# Patient Record
Sex: FEMALE | Race: WHITE | Employment: UNEMPLOYED | URBAN - METROPOLITAN AREA
[De-identification: names, ages, dates, MRNs, and addresses within clinical notes are randomized per-mention and may not be internally consistent; named-entity substitution may affect disease eponyms.]

---

## 2017-01-01 ENCOUNTER — APPOINTMENT (OUTPATIENT)
Dept: GENERAL RADIOLOGY | Age: 47
DRG: 053 | End: 2017-01-01
Payer: MEDICAID

## 2017-01-01 ENCOUNTER — HOSPITAL ENCOUNTER (EMERGENCY)
Age: 47
Discharge: HOME | DRG: 053 | End: 2017-12-28
Attending: EMERGENCY MEDICINE
Payer: MEDICAID

## 2017-01-01 ENCOUNTER — APPOINTMENT (OUTPATIENT)
Dept: CT IMAGING | Age: 47
DRG: 053 | End: 2017-01-01
Payer: MEDICAID

## 2017-01-01 ENCOUNTER — HOSPITAL ENCOUNTER (INPATIENT)
Age: 47
LOS: 5 days | Discharge: NURSING, REHAB OR LONG-TERM CARE | DRG: 053 | End: 2018-01-05
Attending: EMERGENCY MEDICINE | Admitting: FAMILY MEDICINE
Payer: MEDICAID

## 2017-01-01 VITALS
SYSTOLIC BLOOD PRESSURE: 126 MMHG | DIASTOLIC BLOOD PRESSURE: 79 MMHG | RESPIRATION RATE: 14 BRPM | TEMPERATURE: 97.2 F | HEART RATE: 107 BPM | OXYGEN SATURATION: 100 %

## 2017-01-01 DIAGNOSIS — D50.0 IRON DEFICIENCY ANEMIA DUE TO CHRONIC BLOOD LOSS: ICD-10-CM

## 2017-01-01 DIAGNOSIS — R91.8 MASS OF LOWER LOBE OF LEFT LUNG: Primary | Chronic | ICD-10-CM

## 2017-01-01 DIAGNOSIS — R56.9 SEIZURE (HCC): Primary | ICD-10-CM

## 2017-01-01 DIAGNOSIS — E87.6 HYPOKALEMIA: ICD-10-CM

## 2017-01-01 DIAGNOSIS — J18.9 PNEUMONIA OF RIGHT MIDDLE LOBE DUE TO INFECTIOUS ORGANISM: ICD-10-CM

## 2017-01-01 DIAGNOSIS — D68.2: Chronic | ICD-10-CM

## 2017-01-01 DIAGNOSIS — S03.00XA DISLOCATION OF TEMPOROMANDIBULAR JOINT, INITIAL ENCOUNTER: Primary | ICD-10-CM

## 2017-01-01 DIAGNOSIS — D64.9 NORMOCYTIC ANEMIA: Chronic | ICD-10-CM

## 2017-01-01 DIAGNOSIS — D63.8 ANEMIA, CHRONIC DISEASE: ICD-10-CM

## 2017-01-01 DIAGNOSIS — E87.1 HYPONATREMIA: ICD-10-CM

## 2017-01-01 DIAGNOSIS — R53.83 FATIGUE, UNSPECIFIED TYPE: ICD-10-CM

## 2017-01-01 DIAGNOSIS — D64.9 ANEMIA, UNSPECIFIED TYPE: ICD-10-CM

## 2017-01-01 DIAGNOSIS — R91.8 MASS OF LOWER LOBE OF LEFT LUNG: Chronic | ICD-10-CM

## 2017-01-01 LAB
-: NORMAL
ABSOLUTE EOS #: 0.12 K/UL (ref 0–0.44)
ABSOLUTE IMMATURE GRANULOCYTE: 0.15 K/UL (ref 0–0.3)
ABSOLUTE LYMPH #: 1.1 K/UL (ref 1.1–3.7)
ABSOLUTE MONO #: 1.36 K/UL (ref 0.1–1.2)
ALBUMIN SERPL-MCNC: 2.4 G/DL (ref 3.5–5.2)
ALBUMIN/GLOBULIN RATIO: 0.4 (ref 1–2.5)
ALP BLD-CCNC: 567 U/L (ref 35–104)
ALT SERPL-CCNC: 12 U/L (ref 5–33)
AMMONIA: 20 UMOL/L (ref 11–51)
AMORPHOUS: NORMAL
ANION GAP SERPL CALCULATED.3IONS-SCNC: 16 MMOL/L (ref 9–17)
AST SERPL-CCNC: 32 U/L
BACTERIA: NORMAL
BASOPHILS # BLD: 0 % (ref 0–2)
BASOPHILS ABSOLUTE: 0.06 K/UL (ref 0–0.2)
BILIRUB SERPL-MCNC: 1.16 MG/DL (ref 0.3–1.2)
BILIRUBIN URINE: NEGATIVE
BUN BLDV-MCNC: 12 MG/DL (ref 6–20)
BUN/CREAT BLD: ABNORMAL (ref 9–20)
CALCIUM SERPL-MCNC: 8.5 MG/DL (ref 8.6–10.4)
CASTS UA: NORMAL /LPF (ref 0–8)
CHLORIDE BLD-SCNC: 89 MMOL/L (ref 98–107)
CO2: 23 MMOL/L (ref 20–31)
COLOR: YELLOW
CREAT SERPL-MCNC: 0.45 MG/DL (ref 0.5–0.9)
CRYSTALS, UA: NORMAL /HPF
DIFFERENTIAL TYPE: ABNORMAL
EOSINOPHILS RELATIVE PERCENT: 1 % (ref 1–4)
EPITHELIAL CELLS UA: NORMAL /HPF (ref 0–5)
FOLATE: >20 NG/ML
GFR AFRICAN AMERICAN: >60 ML/MIN
GFR NON-AFRICAN AMERICAN: >60 ML/MIN
GFR SERPL CREATININE-BSD FRML MDRD: ABNORMAL ML/MIN/{1.73_M2}
GFR SERPL CREATININE-BSD FRML MDRD: ABNORMAL ML/MIN/{1.73_M2}
GLUCOSE BLD-MCNC: 124 MG/DL (ref 70–99)
GLUCOSE URINE: NEGATIVE
HCT VFR BLD CALC: 22.4 % (ref 36.3–47.1)
HCT VFR BLD CALC: 24.7 % (ref 36.3–47.1)
HEMOGLOBIN: 6.9 G/DL (ref 11.9–15.1)
HEMOGLOBIN: 7.8 G/DL (ref 11.9–15.1)
IMMATURE GRANULOCYTES: 1 %
INR BLD: 1.3
KETONES, URINE: NEGATIVE
LACTIC ACID, WHOLE BLOOD: 0.8 MMOL/L (ref 0.7–2.1)
LEUKOCYTE ESTERASE, URINE: NEGATIVE
LYMPHOCYTES # BLD: 6 % (ref 24–43)
MCH RBC QN AUTO: 26.2 PG (ref 25.2–33.5)
MCHC RBC AUTO-ENTMCNC: 30.8 G/DL (ref 28.4–34.8)
MCV RBC AUTO: 85.2 FL (ref 82.6–102.9)
MONOCYTES # BLD: 8 % (ref 3–12)
MUCUS: NORMAL
NITRITE, URINE: NEGATIVE
OTHER OBSERVATIONS UA: NORMAL
PARTIAL THROMBOPLASTIN TIME: 19.9 SEC (ref 21.3–31.3)
PDW BLD-RTO: 16.4 % (ref 11.8–14.4)
PH UA: 6 (ref 5–8)
PLATELET # BLD: 571 K/UL (ref 138–453)
PLATELET ESTIMATE: ABNORMAL
PMV BLD AUTO: 8.8 FL (ref 8.1–13.5)
POC TROPONIN I: 0.01 NG/ML (ref 0–0.1)
POC TROPONIN INTERP: NORMAL
POTASSIUM SERPL-SCNC: 3.1 MMOL/L (ref 3.7–5.3)
POTASSIUM SERPL-SCNC: 3.7 MMOL/L (ref 3.7–5.3)
PROLACTIN: 21.83 UG/L (ref 4.79–23.3)
PROTEIN UA: NEGATIVE
PROTHROMBIN TIME: 13.6 SEC (ref 9.4–12.6)
RBC # BLD: 2.63 M/UL (ref 3.95–5.11)
RBC # BLD: ABNORMAL 10*6/UL
RBC UA: NORMAL /HPF (ref 0–4)
RENAL EPITHELIAL, UA: NORMAL /HPF
SEG NEUTROPHILS: 84 % (ref 36–65)
SEGMENTED NEUTROPHILS ABSOLUTE COUNT: 14.43 K/UL (ref 1.5–8.1)
SODIUM BLD-SCNC: 128 MMOL/L (ref 135–144)
SODIUM BLD-SCNC: 135 MMOL/L (ref 135–144)
SODIUM BLD-SCNC: 137 MMOL/L (ref 135–144)
SODIUM BLD-SCNC: 137 MMOL/L (ref 135–144)
SPECIFIC GRAVITY UA: 1.01 (ref 1–1.03)
TOTAL PROTEIN: 7.8 G/DL (ref 6.4–8.3)
TRICHOMONAS: NORMAL
TURBIDITY: CLEAR
URINE HGB: NEGATIVE
UROBILINOGEN, URINE: NORMAL
VITAMIN B-12: 473 PG/ML (ref 232–1245)
WBC # BLD: 17.2 K/UL (ref 3.5–11.3)
WBC # BLD: ABNORMAL 10*3/UL
WBC UA: NORMAL /HPF (ref 0–5)
YEAST: NORMAL

## 2017-01-01 PROCEDURE — 96372 THER/PROPH/DIAG INJ SC/IM: CPT

## 2017-01-01 PROCEDURE — 2580000003 HC RX 258: Performed by: NURSE PRACTITIONER

## 2017-01-01 PROCEDURE — 36415 COLL VENOUS BLD VENIPUNCTURE: CPT

## 2017-01-01 PROCEDURE — 84295 ASSAY OF SERUM SODIUM: CPT

## 2017-01-01 PROCEDURE — 36430 TRANSFUSION BLD/BLD COMPNT: CPT

## 2017-01-01 PROCEDURE — 82140 ASSAY OF AMMONIA: CPT

## 2017-01-01 PROCEDURE — 81001 URINALYSIS AUTO W/SCOPE: CPT

## 2017-01-01 PROCEDURE — 99223 1ST HOSP IP/OBS HIGH 75: CPT | Performed by: FAMILY MEDICINE

## 2017-01-01 PROCEDURE — 85025 COMPLETE CBC W/AUTO DIFF WBC: CPT

## 2017-01-01 PROCEDURE — 6360000002 HC RX W HCPCS: Performed by: NURSE PRACTITIONER

## 2017-01-01 PROCEDURE — 87040 BLOOD CULTURE FOR BACTERIA: CPT

## 2017-01-01 PROCEDURE — 86901 BLOOD TYPING SEROLOGIC RH(D): CPT

## 2017-01-01 PROCEDURE — 80307 DRUG TEST PRSMV CHEM ANLYZR: CPT

## 2017-01-01 PROCEDURE — 98925 OSTEOPATH MANJ 1-2 REGIONS: CPT

## 2017-01-01 PROCEDURE — 6360000002 HC RX W HCPCS: Performed by: FAMILY MEDICINE

## 2017-01-01 PROCEDURE — 6360000002 HC RX W HCPCS: Performed by: EMERGENCY MEDICINE

## 2017-01-01 PROCEDURE — 70110 X-RAY EXAM OF JAW 4/> VIEWS: CPT

## 2017-01-01 PROCEDURE — 87086 URINE CULTURE/COLONY COUNT: CPT

## 2017-01-01 PROCEDURE — G0382 LEV 3 HOSP TYPE B ED VISIT: HCPCS

## 2017-01-01 PROCEDURE — 99285 EMERGENCY DEPT VISIT HI MDM: CPT

## 2017-01-01 PROCEDURE — 84484 ASSAY OF TROPONIN QUANT: CPT

## 2017-01-01 PROCEDURE — 70450 CT HEAD/BRAIN W/O DYE: CPT

## 2017-01-01 PROCEDURE — 94640 AIRWAY INHALATION TREATMENT: CPT

## 2017-01-01 PROCEDURE — 86900 BLOOD TYPING SEROLOGIC ABO: CPT

## 2017-01-01 PROCEDURE — 2580000003 HC RX 258: Performed by: EMERGENCY MEDICINE

## 2017-01-01 PROCEDURE — 86850 RBC ANTIBODY SCREEN: CPT

## 2017-01-01 PROCEDURE — 70100 X-RAY EXAM OF JAW <4VIEWS: CPT

## 2017-01-01 PROCEDURE — 6370000000 HC RX 637 (ALT 250 FOR IP): Performed by: FAMILY MEDICINE

## 2017-01-01 PROCEDURE — 85730 THROMBOPLASTIN TIME PARTIAL: CPT

## 2017-01-01 PROCEDURE — 84132 ASSAY OF SERUM POTASSIUM: CPT

## 2017-01-01 PROCEDURE — 85014 HEMATOCRIT: CPT

## 2017-01-01 PROCEDURE — 6370000000 HC RX 637 (ALT 250 FOR IP): Performed by: NURSE PRACTITIONER

## 2017-01-01 PROCEDURE — 80053 COMPREHEN METABOLIC PANEL: CPT

## 2017-01-01 PROCEDURE — 94762 N-INVAS EAR/PLS OXIMTRY CONT: CPT

## 2017-01-01 PROCEDURE — 85610 PROTHROMBIN TIME: CPT

## 2017-01-01 PROCEDURE — 86920 COMPATIBILITY TEST SPIN: CPT

## 2017-01-01 PROCEDURE — 2580000003 HC RX 258: Performed by: FAMILY MEDICINE

## 2017-01-01 PROCEDURE — 2060000000 HC ICU INTERMEDIATE R&B

## 2017-01-01 PROCEDURE — 2500000003 HC RX 250 WO HCPCS: Performed by: FAMILY MEDICINE

## 2017-01-01 PROCEDURE — 83605 ASSAY OF LACTIC ACID: CPT

## 2017-01-01 PROCEDURE — 82746 ASSAY OF FOLIC ACID SERUM: CPT

## 2017-01-01 PROCEDURE — 84146 ASSAY OF PROLACTIN: CPT

## 2017-01-01 PROCEDURE — 82607 VITAMIN B-12: CPT

## 2017-01-01 PROCEDURE — 71010 XR CHEST PORTABLE: CPT

## 2017-01-01 PROCEDURE — 85018 HEMOGLOBIN: CPT

## 2017-01-01 PROCEDURE — 99255 IP/OBS CONSLTJ NEW/EST HI 80: CPT | Performed by: PSYCHIATRY & NEUROLOGY

## 2017-01-01 PROCEDURE — P9040 RBC LEUKOREDUCED IRRADIATED: HCPCS

## 2017-01-01 RX ORDER — PROMETHAZINE HYDROCHLORIDE 25 MG/1
12.5 TABLET ORAL EVERY 6 HOURS PRN
Status: DISCONTINUED | OUTPATIENT
Start: 2017-01-01 | End: 2018-01-01 | Stop reason: HOSPADM

## 2017-01-01 RX ORDER — LORAZEPAM 2 MG/ML
2 INJECTION INTRAMUSCULAR EVERY 4 HOURS PRN
Status: DISCONTINUED | OUTPATIENT
Start: 2017-01-01 | End: 2018-01-01

## 2017-01-01 RX ORDER — MORPHINE SULFATE 10 MG/ML
2 INJECTION, SOLUTION INTRAMUSCULAR; INTRAVENOUS ONCE
Status: COMPLETED | OUTPATIENT
Start: 2017-01-01 | End: 2017-01-01

## 2017-01-01 RX ORDER — OXYCODONE HYDROCHLORIDE AND ACETAMINOPHEN 5; 325 MG/1; MG/1
1 TABLET ORAL EVERY 6 HOURS PRN
Qty: 40 TABLET | Refills: 0 | Status: ON HOLD | OUTPATIENT
Start: 2017-01-01 | End: 2018-01-01

## 2017-01-01 RX ORDER — POTASSIUM CHLORIDE 7.45 MG/ML
10 INJECTION INTRAVENOUS PRN
Status: DISCONTINUED | OUTPATIENT
Start: 2017-01-01 | End: 2018-01-01 | Stop reason: HOSPADM

## 2017-01-01 RX ORDER — ASPIRIN 81 MG/1
81 TABLET, CHEWABLE ORAL DAILY
Status: DISCONTINUED | OUTPATIENT
Start: 2017-01-01 | End: 2018-01-01 | Stop reason: HOSPADM

## 2017-01-01 RX ORDER — CETIRIZINE HYDROCHLORIDE 10 MG/1
10 TABLET ORAL DAILY
Status: DISCONTINUED | OUTPATIENT
Start: 2017-01-01 | End: 2018-01-01 | Stop reason: HOSPADM

## 2017-01-01 RX ORDER — SODIUM CHLORIDE 0.9 % (FLUSH) 0.9 %
10 SYRINGE (ML) INJECTION PRN
Status: DISCONTINUED | OUTPATIENT
Start: 2017-01-01 | End: 2018-01-01 | Stop reason: HOSPADM

## 2017-01-01 RX ORDER — IPRATROPIUM BROMIDE AND ALBUTEROL SULFATE 2.5; .5 MG/3ML; MG/3ML
1 SOLUTION RESPIRATORY (INHALATION)
Status: DISCONTINUED | OUTPATIENT
Start: 2017-01-01 | End: 2017-01-01

## 2017-01-01 RX ORDER — 0.9 % SODIUM CHLORIDE 0.9 %
250 INTRAVENOUS SOLUTION INTRAVENOUS ONCE
Status: COMPLETED | OUTPATIENT
Start: 2017-01-01 | End: 2018-01-01

## 2017-01-01 RX ORDER — POTASSIUM CHLORIDE 20 MEQ/1
40 TABLET, EXTENDED RELEASE ORAL PRN
Status: DISCONTINUED | OUTPATIENT
Start: 2017-01-01 | End: 2018-01-01 | Stop reason: HOSPADM

## 2017-01-01 RX ORDER — ALBUTEROL SULFATE 2.5 MG/3ML
2.5 SOLUTION RESPIRATORY (INHALATION) EVERY 4 HOURS PRN
Status: DISCONTINUED | OUTPATIENT
Start: 2017-01-01 | End: 2018-01-01 | Stop reason: HOSPADM

## 2017-01-01 RX ORDER — LEVOFLOXACIN 5 MG/ML
750 INJECTION, SOLUTION INTRAVENOUS EVERY 24 HOURS
Status: DISCONTINUED | OUTPATIENT
Start: 2017-01-01 | End: 2017-01-01

## 2017-01-01 RX ORDER — POTASSIUM CHLORIDE 20 MEQ/1
40 TABLET, EXTENDED RELEASE ORAL ONCE
Status: DISCONTINUED | OUTPATIENT
Start: 2017-01-01 | End: 2018-01-01 | Stop reason: HOSPADM

## 2017-01-01 RX ORDER — ONDANSETRON 2 MG/ML
4 INJECTION INTRAMUSCULAR; INTRAVENOUS EVERY 6 HOURS PRN
Status: DISCONTINUED | OUTPATIENT
Start: 2017-01-01 | End: 2018-01-01 | Stop reason: HOSPADM

## 2017-01-01 RX ORDER — SODIUM CHLORIDE 9 MG/ML
INJECTION, SOLUTION INTRAVENOUS CONTINUOUS
Status: DISCONTINUED | OUTPATIENT
Start: 2017-01-01 | End: 2017-01-01

## 2017-01-01 RX ORDER — MORPHINE SULFATE 10 MG/ML
2 INJECTION, SOLUTION INTRAMUSCULAR; INTRAVENOUS ONCE
Status: DISCONTINUED | OUTPATIENT
Start: 2017-01-01 | End: 2017-01-01

## 2017-01-01 RX ORDER — ECHINACEA PURPUREA EXTRACT 125 MG
1 TABLET ORAL PRN
Status: DISCONTINUED | OUTPATIENT
Start: 2017-01-01 | End: 2018-01-01 | Stop reason: HOSPADM

## 2017-01-01 RX ORDER — GABAPENTIN 300 MG/1
300 CAPSULE ORAL NIGHTLY
Status: DISCONTINUED | OUTPATIENT
Start: 2017-01-01 | End: 2018-01-01 | Stop reason: HOSPADM

## 2017-01-01 RX ORDER — ALBUTEROL SULFATE 2.5 MG/3ML
2.5 SOLUTION RESPIRATORY (INHALATION)
Status: DISCONTINUED | OUTPATIENT
Start: 2017-01-01 | End: 2018-01-01

## 2017-01-01 RX ORDER — NICOTINE 21 MG/24HR
1 PATCH, TRANSDERMAL 24 HOURS TRANSDERMAL DAILY PRN
Status: DISCONTINUED | OUTPATIENT
Start: 2017-01-01 | End: 2018-01-01 | Stop reason: HOSPADM

## 2017-01-01 RX ORDER — CITALOPRAM 20 MG/1
40 TABLET ORAL DAILY
Status: DISCONTINUED | OUTPATIENT
Start: 2017-01-01 | End: 2018-01-01 | Stop reason: HOSPADM

## 2017-01-01 RX ORDER — LORAZEPAM 2 MG/ML
1 INJECTION INTRAMUSCULAR EVERY 6 HOURS PRN
Status: DISCONTINUED | OUTPATIENT
Start: 2017-01-01 | End: 2018-01-01

## 2017-01-01 RX ORDER — PANTOPRAZOLE SODIUM 40 MG/1
40 TABLET, DELAYED RELEASE ORAL DAILY
Status: DISCONTINUED | OUTPATIENT
Start: 2017-01-01 | End: 2018-01-01 | Stop reason: HOSPADM

## 2017-01-01 RX ORDER — POTASSIUM CHLORIDE 20MEQ/15ML
40 LIQUID (ML) ORAL PRN
Status: DISCONTINUED | OUTPATIENT
Start: 2017-01-01 | End: 2018-01-01 | Stop reason: HOSPADM

## 2017-01-01 RX ORDER — VANCOMYCIN HYDROCHLORIDE 1 G/200ML
1000 INJECTION, SOLUTION INTRAVENOUS EVERY 12 HOURS
Status: DISCONTINUED | OUTPATIENT
Start: 2017-01-01 | End: 2017-01-01

## 2017-01-01 RX ORDER — SODIUM CHLORIDE 9 MG/ML
INJECTION, SOLUTION INTRAVENOUS CONTINUOUS
Status: DISCONTINUED | OUTPATIENT
Start: 2017-01-01 | End: 2018-01-01 | Stop reason: HOSPADM

## 2017-01-01 RX ORDER — DICYCLOMINE HYDROCHLORIDE 10 MG/1
10 CAPSULE ORAL
Status: DISCONTINUED | OUTPATIENT
Start: 2017-01-01 | End: 2018-01-01 | Stop reason: HOSPADM

## 2017-01-01 RX ORDER — FLUTICASONE PROPIONATE 50 MCG
1 SPRAY, SUSPENSION (ML) NASAL DAILY
Status: DISCONTINUED | OUTPATIENT
Start: 2017-01-01 | End: 2018-01-01 | Stop reason: HOSPADM

## 2017-01-01 RX ORDER — BISACODYL 10 MG
10 SUPPOSITORY, RECTAL RECTAL DAILY PRN
Status: DISCONTINUED | OUTPATIENT
Start: 2017-01-01 | End: 2018-01-01 | Stop reason: HOSPADM

## 2017-01-01 RX ORDER — SODIUM CHLORIDE, SODIUM LACTATE, POTASSIUM CHLORIDE, AND CALCIUM CHLORIDE .6; .31; .03; .02 G/100ML; G/100ML; G/100ML; G/100ML
250 INJECTION, SOLUTION INTRAVENOUS ONCE
Status: COMPLETED | OUTPATIENT
Start: 2017-01-01 | End: 2017-01-01

## 2017-01-01 RX ORDER — SODIUM CHLORIDE 0.9 % (FLUSH) 0.9 %
10 SYRINGE (ML) INJECTION EVERY 12 HOURS SCHEDULED
Status: DISCONTINUED | OUTPATIENT
Start: 2017-01-01 | End: 2018-01-01 | Stop reason: HOSPADM

## 2017-01-01 RX ORDER — FOLIC ACID 1 MG/1
1 TABLET ORAL DAILY
Status: DISCONTINUED | OUTPATIENT
Start: 2017-01-01 | End: 2018-01-01 | Stop reason: HOSPADM

## 2017-01-01 RX ORDER — DOCUSATE SODIUM 100 MG/1
100 CAPSULE, LIQUID FILLED ORAL 2 TIMES DAILY
Status: DISCONTINUED | OUTPATIENT
Start: 2017-01-01 | End: 2018-01-01 | Stop reason: HOSPADM

## 2017-01-01 RX ADMIN — DICYCLOMINE HYDROCHLORIDE 10 MG: 10 CAPSULE ORAL at 21:25

## 2017-01-01 RX ADMIN — Medication 1 TABLET: at 09:09

## 2017-01-01 RX ADMIN — SODIUM CHLORIDE 250 ML: 9 INJECTION, SOLUTION INTRAVENOUS at 10:47

## 2017-01-01 RX ADMIN — PROMETHAZINE HYDROCHLORIDE 12.5 MG: 25 TABLET ORAL at 09:09

## 2017-01-01 RX ADMIN — OLODATEROL RESPIMAT INHALATION SPRAY 2 PUFF: 2.5 SPRAY, METERED RESPIRATORY (INHALATION) at 11:52

## 2017-01-01 RX ADMIN — SODIUM CHLORIDE, POTASSIUM CHLORIDE, SODIUM LACTATE AND CALCIUM CHLORIDE 250 ML: 600; 310; 30; 20 INJECTION, SOLUTION INTRAVENOUS at 02:30

## 2017-01-01 RX ADMIN — SODIUM CHLORIDE: 9 INJECTION, SOLUTION INTRAVENOUS at 09:10

## 2017-01-01 RX ADMIN — DOCUSATE SODIUM 100 MG: 100 CAPSULE ORAL at 09:08

## 2017-01-01 RX ADMIN — ENOXAPARIN SODIUM 40 MG: 40 INJECTION SUBCUTANEOUS at 09:10

## 2017-01-01 RX ADMIN — MORPHINE SULFATE 2 MG: 10 INJECTION, SOLUTION INTRAMUSCULAR; INTRAVENOUS at 19:36

## 2017-01-01 RX ADMIN — POTASSIUM CHLORIDE 10 MEQ: 7.46 INJECTION, SOLUTION INTRAVENOUS at 14:44

## 2017-01-01 RX ADMIN — SODIUM CHLORIDE: 9 INJECTION, SOLUTION INTRAVENOUS at 14:44

## 2017-01-01 RX ADMIN — GABAPENTIN 300 MG: 300 CAPSULE ORAL at 21:25

## 2017-01-01 RX ADMIN — DOCUSATE SODIUM 100 MG: 100 CAPSULE ORAL at 21:25

## 2017-01-01 RX ADMIN — FLUTICASONE PROPIONATE 1 SPRAY: 50 SPRAY, METERED NASAL at 11:50

## 2017-01-01 RX ADMIN — FOLIC ACID 1 MG: 1 TABLET ORAL at 09:09

## 2017-01-01 RX ADMIN — POTASSIUM CHLORIDE 10 MEQ: 7.46 INJECTION, SOLUTION INTRAVENOUS at 17:06

## 2017-01-01 RX ADMIN — CITALOPRAM 40 MG: 20 TABLET, FILM COATED ORAL at 09:08

## 2017-01-01 RX ADMIN — POTASSIUM CHLORIDE 10 MEQ: 7.46 INJECTION, SOLUTION INTRAVENOUS at 11:51

## 2017-01-01 RX ADMIN — ASPIRIN 81 MG: 81 TABLET, CHEWABLE ORAL at 09:08

## 2017-01-01 RX ADMIN — TAZOBACTAM SODIUM AND PIPERACILLIN SODIUM 3.38 G: 375; 3 INJECTION, SOLUTION INTRAVENOUS at 21:23

## 2017-01-01 RX ADMIN — TAZOBACTAM SODIUM AND PIPERACILLIN SODIUM 3.38 G: 375; 3 INJECTION, SOLUTION INTRAVENOUS at 11:51

## 2017-01-01 RX ADMIN — TIOTROPIUM BROMIDE 18 MCG: 18 CAPSULE ORAL; RESPIRATORY (INHALATION) at 11:52

## 2017-01-01 RX ADMIN — PANTOPRAZOLE SODIUM 40 MG: 40 TABLET, DELAYED RELEASE ORAL at 09:08

## 2017-01-01 RX ADMIN — POTASSIUM CHLORIDE 10 MEQ: 7.46 INJECTION, SOLUTION INTRAVENOUS at 13:29

## 2017-01-01 RX ADMIN — VANCOMYCIN HYDROCHLORIDE 1000 MG: 1 INJECTION, SOLUTION INTRAVENOUS at 05:41

## 2017-01-01 RX ADMIN — WATER 2 G: 1 INJECTION INTRAMUSCULAR; INTRAVENOUS; SUBCUTANEOUS at 05:40

## 2017-01-01 RX ADMIN — CETIRIZINE HYDROCHLORIDE 10 MG: 10 TABLET ORAL at 09:09

## 2017-01-01 RX ADMIN — DICYCLOMINE HYDROCHLORIDE 10 MG: 10 CAPSULE ORAL at 09:09

## 2017-01-01 ASSESSMENT — PAIN DESCRIPTION - DESCRIPTORS: DESCRIPTORS: ACHING

## 2017-01-01 ASSESSMENT — ENCOUNTER SYMPTOMS
DIARRHEA: 0
ABDOMINAL PAIN: 0
NAUSEA: 0
CONSTIPATION: 0
VOMITING: 0
SHORTNESS OF BREATH: 0
SORE THROAT: 0
CHEST TIGHTNESS: 0

## 2017-01-01 ASSESSMENT — PAIN DESCRIPTION - PAIN TYPE: TYPE: ACUTE PAIN

## 2017-01-01 ASSESSMENT — PAIN DESCRIPTION - ORIENTATION: ORIENTATION: RIGHT;LEFT

## 2017-01-01 ASSESSMENT — PAIN SCALES - GENERAL
PAINLEVEL_OUTOF10: 0
PAINLEVEL_OUTOF10: 10
PAINLEVEL_OUTOF10: 0
PAINLEVEL_OUTOF10: 10
PAINLEVEL_OUTOF10: 0

## 2017-01-01 ASSESSMENT — PAIN DESCRIPTION - LOCATION: LOCATION: JAW

## 2017-01-01 ASSESSMENT — PAIN DESCRIPTION - FREQUENCY: FREQUENCY: CONTINUOUS

## 2017-01-01 ASSESSMENT — PAIN DESCRIPTION - ONSET: ONSET: SUDDEN

## 2017-01-13 ENCOUNTER — OFFICE VISIT (OUTPATIENT)
Dept: PULMONOLOGY | Facility: CLINIC | Age: 47
End: 2017-01-13

## 2017-01-13 VITALS
SYSTOLIC BLOOD PRESSURE: 133 MMHG | RESPIRATION RATE: 16 BRPM | WEIGHT: 177 LBS | HEART RATE: 115 BPM | TEMPERATURE: 99.5 F | OXYGEN SATURATION: 94 % | DIASTOLIC BLOOD PRESSURE: 80 MMHG | HEIGHT: 60 IN | BODY MASS INDEX: 34.75 KG/M2

## 2017-01-13 DIAGNOSIS — J84.116 CRYPTOGENIC ORGANIZING PNEUMONIA (HCC): Primary | ICD-10-CM

## 2017-01-13 DIAGNOSIS — J06.9 VIRAL UPPER RESPIRATORY TRACT INFECTION: ICD-10-CM

## 2017-01-13 DIAGNOSIS — R91.8 MULTIPLE LUNG NODULES ON CT: ICD-10-CM

## 2017-01-13 PROCEDURE — 99214 OFFICE O/P EST MOD 30 MIN: CPT | Performed by: INTERNAL MEDICINE

## 2017-01-13 RX ORDER — AZITHROMYCIN 250 MG/1
TABLET, FILM COATED ORAL
Qty: 6 TABLET | Refills: 0 | Status: SHIPPED | OUTPATIENT
Start: 2017-01-13 | End: 2017-01-18 | Stop reason: ALTCHOICE

## 2017-01-13 RX ORDER — ALBUTEROL SULFATE 90 UG/1
2 AEROSOL, METERED RESPIRATORY (INHALATION) EVERY 6 HOURS PRN
Qty: 1 INHALER | Refills: 3 | Status: SHIPPED | OUTPATIENT
Start: 2017-01-13 | End: 2017-05-17 | Stop reason: SDUPTHER

## 2017-01-18 ENCOUNTER — OFFICE VISIT (OUTPATIENT)
Dept: INTERNAL MEDICINE | Facility: CLINIC | Age: 47
End: 2017-01-18

## 2017-01-18 VITALS
DIASTOLIC BLOOD PRESSURE: 84 MMHG | HEIGHT: 60 IN | WEIGHT: 176 LBS | BODY MASS INDEX: 34.55 KG/M2 | HEART RATE: 114 BPM | SYSTOLIC BLOOD PRESSURE: 144 MMHG

## 2017-01-18 DIAGNOSIS — J84.116 CRYPTOGENIC ORGANIZING PNEUMONIA (HCC): Primary | Chronic | ICD-10-CM

## 2017-01-18 DIAGNOSIS — M25.551 CHRONIC RIGHT HIP PAIN: ICD-10-CM

## 2017-01-18 DIAGNOSIS — I10 ESSENTIAL HYPERTENSION: ICD-10-CM

## 2017-01-18 DIAGNOSIS — M81.0 OSTEOPOROSIS: ICD-10-CM

## 2017-01-18 DIAGNOSIS — G89.29 CHRONIC RIGHT HIP PAIN: ICD-10-CM

## 2017-01-18 DIAGNOSIS — F32.A DEPRESSION, UNSPECIFIED DEPRESSION TYPE: Chronic | ICD-10-CM

## 2017-01-18 PROCEDURE — 99213 OFFICE O/P EST LOW 20 MIN: CPT | Performed by: INTERNAL MEDICINE

## 2017-01-18 RX ORDER — ONDANSETRON 4 MG/1
4 TABLET, FILM COATED ORAL EVERY 8 HOURS PRN
Qty: 30 TABLET | Refills: 0 | Status: SHIPPED | OUTPATIENT
Start: 2017-01-18 | End: 2017-05-09

## 2017-01-18 RX ORDER — GUAIFENESIN/DEXTROMETHORPHAN 100-10MG/5
5 SYRUP ORAL 3 TIMES DAILY PRN
Qty: 120 ML | Refills: 0 | Status: SHIPPED | OUTPATIENT
Start: 2017-01-18 | End: 2017-01-28

## 2017-01-19 ENCOUNTER — TELEPHONE (OUTPATIENT)
Dept: INTERNAL MEDICINE | Facility: CLINIC | Age: 47
End: 2017-01-19

## 2017-01-19 PROBLEM — M81.0 OSTEOPOROSIS: Status: ACTIVE | Noted: 2017-01-19

## 2017-01-19 RX ORDER — PANTOPRAZOLE SODIUM 40 MG/1
TABLET, DELAYED RELEASE ORAL
Qty: 30 TABLET | Refills: 5 | Status: ON HOLD | OUTPATIENT
Start: 2017-01-19 | End: 2017-06-13 | Stop reason: HOSPADM

## 2017-01-19 RX ORDER — LORATADINE 10 MG/1
TABLET ORAL
Qty: 30 TABLET | Refills: 5 | Status: SHIPPED | OUTPATIENT
Start: 2017-01-19 | End: 2017-07-11 | Stop reason: SDUPTHER

## 2017-01-19 RX ORDER — HYDROCHLOROTHIAZIDE 25 MG/1
TABLET ORAL
Qty: 30 TABLET | Refills: 0 | OUTPATIENT
Start: 2017-01-19

## 2017-01-19 RX ORDER — DOCUSATE SODIUM 100 MG/1
CAPSULE ORAL
Qty: 60 CAPSULE | Refills: 5 | Status: ON HOLD | OUTPATIENT
Start: 2017-01-19 | End: 2017-05-24 | Stop reason: HOSPADM

## 2017-01-19 RX ORDER — SPIRONOLACTONE 25 MG/1
TABLET ORAL
Qty: 30 TABLET | Refills: 5 | Status: ON HOLD | OUTPATIENT
Start: 2017-01-19 | End: 2017-05-24 | Stop reason: HOSPADM

## 2017-01-19 RX ORDER — FERROUS SULFATE 325(65) MG
TABLET ORAL
Qty: 60 TABLET | Refills: 5 | Status: ON HOLD | OUTPATIENT
Start: 2017-01-19 | End: 2017-06-13

## 2017-01-19 RX ORDER — SENNA-LAX 8.6 MG/1
TABLET ORAL
Qty: 60 TABLET | Refills: 5 | Status: SHIPPED | OUTPATIENT
Start: 2017-01-19 | End: 2017-07-11 | Stop reason: SDUPTHER

## 2017-01-19 RX ORDER — CITALOPRAM 40 MG/1
TABLET ORAL
Qty: 30 TABLET | Refills: 5 | Status: SHIPPED | OUTPATIENT
Start: 2017-01-19 | End: 2017-07-11 | Stop reason: SDUPTHER

## 2017-01-19 RX ORDER — FOLIC ACID 1 MG/1
TABLET ORAL
Qty: 30 TABLET | Refills: 5 | Status: SHIPPED | OUTPATIENT
Start: 2017-01-19 | End: 2017-07-11 | Stop reason: SDUPTHER

## 2017-01-26 ENCOUNTER — TELEPHONE (OUTPATIENT)
Dept: PULMONOLOGY | Facility: CLINIC | Age: 47
End: 2017-01-26

## 2017-02-02 ENCOUNTER — HOSPITAL ENCOUNTER (OUTPATIENT)
Dept: ONCOLOGY | Age: 47
Setting detail: OBSERVATION
LOS: 1 days | Discharge: HOME | DRG: 249 | End: 2017-02-04
Attending: EMERGENCY MEDICINE | Admitting: INTERNAL MEDICINE
Payer: MEDICAID

## 2017-02-02 DIAGNOSIS — R79.89 ELEVATED SERUM CREATININE: ICD-10-CM

## 2017-02-02 DIAGNOSIS — K56.7 ILEUS (HCC): Primary | ICD-10-CM

## 2017-02-02 DIAGNOSIS — R11.2 NAUSEA AND VOMITING, INTRACTABILITY OF VOMITING NOT SPECIFIED, UNSPECIFIED VOMITING TYPE: ICD-10-CM

## 2017-02-02 DIAGNOSIS — E86.0 DEHYDRATION: ICD-10-CM

## 2017-02-02 PROCEDURE — 9990 CHARGE CONVERSION

## 2017-02-02 PROCEDURE — 99282 EMERGENCY DEPT VISIT SF MDM: CPT

## 2017-02-02 PROCEDURE — 96374 THER/PROPH/DIAG INJ IV PUSH: CPT

## 2017-02-02 PROCEDURE — 99285 EMERGENCY DEPT VISIT HI MDM: CPT

## 2017-02-02 RX ORDER — ONDANSETRON 2 MG/ML
4 INJECTION INTRAMUSCULAR; INTRAVENOUS ONCE
Status: COMPLETED | OUTPATIENT
Start: 2017-02-02 | End: 2017-02-03

## 2017-02-02 ASSESSMENT — PAIN DESCRIPTION - LOCATION: LOCATION: ABDOMEN

## 2017-02-02 ASSESSMENT — PAIN SCALES - GENERAL: PAINLEVEL_OUTOF10: 3

## 2017-02-02 ASSESSMENT — PAIN DESCRIPTION - ORIENTATION: ORIENTATION: LEFT;MID

## 2017-02-03 VITALS
HEIGHT: 60 IN | DIASTOLIC BLOOD PRESSURE: 65 MMHG | OXYGEN SATURATION: 94 % | HEART RATE: 91 BPM | TEMPERATURE: 98.3 F | WEIGHT: 174.16 LBS | RESPIRATION RATE: 15 BRPM | BODY MASS INDEX: 34.19 KG/M2 | SYSTOLIC BLOOD PRESSURE: 112 MMHG

## 2017-02-03 PROBLEM — D72.829 LEUCOCYTOSIS: Status: ACTIVE | Noted: 2017-02-03

## 2017-02-03 PROBLEM — M81.0 OSTEOPOROSIS: Status: RESOLVED | Noted: 2017-01-19 | Resolved: 2017-02-03

## 2017-02-03 PROBLEM — R11.2 NAUSEA & VOMITING: Status: ACTIVE | Noted: 2017-02-03

## 2017-02-03 LAB
-: ABNORMAL
ABSOLUTE EOS #: 0 K/UL (ref 0–0.4)
ABSOLUTE EOS #: 0.47 K/UL (ref 0–0.4)
ABSOLUTE LYMPH #: 2.56 K/UL (ref 1–4.8)
ABSOLUTE LYMPH #: 5.65 K/UL (ref 1–4.8)
ABSOLUTE MONO #: 1.61 K/UL (ref 0.1–0.8)
ABSOLUTE MONO #: 2.1 K/UL (ref 0.1–1.2)
ABSOLUTE RETIC #: NORMAL M/UL (ref 0.02–0.1)
ALBUMIN SERPL-MCNC: 3.1 G/DL (ref 3.5–5.2)
ALBUMIN SERPL-MCNC: 3.6 G/DL (ref 3.5–5.2)
ALBUMIN/GLOBULIN RATIO: 0.8 (ref 1–2.5)
ALBUMIN/GLOBULIN RATIO: 0.8 (ref 1–2.5)
ALP BLD-CCNC: 231 U/L (ref 35–104)
ALP BLD-CCNC: 277 U/L (ref 35–104)
ALT SERPL-CCNC: 26 U/L (ref 5–33)
ALT SERPL-CCNC: 30 U/L (ref 5–33)
AMORPHOUS: ABNORMAL
ANION GAP SERPL CALCULATED.3IONS-SCNC: 16 MMOL/L (ref 9–17)
ANION GAP SERPL CALCULATED.3IONS-SCNC: 16 MMOL/L (ref 9–17)
AST SERPL-CCNC: 21 U/L
AST SERPL-CCNC: 27 U/L
BACTERIA: ABNORMAL
BASOPHILS # BLD: 0 % (ref 0–2)
BASOPHILS # BLD: 0 % (ref 0–2)
BASOPHILS ABSOLUTE: 0 K/UL (ref 0–0.2)
BASOPHILS ABSOLUTE: 0 K/UL (ref 0–0.2)
BILIRUB SERPL-MCNC: 0.17 MG/DL (ref 0.3–1.2)
BILIRUB SERPL-MCNC: 0.19 MG/DL (ref 0.3–1.2)
BILIRUBIN DIRECT: <0.08 MG/DL
BILIRUBIN URINE: NEGATIVE
BILIRUBIN URINE: NEGATIVE
BILIRUBIN, INDIRECT: ABNORMAL MG/DL (ref 0–1)
BUN BLDV-MCNC: 19 MG/DL (ref 6–20)
BUN BLDV-MCNC: 22 MG/DL (ref 6–20)
BUN/CREAT BLD: ABNORMAL (ref 9–20)
BUN/CREAT BLD: ABNORMAL (ref 9–20)
CALCIUM SERPL-MCNC: 10.3 MG/DL (ref 8.6–10.4)
CALCIUM SERPL-MCNC: 9.3 MG/DL (ref 8.6–10.4)
CASTS UA: ABNORMAL /LPF (ref 0–2)
CHLORIDE BLD-SCNC: 101 MMOL/L (ref 98–107)
CHLORIDE BLD-SCNC: 96 MMOL/L (ref 98–107)
CO2: 19 MMOL/L (ref 20–31)
CO2: 24 MMOL/L (ref 20–31)
COLOR: YELLOW
COLOR: YELLOW
COMMENT UA: ABNORMAL
COMMENT UA: ABNORMAL
CREAT SERPL-MCNC: 1 MG/DL (ref 0.5–0.9)
CREAT SERPL-MCNC: 1.18 MG/DL (ref 0.5–0.9)
CRYSTALS, UA: ABNORMAL /HPF
DIFFERENTIAL TYPE: ABNORMAL
DIFFERENTIAL TYPE: ABNORMAL
DIRECT EXAM: NORMAL
EOSINOPHILS RELATIVE PERCENT: 0 % (ref 1–4)
EOSINOPHILS RELATIVE PERCENT: 2 % (ref 1–4)
EPITHELIAL CELLS UA: ABNORMAL /HPF (ref 0–5)
GFR AFRICAN AMERICAN: 59 ML/MIN
GFR AFRICAN AMERICAN: >60 ML/MIN
GFR NON-AFRICAN AMERICAN: 49 ML/MIN
GFR NON-AFRICAN AMERICAN: 59 ML/MIN
GFR SERPL CREATININE-BSD FRML MDRD: ABNORMAL ML/MIN/{1.73_M2}
GLOBULIN: ABNORMAL G/DL (ref 1.5–3.8)
GLUCOSE BLD-MCNC: 121 MG/DL (ref 70–99)
GLUCOSE BLD-MCNC: 139 MG/DL (ref 70–99)
GLUCOSE URINE: NEGATIVE
GLUCOSE URINE: NEGATIVE
HCG(URINE) PREGNANCY TEST: NEGATIVE
HCT VFR BLD CALC: 39.2 % (ref 36–46)
HCT VFR BLD CALC: 45.6 % (ref 36–46)
HEMOGLOBIN: 12.7 G/DL (ref 12–16)
HEMOGLOBIN: 14.6 G/DL (ref 12–16)
KETONES, URINE: NEGATIVE
KETONES, URINE: NEGATIVE
LACTATE DEHYDROGENASE: 165 U/L (ref 135–214)
LACTIC ACID, WHOLE BLOOD: 1.3 MMOL/L (ref 0.7–2.1)
LEUKOCYTE ESTERASE, URINE: ABNORMAL
LEUKOCYTE ESTERASE, URINE: NEGATIVE
LIPASE: 47 U/L (ref 13–60)
LYMPHOCYTES # BLD: 11 % (ref 24–44)
LYMPHOCYTES # BLD: 21 % (ref 24–44)
Lab: NORMAL
Lab: NORMAL
MCH RBC QN AUTO: 29.6 PG (ref 26–34)
MCH RBC QN AUTO: 29.7 PG (ref 26–34)
MCHC RBC AUTO-ENTMCNC: 32.1 G/DL (ref 31–37)
MCHC RBC AUTO-ENTMCNC: 32.4 G/DL (ref 31–37)
MCV RBC AUTO: 91.8 FL (ref 80–100)
MCV RBC AUTO: 92.2 FL (ref 80–100)
MONOCYTES # BLD: 6 % (ref 1–7)
MONOCYTES # BLD: 9 % (ref 2–11)
MORPHOLOGY: ABNORMAL
MORPHOLOGY: ABNORMAL
MUCUS: ABNORMAL
NITRITE, URINE: NEGATIVE
NITRITE, URINE: NEGATIVE
OTHER OBSERVATIONS UA: ABNORMAL
PDW BLD-RTO: 18 % (ref 12.5–15.4)
PDW BLD-RTO: 18.4 % (ref 12.5–15.4)
PH UA: 5.5 (ref 5–8)
PH UA: >9 (ref 5–8)
PLATELET # BLD: 529 K/UL (ref 140–450)
PLATELET # BLD: 546 K/UL (ref 140–450)
PLATELET ESTIMATE: ABNORMAL
PLATELET ESTIMATE: ABNORMAL
PMV BLD AUTO: 7 FL (ref 6–12)
PMV BLD AUTO: 7.1 FL (ref 6–12)
POTASSIUM SERPL-SCNC: 4.2 MMOL/L (ref 3.7–5.3)
POTASSIUM SERPL-SCNC: 4.4 MMOL/L (ref 3.7–5.3)
PROTEIN UA: ABNORMAL
PROTEIN UA: NEGATIVE
RBC # BLD: 4.27 M/UL (ref 4–5.2)
RBC # BLD: 4.95 M/UL (ref 4–5.2)
RBC # BLD: ABNORMAL 10*6/UL
RBC # BLD: ABNORMAL 10*6/UL
RBC UA: ABNORMAL /HPF (ref 0–2)
RENAL EPITHELIAL, UA: ABNORMAL /HPF
RETIC %: 2 % (ref 0.5–2)
SEG NEUTROPHILS: 73 % (ref 36–66)
SEG NEUTROPHILS: 78 % (ref 36–66)
SEGMENTED NEUTROPHILS ABSOLUTE COUNT: 18.17 K/UL (ref 1.8–7.7)
SEGMENTED NEUTROPHILS ABSOLUTE COUNT: 19.64 K/UL (ref 1.8–7.7)
SODIUM BLD-SCNC: 136 MMOL/L (ref 135–144)
SODIUM BLD-SCNC: 136 MMOL/L (ref 135–144)
SPECIFIC GRAVITY UA: 1.02 (ref 1–1.03)
SPECIFIC GRAVITY UA: 1.05 (ref 1–1.03)
SPECIMEN DESCRIPTION: NORMAL
SPECIMEN DESCRIPTION: NORMAL
STATUS: NORMAL
STATUS: NORMAL
TOTAL PROTEIN: 7.1 G/DL (ref 6.4–8.3)
TOTAL PROTEIN: 8.4 G/DL (ref 6.4–8.3)
TRICHOMONAS: ABNORMAL
TURBIDITY: ABNORMAL
TURBIDITY: CLEAR
URINE HGB: NEGATIVE
URINE HGB: NEGATIVE
UROBILINOGEN, URINE: NORMAL
UROBILINOGEN, URINE: NORMAL
WBC # BLD: 23.3 K/UL (ref 3.5–11)
WBC # BLD: 26.9 K/UL (ref 3.5–11)
WBC # BLD: ABNORMAL 10*3/UL
WBC # BLD: ABNORMAL 10*3/UL
WBC UA: ABNORMAL /HPF (ref 0–5)
YEAST: ABNORMAL

## 2017-02-03 PROCEDURE — G0378 HOSPITAL OBSERVATION PER HR: HCPCS | Performed by: INTERNAL MEDICINE

## 2017-02-03 PROCEDURE — 80048 BASIC METABOLIC PNL TOTAL CA: CPT

## 2017-02-03 PROCEDURE — 97530 THERAPEUTIC ACTIVITIES: CPT

## 2017-02-03 PROCEDURE — G8978 MOBILITY CURRENT STATUS: HCPCS

## 2017-02-03 PROCEDURE — 97535 SELF CARE MNGMENT TRAINING: CPT

## 2017-02-03 PROCEDURE — 96376 TX/PRO/DX INJ SAME DRUG ADON: CPT | Performed by: INTERNAL MEDICINE

## 2017-02-03 PROCEDURE — 83690 ASSAY OF LIPASE: CPT

## 2017-02-03 PROCEDURE — 96366 THER/PROPH/DIAG IV INF ADDON: CPT | Performed by: INTERNAL MEDICINE

## 2017-02-03 PROCEDURE — 97161 PT EVAL LOW COMPLEX 20 MIN: CPT

## 2017-02-03 PROCEDURE — 87086 URINE CULTURE/COLONY COUNT: CPT

## 2017-02-03 PROCEDURE — 87103 BLOOD FUNGUS CULTURE: CPT

## 2017-02-03 PROCEDURE — 81001 URINALYSIS AUTO W/SCOPE: CPT

## 2017-02-03 PROCEDURE — 9990 CHARGE CONVERSION

## 2017-02-03 PROCEDURE — 86698 HISTOPLASMA ANTIBODY: CPT

## 2017-02-03 PROCEDURE — 85025 COMPLETE CBC W/AUTO DIFF WBC: CPT

## 2017-02-03 PROCEDURE — 87040 BLOOD CULTURE FOR BACTERIA: CPT

## 2017-02-03 PROCEDURE — 85045 AUTOMATED RETICULOCYTE COUNT: CPT

## 2017-02-03 PROCEDURE — 83516 IMMUNOASSAY NONANTIBODY: CPT

## 2017-02-03 PROCEDURE — 74177 CT ABD & PELVIS W/CONTRAST: CPT

## 2017-02-03 PROCEDURE — 71260 CT THORAX DX C+: CPT

## 2017-02-03 PROCEDURE — 96367 TX/PROPH/DG ADDL SEQ IV INF: CPT | Performed by: INTERNAL MEDICINE

## 2017-02-03 PROCEDURE — 36415 COLL VENOUS BLD VENIPUNCTURE: CPT

## 2017-02-03 PROCEDURE — 84703 CHORIONIC GONADOTROPIN ASSAY: CPT

## 2017-02-03 PROCEDURE — 96372 THER/PROPH/DIAG INJ SC/IM: CPT | Performed by: INTERNAL MEDICINE

## 2017-02-03 PROCEDURE — G8979 MOBILITY GOAL STATUS: HCPCS

## 2017-02-03 PROCEDURE — 96361 HYDRATE IV INFUSION ADD-ON: CPT | Performed by: INTERNAL MEDICINE

## 2017-02-03 PROCEDURE — 99222 1ST HOSP IP/OBS MODERATE 55: CPT | Performed by: INTERNAL MEDICINE

## 2017-02-03 PROCEDURE — 74022 RADEX COMPL AQT ABD SERIES: CPT

## 2017-02-03 PROCEDURE — 87804 INFLUENZA ASSAY W/OPTIC: CPT

## 2017-02-03 PROCEDURE — 96375 TX/PRO/DX INJ NEW DRUG ADDON: CPT | Performed by: INTERNAL MEDICINE

## 2017-02-03 PROCEDURE — G8989 SELF CARE D/C STATUS: HCPCS

## 2017-02-03 PROCEDURE — 81003 URINALYSIS AUTO W/O SCOPE: CPT

## 2017-02-03 PROCEDURE — 80053 COMPREHEN METABOLIC PANEL: CPT

## 2017-02-03 PROCEDURE — 80076 HEPATIC FUNCTION PANEL: CPT

## 2017-02-03 PROCEDURE — 83615 LACTATE (LD) (LDH) ENZYME: CPT

## 2017-02-03 PROCEDURE — 99223 1ST HOSP IP/OBS HIGH 75: CPT | Performed by: INTERNAL MEDICINE

## 2017-02-03 PROCEDURE — G8988 SELF CARE GOAL STATUS: HCPCS

## 2017-02-03 PROCEDURE — G8987 SELF CARE CURRENT STATUS: HCPCS

## 2017-02-03 PROCEDURE — 83605 ASSAY OF LACTIC ACID: CPT

## 2017-02-03 PROCEDURE — 96365 THER/PROPH/DIAG IV INF INIT: CPT | Performed by: INTERNAL MEDICINE

## 2017-02-03 PROCEDURE — 87385 HISTOPLASMA CAPSUL AG IA: CPT

## 2017-02-03 PROCEDURE — 97165 OT EVAL LOW COMPLEX 30 MIN: CPT

## 2017-02-03 RX ORDER — 0.9 % SODIUM CHLORIDE 0.9 %
500 INTRAVENOUS SOLUTION INTRAVENOUS ONCE
Status: COMPLETED | OUTPATIENT
Start: 2017-02-03 | End: 2017-02-03

## 2017-02-03 RX ORDER — ASPIRIN 81 MG/1
81 TABLET, CHEWABLE ORAL DAILY
Status: DISCONTINUED | OUTPATIENT
Start: 2017-02-03 | End: 2017-02-04 | Stop reason: HOSPADM

## 2017-02-03 RX ORDER — SODIUM CHLORIDE 0.9 % (FLUSH) 0.9 %
10 SYRINGE (ML) INJECTION EVERY 12 HOURS SCHEDULED
Status: DISCONTINUED | OUTPATIENT
Start: 2017-02-03 | End: 2017-02-04 | Stop reason: HOSPADM

## 2017-02-03 RX ORDER — ONDANSETRON 2 MG/ML
4 INJECTION INTRAMUSCULAR; INTRAVENOUS EVERY 6 HOURS PRN
Status: DISCONTINUED | OUTPATIENT
Start: 2017-02-03 | End: 2017-02-04 | Stop reason: HOSPADM

## 2017-02-03 RX ORDER — FOLIC ACID 1 MG/1
1 TABLET ORAL DAILY
Status: DISCONTINUED | OUTPATIENT
Start: 2017-02-03 | End: 2017-02-04 | Stop reason: HOSPADM

## 2017-02-03 RX ORDER — PANTOPRAZOLE SODIUM 40 MG/1
40 TABLET, DELAYED RELEASE ORAL
Status: DISCONTINUED | OUTPATIENT
Start: 2017-02-03 | End: 2017-02-04 | Stop reason: HOSPADM

## 2017-02-03 RX ORDER — SODIUM CHLORIDE 9 MG/ML
INJECTION, SOLUTION INTRAVENOUS CONTINUOUS
Status: DISCONTINUED | OUTPATIENT
Start: 2017-02-03 | End: 2017-02-04

## 2017-02-03 RX ORDER — IBUPROFEN 800 MG/1
800 TABLET ORAL ONCE
Status: COMPLETED | OUTPATIENT
Start: 2017-02-03 | End: 2017-02-03

## 2017-02-03 RX ORDER — MORPHINE SULFATE 4 MG/ML
4 INJECTION, SOLUTION INTRAMUSCULAR; INTRAVENOUS EVERY 4 HOURS PRN
Status: DISCONTINUED | OUTPATIENT
Start: 2017-02-03 | End: 2017-02-04 | Stop reason: HOSPADM

## 2017-02-03 RX ORDER — SODIUM CHLORIDE 0.9 % (FLUSH) 0.9 %
10 SYRINGE (ML) INJECTION PRN
Status: DISCONTINUED | OUTPATIENT
Start: 2017-02-03 | End: 2017-02-04 | Stop reason: HOSPADM

## 2017-02-03 RX ORDER — SODIUM CHLORIDE 9 MG/ML
INJECTION, SOLUTION INTRAVENOUS CONTINUOUS
Status: DISCONTINUED | OUTPATIENT
Start: 2017-02-03 | End: 2017-02-03

## 2017-02-03 RX ORDER — CITALOPRAM 20 MG/1
40 TABLET ORAL DAILY
Status: DISCONTINUED | OUTPATIENT
Start: 2017-02-03 | End: 2017-02-04 | Stop reason: HOSPADM

## 2017-02-03 RX ORDER — ACETAMINOPHEN 325 MG/1
650 TABLET ORAL EVERY 4 HOURS PRN
Status: DISCONTINUED | OUTPATIENT
Start: 2017-02-03 | End: 2017-02-04 | Stop reason: HOSPADM

## 2017-02-03 RX ORDER — CIPROFLOXACIN 2 MG/ML
400 INJECTION, SOLUTION INTRAVENOUS EVERY 12 HOURS
Status: DISCONTINUED | OUTPATIENT
Start: 2017-02-03 | End: 2017-02-04 | Stop reason: HOSPADM

## 2017-02-03 RX ORDER — PANTOPRAZOLE SODIUM 40 MG/1
40 TABLET, DELAYED RELEASE ORAL
Status: DISCONTINUED | OUTPATIENT
Start: 2017-02-04 | End: 2017-02-03

## 2017-02-03 RX ADMIN — METRONIDAZOLE 500 MG: 500 INJECTION, SOLUTION INTRAVENOUS at 21:59

## 2017-02-03 RX ADMIN — METRONIDAZOLE 500 MG: 500 INJECTION, SOLUTION INTRAVENOUS at 13:59

## 2017-02-03 RX ADMIN — IBUPROFEN 800 MG: 800 TABLET ORAL at 01:29

## 2017-02-03 RX ADMIN — FOLIC ACID 1 MG: 1 TABLET ORAL at 08:58

## 2017-02-03 RX ADMIN — Medication 500 ML: at 00:02

## 2017-02-03 RX ADMIN — SODIUM CHLORIDE: 9 INJECTION, SOLUTION INTRAVENOUS at 12:08

## 2017-02-03 RX ADMIN — ENOXAPARIN SODIUM 40 MG: 40 INJECTION SUBCUTANEOUS at 08:58

## 2017-02-03 RX ADMIN — CIPROFLOXACIN 400 MG: 2 INJECTION, SOLUTION INTRAVENOUS at 12:11

## 2017-02-03 RX ADMIN — MORPHINE SULFATE 4 MG: 4 INJECTION, SOLUTION INTRAMUSCULAR; INTRAVENOUS at 20:41

## 2017-02-03 RX ADMIN — MORPHINE SULFATE 4 MG: 4 INJECTION, SOLUTION INTRAMUSCULAR; INTRAVENOUS at 08:58

## 2017-02-03 RX ADMIN — SODIUM CHLORIDE: 9 INJECTION, SOLUTION INTRAVENOUS at 04:40

## 2017-02-03 RX ADMIN — CITALOPRAM 40 MG: 20 TABLET, FILM COATED ORAL at 08:58

## 2017-02-03 RX ADMIN — ONDANSETRON 4 MG: 2 INJECTION INTRAMUSCULAR; INTRAVENOUS at 00:02

## 2017-02-03 RX ADMIN — PANTOPRAZOLE SODIUM 40 MG: 40 TABLET, DELAYED RELEASE ORAL at 12:19

## 2017-02-03 RX ADMIN — ASPIRIN 81 MG: 81 TABLET, CHEWABLE ORAL at 08:58

## 2017-02-03 ASSESSMENT — ENCOUNTER SYMPTOMS
CONSTIPATION: 0
COUGH: 0
SORE THROAT: 0
VOMITING: 1
ABDOMINAL PAIN: 0
EYE DISCHARGE: 0
NAUSEA: 1
DIARRHEA: 0
BLOOD IN STOOL: 0
EYE PAIN: 0
SHORTNESS OF BREATH: 0

## 2017-02-03 ASSESSMENT — PAIN DESCRIPTION - DESCRIPTORS
DESCRIPTORS: CONSTANT;ACHING
DESCRIPTORS: PRESSURE;DISCOMFORT

## 2017-02-03 ASSESSMENT — PAIN DESCRIPTION - LOCATION
LOCATION: ABDOMEN

## 2017-02-03 ASSESSMENT — PAIN SCALES - GENERAL
PAINLEVEL_OUTOF10: 2
PAINLEVEL_OUTOF10: 3
PAINLEVEL_OUTOF10: 5
PAINLEVEL_OUTOF10: 0
PAINLEVEL_OUTOF10: 5

## 2017-02-03 ASSESSMENT — PAIN DESCRIPTION - PAIN TYPE
TYPE: ACUTE PAIN
TYPE: ACUTE PAIN

## 2017-02-03 ASSESSMENT — PAIN DESCRIPTION - ORIENTATION: ORIENTATION: LEFT;UPPER;OUTER

## 2017-02-03 ASSESSMENT — PAIN DESCRIPTION - FREQUENCY: FREQUENCY: INTERMITTENT

## 2017-02-04 LAB
CULTURE: NORMAL
HCT VFR BLD CALC: 36.9 % (ref 36–46)
HCT VFR BLD CALC: 36.9 % (ref 36–46)
HEMOGLOBIN: 11.9 G/DL (ref 12–16)
HEMOGLOBIN: 11.9 G/DL (ref 12–16)
HISTOPLASMA AG, S: < 2 U/ML
HISTOPLASMA ANTIGEN, SERUM: NEGATIVE
Lab: NORMAL
Lab: NORMAL
MCH RBC QN AUTO: 29.8 PG (ref 26–34)
MCH RBC QN AUTO: 29.8 PG (ref 26–34)
MCHC RBC AUTO-ENTMCNC: 32.3 G/DL (ref 31–37)
MCHC RBC AUTO-ENTMCNC: 32.3 G/DL (ref 31–37)
MCV RBC AUTO: 92.3 FL (ref 80–100)
MCV RBC AUTO: 92.3 FL (ref 80–100)
PDW BLD-RTO: 17.5 % (ref 12.5–15.4)
PDW BLD-RTO: 17.5 % (ref 12.5–15.4)
PLATELET # BLD: 423 K/UL (ref 140–450)
PLATELET # BLD: 423 K/UL (ref 140–450)
PMV BLD AUTO: 7.6 FL (ref 6–12)
PMV BLD AUTO: 7.6 FL (ref 6–12)
RBC # BLD: 4 M/UL (ref 4–5.2)
RBC # BLD: 4 M/UL (ref 4–5.2)
SPECIMEN DESCRIPTION: NORMAL
SPECIMEN DESCRIPTION: NORMAL
STATUS: NORMAL
STATUS: NORMAL
WBC # BLD: 18.1 K/UL (ref 3.5–11)
WBC # BLD: 18.1 K/UL (ref 3.5–11)

## 2017-02-04 PROCEDURE — 2580000003 HC RX 258: Performed by: INTERNAL MEDICINE

## 2017-02-04 PROCEDURE — 99239 HOSP IP/OBS DSCHRG MGMT >30: CPT | Performed by: INTERNAL MEDICINE

## 2017-02-04 PROCEDURE — G0378 HOSPITAL OBSERVATION PER HR: HCPCS

## 2017-02-04 PROCEDURE — 96376 TX/PRO/DX INJ SAME DRUG ADON: CPT | Performed by: INTERNAL MEDICINE

## 2017-02-04 PROCEDURE — 96366 THER/PROPH/DIAG IV INF ADDON: CPT | Performed by: INTERNAL MEDICINE

## 2017-02-04 PROCEDURE — 6360000002 HC RX W HCPCS: Performed by: INTERNAL MEDICINE

## 2017-02-04 PROCEDURE — 80048 BASIC METABOLIC PNL TOTAL CA: CPT

## 2017-02-04 PROCEDURE — 96372 THER/PROPH/DIAG INJ SC/IM: CPT | Performed by: INTERNAL MEDICINE

## 2017-02-04 PROCEDURE — 2500000003 HC RX 250 WO HCPCS: Performed by: INTERNAL MEDICINE

## 2017-02-04 PROCEDURE — 85027 COMPLETE CBC AUTOMATED: CPT

## 2017-02-04 PROCEDURE — 36415 COLL VENOUS BLD VENIPUNCTURE: CPT

## 2017-02-04 PROCEDURE — 6370000000 HC RX 637 (ALT 250 FOR IP): Performed by: INTERNAL MEDICINE

## 2017-02-04 RX ORDER — CIPROFLOXACIN 500 MG/1
500 TABLET, FILM COATED ORAL 2 TIMES DAILY
Qty: 10 TABLET | Refills: 0 | Status: SHIPPED | OUTPATIENT
Start: 2017-02-04 | End: 2017-02-09

## 2017-02-04 RX ORDER — METRONIDAZOLE 250 MG/1
500 TABLET ORAL 3 TIMES DAILY
Qty: 15 TABLET | Refills: 0 | Status: SHIPPED | OUTPATIENT
Start: 2017-02-04 | End: 2017-02-14

## 2017-02-04 RX ORDER — PREDNISONE 20 MG/1
70 TABLET ORAL DAILY
Qty: 49 TABLET | Refills: 0 | Status: SHIPPED | OUTPATIENT
Start: 2017-02-04 | End: 2017-02-18

## 2017-02-04 RX ORDER — OXYCODONE HYDROCHLORIDE 5 MG/1
5 TABLET ORAL EVERY 6 HOURS PRN
Qty: 30 TABLET | Refills: 0 | Status: SHIPPED | OUTPATIENT
Start: 2017-02-04 | End: 2017-02-11

## 2017-02-04 RX ADMIN — MORPHINE SULFATE 4 MG: 4 INJECTION, SOLUTION INTRAMUSCULAR; INTRAVENOUS at 03:20

## 2017-02-04 RX ADMIN — FOLIC ACID 1 MG: 1 TABLET ORAL at 08:20

## 2017-02-04 RX ADMIN — CITALOPRAM 40 MG: 20 TABLET, FILM COATED ORAL at 08:20

## 2017-02-04 RX ADMIN — ASPIRIN 81 MG: 81 TABLET, CHEWABLE ORAL at 08:20

## 2017-02-04 RX ADMIN — PANTOPRAZOLE SODIUM 40 MG: 40 TABLET, DELAYED RELEASE ORAL at 05:39

## 2017-02-04 RX ADMIN — MORPHINE SULFATE 4 MG: 4 INJECTION, SOLUTION INTRAMUSCULAR; INTRAVENOUS at 08:18

## 2017-02-04 RX ADMIN — SODIUM CHLORIDE: 9 INJECTION, SOLUTION INTRAVENOUS at 08:18

## 2017-02-04 RX ADMIN — ENOXAPARIN SODIUM 40 MG: 40 INJECTION SUBCUTANEOUS at 08:20

## 2017-02-04 RX ADMIN — METRONIDAZOLE 500 MG: 500 INJECTION, SOLUTION INTRAVENOUS at 05:39

## 2017-02-04 ASSESSMENT — ENCOUNTER SYMPTOMS
GASTROINTESTINAL NEGATIVE: 1
WHEEZING: 0
SPUTUM PRODUCTION: 1
ORTHOPNEA: 0
COUGH: 1
SHORTNESS OF BREATH: 1
EYES NEGATIVE: 1

## 2017-02-04 ASSESSMENT — PAIN DESCRIPTION - FREQUENCY: FREQUENCY: CONTINUOUS

## 2017-02-04 ASSESSMENT — PAIN DESCRIPTION - LOCATION
LOCATION: ABDOMEN
LOCATION: CHEST

## 2017-02-04 ASSESSMENT — PAIN DESCRIPTION - PROGRESSION: CLINICAL_PROGRESSION: GRADUALLY IMPROVING

## 2017-02-04 ASSESSMENT — PAIN SCALES - GENERAL
PAINLEVEL_OUTOF10: 7
PAINLEVEL_OUTOF10: 7
PAINLEVEL_OUTOF10: 5
PAINLEVEL_OUTOF10: 7

## 2017-02-04 ASSESSMENT — PAIN DESCRIPTION - ORIENTATION: ORIENTATION: LEFT

## 2017-02-04 ASSESSMENT — PAIN DESCRIPTION - PAIN TYPE
TYPE: ACUTE PAIN
TYPE: ACUTE PAIN

## 2017-02-04 ASSESSMENT — PAIN DESCRIPTION - ONSET: ONSET: ON-GOING

## 2017-02-04 ASSESSMENT — PAIN DESCRIPTION - DESCRIPTORS
DESCRIPTORS: ACHING;DULL
DESCRIPTORS: CONSTANT;ACHING

## 2017-02-06 ENCOUNTER — CARE COORDINATION (OUTPATIENT)
Dept: CARE COORDINATION | Facility: CLINIC | Age: 47
End: 2017-02-06

## 2017-02-06 ENCOUNTER — TELEPHONE (OUTPATIENT)
Dept: PULMONOLOGY | Facility: CLINIC | Age: 47
End: 2017-02-06

## 2017-02-06 DIAGNOSIS — R91.8 LUNG MASS: Primary | ICD-10-CM

## 2017-02-07 ENCOUNTER — TELEPHONE (OUTPATIENT)
Dept: PULMONOLOGY | Facility: CLINIC | Age: 47
End: 2017-02-07

## 2017-02-07 DIAGNOSIS — R91.8 LUNG MASS: Primary | ICD-10-CM

## 2017-02-07 LAB
ANCA MYELOPEROXIDASE: 5 AU/ML
ANCA PROTEINASE 3: 13 AU/ML
ANCA REFERENCE RANGE:: NORMAL
HISTOPLASMA ABS, ID: NORMAL
HISTOPLASMA ANTIBODY MYCELIAL CF: NORMAL
HISTOPLASMA ANTIBODY YEAST CF: NORMAL

## 2017-02-08 LAB — PATHOLOGIST REVIEW: NORMAL

## 2017-02-09 ENCOUNTER — OFFICE VISIT (OUTPATIENT)
Dept: INTERNAL MEDICINE | Facility: CLINIC | Age: 47
End: 2017-02-09

## 2017-02-09 VITALS
HEART RATE: 102 BPM | HEIGHT: 60 IN | SYSTOLIC BLOOD PRESSURE: 131 MMHG | DIASTOLIC BLOOD PRESSURE: 87 MMHG | WEIGHT: 175.4 LBS | BODY MASS INDEX: 34.44 KG/M2

## 2017-02-09 DIAGNOSIS — R11.2 NON-INTRACTABLE VOMITING WITH NAUSEA, UNSPECIFIED VOMITING TYPE: Primary | ICD-10-CM

## 2017-02-09 DIAGNOSIS — R91.8 LUNG MASS: ICD-10-CM

## 2017-02-09 DIAGNOSIS — M81.0 OSTEOPOROSIS: ICD-10-CM

## 2017-02-09 LAB
CULTURE: NORMAL
Lab: NORMAL
Lab: NORMAL
SPECIMEN DESCRIPTION: NORMAL
SPECIMEN DESCRIPTION: NORMAL
STATUS: NORMAL
STATUS: NORMAL

## 2017-02-09 PROCEDURE — 99213 OFFICE O/P EST LOW 20 MIN: CPT | Performed by: INTERNAL MEDICINE

## 2017-02-09 RX ORDER — TRIAMCINOLONE ACETONIDE 0.25 MG/G
CREAM TOPICAL
Qty: 1 TUBE | Refills: 2 | Status: SHIPPED | OUTPATIENT
Start: 2017-02-09 | End: 2017-08-15 | Stop reason: ALTCHOICE

## 2017-02-14 ENCOUNTER — HOSPITAL ENCOUNTER (OUTPATIENT)
Dept: GENERAL RADIOLOGY | Age: 47
Discharge: HOME | End: 2017-02-14
Payer: MEDICAID

## 2017-02-14 ENCOUNTER — HOSPITAL ENCOUNTER (OUTPATIENT)
Dept: CT IMAGING | Age: 47
Discharge: HOME | End: 2017-02-14
Payer: MEDICAID

## 2017-02-14 VITALS
HEIGHT: 60 IN | BODY MASS INDEX: 34.55 KG/M2 | HEART RATE: 88 BPM | RESPIRATION RATE: 18 BRPM | DIASTOLIC BLOOD PRESSURE: 76 MMHG | WEIGHT: 176 LBS | TEMPERATURE: 97.6 F | OXYGEN SATURATION: 95 % | SYSTOLIC BLOOD PRESSURE: 124 MMHG

## 2017-02-14 DIAGNOSIS — R91.8 LUNG MASS: ICD-10-CM

## 2017-02-14 LAB
INR BLD: 1
PARTIAL THROMBOPLASTIN TIME: 19.2 SEC (ref 21.3–31.3)
PLATELET # BLD: 493 K/UL (ref 140–450)
POTASSIUM, WHOLE BLOOD: 4.4 MMOL/L (ref 3.6–5)
PROTHROMBIN TIME: 11.3 SEC (ref 9.4–12.6)
SURGICAL PATHOLOGY REPORT: NORMAL

## 2017-02-14 PROCEDURE — 88342 IMHCHEM/IMCYTCHM 1ST ANTB: CPT

## 2017-02-14 PROCEDURE — 88305 TISSUE EXAM BY PATHOLOGIST: CPT

## 2017-02-14 PROCEDURE — 85610 PROTHROMBIN TIME: CPT

## 2017-02-14 PROCEDURE — 7100000011 HC PHASE II RECOVERY - ADDTL 15 MIN

## 2017-02-14 PROCEDURE — 71010 XR CHEST LIMITED: CPT | Performed by: RADIOLOGY

## 2017-02-14 PROCEDURE — 32405 CT NEEDLE BIOPSY LUNG PERCUTANEOUS: CPT | Performed by: RADIOLOGY

## 2017-02-14 PROCEDURE — 7100000010 HC PHASE II RECOVERY - FIRST 15 MIN

## 2017-02-14 PROCEDURE — 84132 ASSAY OF SERUM POTASSIUM: CPT

## 2017-02-14 PROCEDURE — 71010 XR CHEST LIMITED: CPT

## 2017-02-14 PROCEDURE — 32405 CT NEEDLE BIOPSY LUNG PERCUTANEOUS: CPT

## 2017-02-14 PROCEDURE — 2580000003 HC RX 258: Performed by: RADIOLOGY

## 2017-02-14 PROCEDURE — 85730 THROMBOPLASTIN TIME PARTIAL: CPT

## 2017-02-14 PROCEDURE — 88312 SPECIAL STAINS GROUP 1: CPT

## 2017-02-14 PROCEDURE — 85049 AUTOMATED PLATELET COUNT: CPT

## 2017-02-14 PROCEDURE — 88333 PATH CONSLTJ SURG CYTO XM 1: CPT

## 2017-02-14 PROCEDURE — 6360000002 HC RX W HCPCS: Performed by: RADIOLOGY

## 2017-02-14 PROCEDURE — 77012 CT SCAN FOR NEEDLE BIOPSY: CPT | Performed by: RADIOLOGY

## 2017-02-14 RX ORDER — FENTANYL CITRATE 50 UG/ML
INJECTION, SOLUTION INTRAMUSCULAR; INTRAVENOUS
Status: COMPLETED | OUTPATIENT
Start: 2017-02-14 | End: 2017-02-14

## 2017-02-14 RX ORDER — SODIUM CHLORIDE 9 MG/ML
INJECTION, SOLUTION INTRAVENOUS CONTINUOUS
Status: DISCONTINUED | OUTPATIENT
Start: 2017-02-14 | End: 2017-02-17 | Stop reason: HOSPADM

## 2017-02-14 RX ORDER — MIDAZOLAM HYDROCHLORIDE 1 MG/ML
INJECTION INTRAMUSCULAR; INTRAVENOUS
Status: COMPLETED | OUTPATIENT
Start: 2017-02-14 | End: 2017-02-14

## 2017-02-14 RX ADMIN — MIDAZOLAM HYDROCHLORIDE 0.5 MG: 1 INJECTION, SOLUTION INTRAMUSCULAR; INTRAVENOUS at 10:11

## 2017-02-14 RX ADMIN — FENTANYL CITRATE 50 MCG: 50 INJECTION INTRAMUSCULAR; INTRAVENOUS at 10:05

## 2017-02-14 RX ADMIN — MIDAZOLAM HYDROCHLORIDE 0.5 MG: 1 INJECTION, SOLUTION INTRAMUSCULAR; INTRAVENOUS at 10:05

## 2017-02-14 RX ADMIN — SODIUM CHLORIDE: 9 INJECTION, SOLUTION INTRAVENOUS at 08:33

## 2017-02-14 RX ADMIN — FENTANYL CITRATE 50 MCG: 50 INJECTION INTRAMUSCULAR; INTRAVENOUS at 10:11

## 2017-02-14 ASSESSMENT — PAIN SCALES - GENERAL
PAINLEVEL_OUTOF10: 0

## 2017-02-14 ASSESSMENT — PAIN - FUNCTIONAL ASSESSMENT: PAIN_FUNCTIONAL_ASSESSMENT: 0-10

## 2017-02-16 LAB — SURGICAL PATHOLOGY REPORT: NORMAL

## 2017-02-17 ENCOUNTER — OFFICE VISIT (OUTPATIENT)
Dept: PULMONOLOGY | Facility: CLINIC | Age: 47
End: 2017-02-17

## 2017-02-17 ENCOUNTER — HOSPITAL ENCOUNTER (OUTPATIENT)
Dept: CT IMAGING | Age: 47
Discharge: HOME | End: 2017-02-17
Payer: MEDICAID

## 2017-02-17 VITALS
OXYGEN SATURATION: 96 % | DIASTOLIC BLOOD PRESSURE: 91 MMHG | RESPIRATION RATE: 16 BRPM | HEART RATE: 99 BPM | WEIGHT: 183 LBS | SYSTOLIC BLOOD PRESSURE: 135 MMHG | HEIGHT: 60 IN | BODY MASS INDEX: 35.93 KG/M2

## 2017-02-17 DIAGNOSIS — R91.8 LUNG MASS: Primary | ICD-10-CM

## 2017-02-17 DIAGNOSIS — J84.116 CRYPTOGENIC ORGANIZING PNEUMONIA (HCC): ICD-10-CM

## 2017-02-17 DIAGNOSIS — R91.8 LUNG MASS: ICD-10-CM

## 2017-02-17 LAB
CULTURE: NORMAL
CULTURE: NORMAL
Lab: NORMAL
SPECIMEN DESCRIPTION: NORMAL
STATUS: NORMAL

## 2017-02-17 PROCEDURE — 77012 CT SCAN FOR NEEDLE BIOPSY: CPT

## 2017-02-17 PROCEDURE — 99213 OFFICE O/P EST LOW 20 MIN: CPT | Performed by: INTERNAL MEDICINE

## 2017-02-17 RX ORDER — ASPIRIN 81 MG
TABLET,CHEWABLE ORAL
Qty: 30 TABLET | Refills: 0 | Status: ON HOLD | OUTPATIENT
Start: 2017-02-17 | End: 2017-02-20 | Stop reason: HOSPADM

## 2017-02-17 RX ORDER — CYCLOBENZAPRINE HCL 5 MG
TABLET ORAL
Qty: 60 TABLET | Refills: 0 | Status: SHIPPED | OUTPATIENT
Start: 2017-02-17 | End: 2017-03-14 | Stop reason: SDUPTHER

## 2017-02-18 ENCOUNTER — APPOINTMENT (OUTPATIENT)
Dept: CT IMAGING | Age: 47
DRG: 247 | End: 2017-02-18
Payer: MEDICAID

## 2017-02-18 ENCOUNTER — APPOINTMENT (OUTPATIENT)
Dept: GENERAL RADIOLOGY | Age: 47
DRG: 247 | End: 2017-02-18
Payer: MEDICAID

## 2017-02-18 ENCOUNTER — HOSPITAL ENCOUNTER (INPATIENT)
Age: 47
LOS: 2 days | Discharge: HOME | DRG: 247 | End: 2017-02-20
Attending: EMERGENCY MEDICINE | Admitting: INTERNAL MEDICINE
Payer: MEDICAID

## 2017-02-18 DIAGNOSIS — R10.30 LOWER ABDOMINAL PAIN: ICD-10-CM

## 2017-02-18 DIAGNOSIS — K56.609 SMALL BOWEL OBSTRUCTION (HCC): Primary | ICD-10-CM

## 2017-02-18 DIAGNOSIS — D72.829 LEUKOCYTOSIS, UNSPECIFIED TYPE: ICD-10-CM

## 2017-02-18 DIAGNOSIS — R79.89 ELEVATED LACTIC ACID LEVEL: ICD-10-CM

## 2017-02-18 LAB
ABSOLUTE EOS #: 0 K/UL (ref 0–0.4)
ABSOLUTE LYMPH #: 5.02 K/UL (ref 1–4.8)
ABSOLUTE MONO #: 1.48 K/UL (ref 0.1–0.8)
ALBUMIN SERPL-MCNC: 3.1 G/DL (ref 3.5–5.2)
ALBUMIN/GLOBULIN RATIO: 1.1 (ref 1–2.5)
ALP BLD-CCNC: 92 U/L (ref 35–104)
ALT SERPL-CCNC: 12 U/L (ref 5–33)
ANION GAP SERPL CALCULATED.3IONS-SCNC: 10 MMOL/L (ref 9–17)
AST SERPL-CCNC: 15 U/L
BASOPHILS # BLD: 0 % (ref 0–2)
BASOPHILS ABSOLUTE: 0 K/UL (ref 0–0.2)
BILIRUB SERPL-MCNC: <0.1 MG/DL (ref 0.3–1.2)
BUN BLDV-MCNC: 31 MG/DL (ref 6–20)
BUN/CREAT BLD: ABNORMAL (ref 9–20)
CALCIUM SERPL-MCNC: 8.1 MG/DL (ref 8.6–10.4)
CHLORIDE BLD-SCNC: 100 MMOL/L (ref 98–107)
CO2: 25 MMOL/L (ref 20–31)
CREAT SERPL-MCNC: 0.67 MG/DL (ref 0.5–0.9)
CULTURE: NORMAL
DIFFERENTIAL TYPE: ABNORMAL
EOSINOPHILS RELATIVE PERCENT: 0 % (ref 1–4)
GFR AFRICAN AMERICAN: >60 ML/MIN
GFR NON-AFRICAN AMERICAN: >60 ML/MIN
GFR SERPL CREATININE-BSD FRML MDRD: ABNORMAL ML/MIN/{1.73_M2}
GFR SERPL CREATININE-BSD FRML MDRD: ABNORMAL ML/MIN/{1.73_M2}
GLUCOSE BLD-MCNC: 86 MG/DL (ref 70–99)
HCT VFR BLD CALC: 44.7 % (ref 36–46)
HEMOGLOBIN: 14.6 G/DL (ref 12–16)
LACTIC ACID, WHOLE BLOOD: 1.4 MMOL/L (ref 0.7–2.1)
LACTIC ACID, WHOLE BLOOD: 2.5 MMOL/L (ref 0.7–2.1)
LACTIC ACID: ABNORMAL MMOL/L
LACTIC ACID: NORMAL MMOL/L
LIPASE: 48 U/L (ref 13–60)
LYMPHOCYTES # BLD: 17 % (ref 24–44)
Lab: NORMAL
Lab: NORMAL
MCH RBC QN AUTO: 29.3 PG (ref 26–34)
MCHC RBC AUTO-ENTMCNC: 32.7 G/DL (ref 31–37)
MCV RBC AUTO: 89.5 FL (ref 80–100)
MONOCYTES # BLD: 5 % (ref 1–7)
MORPHOLOGY: ABNORMAL
PDW BLD-RTO: 19.1 % (ref 12.5–15.4)
PLATELET # BLD: 470 K/UL (ref 140–450)
PLATELET ESTIMATE: ABNORMAL
PMV BLD AUTO: 7.7 FL (ref 6–12)
POTASSIUM SERPL-SCNC: 4.1 MMOL/L (ref 3.7–5.3)
RBC # BLD: 4.99 M/UL (ref 4–5.2)
RBC # BLD: ABNORMAL 10*6/UL
SEG NEUTROPHILS: 78 % (ref 36–66)
SEGMENTED NEUTROPHILS ABSOLUTE COUNT: 23 K/UL (ref 1.8–7.7)
SODIUM BLD-SCNC: 135 MMOL/L (ref 135–144)
SPECIMEN DESCRIPTION: NORMAL
SPECIMEN DESCRIPTION: NORMAL
STATUS: NORMAL
STATUS: NORMAL
TOTAL PROTEIN: 6 G/DL (ref 6.4–8.3)
WBC # BLD: 29.5 K/UL (ref 3.5–11)
WBC # BLD: ABNORMAL 10*3/UL

## 2017-02-18 PROCEDURE — 83690 ASSAY OF LIPASE: CPT

## 2017-02-18 PROCEDURE — 71020 XR CHEST STANDARD TWO VW: CPT

## 2017-02-18 PROCEDURE — 2580000003 HC RX 258: Performed by: EMERGENCY MEDICINE

## 2017-02-18 PROCEDURE — 74177 CT ABD & PELVIS W/CONTRAST: CPT | Performed by: RADIOLOGY

## 2017-02-18 PROCEDURE — 96375 TX/PRO/DX INJ NEW DRUG ADDON: CPT

## 2017-02-18 PROCEDURE — 96374 THER/PROPH/DIAG INJ IV PUSH: CPT

## 2017-02-18 PROCEDURE — 87040 BLOOD CULTURE FOR BACTERIA: CPT

## 2017-02-18 PROCEDURE — 6360000002 HC RX W HCPCS: Performed by: EMERGENCY MEDICINE

## 2017-02-18 PROCEDURE — 87086 URINE CULTURE/COLONY COUNT: CPT

## 2017-02-18 PROCEDURE — 6360000002 HC RX W HCPCS: Performed by: INTERNAL MEDICINE

## 2017-02-18 PROCEDURE — 80053 COMPREHEN METABOLIC PANEL: CPT

## 2017-02-18 PROCEDURE — 81003 URINALYSIS AUTO W/O SCOPE: CPT

## 2017-02-18 PROCEDURE — 85025 COMPLETE CBC W/AUTO DIFF WBC: CPT

## 2017-02-18 PROCEDURE — 6370000000 HC RX 637 (ALT 250 FOR IP): Performed by: INTERNAL MEDICINE

## 2017-02-18 PROCEDURE — 81015 MICROSCOPIC EXAM OF URINE: CPT

## 2017-02-18 PROCEDURE — 1200000000 HC SEMI PRIVATE

## 2017-02-18 PROCEDURE — S0028 INJECTION, FAMOTIDINE, 20 MG: HCPCS | Performed by: INTERNAL MEDICINE

## 2017-02-18 PROCEDURE — 2580000003 HC RX 258: Performed by: INTERNAL MEDICINE

## 2017-02-18 PROCEDURE — 71020 XR CHEST STANDARD TWO VW: CPT | Performed by: RADIOLOGY

## 2017-02-18 PROCEDURE — 99285 EMERGENCY DEPT VISIT HI MDM: CPT

## 2017-02-18 PROCEDURE — 83605 ASSAY OF LACTIC ACID: CPT

## 2017-02-18 PROCEDURE — 74177 CT ABD & PELVIS W/CONTRAST: CPT

## 2017-02-18 PROCEDURE — 2500000003 HC RX 250 WO HCPCS: Performed by: INTERNAL MEDICINE

## 2017-02-18 PROCEDURE — 6360000004 HC RX CONTRAST MEDICATION: Performed by: EMERGENCY MEDICINE

## 2017-02-18 RX ORDER — MORPHINE SULFATE 4 MG/ML
4 INJECTION, SOLUTION INTRAMUSCULAR; INTRAVENOUS
Status: DISCONTINUED | OUTPATIENT
Start: 2017-02-18 | End: 2017-02-20

## 2017-02-18 RX ORDER — SODIUM CHLORIDE 0.9 % (FLUSH) 0.9 %
10 SYRINGE (ML) INJECTION EVERY 12 HOURS SCHEDULED
Status: DISCONTINUED | OUTPATIENT
Start: 2017-02-18 | End: 2017-02-20 | Stop reason: HOSPADM

## 2017-02-18 RX ORDER — POTASSIUM CHLORIDE 7.45 MG/ML
10 INJECTION INTRAVENOUS PRN
Status: DISCONTINUED | OUTPATIENT
Start: 2017-02-18 | End: 2017-02-20 | Stop reason: HOSPADM

## 2017-02-18 RX ORDER — POTASSIUM CHLORIDE 20MEQ/15ML
40 LIQUID (ML) ORAL PRN
Status: DISCONTINUED | OUTPATIENT
Start: 2017-02-18 | End: 2017-02-20 | Stop reason: HOSPADM

## 2017-02-18 RX ORDER — POTASSIUM CHLORIDE 20 MEQ/1
40 TABLET, EXTENDED RELEASE ORAL PRN
Status: DISCONTINUED | OUTPATIENT
Start: 2017-02-18 | End: 2017-02-20 | Stop reason: HOSPADM

## 2017-02-18 RX ORDER — SODIUM CHLORIDE 0.9 % (FLUSH) 0.9 %
10 SYRINGE (ML) INJECTION EVERY 12 HOURS SCHEDULED
Status: DISCONTINUED | OUTPATIENT
Start: 2017-02-18 | End: 2017-02-20 | Stop reason: SDUPTHER

## 2017-02-18 RX ORDER — MORPHINE SULFATE 2 MG/ML
2 INJECTION, SOLUTION INTRAMUSCULAR; INTRAVENOUS
Status: DISCONTINUED | OUTPATIENT
Start: 2017-02-18 | End: 2017-02-20

## 2017-02-18 RX ORDER — ASPIRIN 81 MG/1
81 TABLET, CHEWABLE ORAL DAILY
Status: DISCONTINUED | OUTPATIENT
Start: 2017-02-18 | End: 2017-02-20 | Stop reason: HOSPADM

## 2017-02-18 RX ORDER — MORPHINE SULFATE 4 MG/ML
4 INJECTION, SOLUTION INTRAMUSCULAR; INTRAVENOUS ONCE
Status: COMPLETED | OUTPATIENT
Start: 2017-02-18 | End: 2017-02-18

## 2017-02-18 RX ORDER — SODIUM CHLORIDE 0.9 % (FLUSH) 0.9 %
10 SYRINGE (ML) INJECTION PRN
Status: DISCONTINUED | OUTPATIENT
Start: 2017-02-18 | End: 2017-02-20 | Stop reason: SDUPTHER

## 2017-02-18 RX ORDER — ONDANSETRON 2 MG/ML
4 INJECTION INTRAMUSCULAR; INTRAVENOUS ONCE
Status: COMPLETED | OUTPATIENT
Start: 2017-02-18 | End: 2017-02-18

## 2017-02-18 RX ORDER — ONDANSETRON 2 MG/ML
4 INJECTION INTRAMUSCULAR; INTRAVENOUS EVERY 6 HOURS PRN
Status: DISCONTINUED | OUTPATIENT
Start: 2017-02-18 | End: 2017-02-20 | Stop reason: HOSPADM

## 2017-02-18 RX ORDER — CITALOPRAM 20 MG/1
40 TABLET ORAL DAILY
Status: DISCONTINUED | OUTPATIENT
Start: 2017-02-18 | End: 2017-02-20 | Stop reason: HOSPADM

## 2017-02-18 RX ORDER — SODIUM CHLORIDE 0.9 % (FLUSH) 0.9 %
10 SYRINGE (ML) INJECTION PRN
Status: DISCONTINUED | OUTPATIENT
Start: 2017-02-18 | End: 2017-02-20 | Stop reason: HOSPADM

## 2017-02-18 RX ORDER — 0.9 % SODIUM CHLORIDE 0.9 %
1000 INTRAVENOUS SOLUTION INTRAVENOUS ONCE
Status: COMPLETED | OUTPATIENT
Start: 2017-02-18 | End: 2017-02-18

## 2017-02-18 RX ORDER — 0.9 % SODIUM CHLORIDE 0.9 %
30 INTRAVENOUS SOLUTION INTRAVENOUS ONCE
Status: COMPLETED | OUTPATIENT
Start: 2017-02-18 | End: 2017-02-18

## 2017-02-18 RX ORDER — SODIUM CHLORIDE 9 MG/ML
INJECTION, SOLUTION INTRAVENOUS CONTINUOUS
Status: DISCONTINUED | OUTPATIENT
Start: 2017-02-18 | End: 2017-02-20 | Stop reason: HOSPADM

## 2017-02-18 RX ADMIN — SODIUM CHLORIDE 2490 ML: 9 INJECTION, SOLUTION INTRAVENOUS at 19:29

## 2017-02-18 RX ADMIN — MORPHINE SULFATE 4 MG: 4 INJECTION, SOLUTION INTRAMUSCULAR; INTRAVENOUS at 17:09

## 2017-02-18 RX ADMIN — SODIUM CHLORIDE 1000 ML: 9 INJECTION, SOLUTION INTRAVENOUS at 17:09

## 2017-02-18 RX ADMIN — MORPHINE SULFATE 2 MG: 2 INJECTION, SOLUTION INTRAMUSCULAR; INTRAVENOUS at 21:09

## 2017-02-18 RX ADMIN — SODIUM CHLORIDE 1000 ML: 9 INJECTION, SOLUTION INTRAVENOUS at 21:20

## 2017-02-18 RX ADMIN — ASPIRIN 81 MG: 81 TABLET, CHEWABLE ORAL at 21:58

## 2017-02-18 RX ADMIN — CITALOPRAM 40 MG: 20 TABLET, FILM COATED ORAL at 21:58

## 2017-02-18 RX ADMIN — HYDROMORPHONE HYDROCHLORIDE 1 MG: 1 INJECTION, SOLUTION INTRAMUSCULAR; INTRAVENOUS; SUBCUTANEOUS at 18:55

## 2017-02-18 RX ADMIN — IOHEXOL 130 ML: 350 INJECTION, SOLUTION INTRAVENOUS at 18:31

## 2017-02-18 RX ADMIN — FAMOTIDINE 20 MG: 10 INJECTION, SOLUTION INTRAVENOUS at 21:57

## 2017-02-18 RX ADMIN — SODIUM CHLORIDE: 9 INJECTION, SOLUTION INTRAVENOUS at 23:28

## 2017-02-18 RX ADMIN — ONDANSETRON 4 MG: 2 INJECTION, SOLUTION INTRAMUSCULAR; INTRAVENOUS at 17:09

## 2017-02-18 RX ADMIN — ONDANSETRON 4 MG: 2 INJECTION, SOLUTION INTRAMUSCULAR; INTRAVENOUS at 21:09

## 2017-02-18 ASSESSMENT — PAIN DESCRIPTION - ORIENTATION
ORIENTATION: MID;LOWER
ORIENTATION: LEFT;LOWER

## 2017-02-18 ASSESSMENT — PAIN DESCRIPTION - FREQUENCY
FREQUENCY: CONTINUOUS
FREQUENCY: CONTINUOUS

## 2017-02-18 ASSESSMENT — ENCOUNTER SYMPTOMS
BLOOD IN STOOL: 0
RHINORRHEA: 0
TROUBLE SWALLOWING: 0
COUGH: 0
HEMOPTYSIS: 0
SHORTNESS OF BREATH: 0
BLURRED VISION: 0
ABDOMINAL PAIN: 1
DIARRHEA: 0
VOMITING: 1
CONSTIPATION: 0
NAUSEA: 1
BACK PAIN: 0

## 2017-02-18 ASSESSMENT — PAIN SCALES - GENERAL
PAINLEVEL_OUTOF10: 10
PAINLEVEL_OUTOF10: 6
PAINLEVEL_OUTOF10: 10
PAINLEVEL_OUTOF10: 7
PAINLEVEL_OUTOF10: 3

## 2017-02-18 ASSESSMENT — PAIN DESCRIPTION - ONSET: ONSET: ON-GOING

## 2017-02-18 ASSESSMENT — PAIN DESCRIPTION - DESCRIPTORS
DESCRIPTORS: ACHING;DISCOMFORT
DESCRIPTORS: SHARP;CRAMPING

## 2017-02-18 ASSESSMENT — PAIN DESCRIPTION - PAIN TYPE
TYPE: ACUTE PAIN
TYPE: ACUTE PAIN

## 2017-02-18 ASSESSMENT — PAIN DESCRIPTION - LOCATION
LOCATION: ABDOMEN
LOCATION: ABDOMEN

## 2017-02-18 ASSESSMENT — PAIN DESCRIPTION - PROGRESSION: CLINICAL_PROGRESSION: NOT CHANGED

## 2017-02-19 ENCOUNTER — APPOINTMENT (OUTPATIENT)
Dept: GENERAL RADIOLOGY | Age: 47
DRG: 247 | End: 2017-02-19
Payer: MEDICAID

## 2017-02-19 LAB
-: ABNORMAL
ALBUMIN SERPL-MCNC: 3 G/DL (ref 3.5–5.2)
ALBUMIN/GLOBULIN RATIO: 1 (ref 1–2.5)
ALP BLD-CCNC: 103 U/L (ref 35–104)
ALT SERPL-CCNC: 15 U/L (ref 5–33)
AMORPHOUS: ABNORMAL
ANION GAP SERPL CALCULATED.3IONS-SCNC: 12 MMOL/L (ref 9–17)
AST SERPL-CCNC: 14 U/L
BACTERIA: ABNORMAL
BILIRUB SERPL-MCNC: <0.1 MG/DL (ref 0.3–1.2)
BILIRUBIN URINE: NEGATIVE
BUN BLDV-MCNC: 26 MG/DL (ref 6–20)
BUN/CREAT BLD: ABNORMAL (ref 9–20)
CALCIUM SERPL-MCNC: 7.9 MG/DL (ref 8.6–10.4)
CASTS UA: ABNORMAL /LPF (ref 0–2)
CHLORIDE BLD-SCNC: 108 MMOL/L (ref 98–107)
CO2: 22 MMOL/L (ref 20–31)
COLOR: YELLOW
CREAT SERPL-MCNC: 0.69 MG/DL (ref 0.5–0.9)
CRYSTALS, UA: ABNORMAL /HPF
CULTURE: NORMAL
EPITHELIAL CELLS UA: ABNORMAL /HPF (ref 0–5)
GFR AFRICAN AMERICAN: >60 ML/MIN
GFR NON-AFRICAN AMERICAN: >60 ML/MIN
GFR SERPL CREATININE-BSD FRML MDRD: ABNORMAL ML/MIN/{1.73_M2}
GFR SERPL CREATININE-BSD FRML MDRD: ABNORMAL ML/MIN/{1.73_M2}
GLUCOSE BLD-MCNC: 84 MG/DL (ref 70–99)
GLUCOSE URINE: NEGATIVE
HCT VFR BLD CALC: 40.4 % (ref 36–46)
HEMOGLOBIN: 13.2 G/DL (ref 12–16)
KETONES, URINE: NEGATIVE
LACTIC ACID, WHOLE BLOOD: 0.6 MMOL/L (ref 0.7–2.1)
LEUKOCYTE ESTERASE, URINE: NEGATIVE
Lab: NORMAL
Lab: NORMAL
MCH RBC QN AUTO: 29.6 PG (ref 26–34)
MCHC RBC AUTO-ENTMCNC: 32.7 G/DL (ref 31–37)
MCV RBC AUTO: 90.6 FL (ref 80–100)
MUCUS: ABNORMAL
NITRITE, URINE: NEGATIVE
OTHER OBSERVATIONS UA: ABNORMAL
PDW BLD-RTO: 18.6 % (ref 12.5–15.4)
PH UA: 5.5 (ref 5–8)
PLATELET # BLD: 408 K/UL (ref 140–450)
PMV BLD AUTO: 7.4 FL (ref 6–12)
POTASSIUM SERPL-SCNC: 3.6 MMOL/L (ref 3.7–5.3)
PROTEIN UA: NEGATIVE
RBC # BLD: 4.46 M/UL (ref 4–5.2)
RBC UA: ABNORMAL /HPF (ref 0–2)
RENAL EPITHELIAL, UA: ABNORMAL /HPF
SODIUM BLD-SCNC: 142 MMOL/L (ref 135–144)
SPECIFIC GRAVITY UA: 1.09 (ref 1–1.03)
SPECIMEN DESCRIPTION: NORMAL
SPECIMEN DESCRIPTION: NORMAL
STATUS: NORMAL
STATUS: NORMAL
TOTAL PROTEIN: 6 G/DL (ref 6.4–8.3)
TRICHOMONAS: ABNORMAL
TURBIDITY: CLEAR
URINE HGB: NEGATIVE
UROBILINOGEN, URINE: NORMAL
WBC # BLD: 22.3 K/UL (ref 3.5–11)
WBC UA: ABNORMAL /HPF (ref 0–5)
YEAST: ABNORMAL

## 2017-02-19 PROCEDURE — 85027 COMPLETE CBC AUTOMATED: CPT

## 2017-02-19 PROCEDURE — G8978 MOBILITY CURRENT STATUS: HCPCS

## 2017-02-19 PROCEDURE — S0028 INJECTION, FAMOTIDINE, 20 MG: HCPCS | Performed by: INTERNAL MEDICINE

## 2017-02-19 PROCEDURE — 80053 COMPREHEN METABOLIC PANEL: CPT

## 2017-02-19 PROCEDURE — 97161 PT EVAL LOW COMPLEX 20 MIN: CPT

## 2017-02-19 PROCEDURE — 97116 GAIT TRAINING THERAPY: CPT

## 2017-02-19 PROCEDURE — 36415 COLL VENOUS BLD VENIPUNCTURE: CPT

## 2017-02-19 PROCEDURE — 99222 1ST HOSP IP/OBS MODERATE 55: CPT | Performed by: INTERNAL MEDICINE

## 2017-02-19 PROCEDURE — 83605 ASSAY OF LACTIC ACID: CPT

## 2017-02-19 PROCEDURE — 74022 RADEX COMPL AQT ABD SERIES: CPT

## 2017-02-19 PROCEDURE — 2500000003 HC RX 250 WO HCPCS: Performed by: INTERNAL MEDICINE

## 2017-02-19 PROCEDURE — G8980 MOBILITY D/C STATUS: HCPCS

## 2017-02-19 PROCEDURE — G8979 MOBILITY GOAL STATUS: HCPCS

## 2017-02-19 PROCEDURE — 2580000003 HC RX 258: Performed by: INTERNAL MEDICINE

## 2017-02-19 PROCEDURE — 74022 RADEX COMPL AQT ABD SERIES: CPT | Performed by: RADIOLOGY

## 2017-02-19 PROCEDURE — 6360000002 HC RX W HCPCS: Performed by: INTERNAL MEDICINE

## 2017-02-19 PROCEDURE — 1200000000 HC SEMI PRIVATE

## 2017-02-19 PROCEDURE — 6370000000 HC RX 637 (ALT 250 FOR IP): Performed by: INTERNAL MEDICINE

## 2017-02-19 RX ORDER — ASPIRIN 81 MG/1
81 TABLET, CHEWABLE ORAL DAILY
Status: DISCONTINUED | OUTPATIENT
Start: 2017-02-19 | End: 2017-02-20 | Stop reason: SDUPTHER

## 2017-02-19 RX ADMIN — ASPIRIN 81 MG: 81 TABLET, CHEWABLE ORAL at 09:06

## 2017-02-19 RX ADMIN — MORPHINE SULFATE 2 MG: 2 INJECTION, SOLUTION INTRAMUSCULAR; INTRAVENOUS at 05:09

## 2017-02-19 RX ADMIN — FAMOTIDINE 20 MG: 10 INJECTION, SOLUTION INTRAVENOUS at 21:04

## 2017-02-19 RX ADMIN — MORPHINE SULFATE 2 MG: 2 INJECTION, SOLUTION INTRAMUSCULAR; INTRAVENOUS at 01:31

## 2017-02-19 RX ADMIN — SODIUM CHLORIDE: 9 INJECTION, SOLUTION INTRAVENOUS at 15:56

## 2017-02-19 RX ADMIN — MORPHINE SULFATE 2 MG: 2 INJECTION, SOLUTION INTRAMUSCULAR; INTRAVENOUS at 19:22

## 2017-02-19 RX ADMIN — ENOXAPARIN SODIUM 40 MG: 40 INJECTION SUBCUTANEOUS at 09:06

## 2017-02-19 RX ADMIN — CITALOPRAM 40 MG: 20 TABLET, FILM COATED ORAL at 09:06

## 2017-02-19 RX ADMIN — MORPHINE SULFATE 4 MG: 4 INJECTION, SOLUTION INTRAMUSCULAR; INTRAVENOUS at 11:26

## 2017-02-19 RX ADMIN — Medication 10 ML: at 09:07

## 2017-02-19 RX ADMIN — FAMOTIDINE 20 MG: 10 INJECTION, SOLUTION INTRAVENOUS at 09:06

## 2017-02-19 RX ADMIN — MORPHINE SULFATE 4 MG: 4 INJECTION, SOLUTION INTRAMUSCULAR; INTRAVENOUS at 15:55

## 2017-02-19 RX ADMIN — MORPHINE SULFATE 4 MG: 4 INJECTION, SOLUTION INTRAMUSCULAR; INTRAVENOUS at 07:43

## 2017-02-19 RX ADMIN — SODIUM CHLORIDE: 9 INJECTION, SOLUTION INTRAVENOUS at 03:40

## 2017-02-19 ASSESSMENT — PAIN SCALES - GENERAL
PAINLEVEL_OUTOF10: 6
PAINLEVEL_OUTOF10: 6
PAINLEVEL_OUTOF10: 4
PAINLEVEL_OUTOF10: 3
PAINLEVEL_OUTOF10: 9
PAINLEVEL_OUTOF10: 6
PAINLEVEL_OUTOF10: 8
PAINLEVEL_OUTOF10: 8
PAINLEVEL_OUTOF10: 2

## 2017-02-19 ASSESSMENT — PAIN DESCRIPTION - LOCATION: LOCATION: ABDOMEN

## 2017-02-20 VITALS
HEIGHT: 60 IN | HEART RATE: 85 BPM | DIASTOLIC BLOOD PRESSURE: 91 MMHG | OXYGEN SATURATION: 93 % | WEIGHT: 188.49 LBS | BODY MASS INDEX: 37.01 KG/M2 | SYSTOLIC BLOOD PRESSURE: 151 MMHG | TEMPERATURE: 98.5 F | RESPIRATION RATE: 16 BRPM

## 2017-02-20 PROCEDURE — 6360000002 HC RX W HCPCS: Performed by: INTERNAL MEDICINE

## 2017-02-20 PROCEDURE — 99239 HOSP IP/OBS DSCHRG MGMT >30: CPT | Performed by: INTERNAL MEDICINE

## 2017-02-20 PROCEDURE — S0028 INJECTION, FAMOTIDINE, 20 MG: HCPCS | Performed by: INTERNAL MEDICINE

## 2017-02-20 PROCEDURE — 2500000003 HC RX 250 WO HCPCS: Performed by: INTERNAL MEDICINE

## 2017-02-20 PROCEDURE — 6370000000 HC RX 637 (ALT 250 FOR IP): Performed by: INTERNAL MEDICINE

## 2017-02-20 PROCEDURE — 2580000003 HC RX 258: Performed by: INTERNAL MEDICINE

## 2017-02-20 RX ORDER — AMLODIPINE BESYLATE 2.5 MG/1
2.5 TABLET ORAL DAILY
Status: CANCELLED | OUTPATIENT
Start: 2017-02-21

## 2017-02-20 RX ORDER — OXYCODONE HYDROCHLORIDE 5 MG/1
5 TABLET ORAL EVERY 6 HOURS PRN
Status: CANCELLED | OUTPATIENT
Start: 2017-02-20

## 2017-02-20 RX ORDER — OXYCODONE HYDROCHLORIDE 5 MG/1
5 TABLET ORAL EVERY 6 HOURS PRN
Status: DISCONTINUED | OUTPATIENT
Start: 2017-02-20 | End: 2017-02-20 | Stop reason: HOSPADM

## 2017-02-20 RX ORDER — CITALOPRAM 20 MG/1
40 TABLET ORAL DAILY
Status: CANCELLED | OUTPATIENT
Start: 2017-02-21

## 2017-02-20 RX ORDER — ASPIRIN 81 MG/1
81 TABLET, CHEWABLE ORAL DAILY
Status: CANCELLED | OUTPATIENT
Start: 2017-02-21

## 2017-02-20 RX ORDER — OXYCODONE HYDROCHLORIDE 5 MG/1
5 TABLET ORAL EVERY 6 HOURS PRN
Qty: 10 TABLET | Refills: 0 | Status: SHIPPED | OUTPATIENT
Start: 2017-02-20 | End: 2017-04-03 | Stop reason: ALTCHOICE

## 2017-02-20 RX ORDER — AMLODIPINE BESYLATE 2.5 MG/1
2.5 TABLET ORAL DAILY
Qty: 30 TABLET | Refills: 3 | Status: SHIPPED | OUTPATIENT
Start: 2017-02-20 | End: 2017-04-19 | Stop reason: SDUPTHER

## 2017-02-20 RX ORDER — AMLODIPINE BESYLATE 2.5 MG/1
2.5 TABLET ORAL DAILY
Status: DISCONTINUED | OUTPATIENT
Start: 2017-02-20 | End: 2017-02-20 | Stop reason: HOSPADM

## 2017-02-20 RX ADMIN — ASPIRIN 81 MG: 81 TABLET, CHEWABLE ORAL at 08:33

## 2017-02-20 RX ADMIN — AMLODIPINE BESYLATE 2.5 MG: 2.5 TABLET ORAL at 09:30

## 2017-02-20 RX ADMIN — FAMOTIDINE 20 MG: 10 INJECTION, SOLUTION INTRAVENOUS at 08:33

## 2017-02-20 RX ADMIN — CITALOPRAM 40 MG: 20 TABLET, FILM COATED ORAL at 08:33

## 2017-02-20 RX ADMIN — MORPHINE SULFATE 4 MG: 4 INJECTION, SOLUTION INTRAMUSCULAR; INTRAVENOUS at 05:01

## 2017-02-20 RX ADMIN — MORPHINE SULFATE 2 MG: 2 INJECTION, SOLUTION INTRAMUSCULAR; INTRAVENOUS at 08:29

## 2017-02-20 RX ADMIN — ENOXAPARIN SODIUM 40 MG: 40 INJECTION SUBCUTANEOUS at 08:34

## 2017-02-20 RX ADMIN — OXYCODONE HYDROCHLORIDE 5 MG: 5 TABLET ORAL at 10:59

## 2017-02-20 RX ADMIN — MORPHINE SULFATE 2 MG: 2 INJECTION, SOLUTION INTRAMUSCULAR; INTRAVENOUS at 02:20

## 2017-02-20 RX ADMIN — MORPHINE SULFATE 2 MG: 2 INJECTION, SOLUTION INTRAMUSCULAR; INTRAVENOUS at 00:15

## 2017-02-20 RX ADMIN — OXYCODONE HYDROCHLORIDE 5 MG: 5 TABLET ORAL at 16:50

## 2017-02-20 RX ADMIN — SODIUM CHLORIDE: 9 INJECTION, SOLUTION INTRAVENOUS at 00:19

## 2017-02-20 ASSESSMENT — PAIN SCALES - GENERAL
PAINLEVEL_OUTOF10: 3
PAINLEVEL_OUTOF10: 8
PAINLEVEL_OUTOF10: 10
PAINLEVEL_OUTOF10: 3
PAINLEVEL_OUTOF10: 7
PAINLEVEL_OUTOF10: 6
PAINLEVEL_OUTOF10: 6
PAINLEVEL_OUTOF10: 7
PAINLEVEL_OUTOF10: 3

## 2017-02-20 ASSESSMENT — PAIN DESCRIPTION - PROGRESSION: CLINICAL_PROGRESSION: GRADUALLY IMPROVING

## 2017-02-20 ASSESSMENT — PAIN DESCRIPTION - DESCRIPTORS: DESCRIPTORS: ACHING;DISCOMFORT

## 2017-02-20 ASSESSMENT — PAIN DESCRIPTION - ONSET: ONSET: ON-GOING

## 2017-02-20 ASSESSMENT — PAIN DESCRIPTION - LOCATION: LOCATION: ABDOMEN

## 2017-02-20 ASSESSMENT — PAIN DESCRIPTION - ORIENTATION: ORIENTATION: LEFT;LOWER

## 2017-02-20 ASSESSMENT — PAIN DESCRIPTION - PAIN TYPE: TYPE: ACUTE PAIN

## 2017-02-20 ASSESSMENT — PAIN DESCRIPTION - FREQUENCY: FREQUENCY: CONTINUOUS

## 2017-02-21 ENCOUNTER — CARE COORDINATION (OUTPATIENT)
Dept: INTERNAL MEDICINE | Facility: CLINIC | Age: 47
End: 2017-02-21

## 2017-03-07 ENCOUNTER — OFFICE VISIT (OUTPATIENT)
Dept: INTERNAL MEDICINE | Facility: CLINIC | Age: 47
End: 2017-03-07

## 2017-03-07 ENCOUNTER — HOSPITAL ENCOUNTER (OUTPATIENT)
Age: 47
Setting detail: SPECIMEN
Discharge: HOME | End: 2017-03-07
Payer: MEDICAID

## 2017-03-07 VITALS
DIASTOLIC BLOOD PRESSURE: 89 MMHG | HEIGHT: 60 IN | WEIGHT: 177 LBS | HEART RATE: 113 BPM | SYSTOLIC BLOOD PRESSURE: 131 MMHG | BODY MASS INDEX: 34.75 KG/M2

## 2017-03-07 DIAGNOSIS — Z12.4 SCREENING FOR CERVICAL CANCER: Primary | ICD-10-CM

## 2017-03-07 PROCEDURE — 99396 PREV VISIT EST AGE 40-64: CPT | Performed by: INTERNAL MEDICINE

## 2017-03-10 ENCOUNTER — TELEPHONE (OUTPATIENT)
Dept: INTERNAL MEDICINE | Facility: CLINIC | Age: 47
End: 2017-03-10

## 2017-03-10 LAB — CYTOLOGY REPORT: NORMAL

## 2017-03-15 RX ORDER — ASPIRIN 81 MG
TABLET,CHEWABLE ORAL
Qty: 30 TABLET | Refills: 0 | Status: SHIPPED | OUTPATIENT
Start: 2017-03-15 | End: 2017-04-20 | Stop reason: SDUPTHER

## 2017-03-15 RX ORDER — CYCLOBENZAPRINE HCL 5 MG
TABLET ORAL
Qty: 60 TABLET | Refills: 0 | Status: SHIPPED | OUTPATIENT
Start: 2017-03-15 | End: 2017-04-13 | Stop reason: SDUPTHER

## 2017-03-25 ENCOUNTER — HOSPITAL ENCOUNTER (EMERGENCY)
Age: 47
Discharge: HOME | End: 2017-03-25
Attending: EMERGENCY MEDICINE
Payer: MEDICAID

## 2017-03-25 ENCOUNTER — APPOINTMENT (OUTPATIENT)
Dept: GENERAL RADIOLOGY | Age: 47
End: 2017-03-25
Payer: MEDICAID

## 2017-03-25 VITALS
DIASTOLIC BLOOD PRESSURE: 93 MMHG | TEMPERATURE: 99 F | OXYGEN SATURATION: 97 % | SYSTOLIC BLOOD PRESSURE: 135 MMHG | HEIGHT: 60 IN | WEIGHT: 172 LBS | BODY MASS INDEX: 33.77 KG/M2 | RESPIRATION RATE: 15 BRPM | HEART RATE: 106 BPM

## 2017-03-25 DIAGNOSIS — S43.422A SPRAIN OF LEFT ROTATOR CUFF CAPSULE, INITIAL ENCOUNTER: Primary | ICD-10-CM

## 2017-03-25 PROCEDURE — G0382 LEV 3 HOSP TYPE B ED VISIT: HCPCS

## 2017-03-25 PROCEDURE — 6370000000 HC RX 637 (ALT 250 FOR IP): Performed by: EMERGENCY MEDICINE

## 2017-03-25 PROCEDURE — 73030 X-RAY EXAM OF SHOULDER: CPT

## 2017-03-25 RX ORDER — HYDROCODONE BITARTRATE AND ACETAMINOPHEN 5; 325 MG/1; MG/1
2 TABLET ORAL ONCE
Status: COMPLETED | OUTPATIENT
Start: 2017-03-25 | End: 2017-03-25

## 2017-03-25 RX ORDER — HYDROCODONE BITARTRATE AND ACETAMINOPHEN 5; 325 MG/1; MG/1
1 TABLET ORAL EVERY 6 HOURS PRN
Qty: 10 TABLET | Refills: 0 | Status: SHIPPED | OUTPATIENT
Start: 2017-03-25 | End: 2017-04-01

## 2017-03-25 RX ADMIN — HYDROCODONE BITARTRATE AND ACETAMINOPHEN 2 TABLET: 5; 325 TABLET ORAL at 20:26

## 2017-03-25 ASSESSMENT — ENCOUNTER SYMPTOMS
NAUSEA: 0
WHEEZING: 0
SHORTNESS OF BREATH: 0
VOMITING: 0

## 2017-03-25 ASSESSMENT — PAIN SCALES - GENERAL
PAINLEVEL_OUTOF10: 10
PAINLEVEL_OUTOF10: 10

## 2017-03-25 ASSESSMENT — PAIN DESCRIPTION - ORIENTATION: ORIENTATION: LEFT

## 2017-03-25 ASSESSMENT — PAIN DESCRIPTION - LOCATION: LOCATION: SHOULDER

## 2017-03-25 ASSESSMENT — PAIN DESCRIPTION - DESCRIPTORS: DESCRIPTORS: SHARP

## 2017-04-03 ENCOUNTER — OFFICE VISIT (OUTPATIENT)
Dept: ORTHOPEDIC SURGERY | Age: 47
End: 2017-04-03
Payer: MEDICAID

## 2017-04-03 VITALS — HEIGHT: 60 IN | BODY MASS INDEX: 33.98 KG/M2 | WEIGHT: 173.06 LBS

## 2017-04-03 DIAGNOSIS — M75.42 IMPINGEMENT SYNDROME, SHOULDER, LEFT: Primary | ICD-10-CM

## 2017-04-03 DIAGNOSIS — M75.82 ROTATOR CUFF TENDINITIS, LEFT: ICD-10-CM

## 2017-04-03 PROBLEM — M75.40 IMPINGEMENT SYNDROME, SHOULDER: Status: ACTIVE | Noted: 2017-04-03

## 2017-04-03 PROBLEM — M75.80 ROTATOR CUFF TENDINITIS: Status: ACTIVE | Noted: 2017-04-03

## 2017-04-03 PROCEDURE — 99213 OFFICE O/P EST LOW 20 MIN: CPT | Performed by: ORTHOPAEDIC SURGERY

## 2017-04-03 ASSESSMENT — ENCOUNTER SYMPTOMS
ABDOMINAL PAIN: 0
COLOR CHANGE: 0
VOMITING: 0
COUGH: 0
WHEEZING: 0
SHORTNESS OF BREATH: 0
NAUSEA: 0
ABDOMINAL DISTENTION: 0
EYE PAIN: 0

## 2017-04-04 RX ORDER — MELOXICAM 15 MG/1
15 TABLET ORAL DAILY
Qty: 30 TABLET | Refills: 2 | Status: CANCELLED | OUTPATIENT
Start: 2017-04-04

## 2017-04-12 ENCOUNTER — HOSPITAL ENCOUNTER (OUTPATIENT)
Dept: PHYSICAL THERAPY | Age: 47
Setting detail: THERAPIES SERIES
Discharge: HOME | End: 2017-04-12
Payer: MEDICAID

## 2017-04-12 PROCEDURE — 97161 PT EVAL LOW COMPLEX 20 MIN: CPT

## 2017-04-12 PROCEDURE — 97110 THERAPEUTIC EXERCISES: CPT

## 2017-04-13 ENCOUNTER — ANESTHESIA EVENT (OUTPATIENT)
Dept: OPERATING ROOM | Age: 47
End: 2017-04-13
Payer: MEDICAID

## 2017-04-13 RX ORDER — CYCLOBENZAPRINE HCL 5 MG
TABLET ORAL
Qty: 60 TABLET | Refills: 0 | Status: ON HOLD | OUTPATIENT
Start: 2017-04-13 | End: 2017-05-24 | Stop reason: HOSPADM

## 2017-04-13 RX ORDER — IBUPROFEN 400 MG/1
TABLET ORAL
Qty: 60 TABLET | Refills: 0 | Status: SHIPPED | OUTPATIENT
Start: 2017-04-13 | End: 2017-05-09

## 2017-04-14 ENCOUNTER — ANESTHESIA (OUTPATIENT)
Dept: OPERATING ROOM | Age: 47
End: 2017-04-14
Payer: MEDICAID

## 2017-04-14 ENCOUNTER — HOSPITAL ENCOUNTER (OUTPATIENT)
Age: 47
Setting detail: OUTPATIENT SURGERY
Discharge: HOME | End: 2017-04-14
Attending: OTOLARYNGOLOGY | Admitting: OTOLARYNGOLOGY
Payer: MEDICAID

## 2017-04-14 VITALS — DIASTOLIC BLOOD PRESSURE: 83 MMHG | SYSTOLIC BLOOD PRESSURE: 122 MMHG | OXYGEN SATURATION: 98 %

## 2017-04-14 VITALS
BODY MASS INDEX: 35.17 KG/M2 | WEIGHT: 163 LBS | RESPIRATION RATE: 16 BRPM | OXYGEN SATURATION: 95 % | TEMPERATURE: 97.5 F | HEART RATE: 108 BPM | SYSTOLIC BLOOD PRESSURE: 111 MMHG | HEIGHT: 57 IN | DIASTOLIC BLOOD PRESSURE: 69 MMHG

## 2017-04-14 LAB
GLUCOSE BLD-MCNC: 107 MG/DL (ref 74–106)
POC POTASSIUM: 5.3 MMOL/L (ref 3.5–5.1)

## 2017-04-14 PROCEDURE — 6360000002 HC RX W HCPCS: Performed by: NURSE ANESTHETIST, CERTIFIED REGISTERED

## 2017-04-14 PROCEDURE — 3600000004 HC SURGERY LEVEL 4 BASE: Performed by: OTOLARYNGOLOGY

## 2017-04-14 PROCEDURE — 2780000010 HC IMPLANT OTHER: Performed by: OTOLARYNGOLOGY

## 2017-04-14 PROCEDURE — 84132 ASSAY OF SERUM POTASSIUM: CPT

## 2017-04-14 PROCEDURE — 3600000014 HC SURGERY LEVEL 4 ADDTL 15MIN: Performed by: OTOLARYNGOLOGY

## 2017-04-14 PROCEDURE — 2580000003 HC RX 258: Performed by: NURSE ANESTHETIST, CERTIFIED REGISTERED

## 2017-04-14 PROCEDURE — 3700000000 HC ANESTHESIA ATTENDED CARE: Performed by: OTOLARYNGOLOGY

## 2017-04-14 PROCEDURE — 2580000003 HC RX 258: Performed by: OTOLARYNGOLOGY

## 2017-04-14 PROCEDURE — 2500000003 HC RX 250 WO HCPCS: Performed by: OTOLARYNGOLOGY

## 2017-04-14 PROCEDURE — 6370000000 HC RX 637 (ALT 250 FOR IP): Performed by: OTOLARYNGOLOGY

## 2017-04-14 PROCEDURE — 2500000003 HC RX 250 WO HCPCS: Performed by: NURSE ANESTHETIST, CERTIFIED REGISTERED

## 2017-04-14 PROCEDURE — 82947 ASSAY GLUCOSE BLOOD QUANT: CPT

## 2017-04-14 PROCEDURE — 7100000010 HC PHASE II RECOVERY - FIRST 15 MIN: Performed by: OTOLARYNGOLOGY

## 2017-04-14 PROCEDURE — 2580000003 HC RX 258: Performed by: ANESTHESIOLOGY

## 2017-04-14 PROCEDURE — 7100000001 HC PACU RECOVERY - ADDTL 15 MIN: Performed by: OTOLARYNGOLOGY

## 2017-04-14 PROCEDURE — 3700000001 HC ADD 15 MINUTES (ANESTHESIA): Performed by: OTOLARYNGOLOGY

## 2017-04-14 PROCEDURE — 7100000000 HC PACU RECOVERY - FIRST 15 MIN: Performed by: OTOLARYNGOLOGY

## 2017-04-14 DEVICE — VENT TUBE 1016040 5PK MOD GOODE T SIL
Type: IMPLANTABLE DEVICE | Site: NOSE | Status: FUNCTIONAL
Brand: GOODE T-TUBE®

## 2017-04-14 RX ORDER — OXYMETAZOLINE HYDROCHLORIDE 0.05 G/100ML
2 SPRAY NASAL EVERY 10 MIN PRN
Status: COMPLETED | OUTPATIENT
Start: 2017-04-14 | End: 2017-04-14

## 2017-04-14 RX ORDER — LIDOCAINE HYDROCHLORIDE 10 MG/ML
1 INJECTION, SOLUTION EPIDURAL; INFILTRATION; INTRACAUDAL; PERINEURAL
Status: DISCONTINUED | OUTPATIENT
Start: 2017-04-14 | End: 2017-04-14 | Stop reason: HOSPADM

## 2017-04-14 RX ORDER — LIDOCAINE HYDROCHLORIDE AND EPINEPHRINE 10; 10 MG/ML; UG/ML
INJECTION, SOLUTION INFILTRATION; PERINEURAL PRN
Status: DISCONTINUED | OUTPATIENT
Start: 2017-04-14 | End: 2017-04-14 | Stop reason: HOSPADM

## 2017-04-14 RX ORDER — SODIUM CHLORIDE 0.9 % (FLUSH) 0.9 %
10 SYRINGE (ML) INJECTION EVERY 12 HOURS SCHEDULED
Status: DISCONTINUED | OUTPATIENT
Start: 2017-04-14 | End: 2017-04-14 | Stop reason: HOSPADM

## 2017-04-14 RX ORDER — SODIUM CHLORIDE, SODIUM LACTATE, POTASSIUM CHLORIDE, CALCIUM CHLORIDE 600; 310; 30; 20 MG/100ML; MG/100ML; MG/100ML; MG/100ML
INJECTION, SOLUTION INTRAVENOUS CONTINUOUS
Status: DISCONTINUED | OUTPATIENT
Start: 2017-04-14 | End: 2017-04-14 | Stop reason: SDUPTHER

## 2017-04-14 RX ORDER — FENTANYL CITRATE 50 UG/ML
INJECTION, SOLUTION INTRAMUSCULAR; INTRAVENOUS PRN
Status: DISCONTINUED | OUTPATIENT
Start: 2017-04-14 | End: 2017-04-14 | Stop reason: SDUPTHER

## 2017-04-14 RX ORDER — MAGNESIUM HYDROXIDE 1200 MG/15ML
LIQUID ORAL CONTINUOUS PRN
Status: DISCONTINUED | OUTPATIENT
Start: 2017-04-14 | End: 2017-04-14 | Stop reason: HOSPADM

## 2017-04-14 RX ORDER — MIDAZOLAM HYDROCHLORIDE 1 MG/ML
INJECTION INTRAMUSCULAR; INTRAVENOUS PRN
Status: DISCONTINUED | OUTPATIENT
Start: 2017-04-14 | End: 2017-04-14 | Stop reason: SDUPTHER

## 2017-04-14 RX ORDER — SODIUM CHLORIDE 0.9 % (FLUSH) 0.9 %
10 SYRINGE (ML) INJECTION PRN
Status: DISCONTINUED | OUTPATIENT
Start: 2017-04-14 | End: 2017-04-14 | Stop reason: HOSPADM

## 2017-04-14 RX ORDER — OXYMETAZOLINE HYDROCHLORIDE 0.05 G/100ML
SPRAY NASAL PRN
Status: DISCONTINUED | OUTPATIENT
Start: 2017-04-14 | End: 2017-04-14 | Stop reason: HOSPADM

## 2017-04-14 RX ORDER — ONDANSETRON 2 MG/ML
4 INJECTION INTRAMUSCULAR; INTRAVENOUS
Status: DISCONTINUED | OUTPATIENT
Start: 2017-04-14 | End: 2017-04-14 | Stop reason: HOSPADM

## 2017-04-14 RX ORDER — SODIUM CHLORIDE, SODIUM LACTATE, POTASSIUM CHLORIDE, CALCIUM CHLORIDE 600; 310; 30; 20 MG/100ML; MG/100ML; MG/100ML; MG/100ML
INJECTION, SOLUTION INTRAVENOUS CONTINUOUS PRN
Status: DISCONTINUED | OUTPATIENT
Start: 2017-04-14 | End: 2017-04-14 | Stop reason: SDUPTHER

## 2017-04-14 RX ORDER — DEXAMETHASONE SODIUM PHOSPHATE 10 MG/ML
INJECTION INTRAMUSCULAR; INTRAVENOUS PRN
Status: DISCONTINUED | OUTPATIENT
Start: 2017-04-14 | End: 2017-04-14 | Stop reason: SDUPTHER

## 2017-04-14 RX ORDER — SODIUM CHLORIDE, SODIUM LACTATE, POTASSIUM CHLORIDE, CALCIUM CHLORIDE 600; 310; 30; 20 MG/100ML; MG/100ML; MG/100ML; MG/100ML
INJECTION, SOLUTION INTRAVENOUS CONTINUOUS
Status: DISCONTINUED | OUTPATIENT
Start: 2017-04-14 | End: 2017-04-14 | Stop reason: HOSPADM

## 2017-04-14 RX ORDER — PROPOFOL 10 MG/ML
INJECTION, EMULSION INTRAVENOUS PRN
Status: DISCONTINUED | OUTPATIENT
Start: 2017-04-14 | End: 2017-04-14 | Stop reason: SDUPTHER

## 2017-04-14 RX ORDER — LIDOCAINE HYDROCHLORIDE 10 MG/ML
INJECTION, SOLUTION INFILTRATION; PERINEURAL PRN
Status: DISCONTINUED | OUTPATIENT
Start: 2017-04-14 | End: 2017-04-14 | Stop reason: SDUPTHER

## 2017-04-14 RX ADMIN — SODIUM CHLORIDE, POTASSIUM CHLORIDE, SODIUM LACTATE AND CALCIUM CHLORIDE: 600; 310; 30; 20 INJECTION, SOLUTION INTRAVENOUS at 09:04

## 2017-04-14 RX ADMIN — OXYMETAZOLINE HYDROCHLORIDE 2 SPRAY: 5 SPRAY NASAL at 09:11

## 2017-04-14 RX ADMIN — OXYMETAZOLINE HYDROCHLORIDE 2 SPRAY: 5 SPRAY NASAL at 09:01

## 2017-04-14 RX ADMIN — FENTANYL CITRATE 50 MCG: 50 INJECTION INTRAMUSCULAR; INTRAVENOUS at 10:12

## 2017-04-14 RX ADMIN — MIDAZOLAM HYDROCHLORIDE 2 MG: 1 INJECTION, SOLUTION INTRAMUSCULAR; INTRAVENOUS at 10:12

## 2017-04-14 RX ADMIN — PROPOFOL 150 MG: 10 INJECTION, EMULSION INTRAVENOUS at 10:12

## 2017-04-14 RX ADMIN — DEXAMETHASONE SODIUM PHOSPHATE 10 MG: 10 INJECTION INTRAMUSCULAR; INTRAVENOUS at 10:23

## 2017-04-14 RX ADMIN — SODIUM CHLORIDE, POTASSIUM CHLORIDE, SODIUM LACTATE AND CALCIUM CHLORIDE: 600; 310; 30; 20 INJECTION, SOLUTION INTRAVENOUS at 10:12

## 2017-04-14 RX ADMIN — LIDOCAINE HYDROCHLORIDE 50 MG: 10 INJECTION, SOLUTION INFILTRATION; PERINEURAL at 10:12

## 2017-04-14 ASSESSMENT — PAIN SCALES - GENERAL
PAINLEVEL_OUTOF10: 0

## 2017-04-14 ASSESSMENT — ENCOUNTER SYMPTOMS
SHORTNESS OF BREATH: 0
STRIDOR: 0

## 2017-04-14 ASSESSMENT — PAIN - FUNCTIONAL ASSESSMENT: PAIN_FUNCTIONAL_ASSESSMENT: 0-10

## 2017-04-18 ENCOUNTER — HOSPITAL ENCOUNTER (OUTPATIENT)
Dept: PHYSICAL THERAPY | Age: 47
Setting detail: THERAPIES SERIES
Discharge: HOME | End: 2017-04-18
Payer: MEDICAID

## 2017-04-18 PROCEDURE — 97110 THERAPEUTIC EXERCISES: CPT

## 2017-04-19 RX ORDER — AMLODIPINE BESYLATE 2.5 MG/1
2.5 TABLET ORAL DAILY
Qty: 30 TABLET | Refills: 3 | Status: ON HOLD | OUTPATIENT
Start: 2017-04-19 | End: 2017-05-24 | Stop reason: HOSPADM

## 2017-04-20 ENCOUNTER — HOSPITAL ENCOUNTER (OUTPATIENT)
Dept: PHYSICAL THERAPY | Age: 47
Setting detail: THERAPIES SERIES
Discharge: HOME | End: 2017-04-20
Payer: MEDICAID

## 2017-04-23 ENCOUNTER — HOSPITAL ENCOUNTER (EMERGENCY)
Age: 47
Discharge: HOME | End: 2017-04-23
Attending: EMERGENCY MEDICINE
Payer: MEDICAID

## 2017-04-23 ENCOUNTER — APPOINTMENT (OUTPATIENT)
Dept: GENERAL RADIOLOGY | Age: 47
End: 2017-04-23
Payer: MEDICAID

## 2017-04-23 VITALS
SYSTOLIC BLOOD PRESSURE: 129 MMHG | OXYGEN SATURATION: 94 % | WEIGHT: 160 LBS | TEMPERATURE: 99.1 F | RESPIRATION RATE: 18 BRPM | BODY MASS INDEX: 34.62 KG/M2 | HEART RATE: 125 BPM | DIASTOLIC BLOOD PRESSURE: 82 MMHG

## 2017-04-23 DIAGNOSIS — N30.00 ACUTE CYSTITIS WITHOUT HEMATURIA: Primary | ICD-10-CM

## 2017-04-23 DIAGNOSIS — R11.0 NAUSEA: ICD-10-CM

## 2017-04-23 DIAGNOSIS — R11.11 NON-INTRACTABLE VOMITING WITHOUT NAUSEA, UNSPECIFIED VOMITING TYPE: ICD-10-CM

## 2017-04-23 LAB
-: ABNORMAL
ABSOLUTE EOS #: 0.8 K/UL (ref 0–0.4)
ABSOLUTE LYMPH #: 2.1 K/UL (ref 1–4.8)
ABSOLUTE MONO #: 1 K/UL (ref 0.1–1.2)
ALBUMIN SERPL-MCNC: 3.8 G/DL (ref 3.5–5.2)
ALBUMIN/GLOBULIN RATIO: 0.8 (ref 1–2.5)
ALP BLD-CCNC: 225 U/L (ref 35–104)
ALT SERPL-CCNC: 18 U/L (ref 5–33)
AMORPHOUS: ABNORMAL
ANION GAP SERPL CALCULATED.3IONS-SCNC: 17 MMOL/L (ref 9–17)
AST SERPL-CCNC: 21 U/L
BACTERIA: ABNORMAL
BASOPHILS # BLD: 0 % (ref 0–2)
BASOPHILS ABSOLUTE: 0 K/UL (ref 0–0.2)
BILIRUB SERPL-MCNC: 0.27 MG/DL (ref 0.3–1.2)
BILIRUBIN URINE: NEGATIVE
BUN BLDV-MCNC: 21 MG/DL (ref 6–20)
BUN/CREAT BLD: ABNORMAL (ref 9–20)
CALCIUM SERPL-MCNC: 10.2 MG/DL (ref 8.6–10.4)
CASTS UA: ABNORMAL /LPF (ref 0–2)
CHLORIDE BLD-SCNC: 95 MMOL/L (ref 98–107)
CO2: 24 MMOL/L (ref 20–31)
COLOR: ABNORMAL
COMMENT UA: ABNORMAL
CREAT SERPL-MCNC: 0.93 MG/DL (ref 0.5–0.9)
CRYSTALS, UA: ABNORMAL /HPF
DIFFERENTIAL TYPE: ABNORMAL
EOSINOPHILS RELATIVE PERCENT: 4 % (ref 1–4)
EPITHELIAL CELLS UA: ABNORMAL /HPF (ref 0–5)
GFR AFRICAN AMERICAN: >60 ML/MIN
GFR NON-AFRICAN AMERICAN: >60 ML/MIN
GFR SERPL CREATININE-BSD FRML MDRD: ABNORMAL ML/MIN/{1.73_M2}
GFR SERPL CREATININE-BSD FRML MDRD: ABNORMAL ML/MIN/{1.73_M2}
GLUCOSE BLD-MCNC: 122 MG/DL (ref 70–99)
GLUCOSE URINE: NEGATIVE
HCG QUALITATIVE: NEGATIVE
HCT VFR BLD CALC: 38.5 % (ref 36–46)
HEMOGLOBIN: 12.3 G/DL (ref 12–16)
KETONES, URINE: NEGATIVE
LEUKOCYTE ESTERASE, URINE: ABNORMAL
LIPASE: 46 U/L (ref 13–60)
LYMPHOCYTES # BLD: 11 % (ref 24–44)
MCH RBC QN AUTO: 27.2 PG (ref 26–34)
MCHC RBC AUTO-ENTMCNC: 31.9 G/DL (ref 31–37)
MCV RBC AUTO: 85.3 FL (ref 80–100)
MONOCYTES # BLD: 5 % (ref 2–11)
MUCUS: ABNORMAL
NITRITE, URINE: NEGATIVE
OTHER OBSERVATIONS UA: ABNORMAL
PDW BLD-RTO: 17.8 % (ref 12.5–15.4)
PH UA: 8 (ref 5–8)
PLATELET # BLD: 565 K/UL (ref 140–450)
PLATELET ESTIMATE: ABNORMAL
PMV BLD AUTO: 7.4 FL (ref 6–12)
POTASSIUM SERPL-SCNC: 3.6 MMOL/L (ref 3.7–5.3)
PROTEIN UA: NEGATIVE
RBC # BLD: 4.51 M/UL (ref 4–5.2)
RBC # BLD: ABNORMAL 10*6/UL
RBC UA: ABNORMAL /HPF (ref 0–2)
RENAL EPITHELIAL, UA: ABNORMAL /HPF
SEG NEUTROPHILS: 80 % (ref 36–66)
SEGMENTED NEUTROPHILS ABSOLUTE COUNT: 15.2 K/UL (ref 1.8–7.7)
SODIUM BLD-SCNC: 136 MMOL/L (ref 135–144)
SPECIFIC GRAVITY UA: 1.02 (ref 1–1.03)
TOTAL PROTEIN: 8.4 G/DL (ref 6.4–8.3)
TRICHOMONAS: ABNORMAL
TURBIDITY: ABNORMAL
URINE HGB: NEGATIVE
UROBILINOGEN, URINE: NORMAL
WBC # BLD: 19.1 K/UL (ref 3.5–11)
WBC # BLD: ABNORMAL 10*3/UL
WBC UA: ABNORMAL /HPF (ref 0–5)
YEAST: ABNORMAL

## 2017-04-23 PROCEDURE — 74022 RADEX COMPL AQT ABD SERIES: CPT

## 2017-04-23 PROCEDURE — 81001 URINALYSIS AUTO W/SCOPE: CPT

## 2017-04-23 PROCEDURE — 80053 COMPREHEN METABOLIC PANEL: CPT

## 2017-04-23 PROCEDURE — 6360000002 HC RX W HCPCS: Performed by: EMERGENCY MEDICINE

## 2017-04-23 PROCEDURE — 84703 CHORIONIC GONADOTROPIN ASSAY: CPT

## 2017-04-23 PROCEDURE — 99284 EMERGENCY DEPT VISIT MOD MDM: CPT

## 2017-04-23 PROCEDURE — 6360000002 HC RX W HCPCS

## 2017-04-23 PROCEDURE — 2580000003 HC RX 258: Performed by: EMERGENCY MEDICINE

## 2017-04-23 PROCEDURE — 96374 THER/PROPH/DIAG INJ IV PUSH: CPT

## 2017-04-23 PROCEDURE — 96376 TX/PRO/DX INJ SAME DRUG ADON: CPT

## 2017-04-23 PROCEDURE — 85025 COMPLETE CBC W/AUTO DIFF WBC: CPT

## 2017-04-23 PROCEDURE — 83690 ASSAY OF LIPASE: CPT

## 2017-04-23 RX ORDER — PROMETHAZINE HYDROCHLORIDE 25 MG/1
12.5 TABLET ORAL EVERY 8 HOURS PRN
Qty: 10 TABLET | Refills: 0 | Status: SHIPPED | OUTPATIENT
Start: 2017-04-23 | End: 2017-04-30

## 2017-04-23 RX ORDER — ONDANSETRON 4 MG/1
4 TABLET, FILM COATED ORAL EVERY 8 HOURS PRN
Qty: 20 TABLET | Refills: 0 | Status: SHIPPED | OUTPATIENT
Start: 2017-04-23 | End: 2017-05-04 | Stop reason: SDUPTHER

## 2017-04-23 RX ORDER — ONDANSETRON 2 MG/ML
4 INJECTION INTRAMUSCULAR; INTRAVENOUS ONCE
Status: COMPLETED | OUTPATIENT
Start: 2017-04-23 | End: 2017-04-23

## 2017-04-23 RX ORDER — 0.9 % SODIUM CHLORIDE 0.9 %
1000 INTRAVENOUS SOLUTION INTRAVENOUS ONCE
Status: COMPLETED | OUTPATIENT
Start: 2017-04-23 | End: 2017-04-23

## 2017-04-23 RX ORDER — ONDANSETRON 2 MG/ML
INJECTION INTRAMUSCULAR; INTRAVENOUS
Status: COMPLETED
Start: 2017-04-23 | End: 2017-04-23

## 2017-04-23 RX ORDER — CEPHALEXIN 500 MG/1
500 CAPSULE ORAL 4 TIMES DAILY
Qty: 28 CAPSULE | Refills: 0 | Status: SHIPPED | OUTPATIENT
Start: 2017-04-23 | End: 2017-04-30

## 2017-04-23 RX ADMIN — SODIUM CHLORIDE 1000 ML: 9 INJECTION, SOLUTION INTRAVENOUS at 16:58

## 2017-04-23 RX ADMIN — ONDANSETRON 4 MG: 2 INJECTION INTRAMUSCULAR; INTRAVENOUS at 18:12

## 2017-04-23 RX ADMIN — ONDANSETRON 4 MG: 2 INJECTION, SOLUTION INTRAMUSCULAR; INTRAVENOUS at 18:12

## 2017-04-23 RX ADMIN — ONDANSETRON 4 MG: 2 INJECTION, SOLUTION INTRAMUSCULAR; INTRAVENOUS at 16:58

## 2017-04-23 RX ADMIN — ONDANSETRON 4 MG: 2 INJECTION, SOLUTION INTRAMUSCULAR; INTRAVENOUS at 20:01

## 2017-04-23 ASSESSMENT — ENCOUNTER SYMPTOMS
COLOR CHANGE: 0
WHEEZING: 0
CONSTIPATION: 0
NAUSEA: 1
ABDOMINAL PAIN: 0
VOMITING: 1
BLOOD IN STOOL: 0
DIARRHEA: 0
SHORTNESS OF BREATH: 0

## 2017-04-23 ASSESSMENT — PAIN DESCRIPTION - PAIN TYPE: TYPE: ACUTE PAIN

## 2017-04-23 ASSESSMENT — PAIN SCALES - GENERAL: PAINLEVEL_OUTOF10: 7

## 2017-04-23 ASSESSMENT — PAIN DESCRIPTION - LOCATION: LOCATION: ABDOMEN

## 2017-04-25 ENCOUNTER — HOSPITAL ENCOUNTER (OUTPATIENT)
Dept: PHYSICAL THERAPY | Age: 47
Setting detail: THERAPIES SERIES
Discharge: HOME | End: 2017-04-25
Payer: MEDICAID

## 2017-04-25 PROCEDURE — 97110 THERAPEUTIC EXERCISES: CPT

## 2017-04-28 ENCOUNTER — HOSPITAL ENCOUNTER (OUTPATIENT)
Dept: PHYSICAL THERAPY | Age: 47
Setting detail: THERAPIES SERIES
Discharge: HOME | End: 2017-04-28
Payer: MEDICAID

## 2017-04-28 PROCEDURE — 97110 THERAPEUTIC EXERCISES: CPT

## 2017-05-01 ENCOUNTER — HOSPITAL ENCOUNTER (OUTPATIENT)
Age: 47
Setting detail: OBSERVATION
Discharge: HOME | End: 2017-05-02
Attending: EMERGENCY MEDICINE | Admitting: EMERGENCY MEDICINE
Payer: MEDICAID

## 2017-05-01 ENCOUNTER — APPOINTMENT (OUTPATIENT)
Dept: GENERAL RADIOLOGY | Age: 47
End: 2017-05-01
Payer: MEDICAID

## 2017-05-01 ENCOUNTER — APPOINTMENT (OUTPATIENT)
Dept: CT IMAGING | Age: 47
End: 2017-05-01
Payer: MEDICAID

## 2017-05-01 DIAGNOSIS — D72.829 LEUKOCYTOSIS, UNSPECIFIED TYPE: Primary | ICD-10-CM

## 2017-05-01 DIAGNOSIS — R91.1 LESION OF LUNG: ICD-10-CM

## 2017-05-01 LAB
-: ABNORMAL
ABSOLUTE EOS #: 0.6 K/UL (ref 0–0.4)
ABSOLUTE LYMPH #: 2.3 K/UL (ref 1–4.8)
ABSOLUTE MONO #: 0.9 K/UL (ref 0.1–1.2)
ALBUMIN SERPL-MCNC: 3.7 G/DL (ref 3.5–5.2)
ALBUMIN/GLOBULIN RATIO: 0.7 (ref 1–2.5)
ALP BLD-CCNC: 209 U/L (ref 35–104)
ALT SERPL-CCNC: 13 U/L (ref 5–33)
AMORPHOUS: ABNORMAL
ANION GAP SERPL CALCULATED.3IONS-SCNC: 20 MMOL/L (ref 9–17)
AST SERPL-CCNC: 14 U/L
BACTERIA: ABNORMAL
BASOPHILS # BLD: 0 %
BASOPHILS ABSOLUTE: 0 K/UL (ref 0–0.2)
BILIRUB SERPL-MCNC: 0.2 MG/DL (ref 0.3–1.2)
BILIRUBIN URINE: NEGATIVE
BUN BLDV-MCNC: 30 MG/DL (ref 6–20)
BUN/CREAT BLD: ABNORMAL (ref 9–20)
CALCIUM SERPL-MCNC: 10.3 MG/DL (ref 8.6–10.4)
CASTS UA: ABNORMAL /LPF (ref 0–2)
CHLORIDE BLD-SCNC: 99 MMOL/L (ref 98–107)
CO2: 20 MMOL/L (ref 20–31)
COLOR: YELLOW
COMMENT UA: ABNORMAL
CREAT SERPL-MCNC: 1.14 MG/DL (ref 0.5–0.9)
CRYSTALS, UA: ABNORMAL /HPF
DIFFERENTIAL TYPE: ABNORMAL
EOSINOPHILS RELATIVE PERCENT: 3 %
EPITHELIAL CELLS UA: ABNORMAL /HPF (ref 0–5)
GFR AFRICAN AMERICAN: >60 ML/MIN
GFR NON-AFRICAN AMERICAN: 51 ML/MIN
GFR SERPL CREATININE-BSD FRML MDRD: ABNORMAL ML/MIN/{1.73_M2}
GFR SERPL CREATININE-BSD FRML MDRD: ABNORMAL ML/MIN/{1.73_M2}
GLUCOSE BLD-MCNC: 75 MG/DL (ref 70–99)
GLUCOSE URINE: NEGATIVE
HCG QUALITATIVE: NEGATIVE
HCT VFR BLD CALC: 35.7 % (ref 36–46)
HEMOGLOBIN: 11.6 G/DL (ref 12–16)
KETONES, URINE: NEGATIVE
LACTIC ACID, WHOLE BLOOD: 1.9 MMOL/L (ref 0.7–2.1)
LEUKOCYTE ESTERASE, URINE: ABNORMAL
LIPASE: 93 U/L (ref 13–60)
LYMPHOCYTES # BLD: 12 %
MCH RBC QN AUTO: 27.6 PG (ref 26–34)
MCHC RBC AUTO-ENTMCNC: 32.5 G/DL (ref 31–37)
MCV RBC AUTO: 84.8 FL (ref 80–100)
MONOCYTES # BLD: 5 %
MUCUS: ABNORMAL
NITRITE, URINE: NEGATIVE
OTHER OBSERVATIONS UA: ABNORMAL
PDW BLD-RTO: 17.4 % (ref 12.5–15.4)
PH UA: 6 (ref 5–8)
PLATELET # BLD: 587 K/UL (ref 140–450)
PLATELET ESTIMATE: ABNORMAL
PMV BLD AUTO: 7.9 FL (ref 6–12)
POTASSIUM SERPL-SCNC: 4.3 MMOL/L (ref 3.7–5.3)
PROTEIN UA: NEGATIVE
RBC # BLD: 4.21 M/UL (ref 4–5.2)
RBC # BLD: ABNORMAL 10*6/UL
RBC UA: ABNORMAL /HPF (ref 0–2)
RENAL EPITHELIAL, UA: ABNORMAL /HPF
SEG NEUTROPHILS: 80 %
SEGMENTED NEUTROPHILS ABSOLUTE COUNT: 15.9 K/UL (ref 1.8–7.7)
SODIUM BLD-SCNC: 139 MMOL/L (ref 135–144)
SPECIFIC GRAVITY UA: 1.02 (ref 1–1.03)
TOTAL PROTEIN: 8.8 G/DL (ref 6.4–8.3)
TRICHOMONAS: ABNORMAL
TURBIDITY: ABNORMAL
URINE HGB: NEGATIVE
UROBILINOGEN, URINE: NORMAL
WBC # BLD: 19.7 K/UL (ref 3.5–11)
WBC # BLD: ABNORMAL 10*3/UL
WBC UA: ABNORMAL /HPF (ref 0–5)
YEAST: ABNORMAL

## 2017-05-01 PROCEDURE — 93005 ELECTROCARDIOGRAM TRACING: CPT

## 2017-05-01 PROCEDURE — 6360000004 HC RX CONTRAST MEDICATION: Performed by: EMERGENCY MEDICINE

## 2017-05-01 PROCEDURE — 87086 URINE CULTURE/COLONY COUNT: CPT

## 2017-05-01 PROCEDURE — G0384 LEV 5 HOSP TYPE B ED VISIT: HCPCS

## 2017-05-01 PROCEDURE — 6360000002 HC RX W HCPCS: Performed by: EMERGENCY MEDICINE

## 2017-05-01 PROCEDURE — 84703 CHORIONIC GONADOTROPIN ASSAY: CPT

## 2017-05-01 PROCEDURE — 96374 THER/PROPH/DIAG INJ IV PUSH: CPT

## 2017-05-01 PROCEDURE — 96375 TX/PRO/DX INJ NEW DRUG ADDON: CPT

## 2017-05-01 PROCEDURE — 85025 COMPLETE CBC W/AUTO DIFF WBC: CPT

## 2017-05-01 PROCEDURE — 83605 ASSAY OF LACTIC ACID: CPT

## 2017-05-01 PROCEDURE — 83690 ASSAY OF LIPASE: CPT

## 2017-05-01 PROCEDURE — 80053 COMPREHEN METABOLIC PANEL: CPT

## 2017-05-01 PROCEDURE — 2580000003 HC RX 258: Performed by: EMERGENCY MEDICINE

## 2017-05-01 PROCEDURE — 74177 CT ABD & PELVIS W/CONTRAST: CPT

## 2017-05-01 PROCEDURE — 74022 RADEX COMPL AQT ABD SERIES: CPT

## 2017-05-01 PROCEDURE — 81001 URINALYSIS AUTO W/SCOPE: CPT

## 2017-05-01 PROCEDURE — 71260 CT THORAX DX C+: CPT

## 2017-05-01 PROCEDURE — 87040 BLOOD CULTURE FOR BACTERIA: CPT

## 2017-05-01 RX ORDER — ONDANSETRON 2 MG/ML
4 INJECTION INTRAMUSCULAR; INTRAVENOUS ONCE
Status: COMPLETED | OUTPATIENT
Start: 2017-05-01 | End: 2017-05-01

## 2017-05-01 RX ORDER — 0.9 % SODIUM CHLORIDE 0.9 %
1000 INTRAVENOUS SOLUTION INTRAVENOUS ONCE
Status: COMPLETED | OUTPATIENT
Start: 2017-05-01 | End: 2017-05-01

## 2017-05-01 RX ADMIN — SODIUM CHLORIDE 1000 ML: 9 INJECTION, SOLUTION INTRAVENOUS at 21:02

## 2017-05-01 RX ADMIN — IOVERSOL 130 ML: 741 INJECTION INTRA-ARTERIAL; INTRAVENOUS at 23:46

## 2017-05-01 RX ADMIN — ONDANSETRON 4 MG: 2 INJECTION, SOLUTION INTRAMUSCULAR; INTRAVENOUS at 21:02

## 2017-05-01 ASSESSMENT — PAIN DESCRIPTION - ORIENTATION: ORIENTATION: LEFT;LOWER

## 2017-05-01 ASSESSMENT — PAIN DESCRIPTION - PAIN TYPE: TYPE: ACUTE PAIN

## 2017-05-01 ASSESSMENT — PAIN DESCRIPTION - DESCRIPTORS: DESCRIPTORS: SHARP;SHOOTING

## 2017-05-01 ASSESSMENT — PAIN DESCRIPTION - LOCATION: LOCATION: ABDOMEN

## 2017-05-01 ASSESSMENT — PAIN SCALES - GENERAL: PAINLEVEL_OUTOF10: 10

## 2017-05-01 ASSESSMENT — PAIN DESCRIPTION - FREQUENCY: FREQUENCY: CONTINUOUS

## 2017-05-02 ENCOUNTER — APPOINTMENT (OUTPATIENT)
Dept: NUCLEAR MEDICINE | Age: 47
End: 2017-05-02
Payer: MEDICAID

## 2017-05-02 VITALS
SYSTOLIC BLOOD PRESSURE: 126 MMHG | TEMPERATURE: 98.5 F | BODY MASS INDEX: 34.95 KG/M2 | HEART RATE: 102 BPM | OXYGEN SATURATION: 93 % | DIASTOLIC BLOOD PRESSURE: 85 MMHG | HEIGHT: 57 IN | WEIGHT: 162 LBS | RESPIRATION RATE: 16 BRPM

## 2017-05-02 LAB
CULTURE: NORMAL
CULTURE: NORMAL
Lab: NORMAL
SPECIMEN DESCRIPTION: NORMAL
STATUS: NORMAL

## 2017-05-02 PROCEDURE — 3430000000 HC RX DIAGNOSTIC RADIOPHARMACEUTICAL: Performed by: UROLOGY

## 2017-05-02 PROCEDURE — G0378 HOSPITAL OBSERVATION PER HR: HCPCS

## 2017-05-02 PROCEDURE — 96365 THER/PROPH/DIAG IV INF INIT: CPT

## 2017-05-02 PROCEDURE — A9562 TC99M MERTIATIDE: HCPCS | Performed by: UROLOGY

## 2017-05-02 PROCEDURE — 6360000002 HC RX W HCPCS: Performed by: UROLOGY

## 2017-05-02 PROCEDURE — 99245 OFF/OP CONSLTJ NEW/EST HI 55: CPT | Performed by: INTERNAL MEDICINE

## 2017-05-02 PROCEDURE — 2580000003 HC RX 258: Performed by: EMERGENCY MEDICINE

## 2017-05-02 PROCEDURE — 6360000002 HC RX W HCPCS: Performed by: EMERGENCY MEDICINE

## 2017-05-02 PROCEDURE — 6370000000 HC RX 637 (ALT 250 FOR IP): Performed by: EMERGENCY MEDICINE

## 2017-05-02 PROCEDURE — 96372 THER/PROPH/DIAG INJ SC/IM: CPT

## 2017-05-02 PROCEDURE — 78708 K FLOW/FUNCT IMAGE W/DRUG: CPT

## 2017-05-02 RX ORDER — AMLODIPINE BESYLATE 2.5 MG/1
2.5 TABLET ORAL DAILY
Status: DISCONTINUED | OUTPATIENT
Start: 2017-05-02 | End: 2017-05-03 | Stop reason: HOSPADM

## 2017-05-02 RX ORDER — SODIUM CHLORIDE 0.9 % (FLUSH) 0.9 %
10 SYRINGE (ML) INJECTION PRN
Status: DISCONTINUED | OUTPATIENT
Start: 2017-05-02 | End: 2017-05-03 | Stop reason: HOSPADM

## 2017-05-02 RX ORDER — ALBUTEROL SULFATE 90 UG/1
2 AEROSOL, METERED RESPIRATORY (INHALATION) EVERY 6 HOURS PRN
Status: DISCONTINUED | OUTPATIENT
Start: 2017-05-02 | End: 2017-05-03 | Stop reason: HOSPADM

## 2017-05-02 RX ORDER — FOLIC ACID 1 MG/1
1 TABLET ORAL DAILY
Status: DISCONTINUED | OUTPATIENT
Start: 2017-05-02 | End: 2017-05-03 | Stop reason: HOSPADM

## 2017-05-02 RX ORDER — ONDANSETRON 4 MG/1
4 TABLET, FILM COATED ORAL EVERY 8 HOURS PRN
Status: DISCONTINUED | OUTPATIENT
Start: 2017-05-02 | End: 2017-05-03 | Stop reason: HOSPADM

## 2017-05-02 RX ORDER — ASPIRIN 81 MG
TABLET,CHEWABLE ORAL
Qty: 30 TABLET | Refills: 0 | Status: SHIPPED | OUTPATIENT
Start: 2017-05-02 | End: 2017-05-04 | Stop reason: SDUPTHER

## 2017-05-02 RX ORDER — SPIRONOLACTONE 25 MG/1
25 TABLET ORAL DAILY
Status: DISCONTINUED | OUTPATIENT
Start: 2017-05-02 | End: 2017-05-03 | Stop reason: HOSPADM

## 2017-05-02 RX ORDER — ASPIRIN 81 MG/1
81 TABLET, CHEWABLE ORAL DAILY
Status: DISCONTINUED | OUTPATIENT
Start: 2017-05-02 | End: 2017-05-03 | Stop reason: HOSPADM

## 2017-05-02 RX ORDER — FUROSEMIDE 10 MG/ML
40 INJECTION INTRAMUSCULAR; INTRAVENOUS ONCE
Status: COMPLETED | OUTPATIENT
Start: 2017-05-02 | End: 2017-05-02

## 2017-05-02 RX ORDER — CEPHALEXIN 500 MG/1
500 CAPSULE ORAL 4 TIMES DAILY
Qty: 28 CAPSULE | Refills: 0 | Status: SHIPPED | OUTPATIENT
Start: 2017-05-02 | End: 2017-05-02

## 2017-05-02 RX ORDER — SODIUM CHLORIDE 0.9 % (FLUSH) 0.9 %
10 SYRINGE (ML) INJECTION EVERY 12 HOURS SCHEDULED
Status: DISCONTINUED | OUTPATIENT
Start: 2017-05-02 | End: 2017-05-03 | Stop reason: HOSPADM

## 2017-05-02 RX ORDER — CEPHALEXIN 500 MG/1
500 CAPSULE ORAL 4 TIMES DAILY
Qty: 28 CAPSULE | Refills: 0 | Status: SHIPPED | OUTPATIENT
Start: 2017-05-02 | End: 2017-05-09

## 2017-05-02 RX ORDER — LANOLIN ALCOHOL/MO/W.PET/CERES
325 CREAM (GRAM) TOPICAL 2 TIMES DAILY WITH MEALS
Status: DISCONTINUED | OUTPATIENT
Start: 2017-05-02 | End: 2017-05-03 | Stop reason: HOSPADM

## 2017-05-02 RX ORDER — CITALOPRAM 20 MG/1
40 TABLET ORAL DAILY
Status: DISCONTINUED | OUTPATIENT
Start: 2017-05-02 | End: 2017-05-03 | Stop reason: HOSPADM

## 2017-05-02 RX ADMIN — Medication 1 TABLET: at 09:12

## 2017-05-02 RX ADMIN — CITALOPRAM 40 MG: 20 TABLET, FILM COATED ORAL at 09:12

## 2017-05-02 RX ADMIN — Medication 3 MILLICURIE: at 15:00

## 2017-05-02 RX ADMIN — FOLIC ACID 1 MG: 1 TABLET ORAL at 09:12

## 2017-05-02 RX ADMIN — AMLODIPINE BESYLATE 2.5 MG: 2.5 TABLET ORAL at 09:12

## 2017-05-02 RX ADMIN — ASPIRIN 81 MG: 81 TABLET, CHEWABLE ORAL at 09:12

## 2017-05-02 RX ADMIN — FERROUS SULFATE TAB EC 325 MG (65 MG FE EQUIVALENT) 325 MG: 325 (65 FE) TABLET DELAYED RESPONSE at 09:12

## 2017-05-02 RX ADMIN — FUROSEMIDE 40 MG: 10 INJECTION, SOLUTION INTRAMUSCULAR; INTRAVENOUS at 15:29

## 2017-05-02 RX ADMIN — SPIRONOLACTONE 25 MG: 25 TABLET ORAL at 09:12

## 2017-05-02 RX ADMIN — CEFTRIAXONE SODIUM 1 G: 1 INJECTION, POWDER, FOR SOLUTION INTRAMUSCULAR; INTRAVENOUS at 04:19

## 2017-05-02 RX ADMIN — ENOXAPARIN SODIUM 40 MG: 40 INJECTION SUBCUTANEOUS at 09:12

## 2017-05-02 RX ADMIN — Medication 10 ML: at 09:21

## 2017-05-02 ASSESSMENT — PAIN DESCRIPTION - PAIN TYPE: TYPE: ACUTE PAIN

## 2017-05-02 ASSESSMENT — PAIN SCALES - GENERAL
PAINLEVEL_OUTOF10: 7
PAINLEVEL_OUTOF10: 0

## 2017-05-02 ASSESSMENT — PAIN DESCRIPTION - FREQUENCY: FREQUENCY: CONTINUOUS

## 2017-05-02 ASSESSMENT — PAIN DESCRIPTION - ONSET: ONSET: ON-GOING

## 2017-05-02 ASSESSMENT — PAIN DESCRIPTION - PROGRESSION: CLINICAL_PROGRESSION: GRADUALLY IMPROVING

## 2017-05-02 ASSESSMENT — PAIN DESCRIPTION - DESCRIPTORS: DESCRIPTORS: STABBING

## 2017-05-02 ASSESSMENT — PAIN DESCRIPTION - ORIENTATION: ORIENTATION: LEFT;LOWER

## 2017-05-02 ASSESSMENT — PAIN DESCRIPTION - LOCATION: LOCATION: ABDOMEN

## 2017-05-04 ENCOUNTER — HOSPITAL ENCOUNTER (OUTPATIENT)
Dept: MAMMOGRAPHY | Age: 47
Discharge: HOME | End: 2017-05-04
Payer: MEDICAID

## 2017-05-04 ENCOUNTER — OFFICE VISIT (OUTPATIENT)
Dept: UROLOGY | Age: 47
End: 2017-05-04
Payer: MEDICAID

## 2017-05-04 VITALS
WEIGHT: 160 LBS | SYSTOLIC BLOOD PRESSURE: 117 MMHG | BODY MASS INDEX: 34.52 KG/M2 | DIASTOLIC BLOOD PRESSURE: 85 MMHG | TEMPERATURE: 98.5 F | HEIGHT: 57 IN | HEART RATE: 115 BPM

## 2017-05-04 DIAGNOSIS — N13.30 HYDRONEPHROSIS, RIGHT: Primary | ICD-10-CM

## 2017-05-04 DIAGNOSIS — Z12.31 VISIT FOR SCREENING MAMMOGRAM: ICD-10-CM

## 2017-05-04 LAB
BILIRUBIN, POC: ABNORMAL
BLOOD URINE, POC: ABNORMAL
CLARITY, POC: ABNORMAL
COLOR, POC: YELLOW
EKG ATRIAL RATE: 103 BPM
EKG P AXIS: 42 DEGREES
EKG P-R INTERVAL: 122 MS
EKG Q-T INTERVAL: 388 MS
EKG QRS DURATION: 78 MS
EKG QTC CALCULATION (BAZETT): 508 MS
EKG R AXIS: 44 DEGREES
EKG T AXIS: 65 DEGREES
EKG VENTRICULAR RATE: 103 BPM
GLUCOSE URINE, POC: NEGATIVE
KETONES, POC: ABNORMAL
LEUKOCYTE EST, POC: ABNORMAL
NITRITE, POC: NEGATIVE
PH, POC: 5
PROTEIN, POC: ABNORMAL
SPECIFIC GRAVITY, POC: 1.02
UROBILINOGEN, POC: 0.2

## 2017-05-04 PROCEDURE — 77063 BREAST TOMOSYNTHESIS BI: CPT

## 2017-05-04 PROCEDURE — 99213 OFFICE O/P EST LOW 20 MIN: CPT | Performed by: SPECIALIST

## 2017-05-04 PROCEDURE — 81002 URINALYSIS NONAUTO W/O SCOPE: CPT | Performed by: SPECIALIST

## 2017-05-05 ENCOUNTER — APPOINTMENT (OUTPATIENT)
Dept: PHYSICAL THERAPY | Age: 47
End: 2017-05-05
Payer: MEDICAID

## 2017-05-08 ENCOUNTER — HOSPITAL ENCOUNTER (OUTPATIENT)
Dept: PHYSICAL THERAPY | Age: 47
Setting detail: THERAPIES SERIES
Discharge: HOME | End: 2017-05-08
Payer: MEDICAID

## 2017-05-08 PROCEDURE — 97110 THERAPEUTIC EXERCISES: CPT

## 2017-05-09 ENCOUNTER — HOSPITAL ENCOUNTER (EMERGENCY)
Age: 47
Discharge: HOME | End: 2017-05-09
Attending: EMERGENCY MEDICINE
Payer: MEDICAID

## 2017-05-09 VITALS
RESPIRATION RATE: 16 BRPM | DIASTOLIC BLOOD PRESSURE: 81 MMHG | BODY MASS INDEX: 34.52 KG/M2 | WEIGHT: 160 LBS | HEIGHT: 57 IN | OXYGEN SATURATION: 95 % | SYSTOLIC BLOOD PRESSURE: 115 MMHG | TEMPERATURE: 99.7 F | HEART RATE: 97 BPM

## 2017-05-09 DIAGNOSIS — R10.9 FLANK PAIN: Primary | ICD-10-CM

## 2017-05-09 LAB
-: ABNORMAL
ABSOLUTE EOS #: 0.3 K/UL (ref 0–0.4)
ABSOLUTE LYMPH #: 1.5 K/UL (ref 1–4.8)
ABSOLUTE MONO #: 1.3 K/UL (ref 0.1–1.2)
AMORPHOUS: ABNORMAL
ANION GAP SERPL CALCULATED.3IONS-SCNC: 19 MMOL/L (ref 9–17)
BACTERIA: ABNORMAL
BASOPHILS # BLD: 0 %
BASOPHILS ABSOLUTE: 0 K/UL (ref 0–0.2)
BILIRUBIN URINE: ABNORMAL
BUN BLDV-MCNC: 23 MG/DL (ref 6–20)
BUN/CREAT BLD: ABNORMAL (ref 9–20)
CALCIUM SERPL-MCNC: 10.5 MG/DL (ref 8.6–10.4)
CASTS UA: ABNORMAL /LPF (ref 0–2)
CHLORIDE BLD-SCNC: 97 MMOL/L (ref 98–107)
CO2: 19 MMOL/L (ref 20–31)
COLOR: YELLOW
COMMENT UA: ABNORMAL
CREAT SERPL-MCNC: 0.93 MG/DL (ref 0.5–0.9)
CRYSTALS, UA: ABNORMAL /HPF
DIFFERENTIAL TYPE: ABNORMAL
EOSINOPHILS RELATIVE PERCENT: 2 %
EPITHELIAL CELLS UA: ABNORMAL /HPF (ref 0–5)
GFR AFRICAN AMERICAN: >60 ML/MIN
GFR NON-AFRICAN AMERICAN: >60 ML/MIN
GFR SERPL CREATININE-BSD FRML MDRD: ABNORMAL ML/MIN/{1.73_M2}
GFR SERPL CREATININE-BSD FRML MDRD: ABNORMAL ML/MIN/{1.73_M2}
GLUCOSE BLD-MCNC: 127 MG/DL (ref 70–99)
GLUCOSE URINE: NEGATIVE
HCT VFR BLD CALC: 34.2 % (ref 36–46)
HEMOGLOBIN: 10.9 G/DL (ref 12–16)
KETONES, URINE: NEGATIVE
LACTIC ACID, WHOLE BLOOD: 1.1 MMOL/L (ref 0.7–2.1)
LEUKOCYTE ESTERASE, URINE: NEGATIVE
LYMPHOCYTES # BLD: 8 %
MCH RBC QN AUTO: 27.3 PG (ref 26–34)
MCHC RBC AUTO-ENTMCNC: 31.9 G/DL (ref 31–37)
MCV RBC AUTO: 85.6 FL (ref 80–100)
MONOCYTES # BLD: 7 %
MUCUS: ABNORMAL
NITRITE, URINE: NEGATIVE
OTHER OBSERVATIONS UA: ABNORMAL
PDW BLD-RTO: 17 % (ref 12.5–15.4)
PH UA: 5.5 (ref 5–8)
PLATELET # BLD: 579 K/UL (ref 140–450)
PLATELET ESTIMATE: ABNORMAL
PMV BLD AUTO: 7.2 FL (ref 6–12)
POTASSIUM SERPL-SCNC: 4.1 MMOL/L (ref 3.7–5.3)
PROTEIN UA: ABNORMAL
RBC # BLD: 3.99 M/UL (ref 4–5.2)
RBC # BLD: ABNORMAL 10*6/UL
RBC UA: ABNORMAL /HPF (ref 0–2)
RENAL EPITHELIAL, UA: ABNORMAL /HPF
SEG NEUTROPHILS: 83 %
SEGMENTED NEUTROPHILS ABSOLUTE COUNT: 16 K/UL (ref 1.8–7.7)
SODIUM BLD-SCNC: 135 MMOL/L (ref 135–144)
SPECIFIC GRAVITY UA: 1.02 (ref 1–1.03)
TRICHOMONAS: ABNORMAL
TURBIDITY: ABNORMAL
URINE HGB: ABNORMAL
UROBILINOGEN, URINE: NORMAL
WBC # BLD: 19.1 K/UL (ref 3.5–11)
WBC # BLD: ABNORMAL 10*3/UL
WBC UA: ABNORMAL /HPF (ref 0–5)
YEAST: ABNORMAL

## 2017-05-09 PROCEDURE — 96374 THER/PROPH/DIAG INJ IV PUSH: CPT

## 2017-05-09 PROCEDURE — 2580000003 HC RX 258: Performed by: EMERGENCY MEDICINE

## 2017-05-09 PROCEDURE — 85025 COMPLETE CBC W/AUTO DIFF WBC: CPT

## 2017-05-09 PROCEDURE — G0382 LEV 3 HOSP TYPE B ED VISIT: HCPCS

## 2017-05-09 PROCEDURE — 96375 TX/PRO/DX INJ NEW DRUG ADDON: CPT

## 2017-05-09 PROCEDURE — 87086 URINE CULTURE/COLONY COUNT: CPT

## 2017-05-09 PROCEDURE — 81001 URINALYSIS AUTO W/SCOPE: CPT

## 2017-05-09 PROCEDURE — 6360000002 HC RX W HCPCS: Performed by: EMERGENCY MEDICINE

## 2017-05-09 PROCEDURE — 80048 BASIC METABOLIC PNL TOTAL CA: CPT

## 2017-05-09 PROCEDURE — 83605 ASSAY OF LACTIC ACID: CPT

## 2017-05-09 RX ORDER — IBUPROFEN 600 MG/1
600 TABLET ORAL EVERY 6 HOURS PRN
Qty: 20 TABLET | Refills: 0 | Status: SHIPPED | OUTPATIENT
Start: 2017-05-09 | End: 2017-05-15

## 2017-05-09 RX ORDER — 0.9 % SODIUM CHLORIDE 0.9 %
1000 INTRAVENOUS SOLUTION INTRAVENOUS ONCE
Status: COMPLETED | OUTPATIENT
Start: 2017-05-09 | End: 2017-05-09

## 2017-05-09 RX ORDER — KETOROLAC TROMETHAMINE 30 MG/ML
30 INJECTION, SOLUTION INTRAMUSCULAR; INTRAVENOUS ONCE
Status: COMPLETED | OUTPATIENT
Start: 2017-05-09 | End: 2017-05-09

## 2017-05-09 RX ORDER — ONDANSETRON 4 MG/1
4 TABLET, FILM COATED ORAL EVERY 8 HOURS PRN
Qty: 8 TABLET | Refills: 0 | Status: SHIPPED | OUTPATIENT
Start: 2017-05-09

## 2017-05-09 RX ORDER — ONDANSETRON 2 MG/ML
4 INJECTION INTRAMUSCULAR; INTRAVENOUS ONCE
Status: COMPLETED | OUTPATIENT
Start: 2017-05-09 | End: 2017-05-09

## 2017-05-09 RX ADMIN — SODIUM CHLORIDE 1000 ML: 9 INJECTION, SOLUTION INTRAVENOUS at 12:37

## 2017-05-09 RX ADMIN — ONDANSETRON 4 MG: 2 INJECTION, SOLUTION INTRAMUSCULAR; INTRAVENOUS at 12:36

## 2017-05-09 RX ADMIN — KETOROLAC TROMETHAMINE 30 MG: 30 INJECTION, SOLUTION INTRAMUSCULAR at 12:36

## 2017-05-09 ASSESSMENT — ENCOUNTER SYMPTOMS
NAUSEA: 1
ABDOMINAL PAIN: 1
VOMITING: 1

## 2017-05-09 ASSESSMENT — PAIN DESCRIPTION - LOCATION
LOCATION: FLANK
LOCATION: FLANK

## 2017-05-09 ASSESSMENT — PAIN SCALES - GENERAL
PAINLEVEL_OUTOF10: 8
PAINLEVEL_OUTOF10: 4
PAINLEVEL_OUTOF10: 8

## 2017-05-09 ASSESSMENT — PAIN DESCRIPTION - PAIN TYPE
TYPE: ACUTE PAIN
TYPE: ACUTE PAIN

## 2017-05-09 ASSESSMENT — PAIN DESCRIPTION - ORIENTATION
ORIENTATION: RIGHT
ORIENTATION: RIGHT

## 2017-05-09 ASSESSMENT — PAIN DESCRIPTION - FREQUENCY
FREQUENCY: CONTINUOUS
FREQUENCY: CONTINUOUS

## 2017-05-09 ASSESSMENT — PAIN DESCRIPTION - DESCRIPTORS
DESCRIPTORS: STABBING
DESCRIPTORS: STABBING

## 2017-05-10 ENCOUNTER — ANESTHESIA EVENT (OUTPATIENT)
Dept: OPERATING ROOM | Age: 47
End: 2017-05-10
Payer: MEDICAID

## 2017-05-10 LAB
CULTURE: NORMAL
CULTURE: NORMAL
Lab: NORMAL
SPECIMEN DESCRIPTION: NORMAL
STATUS: NORMAL

## 2017-05-11 ENCOUNTER — HOSPITAL ENCOUNTER (OUTPATIENT)
Age: 47
Setting detail: OUTPATIENT SURGERY
Discharge: HOME | End: 2017-05-11
Attending: SPECIALIST | Admitting: SPECIALIST
Payer: MEDICAID

## 2017-05-11 ENCOUNTER — APPOINTMENT (OUTPATIENT)
Dept: GENERAL RADIOLOGY | Age: 47
End: 2017-05-11
Attending: SPECIALIST
Payer: MEDICAID

## 2017-05-11 ENCOUNTER — ANESTHESIA (OUTPATIENT)
Dept: OPERATING ROOM | Age: 47
End: 2017-05-11
Payer: MEDICAID

## 2017-05-11 VITALS — DIASTOLIC BLOOD PRESSURE: 76 MMHG | TEMPERATURE: 98.2 F | SYSTOLIC BLOOD PRESSURE: 118 MMHG | OXYGEN SATURATION: 98 %

## 2017-05-11 VITALS
DIASTOLIC BLOOD PRESSURE: 68 MMHG | SYSTOLIC BLOOD PRESSURE: 114 MMHG | BODY MASS INDEX: 33.87 KG/M2 | HEIGHT: 57 IN | RESPIRATION RATE: 14 BRPM | OXYGEN SATURATION: 97 % | TEMPERATURE: 97.5 F | HEART RATE: 97 BPM | WEIGHT: 157 LBS

## 2017-05-11 PROCEDURE — 3700000000 HC ANESTHESIA ATTENDED CARE: Performed by: SPECIALIST

## 2017-05-11 PROCEDURE — 3600000014 HC SURGERY LEVEL 4 ADDTL 15MIN: Performed by: SPECIALIST

## 2017-05-11 PROCEDURE — 7100000000 HC PACU RECOVERY - FIRST 15 MIN: Performed by: SPECIALIST

## 2017-05-11 PROCEDURE — C1769 GUIDE WIRE: HCPCS | Performed by: SPECIALIST

## 2017-05-11 PROCEDURE — 6360000002 HC RX W HCPCS: Performed by: SPECIALIST

## 2017-05-11 PROCEDURE — 7100000001 HC PACU RECOVERY - ADDTL 15 MIN: Performed by: SPECIALIST

## 2017-05-11 PROCEDURE — C2617 STENT, NON-COR, TEM W/O DEL: HCPCS | Performed by: SPECIALIST

## 2017-05-11 PROCEDURE — 3700000001 HC ADD 15 MINUTES (ANESTHESIA): Performed by: SPECIALIST

## 2017-05-11 PROCEDURE — 74000 XR ABDOMEN LIMITED (KUB): CPT

## 2017-05-11 PROCEDURE — 2500000003 HC RX 250 WO HCPCS: Performed by: ANESTHESIOLOGY

## 2017-05-11 PROCEDURE — C1894 INTRO/SHEATH, NON-LASER: HCPCS | Performed by: SPECIALIST

## 2017-05-11 PROCEDURE — 7100000010 HC PHASE II RECOVERY - FIRST 15 MIN: Performed by: SPECIALIST

## 2017-05-11 PROCEDURE — 3600000004 HC SURGERY LEVEL 4 BASE: Performed by: SPECIALIST

## 2017-05-11 PROCEDURE — 6360000004 HC RX CONTRAST MEDICATION: Performed by: SPECIALIST

## 2017-05-11 PROCEDURE — 2580000003 HC RX 258: Performed by: ANESTHESIOLOGY

## 2017-05-11 DEVICE — URETERAL STENT
Type: IMPLANTABLE DEVICE | Site: URETER | Status: FUNCTIONAL
Brand: CONTOUR™

## 2017-05-11 RX ORDER — TRAMADOL HYDROCHLORIDE 50 MG/1
50 TABLET ORAL EVERY 6 HOURS PRN
Qty: 30 TABLET | Refills: 0 | Status: ON HOLD | OUTPATIENT
Start: 2017-05-11 | End: 2017-05-24

## 2017-05-11 RX ORDER — SODIUM CHLORIDE 0.9 % (FLUSH) 0.9 %
10 SYRINGE (ML) INJECTION EVERY 12 HOURS SCHEDULED
Status: DISCONTINUED | OUTPATIENT
Start: 2017-05-11 | End: 2017-05-11 | Stop reason: HOSPADM

## 2017-05-11 RX ORDER — LIDOCAINE HYDROCHLORIDE 10 MG/ML
1 INJECTION, SOLUTION EPIDURAL; INFILTRATION; INTRACAUDAL; PERINEURAL
Status: COMPLETED | OUTPATIENT
Start: 2017-05-11 | End: 2017-05-11

## 2017-05-11 RX ORDER — FENTANYL CITRATE 50 UG/ML
INJECTION, SOLUTION INTRAMUSCULAR; INTRAVENOUS PRN
Status: DISCONTINUED | OUTPATIENT
Start: 2017-05-11 | End: 2017-05-11 | Stop reason: SDUPTHER

## 2017-05-11 RX ORDER — FENTANYL CITRATE 50 UG/ML
25 INJECTION, SOLUTION INTRAMUSCULAR; INTRAVENOUS EVERY 5 MIN PRN
Status: DISCONTINUED | OUTPATIENT
Start: 2017-05-11 | End: 2017-05-11 | Stop reason: HOSPADM

## 2017-05-11 RX ORDER — TAMSULOSIN HYDROCHLORIDE 0.4 MG/1
0.4 CAPSULE ORAL DAILY
Qty: 30 CAPSULE | Refills: 1 | Status: ON HOLD | OUTPATIENT
Start: 2017-05-11 | End: 2017-05-24 | Stop reason: HOSPADM

## 2017-05-11 RX ORDER — SODIUM CHLORIDE, SODIUM LACTATE, POTASSIUM CHLORIDE, CALCIUM CHLORIDE 600; 310; 30; 20 MG/100ML; MG/100ML; MG/100ML; MG/100ML
INJECTION, SOLUTION INTRAVENOUS CONTINUOUS
Status: DISCONTINUED | OUTPATIENT
Start: 2017-05-11 | End: 2017-05-11 | Stop reason: HOSPADM

## 2017-05-11 RX ORDER — MIDAZOLAM HYDROCHLORIDE 1 MG/ML
1 INJECTION INTRAMUSCULAR; INTRAVENOUS EVERY 10 MIN PRN
Status: DISCONTINUED | OUTPATIENT
Start: 2017-05-11 | End: 2017-05-11 | Stop reason: HOSPADM

## 2017-05-11 RX ORDER — CIPROFLOXACIN 2 MG/ML
400 INJECTION, SOLUTION INTRAVENOUS ONCE
Status: COMPLETED | OUTPATIENT
Start: 2017-05-11 | End: 2017-05-11

## 2017-05-11 RX ORDER — SODIUM CHLORIDE 0.9 % (FLUSH) 0.9 %
10 SYRINGE (ML) INJECTION PRN
Status: DISCONTINUED | OUTPATIENT
Start: 2017-05-11 | End: 2017-05-11 | Stop reason: HOSPADM

## 2017-05-11 RX ORDER — PROPOFOL 10 MG/ML
INJECTION, EMULSION INTRAVENOUS PRN
Status: DISCONTINUED | OUTPATIENT
Start: 2017-05-11 | End: 2017-05-11 | Stop reason: SDUPTHER

## 2017-05-11 RX ORDER — OXYBUTYNIN CHLORIDE 10 MG/1
10 TABLET, EXTENDED RELEASE ORAL DAILY PRN
Qty: 30 TABLET | Refills: 0 | Status: ON HOLD | OUTPATIENT
Start: 2017-05-11 | End: 2017-06-12 | Stop reason: HOSPADM

## 2017-05-11 RX ORDER — ONDANSETRON 2 MG/ML
INJECTION INTRAMUSCULAR; INTRAVENOUS PRN
Status: DISCONTINUED | OUTPATIENT
Start: 2017-05-11 | End: 2017-05-11 | Stop reason: SDUPTHER

## 2017-05-11 RX ORDER — DOCUSATE SODIUM 100 MG/1
100 CAPSULE, LIQUID FILLED ORAL 2 TIMES DAILY PRN
Qty: 30 CAPSULE | Refills: 1 | Status: SHIPPED | OUTPATIENT
Start: 2017-05-11 | End: 2017-06-19 | Stop reason: SDUPTHER

## 2017-05-11 RX ADMIN — SODIUM CHLORIDE, POTASSIUM CHLORIDE, SODIUM LACTATE AND CALCIUM CHLORIDE: 600; 310; 30; 20 INJECTION, SOLUTION INTRAVENOUS at 13:51

## 2017-05-11 RX ADMIN — SODIUM CHLORIDE, SODIUM LACTATE, POTASSIUM CHLORIDE, CALCIUM CHLORIDE: 600; 310; 30; 20 INJECTION, SOLUTION INTRAVENOUS at 14:01

## 2017-05-11 RX ADMIN — FENTANYL CITRATE 50 MCG: 50 INJECTION, SOLUTION INTRAMUSCULAR; INTRAVENOUS at 14:06

## 2017-05-11 RX ADMIN — CIPROFLOXACIN 400 MG: 2 INJECTION, SOLUTION INTRAVENOUS at 14:10

## 2017-05-11 RX ADMIN — LIDOCAINE HYDROCHLORIDE 50 MG: 10 INJECTION, SOLUTION EPIDURAL; INFILTRATION; INTRACAUDAL; PERINEURAL at 14:06

## 2017-05-11 RX ADMIN — FENTANYL CITRATE 25 MCG: 50 INJECTION, SOLUTION INTRAMUSCULAR; INTRAVENOUS at 14:35

## 2017-05-11 RX ADMIN — FENTANYL CITRATE 25 MCG: 50 INJECTION, SOLUTION INTRAMUSCULAR; INTRAVENOUS at 14:20

## 2017-05-11 RX ADMIN — ONDANSETRON 4 MG: 2 INJECTION, SOLUTION INTRAMUSCULAR; INTRAVENOUS at 14:26

## 2017-05-11 RX ADMIN — PROPOFOL 150 MG: 10 INJECTION, EMULSION INTRAVENOUS at 14:06

## 2017-05-11 ASSESSMENT — ENCOUNTER SYMPTOMS: SHORTNESS OF BREATH: 1

## 2017-05-11 ASSESSMENT — PAIN SCALES - GENERAL
PAINLEVEL_OUTOF10: 0
PAINLEVEL_OUTOF10: 0

## 2017-05-11 ASSESSMENT — COPD QUESTIONNAIRES: CAT_SEVERITY: SEVERE

## 2017-05-12 ENCOUNTER — HOSPITAL ENCOUNTER (OUTPATIENT)
Dept: PHYSICAL THERAPY | Age: 47
Setting detail: THERAPIES SERIES
Discharge: HOME | End: 2017-05-12
Payer: MEDICAID

## 2017-05-12 PROCEDURE — 97110 THERAPEUTIC EXERCISES: CPT

## 2017-05-15 RX ORDER — ASPIRIN 81 MG/1
81 TABLET, CHEWABLE ORAL DAILY
Qty: 30 TABLET | Refills: 11 | Status: SHIPPED | OUTPATIENT
Start: 2017-05-15 | End: 2017-06-19 | Stop reason: SDUPTHER

## 2017-05-16 ENCOUNTER — HOSPITAL ENCOUNTER (OUTPATIENT)
Dept: PHYSICAL THERAPY | Age: 47
Setting detail: THERAPIES SERIES
Discharge: HOME | End: 2017-05-16
Payer: MEDICAID

## 2017-05-16 PROCEDURE — 97110 THERAPEUTIC EXERCISES: CPT

## 2017-05-17 DIAGNOSIS — J06.9 VIRAL UPPER RESPIRATORY TRACT INFECTION: ICD-10-CM

## 2017-05-19 ENCOUNTER — APPOINTMENT (OUTPATIENT)
Dept: CT IMAGING | Age: 47
DRG: 249 | End: 2017-05-19
Payer: MEDICAID

## 2017-05-19 ENCOUNTER — HOSPITAL ENCOUNTER (INPATIENT)
Age: 47
LOS: 5 days | Discharge: HOME | DRG: 249 | End: 2017-05-24
Attending: EMERGENCY MEDICINE | Admitting: INTERNAL MEDICINE
Payer: MEDICAID

## 2017-05-19 DIAGNOSIS — R19.7 DIARRHEA, UNSPECIFIED TYPE: ICD-10-CM

## 2017-05-19 DIAGNOSIS — R11.2 NON-INTRACTABLE VOMITING WITH NAUSEA, UNSPECIFIED VOMITING TYPE: ICD-10-CM

## 2017-05-19 DIAGNOSIS — K52.9 COLITIS: Primary | ICD-10-CM

## 2017-05-19 LAB
-: ABNORMAL
ABSOLUTE EOS #: 0 K/UL (ref 0–0.4)
ABSOLUTE LYMPH #: 1.74 K/UL (ref 1–4.8)
ABSOLUTE MONO #: 1.09 K/UL (ref 0.1–0.8)
ALBUMIN SERPL-MCNC: 3.5 G/DL (ref 3.5–5.2)
ALBUMIN/GLOBULIN RATIO: 0.7 (ref 1–2.5)
ALP BLD-CCNC: 286 U/L (ref 35–104)
ALT SERPL-CCNC: 12 U/L (ref 5–33)
AMORPHOUS: ABNORMAL
ANION GAP SERPL CALCULATED.3IONS-SCNC: 21 MMOL/L (ref 9–17)
AST SERPL-CCNC: 15 U/L
BACTERIA: ABNORMAL
BASOPHILS # BLD: 0 %
BASOPHILS ABSOLUTE: 0 K/UL (ref 0–0.2)
BILIRUB SERPL-MCNC: 0.26 MG/DL (ref 0.3–1.2)
BILIRUBIN URINE: NEGATIVE
BUN BLDV-MCNC: 16 MG/DL (ref 6–20)
BUN/CREAT BLD: ABNORMAL (ref 9–20)
CALCIUM SERPL-MCNC: 10.6 MG/DL (ref 8.6–10.4)
CASTS UA: ABNORMAL /LPF (ref 0–2)
CHLORIDE BLD-SCNC: 96 MMOL/L (ref 98–107)
CO2: 19 MMOL/L (ref 20–31)
COLOR: ABNORMAL
COMMENT UA: ABNORMAL
CREAT SERPL-MCNC: 0.86 MG/DL (ref 0.5–0.9)
CRYSTALS, UA: ABNORMAL /HPF
DIFFERENTIAL TYPE: ABNORMAL
EOSINOPHILS RELATIVE PERCENT: 0 %
EPITHELIAL CELLS UA: ABNORMAL /HPF (ref 0–5)
GFR AFRICAN AMERICAN: >60 ML/MIN
GFR NON-AFRICAN AMERICAN: >60 ML/MIN
GFR SERPL CREATININE-BSD FRML MDRD: ABNORMAL ML/MIN/{1.73_M2}
GFR SERPL CREATININE-BSD FRML MDRD: ABNORMAL ML/MIN/{1.73_M2}
GLUCOSE BLD-MCNC: 125 MG/DL (ref 70–99)
GLUCOSE URINE: NEGATIVE
HCG QUALITATIVE: NEGATIVE
HCT VFR BLD CALC: 32.1 % (ref 36–46)
HEMOGLOBIN: 10.4 G/DL (ref 12–16)
KETONES, URINE: ABNORMAL
LACTIC ACID, WHOLE BLOOD: 1.8 MMOL/L (ref 0.7–2.1)
LEUKOCYTE ESTERASE, URINE: ABNORMAL
LIPASE: 16 U/L (ref 13–60)
LYMPHOCYTES # BLD: 8 %
MCH RBC QN AUTO: 27.1 PG (ref 26–34)
MCHC RBC AUTO-ENTMCNC: 32.2 G/DL (ref 31–37)
MCV RBC AUTO: 84 FL (ref 80–100)
MONOCYTES # BLD: 5 %
MORPHOLOGY: ABNORMAL
MUCUS: ABNORMAL
NITRITE, URINE: POSITIVE
OTHER OBSERVATIONS UA: ABNORMAL
PDW BLD-RTO: 17.1 % (ref 12.5–15.4)
PH UA: 5.5 (ref 5–8)
PLATELET # BLD: 713 K/UL (ref 140–450)
PLATELET ESTIMATE: ABNORMAL
PMV BLD AUTO: 7.9 FL (ref 6–12)
POTASSIUM SERPL-SCNC: 4 MMOL/L (ref 3.7–5.3)
PROTEIN UA: ABNORMAL
RBC # BLD: 3.83 M/UL (ref 4–5.2)
RBC # BLD: ABNORMAL 10*6/UL
RBC UA: ABNORMAL /HPF (ref 0–2)
RENAL EPITHELIAL, UA: ABNORMAL /HPF
SEG NEUTROPHILS: 87 %
SEGMENTED NEUTROPHILS ABSOLUTE COUNT: 18.97 K/UL (ref 1.8–7.7)
SODIUM BLD-SCNC: 136 MMOL/L (ref 135–144)
SPECIFIC GRAVITY UA: 1.02 (ref 1–1.03)
TOTAL PROTEIN: 8.5 G/DL (ref 6.4–8.3)
TRICHOMONAS: ABNORMAL
TURBIDITY: ABNORMAL
URINE HGB: ABNORMAL
UROBILINOGEN, URINE: NORMAL
WBC # BLD: 21.8 K/UL (ref 3.5–11)
WBC # BLD: ABNORMAL 10*3/UL
WBC UA: ABNORMAL /HPF (ref 0–5)
YEAST: ABNORMAL

## 2017-05-19 PROCEDURE — 83690 ASSAY OF LIPASE: CPT

## 2017-05-19 PROCEDURE — 1200000000 HC SEMI PRIVATE

## 2017-05-19 PROCEDURE — 85025 COMPLETE CBC W/AUTO DIFF WBC: CPT

## 2017-05-19 PROCEDURE — 74177 CT ABD & PELVIS W/CONTRAST: CPT

## 2017-05-19 PROCEDURE — 2580000003 HC RX 258: Performed by: EMERGENCY MEDICINE

## 2017-05-19 PROCEDURE — 87040 BLOOD CULTURE FOR BACTERIA: CPT

## 2017-05-19 PROCEDURE — 93005 ELECTROCARDIOGRAM TRACING: CPT

## 2017-05-19 PROCEDURE — 2580000003 HC RX 258: Performed by: INTERNAL MEDICINE

## 2017-05-19 PROCEDURE — 96374 THER/PROPH/DIAG INJ IV PUSH: CPT

## 2017-05-19 PROCEDURE — 87086 URINE CULTURE/COLONY COUNT: CPT

## 2017-05-19 PROCEDURE — 6370000000 HC RX 637 (ALT 250 FOR IP): Performed by: INTERNAL MEDICINE

## 2017-05-19 PROCEDURE — 83605 ASSAY OF LACTIC ACID: CPT

## 2017-05-19 PROCEDURE — 96372 THER/PROPH/DIAG INJ SC/IM: CPT

## 2017-05-19 PROCEDURE — 83993 ASSAY FOR CALPROTECTIN FECAL: CPT

## 2017-05-19 PROCEDURE — 6360000002 HC RX W HCPCS: Performed by: INTERNAL MEDICINE

## 2017-05-19 PROCEDURE — 81001 URINALYSIS AUTO W/SCOPE: CPT

## 2017-05-19 PROCEDURE — 6360000004 HC RX CONTRAST MEDICATION: Performed by: EMERGENCY MEDICINE

## 2017-05-19 PROCEDURE — 99285 EMERGENCY DEPT VISIT HI MDM: CPT

## 2017-05-19 PROCEDURE — 2500000003 HC RX 250 WO HCPCS: Performed by: INTERNAL MEDICINE

## 2017-05-19 PROCEDURE — 96375 TX/PRO/DX INJ NEW DRUG ADDON: CPT

## 2017-05-19 PROCEDURE — 6360000002 HC RX W HCPCS: Performed by: EMERGENCY MEDICINE

## 2017-05-19 PROCEDURE — 80053 COMPREHEN METABOLIC PANEL: CPT

## 2017-05-19 PROCEDURE — S0028 INJECTION, FAMOTIDINE, 20 MG: HCPCS | Performed by: INTERNAL MEDICINE

## 2017-05-19 PROCEDURE — 84703 CHORIONIC GONADOTROPIN ASSAY: CPT

## 2017-05-19 RX ORDER — FOLIC ACID 1 MG/1
1 TABLET ORAL DAILY
Status: DISCONTINUED | OUTPATIENT
Start: 2017-05-19 | End: 2017-05-24 | Stop reason: HOSPADM

## 2017-05-19 RX ORDER — OXYBUTYNIN CHLORIDE 10 MG/1
10 TABLET, EXTENDED RELEASE ORAL DAILY PRN
Status: DISCONTINUED | OUTPATIENT
Start: 2017-05-19 | End: 2017-05-24 | Stop reason: HOSPADM

## 2017-05-19 RX ORDER — DICYCLOMINE HYDROCHLORIDE 10 MG/ML
20 INJECTION INTRAMUSCULAR ONCE
Status: COMPLETED | OUTPATIENT
Start: 2017-05-19 | End: 2017-05-19

## 2017-05-19 RX ORDER — SODIUM CHLORIDE 0.9 % (FLUSH) 0.9 %
10 SYRINGE (ML) INJECTION EVERY 12 HOURS SCHEDULED
Status: DISCONTINUED | OUTPATIENT
Start: 2017-05-19 | End: 2017-05-24 | Stop reason: HOSPADM

## 2017-05-19 RX ORDER — ONDANSETRON 2 MG/ML
4 INJECTION INTRAMUSCULAR; INTRAVENOUS ONCE
Status: COMPLETED | OUTPATIENT
Start: 2017-05-19 | End: 2017-05-19

## 2017-05-19 RX ORDER — CIPROFLOXACIN 2 MG/ML
400 INJECTION, SOLUTION INTRAVENOUS EVERY 12 HOURS
Status: DISCONTINUED | OUTPATIENT
Start: 2017-05-20 | End: 2017-05-24 | Stop reason: HOSPADM

## 2017-05-19 RX ORDER — 0.9 % SODIUM CHLORIDE 0.9 %
1000 INTRAVENOUS SOLUTION INTRAVENOUS ONCE
Status: COMPLETED | OUTPATIENT
Start: 2017-05-19 | End: 2017-05-19

## 2017-05-19 RX ORDER — MORPHINE SULFATE 2 MG/ML
2 INJECTION, SOLUTION INTRAMUSCULAR; INTRAVENOUS
Status: DISCONTINUED | OUTPATIENT
Start: 2017-05-19 | End: 2017-05-24 | Stop reason: HOSPADM

## 2017-05-19 RX ORDER — SODIUM CHLORIDE 9 MG/ML
INJECTION, SOLUTION INTRAVENOUS CONTINUOUS
Status: DISCONTINUED | OUTPATIENT
Start: 2017-05-19 | End: 2017-05-21

## 2017-05-19 RX ORDER — ONDANSETRON 2 MG/ML
4 INJECTION INTRAMUSCULAR; INTRAVENOUS EVERY 6 HOURS PRN
Status: DISCONTINUED | OUTPATIENT
Start: 2017-05-19 | End: 2017-05-24 | Stop reason: HOSPADM

## 2017-05-19 RX ORDER — CETIRIZINE HYDROCHLORIDE 10 MG/1
10 TABLET ORAL DAILY
Status: DISCONTINUED | OUTPATIENT
Start: 2017-05-19 | End: 2017-05-24 | Stop reason: HOSPADM

## 2017-05-19 RX ORDER — SODIUM CHLORIDE 0.9 % (FLUSH) 0.9 %
10 SYRINGE (ML) INJECTION PRN
Status: DISCONTINUED | OUTPATIENT
Start: 2017-05-19 | End: 2017-05-24 | Stop reason: HOSPADM

## 2017-05-19 RX ORDER — MORPHINE SULFATE 4 MG/ML
4 INJECTION, SOLUTION INTRAMUSCULAR; INTRAVENOUS
Status: DISCONTINUED | OUTPATIENT
Start: 2017-05-19 | End: 2017-05-24 | Stop reason: HOSPADM

## 2017-05-19 RX ORDER — ACETAMINOPHEN 325 MG/1
650 TABLET ORAL EVERY 4 HOURS PRN
Status: DISCONTINUED | OUTPATIENT
Start: 2017-05-19 | End: 2017-05-24 | Stop reason: HOSPADM

## 2017-05-19 RX ORDER — MORPHINE SULFATE 4 MG/ML
4 INJECTION, SOLUTION INTRAMUSCULAR; INTRAVENOUS ONCE
Status: COMPLETED | OUTPATIENT
Start: 2017-05-19 | End: 2017-05-19

## 2017-05-19 RX ORDER — TRAMADOL HYDROCHLORIDE 50 MG/1
50 TABLET ORAL EVERY 6 HOURS PRN
Status: DISCONTINUED | OUTPATIENT
Start: 2017-05-19 | End: 2017-05-24 | Stop reason: HOSPADM

## 2017-05-19 RX ORDER — CIPROFLOXACIN 2 MG/ML
400 INJECTION, SOLUTION INTRAVENOUS ONCE
Status: COMPLETED | OUTPATIENT
Start: 2017-05-19 | End: 2017-05-19

## 2017-05-19 RX ORDER — ALBUTEROL SULFATE 90 UG/1
2 AEROSOL, METERED RESPIRATORY (INHALATION) EVERY 4 HOURS PRN
Status: DISCONTINUED | OUTPATIENT
Start: 2017-05-19 | End: 2017-05-24 | Stop reason: HOSPADM

## 2017-05-19 RX ORDER — CITALOPRAM 10 MG/1
10 TABLET ORAL DAILY
Status: DISCONTINUED | OUTPATIENT
Start: 2017-05-19 | End: 2017-05-24 | Stop reason: HOSPADM

## 2017-05-19 RX ADMIN — SODIUM CHLORIDE: 9 INJECTION, SOLUTION INTRAVENOUS at 18:15

## 2017-05-19 RX ADMIN — ENOXAPARIN SODIUM 40 MG: 40 INJECTION SUBCUTANEOUS at 16:32

## 2017-05-19 RX ADMIN — MORPHINE SULFATE 4 MG: 4 INJECTION, SOLUTION INTRAMUSCULAR; INTRAVENOUS at 11:19

## 2017-05-19 RX ADMIN — FOLIC ACID 1 MG: 1 TABLET ORAL at 16:32

## 2017-05-19 RX ADMIN — FAMOTIDINE 20 MG: 10 INJECTION, SOLUTION INTRAVENOUS at 21:24

## 2017-05-19 RX ADMIN — DICYCLOMINE HYDROCHLORIDE 20 MG: 20 INJECTION, SOLUTION INTRAMUSCULAR at 11:22

## 2017-05-19 RX ADMIN — IOVERSOL 130 ML: 741 INJECTION INTRA-ARTERIAL; INTRAVENOUS at 12:56

## 2017-05-19 RX ADMIN — METRONIDAZOLE 500 MG: 500 INJECTION, SOLUTION INTRAVENOUS at 16:39

## 2017-05-19 RX ADMIN — SODIUM CHLORIDE 1000 ML: 9 INJECTION, SOLUTION INTRAVENOUS at 11:19

## 2017-05-19 RX ADMIN — MORPHINE SULFATE 4 MG: 4 INJECTION, SOLUTION INTRAMUSCULAR; INTRAVENOUS at 14:37

## 2017-05-19 RX ADMIN — SODIUM CHLORIDE: 9 INJECTION, SOLUTION INTRAVENOUS at 16:34

## 2017-05-19 RX ADMIN — CIPROFLOXACIN 400 MG: 2 INJECTION, SOLUTION INTRAVENOUS at 13:51

## 2017-05-19 RX ADMIN — CITALOPRAM 10 MG: 10 TABLET, FILM COATED ORAL at 16:32

## 2017-05-19 RX ADMIN — MORPHINE SULFATE 2 MG: 2 INJECTION, SOLUTION INTRAMUSCULAR; INTRAVENOUS at 18:11

## 2017-05-19 RX ADMIN — ONDANSETRON 4 MG: 2 INJECTION, SOLUTION INTRAMUSCULAR; INTRAVENOUS at 11:19

## 2017-05-19 RX ADMIN — CETIRIZINE HYDROCHLORIDE 10 MG: 10 TABLET ORAL at 16:32

## 2017-05-19 RX ADMIN — OXYBUTYNIN CHLORIDE 10 MG: 10 TABLET, EXTENDED RELEASE ORAL at 16:32

## 2017-05-19 RX ADMIN — SODIUM CHLORIDE 1000 ML: 9 INJECTION, SOLUTION INTRAVENOUS at 13:05

## 2017-05-19 ASSESSMENT — ENCOUNTER SYMPTOMS
NAUSEA: 1
SHORTNESS OF BREATH: 0
ABDOMINAL DISTENTION: 0
BLOOD IN STOOL: 0
VOMITING: 1
COLOR CHANGE: 0
WHEEZING: 0
DIARRHEA: 1
ABDOMINAL PAIN: 1

## 2017-05-19 ASSESSMENT — PAIN SCALES - GENERAL
PAINLEVEL_OUTOF10: 2
PAINLEVEL_OUTOF10: 4
PAINLEVEL_OUTOF10: 7
PAINLEVEL_OUTOF10: 6
PAINLEVEL_OUTOF10: 10
PAINLEVEL_OUTOF10: 10

## 2017-05-19 ASSESSMENT — PAIN DESCRIPTION - LOCATION: LOCATION: ABDOMEN

## 2017-05-19 ASSESSMENT — PAIN DESCRIPTION - PAIN TYPE: TYPE: ACUTE PAIN

## 2017-05-19 ASSESSMENT — PAIN DESCRIPTION - DESCRIPTORS: DESCRIPTORS: SQUEEZING

## 2017-05-19 ASSESSMENT — PAIN DESCRIPTION - ORIENTATION: ORIENTATION: RIGHT;LEFT

## 2017-05-19 ASSESSMENT — PAIN DESCRIPTION - FREQUENCY: FREQUENCY: CONTINUOUS

## 2017-05-20 PROBLEM — K52.9 COLITIS: Status: ACTIVE | Noted: 2017-05-20

## 2017-05-20 PROBLEM — I95.9 HYPOTENSION ARTERIAL: Status: ACTIVE | Noted: 2017-05-20

## 2017-05-20 PROBLEM — D72.825 BANDEMIA: Status: ACTIVE | Noted: 2017-05-20

## 2017-05-20 LAB
ALBUMIN SERPL-MCNC: 2.4 G/DL (ref 3.5–5.2)
ALBUMIN/GLOBULIN RATIO: 0.6 (ref 1–2.5)
ALP BLD-CCNC: 227 U/L (ref 35–104)
ALT SERPL-CCNC: 10 U/L (ref 5–33)
ANION GAP SERPL CALCULATED.3IONS-SCNC: 15 MMOL/L (ref 9–17)
AST SERPL-CCNC: 11 U/L
BILIRUB SERPL-MCNC: <0.1 MG/DL (ref 0.3–1.2)
BILIRUBIN DIRECT: <0.08 MG/DL
BILIRUBIN, INDIRECT: ABNORMAL MG/DL (ref 0–1)
BUN BLDV-MCNC: 10 MG/DL (ref 6–20)
BUN/CREAT BLD: ABNORMAL (ref 9–20)
CALCIUM SERPL-MCNC: 8.4 MG/DL (ref 8.6–10.4)
CHLORIDE BLD-SCNC: 107 MMOL/L (ref 98–107)
CO2: 19 MMOL/L (ref 20–31)
CREAT SERPL-MCNC: 0.65 MG/DL (ref 0.5–0.9)
CULTURE: NORMAL
CULTURE: NORMAL
EKG ATRIAL RATE: 103 BPM
EKG P AXIS: 31 DEGREES
EKG P-R INTERVAL: 114 MS
EKG Q-T INTERVAL: 338 MS
EKG QRS DURATION: 78 MS
EKG QTC CALCULATION (BAZETT): 442 MS
EKG R AXIS: 17 DEGREES
EKG T AXIS: 60 DEGREES
EKG VENTRICULAR RATE: 103 BPM
GFR AFRICAN AMERICAN: >60 ML/MIN
GFR NON-AFRICAN AMERICAN: >60 ML/MIN
GFR SERPL CREATININE-BSD FRML MDRD: ABNORMAL ML/MIN/{1.73_M2}
GFR SERPL CREATININE-BSD FRML MDRD: ABNORMAL ML/MIN/{1.73_M2}
GLOBULIN: ABNORMAL G/DL (ref 1.5–3.8)
GLUCOSE BLD-MCNC: 87 MG/DL (ref 70–99)
HCT VFR BLD CALC: 29.4 % (ref 36–46)
HEMOGLOBIN: 9.3 G/DL (ref 12–16)
LIPASE: 15 U/L (ref 13–60)
Lab: NORMAL
MCH RBC QN AUTO: 27.4 PG (ref 26–34)
MCHC RBC AUTO-ENTMCNC: 31.5 G/DL (ref 31–37)
MCV RBC AUTO: 86.9 FL (ref 80–100)
PDW BLD-RTO: 17.3 % (ref 12.5–15.4)
PLATELET # BLD: 597 K/UL (ref 140–450)
PMV BLD AUTO: 7.8 FL (ref 6–12)
POTASSIUM SERPL-SCNC: 3.4 MMOL/L (ref 3.7–5.3)
RBC # BLD: 3.38 M/UL (ref 4–5.2)
SODIUM BLD-SCNC: 141 MMOL/L (ref 135–144)
SPECIMEN DESCRIPTION: NORMAL
STATUS: NORMAL
TOTAL PROTEIN: 6.7 G/DL (ref 6.4–8.3)
WBC # BLD: 10.1 K/UL (ref 3.5–11)

## 2017-05-20 PROCEDURE — S0028 INJECTION, FAMOTIDINE, 20 MG: HCPCS | Performed by: INTERNAL MEDICINE

## 2017-05-20 PROCEDURE — 85027 COMPLETE CBC AUTOMATED: CPT

## 2017-05-20 PROCEDURE — 6360000002 HC RX W HCPCS: Performed by: INTERNAL MEDICINE

## 2017-05-20 PROCEDURE — 99223 1ST HOSP IP/OBS HIGH 75: CPT | Performed by: INTERNAL MEDICINE

## 2017-05-20 PROCEDURE — 93005 ELECTROCARDIOGRAM TRACING: CPT

## 2017-05-20 PROCEDURE — 2500000003 HC RX 250 WO HCPCS: Performed by: INTERNAL MEDICINE

## 2017-05-20 PROCEDURE — 80048 BASIC METABOLIC PNL TOTAL CA: CPT

## 2017-05-20 PROCEDURE — 1200000000 HC SEMI PRIVATE

## 2017-05-20 PROCEDURE — 2580000003 HC RX 258: Performed by: INTERNAL MEDICINE

## 2017-05-20 PROCEDURE — 80076 HEPATIC FUNCTION PANEL: CPT

## 2017-05-20 PROCEDURE — 83690 ASSAY OF LIPASE: CPT

## 2017-05-20 PROCEDURE — 6370000000 HC RX 637 (ALT 250 FOR IP): Performed by: INTERNAL MEDICINE

## 2017-05-20 PROCEDURE — 2580000003 HC RX 258: Performed by: NURSE PRACTITIONER

## 2017-05-20 PROCEDURE — 36415 COLL VENOUS BLD VENIPUNCTURE: CPT

## 2017-05-20 RX ORDER — 0.9 % SODIUM CHLORIDE 0.9 %
1000 INTRAVENOUS SOLUTION INTRAVENOUS ONCE
Status: DISCONTINUED | OUTPATIENT
Start: 2017-05-20 | End: 2017-05-20

## 2017-05-20 RX ORDER — 0.9 % SODIUM CHLORIDE 0.9 %
250 INTRAVENOUS SOLUTION INTRAVENOUS ONCE
Status: COMPLETED | OUTPATIENT
Start: 2017-05-20 | End: 2017-05-20

## 2017-05-20 RX ORDER — 0.9 % SODIUM CHLORIDE 0.9 %
1000 INTRAVENOUS SOLUTION INTRAVENOUS ONCE
Status: COMPLETED | OUTPATIENT
Start: 2017-05-20 | End: 2017-05-20

## 2017-05-20 RX ADMIN — Medication 10 ML: at 08:18

## 2017-05-20 RX ADMIN — SODIUM CHLORIDE: 9 INJECTION, SOLUTION INTRAVENOUS at 04:07

## 2017-05-20 RX ADMIN — SODIUM CHLORIDE: 9 INJECTION, SOLUTION INTRAVENOUS at 09:57

## 2017-05-20 RX ADMIN — METRONIDAZOLE 500 MG: 500 INJECTION, SOLUTION INTRAVENOUS at 08:10

## 2017-05-20 RX ADMIN — Medication 10 ML: at 20:58

## 2017-05-20 RX ADMIN — TRAMADOL HYDROCHLORIDE 50 MG: 50 TABLET, FILM COATED ORAL at 13:12

## 2017-05-20 RX ADMIN — CITALOPRAM 10 MG: 10 TABLET, FILM COATED ORAL at 08:10

## 2017-05-20 RX ADMIN — CETIRIZINE HYDROCHLORIDE 10 MG: 10 TABLET ORAL at 08:10

## 2017-05-20 RX ADMIN — SODIUM CHLORIDE: 9 INJECTION, SOLUTION INTRAVENOUS at 13:12

## 2017-05-20 RX ADMIN — METRONIDAZOLE 500 MG: 500 INJECTION, SOLUTION INTRAVENOUS at 00:27

## 2017-05-20 RX ADMIN — TRAMADOL HYDROCHLORIDE 50 MG: 50 TABLET, FILM COATED ORAL at 19:26

## 2017-05-20 RX ADMIN — FAMOTIDINE 20 MG: 10 INJECTION, SOLUTION INTRAVENOUS at 08:10

## 2017-05-20 RX ADMIN — METRONIDAZOLE 500 MG: 500 INJECTION, SOLUTION INTRAVENOUS at 15:47

## 2017-05-20 RX ADMIN — FAMOTIDINE 20 MG: 10 INJECTION, SOLUTION INTRAVENOUS at 20:57

## 2017-05-20 RX ADMIN — FOLIC ACID 1 MG: 1 TABLET ORAL at 08:11

## 2017-05-20 RX ADMIN — CIPROFLOXACIN 400 MG: 2 INJECTION, SOLUTION INTRAVENOUS at 02:17

## 2017-05-20 RX ADMIN — SODIUM CHLORIDE: 9 INJECTION, SOLUTION INTRAVENOUS at 22:17

## 2017-05-20 RX ADMIN — SODIUM CHLORIDE 1000 ML: 9 INJECTION, SOLUTION INTRAVENOUS at 09:00

## 2017-05-20 RX ADMIN — SODIUM CHLORIDE 250 ML: 9 INJECTION, SOLUTION INTRAVENOUS at 15:47

## 2017-05-20 RX ADMIN — OXYBUTYNIN CHLORIDE 10 MG: 10 TABLET, EXTENDED RELEASE ORAL at 08:10

## 2017-05-20 RX ADMIN — TRAMADOL HYDROCHLORIDE 50 MG: 50 TABLET, FILM COATED ORAL at 07:02

## 2017-05-20 RX ADMIN — CIPROFLOXACIN 400 MG: 2 INJECTION, SOLUTION INTRAVENOUS at 13:54

## 2017-05-20 RX ADMIN — SODIUM CHLORIDE 1000 ML: 9 INJECTION, SOLUTION INTRAVENOUS at 01:11

## 2017-05-20 RX ADMIN — ENOXAPARIN SODIUM 40 MG: 40 INJECTION SUBCUTANEOUS at 08:10

## 2017-05-20 ASSESSMENT — PAIN SCALES - GENERAL
PAINLEVEL_OUTOF10: 6
PAINLEVEL_OUTOF10: 6
PAINLEVEL_OUTOF10: 0
PAINLEVEL_OUTOF10: 4
PAINLEVEL_OUTOF10: 4
PAINLEVEL_OUTOF10: 8
PAINLEVEL_OUTOF10: 3

## 2017-05-20 ASSESSMENT — PAIN DESCRIPTION - PAIN TYPE: TYPE: ACUTE PAIN

## 2017-05-20 ASSESSMENT — PAIN DESCRIPTION - ORIENTATION: ORIENTATION: LEFT

## 2017-05-20 ASSESSMENT — PAIN DESCRIPTION - FREQUENCY: FREQUENCY: CONTINUOUS

## 2017-05-20 ASSESSMENT — PAIN DESCRIPTION - LOCATION: LOCATION: ABDOMEN

## 2017-05-20 ASSESSMENT — PAIN DESCRIPTION - DESCRIPTORS: DESCRIPTORS: ACHING

## 2017-05-21 LAB
ABSOLUTE EOS #: 0.3 K/UL (ref 0–0.4)
ABSOLUTE LYMPH #: 1.3 K/UL (ref 1–4.8)
ABSOLUTE MONO #: 0.8 K/UL (ref 0.1–1.2)
ANION GAP SERPL CALCULATED.3IONS-SCNC: 14 MMOL/L (ref 9–17)
BASOPHILS # BLD: 1 %
BASOPHILS ABSOLUTE: 0.1 K/UL (ref 0–0.2)
BUN BLDV-MCNC: 4 MG/DL (ref 6–20)
BUN/CREAT BLD: ABNORMAL (ref 9–20)
CALCIUM SERPL-MCNC: 8.6 MG/DL (ref 8.6–10.4)
CHLORIDE BLD-SCNC: 105 MMOL/L (ref 98–107)
CO2: 20 MMOL/L (ref 20–31)
CREAT SERPL-MCNC: 0.73 MG/DL (ref 0.5–0.9)
DIFFERENTIAL TYPE: ABNORMAL
EOSINOPHILS RELATIVE PERCENT: 3 %
GFR AFRICAN AMERICAN: >60 ML/MIN
GFR NON-AFRICAN AMERICAN: >60 ML/MIN
GFR SERPL CREATININE-BSD FRML MDRD: ABNORMAL ML/MIN/{1.73_M2}
GFR SERPL CREATININE-BSD FRML MDRD: ABNORMAL ML/MIN/{1.73_M2}
GLUCOSE BLD-MCNC: 83 MG/DL (ref 70–99)
HCT VFR BLD CALC: 27.8 % (ref 36–46)
HEMOGLOBIN: 8.9 G/DL (ref 12–16)
LYMPHOCYTES # BLD: 14 %
MCH RBC QN AUTO: 27.4 PG (ref 26–34)
MCHC RBC AUTO-ENTMCNC: 32 G/DL (ref 31–37)
MCV RBC AUTO: 85.6 FL (ref 80–100)
MONOCYTES # BLD: 8 %
PDW BLD-RTO: 16.7 % (ref 12.5–15.4)
PLATELET # BLD: 634 K/UL (ref 140–450)
PLATELET ESTIMATE: ABNORMAL
PMV BLD AUTO: 7.3 FL (ref 6–12)
POTASSIUM SERPL-SCNC: 3.1 MMOL/L (ref 3.7–5.3)
RBC # BLD: 3.25 M/UL (ref 4–5.2)
RBC # BLD: ABNORMAL 10*6/UL
SEG NEUTROPHILS: 74 %
SEGMENTED NEUTROPHILS ABSOLUTE COUNT: 7.4 K/UL (ref 1.8–7.7)
SODIUM BLD-SCNC: 139 MMOL/L (ref 135–144)
WBC # BLD: 10 K/UL (ref 3.5–11)
WBC # BLD: ABNORMAL 10*3/UL

## 2017-05-21 PROCEDURE — 99232 SBSQ HOSP IP/OBS MODERATE 35: CPT | Performed by: INTERNAL MEDICINE

## 2017-05-21 PROCEDURE — 2500000003 HC RX 250 WO HCPCS: Performed by: INTERNAL MEDICINE

## 2017-05-21 PROCEDURE — 2580000003 HC RX 258: Performed by: INTERNAL MEDICINE

## 2017-05-21 PROCEDURE — 6360000002 HC RX W HCPCS: Performed by: INTERNAL MEDICINE

## 2017-05-21 PROCEDURE — S0028 INJECTION, FAMOTIDINE, 20 MG: HCPCS | Performed by: INTERNAL MEDICINE

## 2017-05-21 PROCEDURE — 6370000000 HC RX 637 (ALT 250 FOR IP): Performed by: NURSE PRACTITIONER

## 2017-05-21 PROCEDURE — 6370000000 HC RX 637 (ALT 250 FOR IP): Performed by: INTERNAL MEDICINE

## 2017-05-21 PROCEDURE — 85025 COMPLETE CBC W/AUTO DIFF WBC: CPT

## 2017-05-21 PROCEDURE — 80048 BASIC METABOLIC PNL TOTAL CA: CPT

## 2017-05-21 PROCEDURE — 36415 COLL VENOUS BLD VENIPUNCTURE: CPT

## 2017-05-21 PROCEDURE — 1200000000 HC SEMI PRIVATE

## 2017-05-21 RX ORDER — POTASSIUM CHLORIDE 20 MEQ/1
40 TABLET, EXTENDED RELEASE ORAL PRN
Status: DISCONTINUED | OUTPATIENT
Start: 2017-05-21 | End: 2017-05-24 | Stop reason: HOSPADM

## 2017-05-21 RX ORDER — DEXTROSE AND SODIUM CHLORIDE 5; .9 G/100ML; G/100ML
INJECTION, SOLUTION INTRAVENOUS CONTINUOUS
Status: DISCONTINUED | OUTPATIENT
Start: 2017-05-21 | End: 2017-05-24 | Stop reason: HOSPADM

## 2017-05-21 RX ORDER — POTASSIUM CHLORIDE 20MEQ/15ML
40 LIQUID (ML) ORAL PRN
Status: DISCONTINUED | OUTPATIENT
Start: 2017-05-21 | End: 2017-05-24 | Stop reason: HOSPADM

## 2017-05-21 RX ORDER — POTASSIUM CHLORIDE 7.45 MG/ML
10 INJECTION INTRAVENOUS PRN
Status: DISCONTINUED | OUTPATIENT
Start: 2017-05-21 | End: 2017-05-24 | Stop reason: HOSPADM

## 2017-05-21 RX ADMIN — CIPROFLOXACIN 400 MG: 2 INJECTION, SOLUTION INTRAVENOUS at 02:10

## 2017-05-21 RX ADMIN — ENOXAPARIN SODIUM 40 MG: 40 INJECTION SUBCUTANEOUS at 09:02

## 2017-05-21 RX ADMIN — POLYETHYLENE GLYCOL 3350, SODIUM SULFATE ANHYDROUS, SODIUM BICARBONATE, SODIUM CHLORIDE, POTASSIUM CHLORIDE 2000 ML: 236; 22.74; 6.74; 5.86; 2.97 POWDER, FOR SOLUTION ORAL at 17:06

## 2017-05-21 RX ADMIN — FAMOTIDINE 20 MG: 10 INJECTION, SOLUTION INTRAVENOUS at 20:08

## 2017-05-21 RX ADMIN — METRONIDAZOLE 500 MG: 500 INJECTION, SOLUTION INTRAVENOUS at 08:59

## 2017-05-21 RX ADMIN — CIPROFLOXACIN 400 MG: 2 INJECTION, SOLUTION INTRAVENOUS at 14:30

## 2017-05-21 RX ADMIN — CETIRIZINE HYDROCHLORIDE 10 MG: 10 TABLET ORAL at 09:02

## 2017-05-21 RX ADMIN — DEXTROSE AND SODIUM CHLORIDE: 5; 900 INJECTION, SOLUTION INTRAVENOUS at 17:08

## 2017-05-21 RX ADMIN — POTASSIUM CHLORIDE 40 MEQ: 20 TABLET, EXTENDED RELEASE ORAL at 19:25

## 2017-05-21 RX ADMIN — TRAMADOL HYDROCHLORIDE 50 MG: 50 TABLET, FILM COATED ORAL at 11:00

## 2017-05-21 RX ADMIN — METRONIDAZOLE 500 MG: 500 INJECTION, SOLUTION INTRAVENOUS at 00:23

## 2017-05-21 RX ADMIN — CITALOPRAM 10 MG: 10 TABLET, FILM COATED ORAL at 09:02

## 2017-05-21 RX ADMIN — FAMOTIDINE 20 MG: 10 INJECTION, SOLUTION INTRAVENOUS at 09:02

## 2017-05-21 RX ADMIN — METRONIDAZOLE 500 MG: 500 INJECTION, SOLUTION INTRAVENOUS at 17:06

## 2017-05-21 RX ADMIN — FOLIC ACID 1 MG: 1 TABLET ORAL at 09:02

## 2017-05-21 RX ADMIN — MORPHINE SULFATE 2 MG: 2 INJECTION, SOLUTION INTRAMUSCULAR; INTRAVENOUS at 23:00

## 2017-05-21 RX ADMIN — POLYETHYLENE GLYCOL 3350, SODIUM SULFATE ANHYDROUS, SODIUM BICARBONATE, SODIUM CHLORIDE, POTASSIUM CHLORIDE 2000 ML: 236; 22.74; 6.74; 5.86; 2.97 POWDER, FOR SOLUTION ORAL at 23:02

## 2017-05-21 ASSESSMENT — PAIN SCALES - GENERAL
PAINLEVEL_OUTOF10: 8
PAINLEVEL_OUTOF10: 9

## 2017-05-22 LAB
ABSOLUTE EOS #: 0.3 K/UL (ref 0–0.4)
ABSOLUTE LYMPH #: 1.3 K/UL (ref 1–4.8)
ABSOLUTE MONO #: 1 K/UL (ref 0.1–1.2)
ANION GAP SERPL CALCULATED.3IONS-SCNC: 16 MMOL/L (ref 9–17)
BASOPHILS # BLD: 0 %
BASOPHILS ABSOLUTE: 0 K/UL (ref 0–0.2)
BUN BLDV-MCNC: 2 MG/DL (ref 6–20)
BUN/CREAT BLD: ABNORMAL (ref 9–20)
CALCIUM SERPL-MCNC: 8.9 MG/DL (ref 8.6–10.4)
CHLORIDE BLD-SCNC: 104 MMOL/L (ref 98–107)
CO2: 21 MMOL/L (ref 20–31)
CREAT SERPL-MCNC: 0.69 MG/DL (ref 0.5–0.9)
DIFFERENTIAL TYPE: ABNORMAL
EKG ATRIAL RATE: 83 BPM
EKG P AXIS: 24 DEGREES
EKG P-R INTERVAL: 132 MS
EKG Q-T INTERVAL: 396 MS
EKG QRS DURATION: 80 MS
EKG QTC CALCULATION (BAZETT): 465 MS
EKG R AXIS: 63 DEGREES
EKG T AXIS: 40 DEGREES
EKG VENTRICULAR RATE: 83 BPM
EOSINOPHILS RELATIVE PERCENT: 3 %
GFR AFRICAN AMERICAN: >60 ML/MIN
GFR NON-AFRICAN AMERICAN: >60 ML/MIN
GFR SERPL CREATININE-BSD FRML MDRD: ABNORMAL ML/MIN/{1.73_M2}
GFR SERPL CREATININE-BSD FRML MDRD: ABNORMAL ML/MIN/{1.73_M2}
GLUCOSE BLD-MCNC: 95 MG/DL (ref 70–99)
HCT VFR BLD CALC: 29.8 % (ref 36–46)
HEMOGLOBIN: 9.5 G/DL (ref 12–16)
LYMPHOCYTES # BLD: 12 %
MCH RBC QN AUTO: 27.1 PG (ref 26–34)
MCHC RBC AUTO-ENTMCNC: 31.8 G/DL (ref 31–37)
MCV RBC AUTO: 85.3 FL (ref 80–100)
MONOCYTES # BLD: 9 %
PDW BLD-RTO: 16.7 % (ref 12.5–15.4)
PLATELET # BLD: 667 K/UL (ref 140–450)
PLATELET ESTIMATE: ABNORMAL
PMV BLD AUTO: 7.1 FL (ref 6–12)
POTASSIUM SERPL-SCNC: 3.4 MMOL/L (ref 3.7–5.3)
RBC # BLD: 3.49 M/UL (ref 4–5.2)
RBC # BLD: ABNORMAL 10*6/UL
SEG NEUTROPHILS: 76 %
SEGMENTED NEUTROPHILS ABSOLUTE COUNT: 8.6 K/UL (ref 1.8–7.7)
SODIUM BLD-SCNC: 141 MMOL/L (ref 135–144)
WBC # BLD: 11.3 K/UL (ref 3.5–11)
WBC # BLD: ABNORMAL 10*3/UL

## 2017-05-22 PROCEDURE — 6370000000 HC RX 637 (ALT 250 FOR IP): Performed by: INTERNAL MEDICINE

## 2017-05-22 PROCEDURE — 83993 ASSAY FOR CALPROTECTIN FECAL: CPT

## 2017-05-22 PROCEDURE — 2580000003 HC RX 258: Performed by: INTERNAL MEDICINE

## 2017-05-22 PROCEDURE — 80048 BASIC METABOLIC PNL TOTAL CA: CPT

## 2017-05-22 PROCEDURE — 36415 COLL VENOUS BLD VENIPUNCTURE: CPT

## 2017-05-22 PROCEDURE — 2500000003 HC RX 250 WO HCPCS: Performed by: INTERNAL MEDICINE

## 2017-05-22 PROCEDURE — 99232 SBSQ HOSP IP/OBS MODERATE 35: CPT | Performed by: INTERNAL MEDICINE

## 2017-05-22 PROCEDURE — 6360000002 HC RX W HCPCS: Performed by: INTERNAL MEDICINE

## 2017-05-22 PROCEDURE — 1200000000 HC SEMI PRIVATE

## 2017-05-22 PROCEDURE — S0028 INJECTION, FAMOTIDINE, 20 MG: HCPCS | Performed by: INTERNAL MEDICINE

## 2017-05-22 PROCEDURE — 85025 COMPLETE CBC W/AUTO DIFF WBC: CPT

## 2017-05-22 RX ADMIN — FAMOTIDINE 20 MG: 10 INJECTION, SOLUTION INTRAVENOUS at 08:52

## 2017-05-22 RX ADMIN — MORPHINE SULFATE 4 MG: 4 INJECTION, SOLUTION INTRAMUSCULAR; INTRAVENOUS at 23:22

## 2017-05-22 RX ADMIN — FOLIC ACID 1 MG: 1 TABLET ORAL at 08:52

## 2017-05-22 RX ADMIN — POLYETHYLENE GLYCOL 3350, SODIUM SULFATE ANHYDROUS, SODIUM BICARBONATE, SODIUM CHLORIDE, POTASSIUM CHLORIDE 2000 ML: 236; 22.74; 6.74; 5.86; 2.97 POWDER, FOR SOLUTION ORAL at 14:01

## 2017-05-22 RX ADMIN — METRONIDAZOLE 500 MG: 500 INJECTION, SOLUTION INTRAVENOUS at 23:58

## 2017-05-22 RX ADMIN — DEXTROSE AND SODIUM CHLORIDE: 5; 900 INJECTION, SOLUTION INTRAVENOUS at 14:26

## 2017-05-22 RX ADMIN — CETIRIZINE HYDROCHLORIDE 10 MG: 10 TABLET ORAL at 08:52

## 2017-05-22 RX ADMIN — Medication 10 ML: at 08:55

## 2017-05-22 RX ADMIN — METRONIDAZOLE 500 MG: 500 INJECTION, SOLUTION INTRAVENOUS at 00:38

## 2017-05-22 RX ADMIN — FAMOTIDINE 20 MG: 10 INJECTION, SOLUTION INTRAVENOUS at 20:00

## 2017-05-22 RX ADMIN — METRONIDAZOLE 500 MG: 500 INJECTION, SOLUTION INTRAVENOUS at 16:18

## 2017-05-22 RX ADMIN — ENOXAPARIN SODIUM 40 MG: 40 INJECTION SUBCUTANEOUS at 08:55

## 2017-05-22 RX ADMIN — CITALOPRAM 10 MG: 10 TABLET, FILM COATED ORAL at 08:52

## 2017-05-22 RX ADMIN — POTASSIUM CHLORIDE 40 MEQ: 20 TABLET, EXTENDED RELEASE ORAL at 14:44

## 2017-05-22 RX ADMIN — CIPROFLOXACIN 400 MG: 2 INJECTION, SOLUTION INTRAVENOUS at 03:26

## 2017-05-22 RX ADMIN — METRONIDAZOLE 500 MG: 500 INJECTION, SOLUTION INTRAVENOUS at 08:49

## 2017-05-22 RX ADMIN — CIPROFLOXACIN 400 MG: 2 INJECTION, SOLUTION INTRAVENOUS at 14:25

## 2017-05-22 ASSESSMENT — PAIN SCALES - GENERAL
PAINLEVEL_OUTOF10: 2
PAINLEVEL_OUTOF10: 8

## 2017-05-23 LAB
ABSOLUTE EOS #: 0.3 K/UL (ref 0–0.4)
ABSOLUTE LYMPH #: 1.6 K/UL (ref 1–4.8)
ABSOLUTE MONO #: 1.2 K/UL (ref 0.1–1.2)
ANION GAP SERPL CALCULATED.3IONS-SCNC: 15 MMOL/L (ref 9–17)
BASOPHILS # BLD: 0 %
BASOPHILS ABSOLUTE: 0 K/UL (ref 0–0.2)
BUN BLDV-MCNC: 2 MG/DL (ref 6–20)
BUN/CREAT BLD: ABNORMAL (ref 9–20)
CALCIUM SERPL-MCNC: 8.9 MG/DL (ref 8.6–10.4)
CHLORIDE BLD-SCNC: 99 MMOL/L (ref 98–107)
CO2: 21 MMOL/L (ref 20–31)
CREAT SERPL-MCNC: 0.73 MG/DL (ref 0.5–0.9)
DIFFERENTIAL TYPE: ABNORMAL
EOSINOPHILS RELATIVE PERCENT: 2 %
GFR AFRICAN AMERICAN: >60 ML/MIN
GFR NON-AFRICAN AMERICAN: >60 ML/MIN
GFR SERPL CREATININE-BSD FRML MDRD: ABNORMAL ML/MIN/{1.73_M2}
GFR SERPL CREATININE-BSD FRML MDRD: ABNORMAL ML/MIN/{1.73_M2}
GLUCOSE BLD-MCNC: 127 MG/DL (ref 70–99)
HCT VFR BLD CALC: 29.8 % (ref 36–46)
HEMOGLOBIN: 9.4 G/DL (ref 12–16)
LYMPHOCYTES # BLD: 13 %
MCH RBC QN AUTO: 27 PG (ref 26–34)
MCHC RBC AUTO-ENTMCNC: 31.7 G/DL (ref 31–37)
MCV RBC AUTO: 85 FL (ref 80–100)
MONOCYTES # BLD: 10 %
PDW BLD-RTO: 16.8 % (ref 12.5–15.4)
PLATELET # BLD: 602 K/UL (ref 140–450)
PLATELET ESTIMATE: ABNORMAL
PMV BLD AUTO: 6.9 FL (ref 6–12)
POTASSIUM SERPL-SCNC: 3.4 MMOL/L (ref 3.7–5.3)
RBC # BLD: 3.5 M/UL (ref 4–5.2)
RBC # BLD: ABNORMAL 10*6/UL
SEG NEUTROPHILS: 75 %
SEGMENTED NEUTROPHILS ABSOLUTE COUNT: 9.5 K/UL (ref 1.8–7.7)
SODIUM BLD-SCNC: 135 MMOL/L (ref 135–144)
WBC # BLD: 12.6 K/UL (ref 3.5–11)
WBC # BLD: ABNORMAL 10*3/UL

## 2017-05-23 PROCEDURE — 85025 COMPLETE CBC W/AUTO DIFF WBC: CPT

## 2017-05-23 PROCEDURE — 36415 COLL VENOUS BLD VENIPUNCTURE: CPT

## 2017-05-23 PROCEDURE — S0028 INJECTION, FAMOTIDINE, 20 MG: HCPCS | Performed by: INTERNAL MEDICINE

## 2017-05-23 PROCEDURE — 6370000000 HC RX 637 (ALT 250 FOR IP): Performed by: INTERNAL MEDICINE

## 2017-05-23 PROCEDURE — 99232 SBSQ HOSP IP/OBS MODERATE 35: CPT | Performed by: INTERNAL MEDICINE

## 2017-05-23 PROCEDURE — 1200000000 HC SEMI PRIVATE

## 2017-05-23 PROCEDURE — 76937 US GUIDE VASCULAR ACCESS: CPT

## 2017-05-23 PROCEDURE — 2580000003 HC RX 258: Performed by: INTERNAL MEDICINE

## 2017-05-23 PROCEDURE — 2500000003 HC RX 250 WO HCPCS: Performed by: INTERNAL MEDICINE

## 2017-05-23 PROCEDURE — 80048 BASIC METABOLIC PNL TOTAL CA: CPT

## 2017-05-23 PROCEDURE — 6360000002 HC RX W HCPCS: Performed by: INTERNAL MEDICINE

## 2017-05-23 RX ADMIN — POLYETHYLENE GLYCOL 3350, SODIUM SULFATE ANHYDROUS, SODIUM BICARBONATE, SODIUM CHLORIDE, POTASSIUM CHLORIDE 2000 ML: 236; 22.74; 6.74; 5.86; 2.97 POWDER, FOR SOLUTION ORAL at 14:00

## 2017-05-23 RX ADMIN — CIPROFLOXACIN 400 MG: 2 INJECTION, SOLUTION INTRAVENOUS at 01:38

## 2017-05-23 RX ADMIN — FOLIC ACID 1 MG: 1 TABLET ORAL at 08:35

## 2017-05-23 RX ADMIN — POTASSIUM CHLORIDE 40 MEQ: 20 TABLET, EXTENDED RELEASE ORAL at 08:39

## 2017-05-23 RX ADMIN — OXYBUTYNIN CHLORIDE 10 MG: 10 TABLET, EXTENDED RELEASE ORAL at 08:35

## 2017-05-23 RX ADMIN — METRONIDAZOLE 500 MG: 500 INJECTION, SOLUTION INTRAVENOUS at 16:19

## 2017-05-23 RX ADMIN — CIPROFLOXACIN 400 MG: 2 INJECTION, SOLUTION INTRAVENOUS at 13:30

## 2017-05-23 RX ADMIN — CETIRIZINE HYDROCHLORIDE 10 MG: 10 TABLET ORAL at 08:35

## 2017-05-23 RX ADMIN — MORPHINE SULFATE 4 MG: 4 INJECTION, SOLUTION INTRAMUSCULAR; INTRAVENOUS at 23:20

## 2017-05-23 RX ADMIN — CITALOPRAM 10 MG: 10 TABLET, FILM COATED ORAL at 08:35

## 2017-05-23 RX ADMIN — DEXTROSE AND SODIUM CHLORIDE: 5; 900 INJECTION, SOLUTION INTRAVENOUS at 06:18

## 2017-05-23 RX ADMIN — POLYETHYLENE GLYCOL 3350, SODIUM SULFATE ANHYDROUS, SODIUM BICARBONATE, SODIUM CHLORIDE, POTASSIUM CHLORIDE 2000 ML: 236; 22.74; 6.74; 5.86; 2.97 POWDER, FOR SOLUTION ORAL at 15:22

## 2017-05-23 RX ADMIN — METRONIDAZOLE 500 MG: 500 INJECTION, SOLUTION INTRAVENOUS at 08:00

## 2017-05-23 RX ADMIN — FAMOTIDINE 20 MG: 10 INJECTION, SOLUTION INTRAVENOUS at 08:35

## 2017-05-23 RX ADMIN — DEXTROSE AND SODIUM CHLORIDE: 5; 900 INJECTION, SOLUTION INTRAVENOUS at 15:23

## 2017-05-23 RX ADMIN — FAMOTIDINE 20 MG: 10 INJECTION, SOLUTION INTRAVENOUS at 19:58

## 2017-05-23 ASSESSMENT — PAIN SCALES - GENERAL
PAINLEVEL_OUTOF10: 0
PAINLEVEL_OUTOF10: 9
PAINLEVEL_OUTOF10: 4

## 2017-05-24 ENCOUNTER — TELEPHONE (OUTPATIENT)
Dept: UROLOGY | Age: 47
End: 2017-05-24

## 2017-05-24 VITALS
HEIGHT: 57 IN | DIASTOLIC BLOOD PRESSURE: 66 MMHG | OXYGEN SATURATION: 95 % | RESPIRATION RATE: 16 BRPM | TEMPERATURE: 98.5 F | BODY MASS INDEX: 34.09 KG/M2 | SYSTOLIC BLOOD PRESSURE: 104 MMHG | WEIGHT: 158 LBS | HEART RATE: 84 BPM

## 2017-05-24 LAB
ABSOLUTE EOS #: 0.3 K/UL (ref 0–0.4)
ABSOLUTE LYMPH #: 1.6 K/UL (ref 1–4.8)
ABSOLUTE MONO #: 1.4 K/UL (ref 0.1–1.2)
ANION GAP SERPL CALCULATED.3IONS-SCNC: 15 MMOL/L (ref 9–17)
BASOPHILS # BLD: 0 %
BASOPHILS ABSOLUTE: 0 K/UL (ref 0–0.2)
BUN BLDV-MCNC: 3 MG/DL (ref 6–20)
BUN/CREAT BLD: ABNORMAL (ref 9–20)
CALCIUM SERPL-MCNC: 8.7 MG/DL (ref 8.6–10.4)
CHLORIDE BLD-SCNC: 104 MMOL/L (ref 98–107)
CO2: 20 MMOL/L (ref 20–31)
CREAT SERPL-MCNC: 0.77 MG/DL (ref 0.5–0.9)
DIFFERENTIAL TYPE: ABNORMAL
EOSINOPHILS RELATIVE PERCENT: 2 %
GFR AFRICAN AMERICAN: >60 ML/MIN
GFR NON-AFRICAN AMERICAN: >60 ML/MIN
GFR SERPL CREATININE-BSD FRML MDRD: ABNORMAL ML/MIN/{1.73_M2}
GFR SERPL CREATININE-BSD FRML MDRD: ABNORMAL ML/MIN/{1.73_M2}
GLUCOSE BLD-MCNC: 111 MG/DL (ref 70–99)
HCT VFR BLD CALC: 29.8 % (ref 36–46)
HEMOGLOBIN: 9.4 G/DL (ref 12–16)
LYMPHOCYTES # BLD: 12 %
MAGNESIUM: 1.8 MG/DL (ref 1.6–2.6)
MCH RBC QN AUTO: 27.1 PG (ref 26–34)
MCHC RBC AUTO-ENTMCNC: 31.4 G/DL (ref 31–37)
MCV RBC AUTO: 86.2 FL (ref 80–100)
MONOCYTES # BLD: 11 %
PDW BLD-RTO: 17.1 % (ref 12.5–15.4)
PLATELET # BLD: 564 K/UL (ref 140–450)
PLATELET ESTIMATE: ABNORMAL
PMV BLD AUTO: 7.1 FL (ref 6–12)
POTASSIUM SERPL-SCNC: 3.6 MMOL/L (ref 3.7–5.3)
RBC # BLD: 3.46 M/UL (ref 4–5.2)
RBC # BLD: ABNORMAL 10*6/UL
SEG NEUTROPHILS: 75 %
SEGMENTED NEUTROPHILS ABSOLUTE COUNT: 10.3 K/UL (ref 1.8–7.7)
SODIUM BLD-SCNC: 139 MMOL/L (ref 135–144)
WBC # BLD: 13.6 K/UL (ref 3.5–11)
WBC # BLD: ABNORMAL 10*3/UL

## 2017-05-24 PROCEDURE — 6370000000 HC RX 637 (ALT 250 FOR IP): Performed by: INTERNAL MEDICINE

## 2017-05-24 PROCEDURE — 80048 BASIC METABOLIC PNL TOTAL CA: CPT

## 2017-05-24 PROCEDURE — 36415 COLL VENOUS BLD VENIPUNCTURE: CPT

## 2017-05-24 PROCEDURE — 2580000003 HC RX 258: Performed by: INTERNAL MEDICINE

## 2017-05-24 PROCEDURE — 83735 ASSAY OF MAGNESIUM: CPT

## 2017-05-24 PROCEDURE — 85025 COMPLETE CBC W/AUTO DIFF WBC: CPT

## 2017-05-24 PROCEDURE — S0028 INJECTION, FAMOTIDINE, 20 MG: HCPCS | Performed by: INTERNAL MEDICINE

## 2017-05-24 PROCEDURE — 99232 SBSQ HOSP IP/OBS MODERATE 35: CPT | Performed by: INTERNAL MEDICINE

## 2017-05-24 PROCEDURE — 6360000002 HC RX W HCPCS: Performed by: INTERNAL MEDICINE

## 2017-05-24 PROCEDURE — 2500000003 HC RX 250 WO HCPCS: Performed by: INTERNAL MEDICINE

## 2017-05-24 RX ORDER — CIPROFLOXACIN 500 MG/1
500 TABLET, FILM COATED ORAL 2 TIMES DAILY
Qty: 8 TABLET | Refills: 0 | Status: SHIPPED | OUTPATIENT
Start: 2017-05-24 | End: 2017-05-28

## 2017-05-24 RX ORDER — TRAMADOL HYDROCHLORIDE 50 MG/1
50 TABLET ORAL EVERY 12 HOURS PRN
Qty: 6 TABLET | Refills: 0 | Status: SHIPPED | OUTPATIENT
Start: 2017-05-24 | End: 2017-05-27

## 2017-05-24 RX ORDER — MAGNESIUM SULFATE 1 G/100ML
1 INJECTION INTRAVENOUS PRN
Status: DISCONTINUED | OUTPATIENT
Start: 2017-05-24 | End: 2017-05-24 | Stop reason: HOSPADM

## 2017-05-24 RX ORDER — METRONIDAZOLE 500 MG/1
500 TABLET ORAL 3 TIMES DAILY
Qty: 12 TABLET | Refills: 0 | Status: SHIPPED | OUTPATIENT
Start: 2017-05-24 | End: 2017-05-28

## 2017-05-24 RX ADMIN — FAMOTIDINE 20 MG: 10 INJECTION, SOLUTION INTRAVENOUS at 09:05

## 2017-05-24 RX ADMIN — CITALOPRAM 10 MG: 10 TABLET, FILM COATED ORAL at 09:05

## 2017-05-24 RX ADMIN — Medication 10 ML: at 09:06

## 2017-05-24 RX ADMIN — CETIRIZINE HYDROCHLORIDE 10 MG: 10 TABLET ORAL at 09:05

## 2017-05-24 RX ADMIN — METRONIDAZOLE 500 MG: 500 INJECTION, SOLUTION INTRAVENOUS at 00:25

## 2017-05-24 RX ADMIN — METRONIDAZOLE 500 MG: 500 INJECTION, SOLUTION INTRAVENOUS at 08:04

## 2017-05-24 RX ADMIN — CIPROFLOXACIN 400 MG: 2 INJECTION, SOLUTION INTRAVENOUS at 02:19

## 2017-05-24 RX ADMIN — DEXTROSE AND SODIUM CHLORIDE: 5; 900 INJECTION, SOLUTION INTRAVENOUS at 00:25

## 2017-05-24 RX ADMIN — FOLIC ACID 1 MG: 1 TABLET ORAL at 09:05

## 2017-05-25 ENCOUNTER — TELEPHONE (OUTPATIENT)
Dept: INTERNAL MEDICINE | Age: 47
End: 2017-05-25

## 2017-05-25 LAB
CALPROTECTIN, FECAL: >1250 UG/G
CULTURE: NORMAL
Lab: NORMAL
Lab: NORMAL
SPECIMEN DESCRIPTION: NORMAL
SPECIMEN DESCRIPTION: NORMAL
STATUS: NORMAL
STATUS: NORMAL

## 2017-05-25 RX ORDER — IBUPROFEN 400 MG/1
TABLET ORAL
Qty: 60 TABLET | Refills: 0 | Status: ON HOLD | OUTPATIENT
Start: 2017-05-25 | End: 2017-06-06 | Stop reason: HOSPADM

## 2017-05-25 RX ORDER — CYCLOBENZAPRINE HCL 5 MG
TABLET ORAL
Qty: 60 TABLET | Refills: 0 | Status: ON HOLD | OUTPATIENT
Start: 2017-05-25 | End: 2017-06-13 | Stop reason: HOSPADM

## 2017-06-02 ENCOUNTER — HOSPITAL ENCOUNTER (OUTPATIENT)
Dept: PHYSICAL THERAPY | Age: 47
Setting detail: THERAPIES SERIES
Discharge: HOME | End: 2017-06-02
Payer: MEDICAID

## 2017-06-03 ENCOUNTER — APPOINTMENT (OUTPATIENT)
Dept: CT IMAGING | Age: 47
DRG: 254 | End: 2017-06-03
Payer: MEDICAID

## 2017-06-03 ENCOUNTER — HOSPITAL ENCOUNTER (INPATIENT)
Age: 47
LOS: 3 days | Discharge: HOME | DRG: 254 | End: 2017-06-06
Attending: EMERGENCY MEDICINE | Admitting: INTERNAL MEDICINE
Payer: MEDICAID

## 2017-06-03 DIAGNOSIS — R10.32 ABDOMINAL PAIN, LEFT LOWER QUADRANT: Primary | ICD-10-CM

## 2017-06-03 LAB
ABSOLUTE EOS #: 0.4 K/UL (ref 0–0.4)
ABSOLUTE LYMPH #: 2.38 K/UL (ref 1–4.8)
ABSOLUTE MONO #: 1.39 K/UL (ref 0.1–0.8)
ANION GAP SERPL CALCULATED.3IONS-SCNC: 17 MMOL/L (ref 9–17)
BASOPHILS # BLD: 0 %
BASOPHILS ABSOLUTE: 0 K/UL (ref 0–0.2)
BUN BLDV-MCNC: 20 MG/DL (ref 6–20)
BUN/CREAT BLD: ABNORMAL (ref 9–20)
CALCIUM SERPL-MCNC: 9.9 MG/DL (ref 8.6–10.4)
CHLORIDE BLD-SCNC: 99 MMOL/L (ref 98–107)
CO2: 19 MMOL/L (ref 20–31)
CREAT SERPL-MCNC: 0.62 MG/DL (ref 0.5–0.9)
DIFFERENTIAL TYPE: ABNORMAL
EOSINOPHILS RELATIVE PERCENT: 2 %
GFR AFRICAN AMERICAN: >60 ML/MIN
GFR NON-AFRICAN AMERICAN: >60 ML/MIN
GFR SERPL CREATININE-BSD FRML MDRD: ABNORMAL ML/MIN/{1.73_M2}
GFR SERPL CREATININE-BSD FRML MDRD: ABNORMAL ML/MIN/{1.73_M2}
GLUCOSE BLD-MCNC: 95 MG/DL (ref 70–99)
HCG QUALITATIVE: NEGATIVE
HCT VFR BLD CALC: 33 % (ref 36–46)
HEMOGLOBIN: 10.5 G/DL (ref 12–16)
LACTIC ACID, WHOLE BLOOD: 1 MMOL/L (ref 0.7–2.1)
LACTIC ACID: NORMAL MMOL/L
LIPASE: 56 U/L (ref 13–60)
LYMPHOCYTES # BLD: 12 %
MCH RBC QN AUTO: 26.6 PG (ref 26–34)
MCHC RBC AUTO-ENTMCNC: 31.9 G/DL (ref 31–37)
MCV RBC AUTO: 83.4 FL (ref 80–100)
MONOCYTES # BLD: 7 %
MORPHOLOGY: ABNORMAL
PDW BLD-RTO: 16.3 % (ref 12.5–15.4)
PLATELET # BLD: 645 K/UL (ref 140–450)
PLATELET ESTIMATE: ABNORMAL
PMV BLD AUTO: 7.3 FL (ref 6–12)
POTASSIUM SERPL-SCNC: 4 MMOL/L (ref 3.7–5.3)
RBC # BLD: 3.96 M/UL (ref 4–5.2)
RBC # BLD: ABNORMAL 10*6/UL
SEG NEUTROPHILS: 79 %
SEGMENTED NEUTROPHILS ABSOLUTE COUNT: 15.63 K/UL (ref 1.8–7.7)
SODIUM BLD-SCNC: 135 MMOL/L (ref 135–144)
WBC # BLD: 19.8 K/UL (ref 3.5–11)
WBC # BLD: ABNORMAL 10*3/UL

## 2017-06-03 PROCEDURE — 83605 ASSAY OF LACTIC ACID: CPT

## 2017-06-03 PROCEDURE — 74176 CT ABD & PELVIS W/O CONTRAST: CPT

## 2017-06-03 PROCEDURE — 2580000003 HC RX 258: Performed by: PHYSICIAN ASSISTANT

## 2017-06-03 PROCEDURE — 6360000002 HC RX W HCPCS: Performed by: PHYSICIAN ASSISTANT

## 2017-06-03 PROCEDURE — 96374 THER/PROPH/DIAG INJ IV PUSH: CPT

## 2017-06-03 PROCEDURE — 83690 ASSAY OF LIPASE: CPT

## 2017-06-03 PROCEDURE — 96376 TX/PRO/DX INJ SAME DRUG ADON: CPT

## 2017-06-03 PROCEDURE — 85025 COMPLETE CBC W/AUTO DIFF WBC: CPT

## 2017-06-03 PROCEDURE — 6360000002 HC RX W HCPCS

## 2017-06-03 PROCEDURE — 99285 EMERGENCY DEPT VISIT HI MDM: CPT

## 2017-06-03 PROCEDURE — 84703 CHORIONIC GONADOTROPIN ASSAY: CPT

## 2017-06-03 PROCEDURE — 1200000000 HC SEMI PRIVATE

## 2017-06-03 PROCEDURE — 80048 BASIC METABOLIC PNL TOTAL CA: CPT

## 2017-06-03 PROCEDURE — 96375 TX/PRO/DX INJ NEW DRUG ADDON: CPT

## 2017-06-03 RX ORDER — NICOTINE 21 MG/24HR
1 PATCH, TRANSDERMAL 24 HOURS TRANSDERMAL DAILY PRN
Status: DISCONTINUED | OUTPATIENT
Start: 2017-06-03 | End: 2017-06-06 | Stop reason: HOSPADM

## 2017-06-03 RX ORDER — FENTANYL CITRATE 50 UG/ML
25 INJECTION, SOLUTION INTRAMUSCULAR; INTRAVENOUS ONCE
Status: COMPLETED | OUTPATIENT
Start: 2017-06-03 | End: 2017-06-03

## 2017-06-03 RX ORDER — MORPHINE SULFATE 4 MG/ML
4 INJECTION, SOLUTION INTRAMUSCULAR; INTRAVENOUS
Status: DISCONTINUED | OUTPATIENT
Start: 2017-06-03 | End: 2017-06-06

## 2017-06-03 RX ORDER — POTASSIUM CHLORIDE 20 MEQ/1
40 TABLET, EXTENDED RELEASE ORAL PRN
Status: DISCONTINUED | OUTPATIENT
Start: 2017-06-03 | End: 2017-06-06 | Stop reason: HOSPADM

## 2017-06-03 RX ORDER — PROMETHAZINE HYDROCHLORIDE 25 MG/ML
6.25 INJECTION, SOLUTION INTRAMUSCULAR; INTRAVENOUS ONCE
Status: COMPLETED | OUTPATIENT
Start: 2017-06-03 | End: 2017-06-03

## 2017-06-03 RX ORDER — 0.9 % SODIUM CHLORIDE 0.9 %
1000 INTRAVENOUS SOLUTION INTRAVENOUS ONCE
Status: COMPLETED | OUTPATIENT
Start: 2017-06-03 | End: 2017-06-03

## 2017-06-03 RX ORDER — ONDANSETRON 2 MG/ML
4 INJECTION INTRAMUSCULAR; INTRAVENOUS ONCE
Status: COMPLETED | OUTPATIENT
Start: 2017-06-03 | End: 2017-06-03

## 2017-06-03 RX ORDER — FENTANYL CITRATE 50 UG/ML
INJECTION, SOLUTION INTRAMUSCULAR; INTRAVENOUS
Status: COMPLETED
Start: 2017-06-03 | End: 2017-06-03

## 2017-06-03 RX ORDER — POTASSIUM CHLORIDE 20MEQ/15ML
40 LIQUID (ML) ORAL PRN
Status: DISCONTINUED | OUTPATIENT
Start: 2017-06-03 | End: 2017-06-06 | Stop reason: HOSPADM

## 2017-06-03 RX ORDER — MAGNESIUM SULFATE 1 G/100ML
1 INJECTION INTRAVENOUS PRN
Status: DISCONTINUED | OUTPATIENT
Start: 2017-06-03 | End: 2017-06-06 | Stop reason: HOSPADM

## 2017-06-03 RX ORDER — BISACODYL 10 MG
10 SUPPOSITORY, RECTAL RECTAL DAILY PRN
Status: DISCONTINUED | OUTPATIENT
Start: 2017-06-03 | End: 2017-06-06 | Stop reason: HOSPADM

## 2017-06-03 RX ORDER — POTASSIUM CHLORIDE 7.45 MG/ML
10 INJECTION INTRAVENOUS PRN
Status: DISCONTINUED | OUTPATIENT
Start: 2017-06-03 | End: 2017-06-06 | Stop reason: HOSPADM

## 2017-06-03 RX ORDER — ONDANSETRON 2 MG/ML
4 INJECTION INTRAMUSCULAR; INTRAVENOUS EVERY 6 HOURS PRN
Status: DISCONTINUED | OUTPATIENT
Start: 2017-06-03 | End: 2017-06-06 | Stop reason: HOSPADM

## 2017-06-03 RX ORDER — MORPHINE SULFATE 2 MG/ML
2 INJECTION, SOLUTION INTRAMUSCULAR; INTRAVENOUS
Status: DISCONTINUED | OUTPATIENT
Start: 2017-06-03 | End: 2017-06-06

## 2017-06-03 RX ORDER — SODIUM CHLORIDE 9 MG/ML
INJECTION, SOLUTION INTRAVENOUS CONTINUOUS
Status: DISCONTINUED | OUTPATIENT
Start: 2017-06-03 | End: 2017-06-06 | Stop reason: HOSPADM

## 2017-06-03 RX ORDER — SODIUM CHLORIDE 0.9 % (FLUSH) 0.9 %
10 SYRINGE (ML) INJECTION PRN
Status: DISCONTINUED | OUTPATIENT
Start: 2017-06-03 | End: 2017-06-06 | Stop reason: HOSPADM

## 2017-06-03 RX ORDER — DIPHENHYDRAMINE HYDROCHLORIDE 50 MG/ML
12.5 INJECTION INTRAMUSCULAR; INTRAVENOUS EVERY 6 HOURS PRN
Status: DISCONTINUED | OUTPATIENT
Start: 2017-06-03 | End: 2017-06-03

## 2017-06-03 RX ORDER — SODIUM CHLORIDE 0.9 % (FLUSH) 0.9 %
10 SYRINGE (ML) INJECTION EVERY 12 HOURS SCHEDULED
Status: DISCONTINUED | OUTPATIENT
Start: 2017-06-03 | End: 2017-06-06 | Stop reason: HOSPADM

## 2017-06-03 RX ADMIN — DIPHENHYDRAMINE HYDROCHLORIDE 12.5 MG: 50 INJECTION INTRAMUSCULAR; INTRAVENOUS at 21:44

## 2017-06-03 RX ADMIN — PROMETHAZINE HYDROCHLORIDE 6.25 MG: 25 INJECTION INTRAMUSCULAR; INTRAVENOUS at 20:49

## 2017-06-03 RX ADMIN — FENTANYL CITRATE 25 MCG: 50 INJECTION, SOLUTION INTRAMUSCULAR; INTRAVENOUS at 16:03

## 2017-06-03 RX ADMIN — FENTANYL CITRATE 25 MCG: 50 INJECTION, SOLUTION INTRAMUSCULAR; INTRAVENOUS at 20:49

## 2017-06-03 RX ADMIN — ONDANSETRON 4 MG: 2 INJECTION, SOLUTION INTRAMUSCULAR; INTRAVENOUS at 16:03

## 2017-06-03 RX ADMIN — FENTANYL CITRATE 25 MCG: 50 INJECTION, SOLUTION INTRAMUSCULAR; INTRAVENOUS at 19:13

## 2017-06-03 RX ADMIN — SODIUM CHLORIDE 1000 ML: 9 INJECTION, SOLUTION INTRAVENOUS at 16:03

## 2017-06-03 RX ADMIN — FENTANYL CITRATE: 50 INJECTION INTRAMUSCULAR; INTRAVENOUS at 22:01

## 2017-06-03 RX ADMIN — ONDANSETRON 4 MG: 2 INJECTION INTRAMUSCULAR; INTRAVENOUS at 19:13

## 2017-06-03 ASSESSMENT — PAIN DESCRIPTION - FREQUENCY
FREQUENCY: CONTINUOUS
FREQUENCY: CONTINUOUS

## 2017-06-03 ASSESSMENT — PAIN SCALES - GENERAL
PAINLEVEL_OUTOF10: 10
PAINLEVEL_OUTOF10: 7
PAINLEVEL_OUTOF10: 7
PAINLEVEL_OUTOF10: 9
PAINLEVEL_OUTOF10: 9

## 2017-06-03 ASSESSMENT — PAIN DESCRIPTION - LOCATION
LOCATION: ABDOMEN
LOCATION: ABDOMEN

## 2017-06-03 ASSESSMENT — ENCOUNTER SYMPTOMS
VOMITING: 0
ABDOMINAL DISTENTION: 0
BACK PAIN: 0
COUGH: 0
NAUSEA: 1
DIARRHEA: 0
COLOR CHANGE: 0
SHORTNESS OF BREATH: 0
BLOOD IN STOOL: 0
ABDOMINAL PAIN: 1

## 2017-06-03 ASSESSMENT — PAIN DESCRIPTION - DESCRIPTORS
DESCRIPTORS: ACHING;CONSTANT;DISCOMFORT
DESCRIPTORS: OTHER (COMMENT)

## 2017-06-03 ASSESSMENT — PAIN DESCRIPTION - ORIENTATION: ORIENTATION: LEFT;LOWER

## 2017-06-03 ASSESSMENT — PAIN DESCRIPTION - PAIN TYPE
TYPE: ACUTE PAIN
TYPE: ACUTE PAIN

## 2017-06-04 PROBLEM — N13.5 URETEROPELVIC JUNCTION (UPJ) OBSTRUCTION, RIGHT: Status: ACTIVE | Noted: 2017-06-04

## 2017-06-04 LAB
-: ABNORMAL
ABSOLUTE EOS #: 0 K/UL (ref 0–0.4)
ABSOLUTE LYMPH #: 0.63 K/UL (ref 1–4.8)
ABSOLUTE MONO #: 1.05 K/UL (ref 0.1–0.8)
ALBUMIN SERPL-MCNC: 3.4 G/DL (ref 3.5–5.2)
ALBUMIN/GLOBULIN RATIO: 0.7 (ref 1–2.5)
ALP BLD-CCNC: 331 U/L (ref 35–104)
ALT SERPL-CCNC: 23 U/L (ref 5–33)
AMORPHOUS: ABNORMAL
ANION GAP SERPL CALCULATED.3IONS-SCNC: 16 MMOL/L (ref 9–17)
AST SERPL-CCNC: 44 U/L
BACTERIA: ABNORMAL
BASOPHILS # BLD: 1 %
BASOPHILS ABSOLUTE: 0.21 K/UL (ref 0–0.2)
BILIRUB SERPL-MCNC: 0.26 MG/DL (ref 0.3–1.2)
BILIRUBIN URINE: NEGATIVE
BUN BLDV-MCNC: 18 MG/DL (ref 6–20)
BUN/CREAT BLD: ABNORMAL (ref 9–20)
CALCIUM SERPL-MCNC: 9.3 MG/DL (ref 8.6–10.4)
CASTS UA: ABNORMAL /LPF (ref 0–8)
CHLORIDE BLD-SCNC: 100 MMOL/L (ref 98–107)
CO2: 19 MMOL/L (ref 20–31)
COLOR: YELLOW
COMMENT UA: ABNORMAL
CREAT SERPL-MCNC: 0.61 MG/DL (ref 0.5–0.9)
CRYSTALS, UA: ABNORMAL /HPF
DIFFERENTIAL TYPE: ABNORMAL
EOSINOPHILS RELATIVE PERCENT: 0 %
EPITHELIAL CELLS UA: ABNORMAL /HPF (ref 0–5)
GFR AFRICAN AMERICAN: >60 ML/MIN
GFR NON-AFRICAN AMERICAN: >60 ML/MIN
GFR SERPL CREATININE-BSD FRML MDRD: ABNORMAL ML/MIN/{1.73_M2}
GFR SERPL CREATININE-BSD FRML MDRD: ABNORMAL ML/MIN/{1.73_M2}
GLUCOSE BLD-MCNC: 142 MG/DL (ref 70–99)
GLUCOSE URINE: NEGATIVE
HCT VFR BLD CALC: 31.5 % (ref 36–46)
HEMOGLOBIN: 9.9 G/DL (ref 12–16)
KETONES, URINE: NEGATIVE
LACTIC ACID, WHOLE BLOOD: 0.8 MMOL/L (ref 0.7–2.1)
LEUKOCYTE ESTERASE, URINE: ABNORMAL
LYMPHOCYTES # BLD: 3 %
MAGNESIUM: 2.1 MG/DL (ref 1.6–2.6)
MCH RBC QN AUTO: 26.7 PG (ref 26–34)
MCHC RBC AUTO-ENTMCNC: 31.5 G/DL (ref 31–37)
MCV RBC AUTO: 84.6 FL (ref 80–100)
MONOCYTES # BLD: 5 %
MORPHOLOGY: ABNORMAL
MUCUS: ABNORMAL
NITRITE, URINE: NEGATIVE
OTHER OBSERVATIONS UA: ABNORMAL
PDW BLD-RTO: 16.2 % (ref 12.5–15.4)
PH UA: 5.5 (ref 5–8)
PLATELET # BLD: 699 K/UL (ref 140–450)
PLATELET ESTIMATE: ABNORMAL
PMV BLD AUTO: 7.5 FL (ref 6–12)
POTASSIUM SERPL-SCNC: 4.2 MMOL/L (ref 3.7–5.3)
PROTEIN UA: ABNORMAL
RBC # BLD: 3.72 M/UL (ref 4–5.2)
RBC # BLD: ABNORMAL 10*6/UL
RBC UA: ABNORMAL /HPF (ref 0–4)
RENAL EPITHELIAL, UA: ABNORMAL /HPF
SEG NEUTROPHILS: 91 %
SEGMENTED NEUTROPHILS ABSOLUTE COUNT: 19.01 K/UL (ref 1.8–7.7)
SODIUM BLD-SCNC: 135 MMOL/L (ref 135–144)
SPECIFIC GRAVITY UA: 1.02 (ref 1–1.03)
TOTAL PROTEIN: 8 G/DL (ref 6.4–8.3)
TRICHOMONAS: ABNORMAL
TURBIDITY: ABNORMAL
URINE HGB: ABNORMAL
UROBILINOGEN, URINE: NORMAL
WBC # BLD: 20.9 K/UL (ref 3.5–11)
WBC # BLD: ABNORMAL 10*3/UL
WBC UA: ABNORMAL /HPF (ref 0–5)
YEAST: ABNORMAL

## 2017-06-04 PROCEDURE — 80053 COMPREHEN METABOLIC PANEL: CPT

## 2017-06-04 PROCEDURE — 85025 COMPLETE CBC W/AUTO DIFF WBC: CPT

## 2017-06-04 PROCEDURE — 81001 URINALYSIS AUTO W/SCOPE: CPT

## 2017-06-04 PROCEDURE — 6360000002 HC RX W HCPCS: Performed by: NURSE PRACTITIONER

## 2017-06-04 PROCEDURE — 1200000000 HC SEMI PRIVATE

## 2017-06-04 PROCEDURE — 36415 COLL VENOUS BLD VENIPUNCTURE: CPT

## 2017-06-04 PROCEDURE — 99222 1ST HOSP IP/OBS MODERATE 55: CPT | Performed by: NURSE PRACTITIONER

## 2017-06-04 PROCEDURE — 2580000003 HC RX 258: Performed by: NURSE PRACTITIONER

## 2017-06-04 PROCEDURE — 83605 ASSAY OF LACTIC ACID: CPT

## 2017-06-04 PROCEDURE — 83735 ASSAY OF MAGNESIUM: CPT

## 2017-06-04 RX ADMIN — ENOXAPARIN SODIUM 40 MG: 40 INJECTION SUBCUTANEOUS at 09:05

## 2017-06-04 RX ADMIN — SODIUM CHLORIDE: 9 INJECTION, SOLUTION INTRAVENOUS at 00:03

## 2017-06-04 RX ADMIN — MORPHINE SULFATE 4 MG: 4 INJECTION, SOLUTION INTRAMUSCULAR; INTRAVENOUS at 06:02

## 2017-06-04 RX ADMIN — MORPHINE SULFATE 4 MG: 4 INJECTION, SOLUTION INTRAMUSCULAR; INTRAVENOUS at 21:23

## 2017-06-04 RX ADMIN — Medication 10 ML: at 00:03

## 2017-06-04 RX ADMIN — ONDANSETRON 4 MG: 2 INJECTION, SOLUTION INTRAMUSCULAR; INTRAVENOUS at 00:03

## 2017-06-04 RX ADMIN — ONDANSETRON 4 MG: 2 INJECTION, SOLUTION INTRAMUSCULAR; INTRAVENOUS at 06:02

## 2017-06-04 RX ADMIN — MORPHINE SULFATE 4 MG: 4 INJECTION, SOLUTION INTRAMUSCULAR; INTRAVENOUS at 03:30

## 2017-06-04 RX ADMIN — MORPHINE SULFATE 4 MG: 4 INJECTION, SOLUTION INTRAMUSCULAR; INTRAVENOUS at 00:03

## 2017-06-04 ASSESSMENT — PAIN SCALES - GENERAL
PAINLEVEL_OUTOF10: 8
PAINLEVEL_OUTOF10: 10
PAINLEVEL_OUTOF10: 5
PAINLEVEL_OUTOF10: 10
PAINLEVEL_OUTOF10: 10

## 2017-06-04 ASSESSMENT — PAIN DESCRIPTION - ORIENTATION: ORIENTATION: LEFT;LOWER

## 2017-06-04 ASSESSMENT — PAIN DESCRIPTION - ONSET: ONSET: ON-GOING

## 2017-06-04 ASSESSMENT — PAIN DESCRIPTION - PROGRESSION: CLINICAL_PROGRESSION: NOT CHANGED

## 2017-06-04 ASSESSMENT — PAIN DESCRIPTION - PAIN TYPE: TYPE: ACUTE PAIN

## 2017-06-04 ASSESSMENT — PAIN DESCRIPTION - DESCRIPTORS: DESCRIPTORS: ACHING;CONSTANT;DISCOMFORT

## 2017-06-04 ASSESSMENT — PAIN DESCRIPTION - FREQUENCY: FREQUENCY: CONTINUOUS

## 2017-06-04 ASSESSMENT — PAIN DESCRIPTION - LOCATION: LOCATION: ABDOMEN

## 2017-06-05 ENCOUNTER — APPOINTMENT (OUTPATIENT)
Dept: GENERAL RADIOLOGY | Age: 47
DRG: 254 | End: 2017-06-05
Payer: MEDICAID

## 2017-06-05 ENCOUNTER — HOSPITAL ENCOUNTER (OUTPATIENT)
Dept: PHYSICAL THERAPY | Age: 47
Setting detail: THERAPIES SERIES
Discharge: HOME | End: 2017-06-05
Payer: MEDICAID

## 2017-06-05 LAB
ANION GAP SERPL CALCULATED.3IONS-SCNC: 17 MMOL/L (ref 9–17)
BUN BLDV-MCNC: 14 MG/DL (ref 6–20)
BUN/CREAT BLD: ABNORMAL (ref 9–20)
CALCIUM SERPL-MCNC: 8.9 MG/DL (ref 8.6–10.4)
CHLORIDE BLD-SCNC: 108 MMOL/L (ref 98–107)
CO2: 16 MMOL/L (ref 20–31)
CREAT SERPL-MCNC: <0.2 MG/DL (ref 0.5–0.9)
GFR AFRICAN AMERICAN: ABNORMAL ML/MIN
GFR NON-AFRICAN AMERICAN: ABNORMAL ML/MIN
GFR SERPL CREATININE-BSD FRML MDRD: ABNORMAL ML/MIN/{1.73_M2}
GFR SERPL CREATININE-BSD FRML MDRD: ABNORMAL ML/MIN/{1.73_M2}
GLUCOSE BLD-MCNC: 61 MG/DL (ref 70–99)
HCT VFR BLD CALC: 27.9 % (ref 36–46)
HEMOGLOBIN: 9.1 G/DL (ref 12–16)
LACTIC ACID, WHOLE BLOOD: 0.8 MMOL/L (ref 0.7–2.1)
MCH RBC QN AUTO: 26.8 PG (ref 26–34)
MCHC RBC AUTO-ENTMCNC: 32.5 G/DL (ref 31–37)
MCV RBC AUTO: 82.5 FL (ref 80–100)
PDW BLD-RTO: 16.5 % (ref 12.5–15.4)
PLATELET # BLD: 612 K/UL (ref 140–450)
PMV BLD AUTO: 7.6 FL (ref 6–12)
POTASSIUM SERPL-SCNC: 3.6 MMOL/L (ref 3.7–5.3)
RBC # BLD: 3.39 M/UL (ref 4–5.2)
SODIUM BLD-SCNC: 141 MMOL/L (ref 135–144)
WBC # BLD: 11.6 K/UL (ref 3.5–11)

## 2017-06-05 PROCEDURE — 2580000003 HC RX 258: Performed by: NURSE PRACTITIONER

## 2017-06-05 PROCEDURE — 74000 XR ABDOMEN LIMITED (KUB): CPT

## 2017-06-05 PROCEDURE — 85027 COMPLETE CBC AUTOMATED: CPT

## 2017-06-05 PROCEDURE — 80048 BASIC METABOLIC PNL TOTAL CA: CPT

## 2017-06-05 PROCEDURE — 36415 COLL VENOUS BLD VENIPUNCTURE: CPT

## 2017-06-05 PROCEDURE — 6360000002 HC RX W HCPCS: Performed by: NURSE PRACTITIONER

## 2017-06-05 PROCEDURE — 1200000000 HC SEMI PRIVATE

## 2017-06-05 PROCEDURE — 83605 ASSAY OF LACTIC ACID: CPT

## 2017-06-05 PROCEDURE — 76937 US GUIDE VASCULAR ACCESS: CPT

## 2017-06-05 PROCEDURE — 99232 SBSQ HOSP IP/OBS MODERATE 35: CPT | Performed by: FAMILY MEDICINE

## 2017-06-05 RX ADMIN — MORPHINE SULFATE 2 MG: 2 INJECTION, SOLUTION INTRAMUSCULAR; INTRAVENOUS at 11:21

## 2017-06-05 RX ADMIN — SODIUM CHLORIDE: 9 INJECTION, SOLUTION INTRAVENOUS at 01:35

## 2017-06-05 RX ADMIN — MORPHINE SULFATE 2 MG: 2 INJECTION, SOLUTION INTRAMUSCULAR; INTRAVENOUS at 21:04

## 2017-06-05 RX ADMIN — ENOXAPARIN SODIUM 40 MG: 40 INJECTION SUBCUTANEOUS at 09:30

## 2017-06-05 RX ADMIN — Medication 10 ML: at 21:08

## 2017-06-05 RX ADMIN — MORPHINE SULFATE 2 MG: 2 INJECTION, SOLUTION INTRAMUSCULAR; INTRAVENOUS at 18:07

## 2017-06-05 RX ADMIN — MORPHINE SULFATE 2 MG: 2 INJECTION, SOLUTION INTRAMUSCULAR; INTRAVENOUS at 14:20

## 2017-06-05 RX ADMIN — MORPHINE SULFATE 2 MG: 2 INJECTION, SOLUTION INTRAMUSCULAR; INTRAVENOUS at 23:36

## 2017-06-05 RX ADMIN — SODIUM CHLORIDE: 9 INJECTION, SOLUTION INTRAVENOUS at 12:14

## 2017-06-05 ASSESSMENT — ENCOUNTER SYMPTOMS
COUGH: 0
DIARRHEA: 0
SINUS PRESSURE: 0
WHEEZING: 0
SHORTNESS OF BREATH: 0
BLOOD IN STOOL: 0
ABDOMINAL PAIN: 0
VOMITING: 0
CONSTIPATION: 0
NAUSEA: 0
SORE THROAT: 0
VOICE CHANGE: 0

## 2017-06-05 ASSESSMENT — PAIN SCALES - GENERAL
PAINLEVEL_OUTOF10: 8
PAINLEVEL_OUTOF10: 9
PAINLEVEL_OUTOF10: 0
PAINLEVEL_OUTOF10: 8
PAINLEVEL_OUTOF10: 8
PAINLEVEL_OUTOF10: 7
PAINLEVEL_OUTOF10: 5

## 2017-06-06 VITALS
WEIGHT: 151.8 LBS | TEMPERATURE: 98.8 F | RESPIRATION RATE: 16 BRPM | HEIGHT: 57 IN | SYSTOLIC BLOOD PRESSURE: 124 MMHG | OXYGEN SATURATION: 94 % | HEART RATE: 70 BPM | DIASTOLIC BLOOD PRESSURE: 78 MMHG | BODY MASS INDEX: 32.75 KG/M2

## 2017-06-06 LAB
ANION GAP SERPL CALCULATED.3IONS-SCNC: 14 MMOL/L (ref 9–17)
BUN BLDV-MCNC: 7 MG/DL (ref 6–20)
BUN/CREAT BLD: ABNORMAL (ref 9–20)
CALCIUM SERPL-MCNC: 8.8 MG/DL (ref 8.6–10.4)
CHLORIDE BLD-SCNC: 106 MMOL/L (ref 98–107)
CO2: 20 MMOL/L (ref 20–31)
CREAT SERPL-MCNC: 0.6 MG/DL (ref 0.5–0.9)
GFR AFRICAN AMERICAN: >60 ML/MIN
GFR NON-AFRICAN AMERICAN: >60 ML/MIN
GFR SERPL CREATININE-BSD FRML MDRD: ABNORMAL ML/MIN/{1.73_M2}
GFR SERPL CREATININE-BSD FRML MDRD: ABNORMAL ML/MIN/{1.73_M2}
GLUCOSE BLD-MCNC: 86 MG/DL (ref 70–99)
HCT VFR BLD CALC: 26.5 % (ref 36–46)
HEMOGLOBIN: 8.6 G/DL (ref 12–16)
MCH RBC QN AUTO: 26.7 PG (ref 26–34)
MCHC RBC AUTO-ENTMCNC: 32.5 G/DL (ref 31–37)
MCV RBC AUTO: 82.1 FL (ref 80–100)
PDW BLD-RTO: 16.9 % (ref 12.5–15.4)
PLATELET # BLD: ABNORMAL K/UL (ref 140–450)
PMV BLD AUTO: ABNORMAL FL (ref 6–12)
POTASSIUM SERPL-SCNC: 3.6 MMOL/L (ref 3.7–5.3)
RBC # BLD: 3.23 M/UL (ref 4–5.2)
SODIUM BLD-SCNC: 140 MMOL/L (ref 135–144)
WBC # BLD: 14.6 K/UL (ref 3.5–11)

## 2017-06-06 PROCEDURE — 6360000002 HC RX W HCPCS: Performed by: NURSE PRACTITIONER

## 2017-06-06 PROCEDURE — 36415 COLL VENOUS BLD VENIPUNCTURE: CPT

## 2017-06-06 PROCEDURE — 85027 COMPLETE CBC AUTOMATED: CPT

## 2017-06-06 PROCEDURE — 80048 BASIC METABOLIC PNL TOTAL CA: CPT

## 2017-06-06 PROCEDURE — 99232 SBSQ HOSP IP/OBS MODERATE 35: CPT | Performed by: FAMILY MEDICINE

## 2017-06-06 RX ORDER — TRAMADOL HYDROCHLORIDE 50 MG/1
50 TABLET ORAL EVERY 6 HOURS PRN
Status: DISCONTINUED | OUTPATIENT
Start: 2017-06-06 | End: 2017-06-06 | Stop reason: HOSPADM

## 2017-06-06 RX ADMIN — MORPHINE SULFATE 4 MG: 4 INJECTION, SOLUTION INTRAMUSCULAR; INTRAVENOUS at 04:18

## 2017-06-06 RX ADMIN — ENOXAPARIN SODIUM 40 MG: 40 INJECTION SUBCUTANEOUS at 09:05

## 2017-06-06 RX ADMIN — MORPHINE SULFATE 2 MG: 2 INJECTION, SOLUTION INTRAMUSCULAR; INTRAVENOUS at 10:08

## 2017-06-06 RX ADMIN — MORPHINE SULFATE 2 MG: 2 INJECTION, SOLUTION INTRAMUSCULAR; INTRAVENOUS at 01:55

## 2017-06-06 RX ADMIN — MORPHINE SULFATE 2 MG: 2 INJECTION, SOLUTION INTRAMUSCULAR; INTRAVENOUS at 07:01

## 2017-06-06 ASSESSMENT — PAIN SCALES - GENERAL
PAINLEVEL_OUTOF10: 6
PAINLEVEL_OUTOF10: 8
PAINLEVEL_OUTOF10: 8
PAINLEVEL_OUTOF10: 0
PAINLEVEL_OUTOF10: 8

## 2017-06-06 ASSESSMENT — ENCOUNTER SYMPTOMS
SORE THROAT: 0
SHORTNESS OF BREATH: 0
ABDOMINAL PAIN: 0
SINUS PRESSURE: 0
COUGH: 0
DIARRHEA: 0
CONSTIPATION: 0
BLOOD IN STOOL: 0
WHEEZING: 0
VOICE CHANGE: 0
VOMITING: 0
NAUSEA: 0

## 2017-06-07 ENCOUNTER — TELEPHONE (OUTPATIENT)
Dept: INTERNAL MEDICINE | Age: 47
End: 2017-06-07

## 2017-06-07 ENCOUNTER — HOSPITAL ENCOUNTER (OUTPATIENT)
Dept: PHYSICAL THERAPY | Age: 47
Setting detail: THERAPIES SERIES
Discharge: HOME | End: 2017-06-07
Payer: MEDICAID

## 2017-06-07 PROCEDURE — 97110 THERAPEUTIC EXERCISES: CPT

## 2017-06-11 ENCOUNTER — HOSPITAL ENCOUNTER (INPATIENT)
Age: 47
LOS: 2 days | Discharge: HOME | DRG: 241 | End: 2017-06-13
Attending: EMERGENCY MEDICINE | Admitting: INTERNAL MEDICINE
Payer: MEDICAID

## 2017-06-11 ENCOUNTER — APPOINTMENT (OUTPATIENT)
Dept: GENERAL RADIOLOGY | Age: 47
DRG: 241 | End: 2017-06-11
Payer: MEDICAID

## 2017-06-11 DIAGNOSIS — N39.0 URINARY TRACT INFECTION WITH HEMATURIA, SITE UNSPECIFIED: Primary | ICD-10-CM

## 2017-06-11 DIAGNOSIS — K56.600 PARTIAL SMALL BOWEL OBSTRUCTION (HCC): ICD-10-CM

## 2017-06-11 DIAGNOSIS — D72.829 LEUKOCYTOSIS, UNSPECIFIED TYPE: ICD-10-CM

## 2017-06-11 DIAGNOSIS — R10.84 GENERALIZED ABDOMINAL PAIN: ICD-10-CM

## 2017-06-11 DIAGNOSIS — R11.2 NON-INTRACTABLE VOMITING WITH NAUSEA, UNSPECIFIED VOMITING TYPE: ICD-10-CM

## 2017-06-11 DIAGNOSIS — R31.9 URINARY TRACT INFECTION WITH HEMATURIA, SITE UNSPECIFIED: Primary | ICD-10-CM

## 2017-06-11 PROBLEM — J45.30 MILD PERSISTENT ASTHMA WITHOUT COMPLICATION: Status: ACTIVE | Noted: 2017-06-11

## 2017-06-11 PROBLEM — N30.00 ACUTE CYSTITIS WITHOUT HEMATURIA: Status: ACTIVE | Noted: 2017-06-11

## 2017-06-11 LAB
-: NORMAL
ABSOLUTE EOS #: 0.1 K/UL (ref 0–0.4)
ABSOLUTE LYMPH #: 1.7 K/UL (ref 1–4.8)
ABSOLUTE MONO #: 0.9 K/UL (ref 0.1–1.2)
ALBUMIN SERPL-MCNC: 3.5 G/DL (ref 3.5–5.2)
ALBUMIN/GLOBULIN RATIO: 0.8 (ref 1–2.5)
ALP BLD-CCNC: 230 U/L (ref 35–104)
ALT SERPL-CCNC: 10 U/L (ref 5–33)
AMORPHOUS: NORMAL
ANION GAP SERPL CALCULATED.3IONS-SCNC: 16 MMOL/L (ref 9–17)
AST SERPL-CCNC: 15 U/L
BACTERIA: NORMAL
BASOPHILS # BLD: 0 %
BASOPHILS ABSOLUTE: 0 K/UL (ref 0–0.2)
BILIRUB SERPL-MCNC: 0.21 MG/DL (ref 0.3–1.2)
BILIRUBIN URINE: NEGATIVE
BUN BLDV-MCNC: 15 MG/DL (ref 6–20)
BUN/CREAT BLD: ABNORMAL (ref 9–20)
CALCIUM SERPL-MCNC: 9.8 MG/DL (ref 8.6–10.4)
CASTS UA: NORMAL /LPF (ref 0–8)
CHLORIDE BLD-SCNC: 100 MMOL/L (ref 98–107)
CO2: 23 MMOL/L (ref 20–31)
COLOR: YELLOW
COMMENT UA: ABNORMAL
CREAT SERPL-MCNC: 0.75 MG/DL (ref 0.5–0.9)
CRYSTALS, UA: NORMAL /HPF
DIFFERENTIAL TYPE: ABNORMAL
EOSINOPHILS RELATIVE PERCENT: 0 %
EPITHELIAL CELLS UA: NORMAL /HPF (ref 0–5)
GFR AFRICAN AMERICAN: >60 ML/MIN
GFR NON-AFRICAN AMERICAN: >60 ML/MIN
GFR SERPL CREATININE-BSD FRML MDRD: ABNORMAL ML/MIN/{1.73_M2}
GFR SERPL CREATININE-BSD FRML MDRD: ABNORMAL ML/MIN/{1.73_M2}
GLUCOSE BLD-MCNC: 107 MG/DL (ref 70–99)
GLUCOSE URINE: NEGATIVE
HCG QUALITATIVE: NEGATIVE
HCT VFR BLD CALC: 31.9 % (ref 36–46)
HEMOGLOBIN: 10.3 G/DL (ref 12–16)
INR BLD: 1.2
KETONES, URINE: NEGATIVE
LACTIC ACID, WHOLE BLOOD: 1.1 MMOL/L (ref 0.7–2.1)
LEUKOCYTE ESTERASE, URINE: ABNORMAL
LIPASE: 63 U/L (ref 13–60)
LYMPHOCYTES # BLD: 10 %
MCH RBC QN AUTO: 26.1 PG (ref 26–34)
MCHC RBC AUTO-ENTMCNC: 32.2 G/DL (ref 31–37)
MCV RBC AUTO: 81.1 FL (ref 80–100)
MONOCYTES # BLD: 5 %
MUCUS: NORMAL
NITRITE, URINE: NEGATIVE
OTHER OBSERVATIONS UA: NORMAL
PDW BLD-RTO: 16.7 % (ref 12.5–15.4)
PH UA: 7 (ref 5–8)
PLATELET # BLD: 638 K/UL (ref 140–450)
PLATELET ESTIMATE: ABNORMAL
PMV BLD AUTO: 7.5 FL (ref 6–12)
POTASSIUM SERPL-SCNC: 4.1 MMOL/L (ref 3.7–5.3)
PROTEIN UA: ABNORMAL
PROTHROMBIN TIME: 12.9 SEC (ref 9.4–12.6)
RBC # BLD: 3.93 M/UL (ref 4–5.2)
RBC # BLD: ABNORMAL 10*6/UL
RBC UA: NORMAL /HPF (ref 0–4)
RENAL EPITHELIAL, UA: NORMAL /HPF
SEG NEUTROPHILS: 85 %
SEGMENTED NEUTROPHILS ABSOLUTE COUNT: 15 K/UL (ref 1.8–7.7)
SODIUM BLD-SCNC: 139 MMOL/L (ref 135–144)
SPECIFIC GRAVITY UA: 1.02 (ref 1–1.03)
TOTAL PROTEIN: 7.8 G/DL (ref 6.4–8.3)
TRICHOMONAS: NORMAL
TURBIDITY: CLEAR
URINE HGB: ABNORMAL
UROBILINOGEN, URINE: NORMAL
WBC # BLD: 17.7 K/UL (ref 3.5–11)
WBC # BLD: ABNORMAL 10*3/UL
WBC UA: NORMAL /HPF (ref 0–5)
YEAST: NORMAL

## 2017-06-11 PROCEDURE — 74022 RADEX COMPL AQT ABD SERIES: CPT

## 2017-06-11 PROCEDURE — 6360000002 HC RX W HCPCS: Performed by: EMERGENCY MEDICINE

## 2017-06-11 PROCEDURE — 81001 URINALYSIS AUTO W/SCOPE: CPT

## 2017-06-11 PROCEDURE — 36415 COLL VENOUS BLD VENIPUNCTURE: CPT

## 2017-06-11 PROCEDURE — 2580000003 HC RX 258: Performed by: INTERNAL MEDICINE

## 2017-06-11 PROCEDURE — 85025 COMPLETE CBC W/AUTO DIFF WBC: CPT

## 2017-06-11 PROCEDURE — C9113 INJ PANTOPRAZOLE SODIUM, VIA: HCPCS | Performed by: HOSPITALIST

## 2017-06-11 PROCEDURE — 1200000000 HC SEMI PRIVATE

## 2017-06-11 PROCEDURE — 83605 ASSAY OF LACTIC ACID: CPT

## 2017-06-11 PROCEDURE — 99223 1ST HOSP IP/OBS HIGH 75: CPT | Performed by: INTERNAL MEDICINE

## 2017-06-11 PROCEDURE — 6360000002 HC RX W HCPCS: Performed by: INTERNAL MEDICINE

## 2017-06-11 PROCEDURE — 85610 PROTHROMBIN TIME: CPT

## 2017-06-11 PROCEDURE — 6360000002 HC RX W HCPCS: Performed by: HOSPITALIST

## 2017-06-11 PROCEDURE — 87040 BLOOD CULTURE FOR BACTERIA: CPT

## 2017-06-11 PROCEDURE — 87086 URINE CULTURE/COLONY COUNT: CPT

## 2017-06-11 PROCEDURE — 80053 COMPREHEN METABOLIC PANEL: CPT

## 2017-06-11 PROCEDURE — 83690 ASSAY OF LIPASE: CPT

## 2017-06-11 PROCEDURE — 84703 CHORIONIC GONADOTROPIN ASSAY: CPT

## 2017-06-11 PROCEDURE — 2580000003 HC RX 258: Performed by: EMERGENCY MEDICINE

## 2017-06-11 PROCEDURE — 99285 EMERGENCY DEPT VISIT HI MDM: CPT

## 2017-06-11 RX ORDER — SODIUM CHLORIDE 0.9 % (FLUSH) 0.9 %
10 SYRINGE (ML) INJECTION EVERY 12 HOURS SCHEDULED
Status: DISCONTINUED | OUTPATIENT
Start: 2017-06-11 | End: 2017-06-13 | Stop reason: HOSPADM

## 2017-06-11 RX ORDER — SODIUM CHLORIDE 0.9 % (FLUSH) 0.9 %
10 SYRINGE (ML) INJECTION PRN
Status: DISCONTINUED | OUTPATIENT
Start: 2017-06-11 | End: 2017-06-11

## 2017-06-11 RX ORDER — DOCUSATE SODIUM 100 MG/1
100 CAPSULE, LIQUID FILLED ORAL 2 TIMES DAILY PRN
Status: DISCONTINUED | OUTPATIENT
Start: 2017-06-11 | End: 2017-06-13 | Stop reason: HOSPADM

## 2017-06-11 RX ORDER — MORPHINE SULFATE 2 MG/ML
2 INJECTION, SOLUTION INTRAMUSCULAR; INTRAVENOUS EVERY 6 HOURS PRN
Status: DISCONTINUED | OUTPATIENT
Start: 2017-06-11 | End: 2017-06-13

## 2017-06-11 RX ORDER — SODIUM CHLORIDE 9 MG/ML
INJECTION, SOLUTION INTRAVENOUS CONTINUOUS
Status: DISCONTINUED | OUTPATIENT
Start: 2017-06-11 | End: 2017-06-12

## 2017-06-11 RX ORDER — BETAMETHASONE DIPROPIONATE 0.5 MG/G
CREAM TOPICAL DAILY
Status: DISCONTINUED | OUTPATIENT
Start: 2017-06-12 | End: 2017-06-13 | Stop reason: HOSPADM

## 2017-06-11 RX ORDER — PANTOPRAZOLE SODIUM 40 MG/10ML
40 INJECTION, POWDER, LYOPHILIZED, FOR SOLUTION INTRAVENOUS DAILY
Status: DISCONTINUED | OUTPATIENT
Start: 2017-06-11 | End: 2017-06-12

## 2017-06-11 RX ORDER — ONDANSETRON 2 MG/ML
4 INJECTION INTRAMUSCULAR; INTRAVENOUS ONCE
Status: COMPLETED | OUTPATIENT
Start: 2017-06-11 | End: 2017-06-11

## 2017-06-11 RX ORDER — SODIUM CHLORIDE 0.9 % (FLUSH) 0.9 %
10 SYRINGE (ML) INJECTION PRN
Status: DISCONTINUED | OUTPATIENT
Start: 2017-06-11 | End: 2017-06-13 | Stop reason: HOSPADM

## 2017-06-11 RX ORDER — HYDROXYZINE HYDROCHLORIDE 50 MG/ML
25 INJECTION, SOLUTION INTRAMUSCULAR EVERY 6 HOURS PRN
Status: DISCONTINUED | OUTPATIENT
Start: 2017-06-11 | End: 2017-06-13 | Stop reason: HOSPADM

## 2017-06-11 RX ORDER — 0.9 % SODIUM CHLORIDE 0.9 %
1000 INTRAVENOUS SOLUTION INTRAVENOUS ONCE
Status: COMPLETED | OUTPATIENT
Start: 2017-06-11 | End: 2017-06-11

## 2017-06-11 RX ORDER — DIPHENHYDRAMINE HYDROCHLORIDE 50 MG/ML
INJECTION INTRAMUSCULAR; INTRAVENOUS
Status: DISCONTINUED
Start: 2017-06-11 | End: 2017-06-11

## 2017-06-11 RX ORDER — CIPROFLOXACIN 2 MG/ML
400 INJECTION, SOLUTION INTRAVENOUS EVERY 12 HOURS
Status: DISCONTINUED | OUTPATIENT
Start: 2017-06-11 | End: 2017-06-13

## 2017-06-11 RX ORDER — SODIUM CHLORIDE 9 MG/ML
INJECTION, SOLUTION INTRAVENOUS CONTINUOUS
Status: DISCONTINUED | OUTPATIENT
Start: 2017-06-11 | End: 2017-06-11 | Stop reason: SDUPTHER

## 2017-06-11 RX ORDER — 0.9 % SODIUM CHLORIDE 0.9 %
10 VIAL (ML) INJECTION DAILY
Status: DISCONTINUED | OUTPATIENT
Start: 2017-06-12 | End: 2017-06-12

## 2017-06-11 RX ORDER — ONDANSETRON 2 MG/ML
4 INJECTION INTRAMUSCULAR; INTRAVENOUS EVERY 6 HOURS PRN
Status: DISCONTINUED | OUTPATIENT
Start: 2017-06-11 | End: 2017-06-13

## 2017-06-11 RX ORDER — CITALOPRAM 20 MG/1
40 TABLET ORAL DAILY
Status: DISCONTINUED | OUTPATIENT
Start: 2017-06-12 | End: 2017-06-13 | Stop reason: HOSPADM

## 2017-06-11 RX ORDER — SODIUM CHLORIDE 0.9 % (FLUSH) 0.9 %
10 SYRINGE (ML) INJECTION EVERY 12 HOURS SCHEDULED
Status: DISCONTINUED | OUTPATIENT
Start: 2017-06-11 | End: 2017-06-11

## 2017-06-11 RX ADMIN — CEFTRIAXONE SODIUM 1 G: 1 INJECTION, POWDER, FOR SOLUTION INTRAMUSCULAR; INTRAVENOUS at 20:27

## 2017-06-11 RX ADMIN — ONDANSETRON 4 MG: 2 INJECTION INTRAMUSCULAR; INTRAVENOUS at 14:34

## 2017-06-11 RX ADMIN — SODIUM CHLORIDE 1000 ML: 9 INJECTION, SOLUTION INTRAVENOUS at 14:48

## 2017-06-11 RX ADMIN — MORPHINE SULFATE 2 MG: 2 INJECTION, SOLUTION INTRAMUSCULAR; INTRAVENOUS at 22:24

## 2017-06-11 RX ADMIN — PANTOPRAZOLE SODIUM 40 MG: 40 INJECTION, POWDER, FOR SOLUTION INTRAVENOUS at 23:01

## 2017-06-11 RX ADMIN — SODIUM CHLORIDE: 900 INJECTION INTRAVENOUS at 16:48

## 2017-06-11 RX ADMIN — HYDROMORPHONE HYDROCHLORIDE 1 MG: 1 INJECTION, SOLUTION INTRAMUSCULAR; INTRAVENOUS; SUBCUTANEOUS at 18:01

## 2017-06-11 RX ADMIN — SODIUM CHLORIDE: 9 INJECTION, SOLUTION INTRAVENOUS at 21:16

## 2017-06-11 RX ADMIN — CIPROFLOXACIN 400 MG: 2 INJECTION, SOLUTION INTRAVENOUS at 23:00

## 2017-06-11 RX ADMIN — HYDROMORPHONE HYDROCHLORIDE 1 MG: 1 INJECTION, SOLUTION INTRAMUSCULAR; INTRAVENOUS; SUBCUTANEOUS at 16:35

## 2017-06-11 RX ADMIN — HYDROMORPHONE HYDROCHLORIDE 1 MG: 1 INJECTION, SOLUTION INTRAMUSCULAR; INTRAVENOUS; SUBCUTANEOUS at 14:35

## 2017-06-11 RX ADMIN — HYDROMORPHONE HYDROCHLORIDE 1 MG: 1 INJECTION, SOLUTION INTRAMUSCULAR; INTRAVENOUS; SUBCUTANEOUS at 19:20

## 2017-06-11 ASSESSMENT — ENCOUNTER SYMPTOMS
ANAL BLEEDING: 0
SHORTNESS OF BREATH: 0
DIARRHEA: 1
ABDOMINAL PAIN: 1
VOMITING: 1
BLOOD IN STOOL: 0
NAUSEA: 1
TROUBLE SWALLOWING: 0
CONSTIPATION: 0
RHINORRHEA: 0
BACK PAIN: 0

## 2017-06-11 ASSESSMENT — PAIN SCALES - GENERAL
PAINLEVEL_OUTOF10: 7
PAINLEVEL_OUTOF10: 8
PAINLEVEL_OUTOF10: 4
PAINLEVEL_OUTOF10: 10
PAINLEVEL_OUTOF10: 8
PAINLEVEL_OUTOF10: 7
PAINLEVEL_OUTOF10: 10
PAINLEVEL_OUTOF10: 9
PAINLEVEL_OUTOF10: 10

## 2017-06-11 ASSESSMENT — PAIN DESCRIPTION - PAIN TYPE: TYPE: ACUTE PAIN

## 2017-06-11 ASSESSMENT — PAIN DESCRIPTION - DESCRIPTORS: DESCRIPTORS: STABBING

## 2017-06-11 ASSESSMENT — PAIN DESCRIPTION - LOCATION: LOCATION: ABDOMEN

## 2017-06-11 ASSESSMENT — PAIN DESCRIPTION - FREQUENCY: FREQUENCY: CONTINUOUS

## 2017-06-12 LAB
ABSOLUTE EOS #: 0.4 K/UL (ref 0–0.4)
ABSOLUTE LYMPH #: 1.7 K/UL (ref 1–4.8)
ABSOLUTE MONO #: 1.3 K/UL (ref 0.1–1.2)
ANION GAP SERPL CALCULATED.3IONS-SCNC: 14 MMOL/L (ref 9–17)
BASOPHILS # BLD: 2 %
BASOPHILS ABSOLUTE: 0.3 K/UL (ref 0–0.2)
BUN BLDV-MCNC: 13 MG/DL (ref 6–20)
BUN/CREAT BLD: NORMAL (ref 9–20)
CALCIUM SERPL-MCNC: 8.9 MG/DL (ref 8.6–10.4)
CHLORIDE BLD-SCNC: 104 MMOL/L (ref 98–107)
CO2: 23 MMOL/L (ref 20–31)
CREAT SERPL-MCNC: 0.83 MG/DL (ref 0.5–0.9)
CULTURE: NORMAL
CULTURE: NORMAL
DIFFERENTIAL TYPE: ABNORMAL
EOSINOPHILS RELATIVE PERCENT: 3 %
GFR AFRICAN AMERICAN: >60 ML/MIN
GFR NON-AFRICAN AMERICAN: >60 ML/MIN
GFR SERPL CREATININE-BSD FRML MDRD: NORMAL ML/MIN/{1.73_M2}
GFR SERPL CREATININE-BSD FRML MDRD: NORMAL ML/MIN/{1.73_M2}
GLUCOSE BLD-MCNC: 86 MG/DL (ref 70–99)
HCT VFR BLD CALC: 30.6 % (ref 36–46)
HEMOGLOBIN: 9.6 G/DL (ref 12–16)
LYMPHOCYTES # BLD: 12 %
Lab: NORMAL
MCH RBC QN AUTO: 26.5 PG (ref 26–34)
MCHC RBC AUTO-ENTMCNC: 31.5 G/DL (ref 31–37)
MCV RBC AUTO: 84.2 FL (ref 80–100)
MONOCYTES # BLD: 9 %
PDW BLD-RTO: 16.5 % (ref 12.5–15.4)
PLATELET # BLD: 596 K/UL (ref 140–450)
PLATELET ESTIMATE: ABNORMAL
PMV BLD AUTO: 7.8 FL (ref 6–12)
POTASSIUM SERPL-SCNC: 3.8 MMOL/L (ref 3.7–5.3)
RBC # BLD: 3.64 M/UL (ref 4–5.2)
RBC # BLD: ABNORMAL 10*6/UL
SEG NEUTROPHILS: 74 %
SEGMENTED NEUTROPHILS ABSOLUTE COUNT: 10.8 K/UL (ref 1.8–7.7)
SODIUM BLD-SCNC: 141 MMOL/L (ref 135–144)
SPECIMEN DESCRIPTION: NORMAL
STATUS: NORMAL
WBC # BLD: 14.4 K/UL (ref 3.5–11)
WBC # BLD: ABNORMAL 10*3/UL

## 2017-06-12 PROCEDURE — 6360000002 HC RX W HCPCS: Performed by: HOSPITALIST

## 2017-06-12 PROCEDURE — 36415 COLL VENOUS BLD VENIPUNCTURE: CPT

## 2017-06-12 PROCEDURE — 97530 THERAPEUTIC ACTIVITIES: CPT

## 2017-06-12 PROCEDURE — G8987 SELF CARE CURRENT STATUS: HCPCS

## 2017-06-12 PROCEDURE — G8979 MOBILITY GOAL STATUS: HCPCS

## 2017-06-12 PROCEDURE — 6360000002 HC RX W HCPCS: Performed by: INTERNAL MEDICINE

## 2017-06-12 PROCEDURE — C9113 INJ PANTOPRAZOLE SODIUM, VIA: HCPCS | Performed by: HOSPITALIST

## 2017-06-12 PROCEDURE — 99233 SBSQ HOSP IP/OBS HIGH 50: CPT | Performed by: INTERNAL MEDICINE

## 2017-06-12 PROCEDURE — 85025 COMPLETE CBC W/AUTO DIFF WBC: CPT

## 2017-06-12 PROCEDURE — 2500000003 HC RX 250 WO HCPCS: Performed by: INTERNAL MEDICINE

## 2017-06-12 PROCEDURE — 97535 SELF CARE MNGMENT TRAINING: CPT

## 2017-06-12 PROCEDURE — 2580000003 HC RX 258: Performed by: HOSPITALIST

## 2017-06-12 PROCEDURE — 6370000000 HC RX 637 (ALT 250 FOR IP): Performed by: HOSPITALIST

## 2017-06-12 PROCEDURE — G8989 SELF CARE D/C STATUS: HCPCS

## 2017-06-12 PROCEDURE — 97165 OT EVAL LOW COMPLEX 30 MIN: CPT

## 2017-06-12 PROCEDURE — G8980 MOBILITY D/C STATUS: HCPCS

## 2017-06-12 PROCEDURE — 94762 N-INVAS EAR/PLS OXIMTRY CONT: CPT

## 2017-06-12 PROCEDURE — G8988 SELF CARE GOAL STATUS: HCPCS

## 2017-06-12 PROCEDURE — G8978 MOBILITY CURRENT STATUS: HCPCS

## 2017-06-12 PROCEDURE — 80048 BASIC METABOLIC PNL TOTAL CA: CPT

## 2017-06-12 PROCEDURE — 97161 PT EVAL LOW COMPLEX 20 MIN: CPT

## 2017-06-12 PROCEDURE — 1200000000 HC SEMI PRIVATE

## 2017-06-12 RX ORDER — CIPROFLOXACIN 500 MG/1
500 TABLET, FILM COATED ORAL 2 TIMES DAILY
Qty: 14 TABLET | Refills: 0 | Status: SHIPPED | OUTPATIENT
Start: 2017-06-12 | End: 2017-06-13 | Stop reason: HOSPADM

## 2017-06-12 RX ORDER — SODIUM CHLORIDE, SODIUM LACTATE, POTASSIUM CHLORIDE, CALCIUM CHLORIDE 600; 310; 30; 20 MG/100ML; MG/100ML; MG/100ML; MG/100ML
INJECTION, SOLUTION INTRAVENOUS CONTINUOUS
Status: DISCONTINUED | OUTPATIENT
Start: 2017-06-12 | End: 2017-06-13 | Stop reason: HOSPADM

## 2017-06-12 RX ORDER — 0.9 % SODIUM CHLORIDE 0.9 %
VIAL (ML) INJECTION
Status: DISPENSED
Start: 2017-06-12 | End: 2017-06-12

## 2017-06-12 RX ADMIN — SODIUM CHLORIDE 10 ML: 9 INJECTION, SOLUTION INTRAMUSCULAR; INTRAVENOUS; SUBCUTANEOUS at 09:01

## 2017-06-12 RX ADMIN — PANTOPRAZOLE SODIUM 40 MG: 40 INJECTION, POWDER, FOR SOLUTION INTRAVENOUS at 08:59

## 2017-06-12 RX ADMIN — CIPROFLOXACIN 400 MG: 2 INJECTION, SOLUTION INTRAVENOUS at 10:15

## 2017-06-12 RX ADMIN — MORPHINE SULFATE 2 MG: 2 INJECTION, SOLUTION INTRAMUSCULAR; INTRAVENOUS at 05:12

## 2017-06-12 RX ADMIN — CITALOPRAM 40 MG: 20 TABLET, FILM COATED ORAL at 08:59

## 2017-06-12 RX ADMIN — ENOXAPARIN SODIUM 40 MG: 40 INJECTION SUBCUTANEOUS at 09:00

## 2017-06-12 RX ADMIN — ONDANSETRON 4 MG: 2 INJECTION, SOLUTION INTRAMUSCULAR; INTRAVENOUS at 15:13

## 2017-06-12 RX ADMIN — MORPHINE SULFATE 2 MG: 2 INJECTION, SOLUTION INTRAMUSCULAR; INTRAVENOUS at 11:52

## 2017-06-12 RX ADMIN — SODIUM CHLORIDE, POTASSIUM CHLORIDE, SODIUM LACTATE AND CALCIUM CHLORIDE: 600; 310; 30; 20 INJECTION, SOLUTION INTRAVENOUS at 08:59

## 2017-06-12 RX ADMIN — METRONIDAZOLE 500 MG: 500 INJECTION, SOLUTION INTRAVENOUS at 07:56

## 2017-06-12 RX ADMIN — MORPHINE SULFATE 2 MG: 2 INJECTION, SOLUTION INTRAMUSCULAR; INTRAVENOUS at 17:36

## 2017-06-12 RX ADMIN — METRONIDAZOLE 500 MG: 500 INJECTION, SOLUTION INTRAVENOUS at 00:11

## 2017-06-12 RX ADMIN — BETAMETHASONE DIPROPIONATE: 0.5 CREAM TOPICAL at 08:59

## 2017-06-12 ASSESSMENT — PAIN DESCRIPTION - LOCATION
LOCATION: ABDOMEN
LOCATION: ABDOMEN

## 2017-06-12 ASSESSMENT — PAIN SCALES - GENERAL
PAINLEVEL_OUTOF10: 9
PAINLEVEL_OUTOF10: 4
PAINLEVEL_OUTOF10: 4
PAINLEVEL_OUTOF10: 7
PAINLEVEL_OUTOF10: 8

## 2017-06-13 VITALS
HEART RATE: 84 BPM | RESPIRATION RATE: 16 BRPM | WEIGHT: 154 LBS | BODY MASS INDEX: 33.22 KG/M2 | SYSTOLIC BLOOD PRESSURE: 116 MMHG | DIASTOLIC BLOOD PRESSURE: 75 MMHG | TEMPERATURE: 98.5 F | HEIGHT: 57 IN | OXYGEN SATURATION: 84 %

## 2017-06-13 LAB
ABSOLUTE EOS #: 0.4 K/UL (ref 0–0.4)
ABSOLUTE LYMPH #: 2 K/UL (ref 1–4.8)
ABSOLUTE MONO #: 1.5 K/UL (ref 0.1–1.2)
BASOPHILS # BLD: 0 %
BASOPHILS ABSOLUTE: 0 K/UL (ref 0–0.2)
DIFFERENTIAL TYPE: ABNORMAL
EKG ATRIAL RATE: 78 BPM
EKG P-R INTERVAL: 120 MS
EKG Q-T INTERVAL: 404 MS
EKG QRS DURATION: 74 MS
EKG QTC CALCULATION (BAZETT): 460 MS
EKG R AXIS: 180 DEGREES
EKG T AXIS: 156 DEGREES
EKG VENTRICULAR RATE: 78 BPM
EOSINOPHILS RELATIVE PERCENT: 2 %
FERRITIN: 1628 UG/L (ref 13–150)
HCT VFR BLD CALC: 30.7 % (ref 36–46)
HEMOGLOBIN: 9.6 G/DL (ref 12–16)
IRON SATURATION: 17 % (ref 20–55)
IRON: 21 UG/DL (ref 37–145)
LYMPHOCYTES # BLD: 12 %
MCH RBC QN AUTO: 26.2 PG (ref 26–34)
MCHC RBC AUTO-ENTMCNC: 31.3 G/DL (ref 31–37)
MCV RBC AUTO: 83.5 FL (ref 80–100)
MONOCYTES # BLD: 9 %
PDW BLD-RTO: 17.3 % (ref 12.5–15.4)
PLATELET # BLD: 609 K/UL (ref 140–450)
PLATELET ESTIMATE: ABNORMAL
PMV BLD AUTO: 7.9 FL (ref 6–12)
RBC # BLD: 3.68 M/UL (ref 4–5.2)
RBC # BLD: ABNORMAL 10*6/UL
SEG NEUTROPHILS: 77 %
SEGMENTED NEUTROPHILS ABSOLUTE COUNT: 12.9 K/UL (ref 1.8–7.7)
TOTAL IRON BINDING CAPACITY: 124 UG/DL (ref 250–450)
UNSATURATED IRON BINDING CAPACITY: 103 UG/DL (ref 112–347)
WBC # BLD: 16.9 K/UL (ref 3.5–11)
WBC # BLD: ABNORMAL 10*3/UL

## 2017-06-13 PROCEDURE — 93005 ELECTROCARDIOGRAM TRACING: CPT

## 2017-06-13 PROCEDURE — 82728 ASSAY OF FERRITIN: CPT

## 2017-06-13 PROCEDURE — 94762 N-INVAS EAR/PLS OXIMTRY CONT: CPT

## 2017-06-13 PROCEDURE — 6360000002 HC RX W HCPCS: Performed by: INTERNAL MEDICINE

## 2017-06-13 PROCEDURE — 6370000000 HC RX 637 (ALT 250 FOR IP): Performed by: HOSPITALIST

## 2017-06-13 PROCEDURE — 6360000002 HC RX W HCPCS: Performed by: HOSPITALIST

## 2017-06-13 PROCEDURE — 85025 COMPLETE CBC W/AUTO DIFF WBC: CPT

## 2017-06-13 PROCEDURE — 83540 ASSAY OF IRON: CPT

## 2017-06-13 PROCEDURE — 99233 SBSQ HOSP IP/OBS HIGH 50: CPT | Performed by: INTERNAL MEDICINE

## 2017-06-13 PROCEDURE — 36415 COLL VENOUS BLD VENIPUNCTURE: CPT

## 2017-06-13 PROCEDURE — 83550 IRON BINDING TEST: CPT

## 2017-06-13 PROCEDURE — 2580000003 HC RX 258: Performed by: HOSPITALIST

## 2017-06-13 RX ORDER — METOCLOPRAMIDE 10 MG/1
10 TABLET ORAL
Status: DISCONTINUED | OUTPATIENT
Start: 2017-06-13 | End: 2017-06-13 | Stop reason: HOSPADM

## 2017-06-13 RX ORDER — PANTOPRAZOLE SODIUM 40 MG/1
40 TABLET, DELAYED RELEASE ORAL DAILY
Qty: 90 TABLET | Refills: 3 | Status: SHIPPED | OUTPATIENT
Start: 2017-06-13 | End: 2017-09-21 | Stop reason: SDUPTHER

## 2017-06-13 RX ORDER — CIPROFLOXACIN 500 MG/1
500 TABLET, FILM COATED ORAL EVERY 12 HOURS SCHEDULED
Status: DISCONTINUED | OUTPATIENT
Start: 2017-06-14 | End: 2017-06-13 | Stop reason: HOSPADM

## 2017-06-13 RX ORDER — ONDANSETRON 2 MG/ML
4 INJECTION INTRAMUSCULAR; INTRAVENOUS EVERY 6 HOURS PRN
Status: DISCONTINUED | OUTPATIENT
Start: 2017-06-13 | End: 2017-06-13

## 2017-06-13 RX ORDER — MORPHINE SULFATE 2 MG/ML
2 INJECTION, SOLUTION INTRAMUSCULAR; INTRAVENOUS EVERY 6 HOURS PRN
Status: DISCONTINUED | OUTPATIENT
Start: 2017-06-13 | End: 2017-06-13 | Stop reason: HOSPADM

## 2017-06-13 RX ORDER — METOCLOPRAMIDE 5 MG/1
5 TABLET ORAL
Qty: 60 TABLET | Refills: 1 | Status: ON HOLD | OUTPATIENT
Start: 2017-06-13 | End: 2017-09-02 | Stop reason: HOSPADM

## 2017-06-13 RX ORDER — ONDANSETRON 4 MG/1
2 TABLET, FILM COATED ORAL
Status: DISCONTINUED | OUTPATIENT
Start: 2017-06-13 | End: 2017-06-13 | Stop reason: HOSPADM

## 2017-06-13 RX ORDER — METOCLOPRAMIDE 5 MG/1
5 TABLET ORAL
Status: DISCONTINUED | OUTPATIENT
Start: 2017-06-13 | End: 2017-06-13

## 2017-06-13 RX ORDER — MORPHINE SULFATE 2 MG/ML
2 INJECTION, SOLUTION INTRAMUSCULAR; INTRAVENOUS EVERY 6 HOURS PRN
Status: DISCONTINUED | OUTPATIENT
Start: 2017-06-13 | End: 2017-06-13

## 2017-06-13 RX ORDER — ONDANSETRON 4 MG/1
4 TABLET, FILM COATED ORAL EVERY 8 HOURS PRN
Status: DISCONTINUED | OUTPATIENT
Start: 2017-06-13 | End: 2017-06-13

## 2017-06-13 RX ORDER — CIPROFLOXACIN 500 MG/1
500 TABLET, FILM COATED ORAL EVERY 12 HOURS SCHEDULED
Qty: 10 TABLET | Refills: 0 | Status: SHIPPED | OUTPATIENT
Start: 2017-06-14 | End: 2017-06-19

## 2017-06-13 RX ORDER — ONDANSETRON 4 MG/1
2 TABLET, FILM COATED ORAL
Qty: 60 TABLET | Refills: 1 | Status: SHIPPED | OUTPATIENT
Start: 2017-06-13 | End: 2017-06-13 | Stop reason: HOSPADM

## 2017-06-13 RX ORDER — FERROUS SULFATE 325(65) MG
325 TABLET ORAL
Qty: 60 TABLET | Refills: 5 | Status: SHIPPED | OUTPATIENT
Start: 2017-06-13 | End: 2017-07-14 | Stop reason: SDUPTHER

## 2017-06-13 RX ADMIN — MORPHINE SULFATE 2 MG: 2 INJECTION, SOLUTION INTRAMUSCULAR; INTRAVENOUS at 08:51

## 2017-06-13 RX ADMIN — ONDANSETRON 4 MG: 2 INJECTION INTRAMUSCULAR; INTRAVENOUS at 00:47

## 2017-06-13 RX ADMIN — CITALOPRAM 40 MG: 20 TABLET, FILM COATED ORAL at 08:51

## 2017-06-13 RX ADMIN — CIPROFLOXACIN 400 MG: 2 INJECTION, SOLUTION INTRAVENOUS at 02:08

## 2017-06-13 RX ADMIN — METOCLOPRAMIDE 5 MG: 5 TABLET ORAL at 11:21

## 2017-06-13 RX ADMIN — ONDANSETRON HYDROCHLORIDE 2 MG: 4 TABLET, FILM COATED ORAL at 16:51

## 2017-06-13 RX ADMIN — SODIUM CHLORIDE, POTASSIUM CHLORIDE, SODIUM LACTATE AND CALCIUM CHLORIDE: 600; 310; 30; 20 INJECTION, SOLUTION INTRAVENOUS at 08:52

## 2017-06-13 RX ADMIN — METOCLOPRAMIDE 10 MG: 10 TABLET ORAL at 16:51

## 2017-06-13 RX ADMIN — MORPHINE SULFATE 2 MG: 2 INJECTION, SOLUTION INTRAMUSCULAR; INTRAVENOUS at 14:41

## 2017-06-13 RX ADMIN — BETAMETHASONE DIPROPIONATE: 0.5 CREAM TOPICAL at 08:52

## 2017-06-13 RX ADMIN — ENOXAPARIN SODIUM 40 MG: 40 INJECTION SUBCUTANEOUS at 08:51

## 2017-06-13 RX ADMIN — MORPHINE SULFATE 2 MG: 2 INJECTION, SOLUTION INTRAMUSCULAR; INTRAVENOUS at 00:46

## 2017-06-13 ASSESSMENT — PAIN SCALES - GENERAL
PAINLEVEL_OUTOF10: 4
PAINLEVEL_OUTOF10: 4
PAINLEVEL_OUTOF10: 6
PAINLEVEL_OUTOF10: 6
PAINLEVEL_OUTOF10: 10
PAINLEVEL_OUTOF10: 7

## 2017-06-14 ENCOUNTER — HOSPITAL ENCOUNTER (OUTPATIENT)
Age: 47
Setting detail: OBSERVATION
Discharge: HOME HEALTH | End: 2017-06-15
Attending: EMERGENCY MEDICINE | Admitting: EMERGENCY MEDICINE
Payer: MEDICAID

## 2017-06-14 ENCOUNTER — APPOINTMENT (OUTPATIENT)
Dept: GENERAL RADIOLOGY | Age: 47
End: 2017-06-14
Payer: MEDICAID

## 2017-06-14 DIAGNOSIS — R11.2 NON-INTRACTABLE VOMITING WITH NAUSEA, UNSPECIFIED VOMITING TYPE: ICD-10-CM

## 2017-06-14 DIAGNOSIS — E87.8 HYPOCHLOREMIA: ICD-10-CM

## 2017-06-14 DIAGNOSIS — E87.1 HYPONATREMIA: ICD-10-CM

## 2017-06-14 DIAGNOSIS — D72.828 OTHER ELEVATED WHITE BLOOD CELL (WBC) COUNT: ICD-10-CM

## 2017-06-14 DIAGNOSIS — D75.839 THROMBOCYTOSIS: ICD-10-CM

## 2017-06-14 DIAGNOSIS — R10.13 EPIGASTRIC PAIN: Primary | ICD-10-CM

## 2017-06-14 LAB
-: ABNORMAL
ABSOLUTE EOS #: 0 K/UL (ref 0–0.4)
ABSOLUTE LYMPH #: 2.7 K/UL (ref 1–4.8)
ABSOLUTE MONO #: 1.89 K/UL (ref 0.1–0.8)
ALBUMIN SERPL-MCNC: 3.3 G/DL (ref 3.5–5.2)
ALBUMIN/GLOBULIN RATIO: 0.6 (ref 1–2.5)
ALP BLD-CCNC: 220 U/L (ref 35–104)
ALT SERPL-CCNC: 8 U/L (ref 5–33)
AMORPHOUS: ABNORMAL
ANION GAP SERPL CALCULATED.3IONS-SCNC: 21 MMOL/L (ref 9–17)
AST SERPL-CCNC: 14 U/L
BACTERIA: ABNORMAL
BASOPHILS # BLD: 0 %
BASOPHILS ABSOLUTE: 0 K/UL (ref 0–0.2)
BILIRUB SERPL-MCNC: 0.4 MG/DL (ref 0.3–1.2)
BILIRUBIN DIRECT: <0.08 MG/DL
BILIRUBIN URINE: NEGATIVE
BILIRUBIN, INDIRECT: ABNORMAL MG/DL (ref 0–1)
BUN BLDV-MCNC: 9 MG/DL (ref 6–20)
BUN/CREAT BLD: ABNORMAL (ref 9–20)
CALCIUM SERPL-MCNC: 10.2 MG/DL (ref 8.6–10.4)
CASTS UA: ABNORMAL /LPF (ref 0–2)
CHLORIDE BLD-SCNC: 91 MMOL/L (ref 98–107)
CO2: 20 MMOL/L (ref 20–31)
COLOR: ABNORMAL
COMMENT UA: ABNORMAL
CREAT SERPL-MCNC: 0.85 MG/DL (ref 0.5–0.9)
CRYSTALS, UA: ABNORMAL /HPF
DIFFERENTIAL TYPE: ABNORMAL
EOSINOPHILS RELATIVE PERCENT: 0 %
EPITHELIAL CELLS UA: ABNORMAL /HPF (ref 0–5)
GFR AFRICAN AMERICAN: >60 ML/MIN
GFR NON-AFRICAN AMERICAN: >60 ML/MIN
GFR SERPL CREATININE-BSD FRML MDRD: ABNORMAL ML/MIN/{1.73_M2}
GFR SERPL CREATININE-BSD FRML MDRD: ABNORMAL ML/MIN/{1.73_M2}
GLOBULIN: ABNORMAL G/DL (ref 1.5–3.8)
GLUCOSE BLD-MCNC: 144 MG/DL (ref 70–99)
GLUCOSE URINE: NEGATIVE
HCT VFR BLD CALC: 30.4 % (ref 36–46)
HCT VFR BLD CALC: 35 % (ref 36–46)
HEMOGLOBIN: 11 G/DL (ref 12–16)
HEMOGLOBIN: 9.4 G/DL (ref 12–16)
KETONES, URINE: ABNORMAL
LACTIC ACID, WHOLE BLOOD: 2 MMOL/L (ref 0.7–2.1)
LEUKOCYTE ESTERASE, URINE: ABNORMAL
LIPASE: 29 U/L (ref 13–60)
LYMPHOCYTES # BLD: 10 %
MCH RBC QN AUTO: 25.9 PG (ref 26–34)
MCH RBC QN AUTO: 25.9 PG (ref 26–34)
MCHC RBC AUTO-ENTMCNC: 31 G/DL (ref 31–37)
MCHC RBC AUTO-ENTMCNC: 31.5 G/DL (ref 31–37)
MCV RBC AUTO: 82.3 FL (ref 80–100)
MCV RBC AUTO: 83.7 FL (ref 80–100)
MONOCYTES # BLD: 7 %
MORPHOLOGY: ABNORMAL
MUCUS: ABNORMAL
NITRITE, URINE: NEGATIVE
OTHER OBSERVATIONS UA: ABNORMAL
PDW BLD-RTO: 17.1 % (ref 12.5–15.4)
PDW BLD-RTO: 17.3 % (ref 12.5–15.4)
PH UA: 7.5 (ref 5–8)
PLATELET # BLD: 554 K/UL (ref 140–450)
PLATELET # BLD: 621 K/UL (ref 140–450)
PLATELET ESTIMATE: ABNORMAL
PMV BLD AUTO: 7.3 FL (ref 6–12)
PMV BLD AUTO: 7.9 FL (ref 6–12)
POTASSIUM SERPL-SCNC: 4.7 MMOL/L (ref 3.7–5.3)
PROTEIN UA: ABNORMAL
RBC # BLD: 3.63 M/UL (ref 4–5.2)
RBC # BLD: 4.25 M/UL (ref 4–5.2)
RBC # BLD: ABNORMAL 10*6/UL
RBC UA: ABNORMAL /HPF (ref 0–2)
RENAL EPITHELIAL, UA: ABNORMAL /HPF
SEG NEUTROPHILS: 83 %
SEGMENTED NEUTROPHILS ABSOLUTE COUNT: 22.41 K/UL (ref 1.8–7.7)
SODIUM BLD-SCNC: 132 MMOL/L (ref 135–144)
SPECIFIC GRAVITY UA: 1.02 (ref 1–1.03)
TOTAL PROTEIN: 8.8 G/DL (ref 6.4–8.3)
TRICHOMONAS: ABNORMAL
TURBIDITY: ABNORMAL
URINE HGB: NEGATIVE
UROBILINOGEN, URINE: NORMAL
WBC # BLD: 18.7 K/UL (ref 3.5–11)
WBC # BLD: 27 K/UL (ref 3.5–11)
WBC # BLD: ABNORMAL 10*3/UL
WBC UA: ABNORMAL /HPF (ref 0–5)
YEAST: ABNORMAL

## 2017-06-14 PROCEDURE — 96375 TX/PRO/DX INJ NEW DRUG ADDON: CPT

## 2017-06-14 PROCEDURE — 85025 COMPLETE CBC W/AUTO DIFF WBC: CPT

## 2017-06-14 PROCEDURE — 83690 ASSAY OF LIPASE: CPT

## 2017-06-14 PROCEDURE — 96376 TX/PRO/DX INJ SAME DRUG ADON: CPT

## 2017-06-14 PROCEDURE — 81001 URINALYSIS AUTO W/SCOPE: CPT

## 2017-06-14 PROCEDURE — 80048 BASIC METABOLIC PNL TOTAL CA: CPT

## 2017-06-14 PROCEDURE — 2580000003 HC RX 258: Performed by: EMERGENCY MEDICINE

## 2017-06-14 PROCEDURE — G0378 HOSPITAL OBSERVATION PER HR: HCPCS

## 2017-06-14 PROCEDURE — 6360000002 HC RX W HCPCS: Performed by: EMERGENCY MEDICINE

## 2017-06-14 PROCEDURE — 74022 RADEX COMPL AQT ABD SERIES: CPT

## 2017-06-14 PROCEDURE — 6370000000 HC RX 637 (ALT 250 FOR IP): Performed by: EMERGENCY MEDICINE

## 2017-06-14 PROCEDURE — 85027 COMPLETE CBC AUTOMATED: CPT

## 2017-06-14 PROCEDURE — 80076 HEPATIC FUNCTION PANEL: CPT

## 2017-06-14 PROCEDURE — 96374 THER/PROPH/DIAG INJ IV PUSH: CPT

## 2017-06-14 PROCEDURE — 36415 COLL VENOUS BLD VENIPUNCTURE: CPT

## 2017-06-14 PROCEDURE — 83605 ASSAY OF LACTIC ACID: CPT

## 2017-06-14 PROCEDURE — 99285 EMERGENCY DEPT VISIT HI MDM: CPT

## 2017-06-14 RX ORDER — LANOLIN ALCOHOL/MO/W.PET/CERES
325 CREAM (GRAM) TOPICAL
Status: DISCONTINUED | OUTPATIENT
Start: 2017-06-14 | End: 2017-06-15 | Stop reason: HOSPADM

## 2017-06-14 RX ORDER — PANTOPRAZOLE SODIUM 40 MG/1
40 TABLET, DELAYED RELEASE ORAL DAILY
Status: DISCONTINUED | OUTPATIENT
Start: 2017-06-14 | End: 2017-06-15 | Stop reason: HOSPADM

## 2017-06-14 RX ORDER — SODIUM CHLORIDE 9 MG/ML
INJECTION, SOLUTION INTRAVENOUS CONTINUOUS
Status: DISCONTINUED | OUTPATIENT
Start: 2017-06-14 | End: 2017-06-15 | Stop reason: HOSPADM

## 2017-06-14 RX ORDER — SODIUM CHLORIDE 0.9 % (FLUSH) 0.9 %
10 SYRINGE (ML) INJECTION EVERY 12 HOURS SCHEDULED
Status: DISCONTINUED | OUTPATIENT
Start: 2017-06-14 | End: 2017-06-15 | Stop reason: HOSPADM

## 2017-06-14 RX ORDER — 0.9 % SODIUM CHLORIDE 0.9 %
1000 INTRAVENOUS SOLUTION INTRAVENOUS ONCE
Status: COMPLETED | OUTPATIENT
Start: 2017-06-14 | End: 2017-06-14

## 2017-06-14 RX ORDER — ONDANSETRON 2 MG/ML
4 INJECTION INTRAMUSCULAR; INTRAVENOUS ONCE
Status: COMPLETED | OUTPATIENT
Start: 2017-06-14 | End: 2017-06-14

## 2017-06-14 RX ORDER — METOCLOPRAMIDE 5 MG/1
5 TABLET ORAL
Status: DISCONTINUED | OUTPATIENT
Start: 2017-06-14 | End: 2017-06-15 | Stop reason: HOSPADM

## 2017-06-14 RX ORDER — DOCUSATE SODIUM 100 MG/1
100 CAPSULE, LIQUID FILLED ORAL 2 TIMES DAILY PRN
Status: DISCONTINUED | OUTPATIENT
Start: 2017-06-14 | End: 2017-06-15 | Stop reason: HOSPADM

## 2017-06-14 RX ORDER — CETIRIZINE HYDROCHLORIDE 10 MG/1
10 TABLET ORAL DAILY
Status: DISCONTINUED | OUTPATIENT
Start: 2017-06-14 | End: 2017-06-15 | Stop reason: HOSPADM

## 2017-06-14 RX ORDER — FENTANYL CITRATE 50 UG/ML
25 INJECTION, SOLUTION INTRAMUSCULAR; INTRAVENOUS
Status: DISCONTINUED | OUTPATIENT
Start: 2017-06-14 | End: 2017-06-14

## 2017-06-14 RX ORDER — ONDANSETRON 2 MG/ML
4 INJECTION INTRAMUSCULAR; INTRAVENOUS EVERY 6 HOURS PRN
Status: DISCONTINUED | OUTPATIENT
Start: 2017-06-14 | End: 2017-06-15 | Stop reason: HOSPADM

## 2017-06-14 RX ORDER — SODIUM CHLORIDE 0.9 % (FLUSH) 0.9 %
10 SYRINGE (ML) INJECTION PRN
Status: DISCONTINUED | OUTPATIENT
Start: 2017-06-14 | End: 2017-06-15 | Stop reason: HOSPADM

## 2017-06-14 RX ORDER — ASPIRIN 81 MG/1
81 TABLET, CHEWABLE ORAL DAILY
Status: DISCONTINUED | OUTPATIENT
Start: 2017-06-14 | End: 2017-06-15 | Stop reason: HOSPADM

## 2017-06-14 RX ORDER — POLYETHYLENE GLYCOL 3350 17 G/17G
17 POWDER, FOR SOLUTION ORAL DAILY
Status: DISCONTINUED | OUTPATIENT
Start: 2017-06-14 | End: 2017-06-15 | Stop reason: HOSPADM

## 2017-06-14 RX ORDER — FENTANYL CITRATE 50 UG/ML
50 INJECTION, SOLUTION INTRAMUSCULAR; INTRAVENOUS
Status: DISCONTINUED | OUTPATIENT
Start: 2017-06-14 | End: 2017-06-14

## 2017-06-14 RX ORDER — FOLIC ACID 1 MG/1
1 TABLET ORAL DAILY
Status: DISCONTINUED | OUTPATIENT
Start: 2017-06-14 | End: 2017-06-15 | Stop reason: HOSPADM

## 2017-06-14 RX ORDER — ALBUTEROL SULFATE 90 UG/1
2 AEROSOL, METERED RESPIRATORY (INHALATION) EVERY 4 HOURS PRN
Status: DISCONTINUED | OUTPATIENT
Start: 2017-06-14 | End: 2017-06-15 | Stop reason: HOSPADM

## 2017-06-14 RX ORDER — CITALOPRAM 20 MG/1
40 TABLET ORAL DAILY
Status: DISCONTINUED | OUTPATIENT
Start: 2017-06-14 | End: 2017-06-15 | Stop reason: HOSPADM

## 2017-06-14 RX ORDER — KETOROLAC TROMETHAMINE 15 MG/ML
15 INJECTION, SOLUTION INTRAMUSCULAR; INTRAVENOUS EVERY 6 HOURS PRN
Status: DISCONTINUED | OUTPATIENT
Start: 2017-06-14 | End: 2017-06-15 | Stop reason: HOSPADM

## 2017-06-14 RX ORDER — SENNA PLUS 8.6 MG/1
1 TABLET ORAL 2 TIMES DAILY
Status: DISCONTINUED | OUTPATIENT
Start: 2017-06-14 | End: 2017-06-15 | Stop reason: HOSPADM

## 2017-06-14 RX ORDER — FENTANYL CITRATE 50 UG/ML
50 INJECTION, SOLUTION INTRAMUSCULAR; INTRAVENOUS ONCE
Status: COMPLETED | OUTPATIENT
Start: 2017-06-14 | End: 2017-06-14

## 2017-06-14 RX ORDER — ACETAMINOPHEN 325 MG/1
650 TABLET ORAL EVERY 4 HOURS PRN
Status: DISCONTINUED | OUTPATIENT
Start: 2017-06-14 | End: 2017-06-14

## 2017-06-14 RX ADMIN — ONDANSETRON 4 MG: 2 INJECTION INTRAMUSCULAR; INTRAVENOUS at 04:13

## 2017-06-14 RX ADMIN — CETIRIZINE HYDROCHLORIDE 10 MG: 10 TABLET ORAL at 08:10

## 2017-06-14 RX ADMIN — FENTANYL CITRATE 50 MCG: 50 INJECTION, SOLUTION INTRAMUSCULAR; INTRAVENOUS at 10:00

## 2017-06-14 RX ADMIN — ONDANSETRON 4 MG: 2 INJECTION INTRAMUSCULAR; INTRAVENOUS at 09:59

## 2017-06-14 RX ADMIN — SODIUM CHLORIDE 1000 ML: 9 INJECTION, SOLUTION INTRAVENOUS at 04:13

## 2017-06-14 RX ADMIN — SODIUM CHLORIDE: 9 INJECTION, SOLUTION INTRAVENOUS at 08:16

## 2017-06-14 RX ADMIN — FENTANYL CITRATE 50 MCG: 50 INJECTION, SOLUTION INTRAMUSCULAR; INTRAVENOUS at 04:13

## 2017-06-14 RX ADMIN — Medication 1 TABLET: at 08:10

## 2017-06-14 RX ADMIN — ONDANSETRON 4 MG: 2 INJECTION INTRAMUSCULAR; INTRAVENOUS at 14:15

## 2017-06-14 RX ADMIN — ONDANSETRON 4 MG: 2 INJECTION INTRAMUSCULAR; INTRAVENOUS at 21:07

## 2017-06-14 RX ADMIN — CITALOPRAM 40 MG: 20 TABLET, FILM COATED ORAL at 08:10

## 2017-06-14 RX ADMIN — METOCLOPRAMIDE 5 MG: 5 TABLET ORAL at 14:15

## 2017-06-14 RX ADMIN — KETOROLAC TROMETHAMINE 15 MG: 15 INJECTION, SOLUTION INTRAMUSCULAR; INTRAVENOUS at 22:51

## 2017-06-14 RX ADMIN — SODIUM CHLORIDE: 9 INJECTION, SOLUTION INTRAVENOUS at 20:12

## 2017-06-14 RX ADMIN — SENNA 8.6 MG: 8.6 TABLET, COATED ORAL at 08:09

## 2017-06-14 RX ADMIN — ASPIRIN 81 MG: 81 TABLET, CHEWABLE ORAL at 08:10

## 2017-06-14 RX ADMIN — FOLIC ACID 1 MG: 1 TABLET ORAL at 08:09

## 2017-06-14 RX ADMIN — PANTOPRAZOLE SODIUM 40 MG: 40 TABLET, DELAYED RELEASE ORAL at 08:09

## 2017-06-14 RX ADMIN — METOCLOPRAMIDE 5 MG: 5 TABLET ORAL at 08:09

## 2017-06-14 RX ADMIN — SENNA 8.6 MG: 8.6 TABLET, COATED ORAL at 20:10

## 2017-06-14 RX ADMIN — POLYETHYLENE GLYCOL 3350 17 G: 17 POWDER, FOR SOLUTION ORAL at 15:34

## 2017-06-14 RX ADMIN — FENTANYL CITRATE 50 MCG: 50 INJECTION, SOLUTION INTRAMUSCULAR; INTRAVENOUS at 08:08

## 2017-06-14 RX ADMIN — FERROUS SULFATE TAB EC 325 MG (65 MG FE EQUIVALENT) 325 MG: 325 (65 FE) TABLET DELAYED RESPONSE at 08:10

## 2017-06-14 ASSESSMENT — PAIN SCALES - GENERAL
PAINLEVEL_OUTOF10: 9
PAINLEVEL_OUTOF10: 10
PAINLEVEL_OUTOF10: 10
PAINLEVEL_OUTOF10: 6
PAINLEVEL_OUTOF10: 9
PAINLEVEL_OUTOF10: 9

## 2017-06-14 ASSESSMENT — PAIN DESCRIPTION - FREQUENCY: FREQUENCY: CONTINUOUS

## 2017-06-14 ASSESSMENT — ENCOUNTER SYMPTOMS
VOMITING: 1
ABDOMINAL PAIN: 1
NAUSEA: 1
DIARRHEA: 0
CONSTIPATION: 0
SHORTNESS OF BREATH: 0

## 2017-06-14 ASSESSMENT — PAIN DESCRIPTION - PAIN TYPE
TYPE: ACUTE PAIN
TYPE: ACUTE PAIN

## 2017-06-14 ASSESSMENT — PAIN DESCRIPTION - LOCATION
LOCATION: ABDOMEN
LOCATION: ABDOMEN

## 2017-06-14 ASSESSMENT — PAIN DESCRIPTION - DESCRIPTORS: DESCRIPTORS: CRAMPING

## 2017-06-15 VITALS
RESPIRATION RATE: 16 BRPM | SYSTOLIC BLOOD PRESSURE: 116 MMHG | DIASTOLIC BLOOD PRESSURE: 70 MMHG | TEMPERATURE: 98.2 F | HEIGHT: 57 IN | OXYGEN SATURATION: 95 % | WEIGHT: 154.76 LBS | BODY MASS INDEX: 33.39 KG/M2 | HEART RATE: 84 BPM

## 2017-06-15 PROCEDURE — G0378 HOSPITAL OBSERVATION PER HR: HCPCS

## 2017-06-15 PROCEDURE — 6370000000 HC RX 637 (ALT 250 FOR IP): Performed by: EMERGENCY MEDICINE

## 2017-06-15 PROCEDURE — 96376 TX/PRO/DX INJ SAME DRUG ADON: CPT

## 2017-06-15 PROCEDURE — 6360000002 HC RX W HCPCS: Performed by: EMERGENCY MEDICINE

## 2017-06-15 PROCEDURE — 2580000003 HC RX 258: Performed by: EMERGENCY MEDICINE

## 2017-06-15 RX ORDER — NITROFURANTOIN 25; 75 MG/1; MG/1
100 CAPSULE ORAL EVERY 12 HOURS SCHEDULED
Status: DISCONTINUED | OUTPATIENT
Start: 2017-06-15 | End: 2017-06-15 | Stop reason: HOSPADM

## 2017-06-15 RX ADMIN — ONDANSETRON 4 MG: 2 INJECTION INTRAMUSCULAR; INTRAVENOUS at 12:16

## 2017-06-15 RX ADMIN — FOLIC ACID 1 MG: 1 TABLET ORAL at 12:14

## 2017-06-15 RX ADMIN — METOCLOPRAMIDE 5 MG: 5 TABLET ORAL at 12:14

## 2017-06-15 RX ADMIN — CETIRIZINE HYDROCHLORIDE 10 MG: 10 TABLET ORAL at 12:15

## 2017-06-15 RX ADMIN — KETOROLAC TROMETHAMINE 15 MG: 15 INJECTION, SOLUTION INTRAMUSCULAR; INTRAVENOUS at 04:49

## 2017-06-15 RX ADMIN — Medication 1 TABLET: at 12:15

## 2017-06-15 RX ADMIN — Medication 10 ML: at 12:16

## 2017-06-15 RX ADMIN — CITALOPRAM 40 MG: 20 TABLET, FILM COATED ORAL at 12:14

## 2017-06-15 RX ADMIN — POLYETHYLENE GLYCOL 3350 17 G: 17 POWDER, FOR SOLUTION ORAL at 12:16

## 2017-06-15 RX ADMIN — SODIUM CHLORIDE: 9 INJECTION, SOLUTION INTRAVENOUS at 09:11

## 2017-06-15 RX ADMIN — DOCUSATE SODIUM 100 MG: 100 CAPSULE, LIQUID FILLED ORAL at 04:49

## 2017-06-15 RX ADMIN — KETOROLAC TROMETHAMINE 15 MG: 15 INJECTION, SOLUTION INTRAMUSCULAR; INTRAVENOUS at 12:16

## 2017-06-15 RX ADMIN — ASPIRIN 81 MG: 81 TABLET, CHEWABLE ORAL at 12:14

## 2017-06-15 RX ADMIN — SENNA 8.6 MG: 8.6 TABLET, COATED ORAL at 12:14

## 2017-06-15 RX ADMIN — FERROUS SULFATE TAB EC 325 MG (65 MG FE EQUIVALENT) 325 MG: 325 (65 FE) TABLET DELAYED RESPONSE at 12:15

## 2017-06-15 RX ADMIN — PANTOPRAZOLE SODIUM 40 MG: 40 TABLET, DELAYED RELEASE ORAL at 12:14

## 2017-06-15 ASSESSMENT — PAIN SCALES - GENERAL
PAINLEVEL_OUTOF10: 6
PAINLEVEL_OUTOF10: 9

## 2017-06-19 ENCOUNTER — OFFICE VISIT (OUTPATIENT)
Dept: INTERNAL MEDICINE | Age: 47
End: 2017-06-19
Payer: MEDICAID

## 2017-06-19 VITALS
WEIGHT: 147 LBS | DIASTOLIC BLOOD PRESSURE: 73 MMHG | BODY MASS INDEX: 31.71 KG/M2 | SYSTOLIC BLOOD PRESSURE: 100 MMHG | HEIGHT: 57 IN | HEART RATE: 123 BPM

## 2017-06-19 DIAGNOSIS — M81.0 OSTEOPOROSIS: ICD-10-CM

## 2017-06-19 DIAGNOSIS — F32.A DEPRESSION, UNSPECIFIED DEPRESSION TYPE: Chronic | ICD-10-CM

## 2017-06-19 DIAGNOSIS — K56.609 SMALL BOWEL OBSTRUCTION (HCC): Primary | ICD-10-CM

## 2017-06-19 DIAGNOSIS — D72.829 LEUKOCYTOSIS, UNSPECIFIED TYPE: ICD-10-CM

## 2017-06-19 PROCEDURE — 99214 OFFICE O/P EST MOD 30 MIN: CPT | Performed by: INTERNAL MEDICINE

## 2017-06-19 RX ORDER — DOCUSATE SODIUM 100 MG/1
100 CAPSULE, LIQUID FILLED ORAL 2 TIMES DAILY PRN
Qty: 30 CAPSULE | Refills: 1 | Status: SHIPPED | OUTPATIENT
Start: 2017-06-19 | End: 2017-08-02 | Stop reason: SDUPTHER

## 2017-06-19 RX ORDER — B-COMPLEX WITH VITAMIN C
1 TABLET ORAL DAILY
Qty: 30 TABLET | Refills: 0 | Status: SHIPPED | OUTPATIENT
Start: 2017-06-19 | End: 2017-10-19 | Stop reason: SDUPTHER

## 2017-06-19 RX ORDER — ASPIRIN 81 MG/1
81 TABLET, CHEWABLE ORAL DAILY
Qty: 30 TABLET | Refills: 11 | Status: SHIPPED | OUTPATIENT
Start: 2017-06-19 | End: 2017-09-21 | Stop reason: SDUPTHER

## 2017-06-19 ASSESSMENT — PATIENT HEALTH QUESTIONNAIRE - PHQ9
5. POOR APPETITE OR OVEREATING: 0
3. TROUBLE FALLING OR STAYING ASLEEP: 0
10. IF YOU CHECKED OFF ANY PROBLEMS, HOW DIFFICULT HAVE THESE PROBLEMS MADE IT FOR YOU TO DO YOUR WORK, TAKE CARE OF THINGS AT HOME, OR GET ALONG WITH OTHER PEOPLE: 1
SUM OF ALL RESPONSES TO PHQ9 QUESTIONS 1 & 2: 1
2. FEELING DOWN, DEPRESSED OR HOPELESS: 1
4. FEELING TIRED OR HAVING LITTLE ENERGY: 1
9. THOUGHTS THAT YOU WOULD BE BETTER OFF DEAD, OR OF HURTING YOURSELF: 0
1. LITTLE INTEREST OR PLEASURE IN DOING THINGS: 0
8. MOVING OR SPEAKING SO SLOWLY THAT OTHER PEOPLE COULD HAVE NOTICED. OR THE OPPOSITE, BEING SO FIGETY OR RESTLESS THAT YOU HAVE BEEN MOVING AROUND A LOT MORE THAN USUAL: 1
SUM OF ALL RESPONSES TO PHQ QUESTIONS 1-9: 4
6. FEELING BAD ABOUT YOURSELF - OR THAT YOU ARE A FAILURE OR HAVE LET YOURSELF OR YOUR FAMILY DOWN: 0
7. TROUBLE CONCENTRATING ON THINGS, SUCH AS READING THE NEWSPAPER OR WATCHING TELEVISION: 1

## 2017-06-21 ENCOUNTER — TELEPHONE (OUTPATIENT)
Dept: PULMONOLOGY | Age: 47
End: 2017-06-21

## 2017-06-21 ENCOUNTER — TELEPHONE (OUTPATIENT)
Dept: INTERNAL MEDICINE | Age: 47
End: 2017-06-21

## 2017-06-21 DIAGNOSIS — J06.9 VIRAL UPPER RESPIRATORY TRACT INFECTION: Primary | ICD-10-CM

## 2017-06-21 DIAGNOSIS — J84.116 CRYPTOGENIC ORGANIZING PNEUMONIA (HCC): ICD-10-CM

## 2017-06-21 DIAGNOSIS — J45.30 MILD PERSISTENT ASTHMA WITHOUT COMPLICATION: ICD-10-CM

## 2017-06-23 ENCOUNTER — TELEPHONE (OUTPATIENT)
Dept: INTERNAL MEDICINE | Age: 47
End: 2017-06-23

## 2017-06-25 ENCOUNTER — HOSPITAL ENCOUNTER (EMERGENCY)
Age: 47
Discharge: HOME | DRG: 139 | End: 2017-06-25
Attending: EMERGENCY MEDICINE
Payer: MEDICAID

## 2017-06-25 ENCOUNTER — APPOINTMENT (OUTPATIENT)
Dept: CT IMAGING | Age: 47
DRG: 139 | End: 2017-06-25
Payer: MEDICAID

## 2017-06-25 ENCOUNTER — APPOINTMENT (OUTPATIENT)
Dept: GENERAL RADIOLOGY | Age: 47
DRG: 139 | End: 2017-06-25
Payer: MEDICAID

## 2017-06-25 VITALS
BODY MASS INDEX: 31.71 KG/M2 | WEIGHT: 147 LBS | RESPIRATION RATE: 18 BRPM | SYSTOLIC BLOOD PRESSURE: 109 MMHG | HEART RATE: 99 BPM | OXYGEN SATURATION: 95 % | TEMPERATURE: 99.2 F | HEIGHT: 57 IN | DIASTOLIC BLOOD PRESSURE: 71 MMHG

## 2017-06-25 DIAGNOSIS — R10.84 GENERALIZED ABDOMINAL PAIN: Primary | ICD-10-CM

## 2017-06-25 LAB
ABSOLUTE EOS #: 0.18 K/UL (ref 0–0.4)
ABSOLUTE LYMPH #: 1.95 K/UL (ref 1–4.8)
ABSOLUTE MONO #: 1.42 K/UL (ref 0.1–0.8)
ALBUMIN SERPL-MCNC: 3.2 G/DL (ref 3.5–5.2)
ALBUMIN/GLOBULIN RATIO: 0.6 (ref 1–2.5)
ALP BLD-CCNC: 431 U/L (ref 35–104)
ALT SERPL-CCNC: 44 U/L (ref 5–33)
ANION GAP SERPL CALCULATED.3IONS-SCNC: 15 MMOL/L (ref 9–17)
AST SERPL-CCNC: 60 U/L
BASOPHILS # BLD: 0 %
BASOPHILS ABSOLUTE: 0 K/UL (ref 0–0.2)
BILIRUB SERPL-MCNC: 0.41 MG/DL (ref 0.3–1.2)
BUN BLDV-MCNC: 21 MG/DL (ref 6–20)
BUN/CREAT BLD: ABNORMAL (ref 9–20)
CALCIUM SERPL-MCNC: 9.9 MG/DL (ref 8.6–10.4)
CHLORIDE BLD-SCNC: 94 MMOL/L (ref 98–107)
CO2: 22 MMOL/L (ref 20–31)
CREAT SERPL-MCNC: 0.86 MG/DL (ref 0.5–0.9)
DIFFERENTIAL TYPE: ABNORMAL
EOSINOPHILS RELATIVE PERCENT: 1 %
GFR AFRICAN AMERICAN: >60 ML/MIN
GFR NON-AFRICAN AMERICAN: >60 ML/MIN
GFR SERPL CREATININE-BSD FRML MDRD: ABNORMAL ML/MIN/{1.73_M2}
GFR SERPL CREATININE-BSD FRML MDRD: ABNORMAL ML/MIN/{1.73_M2}
GLUCOSE BLD-MCNC: 99 MG/DL (ref 70–99)
HCG QUALITATIVE: NEGATIVE
HCT VFR BLD CALC: 31 % (ref 36–46)
HEMOGLOBIN: 10 G/DL (ref 12–16)
LACTIC ACID, WHOLE BLOOD: 1.1 MMOL/L (ref 0.7–2.1)
LIPASE: 66 U/L (ref 13–60)
LYMPHOCYTES # BLD: 11 %
MCH RBC QN AUTO: 26.2 PG (ref 26–34)
MCHC RBC AUTO-ENTMCNC: 32.3 G/DL (ref 31–37)
MCV RBC AUTO: 81.2 FL (ref 80–100)
MONOCYTES # BLD: 8 %
MORPHOLOGY: ABNORMAL
PDW BLD-RTO: 16.9 % (ref 12.5–15.4)
PLATELET # BLD: 604 K/UL (ref 140–450)
PLATELET ESTIMATE: ABNORMAL
PMV BLD AUTO: 7.6 FL (ref 6–12)
POTASSIUM SERPL-SCNC: 4.2 MMOL/L (ref 3.7–5.3)
RBC # BLD: 3.82 M/UL (ref 4–5.2)
RBC # BLD: ABNORMAL 10*6/UL
SEG NEUTROPHILS: 80 %
SEGMENTED NEUTROPHILS ABSOLUTE COUNT: 14.15 K/UL (ref 1.8–7.7)
SODIUM BLD-SCNC: 131 MMOL/L (ref 135–144)
TOTAL PROTEIN: 8.8 G/DL (ref 6.4–8.3)
WBC # BLD: 17.7 K/UL (ref 3.5–11)
WBC # BLD: ABNORMAL 10*3/UL

## 2017-06-25 PROCEDURE — 74177 CT ABD & PELVIS W/CONTRAST: CPT

## 2017-06-25 PROCEDURE — 74022 RADEX COMPL AQT ABD SERIES: CPT

## 2017-06-25 PROCEDURE — 6370000000 HC RX 637 (ALT 250 FOR IP): Performed by: EMERGENCY MEDICINE

## 2017-06-25 PROCEDURE — 84703 CHORIONIC GONADOTROPIN ASSAY: CPT

## 2017-06-25 PROCEDURE — 6360000002 HC RX W HCPCS: Performed by: EMERGENCY MEDICINE

## 2017-06-25 PROCEDURE — 80053 COMPREHEN METABOLIC PANEL: CPT

## 2017-06-25 PROCEDURE — 85025 COMPLETE CBC W/AUTO DIFF WBC: CPT

## 2017-06-25 PROCEDURE — 2580000003 HC RX 258: Performed by: EMERGENCY MEDICINE

## 2017-06-25 PROCEDURE — 83690 ASSAY OF LIPASE: CPT

## 2017-06-25 PROCEDURE — 96375 TX/PRO/DX INJ NEW DRUG ADDON: CPT

## 2017-06-25 PROCEDURE — 99285 EMERGENCY DEPT VISIT HI MDM: CPT

## 2017-06-25 PROCEDURE — 96374 THER/PROPH/DIAG INJ IV PUSH: CPT

## 2017-06-25 PROCEDURE — 83605 ASSAY OF LACTIC ACID: CPT

## 2017-06-25 PROCEDURE — 6360000004 HC RX CONTRAST MEDICATION: Performed by: EMERGENCY MEDICINE

## 2017-06-25 RX ORDER — MORPHINE SULFATE 4 MG/ML
4 INJECTION, SOLUTION INTRAMUSCULAR; INTRAVENOUS ONCE
Status: COMPLETED | OUTPATIENT
Start: 2017-06-25 | End: 2017-06-25

## 2017-06-25 RX ORDER — SODIUM PHOSPHATE, DIBASIC AND SODIUM PHOSPHATE, MONOBASIC 7; 19 G/133ML; G/133ML
1 ENEMA RECTAL
Qty: 2 BOTTLE | Refills: 0 | Status: SHIPPED | OUTPATIENT
Start: 2017-06-25 | End: 2017-06-25

## 2017-06-25 RX ORDER — ONDANSETRON 2 MG/ML
4 INJECTION INTRAMUSCULAR; INTRAVENOUS ONCE
Status: COMPLETED | OUTPATIENT
Start: 2017-06-25 | End: 2017-06-25

## 2017-06-25 RX ORDER — FENTANYL CITRATE 50 UG/ML
50 INJECTION, SOLUTION INTRAMUSCULAR; INTRAVENOUS ONCE
Status: DISCONTINUED | OUTPATIENT
Start: 2017-06-25 | End: 2017-06-25

## 2017-06-25 RX ORDER — 0.9 % SODIUM CHLORIDE 0.9 %
1000 INTRAVENOUS SOLUTION INTRAVENOUS ONCE
Status: COMPLETED | OUTPATIENT
Start: 2017-06-25 | End: 2017-06-25

## 2017-06-25 RX ORDER — SODIUM PHOSPHATE, DIBASIC AND SODIUM PHOSPHATE, MONOBASIC 7; 19 G/133ML; G/133ML
118 ENEMA RECTAL ONCE
Status: COMPLETED | OUTPATIENT
Start: 2017-06-25 | End: 2017-06-25

## 2017-06-25 RX ORDER — SODIUM CHLORIDE 0.9 % (FLUSH) 0.9 %
10 SYRINGE (ML) INJECTION 2 TIMES DAILY
Status: DISCONTINUED | OUTPATIENT
Start: 2017-06-25 | End: 2017-06-25 | Stop reason: HOSPADM

## 2017-06-25 RX ADMIN — ONDANSETRON 4 MG: 2 INJECTION INTRAMUSCULAR; INTRAVENOUS at 18:03

## 2017-06-25 RX ADMIN — MORPHINE SULFATE 4 MG: 4 INJECTION, SOLUTION INTRAMUSCULAR; INTRAVENOUS at 18:03

## 2017-06-25 RX ADMIN — Medication 10 ML: at 19:16

## 2017-06-25 RX ADMIN — IOVERSOL 130 ML: 741 INJECTION INTRA-ARTERIAL; INTRAVENOUS at 19:15

## 2017-06-25 RX ADMIN — SODIUM PHOSPHATE, DIBASIC AND SODIUM PHOSPHATE, MONOBASIC 1 ENEMA: 7; 19 ENEMA RECTAL at 20:26

## 2017-06-25 RX ADMIN — SODIUM CHLORIDE 1000 ML: 9 INJECTION, SOLUTION INTRAVENOUS at 18:14

## 2017-06-25 ASSESSMENT — PAIN DESCRIPTION - ORIENTATION: ORIENTATION: LOWER

## 2017-06-25 ASSESSMENT — ENCOUNTER SYMPTOMS
NAUSEA: 1
BLOOD IN STOOL: 0
WHEEZING: 0
VOMITING: 1
COLOR CHANGE: 0
CONSTIPATION: 1
SHORTNESS OF BREATH: 0
ABDOMINAL PAIN: 1

## 2017-06-25 ASSESSMENT — PAIN SCALES - GENERAL
PAINLEVEL_OUTOF10: 9
PAINLEVEL_OUTOF10: 9

## 2017-06-25 ASSESSMENT — PAIN DESCRIPTION - DESCRIPTORS: DESCRIPTORS: STABBING

## 2017-06-25 ASSESSMENT — PAIN DESCRIPTION - LOCATION: LOCATION: ABDOMEN

## 2017-06-27 RX ORDER — PERMETHRIN 50 MG/G
CREAM TOPICAL
Qty: 1 TUBE | Refills: 1 | Status: SHIPPED | OUTPATIENT
Start: 2017-06-27 | End: 2017-07-14

## 2017-06-28 ENCOUNTER — APPOINTMENT (OUTPATIENT)
Dept: GENERAL RADIOLOGY | Age: 47
DRG: 139 | End: 2017-06-28
Payer: MEDICAID

## 2017-06-28 ENCOUNTER — HOSPITAL ENCOUNTER (OUTPATIENT)
Age: 47
Setting detail: OBSERVATION
Discharge: HOME HEALTH | DRG: 139 | End: 2017-07-01
Attending: EMERGENCY MEDICINE | Admitting: INTERNAL MEDICINE
Payer: MEDICAID

## 2017-06-28 DIAGNOSIS — N39.0 URINARY TRACT INFECTION, SITE UNSPECIFIED: ICD-10-CM

## 2017-06-28 DIAGNOSIS — A41.9 SEPSIS, DUE TO UNSPECIFIED ORGANISM: Primary | ICD-10-CM

## 2017-06-28 LAB
-: ABNORMAL
ABSOLUTE EOS #: 0.2 K/UL (ref 0–0.4)
ABSOLUTE LYMPH #: 2 K/UL (ref 1–4.8)
ABSOLUTE MONO #: 1.3 K/UL (ref 0.1–1.2)
ALBUMIN SERPL-MCNC: 2.8 G/DL (ref 3.5–5.2)
ALBUMIN/GLOBULIN RATIO: 0.5 (ref 1–2.5)
ALP BLD-CCNC: 404 U/L (ref 35–104)
ALT SERPL-CCNC: 19 U/L (ref 5–33)
AMORPHOUS: ABNORMAL
ANION GAP SERPL CALCULATED.3IONS-SCNC: 18 MMOL/L (ref 9–17)
AST SERPL-CCNC: 21 U/L
BACTERIA: ABNORMAL
BASOPHILS # BLD: 0 %
BASOPHILS ABSOLUTE: 0.1 K/UL (ref 0–0.2)
BILIRUB SERPL-MCNC: 0.39 MG/DL (ref 0.3–1.2)
BILIRUBIN URINE: NEGATIVE
BUN BLDV-MCNC: 15 MG/DL (ref 6–20)
BUN/CREAT BLD: ABNORMAL (ref 9–20)
CALCIUM SERPL-MCNC: 9.3 MG/DL (ref 8.6–10.4)
CASTS UA: ABNORMAL /LPF (ref 0–8)
CHLORIDE BLD-SCNC: 96 MMOL/L (ref 98–107)
CO2: 19 MMOL/L (ref 20–31)
COLOR: YELLOW
CREAT SERPL-MCNC: 0.81 MG/DL (ref 0.5–0.9)
CRYSTALS, UA: ABNORMAL /HPF
DIFFERENTIAL TYPE: ABNORMAL
EOSINOPHILS RELATIVE PERCENT: 1 %
EPITHELIAL CELLS UA: ABNORMAL /HPF (ref 0–5)
GFR AFRICAN AMERICAN: >60 ML/MIN
GFR NON-AFRICAN AMERICAN: >60 ML/MIN
GFR SERPL CREATININE-BSD FRML MDRD: ABNORMAL ML/MIN/{1.73_M2}
GFR SERPL CREATININE-BSD FRML MDRD: ABNORMAL ML/MIN/{1.73_M2}
GLUCOSE BLD-MCNC: 102 MG/DL (ref 70–99)
GLUCOSE URINE: NEGATIVE
HCT VFR BLD CALC: 28.8 % (ref 36–46)
HEMOGLOBIN: 9 G/DL (ref 12–16)
INR BLD: 1.2
KETONES, URINE: NEGATIVE
LACTIC ACID, WHOLE BLOOD: 1.4 MMOL/L (ref 0.7–2.1)
LEUKOCYTE ESTERASE, URINE: ABNORMAL
LYMPHOCYTES # BLD: 11 %
MCH RBC QN AUTO: 25.4 PG (ref 26–34)
MCHC RBC AUTO-ENTMCNC: 31.2 G/DL (ref 31–37)
MCV RBC AUTO: 81.4 FL (ref 80–100)
MONOCYTES # BLD: 7 %
MUCUS: ABNORMAL
NITRITE, URINE: NEGATIVE
OTHER OBSERVATIONS UA: ABNORMAL
PARTIAL THROMBOPLASTIN TIME: 28.6 SEC (ref 21.3–31.3)
PDW BLD-RTO: 17.6 % (ref 12.5–15.4)
PH UA: 6.5 (ref 5–8)
PLATELET # BLD: 655 K/UL (ref 140–450)
PLATELET ESTIMATE: ABNORMAL
PMV BLD AUTO: 7.4 FL (ref 6–12)
POC TROPONIN I: 0 NG/ML (ref 0–0.1)
POC TROPONIN I: 0 NG/ML (ref 0–0.1)
POC TROPONIN INTERP: NORMAL
POC TROPONIN INTERP: NORMAL
POTASSIUM SERPL-SCNC: 3.6 MMOL/L (ref 3.7–5.3)
PROTEIN UA: NEGATIVE
PROTHROMBIN TIME: 13.3 SEC (ref 9.4–12.6)
RBC # BLD: 3.54 M/UL (ref 4–5.2)
RBC # BLD: ABNORMAL 10*6/UL
RBC UA: ABNORMAL /HPF (ref 0–4)
RENAL EPITHELIAL, UA: ABNORMAL /HPF
SEG NEUTROPHILS: 81 %
SEGMENTED NEUTROPHILS ABSOLUTE COUNT: 15 K/UL (ref 1.8–7.7)
SODIUM BLD-SCNC: 133 MMOL/L (ref 135–144)
SPECIFIC GRAVITY UA: 1.02 (ref 1–1.03)
TOTAL PROTEIN: 8.3 G/DL (ref 6.4–8.3)
TRICHOMONAS: ABNORMAL
TURBIDITY: CLEAR
URINE HGB: NEGATIVE
UROBILINOGEN, URINE: NORMAL
WBC # BLD: 18.5 K/UL (ref 3.5–11)
WBC # BLD: ABNORMAL 10*3/UL
WBC UA: ABNORMAL /HPF (ref 0–5)
YEAST: ABNORMAL

## 2017-06-28 PROCEDURE — 2580000003 HC RX 258: Performed by: INTERNAL MEDICINE

## 2017-06-28 PROCEDURE — 81001 URINALYSIS AUTO W/SCOPE: CPT

## 2017-06-28 PROCEDURE — 86632 CHLAMYDIA IGM ANTIBODY: CPT

## 2017-06-28 PROCEDURE — 2580000003 HC RX 258: Performed by: EMERGENCY MEDICINE

## 2017-06-28 PROCEDURE — 87804 INFLUENZA ASSAY W/OPTIC: CPT

## 2017-06-28 PROCEDURE — 85025 COMPLETE CBC W/AUTO DIFF WBC: CPT

## 2017-06-28 PROCEDURE — 99223 1ST HOSP IP/OBS HIGH 75: CPT | Performed by: INTERNAL MEDICINE

## 2017-06-28 PROCEDURE — 1200000000 HC SEMI PRIVATE

## 2017-06-28 PROCEDURE — 80053 COMPREHEN METABOLIC PANEL: CPT

## 2017-06-28 PROCEDURE — 87449 NOS EACH ORGANISM AG IA: CPT

## 2017-06-28 PROCEDURE — 86738 MYCOPLASMA ANTIBODY: CPT

## 2017-06-28 PROCEDURE — G0378 HOSPITAL OBSERVATION PER HR: HCPCS

## 2017-06-28 PROCEDURE — 87086 URINE CULTURE/COLONY COUNT: CPT

## 2017-06-28 PROCEDURE — 85730 THROMBOPLASTIN TIME PARTIAL: CPT

## 2017-06-28 PROCEDURE — 6360000002 HC RX W HCPCS: Performed by: EMERGENCY MEDICINE

## 2017-06-28 PROCEDURE — 93005 ELECTROCARDIOGRAM TRACING: CPT

## 2017-06-28 PROCEDURE — 96374 THER/PROPH/DIAG INJ IV PUSH: CPT

## 2017-06-28 PROCEDURE — 85610 PROTHROMBIN TIME: CPT

## 2017-06-28 PROCEDURE — 71020 XR CHEST STANDARD TWO VW: CPT

## 2017-06-28 PROCEDURE — 87040 BLOOD CULTURE FOR BACTERIA: CPT

## 2017-06-28 PROCEDURE — 87899 AGENT NOS ASSAY W/OPTIC: CPT

## 2017-06-28 PROCEDURE — 84484 ASSAY OF TROPONIN QUANT: CPT

## 2017-06-28 PROCEDURE — 83605 ASSAY OF LACTIC ACID: CPT

## 2017-06-28 PROCEDURE — 36415 COLL VENOUS BLD VENIPUNCTURE: CPT

## 2017-06-28 PROCEDURE — 99285 EMERGENCY DEPT VISIT HI MDM: CPT

## 2017-06-28 RX ORDER — ONDANSETRON 2 MG/ML
4 INJECTION INTRAMUSCULAR; INTRAVENOUS ONCE
Status: COMPLETED | OUTPATIENT
Start: 2017-06-28 | End: 2017-06-28

## 2017-06-28 RX ORDER — SODIUM CHLORIDE 9 MG/ML
INJECTION, SOLUTION INTRAVENOUS CONTINUOUS
Status: DISCONTINUED | OUTPATIENT
Start: 2017-06-28 | End: 2017-06-30

## 2017-06-28 RX ORDER — SODIUM CHLORIDE 0.9 % (FLUSH) 0.9 %
10 SYRINGE (ML) INJECTION PRN
Status: DISCONTINUED | OUTPATIENT
Start: 2017-06-28 | End: 2017-07-01 | Stop reason: HOSPADM

## 2017-06-28 RX ORDER — ONDANSETRON 2 MG/ML
4 INJECTION INTRAMUSCULAR; INTRAVENOUS EVERY 6 HOURS PRN
Status: DISCONTINUED | OUTPATIENT
Start: 2017-06-28 | End: 2017-07-01 | Stop reason: HOSPADM

## 2017-06-28 RX ORDER — 0.9 % SODIUM CHLORIDE 0.9 %
1000 INTRAVENOUS SOLUTION INTRAVENOUS ONCE
Status: COMPLETED | OUTPATIENT
Start: 2017-06-28 | End: 2017-06-28

## 2017-06-28 RX ORDER — IBUPROFEN 800 MG/1
800 TABLET ORAL ONCE
Status: DISCONTINUED | OUTPATIENT
Start: 2017-06-28 | End: 2017-06-28

## 2017-06-28 RX ORDER — SODIUM CHLORIDE 0.9 % (FLUSH) 0.9 %
10 SYRINGE (ML) INJECTION EVERY 12 HOURS SCHEDULED
Status: DISCONTINUED | OUTPATIENT
Start: 2017-06-28 | End: 2017-07-01 | Stop reason: HOSPADM

## 2017-06-28 RX ORDER — SODIUM CHLORIDE 0.9 % (FLUSH) 0.9 %
10 SYRINGE (ML) INJECTION EVERY 12 HOURS SCHEDULED
Status: DISCONTINUED | OUTPATIENT
Start: 2017-06-29 | End: 2017-07-01 | Stop reason: HOSPADM

## 2017-06-28 RX ADMIN — SODIUM CHLORIDE 1000 ML: 9 INJECTION, SOLUTION INTRAVENOUS at 21:05

## 2017-06-28 RX ADMIN — ONDANSETRON 4 MG: 2 INJECTION, SOLUTION INTRAMUSCULAR; INTRAVENOUS at 21:05

## 2017-06-28 RX ADMIN — SODIUM CHLORIDE: 9 INJECTION, SOLUTION INTRAVENOUS at 23:26

## 2017-06-28 RX ADMIN — CEFTRIAXONE SODIUM 1 G: 1 INJECTION, POWDER, FOR SOLUTION INTRAMUSCULAR; INTRAVENOUS at 22:25

## 2017-06-29 ENCOUNTER — APPOINTMENT (OUTPATIENT)
Dept: CT IMAGING | Age: 47
DRG: 139 | End: 2017-06-29
Payer: MEDICAID

## 2017-06-29 PROBLEM — R50.9 FEVER: Status: ACTIVE | Noted: 2017-06-29

## 2017-06-29 LAB
ABSOLUTE EOS #: 0.3 K/UL (ref 0–0.4)
ABSOLUTE LYMPH #: 2.2 K/UL (ref 1–4.8)
ABSOLUTE MONO #: 1.2 K/UL (ref 0.1–1.2)
ANION GAP SERPL CALCULATED.3IONS-SCNC: 13 MMOL/L (ref 9–17)
BASOPHILS # BLD: 0 %
BASOPHILS ABSOLUTE: 0 K/UL (ref 0–0.2)
BUN BLDV-MCNC: 11 MG/DL (ref 6–20)
BUN/CREAT BLD: NORMAL (ref 9–20)
CALCIUM SERPL-MCNC: 8.7 MG/DL (ref 8.6–10.4)
CHLORIDE BLD-SCNC: 102 MMOL/L (ref 98–107)
CO2: 22 MMOL/L (ref 20–31)
CREAT SERPL-MCNC: 0.75 MG/DL (ref 0.5–0.9)
CULTURE: NORMAL
CULTURE: NORMAL
DIFFERENTIAL TYPE: ABNORMAL
DIRECT EXAM: NORMAL
EOSINOPHILS RELATIVE PERCENT: 3 %
GFR AFRICAN AMERICAN: >60 ML/MIN
GFR NON-AFRICAN AMERICAN: >60 ML/MIN
GFR SERPL CREATININE-BSD FRML MDRD: NORMAL ML/MIN/{1.73_M2}
GFR SERPL CREATININE-BSD FRML MDRD: NORMAL ML/MIN/{1.73_M2}
GLUCOSE BLD-MCNC: 88 MG/DL (ref 70–99)
HCT VFR BLD CALC: 25.6 % (ref 36–46)
HEMOGLOBIN: 8.4 G/DL (ref 12–16)
HIV AG/AB: NONREACTIVE
LYMPHOCYTES # BLD: 16 %
Lab: NORMAL
MCH RBC QN AUTO: 26.6 PG (ref 26–34)
MCHC RBC AUTO-ENTMCNC: 32.9 G/DL (ref 31–37)
MCV RBC AUTO: 80.9 FL (ref 80–100)
MONOCYTES # BLD: 9 %
PDW BLD-RTO: 16.8 % (ref 12.5–15.4)
PLATELET # BLD: 574 K/UL (ref 140–450)
PLATELET ESTIMATE: ABNORMAL
PMV BLD AUTO: 7.5 FL (ref 6–12)
POTASSIUM SERPL-SCNC: 3.7 MMOL/L (ref 3.7–5.3)
RBC # BLD: 3.17 M/UL (ref 4–5.2)
RBC # BLD: ABNORMAL 10*6/UL
RHEUMATOID FACTOR: 12.8 IU/ML
SEG NEUTROPHILS: 72 %
SEGMENTED NEUTROPHILS ABSOLUTE COUNT: 9.8 K/UL (ref 1.8–7.7)
SODIUM BLD-SCNC: 137 MMOL/L (ref 135–144)
SPECIMEN DESCRIPTION: NORMAL
STATUS: NORMAL
TOTAL CK: 12 U/L (ref 26–192)
WBC # BLD: 13.5 K/UL (ref 3.5–11)
WBC # BLD: ABNORMAL 10*3/UL

## 2017-06-29 PROCEDURE — G8980 MOBILITY D/C STATUS: HCPCS

## 2017-06-29 PROCEDURE — 2580000003 HC RX 258: Performed by: INTERNAL MEDICINE

## 2017-06-29 PROCEDURE — 6360000004 HC RX CONTRAST MEDICATION: Performed by: HOSPITALIST

## 2017-06-29 PROCEDURE — 96365 THER/PROPH/DIAG IV INF INIT: CPT

## 2017-06-29 PROCEDURE — 99223 1ST HOSP IP/OBS HIGH 75: CPT | Performed by: INTERNAL MEDICINE

## 2017-06-29 PROCEDURE — 87389 HIV-1 AG W/HIV-1&-2 AB AG IA: CPT

## 2017-06-29 PROCEDURE — 97161 PT EVAL LOW COMPLEX 20 MIN: CPT

## 2017-06-29 PROCEDURE — 6370000000 HC RX 637 (ALT 250 FOR IP): Performed by: HOSPITALIST

## 2017-06-29 PROCEDURE — 96372 THER/PROPH/DIAG INJ SC/IM: CPT

## 2017-06-29 PROCEDURE — 99233 SBSQ HOSP IP/OBS HIGH 50: CPT | Performed by: INTERNAL MEDICINE

## 2017-06-29 PROCEDURE — 71260 CT THORAX DX C+: CPT

## 2017-06-29 PROCEDURE — G8978 MOBILITY CURRENT STATUS: HCPCS

## 2017-06-29 PROCEDURE — 6360000002 HC RX W HCPCS: Performed by: INTERNAL MEDICINE

## 2017-06-29 PROCEDURE — 36415 COLL VENOUS BLD VENIPUNCTURE: CPT

## 2017-06-29 PROCEDURE — 86235 NUCLEAR ANTIGEN ANTIBODY: CPT

## 2017-06-29 PROCEDURE — 86431 RHEUMATOID FACTOR QUANT: CPT

## 2017-06-29 PROCEDURE — 86225 DNA ANTIBODY NATIVE: CPT

## 2017-06-29 PROCEDURE — 82550 ASSAY OF CK (CPK): CPT

## 2017-06-29 PROCEDURE — 80048 BASIC METABOLIC PNL TOTAL CA: CPT

## 2017-06-29 PROCEDURE — 94762 N-INVAS EAR/PLS OXIMTRY CONT: CPT

## 2017-06-29 PROCEDURE — 1200000000 HC SEMI PRIVATE

## 2017-06-29 PROCEDURE — 86038 ANTINUCLEAR ANTIBODIES: CPT

## 2017-06-29 PROCEDURE — 85025 COMPLETE CBC W/AUTO DIFF WBC: CPT

## 2017-06-29 PROCEDURE — 97530 THERAPEUTIC ACTIVITIES: CPT

## 2017-06-29 PROCEDURE — G8979 MOBILITY GOAL STATUS: HCPCS

## 2017-06-29 PROCEDURE — G0378 HOSPITAL OBSERVATION PER HR: HCPCS

## 2017-06-29 RX ORDER — SENNA PLUS 8.6 MG/1
1 TABLET ORAL DAILY
Status: DISCONTINUED | OUTPATIENT
Start: 2017-06-29 | End: 2017-07-01 | Stop reason: HOSPADM

## 2017-06-29 RX ORDER — TRAMADOL HYDROCHLORIDE 50 MG/1
25 TABLET ORAL EVERY 6 HOURS PRN
Status: DISCONTINUED | OUTPATIENT
Start: 2017-06-29 | End: 2017-07-01 | Stop reason: HOSPADM

## 2017-06-29 RX ORDER — PANTOPRAZOLE SODIUM 40 MG/1
40 TABLET, DELAYED RELEASE ORAL DAILY
Status: DISCONTINUED | OUTPATIENT
Start: 2017-06-29 | End: 2017-07-01 | Stop reason: HOSPADM

## 2017-06-29 RX ORDER — NICOTINE 21 MG/24HR
1 PATCH, TRANSDERMAL 24 HOURS TRANSDERMAL DAILY
Status: DISCONTINUED | OUTPATIENT
Start: 2017-06-29 | End: 2017-06-29

## 2017-06-29 RX ORDER — DOCUSATE SODIUM 100 MG/1
100 CAPSULE, LIQUID FILLED ORAL 2 TIMES DAILY PRN
Status: DISCONTINUED | OUTPATIENT
Start: 2017-06-29 | End: 2017-07-01 | Stop reason: HOSPADM

## 2017-06-29 RX ORDER — CITALOPRAM 20 MG/1
40 TABLET ORAL DAILY
Status: DISCONTINUED | OUTPATIENT
Start: 2017-06-29 | End: 2017-07-01 | Stop reason: HOSPADM

## 2017-06-29 RX ORDER — METOCLOPRAMIDE 5 MG/1
5 TABLET ORAL
Status: DISCONTINUED | OUTPATIENT
Start: 2017-06-29 | End: 2017-07-01 | Stop reason: HOSPADM

## 2017-06-29 RX ADMIN — ENOXAPARIN SODIUM 40 MG: 40 INJECTION SUBCUTANEOUS at 08:51

## 2017-06-29 RX ADMIN — Medication 1 TABLET: at 11:37

## 2017-06-29 RX ADMIN — METOCLOPRAMIDE 5 MG: 5 TABLET ORAL at 17:29

## 2017-06-29 RX ADMIN — CITALOPRAM 40 MG: 20 TABLET, FILM COATED ORAL at 11:37

## 2017-06-29 RX ADMIN — TRAMADOL HYDROCHLORIDE 25 MG: 50 TABLET, FILM COATED ORAL at 21:13

## 2017-06-29 RX ADMIN — SODIUM CHLORIDE: 9 INJECTION, SOLUTION INTRAVENOUS at 05:48

## 2017-06-29 RX ADMIN — Medication 10 ML: at 00:03

## 2017-06-29 RX ADMIN — AZITHROMYCIN MONOHYDRATE 500 MG: 500 INJECTION, POWDER, LYOPHILIZED, FOR SOLUTION INTRAVENOUS at 00:17

## 2017-06-29 RX ADMIN — TRAMADOL HYDROCHLORIDE 25 MG: 50 TABLET, FILM COATED ORAL at 01:28

## 2017-06-29 RX ADMIN — METOCLOPRAMIDE 5 MG: 5 TABLET ORAL at 11:37

## 2017-06-29 RX ADMIN — SODIUM CHLORIDE: 9 INJECTION, SOLUTION INTRAVENOUS at 14:28

## 2017-06-29 RX ADMIN — Medication 10 ML: at 08:58

## 2017-06-29 RX ADMIN — IOVERSOL 75 ML: 741 INJECTION INTRA-ARTERIAL; INTRAVENOUS at 12:41

## 2017-06-29 RX ADMIN — Medication 10 ML: at 08:51

## 2017-06-29 RX ADMIN — PANTOPRAZOLE SODIUM 40 MG: 40 TABLET, DELAYED RELEASE ORAL at 11:37

## 2017-06-29 RX ADMIN — SENNA 8.6 MG: 8.6 TABLET, COATED ORAL at 11:37

## 2017-06-29 ASSESSMENT — PAIN SCALES - GENERAL
PAINLEVEL_OUTOF10: 3
PAINLEVEL_OUTOF10: 3
PAINLEVEL_OUTOF10: 9
PAINLEVEL_OUTOF10: 8

## 2017-06-29 ASSESSMENT — ENCOUNTER SYMPTOMS
VOMITING: 0
COLOR CHANGE: 0
COUGH: 0
SPUTUM PRODUCTION: 0
BACK PAIN: 0
ABDOMINAL PAIN: 0
SORE THROAT: 0
SINUS PRESSURE: 0
EYE PAIN: 0
PHOTOPHOBIA: 0
DOUBLE VISION: 0
COUGH: 1
NAUSEA: 1
DIARRHEA: 0
SHORTNESS OF BREATH: 0
RHINORRHEA: 0
HEMOPTYSIS: 0
CONSTIPATION: 0

## 2017-06-30 LAB
ANTI DNA DOUBLE STRANDED: 22 IU/ML
ANTI JO-1 IGG: 16 U/ML
ANTI SSA: 7 U/ML
ANTI SSB: 5 U/ML
ANTI-NUCLEAR ANTIBODY (ANA): NEGATIVE
ANTI-SCLERODERMA: 20 U/ML
MYCOPLASMA PNEUMONIAE IGM: 0.57

## 2017-06-30 PROCEDURE — 6370000000 HC RX 637 (ALT 250 FOR IP): Performed by: INTERNAL MEDICINE

## 2017-06-30 PROCEDURE — 96372 THER/PROPH/DIAG INJ SC/IM: CPT

## 2017-06-30 PROCEDURE — 1200000000 HC SEMI PRIVATE

## 2017-06-30 PROCEDURE — 94762 N-INVAS EAR/PLS OXIMTRY CONT: CPT

## 2017-06-30 PROCEDURE — G0378 HOSPITAL OBSERVATION PER HR: HCPCS

## 2017-06-30 PROCEDURE — 6370000000 HC RX 637 (ALT 250 FOR IP): Performed by: HOSPITALIST

## 2017-06-30 PROCEDURE — 99233 SBSQ HOSP IP/OBS HIGH 50: CPT | Performed by: INTERNAL MEDICINE

## 2017-06-30 PROCEDURE — 93970 EXTREMITY STUDY: CPT

## 2017-06-30 PROCEDURE — 76937 US GUIDE VASCULAR ACCESS: CPT

## 2017-06-30 PROCEDURE — 2580000003 HC RX 258: Performed by: INTERNAL MEDICINE

## 2017-06-30 PROCEDURE — 6360000002 HC RX W HCPCS: Performed by: INTERNAL MEDICINE

## 2017-06-30 RX ORDER — PREDNISONE 20 MG/1
40 TABLET ORAL DAILY
Status: DISCONTINUED | OUTPATIENT
Start: 2017-06-30 | End: 2017-07-01 | Stop reason: HOSPADM

## 2017-06-30 RX ORDER — ALBUTEROL SULFATE 2.5 MG/3ML
2.5 SOLUTION RESPIRATORY (INHALATION) EVERY 4 HOURS PRN
Status: DISCONTINUED | OUTPATIENT
Start: 2017-06-30 | End: 2017-07-01 | Stop reason: HOSPADM

## 2017-06-30 RX ADMIN — ENOXAPARIN SODIUM 40 MG: 40 INJECTION SUBCUTANEOUS at 13:17

## 2017-06-30 RX ADMIN — CITALOPRAM 40 MG: 20 TABLET, FILM COATED ORAL at 13:16

## 2017-06-30 RX ADMIN — Medication 10 ML: at 13:19

## 2017-06-30 RX ADMIN — METOCLOPRAMIDE 5 MG: 5 TABLET ORAL at 17:01

## 2017-06-30 RX ADMIN — Medication 1 TABLET: at 13:16

## 2017-06-30 RX ADMIN — PANTOPRAZOLE SODIUM 40 MG: 40 TABLET, DELAYED RELEASE ORAL at 13:16

## 2017-06-30 RX ADMIN — METOCLOPRAMIDE 5 MG: 5 TABLET ORAL at 06:53

## 2017-06-30 RX ADMIN — METOCLOPRAMIDE 5 MG: 5 TABLET ORAL at 13:16

## 2017-06-30 RX ADMIN — PREDNISONE 40 MG: 20 TABLET ORAL at 19:24

## 2017-06-30 RX ADMIN — SENNA 8.6 MG: 8.6 TABLET, COATED ORAL at 13:16

## 2017-06-30 RX ADMIN — Medication 10 ML: at 20:46

## 2017-07-01 VITALS
HEART RATE: 53 BPM | DIASTOLIC BLOOD PRESSURE: 72 MMHG | SYSTOLIC BLOOD PRESSURE: 116 MMHG | OXYGEN SATURATION: 97 % | WEIGHT: 147 LBS | RESPIRATION RATE: 14 BRPM | HEIGHT: 57 IN | BODY MASS INDEX: 31.71 KG/M2 | TEMPERATURE: 98.2 F

## 2017-07-01 LAB
CHLAMYDIA INTERPRETATION: NORMAL
CHLAMYDIA PNEUMONIAE IGM ANTIBODY: NORMAL
CHLAMYDIA PSITTACI IGM ANTIBODY: NORMAL
CHLAMYDIA TRACHOMATIS IGM ANTIBODY: NORMAL

## 2017-07-01 PROCEDURE — 99239 HOSP IP/OBS DSCHRG MGMT >30: CPT | Performed by: INTERNAL MEDICINE

## 2017-07-01 PROCEDURE — 94762 N-INVAS EAR/PLS OXIMTRY CONT: CPT

## 2017-07-01 PROCEDURE — 6370000000 HC RX 637 (ALT 250 FOR IP): Performed by: HOSPITALIST

## 2017-07-01 PROCEDURE — 6370000000 HC RX 637 (ALT 250 FOR IP): Performed by: INTERNAL MEDICINE

## 2017-07-01 PROCEDURE — 2580000003 HC RX 258: Performed by: INTERNAL MEDICINE

## 2017-07-01 PROCEDURE — G0378 HOSPITAL OBSERVATION PER HR: HCPCS

## 2017-07-01 RX ORDER — PREDNISONE 20 MG/1
40 TABLET ORAL DAILY
Qty: 6 TABLET | Refills: 0 | Status: SHIPPED | OUTPATIENT
Start: 2017-07-02 | End: 2017-07-05

## 2017-07-01 RX ADMIN — Medication 10 ML: at 09:00

## 2017-07-01 RX ADMIN — Medication 1 TABLET: at 10:44

## 2017-07-01 RX ADMIN — PREDNISONE 40 MG: 20 TABLET ORAL at 10:45

## 2017-07-01 RX ADMIN — PANTOPRAZOLE SODIUM 40 MG: 40 TABLET, DELAYED RELEASE ORAL at 10:45

## 2017-07-01 RX ADMIN — METOCLOPRAMIDE 5 MG: 5 TABLET ORAL at 10:44

## 2017-07-01 RX ADMIN — CITALOPRAM 40 MG: 20 TABLET, FILM COATED ORAL at 10:44

## 2017-07-01 RX ADMIN — METOCLOPRAMIDE 5 MG: 5 TABLET ORAL at 06:37

## 2017-07-01 RX ADMIN — SENNA 8.6 MG: 8.6 TABLET, COATED ORAL at 10:44

## 2017-07-03 LAB
EKG ATRIAL RATE: 110 BPM
EKG P AXIS: 28 DEGREES
EKG P-R INTERVAL: 116 MS
EKG Q-T INTERVAL: 334 MS
EKG QRS DURATION: 76 MS
EKG QTC CALCULATION (BAZETT): 452 MS
EKG R AXIS: 2 DEGREES
EKG T AXIS: 23 DEGREES
EKG VENTRICULAR RATE: 110 BPM

## 2017-07-05 ENCOUNTER — TELEPHONE (OUTPATIENT)
Dept: GASTROENTEROLOGY | Age: 47
End: 2017-07-05

## 2017-07-07 ENCOUNTER — TELEPHONE (OUTPATIENT)
Dept: INTERNAL MEDICINE | Age: 47
End: 2017-07-07

## 2017-07-07 ENCOUNTER — APPOINTMENT (OUTPATIENT)
Dept: CT IMAGING | Age: 47
DRG: 254 | End: 2017-07-07
Payer: MEDICAID

## 2017-07-07 ENCOUNTER — APPOINTMENT (OUTPATIENT)
Dept: GENERAL RADIOLOGY | Age: 47
DRG: 254 | End: 2017-07-07
Payer: MEDICAID

## 2017-07-07 ENCOUNTER — HOSPITAL ENCOUNTER (INPATIENT)
Age: 47
LOS: 3 days | Discharge: HOME HEALTH | DRG: 254 | End: 2017-07-10
Attending: EMERGENCY MEDICINE | Admitting: INTERNAL MEDICINE
Payer: MEDICAID

## 2017-07-07 ENCOUNTER — HOSPITAL ENCOUNTER (OUTPATIENT)
Facility: MEDICAL CENTER | Age: 47
Discharge: HOME | End: 2017-07-07
Payer: MEDICAID

## 2017-07-07 DIAGNOSIS — E87.1 HYPONATREMIA: ICD-10-CM

## 2017-07-07 DIAGNOSIS — R91.1 LUNG NODULE: ICD-10-CM

## 2017-07-07 DIAGNOSIS — A41.9 SEPSIS, DUE TO UNSPECIFIED ORGANISM: Primary | ICD-10-CM

## 2017-07-07 DIAGNOSIS — D72.829 LEUKOCYTOSIS, UNSPECIFIED TYPE: ICD-10-CM

## 2017-07-07 DIAGNOSIS — E87.8 HYPOCHLOREMIA: ICD-10-CM

## 2017-07-07 DIAGNOSIS — N83.8 OVARIAN MASS: ICD-10-CM

## 2017-07-07 DIAGNOSIS — D75.839 THROMBOCYTOSIS: ICD-10-CM

## 2017-07-07 DIAGNOSIS — D50.0 IRON DEFICIENCY ANEMIA DUE TO CHRONIC BLOOD LOSS: ICD-10-CM

## 2017-07-07 DIAGNOSIS — E87.20 LACTIC ACIDOSIS: ICD-10-CM

## 2017-07-07 DIAGNOSIS — E86.0 DEHYDRATION: ICD-10-CM

## 2017-07-07 LAB
ABO/RH: NORMAL
ABSOLUTE EOS #: 0 K/UL (ref 0–0.4)
ABSOLUTE LYMPH #: 2.12 K/UL (ref 1–4.8)
ABSOLUTE MONO #: 1.41 K/UL (ref 0.1–0.8)
ALBUMIN SERPL-MCNC: 3.7 G/DL (ref 3.5–5.2)
ALBUMIN/GLOBULIN RATIO: 0.7 (ref 1–2.5)
ALP BLD-CCNC: 344 U/L (ref 35–104)
ALT SERPL-CCNC: 37 U/L (ref 5–33)
ANION GAP SERPL CALCULATED.3IONS-SCNC: 21 MMOL/L (ref 9–17)
ANTIBODY SCREEN: NEGATIVE
ARM BAND NUMBER: NORMAL
AST SERPL-CCNC: 34 U/L
BASOPHILS # BLD: 0 %
BASOPHILS ABSOLUTE: 0 K/UL (ref 0–0.2)
BILIRUB SERPL-MCNC: 0.36 MG/DL (ref 0.3–1.2)
BILIRUBIN DIRECT: 0.09 MG/DL
BILIRUBIN, INDIRECT: 0.27 MG/DL (ref 0–1)
BUN BLDV-MCNC: 22 MG/DL (ref 6–20)
BUN/CREAT BLD: ABNORMAL (ref 9–20)
CALCIUM SERPL-MCNC: 9.5 MG/DL (ref 8.6–10.4)
CHLORIDE BLD-SCNC: 91 MMOL/L (ref 98–107)
CO2: 18 MMOL/L (ref 20–31)
CREAT SERPL-MCNC: 0.75 MG/DL (ref 0.5–0.9)
DIFFERENTIAL TYPE: ABNORMAL
EOSINOPHILS RELATIVE PERCENT: 0 %
EXPIRATION DATE: NORMAL
GFR AFRICAN AMERICAN: >60 ML/MIN
GFR NON-AFRICAN AMERICAN: >60 ML/MIN
GFR SERPL CREATININE-BSD FRML MDRD: ABNORMAL ML/MIN/{1.73_M2}
GFR SERPL CREATININE-BSD FRML MDRD: ABNORMAL ML/MIN/{1.73_M2}
GLOBULIN: ABNORMAL G/DL (ref 1.5–3.8)
GLUCOSE BLD-MCNC: 197 MG/DL (ref 70–99)
HCG QUALITATIVE: NEGATIVE
HCT VFR BLD CALC: 36.5 % (ref 36–46)
HEMOGLOBIN: 11.8 G/DL (ref 12–16)
LACTIC ACID, WHOLE BLOOD: 1.5 MMOL/L (ref 0.7–2.1)
LACTIC ACID, WHOLE BLOOD: 2.9 MMOL/L (ref 0.7–2.1)
LIPASE: 32 U/L (ref 13–60)
LYMPHOCYTES # BLD: 6 %
MCH RBC QN AUTO: 26.1 PG (ref 26–34)
MCHC RBC AUTO-ENTMCNC: 32.3 G/DL (ref 31–37)
MCV RBC AUTO: 80.9 FL (ref 80–100)
MONOCYTES # BLD: 4 %
MORPHOLOGY: ABNORMAL
PARTIAL THROMBOPLASTIN TIME: 26.3 SEC (ref 21.3–31.3)
PDW BLD-RTO: 19 % (ref 12.5–15.4)
PLATELET # BLD: 882 K/UL (ref 140–450)
PLATELET ESTIMATE: ABNORMAL
PMV BLD AUTO: 7.5 FL (ref 6–12)
POC TROPONIN I: 0 NG/ML (ref 0–0.1)
POC TROPONIN INTERP: NORMAL
POTASSIUM SERPL-SCNC: 4.2 MMOL/L (ref 3.7–5.3)
RBC # BLD: 4.52 M/UL (ref 4–5.2)
RBC # BLD: ABNORMAL 10*6/UL
SEG NEUTROPHILS: 90 %
SEGMENTED NEUTROPHILS ABSOLUTE COUNT: 31.77 K/UL (ref 1.8–7.7)
SODIUM BLD-SCNC: 130 MMOL/L (ref 135–144)
TOTAL PROTEIN: 8.8 G/DL (ref 6.4–8.3)
WBC # BLD: 35.3 K/UL (ref 3.5–11)
WBC # BLD: ABNORMAL 10*3/UL

## 2017-07-07 PROCEDURE — 99285 EMERGENCY DEPT VISIT HI MDM: CPT

## 2017-07-07 PROCEDURE — 80076 HEPATIC FUNCTION PANEL: CPT

## 2017-07-07 PROCEDURE — 6360000004 HC RX CONTRAST MEDICATION: Performed by: EMERGENCY MEDICINE

## 2017-07-07 PROCEDURE — 85730 THROMBOPLASTIN TIME PARTIAL: CPT

## 2017-07-07 PROCEDURE — 87086 URINE CULTURE/COLONY COUNT: CPT

## 2017-07-07 PROCEDURE — 84484 ASSAY OF TROPONIN QUANT: CPT

## 2017-07-07 PROCEDURE — 86900 BLOOD TYPING SEROLOGIC ABO: CPT

## 2017-07-07 PROCEDURE — 87493 C DIFF AMPLIFIED PROBE: CPT

## 2017-07-07 PROCEDURE — 96375 TX/PRO/DX INJ NEW DRUG ADDON: CPT

## 2017-07-07 PROCEDURE — 74177 CT ABD & PELVIS W/CONTRAST: CPT

## 2017-07-07 PROCEDURE — 86901 BLOOD TYPING SEROLOGIC RH(D): CPT

## 2017-07-07 PROCEDURE — 71010 XR CHEST PORTABLE: CPT

## 2017-07-07 PROCEDURE — 84703 CHORIONIC GONADOTROPIN ASSAY: CPT

## 2017-07-07 PROCEDURE — 83690 ASSAY OF LIPASE: CPT

## 2017-07-07 PROCEDURE — 2060000000 HC ICU INTERMEDIATE R&B

## 2017-07-07 PROCEDURE — 96365 THER/PROPH/DIAG IV INF INIT: CPT

## 2017-07-07 PROCEDURE — 87040 BLOOD CULTURE FOR BACTERIA: CPT

## 2017-07-07 PROCEDURE — 80048 BASIC METABOLIC PNL TOTAL CA: CPT

## 2017-07-07 PROCEDURE — 86850 RBC ANTIBODY SCREEN: CPT

## 2017-07-07 PROCEDURE — 83605 ASSAY OF LACTIC ACID: CPT

## 2017-07-07 PROCEDURE — 81001 URINALYSIS AUTO W/SCOPE: CPT

## 2017-07-07 PROCEDURE — 2580000003 HC RX 258: Performed by: EMERGENCY MEDICINE

## 2017-07-07 PROCEDURE — 6360000002 HC RX W HCPCS: Performed by: EMERGENCY MEDICINE

## 2017-07-07 PROCEDURE — 85025 COMPLETE CBC W/AUTO DIFF WBC: CPT

## 2017-07-07 RX ORDER — SODIUM CHLORIDE 0.9 % (FLUSH) 0.9 %
10 SYRINGE (ML) INJECTION PRN
Status: DISCONTINUED | OUTPATIENT
Start: 2017-07-07 | End: 2017-07-10 | Stop reason: HOSPADM

## 2017-07-07 RX ORDER — 0.9 % SODIUM CHLORIDE 0.9 %
30 INTRAVENOUS SOLUTION INTRAVENOUS ONCE
Status: COMPLETED | OUTPATIENT
Start: 2017-07-07 | End: 2017-07-08

## 2017-07-07 RX ORDER — TRAMADOL HYDROCHLORIDE 50 MG/1
50 TABLET ORAL 2 TIMES DAILY PRN
Qty: 20 TABLET | Refills: 0 | Status: SHIPPED | OUTPATIENT
Start: 2017-07-07 | End: 2017-08-09

## 2017-07-07 RX ORDER — 0.9 % SODIUM CHLORIDE 0.9 %
1000 INTRAVENOUS SOLUTION INTRAVENOUS ONCE
Status: DISCONTINUED | OUTPATIENT
Start: 2017-07-07 | End: 2017-07-07

## 2017-07-07 RX ORDER — PROMETHAZINE HYDROCHLORIDE 25 MG/ML
25 INJECTION, SOLUTION INTRAMUSCULAR; INTRAVENOUS ONCE
Status: COMPLETED | OUTPATIENT
Start: 2017-07-07 | End: 2017-07-07

## 2017-07-07 RX ORDER — ONDANSETRON 2 MG/ML
4 INJECTION INTRAMUSCULAR; INTRAVENOUS ONCE
Status: COMPLETED | OUTPATIENT
Start: 2017-07-07 | End: 2017-07-07

## 2017-07-07 RX ORDER — FENTANYL CITRATE 50 UG/ML
25 INJECTION, SOLUTION INTRAMUSCULAR; INTRAVENOUS ONCE
Status: COMPLETED | OUTPATIENT
Start: 2017-07-07 | End: 2017-07-07

## 2017-07-07 RX ADMIN — IOVERSOL 130 ML: 741 INJECTION INTRA-ARTERIAL; INTRAVENOUS at 22:07

## 2017-07-07 RX ADMIN — ONDANSETRON 4 MG: 2 INJECTION INTRAMUSCULAR; INTRAVENOUS at 20:55

## 2017-07-07 RX ADMIN — Medication 10 ML: at 22:00

## 2017-07-07 RX ADMIN — FENTANYL CITRATE 25 MCG: 50 INJECTION, SOLUTION INTRAMUSCULAR; INTRAVENOUS at 20:55

## 2017-07-07 RX ADMIN — HYDROMORPHONE HYDROCHLORIDE 1 MG: 1 INJECTION, SOLUTION INTRAMUSCULAR; INTRAVENOUS; SUBCUTANEOUS at 21:38

## 2017-07-07 RX ADMIN — PROMETHAZINE HYDROCHLORIDE 12.5 MG: 25 INJECTION INTRAMUSCULAR; INTRAVENOUS at 21:38

## 2017-07-07 RX ADMIN — PIPERACILLIN AND TAZOBACTAM 3.38 G: 3; .375 INJECTION, POWDER, LYOPHILIZED, FOR SOLUTION INTRAVENOUS; PARENTERAL at 22:31

## 2017-07-07 RX ADMIN — SODIUM CHLORIDE 1000 ML: 9 INJECTION, SOLUTION INTRAVENOUS at 20:55

## 2017-07-07 ASSESSMENT — ENCOUNTER SYMPTOMS
BLOOD IN STOOL: 0
NAUSEA: 1
SORE THROAT: 0
VOMITING: 1
CHEST TIGHTNESS: 0
DIARRHEA: 0
ABDOMINAL PAIN: 1
SHORTNESS OF BREATH: 0
STRIDOR: 0
COUGH: 0
WHEEZING: 0

## 2017-07-07 ASSESSMENT — PAIN DESCRIPTION - LOCATION: LOCATION: ABDOMEN

## 2017-07-07 ASSESSMENT — PAIN SCALES - GENERAL
PAINLEVEL_OUTOF10: 10
PAINLEVEL_OUTOF10: 8
PAINLEVEL_OUTOF10: 10

## 2017-07-07 ASSESSMENT — PAIN DESCRIPTION - PAIN TYPE: TYPE: ACUTE PAIN

## 2017-07-07 ASSESSMENT — PAIN DESCRIPTION - FREQUENCY: FREQUENCY: CONTINUOUS

## 2017-07-07 ASSESSMENT — PAIN DESCRIPTION - DESCRIPTORS: DESCRIPTORS: TIGHTNESS;CRAMPING

## 2017-07-08 PROBLEM — R11.15 INTRACTABLE CYCLICAL VOMITING WITH NAUSEA: Status: ACTIVE | Noted: 2017-07-08

## 2017-07-08 LAB
-: ABNORMAL
ABSOLUTE EOS #: 0 K/UL (ref 0–0.4)
ABSOLUTE LYMPH #: 1.82 K/UL (ref 1–4.8)
ABSOLUTE MONO #: 0.99 K/UL (ref 0.1–0.8)
AMORPHOUS: ABNORMAL
ANION GAP SERPL CALCULATED.3IONS-SCNC: 16 MMOL/L (ref 9–17)
BACTERIA: ABNORMAL
BASOPHILS # BLD: 0 %
BASOPHILS ABSOLUTE: 0 K/UL (ref 0–0.2)
BILIRUBIN URINE: NEGATIVE
BUN BLDV-MCNC: 21 MG/DL (ref 6–20)
BUN/CREAT BLD: ABNORMAL (ref 9–20)
CALCIUM IONIZED: 1.13 MMOL/L (ref 1.13–1.33)
CALCIUM SERPL-MCNC: 9.1 MG/DL (ref 8.6–10.4)
CASTS UA: ABNORMAL /LPF (ref 0–8)
CHLORIDE BLD-SCNC: 98 MMOL/L (ref 98–107)
CO2: 24 MMOL/L (ref 20–31)
COLOR: YELLOW
CREAT SERPL-MCNC: 0.63 MG/DL (ref 0.5–0.9)
CRYSTALS, UA: ABNORMAL /HPF
DIFFERENTIAL TYPE: ABNORMAL
EOSINOPHILS RELATIVE PERCENT: 0 %
EPITHELIAL CELLS UA: ABNORMAL /HPF (ref 0–5)
GFR AFRICAN AMERICAN: >60 ML/MIN
GFR NON-AFRICAN AMERICAN: >60 ML/MIN
GFR SERPL CREATININE-BSD FRML MDRD: ABNORMAL ML/MIN/{1.73_M2}
GFR SERPL CREATININE-BSD FRML MDRD: ABNORMAL ML/MIN/{1.73_M2}
GLUCOSE BLD-MCNC: 126 MG/DL (ref 70–99)
GLUCOSE URINE: NEGATIVE
HCT VFR BLD CALC: 32.9 % (ref 36–46)
HEMOGLOBIN: 10.4 G/DL (ref 12–16)
INR BLD: 1.3
KETONES, URINE: NEGATIVE
LACTIC ACID, WHOLE BLOOD: 0.8 MMOL/L (ref 0.7–2.1)
LACTIC ACID: NORMAL MMOL/L
LEUKOCYTE ESTERASE, URINE: ABNORMAL
LYMPHOCYTES # BLD: 11 %
MCH RBC QN AUTO: 25.9 PG (ref 26–34)
MCHC RBC AUTO-ENTMCNC: 31.5 G/DL (ref 31–37)
MCV RBC AUTO: 82.3 FL (ref 80–100)
MONOCYTES # BLD: 6 %
MORPHOLOGY: ABNORMAL
MUCUS: ABNORMAL
NITRITE, URINE: NEGATIVE
OTHER OBSERVATIONS UA: ABNORMAL
PDW BLD-RTO: 19.1 % (ref 12.5–15.4)
PH UA: 5.5 (ref 5–8)
PLATELET # BLD: 747 K/UL (ref 140–450)
PLATELET ESTIMATE: ABNORMAL
PMV BLD AUTO: 6.9 FL (ref 6–12)
POTASSIUM SERPL-SCNC: 4.1 MMOL/L (ref 3.7–5.3)
PROTEIN UA: ABNORMAL
PROTHROMBIN TIME: 13.6 SEC (ref 9.4–12.6)
RBC # BLD: 3.99 M/UL (ref 4–5.2)
RBC # BLD: ABNORMAL 10*6/UL
RBC UA: ABNORMAL /HPF (ref 0–4)
RENAL EPITHELIAL, UA: ABNORMAL /HPF
SEG NEUTROPHILS: 83 %
SEGMENTED NEUTROPHILS ABSOLUTE COUNT: 13.69 K/UL (ref 1.8–7.7)
SODIUM BLD-SCNC: 138 MMOL/L (ref 135–144)
SPECIFIC GRAVITY UA: 1.04 (ref 1–1.03)
TRICHOMONAS: ABNORMAL
TURBIDITY: ABNORMAL
URINE HGB: NEGATIVE
UROBILINOGEN, URINE: NORMAL
WBC # BLD: 16.5 K/UL (ref 3.5–11)
WBC # BLD: ABNORMAL 10*3/UL
WBC UA: ABNORMAL /HPF (ref 0–5)
YEAST: ABNORMAL

## 2017-07-08 PROCEDURE — 85610 PROTHROMBIN TIME: CPT

## 2017-07-08 PROCEDURE — 85025 COMPLETE CBC W/AUTO DIFF WBC: CPT

## 2017-07-08 PROCEDURE — 36415 COLL VENOUS BLD VENIPUNCTURE: CPT

## 2017-07-08 PROCEDURE — 99223 1ST HOSP IP/OBS HIGH 75: CPT | Performed by: INTERNAL MEDICINE

## 2017-07-08 PROCEDURE — 83605 ASSAY OF LACTIC ACID: CPT

## 2017-07-08 PROCEDURE — 6360000002 HC RX W HCPCS: Performed by: NURSE PRACTITIONER

## 2017-07-08 PROCEDURE — 2580000003 HC RX 258: Performed by: NURSE PRACTITIONER

## 2017-07-08 PROCEDURE — 82330 ASSAY OF CALCIUM: CPT

## 2017-07-08 PROCEDURE — 2500000003 HC RX 250 WO HCPCS: Performed by: NURSE PRACTITIONER

## 2017-07-08 PROCEDURE — 2060000000 HC ICU INTERMEDIATE R&B

## 2017-07-08 PROCEDURE — S0028 INJECTION, FAMOTIDINE, 20 MG: HCPCS | Performed by: NURSE PRACTITIONER

## 2017-07-08 PROCEDURE — 80048 BASIC METABOLIC PNL TOTAL CA: CPT

## 2017-07-08 RX ORDER — SODIUM CHLORIDE 9 MG/ML
INJECTION, SOLUTION INTRAVENOUS CONTINUOUS
Status: DISCONTINUED | OUTPATIENT
Start: 2017-07-08 | End: 2017-07-09

## 2017-07-08 RX ORDER — ONDANSETRON 2 MG/ML
4 INJECTION INTRAMUSCULAR; INTRAVENOUS EVERY 6 HOURS PRN
Status: DISCONTINUED | OUTPATIENT
Start: 2017-07-08 | End: 2017-07-10 | Stop reason: HOSPADM

## 2017-07-08 RX ORDER — SODIUM CHLORIDE 0.9 % (FLUSH) 0.9 %
10 SYRINGE (ML) INJECTION PRN
Status: DISCONTINUED | OUTPATIENT
Start: 2017-07-08 | End: 2017-07-10 | Stop reason: HOSPADM

## 2017-07-08 RX ORDER — SODIUM CHLORIDE 0.9 % (FLUSH) 0.9 %
10 SYRINGE (ML) INJECTION EVERY 12 HOURS SCHEDULED
Status: DISCONTINUED | OUTPATIENT
Start: 2017-07-08 | End: 2017-07-10 | Stop reason: HOSPADM

## 2017-07-08 RX ORDER — MAGNESIUM SULFATE 1 G/100ML
1 INJECTION INTRAVENOUS PRN
Status: DISCONTINUED | OUTPATIENT
Start: 2017-07-08 | End: 2017-07-10 | Stop reason: HOSPADM

## 2017-07-08 RX ORDER — POTASSIUM CHLORIDE 7.45 MG/ML
10 INJECTION INTRAVENOUS PRN
Status: DISCONTINUED | OUTPATIENT
Start: 2017-07-08 | End: 2017-07-10 | Stop reason: HOSPADM

## 2017-07-08 RX ADMIN — HYDROMORPHONE HYDROCHLORIDE 0.25 MG: 1 INJECTION, SOLUTION INTRAMUSCULAR; INTRAVENOUS; SUBCUTANEOUS at 06:53

## 2017-07-08 RX ADMIN — SODIUM CHLORIDE: 9 INJECTION, SOLUTION INTRAVENOUS at 01:25

## 2017-07-08 RX ADMIN — Medication 10 ML: at 22:51

## 2017-07-08 RX ADMIN — HYDROMORPHONE HYDROCHLORIDE 0.5 MG: 1 INJECTION, SOLUTION INTRAMUSCULAR; INTRAVENOUS; SUBCUTANEOUS at 22:50

## 2017-07-08 RX ADMIN — HYDROMORPHONE HYDROCHLORIDE 0.5 MG: 1 INJECTION, SOLUTION INTRAMUSCULAR; INTRAVENOUS; SUBCUTANEOUS at 16:03

## 2017-07-08 RX ADMIN — SODIUM CHLORIDE: 9 INJECTION, SOLUTION INTRAVENOUS at 17:36

## 2017-07-08 RX ADMIN — ENOXAPARIN SODIUM 40 MG: 40 INJECTION SUBCUTANEOUS at 10:08

## 2017-07-08 RX ADMIN — FAMOTIDINE 20 MG: 10 INJECTION, SOLUTION INTRAVENOUS at 21:11

## 2017-07-08 RX ADMIN — FAMOTIDINE 20 MG: 10 INJECTION, SOLUTION INTRAVENOUS at 02:19

## 2017-07-08 RX ADMIN — HYDROMORPHONE HYDROCHLORIDE 0.5 MG: 1 INJECTION, SOLUTION INTRAMUSCULAR; INTRAVENOUS; SUBCUTANEOUS at 02:53

## 2017-07-08 RX ADMIN — HYDROMORPHONE HYDROCHLORIDE 0.5 MG: 1 INJECTION, SOLUTION INTRAMUSCULAR; INTRAVENOUS; SUBCUTANEOUS at 19:19

## 2017-07-08 RX ADMIN — FAMOTIDINE 20 MG: 10 INJECTION, SOLUTION INTRAVENOUS at 10:08

## 2017-07-08 RX ADMIN — SODIUM CHLORIDE: 9 INJECTION, SOLUTION INTRAVENOUS at 10:08

## 2017-07-08 RX ADMIN — HYDROMORPHONE HYDROCHLORIDE 0.5 MG: 1 INJECTION, SOLUTION INTRAMUSCULAR; INTRAVENOUS; SUBCUTANEOUS at 10:12

## 2017-07-08 ASSESSMENT — PAIN DESCRIPTION - PAIN TYPE: TYPE: ACUTE PAIN

## 2017-07-08 ASSESSMENT — PAIN SCALES - GENERAL
PAINLEVEL_OUTOF10: 6
PAINLEVEL_OUTOF10: 7
PAINLEVEL_OUTOF10: 8
PAINLEVEL_OUTOF10: 0
PAINLEVEL_OUTOF10: 8
PAINLEVEL_OUTOF10: 7
PAINLEVEL_OUTOF10: 9
PAINLEVEL_OUTOF10: 6
PAINLEVEL_OUTOF10: 8
PAINLEVEL_OUTOF10: 7

## 2017-07-08 ASSESSMENT — PAIN DESCRIPTION - ORIENTATION: ORIENTATION: LOWER

## 2017-07-08 ASSESSMENT — PAIN DESCRIPTION - LOCATION: LOCATION: ABDOMEN

## 2017-07-09 ENCOUNTER — APPOINTMENT (OUTPATIENT)
Dept: GENERAL RADIOLOGY | Age: 47
DRG: 254 | End: 2017-07-09
Payer: MEDICAID

## 2017-07-09 LAB
CULTURE: NORMAL
CULTURE: NORMAL
Lab: NORMAL
SPECIMEN DESCRIPTION: NORMAL
STATUS: NORMAL

## 2017-07-09 PROCEDURE — 2580000003 HC RX 258: Performed by: NURSE PRACTITIONER

## 2017-07-09 PROCEDURE — 6370000000 HC RX 637 (ALT 250 FOR IP): Performed by: SURGERY

## 2017-07-09 PROCEDURE — 2060000000 HC ICU INTERMEDIATE R&B

## 2017-07-09 PROCEDURE — 6370000000 HC RX 637 (ALT 250 FOR IP): Performed by: INTERNAL MEDICINE

## 2017-07-09 PROCEDURE — 74020 XR ABDOMEN STANDARD: CPT

## 2017-07-09 PROCEDURE — 99233 SBSQ HOSP IP/OBS HIGH 50: CPT | Performed by: INTERNAL MEDICINE

## 2017-07-09 PROCEDURE — 6360000002 HC RX W HCPCS: Performed by: NURSE PRACTITIONER

## 2017-07-09 RX ORDER — FAMOTIDINE 20 MG/1
20 TABLET, FILM COATED ORAL 2 TIMES DAILY
Status: DISCONTINUED | OUTPATIENT
Start: 2017-07-09 | End: 2017-07-10 | Stop reason: HOSPADM

## 2017-07-09 RX ORDER — TRAMADOL HYDROCHLORIDE 50 MG/1
50 TABLET ORAL EVERY 6 HOURS PRN
Status: DISCONTINUED | OUTPATIENT
Start: 2017-07-09 | End: 2017-07-10 | Stop reason: HOSPADM

## 2017-07-09 RX ADMIN — HYDROMORPHONE HYDROCHLORIDE 0.5 MG: 1 INJECTION, SOLUTION INTRAMUSCULAR; INTRAVENOUS; SUBCUTANEOUS at 06:30

## 2017-07-09 RX ADMIN — Medication 10 ML: at 20:23

## 2017-07-09 RX ADMIN — FAMOTIDINE 20 MG: 20 TABLET, FILM COATED ORAL at 20:22

## 2017-07-09 RX ADMIN — ENOXAPARIN SODIUM 40 MG: 40 INJECTION SUBCUTANEOUS at 08:34

## 2017-07-09 RX ADMIN — HYDROMORPHONE HYDROCHLORIDE 0.5 MG: 1 INJECTION, SOLUTION INTRAMUSCULAR; INTRAVENOUS; SUBCUTANEOUS at 03:19

## 2017-07-09 RX ADMIN — HYDROMORPHONE HYDROCHLORIDE 0.5 MG: 1 INJECTION, SOLUTION INTRAMUSCULAR; INTRAVENOUS; SUBCUTANEOUS at 11:39

## 2017-07-09 RX ADMIN — FAMOTIDINE 20 MG: 20 TABLET, FILM COATED ORAL at 08:34

## 2017-07-09 RX ADMIN — TRAMADOL HYDROCHLORIDE 50 MG: 50 TABLET, FILM COATED ORAL at 14:05

## 2017-07-09 RX ADMIN — TRAMADOL HYDROCHLORIDE 50 MG: 50 TABLET, FILM COATED ORAL at 20:22

## 2017-07-09 RX ADMIN — ONDANSETRON 4 MG: 2 INJECTION INTRAMUSCULAR; INTRAVENOUS at 17:39

## 2017-07-09 ASSESSMENT — PAIN DESCRIPTION - DESCRIPTORS
DESCRIPTORS: STABBING
DESCRIPTORS: ACHING;CRAMPING

## 2017-07-09 ASSESSMENT — PAIN SCALES - GENERAL
PAINLEVEL_OUTOF10: 0
PAINLEVEL_OUTOF10: 8
PAINLEVEL_OUTOF10: 7
PAINLEVEL_OUTOF10: 7
PAINLEVEL_OUTOF10: 3
PAINLEVEL_OUTOF10: 7
PAINLEVEL_OUTOF10: 5
PAINLEVEL_OUTOF10: 0
PAINLEVEL_OUTOF10: 7

## 2017-07-09 ASSESSMENT — PAIN DESCRIPTION - PAIN TYPE
TYPE: ACUTE PAIN

## 2017-07-09 ASSESSMENT — PAIN DESCRIPTION - LOCATION
LOCATION: ABDOMEN

## 2017-07-09 ASSESSMENT — PAIN DESCRIPTION - PROGRESSION: CLINICAL_PROGRESSION: GRADUALLY WORSENING

## 2017-07-09 ASSESSMENT — PAIN DESCRIPTION - ORIENTATION
ORIENTATION: MID;LOWER
ORIENTATION: LOWER
ORIENTATION: LOWER;MID

## 2017-07-09 ASSESSMENT — PAIN DESCRIPTION - FREQUENCY
FREQUENCY: INTERMITTENT
FREQUENCY: CONTINUOUS

## 2017-07-09 ASSESSMENT — PAIN DESCRIPTION - ONSET: ONSET: ON-GOING

## 2017-07-10 ENCOUNTER — OFFICE VISIT (OUTPATIENT)
Dept: ONCOLOGY | Age: 47
End: 2017-07-10
Payer: MEDICAID

## 2017-07-10 ENCOUNTER — TELEPHONE (OUTPATIENT)
Dept: ONCOLOGY | Age: 47
End: 2017-07-10

## 2017-07-10 VITALS
WEIGHT: 147 LBS | OXYGEN SATURATION: 96 % | BODY MASS INDEX: 31.71 KG/M2 | RESPIRATION RATE: 16 BRPM | HEART RATE: 74 BPM | TEMPERATURE: 98.2 F | SYSTOLIC BLOOD PRESSURE: 117 MMHG | DIASTOLIC BLOOD PRESSURE: 59 MMHG | HEIGHT: 57 IN

## 2017-07-10 VITALS
WEIGHT: 148.5 LBS | DIASTOLIC BLOOD PRESSURE: 75 MMHG | HEART RATE: 96 BPM | BODY MASS INDEX: 32.14 KG/M2 | TEMPERATURE: 97.9 F | SYSTOLIC BLOOD PRESSURE: 123 MMHG | RESPIRATION RATE: 20 BRPM

## 2017-07-10 DIAGNOSIS — D72.829 LEUKOCYTOSIS, UNSPECIFIED TYPE: Primary | ICD-10-CM

## 2017-07-10 LAB
ABSOLUTE EOS #: 0.2 K/UL (ref 0–0.4)
ABSOLUTE LYMPH #: 1.5 K/UL (ref 1–4.8)
ABSOLUTE MONO #: 1 K/UL (ref 0.2–0.8)
BASOPHILS # BLD: 0 %
BASOPHILS ABSOLUTE: 0 K/UL (ref 0–0.2)
DIFFERENTIAL TYPE: ABNORMAL
EOSINOPHILS RELATIVE PERCENT: 1 %
HCT VFR BLD CALC: 30.6 % (ref 36–46)
HEMOGLOBIN: 9.7 G/DL (ref 12–16)
LYMPHOCYTES # BLD: 9 %
MCH RBC QN AUTO: 26.1 PG (ref 26–34)
MCHC RBC AUTO-ENTMCNC: 31.7 G/DL (ref 31–37)
MCV RBC AUTO: 82.2 FL (ref 80–100)
MONOCYTES # BLD: 6 %
PDW BLD-RTO: 18.3 % (ref 11.5–14.5)
PLATELET # BLD: 643 K/UL (ref 130–400)
PLATELET ESTIMATE: ABNORMAL
PMV BLD AUTO: 6.5 FL (ref 6–12)
RBC # BLD: 3.73 M/UL (ref 4–5.2)
RBC # BLD: ABNORMAL 10*6/UL
SEG NEUTROPHILS: 84 %
SEGMENTED NEUTROPHILS ABSOLUTE COUNT: 14.1 K/UL (ref 1.8–7.7)
WBC # BLD: 16.8 K/UL (ref 3.5–11)
WBC # BLD: ABNORMAL 10*3/UL

## 2017-07-10 PROCEDURE — 99239 HOSP IP/OBS DSCHRG MGMT >30: CPT | Performed by: INTERNAL MEDICINE

## 2017-07-10 PROCEDURE — 99214 OFFICE O/P EST MOD 30 MIN: CPT | Performed by: INTERNAL MEDICINE

## 2017-07-10 PROCEDURE — 6370000000 HC RX 637 (ALT 250 FOR IP): Performed by: INTERNAL MEDICINE

## 2017-07-10 PROCEDURE — 36415 COLL VENOUS BLD VENIPUNCTURE: CPT

## 2017-07-10 PROCEDURE — 6360000002 HC RX W HCPCS: Performed by: NURSE PRACTITIONER

## 2017-07-10 PROCEDURE — 6370000000 HC RX 637 (ALT 250 FOR IP): Performed by: SURGERY

## 2017-07-10 PROCEDURE — 85025 COMPLETE CBC W/AUTO DIFF WBC: CPT

## 2017-07-10 PROCEDURE — 99211 OFF/OP EST MAY X REQ PHY/QHP: CPT

## 2017-07-10 PROCEDURE — 2580000003 HC RX 258: Performed by: NURSE PRACTITIONER

## 2017-07-10 RX ADMIN — Medication 10 ML: at 08:50

## 2017-07-10 RX ADMIN — FAMOTIDINE 20 MG: 20 TABLET, FILM COATED ORAL at 08:50

## 2017-07-10 RX ADMIN — TRAMADOL HYDROCHLORIDE 50 MG: 50 TABLET, FILM COATED ORAL at 08:49

## 2017-07-10 RX ADMIN — ENOXAPARIN SODIUM 40 MG: 40 INJECTION SUBCUTANEOUS at 08:50

## 2017-07-10 ASSESSMENT — PAIN SCALES - GENERAL
PAINLEVEL_OUTOF10: 5
PAINLEVEL_OUTOF10: 7

## 2017-07-11 ENCOUNTER — HOSPITAL ENCOUNTER (OUTPATIENT)
Dept: PREADMISSION TESTING | Age: 47
Discharge: HOME | End: 2017-07-11
Payer: MEDICAID

## 2017-07-11 ENCOUNTER — TELEPHONE (OUTPATIENT)
Dept: INTERNAL MEDICINE | Age: 47
End: 2017-07-11

## 2017-07-11 VITALS
DIASTOLIC BLOOD PRESSURE: 72 MMHG | SYSTOLIC BLOOD PRESSURE: 115 MMHG | BODY MASS INDEX: 31.44 KG/M2 | RESPIRATION RATE: 16 BRPM | OXYGEN SATURATION: 99 % | WEIGHT: 145.72 LBS | HEART RATE: 104 BPM | HEIGHT: 57 IN

## 2017-07-11 RX ORDER — LORATADINE 10 MG/1
TABLET ORAL
Qty: 30 TABLET | Refills: 0 | Status: SHIPPED | OUTPATIENT
Start: 2017-07-11 | End: 2017-08-01 | Stop reason: SDUPTHER

## 2017-07-11 RX ORDER — CITALOPRAM 40 MG/1
TABLET ORAL
Qty: 30 TABLET | Refills: 0 | Status: SHIPPED | OUTPATIENT
Start: 2017-07-11 | End: 2017-08-01 | Stop reason: SDUPTHER

## 2017-07-11 RX ORDER — FERROUS SULFATE 325(65) MG
TABLET ORAL
Qty: 60 TABLET | Refills: 0 | Status: SHIPPED | OUTPATIENT
Start: 2017-07-11 | End: 2017-08-01 | Stop reason: SDUPTHER

## 2017-07-11 RX ORDER — SENNA-LAX 8.6 MG/1
TABLET ORAL
Qty: 60 TABLET | Refills: 0 | Status: SHIPPED | OUTPATIENT
Start: 2017-07-11 | End: 2017-08-01 | Stop reason: SDUPTHER

## 2017-07-11 RX ORDER — FOLIC ACID 1 MG/1
TABLET ORAL
Qty: 30 TABLET | Refills: 0 | Status: SHIPPED | OUTPATIENT
Start: 2017-07-11 | End: 2017-08-01 | Stop reason: SDUPTHER

## 2017-07-11 ASSESSMENT — PAIN DESCRIPTION - PAIN TYPE: TYPE: ACUTE PAIN

## 2017-07-11 ASSESSMENT — PAIN DESCRIPTION - DESCRIPTORS: DESCRIPTORS: SQUEEZING;PRESSURE;CONSTANT

## 2017-07-11 ASSESSMENT — PAIN SCALES - GENERAL: PAINLEVEL_OUTOF10: 4

## 2017-07-11 ASSESSMENT — PAIN DESCRIPTION - LOCATION: LOCATION: ABDOMEN

## 2017-07-11 ASSESSMENT — PAIN DESCRIPTION - PROGRESSION: CLINICAL_PROGRESSION: GRADUALLY WORSENING

## 2017-07-11 ASSESSMENT — PAIN DESCRIPTION - FREQUENCY: FREQUENCY: CONTINUOUS

## 2017-07-12 ENCOUNTER — TELEPHONE (OUTPATIENT)
Dept: GASTROENTEROLOGY | Age: 47
End: 2017-07-12

## 2017-07-14 ENCOUNTER — OFFICE VISIT (OUTPATIENT)
Dept: INTERNAL MEDICINE | Age: 47
End: 2017-07-14
Payer: MEDICAID

## 2017-07-14 VITALS
SYSTOLIC BLOOD PRESSURE: 117 MMHG | HEART RATE: 99 BPM | WEIGHT: 139.8 LBS | HEIGHT: 57 IN | DIASTOLIC BLOOD PRESSURE: 80 MMHG | BODY MASS INDEX: 30.16 KG/M2

## 2017-07-14 DIAGNOSIS — K56.600 PARTIAL SMALL BOWEL OBSTRUCTION (HCC): Primary | ICD-10-CM

## 2017-07-14 PROBLEM — D72.829 LEUCOCYTOSIS: Status: RESOLVED | Noted: 2017-02-03 | Resolved: 2017-07-14

## 2017-07-14 PROBLEM — R11.2 NAUSEA & VOMITING: Status: RESOLVED | Noted: 2017-02-03 | Resolved: 2017-07-14

## 2017-07-14 PROBLEM — R50.9 FEVER: Status: RESOLVED | Noted: 2017-06-29 | Resolved: 2017-07-14

## 2017-07-14 PROBLEM — M75.80 ROTATOR CUFF TENDINITIS: Status: RESOLVED | Noted: 2017-04-03 | Resolved: 2017-07-14

## 2017-07-14 PROBLEM — K52.9 COLITIS: Status: RESOLVED | Noted: 2017-05-20 | Resolved: 2017-07-14

## 2017-07-14 PROBLEM — D72.825 BANDEMIA: Status: RESOLVED | Noted: 2017-05-20 | Resolved: 2017-07-14

## 2017-07-14 PROBLEM — N30.00 ACUTE CYSTITIS WITHOUT HEMATURIA: Status: RESOLVED | Noted: 2017-06-11 | Resolved: 2017-07-14

## 2017-07-14 PROBLEM — I95.9 HYPOTENSION: Status: RESOLVED | Noted: 2017-05-20 | Resolved: 2017-07-14

## 2017-07-14 PROCEDURE — 99213 OFFICE O/P EST LOW 20 MIN: CPT | Performed by: INTERNAL MEDICINE

## 2017-07-16 ENCOUNTER — APPOINTMENT (OUTPATIENT)
Dept: CT IMAGING | Age: 47
DRG: 227 | End: 2017-07-16
Payer: MEDICAID

## 2017-07-16 ENCOUNTER — HOSPITAL ENCOUNTER (INPATIENT)
Age: 47
LOS: 4 days | Discharge: HOME HEALTH | DRG: 227 | End: 2017-07-20
Attending: EMERGENCY MEDICINE | Admitting: SURGERY
Payer: MEDICAID

## 2017-07-16 ENCOUNTER — APPOINTMENT (OUTPATIENT)
Dept: GENERAL RADIOLOGY | Age: 47
DRG: 227 | End: 2017-07-16
Payer: MEDICAID

## 2017-07-16 DIAGNOSIS — K43.2 INCISIONAL HERNIA, WITHOUT OBSTRUCTION OR GANGRENE: Primary | ICD-10-CM

## 2017-07-16 DIAGNOSIS — K59.09 OTHER CONSTIPATION: ICD-10-CM

## 2017-07-16 LAB
ABSOLUTE EOS #: 0 K/UL (ref 0–0.4)
ABSOLUTE LYMPH #: 0.25 K/UL (ref 1–4.8)
ABSOLUTE MONO #: 0.25 K/UL (ref 0.1–0.8)
ALBUMIN SERPL-MCNC: 3.5 G/DL (ref 3.5–5.2)
ALBUMIN/GLOBULIN RATIO: 0.7 (ref 1–2.5)
ALP BLD-CCNC: 334 U/L (ref 35–104)
ALT SERPL-CCNC: 11 U/L (ref 5–33)
ANION GAP SERPL CALCULATED.3IONS-SCNC: 19 MMOL/L (ref 9–17)
AST SERPL-CCNC: 12 U/L
BASOPHILS # BLD: 0 %
BASOPHILS ABSOLUTE: 0 K/UL (ref 0–0.2)
BILIRUB SERPL-MCNC: 0.36 MG/DL (ref 0.3–1.2)
BUN BLDV-MCNC: 13 MG/DL (ref 6–20)
BUN/CREAT BLD: ABNORMAL (ref 9–20)
CALCIUM SERPL-MCNC: 9.6 MG/DL (ref 8.6–10.4)
CHLORIDE BLD-SCNC: 96 MMOL/L (ref 98–107)
CO2: 21 MMOL/L (ref 20–31)
CREAT SERPL-MCNC: 0.73 MG/DL (ref 0.5–0.9)
DIFFERENTIAL TYPE: ABNORMAL
EOSINOPHILS RELATIVE PERCENT: 0 %
GFR AFRICAN AMERICAN: >60 ML/MIN
GFR NON-AFRICAN AMERICAN: >60 ML/MIN
GFR SERPL CREATININE-BSD FRML MDRD: ABNORMAL ML/MIN/{1.73_M2}
GFR SERPL CREATININE-BSD FRML MDRD: ABNORMAL ML/MIN/{1.73_M2}
GLUCOSE BLD-MCNC: 125 MG/DL (ref 70–99)
HCG QUALITATIVE: NEGATIVE
HCT VFR BLD CALC: 32.4 % (ref 36–46)
HEMOGLOBIN: 10.4 G/DL (ref 12–16)
LACTIC ACID, WHOLE BLOOD: 1 MMOL/L (ref 0.7–2.1)
LIPASE: 35 U/L (ref 13–60)
LYMPHOCYTES # BLD: 1 %
MCH RBC QN AUTO: 26.1 PG (ref 26–34)
MCHC RBC AUTO-ENTMCNC: 32.2 G/DL (ref 31–37)
MCV RBC AUTO: 80.9 FL (ref 80–100)
MONOCYTES # BLD: 1 %
MORPHOLOGY: ABNORMAL
PDW BLD-RTO: 18.4 % (ref 12.5–15.4)
PLATELET # BLD: 654 K/UL (ref 140–450)
PLATELET ESTIMATE: ABNORMAL
PMV BLD AUTO: 7.2 FL (ref 6–12)
POTASSIUM SERPL-SCNC: 4.1 MMOL/L (ref 3.7–5.3)
RBC # BLD: 4 M/UL (ref 4–5.2)
RBC # BLD: ABNORMAL 10*6/UL
SEG NEUTROPHILS: 98 %
SEGMENTED NEUTROPHILS ABSOLUTE COUNT: 24.4 K/UL (ref 1.8–7.7)
SODIUM BLD-SCNC: 136 MMOL/L (ref 135–144)
TOTAL PROTEIN: 8.4 G/DL (ref 6.4–8.3)
WBC # BLD: 24.9 K/UL (ref 3.5–11)
WBC # BLD: ABNORMAL 10*3/UL

## 2017-07-16 PROCEDURE — 6360000004 HC RX CONTRAST MEDICATION: Performed by: EMERGENCY MEDICINE

## 2017-07-16 PROCEDURE — 96375 TX/PRO/DX INJ NEW DRUG ADDON: CPT

## 2017-07-16 PROCEDURE — 84703 CHORIONIC GONADOTROPIN ASSAY: CPT

## 2017-07-16 PROCEDURE — 1200000000 HC SEMI PRIVATE

## 2017-07-16 PROCEDURE — 85025 COMPLETE CBC W/AUTO DIFF WBC: CPT

## 2017-07-16 PROCEDURE — 2580000003 HC RX 258: Performed by: SURGERY

## 2017-07-16 PROCEDURE — 96374 THER/PROPH/DIAG INJ IV PUSH: CPT

## 2017-07-16 PROCEDURE — 83605 ASSAY OF LACTIC ACID: CPT

## 2017-07-16 PROCEDURE — 80053 COMPREHEN METABOLIC PANEL: CPT

## 2017-07-16 PROCEDURE — 6360000002 HC RX W HCPCS: Performed by: FAMILY MEDICINE

## 2017-07-16 PROCEDURE — 6370000000 HC RX 637 (ALT 250 FOR IP): Performed by: SURGERY

## 2017-07-16 PROCEDURE — 74177 CT ABD & PELVIS W/CONTRAST: CPT

## 2017-07-16 PROCEDURE — 74022 RADEX COMPL AQT ABD SERIES: CPT

## 2017-07-16 PROCEDURE — 6360000002 HC RX W HCPCS: Performed by: SURGERY

## 2017-07-16 PROCEDURE — 99285 EMERGENCY DEPT VISIT HI MDM: CPT

## 2017-07-16 PROCEDURE — 83690 ASSAY OF LIPASE: CPT

## 2017-07-16 PROCEDURE — 2580000003 HC RX 258: Performed by: FAMILY MEDICINE

## 2017-07-16 RX ORDER — DOCUSATE SODIUM 100 MG/1
100 CAPSULE, LIQUID FILLED ORAL 2 TIMES DAILY PRN
Status: DISCONTINUED | OUTPATIENT
Start: 2017-07-16 | End: 2017-07-20 | Stop reason: HOSPADM

## 2017-07-16 RX ORDER — POTASSIUM CHLORIDE 7.45 MG/ML
10 INJECTION INTRAVENOUS PRN
Status: DISCONTINUED | OUTPATIENT
Start: 2017-07-16 | End: 2017-07-20

## 2017-07-16 RX ORDER — SODIUM CHLORIDE 0.9 % (FLUSH) 0.9 %
10 SYRINGE (ML) INJECTION PRN
Status: DISCONTINUED | OUTPATIENT
Start: 2017-07-16 | End: 2017-07-20 | Stop reason: HOSPADM

## 2017-07-16 RX ORDER — SODIUM CHLORIDE 9 MG/ML
INJECTION, SOLUTION INTRAVENOUS CONTINUOUS
Status: DISCONTINUED | OUTPATIENT
Start: 2017-07-16 | End: 2017-07-18

## 2017-07-16 RX ORDER — MAGNESIUM SULFATE 1 G/100ML
1 INJECTION INTRAVENOUS PRN
Status: DISCONTINUED | OUTPATIENT
Start: 2017-07-16 | End: 2017-07-20 | Stop reason: HOSPADM

## 2017-07-16 RX ORDER — LANOLIN ALCOHOL/MO/W.PET/CERES
325 CREAM (GRAM) TOPICAL 2 TIMES DAILY WITH MEALS
Status: DISCONTINUED | OUTPATIENT
Start: 2017-07-16 | End: 2017-07-20 | Stop reason: HOSPADM

## 2017-07-16 RX ORDER — ONDANSETRON 2 MG/ML
4 INJECTION INTRAMUSCULAR; INTRAVENOUS ONCE
Status: COMPLETED | OUTPATIENT
Start: 2017-07-16 | End: 2017-07-16

## 2017-07-16 RX ORDER — CETIRIZINE HYDROCHLORIDE 10 MG/1
10 TABLET ORAL DAILY
Status: DISCONTINUED | OUTPATIENT
Start: 2017-07-16 | End: 2017-07-20 | Stop reason: HOSPADM

## 2017-07-16 RX ORDER — POTASSIUM CHLORIDE 20 MEQ/1
40 TABLET, EXTENDED RELEASE ORAL PRN
Status: DISCONTINUED | OUTPATIENT
Start: 2017-07-16 | End: 2017-07-20

## 2017-07-16 RX ORDER — SODIUM CHLORIDE 0.9 % (FLUSH) 0.9 %
10 SYRINGE (ML) INJECTION EVERY 12 HOURS SCHEDULED
Status: DISCONTINUED | OUTPATIENT
Start: 2017-07-16 | End: 2017-07-20 | Stop reason: HOSPADM

## 2017-07-16 RX ORDER — SENNA PLUS 8.6 MG/1
1 TABLET ORAL 2 TIMES DAILY
Status: DISCONTINUED | OUTPATIENT
Start: 2017-07-16 | End: 2017-07-20 | Stop reason: HOSPADM

## 2017-07-16 RX ORDER — CITALOPRAM 20 MG/1
40 TABLET ORAL DAILY
Status: DISCONTINUED | OUTPATIENT
Start: 2017-07-16 | End: 2017-07-20 | Stop reason: HOSPADM

## 2017-07-16 RX ORDER — MORPHINE SULFATE 2 MG/ML
2 INJECTION, SOLUTION INTRAMUSCULAR; INTRAVENOUS ONCE
Status: COMPLETED | OUTPATIENT
Start: 2017-07-16 | End: 2017-07-16

## 2017-07-16 RX ORDER — ONDANSETRON 2 MG/ML
4 INJECTION INTRAMUSCULAR; INTRAVENOUS EVERY 6 HOURS PRN
Status: DISCONTINUED | OUTPATIENT
Start: 2017-07-16 | End: 2017-07-20

## 2017-07-16 RX ORDER — POTASSIUM CHLORIDE 20MEQ/15ML
40 LIQUID (ML) ORAL PRN
Status: DISCONTINUED | OUTPATIENT
Start: 2017-07-16 | End: 2017-07-20

## 2017-07-16 RX ORDER — MORPHINE SULFATE 2 MG/ML
2 INJECTION, SOLUTION INTRAMUSCULAR; INTRAVENOUS
Status: DISCONTINUED | OUTPATIENT
Start: 2017-07-16 | End: 2017-07-20

## 2017-07-16 RX ORDER — METOCLOPRAMIDE 5 MG/1
5 TABLET ORAL
Status: DISCONTINUED | OUTPATIENT
Start: 2017-07-16 | End: 2017-07-20 | Stop reason: HOSPADM

## 2017-07-16 RX ORDER — FOLIC ACID 1 MG/1
1 TABLET ORAL DAILY
Status: DISCONTINUED | OUTPATIENT
Start: 2017-07-16 | End: 2017-07-20 | Stop reason: HOSPADM

## 2017-07-16 RX ORDER — PANTOPRAZOLE SODIUM 40 MG/1
40 TABLET, DELAYED RELEASE ORAL DAILY
Status: DISCONTINUED | OUTPATIENT
Start: 2017-07-16 | End: 2017-07-20 | Stop reason: HOSPADM

## 2017-07-16 RX ORDER — MORPHINE SULFATE 4 MG/ML
4 INJECTION, SOLUTION INTRAMUSCULAR; INTRAVENOUS
Status: DISCONTINUED | OUTPATIENT
Start: 2017-07-16 | End: 2017-07-20

## 2017-07-16 RX ORDER — ONDANSETRON 4 MG/1
4 TABLET, FILM COATED ORAL EVERY 8 HOURS PRN
Status: DISCONTINUED | OUTPATIENT
Start: 2017-07-16 | End: 2017-07-20 | Stop reason: HOSPADM

## 2017-07-16 RX ORDER — ECHINACEA PURPUREA EXTRACT 125 MG
1 TABLET ORAL PRN
Status: DISCONTINUED | OUTPATIENT
Start: 2017-07-16 | End: 2017-07-20 | Stop reason: HOSPADM

## 2017-07-16 RX ADMIN — MORPHINE SULFATE 2 MG: 2 INJECTION, SOLUTION INTRAMUSCULAR; INTRAVENOUS at 21:38

## 2017-07-16 RX ADMIN — Medication 1 TABLET: at 17:47

## 2017-07-16 RX ADMIN — SODIUM CHLORIDE: 9 INJECTION, SOLUTION INTRAVENOUS at 17:51

## 2017-07-16 RX ADMIN — METOCLOPRAMIDE 5 MG: 5 TABLET ORAL at 17:46

## 2017-07-16 RX ADMIN — SENNA 8.6 MG: 8.6 TABLET, COATED ORAL at 21:34

## 2017-07-16 RX ADMIN — SODIUM CHLORIDE: 9 INJECTION, SOLUTION INTRAVENOUS at 11:06

## 2017-07-16 RX ADMIN — IOVERSOL 130 ML: 741 INJECTION INTRA-ARTERIAL; INTRAVENOUS at 16:00

## 2017-07-16 RX ADMIN — MORPHINE SULFATE 2 MG: 2 INJECTION, SOLUTION INTRAMUSCULAR; INTRAVENOUS at 11:05

## 2017-07-16 RX ADMIN — FERROUS SULFATE TAB EC 325 MG (65 MG FE EQUIVALENT) 325 MG: 325 (65 FE) TABLET DELAYED RESPONSE at 17:47

## 2017-07-16 RX ADMIN — CETIRIZINE HYDROCHLORIDE 10 MG: 10 TABLET ORAL at 17:47

## 2017-07-16 RX ADMIN — CITALOPRAM 40 MG: 20 TABLET, FILM COATED ORAL at 17:47

## 2017-07-16 RX ADMIN — MORPHINE SULFATE 4 MG: 4 INJECTION, SOLUTION INTRAMUSCULAR; INTRAVENOUS at 18:07

## 2017-07-16 RX ADMIN — PANTOPRAZOLE SODIUM 40 MG: 40 TABLET, DELAYED RELEASE ORAL at 17:46

## 2017-07-16 RX ADMIN — ONDANSETRON 4 MG: 2 INJECTION, SOLUTION INTRAMUSCULAR; INTRAVENOUS at 11:05

## 2017-07-16 RX ADMIN — FOLIC ACID 1 MG: 1 TABLET ORAL at 17:47

## 2017-07-16 ASSESSMENT — PAIN SCALES - GENERAL
PAINLEVEL_OUTOF10: 7
PAINLEVEL_OUTOF10: 4
PAINLEVEL_OUTOF10: 10
PAINLEVEL_OUTOF10: 10
PAINLEVEL_OUTOF10: 9

## 2017-07-16 ASSESSMENT — PAIN DESCRIPTION - PAIN TYPE
TYPE: ACUTE PAIN
TYPE: ACUTE PAIN

## 2017-07-16 ASSESSMENT — PAIN DESCRIPTION - FREQUENCY
FREQUENCY: CONTINUOUS
FREQUENCY: CONTINUOUS

## 2017-07-16 ASSESSMENT — PAIN DESCRIPTION - DESCRIPTORS
DESCRIPTORS: SHARP;SHOOTING
DESCRIPTORS: SHARP

## 2017-07-16 ASSESSMENT — ENCOUNTER SYMPTOMS
BACK PAIN: 0
BLOOD IN STOOL: 0
COUGH: 0
VOMITING: 1
HEMOPTYSIS: 0
NAUSEA: 1
ABDOMINAL PAIN: 1
HEARTBURN: 0
SORE THROAT: 0
CONSTIPATION: 1
DIARRHEA: 0

## 2017-07-16 ASSESSMENT — PAIN DESCRIPTION - LOCATION
LOCATION: ABDOMEN
LOCATION: ABDOMEN

## 2017-07-16 ASSESSMENT — PAIN DESCRIPTION - ORIENTATION: ORIENTATION: LOWER;MID

## 2017-07-16 ASSESSMENT — PAIN DESCRIPTION - ONSET: ONSET: GRADUAL

## 2017-07-17 ENCOUNTER — ANESTHESIA EVENT (OUTPATIENT)
Dept: OPERATING ROOM | Age: 47
DRG: 227 | End: 2017-07-17
Payer: MEDICAID

## 2017-07-17 ENCOUNTER — APPOINTMENT (OUTPATIENT)
Dept: GENERAL RADIOLOGY | Age: 47
DRG: 227 | End: 2017-07-17
Payer: MEDICAID

## 2017-07-17 LAB
-: NORMAL
AMORPHOUS: NORMAL
ANION GAP SERPL CALCULATED.3IONS-SCNC: 14 MMOL/L (ref 9–17)
BACTERIA: NORMAL
BILIRUBIN URINE: NEGATIVE
BUN BLDV-MCNC: 11 MG/DL (ref 6–20)
BUN/CREAT BLD: ABNORMAL (ref 9–20)
CALCIUM SERPL-MCNC: 8.7 MG/DL (ref 8.6–10.4)
CASTS UA: NORMAL /LPF (ref 0–8)
CHLORIDE BLD-SCNC: 105 MMOL/L (ref 98–107)
CO2: 22 MMOL/L (ref 20–31)
COLOR: ABNORMAL
COMMENT UA: ABNORMAL
CREAT SERPL-MCNC: 0.68 MG/DL (ref 0.5–0.9)
CRYSTALS, UA: NORMAL /HPF
EPITHELIAL CELLS UA: NORMAL /HPF (ref 0–5)
GFR AFRICAN AMERICAN: >60 ML/MIN
GFR NON-AFRICAN AMERICAN: >60 ML/MIN
GFR SERPL CREATININE-BSD FRML MDRD: ABNORMAL ML/MIN/{1.73_M2}
GFR SERPL CREATININE-BSD FRML MDRD: ABNORMAL ML/MIN/{1.73_M2}
GLUCOSE BLD-MCNC: 74 MG/DL (ref 70–99)
GLUCOSE URINE: NEGATIVE
HCT VFR BLD CALC: 26.8 % (ref 36–46)
HEMOGLOBIN: 8.5 G/DL (ref 12–16)
KETONES, URINE: NEGATIVE
LACTIC ACID, WHOLE BLOOD: 0.5 MMOL/L (ref 0.7–2.1)
LACTIC ACID: ABNORMAL MMOL/L
LEUKOCYTE ESTERASE, URINE: NEGATIVE
MCH RBC QN AUTO: 25.9 PG (ref 26–34)
MCHC RBC AUTO-ENTMCNC: 31.6 G/DL (ref 31–37)
MCV RBC AUTO: 81.8 FL (ref 80–100)
MUCUS: NORMAL
NITRITE, URINE: NEGATIVE
OTHER OBSERVATIONS UA: NORMAL
PDW BLD-RTO: 18.7 % (ref 12.5–15.4)
PH UA: 6 (ref 5–8)
PLATELET # BLD: 548 K/UL (ref 140–450)
PMV BLD AUTO: 6.8 FL (ref 6–12)
POTASSIUM SERPL-SCNC: 3.4 MMOL/L (ref 3.7–5.3)
PROTEIN UA: ABNORMAL
RBC # BLD: 3.28 M/UL (ref 4–5.2)
RBC UA: 2 /HPF (ref 0–4)
RENAL EPITHELIAL, UA: NORMAL /HPF
SODIUM BLD-SCNC: 141 MMOL/L (ref 135–144)
SPECIFIC GRAVITY UA: 1.07 (ref 1–1.03)
TRICHOMONAS: NORMAL
TURBIDITY: CLEAR
URINE HGB: NEGATIVE
UROBILINOGEN, URINE: NORMAL
WBC # BLD: 12.3 K/UL (ref 3.5–11)
WBC UA: NORMAL /HPF (ref 0–5)
YEAST: NORMAL

## 2017-07-17 PROCEDURE — 81001 URINALYSIS AUTO W/SCOPE: CPT

## 2017-07-17 PROCEDURE — 74000 XR ABDOMEN LIMITED (KUB): CPT

## 2017-07-17 PROCEDURE — 83605 ASSAY OF LACTIC ACID: CPT

## 2017-07-17 PROCEDURE — 6360000002 HC RX W HCPCS: Performed by: SURGERY

## 2017-07-17 PROCEDURE — 2580000003 HC RX 258: Performed by: SURGERY

## 2017-07-17 PROCEDURE — 6370000000 HC RX 637 (ALT 250 FOR IP): Performed by: SURGERY

## 2017-07-17 PROCEDURE — 1200000000 HC SEMI PRIVATE

## 2017-07-17 PROCEDURE — 36415 COLL VENOUS BLD VENIPUNCTURE: CPT

## 2017-07-17 PROCEDURE — 80048 BASIC METABOLIC PNL TOTAL CA: CPT

## 2017-07-17 PROCEDURE — 85027 COMPLETE CBC AUTOMATED: CPT

## 2017-07-17 RX ADMIN — MORPHINE SULFATE 2 MG: 2 INJECTION, SOLUTION INTRAMUSCULAR; INTRAVENOUS at 20:48

## 2017-07-17 RX ADMIN — PANTOPRAZOLE SODIUM 40 MG: 40 TABLET, DELAYED RELEASE ORAL at 08:54

## 2017-07-17 RX ADMIN — METOCLOPRAMIDE 5 MG: 5 TABLET ORAL at 17:34

## 2017-07-17 RX ADMIN — FERROUS SULFATE TAB EC 325 MG (65 MG FE EQUIVALENT) 325 MG: 325 (65 FE) TABLET DELAYED RESPONSE at 17:35

## 2017-07-17 RX ADMIN — SODIUM CHLORIDE: 9 INJECTION, SOLUTION INTRAVENOUS at 19:50

## 2017-07-17 RX ADMIN — Medication 1 TABLET: at 08:55

## 2017-07-17 RX ADMIN — FOLIC ACID 1 MG: 1 TABLET ORAL at 08:55

## 2017-07-17 RX ADMIN — FERROUS SULFATE TAB EC 325 MG (65 MG FE EQUIVALENT) 325 MG: 325 (65 FE) TABLET DELAYED RESPONSE at 08:55

## 2017-07-17 RX ADMIN — MORPHINE SULFATE 4 MG: 4 INJECTION, SOLUTION INTRAMUSCULAR; INTRAVENOUS at 23:47

## 2017-07-17 RX ADMIN — SENNA 8.6 MG: 8.6 TABLET, COATED ORAL at 08:55

## 2017-07-17 RX ADMIN — MORPHINE SULFATE 2 MG: 2 INJECTION, SOLUTION INTRAMUSCULAR; INTRAVENOUS at 01:09

## 2017-07-17 RX ADMIN — MORPHINE SULFATE 2 MG: 2 INJECTION, SOLUTION INTRAMUSCULAR; INTRAVENOUS at 16:39

## 2017-07-17 RX ADMIN — POTASSIUM CHLORIDE 40 MEQ: 20 TABLET, EXTENDED RELEASE ORAL at 14:00

## 2017-07-17 RX ADMIN — CITALOPRAM 40 MG: 20 TABLET, FILM COATED ORAL at 08:55

## 2017-07-17 RX ADMIN — METOCLOPRAMIDE 5 MG: 5 TABLET ORAL at 06:22

## 2017-07-17 RX ADMIN — SODIUM CHLORIDE: 9 INJECTION, SOLUTION INTRAVENOUS at 06:18

## 2017-07-17 RX ADMIN — METOCLOPRAMIDE 5 MG: 5 TABLET ORAL at 12:46

## 2017-07-17 RX ADMIN — CETIRIZINE HYDROCHLORIDE 10 MG: 10 TABLET ORAL at 08:55

## 2017-07-17 RX ADMIN — SENNA 8.6 MG: 8.6 TABLET, COATED ORAL at 20:51

## 2017-07-17 RX ADMIN — MORPHINE SULFATE 2 MG: 2 INJECTION, SOLUTION INTRAMUSCULAR; INTRAVENOUS at 06:22

## 2017-07-17 RX ADMIN — MORPHINE SULFATE 2 MG: 2 INJECTION, SOLUTION INTRAMUSCULAR; INTRAVENOUS at 14:14

## 2017-07-17 ASSESSMENT — PAIN SCALES - GENERAL
PAINLEVEL_OUTOF10: 7
PAINLEVEL_OUTOF10: 0
PAINLEVEL_OUTOF10: 5
PAINLEVEL_OUTOF10: 8
PAINLEVEL_OUTOF10: 6
PAINLEVEL_OUTOF10: 0
PAINLEVEL_OUTOF10: 9
PAINLEVEL_OUTOF10: 9
PAINLEVEL_OUTOF10: 6

## 2017-07-17 ASSESSMENT — PAIN DESCRIPTION - ORIENTATION: ORIENTATION: LOWER;MID

## 2017-07-17 ASSESSMENT — PAIN DESCRIPTION - PAIN TYPE: TYPE: ACUTE PAIN

## 2017-07-17 ASSESSMENT — PAIN DESCRIPTION - LOCATION: LOCATION: ABDOMEN

## 2017-07-17 ASSESSMENT — PAIN DESCRIPTION - FREQUENCY: FREQUENCY: CONTINUOUS

## 2017-07-17 ASSESSMENT — ENCOUNTER SYMPTOMS: SHORTNESS OF BREATH: 1

## 2017-07-17 ASSESSMENT — PAIN DESCRIPTION - DESCRIPTORS: DESCRIPTORS: STABBING

## 2017-07-17 ASSESSMENT — COPD QUESTIONNAIRES: CAT_SEVERITY: MILD

## 2017-07-17 ASSESSMENT — PAIN DESCRIPTION - ONSET: ONSET: ON-GOING

## 2017-07-17 ASSESSMENT — PAIN DESCRIPTION - PROGRESSION: CLINICAL_PROGRESSION: NOT CHANGED

## 2017-07-18 ENCOUNTER — ANESTHESIA (OUTPATIENT)
Dept: OPERATING ROOM | Age: 47
DRG: 227 | End: 2017-07-18
Payer: MEDICAID

## 2017-07-18 VITALS — DIASTOLIC BLOOD PRESSURE: 94 MMHG | OXYGEN SATURATION: 100 % | SYSTOLIC BLOOD PRESSURE: 133 MMHG | TEMPERATURE: 98.1 F

## 2017-07-18 PROBLEM — K43.0 INCISIONAL HERNIA WITH BOWEL OBSTRUCTION: Status: ACTIVE | Noted: 2017-07-18

## 2017-07-18 LAB
ANION GAP SERPL CALCULATED.3IONS-SCNC: 12 MMOL/L (ref 9–17)
BUN BLDV-MCNC: 7 MG/DL (ref 6–20)
BUN/CREAT BLD: NORMAL (ref 9–20)
CALCIUM SERPL-MCNC: 8.7 MG/DL (ref 8.6–10.4)
CHLORIDE BLD-SCNC: 104 MMOL/L (ref 98–107)
CO2: 22 MMOL/L (ref 20–31)
CREAT SERPL-MCNC: 0.69 MG/DL (ref 0.5–0.9)
GFR AFRICAN AMERICAN: >60 ML/MIN
GFR NON-AFRICAN AMERICAN: >60 ML/MIN
GFR SERPL CREATININE-BSD FRML MDRD: NORMAL ML/MIN/{1.73_M2}
GFR SERPL CREATININE-BSD FRML MDRD: NORMAL ML/MIN/{1.73_M2}
GLUCOSE BLD-MCNC: 81 MG/DL (ref 70–99)
HCT VFR BLD CALC: 26.2 % (ref 36–46)
HEMOGLOBIN: 8.2 G/DL (ref 12–16)
MCH RBC QN AUTO: 26.1 PG (ref 26–34)
MCHC RBC AUTO-ENTMCNC: 31.5 G/DL (ref 31–37)
MCV RBC AUTO: 83 FL (ref 80–100)
PDW BLD-RTO: 18.9 % (ref 12.5–15.4)
PLATELET # BLD: 538 K/UL (ref 140–450)
PMV BLD AUTO: 7.1 FL (ref 6–12)
POTASSIUM SERPL-SCNC: 3.7 MMOL/L (ref 3.7–5.3)
RBC # BLD: 3.16 M/UL (ref 4–5.2)
SODIUM BLD-SCNC: 138 MMOL/L (ref 135–144)
WBC # BLD: 12.1 K/UL (ref 3.5–11)

## 2017-07-18 PROCEDURE — 7100000001 HC PACU RECOVERY - ADDTL 15 MIN: Performed by: SURGERY

## 2017-07-18 PROCEDURE — C1781 MESH (IMPLANTABLE): HCPCS | Performed by: SURGERY

## 2017-07-18 PROCEDURE — 2580000003 HC RX 258: Performed by: SURGERY

## 2017-07-18 PROCEDURE — 6370000000 HC RX 637 (ALT 250 FOR IP): Performed by: SURGERY

## 2017-07-18 PROCEDURE — 6360000002 HC RX W HCPCS: Performed by: SURGERY

## 2017-07-18 PROCEDURE — 7100000000 HC PACU RECOVERY - FIRST 15 MIN: Performed by: SURGERY

## 2017-07-18 PROCEDURE — 3600000014 HC SURGERY LEVEL 4 ADDTL 15MIN: Performed by: SURGERY

## 2017-07-18 PROCEDURE — 3700000000 HC ANESTHESIA ATTENDED CARE: Performed by: SURGERY

## 2017-07-18 PROCEDURE — 36415 COLL VENOUS BLD VENIPUNCTURE: CPT

## 2017-07-18 PROCEDURE — 86901 BLOOD TYPING SEROLOGIC RH(D): CPT

## 2017-07-18 PROCEDURE — 86920 COMPATIBILITY TEST SPIN: CPT

## 2017-07-18 PROCEDURE — 80048 BASIC METABOLIC PNL TOTAL CA: CPT

## 2017-07-18 PROCEDURE — 2500000003 HC RX 250 WO HCPCS: Performed by: NURSE ANESTHETIST, CERTIFIED REGISTERED

## 2017-07-18 PROCEDURE — 2580000003 HC RX 258: Performed by: NURSE ANESTHETIST, CERTIFIED REGISTERED

## 2017-07-18 PROCEDURE — 85027 COMPLETE CBC AUTOMATED: CPT

## 2017-07-18 PROCEDURE — 87086 URINE CULTURE/COLONY COUNT: CPT

## 2017-07-18 PROCEDURE — 3600000004 HC SURGERY LEVEL 4 BASE: Performed by: SURGERY

## 2017-07-18 PROCEDURE — 86850 RBC ANTIBODY SCREEN: CPT

## 2017-07-18 PROCEDURE — 1200000000 HC SEMI PRIVATE

## 2017-07-18 PROCEDURE — 6360000002 HC RX W HCPCS: Performed by: NURSE ANESTHETIST, CERTIFIED REGISTERED

## 2017-07-18 PROCEDURE — 0WUF0JZ SUPPLEMENT ABDOMINAL WALL WITH SYNTHETIC SUBSTITUTE, OPEN APPROACH: ICD-10-PCS | Performed by: SURGERY

## 2017-07-18 PROCEDURE — 6360000002 HC RX W HCPCS: Performed by: ANESTHESIOLOGY

## 2017-07-18 PROCEDURE — 86900 BLOOD TYPING SEROLOGIC ABO: CPT

## 2017-07-18 PROCEDURE — 2500000003 HC RX 250 WO HCPCS: Performed by: SURGERY

## 2017-07-18 PROCEDURE — 3700000001 HC ADD 15 MINUTES (ANESTHESIA): Performed by: SURGERY

## 2017-07-18 DEVICE — PATCH HERN L DIA3.2IN CIR W/ STRP SEPRA TECHNOLOGY ABSRB: Type: IMPLANTABLE DEVICE | Site: ABDOMEN | Status: FUNCTIONAL

## 2017-07-18 RX ORDER — ONDANSETRON 2 MG/ML
INJECTION INTRAMUSCULAR; INTRAVENOUS PRN
Status: DISCONTINUED | OUTPATIENT
Start: 2017-07-18 | End: 2017-07-18 | Stop reason: SDUPTHER

## 2017-07-18 RX ORDER — ROCURONIUM BROMIDE 10 MG/ML
INJECTION, SOLUTION INTRAVENOUS PRN
Status: DISCONTINUED | OUTPATIENT
Start: 2017-07-18 | End: 2017-07-18 | Stop reason: SDUPTHER

## 2017-07-18 RX ORDER — DEXAMETHASONE SODIUM PHOSPHATE 10 MG/ML
INJECTION INTRAMUSCULAR; INTRAVENOUS PRN
Status: DISCONTINUED | OUTPATIENT
Start: 2017-07-18 | End: 2017-07-18 | Stop reason: SDUPTHER

## 2017-07-18 RX ORDER — ONDANSETRON 2 MG/ML
4 INJECTION INTRAMUSCULAR; INTRAVENOUS
Status: DISCONTINUED | OUTPATIENT
Start: 2017-07-18 | End: 2017-07-18 | Stop reason: HOSPADM

## 2017-07-18 RX ORDER — PROPOFOL 10 MG/ML
INJECTION, EMULSION INTRAVENOUS PRN
Status: DISCONTINUED | OUTPATIENT
Start: 2017-07-18 | End: 2017-07-18 | Stop reason: SDUPTHER

## 2017-07-18 RX ORDER — FENTANYL CITRATE 50 UG/ML
INJECTION, SOLUTION INTRAMUSCULAR; INTRAVENOUS PRN
Status: DISCONTINUED | OUTPATIENT
Start: 2017-07-18 | End: 2017-07-18 | Stop reason: SDUPTHER

## 2017-07-18 RX ORDER — SUCCINYLCHOLINE CHLORIDE 20 MG/ML
INJECTION INTRAMUSCULAR; INTRAVENOUS PRN
Status: DISCONTINUED | OUTPATIENT
Start: 2017-07-18 | End: 2017-07-18 | Stop reason: SDUPTHER

## 2017-07-18 RX ORDER — MAGNESIUM HYDROXIDE 1200 MG/15ML
LIQUID ORAL CONTINUOUS PRN
Status: DISCONTINUED | OUTPATIENT
Start: 2017-07-18 | End: 2017-07-18 | Stop reason: HOSPADM

## 2017-07-18 RX ORDER — BUPIVACAINE HYDROCHLORIDE AND EPINEPHRINE 5; 5 MG/ML; UG/ML
INJECTION, SOLUTION EPIDURAL; INTRACAUDAL; PERINEURAL PRN
Status: DISCONTINUED | OUTPATIENT
Start: 2017-07-18 | End: 2017-07-18 | Stop reason: HOSPADM

## 2017-07-18 RX ORDER — LIDOCAINE HYDROCHLORIDE 10 MG/ML
INJECTION, SOLUTION EPIDURAL; INFILTRATION; INTRACAUDAL; PERINEURAL PRN
Status: DISCONTINUED | OUTPATIENT
Start: 2017-07-18 | End: 2017-07-18 | Stop reason: SDUPTHER

## 2017-07-18 RX ORDER — GLYCOPYRROLATE 0.2 MG/ML
INJECTION INTRAMUSCULAR; INTRAVENOUS PRN
Status: DISCONTINUED | OUTPATIENT
Start: 2017-07-18 | End: 2017-07-18 | Stop reason: SDUPTHER

## 2017-07-18 RX ORDER — SODIUM CHLORIDE, SODIUM LACTATE, POTASSIUM CHLORIDE, CALCIUM CHLORIDE 600; 310; 30; 20 MG/100ML; MG/100ML; MG/100ML; MG/100ML
INJECTION, SOLUTION INTRAVENOUS CONTINUOUS PRN
Status: DISCONTINUED | OUTPATIENT
Start: 2017-07-18 | End: 2017-07-18 | Stop reason: SDUPTHER

## 2017-07-18 RX ORDER — FENTANYL CITRATE 50 UG/ML
25 INJECTION, SOLUTION INTRAMUSCULAR; INTRAVENOUS EVERY 5 MIN PRN
Status: DISCONTINUED | OUTPATIENT
Start: 2017-07-18 | End: 2017-07-18 | Stop reason: HOSPADM

## 2017-07-18 RX ADMIN — MORPHINE SULFATE 4 MG: 4 INJECTION, SOLUTION INTRAMUSCULAR; INTRAVENOUS at 14:41

## 2017-07-18 RX ADMIN — SENNA 8.6 MG: 8.6 TABLET, COATED ORAL at 20:41

## 2017-07-18 RX ADMIN — MORPHINE SULFATE 4 MG: 4 INJECTION, SOLUTION INTRAMUSCULAR; INTRAVENOUS at 04:52

## 2017-07-18 RX ADMIN — GLYCOPYRROLATE 0.4 MG: 0.2 INJECTION, SOLUTION INTRAMUSCULAR; INTRAVENOUS at 13:21

## 2017-07-18 RX ADMIN — CITALOPRAM 40 MG: 20 TABLET, FILM COATED ORAL at 08:40

## 2017-07-18 RX ADMIN — MORPHINE SULFATE 4 MG: 4 INJECTION, SOLUTION INTRAMUSCULAR; INTRAVENOUS at 16:45

## 2017-07-18 RX ADMIN — FENTANYL CITRATE 50 MCG: 50 INJECTION INTRAMUSCULAR; INTRAVENOUS at 12:28

## 2017-07-18 RX ADMIN — Medication 2 G: at 12:19

## 2017-07-18 RX ADMIN — Medication 1 TABLET: at 08:40

## 2017-07-18 RX ADMIN — LIDOCAINE HYDROCHLORIDE 50 MG: 10 INJECTION, SOLUTION EPIDURAL; INFILTRATION; INTRACAUDAL; PERINEURAL at 12:05

## 2017-07-18 RX ADMIN — CETIRIZINE HYDROCHLORIDE 10 MG: 10 TABLET ORAL at 08:40

## 2017-07-18 RX ADMIN — MORPHINE SULFATE 4 MG: 4 INJECTION, SOLUTION INTRAMUSCULAR; INTRAVENOUS at 06:37

## 2017-07-18 RX ADMIN — SODIUM CHLORIDE, POTASSIUM CHLORIDE, SODIUM LACTATE AND CALCIUM CHLORIDE: 600; 310; 30; 20 INJECTION, SOLUTION INTRAVENOUS at 12:04

## 2017-07-18 RX ADMIN — MORPHINE SULFATE 2 MG: 2 INJECTION, SOLUTION INTRAMUSCULAR; INTRAVENOUS at 08:47

## 2017-07-18 RX ADMIN — METOCLOPRAMIDE 5 MG: 5 TABLET ORAL at 16:49

## 2017-07-18 RX ADMIN — NEOSTIGMINE METHYLSULFATE 3 MG: 1 INJECTION, SOLUTION INTRAMUSCULAR; INTRAVENOUS; SUBCUTANEOUS at 13:21

## 2017-07-18 RX ADMIN — ONDANSETRON 4 MG: 2 INJECTION, SOLUTION INTRAMUSCULAR; INTRAVENOUS at 13:20

## 2017-07-18 RX ADMIN — PROPOFOL 150 MG: 10 INJECTION, EMULSION INTRAVENOUS at 12:05

## 2017-07-18 RX ADMIN — Medication 10 ML: at 20:41

## 2017-07-18 RX ADMIN — MORPHINE SULFATE 4 MG: 4 INJECTION, SOLUTION INTRAMUSCULAR; INTRAVENOUS at 23:01

## 2017-07-18 RX ADMIN — FENTANYL CITRATE 25 MCG: 50 INJECTION, SOLUTION INTRAMUSCULAR; INTRAVENOUS at 14:13

## 2017-07-18 RX ADMIN — GLYCOPYRROLATE 0.4 MG: 0.2 INJECTION, SOLUTION INTRAMUSCULAR; INTRAVENOUS at 13:34

## 2017-07-18 RX ADMIN — FERROUS SULFATE TAB EC 325 MG (65 MG FE EQUIVALENT) 325 MG: 325 (65 FE) TABLET DELAYED RESPONSE at 08:40

## 2017-07-18 RX ADMIN — FOLIC ACID 1 MG: 1 TABLET ORAL at 08:40

## 2017-07-18 RX ADMIN — METOCLOPRAMIDE 5 MG: 5 TABLET ORAL at 06:37

## 2017-07-18 RX ADMIN — PANTOPRAZOLE SODIUM 40 MG: 40 TABLET, DELAYED RELEASE ORAL at 08:40

## 2017-07-18 RX ADMIN — ROCURONIUM BROMIDE 10 MG: 10 INJECTION INTRAVENOUS at 12:46

## 2017-07-18 RX ADMIN — MORPHINE SULFATE 4 MG: 4 INJECTION, SOLUTION INTRAMUSCULAR; INTRAVENOUS at 20:54

## 2017-07-18 RX ADMIN — NEOSTIGMINE METHYLSULFATE 2 MG: 1 INJECTION, SOLUTION INTRAMUSCULAR; INTRAVENOUS; SUBCUTANEOUS at 13:34

## 2017-07-18 RX ADMIN — FENTANYL CITRATE 100 MCG: 50 INJECTION INTRAMUSCULAR; INTRAVENOUS at 12:05

## 2017-07-18 RX ADMIN — ROCURONIUM BROMIDE 50 MG: 10 INJECTION INTRAVENOUS at 12:25

## 2017-07-18 RX ADMIN — DEXAMETHASONE SODIUM PHOSPHATE 10 MG: 10 INJECTION INTRAMUSCULAR; INTRAVENOUS at 12:36

## 2017-07-18 RX ADMIN — FENTANYL CITRATE 50 MCG: 50 INJECTION INTRAMUSCULAR; INTRAVENOUS at 12:51

## 2017-07-18 RX ADMIN — MORPHINE SULFATE 4 MG: 4 INJECTION, SOLUTION INTRAMUSCULAR; INTRAVENOUS at 18:47

## 2017-07-18 RX ADMIN — SENNA 8.6 MG: 8.6 TABLET, COATED ORAL at 08:40

## 2017-07-18 RX ADMIN — SUCCINYLCHOLINE CHLORIDE 100 MG: 20 INJECTION, SOLUTION INTRAMUSCULAR; INTRAVENOUS at 12:05

## 2017-07-18 RX ADMIN — MORPHINE SULFATE 4 MG: 4 INJECTION, SOLUTION INTRAMUSCULAR; INTRAVENOUS at 02:25

## 2017-07-18 RX ADMIN — FERROUS SULFATE TAB EC 325 MG (65 MG FE EQUIVALENT) 325 MG: 325 (65 FE) TABLET DELAYED RESPONSE at 16:49

## 2017-07-18 ASSESSMENT — PAIN SCALES - GENERAL
PAINLEVEL_OUTOF10: 2
PAINLEVEL_OUTOF10: 7
PAINLEVEL_OUTOF10: 9
PAINLEVEL_OUTOF10: 8
PAINLEVEL_OUTOF10: 2
PAINLEVEL_OUTOF10: 8
PAINLEVEL_OUTOF10: 9
PAINLEVEL_OUTOF10: 5
PAINLEVEL_OUTOF10: 8
PAINLEVEL_OUTOF10: 2
PAINLEVEL_OUTOF10: 8
PAINLEVEL_OUTOF10: 4
PAINLEVEL_OUTOF10: 4
PAINLEVEL_OUTOF10: 7
PAINLEVEL_OUTOF10: 9
PAINLEVEL_OUTOF10: 2
PAINLEVEL_OUTOF10: 5
PAINLEVEL_OUTOF10: 9

## 2017-07-18 ASSESSMENT — PAIN SCALES - WONG BAKER: WONGBAKER_NUMERICALRESPONSE: 0

## 2017-07-18 ASSESSMENT — PAIN DESCRIPTION - PAIN TYPE: TYPE: ACUTE PAIN

## 2017-07-19 LAB
ALBUMIN SERPL-MCNC: 3.1 G/DL (ref 3.5–5.2)
ALBUMIN/GLOBULIN RATIO: 0.7 (ref 1–2.5)
ALP BLD-CCNC: 315 U/L (ref 35–104)
ALT SERPL-CCNC: 11 U/L (ref 5–33)
ANION GAP SERPL CALCULATED.3IONS-SCNC: 16 MMOL/L (ref 9–17)
ANION GAP SERPL CALCULATED.3IONS-SCNC: 16 MMOL/L (ref 9–17)
AST SERPL-CCNC: 25 U/L
BILIRUB SERPL-MCNC: 0.19 MG/DL (ref 0.3–1.2)
BUN BLDV-MCNC: 9 MG/DL (ref 6–20)
BUN BLDV-MCNC: 9 MG/DL (ref 6–20)
BUN/CREAT BLD: ABNORMAL (ref 9–20)
BUN/CREAT BLD: ABNORMAL (ref 9–20)
CALCIUM SERPL-MCNC: 8.9 MG/DL (ref 8.6–10.4)
CALCIUM SERPL-MCNC: 9 MG/DL (ref 8.6–10.4)
CHLORIDE BLD-SCNC: 106 MMOL/L (ref 98–107)
CHLORIDE BLD-SCNC: 98 MMOL/L (ref 98–107)
CO2: 20 MMOL/L (ref 20–31)
CO2: 24 MMOL/L (ref 20–31)
CREAT SERPL-MCNC: 0.66 MG/DL (ref 0.5–0.9)
CREAT SERPL-MCNC: 0.73 MG/DL (ref 0.5–0.9)
CULTURE: NO GROWTH
CULTURE: NORMAL
GFR AFRICAN AMERICAN: >60 ML/MIN
GFR AFRICAN AMERICAN: >60 ML/MIN
GFR NON-AFRICAN AMERICAN: >60 ML/MIN
GFR NON-AFRICAN AMERICAN: >60 ML/MIN
GFR SERPL CREATININE-BSD FRML MDRD: ABNORMAL ML/MIN/{1.73_M2}
GLUCOSE BLD-MCNC: 107 MG/DL (ref 70–99)
GLUCOSE BLD-MCNC: 121 MG/DL (ref 70–99)
HCT VFR BLD CALC: 27 % (ref 36–46)
HEMOGLOBIN: 8.4 G/DL (ref 12–16)
Lab: NORMAL
MCH RBC QN AUTO: 26 PG (ref 26–34)
MCHC RBC AUTO-ENTMCNC: 31.1 G/DL (ref 31–37)
MCV RBC AUTO: 83.7 FL (ref 80–100)
PDW BLD-RTO: 18.6 % (ref 12.5–15.4)
PLATELET # BLD: 540 K/UL (ref 140–450)
PMV BLD AUTO: 6.7 FL (ref 6–12)
POTASSIUM SERPL-SCNC: 4.1 MMOL/L (ref 3.7–5.3)
POTASSIUM SERPL-SCNC: 5.2 MMOL/L (ref 3.7–5.3)
RBC # BLD: 3.22 M/UL (ref 4–5.2)
SODIUM BLD-SCNC: 138 MMOL/L (ref 135–144)
SODIUM BLD-SCNC: 142 MMOL/L (ref 135–144)
SPECIMEN DESCRIPTION: NORMAL
STATUS: NORMAL
TOTAL PROTEIN: 7.5 G/DL (ref 6.4–8.3)
WBC # BLD: 25.7 K/UL (ref 3.5–11)

## 2017-07-19 PROCEDURE — 2580000003 HC RX 258: Performed by: STUDENT IN AN ORGANIZED HEALTH CARE EDUCATION/TRAINING PROGRAM

## 2017-07-19 PROCEDURE — 36415 COLL VENOUS BLD VENIPUNCTURE: CPT

## 2017-07-19 PROCEDURE — 1200000000 HC SEMI PRIVATE

## 2017-07-19 PROCEDURE — 6370000000 HC RX 637 (ALT 250 FOR IP): Performed by: STUDENT IN AN ORGANIZED HEALTH CARE EDUCATION/TRAINING PROGRAM

## 2017-07-19 PROCEDURE — 80053 COMPREHEN METABOLIC PANEL: CPT

## 2017-07-19 PROCEDURE — 6370000000 HC RX 637 (ALT 250 FOR IP): Performed by: SURGERY

## 2017-07-19 PROCEDURE — 6360000002 HC RX W HCPCS: Performed by: SURGERY

## 2017-07-19 PROCEDURE — 85027 COMPLETE CBC AUTOMATED: CPT

## 2017-07-19 PROCEDURE — 80048 BASIC METABOLIC PNL TOTAL CA: CPT

## 2017-07-19 RX ORDER — BISACODYL 10 MG
10 SUPPOSITORY, RECTAL RECTAL DAILY PRN
Status: DISCONTINUED | OUTPATIENT
Start: 2017-07-19 | End: 2017-07-20 | Stop reason: HOSPADM

## 2017-07-19 RX ORDER — SODIUM CHLORIDE 9 MG/ML
INJECTION, SOLUTION INTRAVENOUS CONTINUOUS
Status: DISCONTINUED | OUTPATIENT
Start: 2017-07-19 | End: 2017-07-20

## 2017-07-19 RX ORDER — OXYCODONE HYDROCHLORIDE 5 MG/1
5 TABLET ORAL EVERY 6 HOURS PRN
Qty: 40 TABLET | Refills: 0 | Status: SHIPPED | OUTPATIENT
Start: 2017-07-19 | End: 2017-08-09

## 2017-07-19 RX ORDER — OXYCODONE HYDROCHLORIDE 5 MG/1
5 TABLET ORAL EVERY 4 HOURS PRN
Status: DISCONTINUED | OUTPATIENT
Start: 2017-07-19 | End: 2017-07-20

## 2017-07-19 RX ADMIN — MORPHINE SULFATE 4 MG: 4 INJECTION, SOLUTION INTRAMUSCULAR; INTRAVENOUS at 21:45

## 2017-07-19 RX ADMIN — MORPHINE SULFATE 4 MG: 4 INJECTION, SOLUTION INTRAMUSCULAR; INTRAVENOUS at 05:43

## 2017-07-19 RX ADMIN — FERROUS SULFATE TAB EC 325 MG (65 MG FE EQUIVALENT) 325 MG: 325 (65 FE) TABLET DELAYED RESPONSE at 08:12

## 2017-07-19 RX ADMIN — ONDANSETRON 4 MG: 2 INJECTION, SOLUTION INTRAMUSCULAR; INTRAVENOUS at 23:14

## 2017-07-19 RX ADMIN — MORPHINE SULFATE 4 MG: 4 INJECTION, SOLUTION INTRAMUSCULAR; INTRAVENOUS at 01:24

## 2017-07-19 RX ADMIN — SENNA 8.6 MG: 8.6 TABLET, COATED ORAL at 08:12

## 2017-07-19 RX ADMIN — MORPHINE SULFATE 4 MG: 4 INJECTION, SOLUTION INTRAMUSCULAR; INTRAVENOUS at 17:29

## 2017-07-19 RX ADMIN — SENNA 8.6 MG: 8.6 TABLET, COATED ORAL at 23:03

## 2017-07-19 RX ADMIN — CETIRIZINE HYDROCHLORIDE 10 MG: 10 TABLET ORAL at 08:12

## 2017-07-19 RX ADMIN — PANTOPRAZOLE SODIUM 40 MG: 40 TABLET, DELAYED RELEASE ORAL at 08:12

## 2017-07-19 RX ADMIN — MORPHINE SULFATE 4 MG: 4 INJECTION, SOLUTION INTRAMUSCULAR; INTRAVENOUS at 07:55

## 2017-07-19 RX ADMIN — Medication 1 TABLET: at 08:12

## 2017-07-19 RX ADMIN — FOLIC ACID 1 MG: 1 TABLET ORAL at 08:12

## 2017-07-19 RX ADMIN — METOCLOPRAMIDE 5 MG: 5 TABLET ORAL at 11:10

## 2017-07-19 RX ADMIN — MORPHINE SULFATE 4 MG: 4 INJECTION, SOLUTION INTRAMUSCULAR; INTRAVENOUS at 03:27

## 2017-07-19 RX ADMIN — ONDANSETRON HYDROCHLORIDE 4 MG: 4 TABLET, FILM COATED ORAL at 12:06

## 2017-07-19 RX ADMIN — OXYCODONE HYDROCHLORIDE 5 MG: 5 TABLET ORAL at 16:41

## 2017-07-19 RX ADMIN — CITALOPRAM 40 MG: 20 TABLET, FILM COATED ORAL at 08:12

## 2017-07-19 RX ADMIN — OXYCODONE HYDROCHLORIDE 5 MG: 5 TABLET ORAL at 12:30

## 2017-07-19 RX ADMIN — OXYCODONE HYDROCHLORIDE 5 MG: 5 TABLET ORAL at 20:45

## 2017-07-19 RX ADMIN — SODIUM CHLORIDE: 9 INJECTION, SOLUTION INTRAVENOUS at 23:10

## 2017-07-19 RX ADMIN — MORPHINE SULFATE 4 MG: 4 INJECTION, SOLUTION INTRAMUSCULAR; INTRAVENOUS at 18:54

## 2017-07-19 RX ADMIN — METOCLOPRAMIDE 5 MG: 5 TABLET ORAL at 06:25

## 2017-07-19 RX ADMIN — OXYCODONE HYDROCHLORIDE 5 MG: 5 TABLET ORAL at 08:24

## 2017-07-19 ASSESSMENT — PAIN SCALES - GENERAL
PAINLEVEL_OUTOF10: 6
PAINLEVEL_OUTOF10: 8
PAINLEVEL_OUTOF10: 2
PAINLEVEL_OUTOF10: 9
PAINLEVEL_OUTOF10: 8
PAINLEVEL_OUTOF10: 9
PAINLEVEL_OUTOF10: 8
PAINLEVEL_OUTOF10: 7
PAINLEVEL_OUTOF10: 2
PAINLEVEL_OUTOF10: 8
PAINLEVEL_OUTOF10: 8
PAINLEVEL_OUTOF10: 10
PAINLEVEL_OUTOF10: 8
PAINLEVEL_OUTOF10: 10

## 2017-07-20 VITALS
TEMPERATURE: 98.1 F | BODY MASS INDEX: 30.2 KG/M2 | HEIGHT: 57 IN | RESPIRATION RATE: 14 BRPM | SYSTOLIC BLOOD PRESSURE: 109 MMHG | HEART RATE: 82 BPM | OXYGEN SATURATION: 88 % | DIASTOLIC BLOOD PRESSURE: 71 MMHG | WEIGHT: 140 LBS

## 2017-07-20 LAB
ABO/RH: NORMAL
ALBUMIN SERPL-MCNC: 2.7 G/DL (ref 3.5–5.2)
ALBUMIN/GLOBULIN RATIO: 0.6 (ref 1–2.5)
ALP BLD-CCNC: 311 U/L (ref 35–104)
ALT SERPL-CCNC: 11 U/L (ref 5–33)
ANION GAP SERPL CALCULATED.3IONS-SCNC: 15 MMOL/L (ref 9–17)
ANTIBODY SCREEN: NEGATIVE
ARM BAND NUMBER: NORMAL
AST SERPL-CCNC: 22 U/L
BILIRUB SERPL-MCNC: 0.26 MG/DL (ref 0.3–1.2)
BILIRUBIN DIRECT: 0.12 MG/DL
BILIRUBIN, INDIRECT: 0.14 MG/DL (ref 0–1)
BLD PROD TYP BPU: NORMAL
BLD PROD TYP BPU: NORMAL
BUN BLDV-MCNC: 10 MG/DL (ref 6–20)
BUN/CREAT BLD: NORMAL (ref 9–20)
CALCIUM SERPL-MCNC: 8.7 MG/DL (ref 8.6–10.4)
CHLORIDE BLD-SCNC: 102 MMOL/L (ref 98–107)
CO2: 22 MMOL/L (ref 20–31)
CREAT SERPL-MCNC: 0.85 MG/DL (ref 0.5–0.9)
CROSSMATCH RESULT: NORMAL
CROSSMATCH RESULT: NORMAL
DISPENSE STATUS BLOOD BANK: NORMAL
DISPENSE STATUS BLOOD BANK: NORMAL
EXPIRATION DATE: NORMAL
GFR AFRICAN AMERICAN: >60 ML/MIN
GFR NON-AFRICAN AMERICAN: >60 ML/MIN
GFR SERPL CREATININE-BSD FRML MDRD: NORMAL ML/MIN/{1.73_M2}
GFR SERPL CREATININE-BSD FRML MDRD: NORMAL ML/MIN/{1.73_M2}
GLOBULIN: ABNORMAL G/DL (ref 1.5–3.8)
GLUCOSE BLD-MCNC: 92 MG/DL (ref 70–99)
HCT VFR BLD CALC: 28.8 % (ref 36–46)
HEMOGLOBIN: 9 G/DL (ref 12–16)
MCH RBC QN AUTO: 25.9 PG (ref 26–34)
MCHC RBC AUTO-ENTMCNC: 31.4 G/DL (ref 31–37)
MCV RBC AUTO: 82.7 FL (ref 80–100)
PDW BLD-RTO: 18.6 % (ref 12.5–15.4)
PLATELET # BLD: 543 K/UL (ref 140–450)
PMV BLD AUTO: 7.4 FL (ref 6–12)
POTASSIUM SERPL-SCNC: 3.7 MMOL/L (ref 3.7–5.3)
RBC # BLD: 3.49 M/UL (ref 4–5.2)
SODIUM BLD-SCNC: 139 MMOL/L (ref 135–144)
TOTAL PROTEIN: 6.9 G/DL (ref 6.4–8.3)
TRANSFUSION STATUS: NORMAL
TRANSFUSION STATUS: NORMAL
UNIT DIVISION: 0
UNIT DIVISION: 0
UNIT NUMBER: NORMAL
UNIT NUMBER: NORMAL
WBC # BLD: 14.9 K/UL (ref 3.5–11)

## 2017-07-20 PROCEDURE — 80048 BASIC METABOLIC PNL TOTAL CA: CPT

## 2017-07-20 PROCEDURE — 6370000000 HC RX 637 (ALT 250 FOR IP): Performed by: SURGERY

## 2017-07-20 PROCEDURE — 80076 HEPATIC FUNCTION PANEL: CPT

## 2017-07-20 PROCEDURE — 36415 COLL VENOUS BLD VENIPUNCTURE: CPT

## 2017-07-20 PROCEDURE — 85027 COMPLETE CBC AUTOMATED: CPT

## 2017-07-20 PROCEDURE — 6360000002 HC RX W HCPCS: Performed by: SURGERY

## 2017-07-20 RX ORDER — OXYCODONE HYDROCHLORIDE 5 MG/1
10 TABLET ORAL EVERY 4 HOURS PRN
Status: DISCONTINUED | OUTPATIENT
Start: 2017-07-20 | End: 2017-07-20 | Stop reason: HOSPADM

## 2017-07-20 RX ORDER — OXYCODONE HYDROCHLORIDE 5 MG/1
5 TABLET ORAL EVERY 4 HOURS PRN
Status: DISCONTINUED | OUTPATIENT
Start: 2017-07-20 | End: 2017-07-20 | Stop reason: HOSPADM

## 2017-07-20 RX ADMIN — OXYCODONE HYDROCHLORIDE 10 MG: 5 TABLET ORAL at 06:53

## 2017-07-20 RX ADMIN — SENNA 8.6 MG: 8.6 TABLET, COATED ORAL at 07:58

## 2017-07-20 RX ADMIN — MORPHINE SULFATE 4 MG: 4 INJECTION, SOLUTION INTRAMUSCULAR; INTRAVENOUS at 01:25

## 2017-07-20 RX ADMIN — CITALOPRAM 40 MG: 20 TABLET, FILM COATED ORAL at 07:56

## 2017-07-20 RX ADMIN — OXYCODONE HYDROCHLORIDE 10 MG: 5 TABLET ORAL at 14:57

## 2017-07-20 RX ADMIN — MORPHINE SULFATE 4 MG: 4 INJECTION, SOLUTION INTRAMUSCULAR; INTRAVENOUS at 04:40

## 2017-07-20 RX ADMIN — PANTOPRAZOLE SODIUM 40 MG: 40 TABLET, DELAYED RELEASE ORAL at 07:55

## 2017-07-20 RX ADMIN — FOLIC ACID 1 MG: 1 TABLET ORAL at 07:56

## 2017-07-20 RX ADMIN — METOCLOPRAMIDE 5 MG: 5 TABLET ORAL at 10:57

## 2017-07-20 RX ADMIN — CETIRIZINE HYDROCHLORIDE 10 MG: 10 TABLET ORAL at 07:56

## 2017-07-20 RX ADMIN — OXYCODONE HYDROCHLORIDE 10 MG: 5 TABLET ORAL at 10:55

## 2017-07-20 RX ADMIN — Medication 1 TABLET: at 07:56

## 2017-07-20 RX ADMIN — FERROUS SULFATE TAB EC 325 MG (65 MG FE EQUIVALENT) 325 MG: 325 (65 FE) TABLET DELAYED RESPONSE at 07:56

## 2017-07-20 RX ADMIN — METOCLOPRAMIDE 5 MG: 5 TABLET ORAL at 06:00

## 2017-07-20 ASSESSMENT — PAIN SCALES - GENERAL
PAINLEVEL_OUTOF10: 7
PAINLEVEL_OUTOF10: 2
PAINLEVEL_OUTOF10: 7
PAINLEVEL_OUTOF10: 8

## 2017-07-21 ENCOUNTER — TELEPHONE (OUTPATIENT)
Dept: INTERNAL MEDICINE | Age: 47
End: 2017-07-21

## 2017-08-01 RX ORDER — FOLIC ACID 1 MG/1
TABLET ORAL
Qty: 30 TABLET | Refills: 0 | Status: SHIPPED | OUTPATIENT
Start: 2017-08-01 | End: 2017-09-21 | Stop reason: SDUPTHER

## 2017-08-01 RX ORDER — CITALOPRAM 40 MG/1
TABLET ORAL
Qty: 30 TABLET | Refills: 0 | Status: SHIPPED | OUTPATIENT
Start: 2017-08-01 | End: 2017-09-21 | Stop reason: SDUPTHER

## 2017-08-01 RX ORDER — FERROUS SULFATE 325(65) MG
TABLET ORAL
Qty: 60 TABLET | Refills: 0 | Status: SHIPPED | OUTPATIENT
Start: 2017-08-01 | End: 2017-08-15 | Stop reason: ALTCHOICE

## 2017-08-01 RX ORDER — LORATADINE 10 MG/1
TABLET ORAL
Qty: 30 TABLET | Refills: 0 | Status: SHIPPED | OUTPATIENT
Start: 2017-08-01 | End: 2017-09-21 | Stop reason: SDUPTHER

## 2017-08-01 RX ORDER — SENNOSIDES 8.6 MG
TABLET ORAL
Qty: 60 TABLET | Refills: 0 | Status: SHIPPED | OUTPATIENT
Start: 2017-08-01 | End: 2017-09-21 | Stop reason: SDUPTHER

## 2017-08-02 RX ORDER — DOCUSATE SODIUM 100 MG/1
CAPSULE ORAL
Qty: 30 CAPSULE | Refills: 0 | Status: SHIPPED | OUTPATIENT
Start: 2017-08-02 | End: 2017-08-15 | Stop reason: SDUPTHER

## 2017-08-09 ENCOUNTER — OFFICE VISIT (OUTPATIENT)
Dept: INTERNAL MEDICINE | Age: 47
End: 2017-08-09
Payer: MEDICAID

## 2017-08-09 VITALS
BODY MASS INDEX: 29.56 KG/M2 | SYSTOLIC BLOOD PRESSURE: 113 MMHG | HEIGHT: 57 IN | WEIGHT: 137 LBS | HEART RATE: 90 BPM | DIASTOLIC BLOOD PRESSURE: 79 MMHG

## 2017-08-09 DIAGNOSIS — K43.6 VENTRAL HERNIA WITH OBSTRUCTION AND WITHOUT GANGRENE: Primary | Chronic | ICD-10-CM

## 2017-08-09 PROCEDURE — 99213 OFFICE O/P EST LOW 20 MIN: CPT | Performed by: INTERNAL MEDICINE

## 2017-08-14 ENCOUNTER — TELEPHONE (OUTPATIENT)
Dept: INTERNAL MEDICINE | Age: 47
End: 2017-08-14

## 2017-08-15 ENCOUNTER — HOSPITAL ENCOUNTER (OUTPATIENT)
Age: 47
Setting detail: SPECIMEN
Discharge: HOME | DRG: 463 | End: 2017-08-15
Payer: MEDICAID

## 2017-08-15 ENCOUNTER — OFFICE VISIT (OUTPATIENT)
Dept: INTERNAL MEDICINE | Age: 47
End: 2017-08-15
Payer: MEDICAID

## 2017-08-15 VITALS
SYSTOLIC BLOOD PRESSURE: 114 MMHG | DIASTOLIC BLOOD PRESSURE: 71 MMHG | TEMPERATURE: 98.9 F | HEART RATE: 117 BPM | HEIGHT: 57 IN | WEIGHT: 134.6 LBS | BODY MASS INDEX: 29.04 KG/M2

## 2017-08-15 DIAGNOSIS — M81.0 OSTEOPOROSIS, UNSPECIFIED OSTEOPOROSIS TYPE, UNSPECIFIED PATHOLOGICAL FRACTURE PRESENCE: ICD-10-CM

## 2017-08-15 DIAGNOSIS — F32.A DEPRESSION, UNSPECIFIED DEPRESSION TYPE: Primary | Chronic | ICD-10-CM

## 2017-08-15 DIAGNOSIS — R10.2 SUPRAPUBIC PAIN: ICD-10-CM

## 2017-08-15 DIAGNOSIS — D63.8 ANEMIA OF CHRONIC DISEASE: ICD-10-CM

## 2017-08-15 DIAGNOSIS — J01.90 ACUTE NON-RECURRENT SINUSITIS, UNSPECIFIED LOCATION: ICD-10-CM

## 2017-08-15 DIAGNOSIS — J44.9 COPD WITH CHRONIC BRONCHITIS AND EMPHYSEMA (HCC): ICD-10-CM

## 2017-08-15 PROBLEM — J45.30 MILD PERSISTENT ASTHMA WITHOUT COMPLICATION: Status: RESOLVED | Noted: 2017-06-11 | Resolved: 2017-08-15

## 2017-08-15 PROBLEM — R11.15 INTRACTABLE CYCLICAL VOMITING WITH NAUSEA: Status: RESOLVED | Noted: 2017-07-08 | Resolved: 2017-08-15

## 2017-08-15 PROBLEM — M75.40 IMPINGEMENT SYNDROME, SHOULDER: Status: RESOLVED | Noted: 2017-04-03 | Resolved: 2017-08-15

## 2017-08-15 PROBLEM — K43.0 INCISIONAL HERNIA WITH BOWEL OBSTRUCTION: Status: RESOLVED | Noted: 2017-07-18 | Resolved: 2017-08-15

## 2017-08-15 PROCEDURE — 81003 URINALYSIS AUTO W/O SCOPE: CPT | Performed by: STUDENT IN AN ORGANIZED HEALTH CARE EDUCATION/TRAINING PROGRAM

## 2017-08-15 PROCEDURE — 99214 OFFICE O/P EST MOD 30 MIN: CPT | Performed by: STUDENT IN AN ORGANIZED HEALTH CARE EDUCATION/TRAINING PROGRAM

## 2017-08-15 RX ORDER — FLUTICASONE PROPIONATE 50 MCG
1 SPRAY, SUSPENSION (ML) NASAL DAILY
Qty: 1 BOTTLE | Refills: 3 | Status: SHIPPED | OUTPATIENT
Start: 2017-08-15

## 2017-08-15 RX ORDER — DOCUSATE SODIUM 100 MG/1
CAPSULE, LIQUID FILLED ORAL
Qty: 30 CAPSULE | Refills: 3 | Status: SHIPPED | OUTPATIENT
Start: 2017-08-15

## 2017-08-15 RX ORDER — SULFAMETHOXAZOLE AND TRIMETHOPRIM 400; 80 MG/1; MG/1
1 TABLET ORAL 2 TIMES DAILY
Qty: 14 TABLET | Refills: 0 | Status: ON HOLD | OUTPATIENT
Start: 2017-08-15 | End: 2017-08-19 | Stop reason: HOSPADM

## 2017-08-16 ENCOUNTER — HOSPITAL ENCOUNTER (INPATIENT)
Age: 47
LOS: 3 days | Discharge: HOME | DRG: 463 | End: 2017-08-19
Attending: EMERGENCY MEDICINE | Admitting: INTERNAL MEDICINE
Payer: MEDICAID

## 2017-08-16 DIAGNOSIS — N30.01 ACUTE CYSTITIS WITH HEMATURIA: ICD-10-CM

## 2017-08-16 DIAGNOSIS — A41.9 SEPSIS, DUE TO UNSPECIFIED ORGANISM: Primary | ICD-10-CM

## 2017-08-16 LAB
-: ABNORMAL
ABSOLUTE EOS #: 0.21 K/UL (ref 0–0.4)
ABSOLUTE LYMPH #: 1.88 K/UL (ref 1–4.8)
ABSOLUTE MONO #: 1.46 K/UL (ref 0.1–1.2)
ALBUMIN SERPL-MCNC: 3.3 G/DL (ref 3.5–5.2)
ALBUMIN/GLOBULIN RATIO: 0.7 (ref 1–2.5)
ALP BLD-CCNC: 663 U/L (ref 35–104)
ALT SERPL-CCNC: 30 U/L (ref 5–33)
AMORPHOUS: ABNORMAL
ANION GAP SERPL CALCULATED.3IONS-SCNC: 18 MMOL/L (ref 9–17)
AST SERPL-CCNC: 37 U/L
BACTERIA: ABNORMAL
BASOPHILS # BLD: 0 %
BASOPHILS ABSOLUTE: 0 K/UL (ref 0–0.2)
BILIRUB SERPL-MCNC: 0.6 MG/DL (ref 0.3–1.2)
BILIRUBIN DIRECT: 0.37 MG/DL
BILIRUBIN URINE: ABNORMAL
BILIRUBIN, INDIRECT: 0.23 MG/DL (ref 0–1)
BUN BLDV-MCNC: 16 MG/DL (ref 6–20)
BUN/CREAT BLD: ABNORMAL (ref 9–20)
CALCIUM SERPL-MCNC: 10 MG/DL (ref 8.6–10.4)
CASTS UA: ABNORMAL /LPF (ref 0–8)
CHLORIDE BLD-SCNC: 95 MMOL/L (ref 98–107)
CO2: 22 MMOL/L (ref 20–31)
COLOR: ABNORMAL
CREAT SERPL-MCNC: 0.74 MG/DL (ref 0.5–0.9)
CRYSTALS, UA: ABNORMAL /HPF
DIFFERENTIAL TYPE: ABNORMAL
EOSINOPHILS RELATIVE PERCENT: 1 %
EPITHELIAL CELLS UA: ABNORMAL /HPF (ref 0–5)
GFR AFRICAN AMERICAN: >60 ML/MIN
GFR NON-AFRICAN AMERICAN: >60 ML/MIN
GFR SERPL CREATININE-BSD FRML MDRD: ABNORMAL ML/MIN/{1.73_M2}
GFR SERPL CREATININE-BSD FRML MDRD: ABNORMAL ML/MIN/{1.73_M2}
GLOBULIN: ABNORMAL G/DL (ref 1.5–3.8)
GLUCOSE BLD-MCNC: 85 MG/DL (ref 70–99)
GLUCOSE URINE: NEGATIVE
HCT VFR BLD CALC: 30.2 % (ref 36–46)
HEMOGLOBIN: 9.6 G/DL (ref 12–16)
INR BLD: 1.3
KETONES, URINE: ABNORMAL
LACTIC ACID, WHOLE BLOOD: 0.9 MMOL/L (ref 0.7–2.1)
LACTIC ACID, WHOLE BLOOD: 0.9 MMOL/L (ref 0.7–2.1)
LEUKOCYTE ESTERASE, URINE: ABNORMAL
LYMPHOCYTES # BLD: 9 %
MCH RBC QN AUTO: 25.9 PG (ref 26–34)
MCHC RBC AUTO-ENTMCNC: 31.8 G/DL (ref 31–37)
MCV RBC AUTO: 81.3 FL (ref 80–100)
MONOCYTES # BLD: 7 %
MORPHOLOGY: ABNORMAL
MUCUS: ABNORMAL
NITRITE, URINE: NEGATIVE
OTHER OBSERVATIONS UA: ABNORMAL
PARTIAL THROMBOPLASTIN TIME: 31.6 SEC (ref 21.3–31.3)
PDW BLD-RTO: 19 % (ref 12.5–15.4)
PH UA: 5.5 (ref 5–8)
PLATELET # BLD: 828 K/UL (ref 140–450)
PLATELET ESTIMATE: ABNORMAL
PMV BLD AUTO: 7.5 FL (ref 6–12)
POTASSIUM SERPL-SCNC: 5 MMOL/L (ref 3.7–5.3)
PROCALCITONIN: 0.37 NG/ML
PROTEIN UA: ABNORMAL
PROTHROMBIN TIME: 13.6 SEC (ref 9.4–12.6)
RBC # BLD: 3.71 M/UL (ref 4–5.2)
RBC # BLD: ABNORMAL 10*6/UL
RBC UA: ABNORMAL /HPF (ref 0–4)
RENAL EPITHELIAL, UA: ABNORMAL /HPF
SEG NEUTROPHILS: 83 %
SEGMENTED NEUTROPHILS ABSOLUTE COUNT: 17.35 K/UL (ref 1.8–7.7)
SODIUM BLD-SCNC: 135 MMOL/L (ref 135–144)
SPECIFIC GRAVITY UA: 1.02 (ref 1–1.03)
TOTAL PROTEIN: 8.3 G/DL (ref 6.4–8.3)
TRICHOMONAS: ABNORMAL
TURBIDITY: ABNORMAL
URINE HGB: ABNORMAL
UROBILINOGEN, URINE: NORMAL
WBC # BLD: 20.9 K/UL (ref 3.5–11)
WBC # BLD: ABNORMAL 10*3/UL
WBC UA: ABNORMAL /HPF (ref 0–5)
YEAST: ABNORMAL

## 2017-08-16 PROCEDURE — 87040 BLOOD CULTURE FOR BACTERIA: CPT

## 2017-08-16 PROCEDURE — 1200000000 HC SEMI PRIVATE

## 2017-08-16 PROCEDURE — 6360000002 HC RX W HCPCS: Performed by: INTERNAL MEDICINE

## 2017-08-16 PROCEDURE — 2580000003 HC RX 258: Performed by: INTERNAL MEDICINE

## 2017-08-16 PROCEDURE — 84145 PROCALCITONIN (PCT): CPT

## 2017-08-16 PROCEDURE — 81001 URINALYSIS AUTO W/SCOPE: CPT

## 2017-08-16 PROCEDURE — 2580000003 HC RX 258: Performed by: EMERGENCY MEDICINE

## 2017-08-16 PROCEDURE — 85025 COMPLETE CBC W/AUTO DIFF WBC: CPT

## 2017-08-16 PROCEDURE — 80048 BASIC METABOLIC PNL TOTAL CA: CPT

## 2017-08-16 PROCEDURE — 80076 HEPATIC FUNCTION PANEL: CPT

## 2017-08-16 PROCEDURE — 85610 PROTHROMBIN TIME: CPT

## 2017-08-16 PROCEDURE — 85730 THROMBOPLASTIN TIME PARTIAL: CPT

## 2017-08-16 PROCEDURE — 87086 URINE CULTURE/COLONY COUNT: CPT

## 2017-08-16 PROCEDURE — 6370000000 HC RX 637 (ALT 250 FOR IP): Performed by: NURSE PRACTITIONER

## 2017-08-16 PROCEDURE — 6360000002 HC RX W HCPCS: Performed by: EMERGENCY MEDICINE

## 2017-08-16 PROCEDURE — 83605 ASSAY OF LACTIC ACID: CPT

## 2017-08-16 PROCEDURE — 6370000000 HC RX 637 (ALT 250 FOR IP): Performed by: INTERNAL MEDICINE

## 2017-08-16 PROCEDURE — 99284 EMERGENCY DEPT VISIT MOD MDM: CPT

## 2017-08-16 RX ORDER — PANTOPRAZOLE SODIUM 40 MG/1
40 TABLET, DELAYED RELEASE ORAL DAILY
Status: DISCONTINUED | OUTPATIENT
Start: 2017-08-16 | End: 2017-08-19 | Stop reason: HOSPADM

## 2017-08-16 RX ORDER — SODIUM CHLORIDE 0.9 % (FLUSH) 0.9 %
10 SYRINGE (ML) INJECTION EVERY 12 HOURS SCHEDULED
Status: DISCONTINUED | OUTPATIENT
Start: 2017-08-16 | End: 2017-08-19 | Stop reason: HOSPADM

## 2017-08-16 RX ORDER — SODIUM CHLORIDE 0.9 % (FLUSH) 0.9 %
10 SYRINGE (ML) INJECTION PRN
Status: DISCONTINUED | OUTPATIENT
Start: 2017-08-16 | End: 2017-08-16 | Stop reason: SDUPTHER

## 2017-08-16 RX ORDER — KETOROLAC TROMETHAMINE 30 MG/ML
15 INJECTION, SOLUTION INTRAMUSCULAR; INTRAVENOUS ONCE
Status: COMPLETED | OUTPATIENT
Start: 2017-08-16 | End: 2017-08-16

## 2017-08-16 RX ORDER — SODIUM CHLORIDE 0.9 % (FLUSH) 0.9 %
10 SYRINGE (ML) INJECTION EVERY 12 HOURS SCHEDULED
Status: DISCONTINUED | OUTPATIENT
Start: 2017-08-16 | End: 2017-08-16 | Stop reason: SDUPTHER

## 2017-08-16 RX ORDER — ACETAMINOPHEN 325 MG/1
650 TABLET ORAL EVERY 4 HOURS PRN
Status: DISCONTINUED | OUTPATIENT
Start: 2017-08-16 | End: 2017-08-16

## 2017-08-16 RX ORDER — SENNA PLUS 8.6 MG/1
1 TABLET ORAL DAILY
Status: DISCONTINUED | OUTPATIENT
Start: 2017-08-16 | End: 2017-08-19 | Stop reason: HOSPADM

## 2017-08-16 RX ORDER — SODIUM CHLORIDE 0.9 % (FLUSH) 0.9 %
10 SYRINGE (ML) INJECTION PRN
Status: DISCONTINUED | OUTPATIENT
Start: 2017-08-16 | End: 2017-08-19 | Stop reason: HOSPADM

## 2017-08-16 RX ORDER — 0.9 % SODIUM CHLORIDE 0.9 %
1000 INTRAVENOUS SOLUTION INTRAVENOUS ONCE
Status: COMPLETED | OUTPATIENT
Start: 2017-08-16 | End: 2017-08-16

## 2017-08-16 RX ORDER — ACETAMINOPHEN 325 MG/1
650 TABLET ORAL EVERY 4 HOURS PRN
Status: DISCONTINUED | OUTPATIENT
Start: 2017-08-16 | End: 2017-08-16 | Stop reason: SDUPTHER

## 2017-08-16 RX ORDER — ONDANSETRON 2 MG/ML
4 INJECTION INTRAMUSCULAR; INTRAVENOUS EVERY 6 HOURS PRN
Status: DISCONTINUED | OUTPATIENT
Start: 2017-08-16 | End: 2017-08-19 | Stop reason: HOSPADM

## 2017-08-16 RX ORDER — ONDANSETRON 4 MG/1
4 TABLET, FILM COATED ORAL EVERY 8 HOURS PRN
Status: DISCONTINUED | OUTPATIENT
Start: 2017-08-16 | End: 2017-08-19 | Stop reason: HOSPADM

## 2017-08-16 RX ORDER — IBUPROFEN 600 MG/1
600 TABLET ORAL EVERY 6 HOURS PRN
Status: DISCONTINUED | OUTPATIENT
Start: 2017-08-16 | End: 2017-08-19 | Stop reason: HOSPADM

## 2017-08-16 RX ORDER — ASPIRIN 81 MG/1
81 TABLET, CHEWABLE ORAL DAILY
Status: DISCONTINUED | OUTPATIENT
Start: 2017-08-16 | End: 2017-08-19 | Stop reason: HOSPADM

## 2017-08-16 RX ADMIN — Medication 10 ML: at 20:53

## 2017-08-16 RX ADMIN — ENOXAPARIN SODIUM 40 MG: 40 INJECTION SUBCUTANEOUS at 18:42

## 2017-08-16 RX ADMIN — PANTOPRAZOLE SODIUM 40 MG: 40 TABLET, DELAYED RELEASE ORAL at 18:42

## 2017-08-16 RX ADMIN — KETOROLAC TROMETHAMINE 15 MG: 30 INJECTION, SOLUTION INTRAMUSCULAR at 16:36

## 2017-08-16 RX ADMIN — IBUPROFEN 600 MG: 600 TABLET, FILM COATED ORAL at 22:03

## 2017-08-16 RX ADMIN — CEFEPIME 1 G: 1 INJECTION, POWDER, FOR SOLUTION INTRAMUSCULAR; INTRAVENOUS at 15:44

## 2017-08-16 RX ADMIN — SENNA 8.6 MG: 8.6 TABLET, COATED ORAL at 18:42

## 2017-08-16 RX ADMIN — ASPIRIN 81 MG: 81 TABLET, CHEWABLE ORAL at 18:42

## 2017-08-16 RX ADMIN — SODIUM CHLORIDE 1000 ML: 9 INJECTION, SOLUTION INTRAVENOUS at 15:00

## 2017-08-16 ASSESSMENT — ENCOUNTER SYMPTOMS
NAUSEA: 0
COUGH: 0
ABDOMINAL PAIN: 1
VOMITING: 0
SHORTNESS OF BREATH: 0
SORE THROAT: 0

## 2017-08-16 ASSESSMENT — PAIN SCALES - GENERAL
PAINLEVEL_OUTOF10: 0
PAINLEVEL_OUTOF10: 7
PAINLEVEL_OUTOF10: 5
PAINLEVEL_OUTOF10: 5

## 2017-08-16 ASSESSMENT — PAIN DESCRIPTION - FREQUENCY: FREQUENCY: CONTINUOUS

## 2017-08-16 ASSESSMENT — PAIN DESCRIPTION - ONSET: ONSET: ON-GOING

## 2017-08-16 ASSESSMENT — PAIN DESCRIPTION - PAIN TYPE: TYPE: ACUTE PAIN

## 2017-08-16 ASSESSMENT — PAIN DESCRIPTION - LOCATION: LOCATION: ABDOMEN

## 2017-08-16 ASSESSMENT — PAIN DESCRIPTION - PROGRESSION: CLINICAL_PROGRESSION: NOT CHANGED

## 2017-08-16 ASSESSMENT — PAIN DESCRIPTION - DESCRIPTORS: DESCRIPTORS: CONSTANT;DISCOMFORT

## 2017-08-17 PROBLEM — N30.00 ACUTE CYSTITIS: Status: ACTIVE | Noted: 2017-08-17

## 2017-08-17 PROBLEM — K43.2 INCISIONAL HERNIA: Status: ACTIVE | Noted: 2017-07-18

## 2017-08-17 PROBLEM — E88.09 HYPOALBUMINEMIA DUE TO PROTEIN-CALORIE MALNUTRITION (HCC): Status: ACTIVE | Noted: 2017-08-17

## 2017-08-17 PROBLEM — E87.1 HYPONATREMIA: Status: ACTIVE | Noted: 2017-08-17

## 2017-08-17 PROBLEM — E87.6 HYPOKALEMIA: Status: ACTIVE | Noted: 2017-08-17

## 2017-08-17 PROBLEM — E46 HYPOALBUMINEMIA DUE TO PROTEIN-CALORIE MALNUTRITION (HCC): Status: ACTIVE | Noted: 2017-08-17

## 2017-08-17 PROBLEM — D69.6 THROMBOCYTOPENIA (HCC): Status: ACTIVE | Noted: 2017-08-17

## 2017-08-17 PROBLEM — R31.9 HEMATURIA: Status: ACTIVE | Noted: 2017-08-17

## 2017-08-17 LAB
ABSOLUTE EOS #: 0.29 K/UL (ref 0–0.4)
ABSOLUTE LYMPH #: 1.03 K/UL (ref 1–4.8)
ABSOLUTE MONO #: 1.03 K/UL (ref 0.1–0.8)
ALBUMIN SERPL-MCNC: 2.8 G/DL (ref 3.5–5.2)
ALBUMIN/GLOBULIN RATIO: 0.5 (ref 1–2.5)
ALP BLD-CCNC: 616 U/L (ref 35–104)
ALT SERPL-CCNC: 24 U/L (ref 5–33)
ANION GAP SERPL CALCULATED.3IONS-SCNC: 15 MMOL/L (ref 9–17)
AST SERPL-CCNC: 26 U/L
BASOPHILS # BLD: 0 %
BASOPHILS ABSOLUTE: 0 K/UL (ref 0–0.2)
BILIRUB SERPL-MCNC: 0.31 MG/DL (ref 0.3–1.2)
BUN BLDV-MCNC: 17 MG/DL (ref 6–20)
BUN/CREAT BLD: ABNORMAL (ref 9–20)
CALCIUM SERPL-MCNC: 9.2 MG/DL (ref 8.6–10.4)
CHLORIDE BLD-SCNC: 99 MMOL/L (ref 98–107)
CO2: 20 MMOL/L (ref 20–31)
CREAT SERPL-MCNC: 0.75 MG/DL (ref 0.5–0.9)
CULTURE: NORMAL
CULTURE: NORMAL
DIFFERENTIAL TYPE: ABNORMAL
EOSINOPHILS RELATIVE PERCENT: 2 %
GFR AFRICAN AMERICAN: >60 ML/MIN
GFR NON-AFRICAN AMERICAN: >60 ML/MIN
GFR SERPL CREATININE-BSD FRML MDRD: ABNORMAL ML/MIN/{1.73_M2}
GFR SERPL CREATININE-BSD FRML MDRD: ABNORMAL ML/MIN/{1.73_M2}
GLUCOSE BLD-MCNC: 110 MG/DL (ref 65–105)
GLUCOSE BLD-MCNC: 113 MG/DL (ref 70–99)
GLUCOSE BLD-MCNC: 114 MG/DL (ref 65–105)
GLUCOSE BLD-MCNC: 148 MG/DL (ref 65–105)
HCT VFR BLD CALC: 28.5 % (ref 36–46)
HEMOGLOBIN: 8.9 G/DL (ref 12–16)
LYMPHOCYTES # BLD: 7 %
Lab: NORMAL
MCH RBC QN AUTO: 25.7 PG (ref 26–34)
MCHC RBC AUTO-ENTMCNC: 31.3 G/DL (ref 31–37)
MCV RBC AUTO: 82.1 FL (ref 80–100)
MONOCYTES # BLD: 7 %
MORPHOLOGY: ABNORMAL
PDW BLD-RTO: 19.1 % (ref 12.5–15.4)
PLATELET # BLD: 718 K/UL (ref 140–450)
PLATELET ESTIMATE: ABNORMAL
PMV BLD AUTO: 7.5 FL (ref 6–12)
POTASSIUM SERPL-SCNC: 3.6 MMOL/L (ref 3.7–5.3)
RBC # BLD: 3.47 M/UL (ref 4–5.2)
RBC # BLD: ABNORMAL 10*6/UL
SEG NEUTROPHILS: 84 %
SEGMENTED NEUTROPHILS ABSOLUTE COUNT: 12.35 K/UL (ref 1.8–7.7)
SODIUM BLD-SCNC: 134 MMOL/L (ref 135–144)
SPECIMEN DESCRIPTION: NORMAL
STATUS: NORMAL
TOTAL PROTEIN: 7.9 G/DL (ref 6.4–8.3)
WBC # BLD: 14.7 K/UL (ref 3.5–11)
WBC # BLD: ABNORMAL 10*3/UL

## 2017-08-17 PROCEDURE — 99222 1ST HOSP IP/OBS MODERATE 55: CPT | Performed by: INTERNAL MEDICINE

## 2017-08-17 PROCEDURE — 6360000002 HC RX W HCPCS: Performed by: INTERNAL MEDICINE

## 2017-08-17 PROCEDURE — 99406 BEHAV CHNG SMOKING 3-10 MIN: CPT

## 2017-08-17 PROCEDURE — 2580000003 HC RX 258: Performed by: INTERNAL MEDICINE

## 2017-08-17 PROCEDURE — 6370000000 HC RX 637 (ALT 250 FOR IP): Performed by: INTERNAL MEDICINE

## 2017-08-17 PROCEDURE — 85025 COMPLETE CBC W/AUTO DIFF WBC: CPT

## 2017-08-17 PROCEDURE — 80053 COMPREHEN METABOLIC PANEL: CPT

## 2017-08-17 PROCEDURE — 84080 ASSAY ALKALINE PHOSPHATASES: CPT

## 2017-08-17 PROCEDURE — 94640 AIRWAY INHALATION TREATMENT: CPT

## 2017-08-17 PROCEDURE — 94664 DEMO&/EVAL PT USE INHALER: CPT

## 2017-08-17 PROCEDURE — 36415 COLL VENOUS BLD VENIPUNCTURE: CPT

## 2017-08-17 PROCEDURE — 6370000000 HC RX 637 (ALT 250 FOR IP): Performed by: NURSE PRACTITIONER

## 2017-08-17 PROCEDURE — 1200000000 HC SEMI PRIVATE

## 2017-08-17 PROCEDURE — 84075 ASSAY ALKALINE PHOSPHATASE: CPT

## 2017-08-17 PROCEDURE — 82947 ASSAY GLUCOSE BLOOD QUANT: CPT

## 2017-08-17 RX ORDER — POTASSIUM CHLORIDE 20 MEQ/1
40 TABLET, EXTENDED RELEASE ORAL ONCE
Status: COMPLETED | OUTPATIENT
Start: 2017-08-17 | End: 2017-08-17

## 2017-08-17 RX ADMIN — ASPIRIN 81 MG: 81 TABLET, CHEWABLE ORAL at 07:59

## 2017-08-17 RX ADMIN — Medication 10 ML: at 20:29

## 2017-08-17 RX ADMIN — ENOXAPARIN SODIUM 40 MG: 40 INJECTION SUBCUTANEOUS at 08:00

## 2017-08-17 RX ADMIN — SENNA 8.6 MG: 8.6 TABLET, COATED ORAL at 08:00

## 2017-08-17 RX ADMIN — IBUPROFEN 600 MG: 600 TABLET, FILM COATED ORAL at 23:05

## 2017-08-17 RX ADMIN — POTASSIUM CHLORIDE 40 MEQ: 20 TABLET, EXTENDED RELEASE ORAL at 12:45

## 2017-08-17 RX ADMIN — ONDANSETRON 4 MG: 2 INJECTION, SOLUTION INTRAMUSCULAR; INTRAVENOUS at 20:46

## 2017-08-17 RX ADMIN — Medication 10 ML: at 07:59

## 2017-08-17 RX ADMIN — ALBUTEROL SULFATE 2.5 MG: 5 SOLUTION RESPIRATORY (INHALATION) at 18:51

## 2017-08-17 RX ADMIN — PANTOPRAZOLE SODIUM 40 MG: 40 TABLET, DELAYED RELEASE ORAL at 07:59

## 2017-08-17 ASSESSMENT — PAIN SCALES - GENERAL: PAINLEVEL_OUTOF10: 6

## 2017-08-17 ASSESSMENT — ENCOUNTER SYMPTOMS
BLOOD IN STOOL: 0
COUGH: 0
NAUSEA: 0
SHORTNESS OF BREATH: 0
DIARRHEA: 0
ABDOMINAL PAIN: 1
VOMITING: 0
CONSTIPATION: 0

## 2017-08-18 PROBLEM — N30.01 ACUTE CYSTITIS WITH HEMATURIA: Status: ACTIVE | Noted: 2017-08-17

## 2017-08-18 LAB
-: ABNORMAL
ABSOLUTE EOS #: 0.67 K/UL (ref 0–0.4)
ABSOLUTE LYMPH #: 2.52 K/UL (ref 1–4.8)
ABSOLUTE MONO #: 1.01 K/UL (ref 0.1–0.8)
ALK PHOS BONE SPECIFIC: 76 U/L (ref 0–55)
ALK PHOS OTHER CALC: 0 U/L
ALK PHOSPHATASE: 635 U/L (ref 40–120)
ALKALINE PHOSPHATASE LIVER FRACTION: 559 U/L (ref 0–94)
AMORPHOUS: ABNORMAL
ANION GAP SERPL CALCULATED.3IONS-SCNC: 16 MMOL/L (ref 9–17)
BACTERIA: ABNORMAL
BASOPHILS # BLD: 0 %
BASOPHILS ABSOLUTE: 0 K/UL (ref 0–0.2)
BILIRUBIN URINE: NEGATIVE
BUN BLDV-MCNC: 14 MG/DL (ref 6–20)
BUN/CREAT BLD: ABNORMAL (ref 9–20)
CALCIUM SERPL-MCNC: 9.1 MG/DL (ref 8.6–10.4)
CASTS UA: ABNORMAL /LPF (ref 0–2)
CHLORIDE BLD-SCNC: 103 MMOL/L (ref 98–107)
CO2: 21 MMOL/L (ref 20–31)
COLOR: YELLOW
COMMENT UA: ABNORMAL
CREAT SERPL-MCNC: 0.77 MG/DL (ref 0.5–0.9)
CRYSTALS, UA: ABNORMAL /HPF
CULTURE: NORMAL
CULTURE: NORMAL
DIFFERENTIAL TYPE: ABNORMAL
EOSINOPHILS RELATIVE PERCENT: 4 %
EPITHELIAL CELLS UA: ABNORMAL /HPF (ref 0–5)
GFR AFRICAN AMERICAN: >60 ML/MIN
GFR NON-AFRICAN AMERICAN: >60 ML/MIN
GFR SERPL CREATININE-BSD FRML MDRD: ABNORMAL ML/MIN/{1.73_M2}
GFR SERPL CREATININE-BSD FRML MDRD: ABNORMAL ML/MIN/{1.73_M2}
GLUCOSE BLD-MCNC: 103 MG/DL (ref 70–99)
GLUCOSE BLD-MCNC: 85 MG/DL (ref 65–105)
GLUCOSE URINE: NEGATIVE
HCT VFR BLD CALC: 25.8 % (ref 36–46)
HEMOGLOBIN: 8.1 G/DL (ref 12–16)
KETONES, URINE: NEGATIVE
LEUKOCYTE ESTERASE, URINE: ABNORMAL
LYMPHOCYTES # BLD: 15 %
Lab: NORMAL
MCH RBC QN AUTO: 25.7 PG (ref 26–34)
MCHC RBC AUTO-ENTMCNC: 31.5 G/DL (ref 31–37)
MCV RBC AUTO: 81.5 FL (ref 80–100)
MONOCYTES # BLD: 6 %
MORPHOLOGY: ABNORMAL
MUCUS: ABNORMAL
NITRITE, URINE: NEGATIVE
OTHER OBSERVATIONS UA: ABNORMAL
PDW BLD-RTO: 19.2 % (ref 12.5–15.4)
PH UA: 6 (ref 5–8)
PLATELET # BLD: 680 K/UL (ref 140–450)
PLATELET ESTIMATE: ABNORMAL
PMV BLD AUTO: 7.8 FL (ref 6–12)
POTASSIUM SERPL-SCNC: 4.2 MMOL/L (ref 3.7–5.3)
PROTEIN UA: ABNORMAL
RBC # BLD: 3.17 M/UL (ref 4–5.2)
RBC # BLD: ABNORMAL 10*6/UL
RBC UA: ABNORMAL /HPF (ref 0–2)
RENAL EPITHELIAL, UA: ABNORMAL /HPF
SEG NEUTROPHILS: 75 %
SEGMENTED NEUTROPHILS ABSOLUTE COUNT: 12.6 K/UL (ref 1.8–7.7)
SODIUM BLD-SCNC: 140 MMOL/L (ref 135–144)
SPECIFIC GRAVITY UA: 1.02 (ref 1–1.03)
SPECIMEN DESCRIPTION: NORMAL
STATUS: NORMAL
TRICHOMONAS: ABNORMAL
TURBIDITY: ABNORMAL
URINE HGB: ABNORMAL
UROBILINOGEN, URINE: NORMAL
WBC # BLD: 16.8 K/UL (ref 3.5–11)
WBC # BLD: ABNORMAL 10*3/UL
WBC UA: ABNORMAL /HPF (ref 0–5)
YEAST: ABNORMAL

## 2017-08-18 PROCEDURE — 82947 ASSAY GLUCOSE BLOOD QUANT: CPT

## 2017-08-18 PROCEDURE — 1200000000 HC SEMI PRIVATE

## 2017-08-18 PROCEDURE — 36415 COLL VENOUS BLD VENIPUNCTURE: CPT

## 2017-08-18 PROCEDURE — 85025 COMPLETE CBC W/AUTO DIFF WBC: CPT

## 2017-08-18 PROCEDURE — 80048 BASIC METABOLIC PNL TOTAL CA: CPT

## 2017-08-18 PROCEDURE — 6370000000 HC RX 637 (ALT 250 FOR IP): Performed by: INTERNAL MEDICINE

## 2017-08-18 PROCEDURE — 2580000003 HC RX 258: Performed by: INTERNAL MEDICINE

## 2017-08-18 PROCEDURE — 6360000002 HC RX W HCPCS: Performed by: INTERNAL MEDICINE

## 2017-08-18 PROCEDURE — 99232 SBSQ HOSP IP/OBS MODERATE 35: CPT | Performed by: INTERNAL MEDICINE

## 2017-08-18 PROCEDURE — 6370000000 HC RX 637 (ALT 250 FOR IP): Performed by: NURSE PRACTITIONER

## 2017-08-18 RX ADMIN — IBUPROFEN 600 MG: 600 TABLET, FILM COATED ORAL at 22:14

## 2017-08-18 RX ADMIN — ASPIRIN 81 MG: 81 TABLET, CHEWABLE ORAL at 09:00

## 2017-08-18 RX ADMIN — SENNA 8.6 MG: 8.6 TABLET, COATED ORAL at 09:00

## 2017-08-18 RX ADMIN — ENOXAPARIN SODIUM 40 MG: 40 INJECTION SUBCUTANEOUS at 09:00

## 2017-08-18 RX ADMIN — Medication 10 ML: at 09:00

## 2017-08-18 RX ADMIN — Medication 10 ML: at 20:28

## 2017-08-18 RX ADMIN — PANTOPRAZOLE SODIUM 40 MG: 40 TABLET, DELAYED RELEASE ORAL at 09:00

## 2017-08-18 ASSESSMENT — PAIN DESCRIPTION - LOCATION
LOCATION: ABDOMEN
LOCATION: ABDOMEN

## 2017-08-18 ASSESSMENT — PAIN SCALES - GENERAL
PAINLEVEL_OUTOF10: 3
PAINLEVEL_OUTOF10: 7

## 2017-08-18 ASSESSMENT — PAIN DESCRIPTION - FREQUENCY: FREQUENCY: CONTINUOUS

## 2017-08-18 ASSESSMENT — ENCOUNTER SYMPTOMS
ABDOMINAL PAIN: 1
COUGH: 0
CONSTIPATION: 0
SHORTNESS OF BREATH: 0
BLOOD IN STOOL: 0
NAUSEA: 0
DIARRHEA: 0
VOMITING: 0

## 2017-08-18 ASSESSMENT — PAIN DESCRIPTION - DESCRIPTORS: DESCRIPTORS: ACHING

## 2017-08-18 ASSESSMENT — PAIN DESCRIPTION - PAIN TYPE: TYPE: ACUTE PAIN

## 2017-08-18 ASSESSMENT — PAIN DESCRIPTION - ONSET: ONSET: ON-GOING

## 2017-08-18 ASSESSMENT — PAIN DESCRIPTION - ORIENTATION: ORIENTATION: LEFT;LOWER

## 2017-08-19 VITALS
BODY MASS INDEX: 30.31 KG/M2 | OXYGEN SATURATION: 100 % | HEIGHT: 57 IN | RESPIRATION RATE: 16 BRPM | TEMPERATURE: 98.2 F | SYSTOLIC BLOOD PRESSURE: 99 MMHG | HEART RATE: 85 BPM | WEIGHT: 140.5 LBS | DIASTOLIC BLOOD PRESSURE: 62 MMHG

## 2017-08-19 LAB
ANION GAP SERPL CALCULATED.3IONS-SCNC: 18 MMOL/L (ref 9–17)
BUN BLDV-MCNC: 17 MG/DL (ref 6–20)
BUN/CREAT BLD: ABNORMAL (ref 9–20)
CALCIUM SERPL-MCNC: 9.6 MG/DL (ref 8.6–10.4)
CHLORIDE BLD-SCNC: 104 MMOL/L (ref 98–107)
CO2: 19 MMOL/L (ref 20–31)
CREAT SERPL-MCNC: 0.68 MG/DL (ref 0.5–0.9)
GFR AFRICAN AMERICAN: >60 ML/MIN
GFR NON-AFRICAN AMERICAN: >60 ML/MIN
GFR SERPL CREATININE-BSD FRML MDRD: ABNORMAL ML/MIN/{1.73_M2}
GFR SERPL CREATININE-BSD FRML MDRD: ABNORMAL ML/MIN/{1.73_M2}
GLUCOSE BLD-MCNC: 109 MG/DL (ref 70–99)
POTASSIUM SERPL-SCNC: 4.2 MMOL/L (ref 3.7–5.3)
SODIUM BLD-SCNC: 141 MMOL/L (ref 135–144)

## 2017-08-19 PROCEDURE — 6370000000 HC RX 637 (ALT 250 FOR IP): Performed by: INTERNAL MEDICINE

## 2017-08-19 PROCEDURE — 80048 BASIC METABOLIC PNL TOTAL CA: CPT

## 2017-08-19 PROCEDURE — 99238 HOSP IP/OBS DSCHRG MGMT 30/<: CPT | Performed by: INTERNAL MEDICINE

## 2017-08-19 PROCEDURE — 36415 COLL VENOUS BLD VENIPUNCTURE: CPT

## 2017-08-19 PROCEDURE — 2580000003 HC RX 258: Performed by: INTERNAL MEDICINE

## 2017-08-19 PROCEDURE — 6360000002 HC RX W HCPCS: Performed by: INTERNAL MEDICINE

## 2017-08-19 RX ORDER — LEVOFLOXACIN 500 MG/1
500 TABLET, FILM COATED ORAL DAILY
Status: DISCONTINUED | OUTPATIENT
Start: 2017-08-19 | End: 2017-08-19 | Stop reason: HOSPADM

## 2017-08-19 RX ORDER — LEVOFLOXACIN 500 MG/1
500 TABLET, FILM COATED ORAL DAILY
Qty: 7 TABLET | Refills: 0 | Status: ON HOLD | OUTPATIENT
Start: 2017-08-19 | End: 2017-08-23 | Stop reason: HOSPADM

## 2017-08-19 RX ADMIN — SENNA 8.6 MG: 8.6 TABLET, COATED ORAL at 09:40

## 2017-08-19 RX ADMIN — ASPIRIN 81 MG: 81 TABLET, CHEWABLE ORAL at 09:40

## 2017-08-19 RX ADMIN — ENOXAPARIN SODIUM 40 MG: 40 INJECTION SUBCUTANEOUS at 09:40

## 2017-08-19 RX ADMIN — Medication 10 ML: at 09:44

## 2017-08-19 RX ADMIN — Medication 10 ML: at 09:41

## 2017-08-19 RX ADMIN — PANTOPRAZOLE SODIUM 40 MG: 40 TABLET, DELAYED RELEASE ORAL at 09:40

## 2017-08-19 RX ADMIN — LEVOFLOXACIN 500 MG: 500 TABLET, FILM COATED ORAL at 15:13

## 2017-08-19 ASSESSMENT — ENCOUNTER SYMPTOMS
DIARRHEA: 0
NAUSEA: 0
SHORTNESS OF BREATH: 0
VOMITING: 0
BLOOD IN STOOL: 0
CONSTIPATION: 0
COUGH: 0
ABDOMINAL PAIN: 1

## 2017-08-20 ENCOUNTER — HOSPITAL ENCOUNTER (INPATIENT)
Age: 47
LOS: 2 days | Discharge: HOME | DRG: 137 | End: 2017-08-23
Attending: EMERGENCY MEDICINE | Admitting: INTERNAL MEDICINE
Payer: MEDICAID

## 2017-08-20 ENCOUNTER — APPOINTMENT (OUTPATIENT)
Dept: GENERAL RADIOLOGY | Age: 47
DRG: 137 | End: 2017-08-20
Payer: MEDICAID

## 2017-08-20 DIAGNOSIS — G89.18 POST-OP PAIN: ICD-10-CM

## 2017-08-20 DIAGNOSIS — R10.11 ABDOMINAL PAIN, RIGHT UPPER QUADRANT: Primary | ICD-10-CM

## 2017-08-20 DIAGNOSIS — R11.2 NAUSEA AND VOMITING, INTRACTABILITY OF VOMITING NOT SPECIFIED, UNSPECIFIED VOMITING TYPE: ICD-10-CM

## 2017-08-20 PROCEDURE — 87040 BLOOD CULTURE FOR BACTERIA: CPT

## 2017-08-20 PROCEDURE — 80053 COMPREHEN METABOLIC PANEL: CPT

## 2017-08-20 PROCEDURE — 85025 COMPLETE CBC W/AUTO DIFF WBC: CPT

## 2017-08-20 PROCEDURE — 93005 ELECTROCARDIOGRAM TRACING: CPT

## 2017-08-20 PROCEDURE — 71020 XR CHEST STANDARD TWO VW: CPT

## 2017-08-20 PROCEDURE — 99285 EMERGENCY DEPT VISIT HI MDM: CPT

## 2017-08-20 PROCEDURE — 84484 ASSAY OF TROPONIN QUANT: CPT

## 2017-08-20 RX ORDER — ONDANSETRON 2 MG/ML
4 INJECTION INTRAMUSCULAR; INTRAVENOUS ONCE
Status: COMPLETED | OUTPATIENT
Start: 2017-08-20 | End: 2017-08-21

## 2017-08-20 ASSESSMENT — ENCOUNTER SYMPTOMS
TROUBLE SWALLOWING: 0
SHORTNESS OF BREATH: 0
COUGH: 1
VOMITING: 1
CONSTIPATION: 0
CHEST TIGHTNESS: 0
COLOR CHANGE: 0
BLOOD IN STOOL: 0
ABDOMINAL PAIN: 1
PHOTOPHOBIA: 0
BACK PAIN: 0
NAUSEA: 1
SINUS PRESSURE: 0
ABDOMINAL DISTENTION: 0
DIARRHEA: 0

## 2017-08-20 ASSESSMENT — PAIN DESCRIPTION - ORIENTATION: ORIENTATION: LOWER

## 2017-08-20 ASSESSMENT — PAIN DESCRIPTION - LOCATION: LOCATION: ABDOMEN

## 2017-08-20 ASSESSMENT — PAIN SCALES - GENERAL: PAINLEVEL_OUTOF10: 7

## 2017-08-20 ASSESSMENT — PAIN DESCRIPTION - PAIN TYPE: TYPE: ACUTE PAIN

## 2017-08-21 PROBLEM — J69.0 ASPIRATION PNEUMONIA DUE TO VOMIT (HCC): Status: ACTIVE | Noted: 2017-08-21

## 2017-08-21 LAB
-: ABNORMAL
ABSOLUTE EOS #: 0.44 K/UL (ref 0–0.4)
ABSOLUTE LYMPH #: 2.87 K/UL (ref 1–4.8)
ABSOLUTE MONO #: 1.33 K/UL (ref 0.1–0.8)
ALBUMIN SERPL-MCNC: 3 G/DL (ref 3.5–5.2)
ALBUMIN SERPL-MCNC: NORMAL G/DL (ref 3.5–5.2)
ALBUMIN/GLOBULIN RATIO: 0.6 (ref 1–2.5)
ALBUMIN/GLOBULIN RATIO: NORMAL (ref 1–2.5)
ALP BLD-CCNC: 512 U/L (ref 35–104)
ALP BLD-CCNC: NORMAL U/L (ref 35–104)
ALT SERPL-CCNC: 21 U/L (ref 5–33)
ALT SERPL-CCNC: NORMAL U/L (ref 5–33)
AMORPHOUS: ABNORMAL
ANION GAP SERPL CALCULATED.3IONS-SCNC: 17 MMOL/L (ref 9–17)
ANION GAP SERPL CALCULATED.3IONS-SCNC: NORMAL MMOL/L (ref 9–17)
AST SERPL-CCNC: 25 U/L
AST SERPL-CCNC: NORMAL U/L
BACTERIA: ABNORMAL
BASOPHILS # BLD: 0 %
BASOPHILS ABSOLUTE: 0 K/UL (ref 0–0.2)
BILIRUB SERPL-MCNC: 0.31 MG/DL (ref 0.3–1.2)
BILIRUB SERPL-MCNC: NORMAL MG/DL (ref 0.3–1.2)
BILIRUBIN URINE: NEGATIVE
BUN BLDV-MCNC: 19 MG/DL (ref 6–20)
BUN BLDV-MCNC: NORMAL MG/DL (ref 6–20)
BUN/CREAT BLD: ABNORMAL (ref 9–20)
BUN/CREAT BLD: NORMAL (ref 9–20)
CALCIUM SERPL-MCNC: 9.1 MG/DL (ref 8.6–10.4)
CALCIUM SERPL-MCNC: NORMAL MG/DL (ref 8.6–10.4)
CASTS UA: ABNORMAL /LPF (ref 0–8)
CHLORIDE BLD-SCNC: 94 MMOL/L (ref 98–107)
CHLORIDE BLD-SCNC: NORMAL MMOL/L (ref 98–107)
CO2: 22 MMOL/L (ref 20–31)
CO2: NORMAL MMOL/L (ref 20–31)
COLOR: YELLOW
CREAT SERPL-MCNC: 0.63 MG/DL (ref 0.5–0.9)
CREAT SERPL-MCNC: NORMAL MG/DL (ref 0.5–0.9)
CRYSTALS, UA: ABNORMAL /HPF
DIFFERENTIAL TYPE: ABNORMAL
EOSINOPHILS RELATIVE PERCENT: 2 %
EPITHELIAL CELLS UA: ABNORMAL /HPF (ref 0–5)
GFR AFRICAN AMERICAN: >60 ML/MIN
GFR AFRICAN AMERICAN: NORMAL ML/MIN
GFR NON-AFRICAN AMERICAN: >60 ML/MIN
GFR NON-AFRICAN AMERICAN: NORMAL ML/MIN
GFR SERPL CREATININE-BSD FRML MDRD: ABNORMAL ML/MIN/{1.73_M2}
GFR SERPL CREATININE-BSD FRML MDRD: ABNORMAL ML/MIN/{1.73_M2}
GFR SERPL CREATININE-BSD FRML MDRD: NORMAL ML/MIN/{1.73_M2}
GFR SERPL CREATININE-BSD FRML MDRD: NORMAL ML/MIN/{1.73_M2}
GGT: 108 U/L (ref 5–36)
GLUCOSE BLD-MCNC: 116 MG/DL (ref 70–99)
GLUCOSE BLD-MCNC: NORMAL MG/DL (ref 70–99)
GLUCOSE URINE: NEGATIVE
HCT VFR BLD CALC: 30.8 % (ref 36–46)
HEMOGLOBIN: 9.8 G/DL (ref 12–16)
KETONES, URINE: NEGATIVE
LACTIC ACID, WHOLE BLOOD: 0.7 MMOL/L (ref 0.7–2.1)
LACTIC ACID, WHOLE BLOOD: 1.5 MMOL/L (ref 0.7–2.1)
LACTIC ACID, WHOLE BLOOD: NORMAL MMOL/L (ref 0.7–2.1)
LEUKOCYTE ESTERASE, URINE: ABNORMAL
LYMPHOCYTES # BLD: 13 %
MCH RBC QN AUTO: 25.9 PG (ref 26–34)
MCHC RBC AUTO-ENTMCNC: 31.8 G/DL (ref 31–37)
MCV RBC AUTO: 81.5 FL (ref 80–100)
MONOCYTES # BLD: 6 %
MORPHOLOGY: ABNORMAL
MUCUS: ABNORMAL
NITRITE, URINE: NEGATIVE
OTHER OBSERVATIONS UA: ABNORMAL
PDW BLD-RTO: 19.8 % (ref 12.5–15.4)
PH UA: 5.5 (ref 5–8)
PLATELET # BLD: 904 K/UL (ref 140–450)
PLATELET ESTIMATE: ABNORMAL
PMV BLD AUTO: 7.5 FL (ref 6–12)
POC TROPONIN I: 0.02 NG/ML (ref 0–0.1)
POC TROPONIN INTERP: NORMAL
POTASSIUM SERPL-SCNC: 4 MMOL/L (ref 3.7–5.3)
POTASSIUM SERPL-SCNC: NORMAL MMOL/L (ref 3.7–5.3)
PROTEIN UA: ABNORMAL
RBC # BLD: 3.78 M/UL (ref 4–5.2)
RBC # BLD: ABNORMAL 10*6/UL
RBC UA: ABNORMAL /HPF (ref 0–4)
RENAL EPITHELIAL, UA: ABNORMAL /HPF
SEG NEUTROPHILS: 79 %
SEGMENTED NEUTROPHILS ABSOLUTE COUNT: 17.46 K/UL (ref 1.8–7.7)
SODIUM BLD-SCNC: 133 MMOL/L (ref 135–144)
SODIUM BLD-SCNC: NORMAL MMOL/L (ref 135–144)
SPECIFIC GRAVITY UA: 1.02 (ref 1–1.03)
TOTAL PROTEIN: 7.7 G/DL (ref 6.4–8.3)
TOTAL PROTEIN: NORMAL G/DL (ref 6.4–8.3)
TRICHOMONAS: ABNORMAL
TURBIDITY: CLEAR
URINE HGB: ABNORMAL
UROBILINOGEN, URINE: NORMAL
WBC # BLD: 22.1 K/UL (ref 3.5–11)
WBC # BLD: ABNORMAL 10*3/UL
WBC UA: ABNORMAL /HPF (ref 0–5)
YEAST: ABNORMAL

## 2017-08-21 PROCEDURE — 87086 URINE CULTURE/COLONY COUNT: CPT

## 2017-08-21 PROCEDURE — 82977 ASSAY OF GGT: CPT

## 2017-08-21 PROCEDURE — 6360000002 HC RX W HCPCS: Performed by: EMERGENCY MEDICINE

## 2017-08-21 PROCEDURE — 81001 URINALYSIS AUTO W/SCOPE: CPT

## 2017-08-21 PROCEDURE — 6370000000 HC RX 637 (ALT 250 FOR IP): Performed by: NURSE PRACTITIONER

## 2017-08-21 PROCEDURE — 94640 AIRWAY INHALATION TREATMENT: CPT

## 2017-08-21 PROCEDURE — 1200000000 HC SEMI PRIVATE

## 2017-08-21 PROCEDURE — 36415 COLL VENOUS BLD VENIPUNCTURE: CPT

## 2017-08-21 PROCEDURE — 2580000003 HC RX 258: Performed by: NURSE PRACTITIONER

## 2017-08-21 PROCEDURE — 83605 ASSAY OF LACTIC ACID: CPT

## 2017-08-21 PROCEDURE — 6370000000 HC RX 637 (ALT 250 FOR IP): Performed by: INTERNAL MEDICINE

## 2017-08-21 PROCEDURE — 2580000003 HC RX 258: Performed by: EMERGENCY MEDICINE

## 2017-08-21 PROCEDURE — 6360000002 HC RX W HCPCS: Performed by: NURSE PRACTITIONER

## 2017-08-21 PROCEDURE — 6360000002 HC RX W HCPCS: Performed by: INTERNAL MEDICINE

## 2017-08-21 PROCEDURE — 2580000003 HC RX 258: Performed by: INTERNAL MEDICINE

## 2017-08-21 PROCEDURE — 96365 THER/PROPH/DIAG IV INF INIT: CPT

## 2017-08-21 PROCEDURE — 99222 1ST HOSP IP/OBS MODERATE 55: CPT | Performed by: INTERNAL MEDICINE

## 2017-08-21 PROCEDURE — 87040 BLOOD CULTURE FOR BACTERIA: CPT

## 2017-08-21 PROCEDURE — 96375 TX/PRO/DX INJ NEW DRUG ADDON: CPT

## 2017-08-21 PROCEDURE — 80053 COMPREHEN METABOLIC PANEL: CPT

## 2017-08-21 RX ORDER — SODIUM CHLORIDE 0.9 % (FLUSH) 0.9 %
10 SYRINGE (ML) INJECTION EVERY 12 HOURS SCHEDULED
Status: DISCONTINUED | OUTPATIENT
Start: 2017-08-21 | End: 2017-08-23 | Stop reason: HOSPADM

## 2017-08-21 RX ORDER — IBUPROFEN 400 MG/1
600 TABLET ORAL EVERY 8 HOURS PRN
Status: DISCONTINUED | OUTPATIENT
Start: 2017-08-21 | End: 2017-08-22

## 2017-08-21 RX ORDER — METOCLOPRAMIDE 5 MG/1
5 TABLET ORAL
Status: DISCONTINUED | OUTPATIENT
Start: 2017-08-21 | End: 2017-08-23 | Stop reason: HOSPADM

## 2017-08-21 RX ORDER — FLUTICASONE PROPIONATE 50 MCG
1 SPRAY, SUSPENSION (ML) NASAL DAILY
Status: DISCONTINUED | OUTPATIENT
Start: 2017-08-21 | End: 2017-08-23 | Stop reason: HOSPADM

## 2017-08-21 RX ORDER — VANCOMYCIN HYDROCHLORIDE 1 G/200ML
1000 INJECTION, SOLUTION INTRAVENOUS EVERY 12 HOURS
Status: DISCONTINUED | OUTPATIENT
Start: 2017-08-21 | End: 2017-08-22

## 2017-08-21 RX ORDER — PANTOPRAZOLE SODIUM 40 MG/1
40 TABLET, DELAYED RELEASE ORAL DAILY
Status: DISCONTINUED | OUTPATIENT
Start: 2017-08-21 | End: 2017-08-23 | Stop reason: HOSPADM

## 2017-08-21 RX ORDER — ONDANSETRON 4 MG/1
4 TABLET, FILM COATED ORAL EVERY 8 HOURS PRN
Status: DISCONTINUED | OUTPATIENT
Start: 2017-08-21 | End: 2017-08-23 | Stop reason: HOSPADM

## 2017-08-21 RX ORDER — SODIUM CHLORIDE 9 MG/ML
INJECTION, SOLUTION INTRAVENOUS CONTINUOUS
Status: DISCONTINUED | OUTPATIENT
Start: 2017-08-21 | End: 2017-08-23 | Stop reason: HOSPADM

## 2017-08-21 RX ORDER — DOCUSATE SODIUM 100 MG/1
100 CAPSULE, LIQUID FILLED ORAL 2 TIMES DAILY
Status: DISCONTINUED | OUTPATIENT
Start: 2017-08-21 | End: 2017-08-23 | Stop reason: HOSPADM

## 2017-08-21 RX ORDER — ECHINACEA PURPUREA EXTRACT 125 MG
1 TABLET ORAL PRN
Status: DISCONTINUED | OUTPATIENT
Start: 2017-08-21 | End: 2017-08-23 | Stop reason: HOSPADM

## 2017-08-21 RX ORDER — VANCOMYCIN HYDROCHLORIDE 1 G/200ML
15 INJECTION, SOLUTION INTRAVENOUS EVERY 12 HOURS
Status: DISCONTINUED | OUTPATIENT
Start: 2017-08-21 | End: 2017-08-21 | Stop reason: SDUPTHER

## 2017-08-21 RX ORDER — CITALOPRAM 20 MG/1
40 TABLET ORAL DAILY
Status: DISCONTINUED | OUTPATIENT
Start: 2017-08-21 | End: 2017-08-23 | Stop reason: HOSPADM

## 2017-08-21 RX ORDER — ALBUTEROL SULFATE 2.5 MG/3ML
2.5 SOLUTION RESPIRATORY (INHALATION)
Status: DISCONTINUED | OUTPATIENT
Start: 2017-08-21 | End: 2017-08-23 | Stop reason: HOSPADM

## 2017-08-21 RX ORDER — VANCOMYCIN HYDROCHLORIDE 1 G/200ML
15 INJECTION, SOLUTION INTRAVENOUS ONCE
Status: COMPLETED | OUTPATIENT
Start: 2017-08-21 | End: 2017-08-21

## 2017-08-21 RX ORDER — CETIRIZINE HYDROCHLORIDE 10 MG/1
10 TABLET ORAL DAILY
Status: DISCONTINUED | OUTPATIENT
Start: 2017-08-21 | End: 2017-08-23 | Stop reason: HOSPADM

## 2017-08-21 RX ORDER — ASPIRIN 81 MG/1
81 TABLET, CHEWABLE ORAL DAILY
Status: DISCONTINUED | OUTPATIENT
Start: 2017-08-21 | End: 2017-08-23 | Stop reason: HOSPADM

## 2017-08-21 RX ORDER — SENNA PLUS 8.6 MG/1
1 TABLET ORAL 2 TIMES DAILY
Status: DISCONTINUED | OUTPATIENT
Start: 2017-08-21 | End: 2017-08-23 | Stop reason: HOSPADM

## 2017-08-21 RX ORDER — BISACODYL 10 MG
10 SUPPOSITORY, RECTAL RECTAL DAILY PRN
Status: DISCONTINUED | OUTPATIENT
Start: 2017-08-21 | End: 2017-08-23 | Stop reason: HOSPADM

## 2017-08-21 RX ORDER — ONDANSETRON 2 MG/ML
4 INJECTION INTRAMUSCULAR; INTRAVENOUS EVERY 6 HOURS PRN
Status: DISCONTINUED | OUTPATIENT
Start: 2017-08-21 | End: 2017-08-23 | Stop reason: HOSPADM

## 2017-08-21 RX ORDER — FOLIC ACID 1 MG/1
1 TABLET ORAL DAILY
Status: DISCONTINUED | OUTPATIENT
Start: 2017-08-21 | End: 2017-08-23 | Stop reason: HOSPADM

## 2017-08-21 RX ORDER — SODIUM CHLORIDE 0.9 % (FLUSH) 0.9 %
10 SYRINGE (ML) INJECTION PRN
Status: DISCONTINUED | OUTPATIENT
Start: 2017-08-21 | End: 2017-08-23 | Stop reason: HOSPADM

## 2017-08-21 RX ORDER — MORPHINE SULFATE 4 MG/ML
4 INJECTION, SOLUTION INTRAMUSCULAR; INTRAVENOUS ONCE
Status: COMPLETED | OUTPATIENT
Start: 2017-08-21 | End: 2017-08-21

## 2017-08-21 RX ORDER — IPRATROPIUM BROMIDE AND ALBUTEROL SULFATE 2.5; .5 MG/3ML; MG/3ML
1 SOLUTION RESPIRATORY (INHALATION)
Status: DISCONTINUED | OUTPATIENT
Start: 2017-08-21 | End: 2017-08-23 | Stop reason: HOSPADM

## 2017-08-21 RX ADMIN — IPRATROPIUM BROMIDE AND ALBUTEROL SULFATE 1 AMPULE: .5; 3 SOLUTION RESPIRATORY (INHALATION) at 11:56

## 2017-08-21 RX ADMIN — SODIUM CHLORIDE: 9 INJECTION, SOLUTION INTRAVENOUS at 04:00

## 2017-08-21 RX ADMIN — METOCLOPRAMIDE 5 MG: 5 TABLET ORAL at 12:08

## 2017-08-21 RX ADMIN — PANTOPRAZOLE SODIUM 40 MG: 40 TABLET, DELAYED RELEASE ORAL at 10:07

## 2017-08-21 RX ADMIN — PIPERACILLIN AND TAZOBACTAM 3.38 G: 3; .375 INJECTION, POWDER, LYOPHILIZED, FOR SOLUTION INTRAVENOUS; PARENTERAL at 10:06

## 2017-08-21 RX ADMIN — SENNA 8.6 MG: 8.6 TABLET, COATED ORAL at 10:06

## 2017-08-21 RX ADMIN — CETIRIZINE HYDROCHLORIDE 10 MG: 10 TABLET ORAL at 16:04

## 2017-08-21 RX ADMIN — DOCUSATE SODIUM 100 MG: 100 CAPSULE ORAL at 10:07

## 2017-08-21 RX ADMIN — ONDANSETRON 4 MG: 2 INJECTION, SOLUTION INTRAMUSCULAR; INTRAVENOUS at 00:03

## 2017-08-21 RX ADMIN — FLUTICASONE PROPIONATE 1 SPRAY: 50 SPRAY, METERED NASAL at 16:03

## 2017-08-21 RX ADMIN — METOCLOPRAMIDE 5 MG: 5 TABLET ORAL at 16:03

## 2017-08-21 RX ADMIN — IPRATROPIUM BROMIDE AND ALBUTEROL SULFATE 1 AMPULE: .5; 3 SOLUTION RESPIRATORY (INHALATION) at 09:40

## 2017-08-21 RX ADMIN — FOLIC ACID 1 MG: 1 TABLET ORAL at 10:07

## 2017-08-21 RX ADMIN — IPRATROPIUM BROMIDE AND ALBUTEROL SULFATE 1 AMPULE: .5; 3 SOLUTION RESPIRATORY (INHALATION) at 15:26

## 2017-08-21 RX ADMIN — VANCOMYCIN HYDROCHLORIDE 1000 MG: 1 INJECTION, SOLUTION INTRAVENOUS at 00:43

## 2017-08-21 RX ADMIN — ASPIRIN 81 MG: 81 TABLET, CHEWABLE ORAL at 10:07

## 2017-08-21 RX ADMIN — DOCUSATE SODIUM 100 MG: 100 CAPSULE ORAL at 20:41

## 2017-08-21 RX ADMIN — Medication 10 ML: at 10:09

## 2017-08-21 RX ADMIN — IPRATROPIUM BROMIDE AND ALBUTEROL SULFATE 1 AMPULE: .5; 3 SOLUTION RESPIRATORY (INHALATION) at 20:52

## 2017-08-21 RX ADMIN — PIPERACILLIN AND TAZOBACTAM 3.38 G: 3; .375 INJECTION, POWDER, LYOPHILIZED, FOR SOLUTION INTRAVENOUS; PARENTERAL at 01:54

## 2017-08-21 RX ADMIN — IBUPROFEN 600 MG: 400 TABLET ORAL at 04:00

## 2017-08-21 RX ADMIN — ENOXAPARIN SODIUM 40 MG: 40 INJECTION SUBCUTANEOUS at 10:06

## 2017-08-21 RX ADMIN — METOCLOPRAMIDE 5 MG: 5 TABLET ORAL at 06:14

## 2017-08-21 RX ADMIN — VANCOMYCIN HYDROCHLORIDE 1000 MG: 1 INJECTION, SOLUTION INTRAVENOUS at 14:15

## 2017-08-21 RX ADMIN — ONDANSETRON 4 MG: 4 TABLET, FILM COATED ORAL at 04:00

## 2017-08-21 RX ADMIN — Medication 1 TABLET: at 16:04

## 2017-08-21 RX ADMIN — PIPERACILLIN AND TAZOBACTAM 3.38 G: 3; .375 INJECTION, POWDER, LYOPHILIZED, FOR SOLUTION INTRAVENOUS; PARENTERAL at 16:03

## 2017-08-21 RX ADMIN — SENNA 8.6 MG: 8.6 TABLET, COATED ORAL at 20:41

## 2017-08-21 RX ADMIN — MORPHINE SULFATE 4 MG: 4 INJECTION, SOLUTION INTRAMUSCULAR; INTRAVENOUS at 00:18

## 2017-08-21 RX ADMIN — CITALOPRAM 40 MG: 20 TABLET, FILM COATED ORAL at 10:07

## 2017-08-21 ASSESSMENT — ENCOUNTER SYMPTOMS
WHEEZING: 0
SPUTUM PRODUCTION: 0
VOMITING: 1
NAUSEA: 1
COUGH: 0
BLOOD IN STOOL: 0
DIARRHEA: 1
SHORTNESS OF BREATH: 0
CONSTIPATION: 0

## 2017-08-21 ASSESSMENT — PAIN SCALES - GENERAL
PAINLEVEL_OUTOF10: 7
PAINLEVEL_OUTOF10: 7

## 2017-08-22 ENCOUNTER — APPOINTMENT (OUTPATIENT)
Dept: GENERAL RADIOLOGY | Age: 47
DRG: 137 | End: 2017-08-22
Payer: MEDICAID

## 2017-08-22 ENCOUNTER — APPOINTMENT (OUTPATIENT)
Dept: ULTRASOUND IMAGING | Age: 47
DRG: 137 | End: 2017-08-22
Payer: MEDICAID

## 2017-08-22 VITALS
DIASTOLIC BLOOD PRESSURE: 74 MMHG | HEART RATE: 99 BPM | TEMPERATURE: 100.2 F | RESPIRATION RATE: 18 BRPM | SYSTOLIC BLOOD PRESSURE: 127 MMHG | OXYGEN SATURATION: 94 % | HEIGHT: 57 IN | BODY MASS INDEX: 28.93 KG/M2 | WEIGHT: 134.1 LBS

## 2017-08-22 LAB
ABSOLUTE EOS #: 0.18 K/UL (ref 0–0.4)
ABSOLUTE LYMPH #: 0.55 K/UL (ref 1–4.8)
ABSOLUTE MONO #: 0.92 K/UL (ref 0.1–0.8)
ABSOLUTE RETIC #: 0.08 M/UL (ref 0.02–0.1)
ALBUMIN SERPL-MCNC: 2.6 G/DL (ref 3.5–5.2)
ALBUMIN/GLOBULIN RATIO: 0.5 (ref 1–2.5)
ALP BLD-CCNC: 598 U/L (ref 35–104)
ALT SERPL-CCNC: 37 U/L (ref 5–33)
ANION GAP SERPL CALCULATED.3IONS-SCNC: 15 MMOL/L (ref 9–17)
AST SERPL-CCNC: 61 U/L
BASOPHILS # BLD: 0 %
BASOPHILS ABSOLUTE: 0 K/UL (ref 0–0.2)
BILIRUB SERPL-MCNC: 0.53 MG/DL (ref 0.3–1.2)
BUN BLDV-MCNC: 16 MG/DL (ref 6–20)
BUN/CREAT BLD: ABNORMAL (ref 9–20)
CALCIUM SERPL-MCNC: 9.3 MG/DL (ref 8.6–10.4)
CERULOPLASMIN: 40 MG/DL (ref 16–45)
CHLORIDE BLD-SCNC: 98 MMOL/L (ref 98–107)
CO2: 21 MMOL/L (ref 20–31)
CREAT SERPL-MCNC: 0.74 MG/DL (ref 0.5–0.9)
CULTURE: NORMAL
DIFFERENTIAL TYPE: ABNORMAL
EOSINOPHILS RELATIVE PERCENT: 1 %
FERRITIN: 3998 UG/L (ref 13–150)
GFR AFRICAN AMERICAN: >60 ML/MIN
GFR NON-AFRICAN AMERICAN: >60 ML/MIN
GFR SERPL CREATININE-BSD FRML MDRD: ABNORMAL ML/MIN/{1.73_M2}
GFR SERPL CREATININE-BSD FRML MDRD: ABNORMAL ML/MIN/{1.73_M2}
GLUCOSE BLD-MCNC: 121 MG/DL (ref 70–99)
HAPTOGLOBIN: 423 MG/DL (ref 30–200)
HCT VFR BLD CALC: 25.3 % (ref 36–46)
HCT VFR BLD CALC: 25.4 % (ref 36–46)
HEMOGLOBIN: 8 G/DL (ref 12–16)
HEMOGLOBIN: 8.2 G/DL (ref 12–16)
IRON SATURATION: 11 % (ref 20–55)
IRON: 16 UG/DL (ref 37–145)
LACTATE DEHYDROGENASE: 89 U/L (ref 135–214)
LYMPHOCYTES # BLD: 3 %
Lab: NORMAL
MCH RBC QN AUTO: 26 PG (ref 26–34)
MCH RBC QN AUTO: 26.5 PG (ref 26–34)
MCHC RBC AUTO-ENTMCNC: 31.6 G/DL (ref 31–37)
MCHC RBC AUTO-ENTMCNC: 32.5 G/DL (ref 31–37)
MCV RBC AUTO: 81.8 FL (ref 80–100)
MCV RBC AUTO: 82 FL (ref 80–100)
MONOCYTES # BLD: 5 %
MORPHOLOGY: ABNORMAL
PDW BLD-RTO: 18.8 % (ref 12.5–15.4)
PDW BLD-RTO: 19.3 % (ref 12.5–15.4)
PLATELET # BLD: 723 K/UL (ref 140–450)
PLATELET # BLD: 828 K/UL (ref 140–450)
PLATELET ESTIMATE: ABNORMAL
PMV BLD AUTO: 6.5 FL (ref 6–12)
PMV BLD AUTO: 7.2 FL (ref 6–12)
POTASSIUM SERPL-SCNC: 3.9 MMOL/L (ref 3.7–5.3)
RBC # BLD: 3.1 M/UL (ref 4–5.2)
RBC # BLD: 3.1 M/UL (ref 4–5.2)
RBC # BLD: ABNORMAL 10*6/UL
RETIC %: 2.6 % (ref 0.5–2)
SEG NEUTROPHILS: 91 %
SEGMENTED NEUTROPHILS ABSOLUTE COUNT: 16.75 K/UL (ref 1.8–7.7)
SODIUM BLD-SCNC: 134 MMOL/L (ref 135–144)
SPECIMEN DESCRIPTION: NORMAL
STATUS: NORMAL
TOTAL IRON BINDING CAPACITY: 140 UG/DL (ref 250–450)
TOTAL PROTEIN: 7.6 G/DL (ref 6.4–8.3)
UNSATURATED IRON BINDING CAPACITY: 124 UG/DL (ref 112–347)
VANCOMYCIN TROUGH DATE LAST DOSE: ABNORMAL
VANCOMYCIN TROUGH DOSE AMOUNT: ABNORMAL
VANCOMYCIN TROUGH TIME LAST DOSE: ABNORMAL
VANCOMYCIN TROUGH: 35.3 UG/ML (ref 10–20)
VITAMIN B-12: 566 PG/ML (ref 211–946)
WBC # BLD: 16.9 K/UL (ref 3.5–11)
WBC # BLD: 18.4 K/UL (ref 3.5–11)
WBC # BLD: ABNORMAL 10*3/UL

## 2017-08-22 PROCEDURE — 85027 COMPLETE CBC AUTOMATED: CPT

## 2017-08-22 PROCEDURE — 36415 COLL VENOUS BLD VENIPUNCTURE: CPT

## 2017-08-22 PROCEDURE — 83540 ASSAY OF IRON: CPT

## 2017-08-22 PROCEDURE — 6360000002 HC RX W HCPCS: Performed by: FAMILY MEDICINE

## 2017-08-22 PROCEDURE — 2580000003 HC RX 258: Performed by: NURSE PRACTITIONER

## 2017-08-22 PROCEDURE — 83615 LACTATE (LD) (LDH) ENZYME: CPT

## 2017-08-22 PROCEDURE — 76937 US GUIDE VASCULAR ACCESS: CPT

## 2017-08-22 PROCEDURE — 82390 ASSAY OF CERULOPLASMIN: CPT

## 2017-08-22 PROCEDURE — 83550 IRON BINDING TEST: CPT

## 2017-08-22 PROCEDURE — 82607 VITAMIN B-12: CPT

## 2017-08-22 PROCEDURE — 2580000003 HC RX 258: Performed by: INTERNAL MEDICINE

## 2017-08-22 PROCEDURE — 82728 ASSAY OF FERRITIN: CPT

## 2017-08-22 PROCEDURE — 81206 BCR/ABL1 GENE MAJOR BP: CPT

## 2017-08-22 PROCEDURE — 74000 XR ABDOMEN LIMITED (KUB): CPT

## 2017-08-22 PROCEDURE — 81270 JAK2 GENE: CPT

## 2017-08-22 PROCEDURE — 6370000000 HC RX 637 (ALT 250 FOR IP): Performed by: INTERNAL MEDICINE

## 2017-08-22 PROCEDURE — 83516 IMMUNOASSAY NONANTIBODY: CPT

## 2017-08-22 PROCEDURE — 6370000000 HC RX 637 (ALT 250 FOR IP): Performed by: NURSE PRACTITIONER

## 2017-08-22 PROCEDURE — 83010 ASSAY OF HAPTOGLOBIN QUANT: CPT

## 2017-08-22 PROCEDURE — 94640 AIRWAY INHALATION TREATMENT: CPT

## 2017-08-22 PROCEDURE — 80053 COMPREHEN METABOLIC PANEL: CPT

## 2017-08-22 PROCEDURE — 85025 COMPLETE CBC W/AUTO DIFF WBC: CPT

## 2017-08-22 PROCEDURE — 85045 AUTOMATED RETICULOCYTE COUNT: CPT

## 2017-08-22 PROCEDURE — 6360000002 HC RX W HCPCS: Performed by: INTERNAL MEDICINE

## 2017-08-22 PROCEDURE — 86038 ANTINUCLEAR ANTIBODIES: CPT

## 2017-08-22 PROCEDURE — 1200000000 HC SEMI PRIVATE

## 2017-08-22 PROCEDURE — 6360000002 HC RX W HCPCS: Performed by: NURSE PRACTITIONER

## 2017-08-22 PROCEDURE — 80202 ASSAY OF VANCOMYCIN: CPT

## 2017-08-22 PROCEDURE — 71020 XR CHEST STANDARD TWO VW: CPT

## 2017-08-22 PROCEDURE — 84155 ASSAY OF PROTEIN SERUM: CPT

## 2017-08-22 PROCEDURE — 76705 ECHO EXAM OF ABDOMEN: CPT

## 2017-08-22 PROCEDURE — 99232 SBSQ HOSP IP/OBS MODERATE 35: CPT | Performed by: FAMILY MEDICINE

## 2017-08-22 PROCEDURE — 2580000003 HC RX 258: Performed by: FAMILY MEDICINE

## 2017-08-22 PROCEDURE — 84165 PROTEIN E-PHORESIS SERUM: CPT

## 2017-08-22 RX ADMIN — AMPICILLIN SODIUM AND SULBACTAM SODIUM 1.5 G: 1; .5 INJECTION, POWDER, FOR SOLUTION INTRAMUSCULAR; INTRAVENOUS at 22:25

## 2017-08-22 RX ADMIN — SODIUM CHLORIDE: 9 INJECTION, SOLUTION INTRAVENOUS at 07:58

## 2017-08-22 RX ADMIN — METOCLOPRAMIDE 5 MG: 5 TABLET ORAL at 11:00

## 2017-08-22 RX ADMIN — METOCLOPRAMIDE 5 MG: 5 TABLET ORAL at 06:33

## 2017-08-22 RX ADMIN — VANCOMYCIN HYDROCHLORIDE 1000 MG: 1 INJECTION, SOLUTION INTRAVENOUS at 04:39

## 2017-08-22 RX ADMIN — Medication 1 TABLET: at 07:58

## 2017-08-22 RX ADMIN — ASPIRIN 81 MG: 81 TABLET, CHEWABLE ORAL at 07:58

## 2017-08-22 RX ADMIN — PIPERACILLIN AND TAZOBACTAM 3.38 G: 3; .375 INJECTION, POWDER, LYOPHILIZED, FOR SOLUTION INTRAVENOUS; PARENTERAL at 08:00

## 2017-08-22 RX ADMIN — PANTOPRAZOLE SODIUM 40 MG: 40 TABLET, DELAYED RELEASE ORAL at 07:58

## 2017-08-22 RX ADMIN — Medication 10 ML: at 21:50

## 2017-08-22 RX ADMIN — ENOXAPARIN SODIUM 40 MG: 40 INJECTION SUBCUTANEOUS at 09:00

## 2017-08-22 RX ADMIN — CETIRIZINE HYDROCHLORIDE 10 MG: 10 TABLET ORAL at 07:58

## 2017-08-22 RX ADMIN — SENNA 8.6 MG: 8.6 TABLET, COATED ORAL at 07:58

## 2017-08-22 RX ADMIN — AMPICILLIN SODIUM AND SULBACTAM SODIUM 1.5 G: 1; .5 INJECTION, POWDER, FOR SOLUTION INTRAMUSCULAR; INTRAVENOUS at 16:45

## 2017-08-22 RX ADMIN — IPRATROPIUM BROMIDE AND ALBUTEROL SULFATE 1 AMPULE: .5; 3 SOLUTION RESPIRATORY (INHALATION) at 10:56

## 2017-08-22 RX ADMIN — DOCUSATE SODIUM 100 MG: 100 CAPSULE ORAL at 07:58

## 2017-08-22 RX ADMIN — AMPICILLIN SODIUM AND SULBACTAM SODIUM 1.5 G: 1; .5 INJECTION, POWDER, FOR SOLUTION INTRAMUSCULAR; INTRAVENOUS at 10:45

## 2017-08-22 RX ADMIN — SENNA 8.6 MG: 8.6 TABLET, COATED ORAL at 21:50

## 2017-08-22 RX ADMIN — CITALOPRAM 40 MG: 20 TABLET, FILM COATED ORAL at 07:58

## 2017-08-22 RX ADMIN — DOCUSATE SODIUM 100 MG: 100 CAPSULE ORAL at 21:50

## 2017-08-22 RX ADMIN — IPRATROPIUM BROMIDE AND ALBUTEROL SULFATE 1 AMPULE: .5; 3 SOLUTION RESPIRATORY (INHALATION) at 16:48

## 2017-08-22 RX ADMIN — FOLIC ACID 1 MG: 1 TABLET ORAL at 07:58

## 2017-08-22 RX ADMIN — Medication 10 ML: at 09:00

## 2017-08-22 RX ADMIN — IPRATROPIUM BROMIDE AND ALBUTEROL SULFATE 1 AMPULE: .5; 3 SOLUTION RESPIRATORY (INHALATION) at 21:08

## 2017-08-22 RX ADMIN — FLUTICASONE PROPIONATE 1 SPRAY: 50 SPRAY, METERED NASAL at 07:58

## 2017-08-22 RX ADMIN — PIPERACILLIN AND TAZOBACTAM 3.38 G: 3; .375 INJECTION, POWDER, LYOPHILIZED, FOR SOLUTION INTRAVENOUS; PARENTERAL at 00:33

## 2017-08-22 RX ADMIN — IPRATROPIUM BROMIDE AND ALBUTEROL SULFATE 1 AMPULE: .5; 3 SOLUTION RESPIRATORY (INHALATION) at 07:38

## 2017-08-22 RX ADMIN — METOCLOPRAMIDE 5 MG: 5 TABLET ORAL at 16:00

## 2017-08-22 ASSESSMENT — ENCOUNTER SYMPTOMS
BLOOD IN STOOL: 0
SHORTNESS OF BREATH: 1
VOICE CHANGE: 0
SINUS PRESSURE: 0
NAUSEA: 0
COUGH: 0
VOMITING: 0
DIARRHEA: 0
WHEEZING: 0
CONSTIPATION: 0
SORE THROAT: 0
ABDOMINAL PAIN: 0

## 2017-08-22 ASSESSMENT — PAIN SCALES - GENERAL: PAINLEVEL_OUTOF10: 0

## 2017-08-23 ENCOUNTER — APPOINTMENT (OUTPATIENT)
Dept: MRI IMAGING | Age: 47
DRG: 137 | End: 2017-08-23
Payer: MEDICAID

## 2017-08-23 LAB
ALBUMIN (CALCULATED): 3.1 G/DL (ref 3.2–5.2)
ALBUMIN PERCENT: 43 % (ref 45–65)
ALBUMIN SERPL-MCNC: 2.8 G/DL (ref 3.5–5.2)
ALBUMIN/GLOBULIN RATIO: 0.6 (ref 1–2.5)
ALP BLD-CCNC: 617 U/L (ref 35–104)
ALPHA 1 PERCENT: 7 % (ref 3–6)
ALPHA 2 PERCENT: 14 % (ref 6–13)
ALPHA-1-GLOBULIN: 0.5 G/DL (ref 0.1–0.4)
ALPHA-2-GLOBULIN: 1 G/DL (ref 0.5–0.9)
ALT SERPL-CCNC: 35 U/L (ref 5–33)
ANION GAP SERPL CALCULATED.3IONS-SCNC: 17 MMOL/L (ref 9–17)
ANTI-NUCLEAR ANTIBODY (ANA): NEGATIVE
AST SERPL-CCNC: 48 U/L
BETA GLOBULIN: 1.1 G/DL (ref 0.5–1.1)
BETA PERCENT: 15 % (ref 11–19)
BILIRUB SERPL-MCNC: 0.64 MG/DL (ref 0.3–1.2)
BUN BLDV-MCNC: 13 MG/DL (ref 6–20)
BUN/CREAT BLD: ABNORMAL (ref 9–20)
CALCIUM SERPL-MCNC: 9.5 MG/DL (ref 8.6–10.4)
CHLORIDE BLD-SCNC: 99 MMOL/L (ref 98–107)
CO2: 21 MMOL/L (ref 20–31)
CREAT SERPL-MCNC: 0.95 MG/DL (ref 0.5–0.9)
FOLATE: >20 NG/ML
GAMMA GLOBULIN %: 21 % (ref 9–20)
GAMMA GLOBULIN: 1.5 G/DL (ref 0.5–1.5)
GFR AFRICAN AMERICAN: >60 ML/MIN
GFR NON-AFRICAN AMERICAN: >60 ML/MIN
GFR SERPL CREATININE-BSD FRML MDRD: ABNORMAL ML/MIN/{1.73_M2}
GFR SERPL CREATININE-BSD FRML MDRD: ABNORMAL ML/MIN/{1.73_M2}
GLUCOSE BLD-MCNC: 102 MG/DL (ref 70–99)
MITOCHONDRIAL ANTIBODY: 9.3 UNITS (ref 0–20)
PATHOLOGIST: ABNORMAL
POTASSIUM SERPL-SCNC: 3.7 MMOL/L (ref 3.7–5.3)
PROTEIN ELECTROPHORESIS, SERUM: ABNORMAL
SODIUM BLD-SCNC: 137 MMOL/L (ref 135–144)
TOTAL PROT. SUM,%: 100 % (ref 98–102)
TOTAL PROT. SUM: 7.2 G/DL (ref 6.3–8.2)
TOTAL PROTEIN: 7.1 G/DL (ref 6.4–8.3)
TOTAL PROTEIN: 7.5 G/DL (ref 6.4–8.3)

## 2017-08-23 PROCEDURE — 80053 COMPREHEN METABOLIC PANEL: CPT

## 2017-08-23 PROCEDURE — 94640 AIRWAY INHALATION TREATMENT: CPT

## 2017-08-23 PROCEDURE — 6370000000 HC RX 637 (ALT 250 FOR IP): Performed by: NURSE PRACTITIONER

## 2017-08-23 PROCEDURE — 36415 COLL VENOUS BLD VENIPUNCTURE: CPT

## 2017-08-23 PROCEDURE — 76377 3D RENDER W/INTRP POSTPROCES: CPT

## 2017-08-23 PROCEDURE — 6360000002 HC RX W HCPCS: Performed by: FAMILY MEDICINE

## 2017-08-23 PROCEDURE — 6360000004 HC RX CONTRAST MEDICATION: Performed by: INTERNAL MEDICINE

## 2017-08-23 PROCEDURE — 99232 SBSQ HOSP IP/OBS MODERATE 35: CPT | Performed by: FAMILY MEDICINE

## 2017-08-23 PROCEDURE — 74183 MRI ABD W/O CNTR FLWD CNTR: CPT

## 2017-08-23 PROCEDURE — 6360000002 HC RX W HCPCS: Performed by: NURSE PRACTITIONER

## 2017-08-23 PROCEDURE — 82746 ASSAY OF FOLIC ACID SERUM: CPT

## 2017-08-23 PROCEDURE — 6370000000 HC RX 637 (ALT 250 FOR IP): Performed by: INTERNAL MEDICINE

## 2017-08-23 PROCEDURE — A9579 GAD-BASE MR CONTRAST NOS,1ML: HCPCS | Performed by: INTERNAL MEDICINE

## 2017-08-23 PROCEDURE — 2580000003 HC RX 258: Performed by: FAMILY MEDICINE

## 2017-08-23 RX ORDER — AMOXICILLIN AND CLAVULANATE POTASSIUM 875; 125 MG/1; MG/1
1 TABLET, FILM COATED ORAL 2 TIMES DAILY
Qty: 14 TABLET | Refills: 0 | Status: ON HOLD | OUTPATIENT
Start: 2017-08-23 | End: 2017-09-02 | Stop reason: HOSPADM

## 2017-08-23 RX ORDER — SODIUM CHLORIDE 0.9 % (FLUSH) 0.9 %
10 SYRINGE (ML) INJECTION 2 TIMES DAILY
Status: DISCONTINUED | OUTPATIENT
Start: 2017-08-23 | End: 2017-08-23 | Stop reason: HOSPADM

## 2017-08-23 RX ADMIN — METOCLOPRAMIDE 5 MG: 5 TABLET ORAL at 10:38

## 2017-08-23 RX ADMIN — ASPIRIN 81 MG: 81 TABLET, CHEWABLE ORAL at 10:38

## 2017-08-23 RX ADMIN — AMPICILLIN SODIUM AND SULBACTAM SODIUM 1.5 G: 1; .5 INJECTION, POWDER, FOR SOLUTION INTRAMUSCULAR; INTRAVENOUS at 10:36

## 2017-08-23 RX ADMIN — FLUTICASONE PROPIONATE 1 SPRAY: 50 SPRAY, METERED NASAL at 10:38

## 2017-08-23 RX ADMIN — SENNA 8.6 MG: 8.6 TABLET, COATED ORAL at 10:38

## 2017-08-23 RX ADMIN — AMPICILLIN SODIUM AND SULBACTAM SODIUM 1.5 G: 1; .5 INJECTION, POWDER, FOR SOLUTION INTRAMUSCULAR; INTRAVENOUS at 05:02

## 2017-08-23 RX ADMIN — CETIRIZINE HYDROCHLORIDE 10 MG: 10 TABLET ORAL at 10:38

## 2017-08-23 RX ADMIN — PANTOPRAZOLE SODIUM 40 MG: 40 TABLET, DELAYED RELEASE ORAL at 10:38

## 2017-08-23 RX ADMIN — ENOXAPARIN SODIUM 40 MG: 40 INJECTION SUBCUTANEOUS at 10:37

## 2017-08-23 RX ADMIN — IPRATROPIUM BROMIDE AND ALBUTEROL SULFATE 1 AMPULE: .5; 3 SOLUTION RESPIRATORY (INHALATION) at 08:04

## 2017-08-23 RX ADMIN — DOCUSATE SODIUM 100 MG: 100 CAPSULE ORAL at 10:38

## 2017-08-23 RX ADMIN — METOCLOPRAMIDE 5 MG: 5 TABLET ORAL at 06:56

## 2017-08-23 RX ADMIN — GADOPENTETATE DIMEGLUMINE 12 ML: 469.01 INJECTION INTRAVENOUS at 12:39

## 2017-08-23 RX ADMIN — CITALOPRAM 40 MG: 20 TABLET, FILM COATED ORAL at 10:38

## 2017-08-23 RX ADMIN — FOLIC ACID 1 MG: 1 TABLET ORAL at 10:38

## 2017-08-23 RX ADMIN — Medication 1 TABLET: at 10:38

## 2017-08-23 ASSESSMENT — ENCOUNTER SYMPTOMS
COUGH: 0
SORE THROAT: 0
ABDOMINAL PAIN: 0
SINUS PRESSURE: 0
SHORTNESS OF BREATH: 1
NAUSEA: 0
VOICE CHANGE: 0
DIARRHEA: 0
CONSTIPATION: 0
BLOOD IN STOOL: 0
WHEEZING: 0
VOMITING: 0

## 2017-08-24 ENCOUNTER — TELEPHONE (OUTPATIENT)
Dept: INTERNAL MEDICINE | Age: 47
End: 2017-08-24

## 2017-08-24 LAB
ANCA MYELOPEROXIDASE: 11 AU/ML
ANCA PROTEINASE 3: 9 AU/ML
EKG ATRIAL RATE: 113 BPM
EKG P AXIS: 39 DEGREES
EKG P-R INTERVAL: 118 MS
EKG Q-T INTERVAL: 312 MS
EKG QRS DURATION: 72 MS
EKG QTC CALCULATION (BAZETT): 427 MS
EKG T AXIS: 35 DEGREES
EKG VENTRICULAR RATE: 113 BPM
PATHOLOGIST REVIEW: NORMAL
SURGICAL PATHOLOGY REPORT: NORMAL

## 2017-08-26 LAB
BCR-ABL QUANTITATIVE: NOT DETECTED
BCR-ABL1, MAJOR, RATIO: 0
BCR-ABL1, PERCENT: 0 %
BCR/ABL SOURCE: NORMAL
CULTURE: NORMAL
CULTURE: NORMAL
EER BCR-ABL1, MAJOR: NORMAL
Lab: NORMAL
SPECIMEN DESCRIPTION: NORMAL
STATUS: NORMAL
V617F MUTATION, QNT: 0 %

## 2017-08-27 LAB
CULTURE: NORMAL
Lab: NORMAL
SPECIMEN DESCRIPTION: NORMAL
STATUS: NORMAL

## 2017-08-28 ENCOUNTER — APPOINTMENT (OUTPATIENT)
Dept: GENERAL RADIOLOGY | Age: 47
DRG: 254 | End: 2017-08-28
Payer: MEDICAID

## 2017-08-28 ENCOUNTER — HOSPITAL ENCOUNTER (INPATIENT)
Age: 47
LOS: 5 days | Discharge: HOME | DRG: 254 | End: 2017-09-02
Attending: EMERGENCY MEDICINE | Admitting: INTERNAL MEDICINE
Payer: MEDICAID

## 2017-08-28 DIAGNOSIS — D72.829 LEUKOCYTOSIS, UNSPECIFIED TYPE: ICD-10-CM

## 2017-08-28 DIAGNOSIS — R11.2 NAUSEA AND VOMITING, INTRACTABILITY OF VOMITING NOT SPECIFIED, UNSPECIFIED VOMITING TYPE: Primary | ICD-10-CM

## 2017-08-28 LAB
ABSOLUTE EOS #: 0.26 K/UL (ref 0–0.4)
ABSOLUTE LYMPH #: 2.85 K/UL (ref 1–4.8)
ABSOLUTE MONO #: 1.04 K/UL (ref 0.1–0.8)
ALBUMIN SERPL-MCNC: 3 G/DL (ref 3.5–5.2)
ALBUMIN/GLOBULIN RATIO: 0.5 (ref 1–2.5)
ALP BLD-CCNC: 596 U/L (ref 35–104)
ALT SERPL-CCNC: 11 U/L (ref 5–33)
ANION GAP SERPL CALCULATED.3IONS-SCNC: 15 MMOL/L (ref 9–17)
AST SERPL-CCNC: 19 U/L
BASOPHILS # BLD: 0 %
BASOPHILS ABSOLUTE: 0 K/UL (ref 0–0.2)
BILIRUB SERPL-MCNC: 0.42 MG/DL (ref 0.3–1.2)
BUN BLDV-MCNC: 19 MG/DL (ref 6–20)
BUN/CREAT BLD: ABNORMAL (ref 9–20)
CALCIUM SERPL-MCNC: 9.8 MG/DL (ref 8.6–10.4)
CHLORIDE BLD-SCNC: 97 MMOL/L (ref 98–107)
CO2: 21 MMOL/L (ref 20–31)
CREAT SERPL-MCNC: 0.88 MG/DL (ref 0.5–0.9)
DIFFERENTIAL TYPE: ABNORMAL
EOSINOPHILS RELATIVE PERCENT: 1 %
GFR AFRICAN AMERICAN: >60 ML/MIN
GFR NON-AFRICAN AMERICAN: >60 ML/MIN
GFR SERPL CREATININE-BSD FRML MDRD: ABNORMAL ML/MIN/{1.73_M2}
GFR SERPL CREATININE-BSD FRML MDRD: ABNORMAL ML/MIN/{1.73_M2}
GLUCOSE BLD-MCNC: 117 MG/DL (ref 70–99)
HCT VFR BLD CALC: 27.1 % (ref 36–46)
HEMOGLOBIN: 8.8 G/DL (ref 12–16)
INR BLD: 1.3
LACTIC ACID, WHOLE BLOOD: 1.2 MMOL/L (ref 0.7–2.1)
LYMPHOCYTES # BLD: 11 %
MCH RBC QN AUTO: 26.5 PG (ref 26–34)
MCHC RBC AUTO-ENTMCNC: 32.4 G/DL (ref 31–37)
MCV RBC AUTO: 81.8 FL (ref 80–100)
MONOCYTES # BLD: 4 %
MORPHOLOGY: ABNORMAL
PARTIAL THROMBOPLASTIN TIME: 33.3 SEC (ref 21.3–31.3)
PDW BLD-RTO: 18.9 % (ref 12.5–15.4)
PLATELET # BLD: 891 K/UL (ref 140–450)
PLATELET ESTIMATE: ABNORMAL
PMV BLD AUTO: 8.1 FL (ref 6–12)
POC TROPONIN I: 0 NG/ML (ref 0–0.1)
POC TROPONIN I: 0.01 NG/ML (ref 0–0.1)
POC TROPONIN INTERP: NORMAL
POC TROPONIN INTERP: NORMAL
POTASSIUM SERPL-SCNC: 4.1 MMOL/L (ref 3.7–5.3)
PROTHROMBIN TIME: 13.9 SEC (ref 9.4–12.6)
RBC # BLD: 3.31 M/UL (ref 4–5.2)
RBC # BLD: ABNORMAL 10*6/UL
SEG NEUTROPHILS: 84 %
SEGMENTED NEUTROPHILS ABSOLUTE COUNT: 21.75 K/UL (ref 1.8–7.7)
SODIUM BLD-SCNC: 133 MMOL/L (ref 135–144)
TOTAL PROTEIN: 8.7 G/DL (ref 6.4–8.3)
WBC # BLD: 25.9 K/UL (ref 3.5–11)
WBC # BLD: ABNORMAL 10*3/UL

## 2017-08-28 PROCEDURE — 2580000003 HC RX 258: Performed by: EMERGENCY MEDICINE

## 2017-08-28 PROCEDURE — 1200000000 HC SEMI PRIVATE

## 2017-08-28 PROCEDURE — 84484 ASSAY OF TROPONIN QUANT: CPT

## 2017-08-28 PROCEDURE — 71020 XR CHEST STANDARD TWO VW: CPT

## 2017-08-28 PROCEDURE — 87086 URINE CULTURE/COLONY COUNT: CPT

## 2017-08-28 PROCEDURE — 96374 THER/PROPH/DIAG INJ IV PUSH: CPT

## 2017-08-28 PROCEDURE — 87040 BLOOD CULTURE FOR BACTERIA: CPT

## 2017-08-28 PROCEDURE — 85730 THROMBOPLASTIN TIME PARTIAL: CPT

## 2017-08-28 PROCEDURE — 85025 COMPLETE CBC W/AUTO DIFF WBC: CPT

## 2017-08-28 PROCEDURE — 99285 EMERGENCY DEPT VISIT HI MDM: CPT

## 2017-08-28 PROCEDURE — 80053 COMPREHEN METABOLIC PANEL: CPT

## 2017-08-28 PROCEDURE — 81001 URINALYSIS AUTO W/SCOPE: CPT

## 2017-08-28 PROCEDURE — 83605 ASSAY OF LACTIC ACID: CPT

## 2017-08-28 PROCEDURE — 6360000002 HC RX W HCPCS: Performed by: EMERGENCY MEDICINE

## 2017-08-28 PROCEDURE — 85610 PROTHROMBIN TIME: CPT

## 2017-08-28 RX ORDER — 0.9 % SODIUM CHLORIDE 0.9 %
30 INTRAVENOUS SOLUTION INTRAVENOUS ONCE
Status: COMPLETED | OUTPATIENT
Start: 2017-08-28 | End: 2017-08-28

## 2017-08-28 RX ORDER — ONDANSETRON 2 MG/ML
4 INJECTION INTRAMUSCULAR; INTRAVENOUS ONCE
Status: COMPLETED | OUTPATIENT
Start: 2017-08-28 | End: 2017-08-28

## 2017-08-28 RX ADMIN — CEFEPIME 1 G: 1 INJECTION, POWDER, FOR SOLUTION INTRAMUSCULAR; INTRAVENOUS at 23:44

## 2017-08-28 RX ADMIN — SODIUM CHLORIDE 1716 ML: 9 INJECTION, SOLUTION INTRAVENOUS at 20:42

## 2017-08-28 RX ADMIN — ONDANSETRON 4 MG: 2 INJECTION, SOLUTION INTRAMUSCULAR; INTRAVENOUS at 22:06

## 2017-08-28 ASSESSMENT — ENCOUNTER SYMPTOMS
ABDOMINAL PAIN: 0
TROUBLE SWALLOWING: 0
STRIDOR: 0
SHORTNESS OF BREATH: 0
CHOKING: 0
WHEEZING: 0
CHEST TIGHTNESS: 0
DIARRHEA: 0
PHOTOPHOBIA: 0
VOMITING: 1
COLOR CHANGE: 0
BLOOD IN STOOL: 0
FACIAL SWELLING: 0
NAUSEA: 1

## 2017-08-28 ASSESSMENT — PAIN DESCRIPTION - ORIENTATION: ORIENTATION: RIGHT;LEFT

## 2017-08-28 ASSESSMENT — PAIN SCALES - GENERAL: PAINLEVEL_OUTOF10: 7

## 2017-08-28 ASSESSMENT — PAIN DESCRIPTION - LOCATION: LOCATION: RIB CAGE

## 2017-08-28 ASSESSMENT — PAIN DESCRIPTION - DESCRIPTORS: DESCRIPTORS: PRESSURE

## 2017-08-29 PROBLEM — K52.9 GASTROENTERITIS: Status: ACTIVE | Noted: 2017-08-29

## 2017-08-29 LAB
-: ABNORMAL
ALBUMIN SERPL-MCNC: 2.6 G/DL (ref 3.5–5.2)
ALBUMIN/GLOBULIN RATIO: 0.5 (ref 1–2.5)
ALP BLD-CCNC: 478 U/L (ref 35–104)
ALT SERPL-CCNC: 10 U/L (ref 5–33)
AMORPHOUS: ABNORMAL
ANION GAP SERPL CALCULATED.3IONS-SCNC: 16 MMOL/L (ref 9–17)
AST SERPL-CCNC: 11 U/L
BACTERIA: ABNORMAL
BILIRUB SERPL-MCNC: 0.38 MG/DL (ref 0.3–1.2)
BILIRUBIN URINE: NEGATIVE
BUN BLDV-MCNC: 15 MG/DL (ref 6–20)
BUN/CREAT BLD: ABNORMAL (ref 9–20)
CALCIUM SERPL-MCNC: 9 MG/DL (ref 8.6–10.4)
CASTS UA: ABNORMAL /LPF (ref 0–8)
CHLORIDE BLD-SCNC: 97 MMOL/L (ref 98–107)
CO2: 20 MMOL/L (ref 20–31)
COLOR: YELLOW
CREAT SERPL-MCNC: 0.77 MG/DL (ref 0.5–0.9)
CRYSTALS, UA: ABNORMAL /HPF
CULTURE: NORMAL
CULTURE: NORMAL
EPITHELIAL CELLS UA: ABNORMAL /HPF (ref 0–5)
GFR AFRICAN AMERICAN: >60 ML/MIN
GFR NON-AFRICAN AMERICAN: >60 ML/MIN
GFR SERPL CREATININE-BSD FRML MDRD: ABNORMAL ML/MIN/{1.73_M2}
GFR SERPL CREATININE-BSD FRML MDRD: ABNORMAL ML/MIN/{1.73_M2}
GLUCOSE BLD-MCNC: 120 MG/DL (ref 70–99)
GLUCOSE URINE: NEGATIVE
HCT VFR BLD CALC: 25 % (ref 36–46)
HEMOGLOBIN: 7.9 G/DL (ref 12–16)
INR BLD: 1.4
KETONES, URINE: NEGATIVE
LACTIC ACID, WHOLE BLOOD: 0.7 MMOL/L (ref 0.7–2.1)
LEUKOCYTE ESTERASE, URINE: ABNORMAL
Lab: NORMAL
MCH RBC QN AUTO: 25.9 PG (ref 26–34)
MCHC RBC AUTO-ENTMCNC: 31.6 G/DL (ref 31–37)
MCV RBC AUTO: 82.1 FL (ref 80–100)
MUCUS: ABNORMAL
NITRITE, URINE: NEGATIVE
OTHER OBSERVATIONS UA: ABNORMAL
PDW BLD-RTO: 18.6 % (ref 12.5–15.4)
PH UA: 5.5 (ref 5–8)
PLATELET # BLD: 749 K/UL (ref 140–450)
PMV BLD AUTO: 7 FL (ref 6–12)
POTASSIUM SERPL-SCNC: 3.7 MMOL/L (ref 3.7–5.3)
PROTEIN UA: ABNORMAL
PROTHROMBIN TIME: 15.1 SEC (ref 9.4–12.6)
RBC # BLD: 3.05 M/UL (ref 4–5.2)
RBC UA: ABNORMAL /HPF (ref 0–4)
RENAL EPITHELIAL, UA: ABNORMAL /HPF
SODIUM BLD-SCNC: 133 MMOL/L (ref 135–144)
SPECIFIC GRAVITY UA: 1.02 (ref 1–1.03)
SPECIMEN DESCRIPTION: NORMAL
STATUS: NORMAL
TOTAL PROTEIN: 7.7 G/DL (ref 6.4–8.3)
TRICHOMONAS: ABNORMAL
TURBIDITY: ABNORMAL
URINE HGB: ABNORMAL
UROBILINOGEN, URINE: NORMAL
WBC # BLD: 24.7 K/UL (ref 3.5–11)
WBC UA: ABNORMAL /HPF (ref 0–5)
YEAST: ABNORMAL

## 2017-08-29 PROCEDURE — 99255 IP/OBS CONSLTJ NEW/EST HI 80: CPT | Performed by: INTERNAL MEDICINE

## 2017-08-29 PROCEDURE — 85610 PROTHROMBIN TIME: CPT

## 2017-08-29 PROCEDURE — 80053 COMPREHEN METABOLIC PANEL: CPT

## 2017-08-29 PROCEDURE — 99222 1ST HOSP IP/OBS MODERATE 55: CPT | Performed by: INTERNAL MEDICINE

## 2017-08-29 PROCEDURE — 2580000003 HC RX 258: Performed by: INTERNAL MEDICINE

## 2017-08-29 PROCEDURE — 1200000000 HC SEMI PRIVATE

## 2017-08-29 PROCEDURE — 6360000002 HC RX W HCPCS: Performed by: EMERGENCY MEDICINE

## 2017-08-29 PROCEDURE — 6360000002 HC RX W HCPCS

## 2017-08-29 PROCEDURE — 83605 ASSAY OF LACTIC ACID: CPT

## 2017-08-29 PROCEDURE — 85027 COMPLETE CBC AUTOMATED: CPT

## 2017-08-29 PROCEDURE — 6360000002 HC RX W HCPCS: Performed by: INTERNAL MEDICINE

## 2017-08-29 PROCEDURE — 6370000000 HC RX 637 (ALT 250 FOR IP): Performed by: INTERNAL MEDICINE

## 2017-08-29 PROCEDURE — 2580000003 HC RX 258: Performed by: EMERGENCY MEDICINE

## 2017-08-29 RX ORDER — MORPHINE SULFATE 2 MG/ML
2 INJECTION, SOLUTION INTRAMUSCULAR; INTRAVENOUS
Status: DISCONTINUED | OUTPATIENT
Start: 2017-08-29 | End: 2017-09-02 | Stop reason: HOSPADM

## 2017-08-29 RX ORDER — PROMETHAZINE HYDROCHLORIDE 25 MG/ML
12.5 INJECTION, SOLUTION INTRAMUSCULAR; INTRAVENOUS ONCE
Status: COMPLETED | OUTPATIENT
Start: 2017-08-29 | End: 2017-08-29

## 2017-08-29 RX ORDER — CITALOPRAM 20 MG/1
40 TABLET ORAL DAILY
Status: DISCONTINUED | OUTPATIENT
Start: 2017-08-29 | End: 2017-08-31

## 2017-08-29 RX ORDER — POTASSIUM CHLORIDE 20 MEQ/1
40 TABLET, EXTENDED RELEASE ORAL PRN
Status: DISCONTINUED | OUTPATIENT
Start: 2017-08-29 | End: 2017-09-02 | Stop reason: HOSPADM

## 2017-08-29 RX ORDER — POTASSIUM CHLORIDE 20MEQ/15ML
40 LIQUID (ML) ORAL PRN
Status: DISCONTINUED | OUTPATIENT
Start: 2017-08-29 | End: 2017-09-02 | Stop reason: HOSPADM

## 2017-08-29 RX ORDER — DOCUSATE SODIUM 100 MG/1
100 CAPSULE, LIQUID FILLED ORAL DAILY
Status: DISCONTINUED | OUTPATIENT
Start: 2017-08-29 | End: 2017-08-31

## 2017-08-29 RX ORDER — BISACODYL 10 MG
10 SUPPOSITORY, RECTAL RECTAL DAILY PRN
Status: DISCONTINUED | OUTPATIENT
Start: 2017-08-29 | End: 2017-09-02 | Stop reason: HOSPADM

## 2017-08-29 RX ORDER — ONDANSETRON 2 MG/ML
INJECTION INTRAMUSCULAR; INTRAVENOUS
Status: COMPLETED
Start: 2017-08-29 | End: 2017-08-29

## 2017-08-29 RX ORDER — ONDANSETRON 4 MG/1
4 TABLET, FILM COATED ORAL EVERY 8 HOURS PRN
Status: DISCONTINUED | OUTPATIENT
Start: 2017-08-29 | End: 2017-09-02 | Stop reason: HOSPADM

## 2017-08-29 RX ORDER — CETIRIZINE HYDROCHLORIDE 10 MG/1
10 TABLET ORAL DAILY
Status: DISCONTINUED | OUTPATIENT
Start: 2017-08-29 | End: 2017-09-02 | Stop reason: HOSPADM

## 2017-08-29 RX ORDER — METOCLOPRAMIDE 5 MG/1
5 TABLET ORAL
Status: DISCONTINUED | OUTPATIENT
Start: 2017-08-29 | End: 2017-09-01

## 2017-08-29 RX ORDER — ALBUTEROL SULFATE 90 UG/1
2 AEROSOL, METERED RESPIRATORY (INHALATION) EVERY 6 HOURS PRN
Status: DISCONTINUED | OUTPATIENT
Start: 2017-08-29 | End: 2017-09-02 | Stop reason: HOSPADM

## 2017-08-29 RX ORDER — ONDANSETRON 2 MG/ML
4 INJECTION INTRAMUSCULAR; INTRAVENOUS EVERY 6 HOURS PRN
Status: DISCONTINUED | OUTPATIENT
Start: 2017-08-29 | End: 2017-09-02 | Stop reason: HOSPADM

## 2017-08-29 RX ORDER — POTASSIUM CHLORIDE 7.45 MG/ML
10 INJECTION INTRAVENOUS PRN
Status: DISCONTINUED | OUTPATIENT
Start: 2017-08-29 | End: 2017-09-02 | Stop reason: HOSPADM

## 2017-08-29 RX ORDER — FLUTICASONE PROPIONATE 50 MCG
1 SPRAY, SUSPENSION (ML) NASAL DAILY
Status: DISCONTINUED | OUTPATIENT
Start: 2017-08-29 | End: 2017-09-02 | Stop reason: HOSPADM

## 2017-08-29 RX ORDER — MORPHINE SULFATE 4 MG/ML
4 INJECTION, SOLUTION INTRAMUSCULAR; INTRAVENOUS ONCE
Status: COMPLETED | OUTPATIENT
Start: 2017-08-29 | End: 2017-08-29

## 2017-08-29 RX ORDER — MORPHINE SULFATE 4 MG/ML
4 INJECTION, SOLUTION INTRAMUSCULAR; INTRAVENOUS
Status: DISCONTINUED | OUTPATIENT
Start: 2017-08-29 | End: 2017-09-02 | Stop reason: HOSPADM

## 2017-08-29 RX ORDER — SODIUM CHLORIDE 0.9 % (FLUSH) 0.9 %
10 SYRINGE (ML) INJECTION PRN
Status: DISCONTINUED | OUTPATIENT
Start: 2017-08-29 | End: 2017-09-02 | Stop reason: HOSPADM

## 2017-08-29 RX ORDER — MAGNESIUM SULFATE 1 G/100ML
1 INJECTION INTRAVENOUS PRN
Status: DISCONTINUED | OUTPATIENT
Start: 2017-08-29 | End: 2017-09-02 | Stop reason: HOSPADM

## 2017-08-29 RX ORDER — ONDANSETRON 2 MG/ML
4 INJECTION INTRAMUSCULAR; INTRAVENOUS ONCE
Status: COMPLETED | OUTPATIENT
Start: 2017-08-29 | End: 2017-08-29

## 2017-08-29 RX ORDER — PANTOPRAZOLE SODIUM 40 MG/1
40 TABLET, DELAYED RELEASE ORAL DAILY
Status: DISCONTINUED | OUTPATIENT
Start: 2017-08-29 | End: 2017-09-02 | Stop reason: HOSPADM

## 2017-08-29 RX ORDER — SODIUM CHLORIDE 0.9 % (FLUSH) 0.9 %
10 SYRINGE (ML) INJECTION EVERY 12 HOURS SCHEDULED
Status: DISCONTINUED | OUTPATIENT
Start: 2017-08-29 | End: 2017-09-02 | Stop reason: HOSPADM

## 2017-08-29 RX ORDER — ASPIRIN 81 MG/1
81 TABLET, CHEWABLE ORAL DAILY
Status: DISCONTINUED | OUTPATIENT
Start: 2017-08-29 | End: 2017-08-31

## 2017-08-29 RX ORDER — SODIUM CHLORIDE 9 MG/ML
INJECTION, SOLUTION INTRAVENOUS CONTINUOUS
Status: DISCONTINUED | OUTPATIENT
Start: 2017-08-29 | End: 2017-09-01

## 2017-08-29 RX ORDER — ECHINACEA PURPUREA EXTRACT 125 MG
1 TABLET ORAL PRN
Status: DISCONTINUED | OUTPATIENT
Start: 2017-08-29 | End: 2017-09-02 | Stop reason: HOSPADM

## 2017-08-29 RX ORDER — FOLIC ACID 1 MG/1
1 TABLET ORAL DAILY
Status: DISCONTINUED | OUTPATIENT
Start: 2017-08-29 | End: 2017-09-02 | Stop reason: HOSPADM

## 2017-08-29 RX ORDER — SENNA PLUS 8.6 MG/1
2 TABLET ORAL 2 TIMES DAILY
Status: DISCONTINUED | OUTPATIENT
Start: 2017-08-29 | End: 2017-08-31

## 2017-08-29 RX ADMIN — ASPIRIN 81 MG: 81 TABLET, CHEWABLE ORAL at 10:51

## 2017-08-29 RX ADMIN — CITALOPRAM 40 MG: 20 TABLET, FILM COATED ORAL at 10:51

## 2017-08-29 RX ADMIN — ONDANSETRON 4 MG: 2 INJECTION INTRAMUSCULAR; INTRAVENOUS at 06:42

## 2017-08-29 RX ADMIN — SODIUM CHLORIDE: 9 INJECTION, SOLUTION INTRAVENOUS at 18:00

## 2017-08-29 RX ADMIN — MORPHINE SULFATE 4 MG: 4 INJECTION, SOLUTION INTRAMUSCULAR; INTRAVENOUS at 02:21

## 2017-08-29 RX ADMIN — ONDANSETRON 4 MG: 2 INJECTION INTRAMUSCULAR; INTRAVENOUS at 13:47

## 2017-08-29 RX ADMIN — FLUTICASONE PROPIONATE 1 SPRAY: 50 SPRAY, METERED NASAL at 10:51

## 2017-08-29 RX ADMIN — VANCOMYCIN HYDROCHLORIDE 750 MG: 10 INJECTION, POWDER, LYOPHILIZED, FOR SOLUTION INTRAVENOUS at 00:25

## 2017-08-29 RX ADMIN — VANCOMYCIN HYDROCHLORIDE 750 MG: 10 INJECTION, POWDER, LYOPHILIZED, FOR SOLUTION INTRAVENOUS at 12:18

## 2017-08-29 RX ADMIN — PANTOPRAZOLE SODIUM 40 MG: 40 TABLET, DELAYED RELEASE ORAL at 10:54

## 2017-08-29 RX ADMIN — Medication 1 TABLET: at 10:50

## 2017-08-29 RX ADMIN — PROMETHAZINE HYDROCHLORIDE 12.5 MG: 25 INJECTION INTRAMUSCULAR; INTRAVENOUS at 02:21

## 2017-08-29 RX ADMIN — DOCUSATE SODIUM 100 MG: 100 CAPSULE ORAL at 10:50

## 2017-08-29 RX ADMIN — VANCOMYCIN HYDROCHLORIDE 750 MG: 10 INJECTION, POWDER, LYOPHILIZED, FOR SOLUTION INTRAVENOUS at 23:39

## 2017-08-29 RX ADMIN — METOCLOPRAMIDE 5 MG: 5 TABLET ORAL at 10:51

## 2017-08-29 RX ADMIN — CEFEPIME 1 G: 1 INJECTION, POWDER, FOR SOLUTION INTRAMUSCULAR; INTRAVENOUS at 10:52

## 2017-08-29 RX ADMIN — METOCLOPRAMIDE 5 MG: 5 TABLET ORAL at 19:01

## 2017-08-29 RX ADMIN — ONDANSETRON 4 MG: 2 INJECTION, SOLUTION INTRAMUSCULAR; INTRAVENOUS at 02:21

## 2017-08-29 RX ADMIN — Medication 10 ML: at 10:54

## 2017-08-29 RX ADMIN — ONDANSETRON 4 MG: 2 INJECTION, SOLUTION INTRAMUSCULAR; INTRAVENOUS at 06:42

## 2017-08-29 RX ADMIN — FOLIC ACID 1 MG: 1 TABLET ORAL at 10:54

## 2017-08-29 RX ADMIN — CETIRIZINE HYDROCHLORIDE 10 MG: 10 TABLET ORAL at 10:51

## 2017-08-29 RX ADMIN — SENNA 17.2 MG: 8.6 TABLET, COATED ORAL at 10:53

## 2017-08-29 ASSESSMENT — ENCOUNTER SYMPTOMS
NAUSEA: 1
VOMITING: 1
CONSTIPATION: 0
DIARRHEA: 0
BLURRED VISION: 0
COUGH: 1
SPUTUM PRODUCTION: 1
WHEEZING: 0
BLOOD IN STOOL: 0
HEMOPTYSIS: 0
SHORTNESS OF BREATH: 0

## 2017-08-29 ASSESSMENT — PAIN SCALES - GENERAL: PAINLEVEL_OUTOF10: 8

## 2017-08-30 ENCOUNTER — ANESTHESIA (OUTPATIENT)
Dept: ENDOSCOPY | Age: 47
DRG: 254 | End: 2017-08-30
Payer: MEDICAID

## 2017-08-30 ENCOUNTER — ANESTHESIA EVENT (OUTPATIENT)
Dept: ENDOSCOPY | Age: 47
DRG: 254 | End: 2017-08-30
Payer: MEDICAID

## 2017-08-30 ENCOUNTER — TELEPHONE (OUTPATIENT)
Dept: PULMONOLOGY | Age: 47
End: 2017-08-30

## 2017-08-30 ENCOUNTER — APPOINTMENT (OUTPATIENT)
Dept: GENERAL RADIOLOGY | Age: 47
DRG: 254 | End: 2017-08-30
Payer: MEDICAID

## 2017-08-30 VITALS
SYSTOLIC BLOOD PRESSURE: 94 MMHG | RESPIRATION RATE: 20 BRPM | OXYGEN SATURATION: 97 % | DIASTOLIC BLOOD PRESSURE: 50 MMHG

## 2017-08-30 PROBLEM — K31.1 GASTRIC OUTLET OBSTRUCTION: Status: ACTIVE | Noted: 2017-08-30

## 2017-08-30 PROCEDURE — 3700000000 HC ANESTHESIA ATTENDED CARE: Performed by: INTERNAL MEDICINE

## 2017-08-30 PROCEDURE — 97165 OT EVAL LOW COMPLEX 30 MIN: CPT

## 2017-08-30 PROCEDURE — 6360000002 HC RX W HCPCS: Performed by: SPECIALIST

## 2017-08-30 PROCEDURE — 6360000002 HC RX W HCPCS: Performed by: INTERNAL MEDICINE

## 2017-08-30 PROCEDURE — 2580000003 HC RX 258: Performed by: EMERGENCY MEDICINE

## 2017-08-30 PROCEDURE — 7100000011 HC PHASE II RECOVERY - ADDTL 15 MIN: Performed by: INTERNAL MEDICINE

## 2017-08-30 PROCEDURE — 3700000001 HC ADD 15 MINUTES (ANESTHESIA): Performed by: INTERNAL MEDICINE

## 2017-08-30 PROCEDURE — G8988 SELF CARE GOAL STATUS: HCPCS

## 2017-08-30 PROCEDURE — 1200000000 HC SEMI PRIVATE

## 2017-08-30 PROCEDURE — 7100000010 HC PHASE II RECOVERY - FIRST 15 MIN: Performed by: INTERNAL MEDICINE

## 2017-08-30 PROCEDURE — 99232 SBSQ HOSP IP/OBS MODERATE 35: CPT | Performed by: INTERNAL MEDICINE

## 2017-08-30 PROCEDURE — 2580000003 HC RX 258: Performed by: INTERNAL MEDICINE

## 2017-08-30 PROCEDURE — G8987 SELF CARE CURRENT STATUS: HCPCS

## 2017-08-30 PROCEDURE — 0DJ08ZZ INSPECTION OF UPPER INTESTINAL TRACT, VIA NATURAL OR ARTIFICIAL OPENING ENDOSCOPIC: ICD-10-PCS | Performed by: INTERNAL MEDICINE

## 2017-08-30 PROCEDURE — 2580000003 HC RX 258: Performed by: SPECIALIST

## 2017-08-30 PROCEDURE — 6360000002 HC RX W HCPCS: Performed by: EMERGENCY MEDICINE

## 2017-08-30 PROCEDURE — 3609017100 HC EGD: Performed by: INTERNAL MEDICINE

## 2017-08-30 PROCEDURE — 74000 XR ABDOMEN LIMITED (KUB): CPT

## 2017-08-30 PROCEDURE — 97535 SELF CARE MNGMENT TRAINING: CPT

## 2017-08-30 PROCEDURE — 6370000000 HC RX 637 (ALT 250 FOR IP): Performed by: INTERNAL MEDICINE

## 2017-08-30 PROCEDURE — 99233 SBSQ HOSP IP/OBS HIGH 50: CPT | Performed by: INTERNAL MEDICINE

## 2017-08-30 PROCEDURE — G8989 SELF CARE D/C STATUS: HCPCS

## 2017-08-30 RX ORDER — SODIUM CHLORIDE 9 MG/ML
INJECTION, SOLUTION INTRAVENOUS CONTINUOUS PRN
Status: DISCONTINUED | OUTPATIENT
Start: 2017-08-30 | End: 2017-08-30 | Stop reason: SDUPTHER

## 2017-08-30 RX ORDER — PROPOFOL 10 MG/ML
INJECTION, EMULSION INTRAVENOUS PRN
Status: DISCONTINUED | OUTPATIENT
Start: 2017-08-30 | End: 2017-08-30 | Stop reason: SDUPTHER

## 2017-08-30 RX ADMIN — CEFEPIME 1 G: 1 INJECTION, POWDER, FOR SOLUTION INTRAMUSCULAR; INTRAVENOUS at 22:24

## 2017-08-30 RX ADMIN — PROPOFOL 40 MG: 10 INJECTION, EMULSION INTRAVENOUS at 14:50

## 2017-08-30 RX ADMIN — FLUTICASONE PROPIONATE 1 SPRAY: 50 SPRAY, METERED NASAL at 08:55

## 2017-08-30 RX ADMIN — VANCOMYCIN HYDROCHLORIDE 750 MG: 10 INJECTION, POWDER, LYOPHILIZED, FOR SOLUTION INTRAVENOUS at 23:31

## 2017-08-30 RX ADMIN — ENOXAPARIN SODIUM 40 MG: 40 INJECTION SUBCUTANEOUS at 08:56

## 2017-08-30 RX ADMIN — CEFEPIME 1 G: 1 INJECTION, POWDER, FOR SOLUTION INTRAMUSCULAR; INTRAVENOUS at 10:56

## 2017-08-30 RX ADMIN — SODIUM CHLORIDE: 9 INJECTION, SOLUTION INTRAVENOUS at 14:42

## 2017-08-30 RX ADMIN — CITALOPRAM 40 MG: 20 TABLET, FILM COATED ORAL at 08:56

## 2017-08-30 RX ADMIN — VANCOMYCIN HYDROCHLORIDE 750 MG: 10 INJECTION, POWDER, LYOPHILIZED, FOR SOLUTION INTRAVENOUS at 11:34

## 2017-08-30 RX ADMIN — PROPOFOL 30 MG: 10 INJECTION, EMULSION INTRAVENOUS at 14:48

## 2017-08-30 RX ADMIN — CETIRIZINE HYDROCHLORIDE 10 MG: 10 TABLET ORAL at 08:56

## 2017-08-30 RX ADMIN — SENNA 17.2 MG: 8.6 TABLET, COATED ORAL at 08:56

## 2017-08-30 RX ADMIN — FOLIC ACID 1 MG: 1 TABLET ORAL at 08:56

## 2017-08-30 RX ADMIN — ONDANSETRON 4 MG: 2 INJECTION INTRAMUSCULAR; INTRAVENOUS at 02:48

## 2017-08-30 RX ADMIN — DOCUSATE SODIUM 100 MG: 100 CAPSULE ORAL at 08:56

## 2017-08-30 RX ADMIN — MORPHINE SULFATE 2 MG: 2 INJECTION, SOLUTION INTRAMUSCULAR; INTRAVENOUS at 23:32

## 2017-08-30 RX ADMIN — Medication 1 TABLET: at 08:56

## 2017-08-30 RX ADMIN — PROPOFOL 30 MG: 10 INJECTION, EMULSION INTRAVENOUS at 14:55

## 2017-08-30 RX ADMIN — ASPIRIN 81 MG: 81 TABLET, CHEWABLE ORAL at 08:56

## 2017-08-30 RX ADMIN — PANTOPRAZOLE SODIUM 40 MG: 40 TABLET, DELAYED RELEASE ORAL at 08:56

## 2017-08-30 RX ADMIN — SODIUM CHLORIDE 1000 ML: 9 INJECTION, SOLUTION INTRAVENOUS at 22:22

## 2017-08-30 RX ADMIN — PROPOFOL 30 MG: 10 INJECTION, EMULSION INTRAVENOUS at 15:00

## 2017-08-30 RX ADMIN — LIDOCAINE HYDROCHLORIDE 30 MG: 10 INJECTION, SOLUTION EPIDURAL; INFILTRATION; INTRACAUDAL; PERINEURAL at 14:48

## 2017-08-30 RX ADMIN — METOCLOPRAMIDE 5 MG: 5 TABLET ORAL at 08:56

## 2017-08-30 RX ADMIN — ONDANSETRON 4 MG: 2 INJECTION INTRAMUSCULAR; INTRAVENOUS at 09:43

## 2017-08-30 RX ADMIN — ONDANSETRON 4 MG: 2 INJECTION INTRAMUSCULAR; INTRAVENOUS at 15:09

## 2017-08-30 ASSESSMENT — PAIN - FUNCTIONAL ASSESSMENT: PAIN_FUNCTIONAL_ASSESSMENT: 0-10

## 2017-08-30 ASSESSMENT — ENCOUNTER SYMPTOMS
SHORTNESS OF BREATH: 1
ABDOMINAL PAIN: 1
HEMOPTYSIS: 0
COUGH: 0
VOMITING: 1
NAUSEA: 1
BLOOD IN STOOL: 0
DIARRHEA: 0
WHEEZING: 0
SHORTNESS OF BREATH: 0
CONSTIPATION: 0

## 2017-08-30 ASSESSMENT — PAIN SCALES - GENERAL
PAINLEVEL_OUTOF10: 0
PAINLEVEL_OUTOF10: 4
PAINLEVEL_OUTOF10: 0
PAINLEVEL_OUTOF10: 0

## 2017-08-30 ASSESSMENT — COPD QUESTIONNAIRES: CAT_SEVERITY: MODERATE

## 2017-08-31 ENCOUNTER — APPOINTMENT (OUTPATIENT)
Dept: GENERAL RADIOLOGY | Age: 47
DRG: 254 | End: 2017-08-31
Payer: MEDICAID

## 2017-08-31 PROCEDURE — 99232 SBSQ HOSP IP/OBS MODERATE 35: CPT | Performed by: INTERNAL MEDICINE

## 2017-08-31 PROCEDURE — 6370000000 HC RX 637 (ALT 250 FOR IP): Performed by: INTERNAL MEDICINE

## 2017-08-31 PROCEDURE — 74245 FL UPPER GI WITH SMALL BOWEL: CPT

## 2017-08-31 PROCEDURE — 6360000002 HC RX W HCPCS: Performed by: EMERGENCY MEDICINE

## 2017-08-31 PROCEDURE — 6360000002 HC RX W HCPCS: Performed by: INTERNAL MEDICINE

## 2017-08-31 PROCEDURE — 1200000000 HC SEMI PRIVATE

## 2017-08-31 PROCEDURE — 2580000003 HC RX 258: Performed by: INTERNAL MEDICINE

## 2017-08-31 PROCEDURE — 2580000003 HC RX 258: Performed by: EMERGENCY MEDICINE

## 2017-08-31 PROCEDURE — 99233 SBSQ HOSP IP/OBS HIGH 50: CPT | Performed by: INTERNAL MEDICINE

## 2017-08-31 PROCEDURE — 6360000004 HC RX CONTRAST MEDICATION: Performed by: STUDENT IN AN ORGANIZED HEALTH CARE EDUCATION/TRAINING PROGRAM

## 2017-08-31 RX ORDER — ASPIRIN 81 MG/1
81 TABLET, CHEWABLE ORAL DAILY
Status: DISCONTINUED | OUTPATIENT
Start: 2017-08-31 | End: 2017-09-02 | Stop reason: HOSPADM

## 2017-08-31 RX ORDER — CITALOPRAM 20 MG/1
40 TABLET ORAL DAILY
Status: DISCONTINUED | OUTPATIENT
Start: 2017-08-31 | End: 2017-09-02 | Stop reason: HOSPADM

## 2017-08-31 RX ORDER — SENNA PLUS 8.6 MG/1
2 TABLET ORAL 2 TIMES DAILY
Status: DISCONTINUED | OUTPATIENT
Start: 2017-08-31 | End: 2017-09-02 | Stop reason: HOSPADM

## 2017-08-31 RX ORDER — DOCUSATE SODIUM 100 MG/1
100 CAPSULE, LIQUID FILLED ORAL DAILY
Status: DISCONTINUED | OUTPATIENT
Start: 2017-08-31 | End: 2017-09-02 | Stop reason: HOSPADM

## 2017-08-31 RX ADMIN — Medication 10 ML: at 12:24

## 2017-08-31 RX ADMIN — DOCUSATE SODIUM 100 MG: 100 CAPSULE ORAL at 20:43

## 2017-08-31 RX ADMIN — IOHEXOL 225 ML: 240 INJECTION, SOLUTION INTRATHECAL; INTRAVASCULAR; INTRAVENOUS; ORAL at 13:53

## 2017-08-31 RX ADMIN — MORPHINE SULFATE 2 MG: 2 INJECTION, SOLUTION INTRAMUSCULAR; INTRAVENOUS at 08:40

## 2017-08-31 RX ADMIN — CEFEPIME 1 G: 1 INJECTION, POWDER, FOR SOLUTION INTRAMUSCULAR; INTRAVENOUS at 12:24

## 2017-08-31 RX ADMIN — METOCLOPRAMIDE 5 MG: 5 TABLET ORAL at 19:05

## 2017-08-31 RX ADMIN — FLUTICASONE PROPIONATE 1 SPRAY: 50 SPRAY, METERED NASAL at 13:06

## 2017-08-31 RX ADMIN — ENOXAPARIN SODIUM 40 MG: 40 INJECTION SUBCUTANEOUS at 13:06

## 2017-08-31 RX ADMIN — MORPHINE SULFATE 2 MG: 2 INJECTION, SOLUTION INTRAMUSCULAR; INTRAVENOUS at 19:10

## 2017-08-31 RX ADMIN — CITALOPRAM 40 MG: 20 TABLET, FILM COATED ORAL at 20:42

## 2017-08-31 RX ADMIN — ASPIRIN 81 MG: 81 TABLET, CHEWABLE ORAL at 20:42

## 2017-08-31 RX ADMIN — VANCOMYCIN HYDROCHLORIDE 750 MG: 10 INJECTION, POWDER, LYOPHILIZED, FOR SOLUTION INTRAVENOUS at 13:06

## 2017-08-31 RX ADMIN — SENNA 17.2 MG: 8.6 TABLET, COATED ORAL at 20:43

## 2017-08-31 RX ADMIN — MORPHINE SULFATE 2 MG: 2 INJECTION, SOLUTION INTRAMUSCULAR; INTRAVENOUS at 13:06

## 2017-08-31 RX ADMIN — VANCOMYCIN HYDROCHLORIDE 750 MG: 10 INJECTION, POWDER, LYOPHILIZED, FOR SOLUTION INTRAVENOUS at 23:56

## 2017-08-31 RX ADMIN — Medication 10 ML: at 13:07

## 2017-08-31 RX ADMIN — MORPHINE SULFATE 2 MG: 2 INJECTION, SOLUTION INTRAMUSCULAR; INTRAVENOUS at 23:00

## 2017-08-31 ASSESSMENT — ENCOUNTER SYMPTOMS
NAUSEA: 1
COUGH: 0
VOMITING: 0
ABDOMINAL PAIN: 1
WHEEZING: 0
SHORTNESS OF BREATH: 0
DIARRHEA: 0
CONSTIPATION: 0
HEMOPTYSIS: 0
BLOOD IN STOOL: 0

## 2017-08-31 ASSESSMENT — PAIN SCALES - GENERAL
PAINLEVEL_OUTOF10: 8
PAINLEVEL_OUTOF10: 0
PAINLEVEL_OUTOF10: 0
PAINLEVEL_OUTOF10: 8
PAINLEVEL_OUTOF10: 6

## 2017-08-31 ASSESSMENT — PAIN DESCRIPTION - PAIN TYPE: TYPE: ACUTE PAIN

## 2017-09-01 LAB
ABSOLUTE EOS #: 0 K/UL (ref 0–0.4)
ABSOLUTE LYMPH #: 2.13 K/UL (ref 1–4.8)
ABSOLUTE MONO #: 1.16 K/UL (ref 0.1–0.8)
AMYLASE: 32 U/L (ref 28–100)
ANION GAP SERPL CALCULATED.3IONS-SCNC: 17 MMOL/L (ref 9–17)
BASOPHILS # BLD: 1 %
BASOPHILS ABSOLUTE: 0.19 K/UL (ref 0–0.2)
BUN BLDV-MCNC: 9 MG/DL (ref 6–20)
BUN/CREAT BLD: ABNORMAL (ref 9–20)
CALCIUM SERPL-MCNC: 8.8 MG/DL (ref 8.6–10.4)
CHLORIDE BLD-SCNC: 105 MMOL/L (ref 98–107)
CO2: 17 MMOL/L (ref 20–31)
CREAT SERPL-MCNC: 0.63 MG/DL (ref 0.5–0.9)
DIFFERENTIAL TYPE: ABNORMAL
EOSINOPHILS RELATIVE PERCENT: 0 %
GFR AFRICAN AMERICAN: >60 ML/MIN
GFR NON-AFRICAN AMERICAN: >60 ML/MIN
GFR SERPL CREATININE-BSD FRML MDRD: ABNORMAL ML/MIN/{1.73_M2}
GFR SERPL CREATININE-BSD FRML MDRD: ABNORMAL ML/MIN/{1.73_M2}
GLUCOSE BLD-MCNC: 77 MG/DL (ref 70–99)
HCT VFR BLD CALC: 22.7 % (ref 36–46)
HEMOGLOBIN: 7.1 G/DL (ref 12–16)
LIPASE: 11 U/L (ref 13–60)
LYMPHOCYTES # BLD: 11 %
MCH RBC QN AUTO: 26.1 PG (ref 26–34)
MCHC RBC AUTO-ENTMCNC: 31.2 G/DL (ref 31–37)
MCV RBC AUTO: 83.7 FL (ref 80–100)
MONOCYTES # BLD: 6 %
MORPHOLOGY: ABNORMAL
PDW BLD-RTO: 19.1 % (ref 12.5–15.4)
PLATELET # BLD: 819 K/UL (ref 140–450)
PLATELET ESTIMATE: ABNORMAL
PMV BLD AUTO: 7.4 FL (ref 6–12)
POTASSIUM SERPL-SCNC: 3.3 MMOL/L (ref 3.7–5.3)
RBC # BLD: 2.71 M/UL (ref 4–5.2)
RBC # BLD: ABNORMAL 10*6/UL
SEG NEUTROPHILS: 82 %
SEGMENTED NEUTROPHILS ABSOLUTE COUNT: 15.92 K/UL (ref 1.8–7.7)
SODIUM BLD-SCNC: 139 MMOL/L (ref 135–144)
WBC # BLD: 19.4 K/UL (ref 3.5–11)
WBC # BLD: ABNORMAL 10*3/UL

## 2017-09-01 PROCEDURE — 6360000002 HC RX W HCPCS: Performed by: INTERNAL MEDICINE

## 2017-09-01 PROCEDURE — 85025 COMPLETE CBC W/AUTO DIFF WBC: CPT

## 2017-09-01 PROCEDURE — 2580000003 HC RX 258: Performed by: INTERNAL MEDICINE

## 2017-09-01 PROCEDURE — 6360000002 HC RX W HCPCS: Performed by: EMERGENCY MEDICINE

## 2017-09-01 PROCEDURE — 80048 BASIC METABOLIC PNL TOTAL CA: CPT

## 2017-09-01 PROCEDURE — 6370000000 HC RX 637 (ALT 250 FOR IP): Performed by: INTERNAL MEDICINE

## 2017-09-01 PROCEDURE — 82150 ASSAY OF AMYLASE: CPT

## 2017-09-01 PROCEDURE — 99232 SBSQ HOSP IP/OBS MODERATE 35: CPT | Performed by: INTERNAL MEDICINE

## 2017-09-01 PROCEDURE — 2580000003 HC RX 258: Performed by: EMERGENCY MEDICINE

## 2017-09-01 PROCEDURE — 1200000000 HC SEMI PRIVATE

## 2017-09-01 PROCEDURE — 83690 ASSAY OF LIPASE: CPT

## 2017-09-01 PROCEDURE — 36415 COLL VENOUS BLD VENIPUNCTURE: CPT

## 2017-09-01 PROCEDURE — 99233 SBSQ HOSP IP/OBS HIGH 50: CPT | Performed by: INTERNAL MEDICINE

## 2017-09-01 RX ORDER — SODIUM CHLORIDE 9 MG/ML
INJECTION, SOLUTION INTRAVENOUS CONTINUOUS
Status: DISCONTINUED | OUTPATIENT
Start: 2017-09-01 | End: 2017-09-02 | Stop reason: HOSPADM

## 2017-09-01 RX ADMIN — CEFEPIME 1 G: 1 INJECTION, POWDER, FOR SOLUTION INTRAMUSCULAR; INTRAVENOUS at 01:03

## 2017-09-01 RX ADMIN — MORPHINE SULFATE 2 MG: 2 INJECTION, SOLUTION INTRAMUSCULAR; INTRAVENOUS at 10:37

## 2017-09-01 RX ADMIN — Medication 1 TABLET: at 09:02

## 2017-09-01 RX ADMIN — SODIUM CHLORIDE: 9 INJECTION, SOLUTION INTRAVENOUS at 16:41

## 2017-09-01 RX ADMIN — ASPIRIN 81 MG: 81 TABLET, CHEWABLE ORAL at 09:02

## 2017-09-01 RX ADMIN — DOCUSATE SODIUM 100 MG: 100 CAPSULE ORAL at 09:02

## 2017-09-01 RX ADMIN — Medication 10 ML: at 09:06

## 2017-09-01 RX ADMIN — METOCLOPRAMIDE 5 MG: 5 TABLET ORAL at 06:13

## 2017-09-01 RX ADMIN — SENNA 17.2 MG: 8.6 TABLET, COATED ORAL at 09:01

## 2017-09-01 RX ADMIN — CITALOPRAM 40 MG: 20 TABLET, FILM COATED ORAL at 09:02

## 2017-09-01 RX ADMIN — FOLIC ACID 1 MG: 1 TABLET ORAL at 09:02

## 2017-09-01 RX ADMIN — FLUTICASONE PROPIONATE 1 SPRAY: 50 SPRAY, METERED NASAL at 09:01

## 2017-09-01 RX ADMIN — SODIUM CHLORIDE: 9 INJECTION, SOLUTION INTRAVENOUS at 10:35

## 2017-09-01 RX ADMIN — ENOXAPARIN SODIUM 40 MG: 40 INJECTION SUBCUTANEOUS at 09:01

## 2017-09-01 RX ADMIN — CETIRIZINE HYDROCHLORIDE 10 MG: 10 TABLET ORAL at 09:02

## 2017-09-01 RX ADMIN — PANTOPRAZOLE SODIUM 40 MG: 40 TABLET, DELAYED RELEASE ORAL at 09:02

## 2017-09-01 RX ADMIN — MORPHINE SULFATE 2 MG: 2 INJECTION, SOLUTION INTRAMUSCULAR; INTRAVENOUS at 20:37

## 2017-09-01 ASSESSMENT — ENCOUNTER SYMPTOMS
DIARRHEA: 1
CONSTIPATION: 0
WHEEZING: 0
VOMITING: 0
ABDOMINAL PAIN: 1
HEMOPTYSIS: 0
SHORTNESS OF BREATH: 0
BLOOD IN STOOL: 0
COUGH: 0
NAUSEA: 1

## 2017-09-01 ASSESSMENT — PAIN SCALES - GENERAL
PAINLEVEL_OUTOF10: 7
PAINLEVEL_OUTOF10: 7

## 2017-09-02 VITALS
BODY MASS INDEX: 30.53 KG/M2 | HEIGHT: 57 IN | RESPIRATION RATE: 20 BRPM | TEMPERATURE: 99.1 F | DIASTOLIC BLOOD PRESSURE: 68 MMHG | SYSTOLIC BLOOD PRESSURE: 115 MMHG | OXYGEN SATURATION: 96 % | HEART RATE: 86 BPM | WEIGHT: 141.5 LBS

## 2017-09-02 LAB
ANION GAP SERPL CALCULATED.3IONS-SCNC: 12 MMOL/L (ref 9–17)
BUN BLDV-MCNC: 6 MG/DL (ref 6–20)
BUN/CREAT BLD: ABNORMAL (ref 9–20)
CALCIUM SERPL-MCNC: 8.6 MG/DL (ref 8.6–10.4)
CHLORIDE BLD-SCNC: 108 MMOL/L (ref 98–107)
CO2: 19 MMOL/L (ref 20–31)
CREAT SERPL-MCNC: 0.64 MG/DL (ref 0.5–0.9)
GFR AFRICAN AMERICAN: >60 ML/MIN
GFR NON-AFRICAN AMERICAN: >60 ML/MIN
GFR SERPL CREATININE-BSD FRML MDRD: ABNORMAL ML/MIN/{1.73_M2}
GFR SERPL CREATININE-BSD FRML MDRD: ABNORMAL ML/MIN/{1.73_M2}
GLUCOSE BLD-MCNC: 83 MG/DL (ref 70–99)
POTASSIUM SERPL-SCNC: 3.3 MMOL/L (ref 3.7–5.3)
SODIUM BLD-SCNC: 139 MMOL/L (ref 135–144)

## 2017-09-02 PROCEDURE — 36415 COLL VENOUS BLD VENIPUNCTURE: CPT

## 2017-09-02 PROCEDURE — 2580000003 HC RX 258: Performed by: INTERNAL MEDICINE

## 2017-09-02 PROCEDURE — 6370000000 HC RX 637 (ALT 250 FOR IP): Performed by: INTERNAL MEDICINE

## 2017-09-02 PROCEDURE — 99232 SBSQ HOSP IP/OBS MODERATE 35: CPT | Performed by: INTERNAL MEDICINE

## 2017-09-02 PROCEDURE — 80048 BASIC METABOLIC PNL TOTAL CA: CPT

## 2017-09-02 PROCEDURE — 6360000002 HC RX W HCPCS: Performed by: INTERNAL MEDICINE

## 2017-09-02 PROCEDURE — 99233 SBSQ HOSP IP/OBS HIGH 50: CPT | Performed by: INTERNAL MEDICINE

## 2017-09-02 RX ADMIN — FOLIC ACID 1 MG: 1 TABLET ORAL at 08:51

## 2017-09-02 RX ADMIN — ENOXAPARIN SODIUM 40 MG: 40 INJECTION SUBCUTANEOUS at 08:52

## 2017-09-02 RX ADMIN — DOCUSATE SODIUM 100 MG: 100 CAPSULE ORAL at 08:52

## 2017-09-02 RX ADMIN — MORPHINE SULFATE 2 MG: 2 INJECTION, SOLUTION INTRAMUSCULAR; INTRAVENOUS at 01:57

## 2017-09-02 RX ADMIN — Medication 10 ML: at 08:54

## 2017-09-02 RX ADMIN — CETIRIZINE HYDROCHLORIDE 10 MG: 10 TABLET ORAL at 08:51

## 2017-09-02 RX ADMIN — SENNA 17.2 MG: 8.6 TABLET, COATED ORAL at 08:54

## 2017-09-02 RX ADMIN — SODIUM CHLORIDE: 9 INJECTION, SOLUTION INTRAVENOUS at 02:01

## 2017-09-02 RX ADMIN — CITALOPRAM 40 MG: 20 TABLET, FILM COATED ORAL at 08:51

## 2017-09-02 RX ADMIN — FLUTICASONE PROPIONATE 1 SPRAY: 50 SPRAY, METERED NASAL at 08:52

## 2017-09-02 RX ADMIN — MORPHINE SULFATE 2 MG: 2 INJECTION, SOLUTION INTRAMUSCULAR; INTRAVENOUS at 08:59

## 2017-09-02 RX ADMIN — ASPIRIN 81 MG: 81 TABLET, CHEWABLE ORAL at 08:52

## 2017-09-02 RX ADMIN — PANTOPRAZOLE SODIUM 40 MG: 40 TABLET, DELAYED RELEASE ORAL at 08:51

## 2017-09-02 RX ADMIN — Medication 1 TABLET: at 08:51

## 2017-09-02 RX ADMIN — POTASSIUM CHLORIDE 40 MEQ: 20 TABLET, EXTENDED RELEASE ORAL at 08:52

## 2017-09-02 RX ADMIN — MORPHINE SULFATE 2 MG: 2 INJECTION, SOLUTION INTRAMUSCULAR; INTRAVENOUS at 15:43

## 2017-09-02 ASSESSMENT — ENCOUNTER SYMPTOMS
VOMITING: 0
COUGH: 0
CONSTIPATION: 0
ABDOMINAL PAIN: 1
WHEEZING: 0
HEMOPTYSIS: 0
DIARRHEA: 0
BLOOD IN STOOL: 0
NAUSEA: 0
SHORTNESS OF BREATH: 0

## 2017-09-02 ASSESSMENT — PAIN SCALES - GENERAL
PAINLEVEL_OUTOF10: 6
PAINLEVEL_OUTOF10: 5
PAINLEVEL_OUTOF10: 6

## 2017-09-02 ASSESSMENT — PAIN DESCRIPTION - DESCRIPTORS: DESCRIPTORS: ACHING

## 2017-09-02 ASSESSMENT — PAIN DESCRIPTION - PAIN TYPE: TYPE: ACUTE PAIN

## 2017-09-02 ASSESSMENT — PAIN DESCRIPTION - ORIENTATION: ORIENTATION: MID;LEFT

## 2017-09-02 ASSESSMENT — PAIN DESCRIPTION - ONSET: ONSET: ON-GOING

## 2017-09-02 ASSESSMENT — PAIN DESCRIPTION - LOCATION: LOCATION: ABDOMEN

## 2017-09-02 ASSESSMENT — PAIN DESCRIPTION - FREQUENCY: FREQUENCY: CONTINUOUS

## 2017-09-05 ENCOUNTER — TELEPHONE (OUTPATIENT)
Dept: PULMONOLOGY | Age: 47
End: 2017-09-05

## 2017-09-08 ENCOUNTER — APPOINTMENT (OUTPATIENT)
Dept: GENERAL RADIOLOGY | Age: 47
DRG: 140 | End: 2017-09-08
Payer: MEDICAID

## 2017-09-08 ENCOUNTER — HOSPITAL ENCOUNTER (INPATIENT)
Age: 47
LOS: 4 days | Discharge: HOME | DRG: 140 | End: 2017-09-13
Attending: EMERGENCY MEDICINE | Admitting: FAMILY MEDICINE
Payer: MEDICAID

## 2017-09-08 DIAGNOSIS — R06.89 DYSPNEA AND RESPIRATORY ABNORMALITIES: Primary | ICD-10-CM

## 2017-09-08 DIAGNOSIS — R06.00 DYSPNEA AND RESPIRATORY ABNORMALITIES: Primary | ICD-10-CM

## 2017-09-08 LAB
ABSOLUTE EOS #: 0 K/UL (ref 0–0.4)
ABSOLUTE LYMPH #: 1.78 K/UL (ref 1–4.8)
ABSOLUTE MONO #: 1.33 K/UL (ref 0.1–0.8)
ANION GAP SERPL CALCULATED.3IONS-SCNC: 15 MMOL/L (ref 9–17)
BASOPHILS # BLD: 0 %
BASOPHILS ABSOLUTE: 0 K/UL (ref 0–0.2)
BNP INTERPRETATION: ABNORMAL
BUN BLDV-MCNC: 12 MG/DL (ref 6–20)
BUN/CREAT BLD: ABNORMAL (ref 9–20)
CALCIUM SERPL-MCNC: 8.8 MG/DL (ref 8.6–10.4)
CHLORIDE BLD-SCNC: 97 MMOL/L (ref 98–107)
CO2: 21 MMOL/L (ref 20–31)
CREAT SERPL-MCNC: 0.63 MG/DL (ref 0.5–0.9)
D-DIMER QUANTITATIVE: 3.06 MG/L FEU
DIFFERENTIAL TYPE: ABNORMAL
EOSINOPHILS RELATIVE PERCENT: 0 %
GFR AFRICAN AMERICAN: >60 ML/MIN
GFR NON-AFRICAN AMERICAN: >60 ML/MIN
GFR SERPL CREATININE-BSD FRML MDRD: ABNORMAL ML/MIN/{1.73_M2}
GFR SERPL CREATININE-BSD FRML MDRD: ABNORMAL ML/MIN/{1.73_M2}
GLUCOSE BLD-MCNC: 109 MG/DL (ref 70–99)
HCT VFR BLD CALC: 24.9 % (ref 36–46)
HEMOGLOBIN: 7.6 G/DL (ref 12–16)
LACTIC ACID, WHOLE BLOOD: 1.2 MMOL/L (ref 0.7–2.1)
LYMPHOCYTES # BLD: 8 %
MCH RBC QN AUTO: 25.3 PG (ref 26–34)
MCHC RBC AUTO-ENTMCNC: 30.4 G/DL (ref 31–37)
MCV RBC AUTO: 83.1 FL (ref 80–100)
MONOCYTES # BLD: 6 %
MORPHOLOGY: ABNORMAL
PDW BLD-RTO: 19.1 % (ref 12.5–15.4)
PLATELET # BLD: 714 K/UL (ref 140–450)
PLATELET ESTIMATE: ABNORMAL
PMV BLD AUTO: 7.8 FL (ref 6–12)
POTASSIUM SERPL-SCNC: 3.6 MMOL/L (ref 3.7–5.3)
PRO-BNP: 392 PG/ML
RBC # BLD: 3 M/UL (ref 4–5.2)
RBC # BLD: ABNORMAL 10*6/UL
SEG NEUTROPHILS: 86 %
SEGMENTED NEUTROPHILS ABSOLUTE COUNT: 19.09 K/UL (ref 1.8–7.7)
SODIUM BLD-SCNC: 133 MMOL/L (ref 135–144)
WBC # BLD: 22.2 K/UL (ref 3.5–11)
WBC # BLD: ABNORMAL 10*3/UL

## 2017-09-08 PROCEDURE — 85025 COMPLETE CBC W/AUTO DIFF WBC: CPT

## 2017-09-08 PROCEDURE — 2580000003 HC RX 258: Performed by: EMERGENCY MEDICINE

## 2017-09-08 PROCEDURE — 81001 URINALYSIS AUTO W/SCOPE: CPT

## 2017-09-08 PROCEDURE — 87040 BLOOD CULTURE FOR BACTERIA: CPT

## 2017-09-08 PROCEDURE — 83605 ASSAY OF LACTIC ACID: CPT

## 2017-09-08 PROCEDURE — G0378 HOSPITAL OBSERVATION PER HR: HCPCS

## 2017-09-08 PROCEDURE — 85379 FIBRIN DEGRADATION QUANT: CPT

## 2017-09-08 PROCEDURE — 87086 URINE CULTURE/COLONY COUNT: CPT

## 2017-09-08 PROCEDURE — 80048 BASIC METABOLIC PNL TOTAL CA: CPT

## 2017-09-08 PROCEDURE — 99285 EMERGENCY DEPT VISIT HI MDM: CPT

## 2017-09-08 PROCEDURE — 71020 XR CHEST STANDARD TWO VW: CPT

## 2017-09-08 PROCEDURE — 83880 ASSAY OF NATRIURETIC PEPTIDE: CPT

## 2017-09-08 RX ORDER — 0.9 % SODIUM CHLORIDE 0.9 %
1000 INTRAVENOUS SOLUTION INTRAVENOUS ONCE
Status: COMPLETED | OUTPATIENT
Start: 2017-09-08 | End: 2017-09-09

## 2017-09-08 RX ADMIN — SODIUM CHLORIDE 1000 ML: 9 INJECTION, SOLUTION INTRAVENOUS at 22:30

## 2017-09-08 ASSESSMENT — ENCOUNTER SYMPTOMS
NAUSEA: 0
SORE THROAT: 0
BACK PAIN: 0
VOMITING: 0
SHORTNESS OF BREATH: 1
CHEST TIGHTNESS: 0
ABDOMINAL PAIN: 0

## 2017-09-09 ENCOUNTER — APPOINTMENT (OUTPATIENT)
Dept: CT IMAGING | Age: 47
DRG: 140 | End: 2017-09-09
Payer: MEDICAID

## 2017-09-09 LAB
-: ABNORMAL
ABSOLUTE EOS #: 0 K/UL (ref 0–0.4)
ABSOLUTE LYMPH #: 1.68 K/UL (ref 1–4.8)
ABSOLUTE MONO #: 1.12 K/UL (ref 0.1–0.8)
AMORPHOUS: ABNORMAL
ANION GAP SERPL CALCULATED.3IONS-SCNC: 15 MMOL/L (ref 9–17)
BACTERIA: ABNORMAL
BASOPHILS # BLD: 0 %
BASOPHILS ABSOLUTE: 0 K/UL (ref 0–0.2)
BILIRUBIN URINE: NEGATIVE
BLOOD BANK SPECIMEN: NORMAL
BUN BLDV-MCNC: 11 MG/DL (ref 6–20)
BUN/CREAT BLD: ABNORMAL (ref 9–20)
CALCIUM SERPL-MCNC: 8.4 MG/DL (ref 8.6–10.4)
CASTS UA: ABNORMAL /LPF (ref 0–2)
CHLORIDE BLD-SCNC: 102 MMOL/L (ref 98–107)
CO2: 20 MMOL/L (ref 20–31)
COLOR: YELLOW
CREAT SERPL-MCNC: 0.64 MG/DL (ref 0.5–0.9)
CRYSTALS, UA: ABNORMAL /HPF
DIFFERENTIAL TYPE: ABNORMAL
EOSINOPHILS RELATIVE PERCENT: 0 %
EPITHELIAL CELLS UA: ABNORMAL /HPF (ref 0–5)
FERRITIN: 4922 UG/L (ref 13–150)
FOLATE: >20 NG/ML
FREE KAPPA/LAMBDA RATIO: 1.31 (ref 0.26–1.65)
GFR AFRICAN AMERICAN: >60 ML/MIN
GFR NON-AFRICAN AMERICAN: >60 ML/MIN
GFR SERPL CREATININE-BSD FRML MDRD: ABNORMAL ML/MIN/{1.73_M2}
GFR SERPL CREATININE-BSD FRML MDRD: ABNORMAL ML/MIN/{1.73_M2}
GLUCOSE BLD-MCNC: 92 MG/DL (ref 70–99)
GLUCOSE URINE: NEGATIVE
HCT VFR BLD CALC: 22.3 % (ref 36–46)
HEMOGLOBIN: 6.8 G/DL (ref 12–16)
IGA: 914 MG/DL (ref 70–400)
IGG: 2245 MG/DL (ref 700–1600)
IGM: 136 MG/DL (ref 40–230)
IRON SATURATION: 25 % (ref 20–55)
IRON: 22 UG/DL (ref 37–145)
KAPPA FREE LIGHT CHAINS QNT: 10.59 MG/DL (ref 0.37–1.94)
KETONES, URINE: NEGATIVE
LAMBDA FREE LIGHT CHAINS QNT: 8.08 MG/DL (ref 0.57–2.63)
LEUKOCYTE ESTERASE, URINE: ABNORMAL
LYMPHOCYTES # BLD: 9 %
MAGNESIUM: 2.1 MG/DL (ref 1.6–2.6)
MCH RBC QN AUTO: 25.5 PG (ref 26–34)
MCHC RBC AUTO-ENTMCNC: 30.2 G/DL (ref 31–37)
MCV RBC AUTO: 84.3 FL (ref 80–100)
MONOCYTES # BLD: 6 %
MORPHOLOGY: ABNORMAL
MUCUS: ABNORMAL
NITRITE, URINE: NEGATIVE
OTHER OBSERVATIONS UA: ABNORMAL
PDW BLD-RTO: 19 % (ref 12.5–15.4)
PH UA: 6.5 (ref 5–8)
PLATELET # BLD: 643 K/UL (ref 140–450)
PLATELET ESTIMATE: ABNORMAL
PMV BLD AUTO: 7 FL (ref 6–12)
POTASSIUM SERPL-SCNC: 3.4 MMOL/L (ref 3.7–5.3)
PROTEIN UA: ABNORMAL
RBC # BLD: 2.65 M/UL (ref 4–5.2)
RBC # BLD: ABNORMAL 10*6/UL
RBC UA: ABNORMAL /HPF (ref 0–2)
RENAL EPITHELIAL, UA: ABNORMAL /HPF
SEG NEUTROPHILS: 85 %
SEGMENTED NEUTROPHILS ABSOLUTE COUNT: 15.9 K/UL (ref 1.8–7.7)
SODIUM BLD-SCNC: 137 MMOL/L (ref 135–144)
SPECIFIC GRAVITY UA: 1.04 (ref 1–1.03)
TOTAL IRON BINDING CAPACITY: 89 UG/DL (ref 250–450)
TRICHOMONAS: ABNORMAL
TURBIDITY: CLEAR
UNSATURATED IRON BINDING CAPACITY: 67 UG/DL (ref 112–347)
URINE HGB: ABNORMAL
UROBILINOGEN, URINE: NORMAL
VITAMIN B-12: 908 PG/ML (ref 211–946)
WBC # BLD: 18.7 K/UL (ref 3.5–11)
WBC # BLD: ABNORMAL 10*3/UL
WBC UA: ABNORMAL /HPF (ref 0–5)
YEAST: ABNORMAL

## 2017-09-09 PROCEDURE — 94664 DEMO&/EVAL PT USE INHALER: CPT

## 2017-09-09 PROCEDURE — 80048 BASIC METABOLIC PNL TOTAL CA: CPT

## 2017-09-09 PROCEDURE — 6370000000 HC RX 637 (ALT 250 FOR IP): Performed by: EMERGENCY MEDICINE

## 2017-09-09 PROCEDURE — 86335 IMMUNFIX E-PHORSIS/URINE/CSF: CPT

## 2017-09-09 PROCEDURE — 84156 ASSAY OF PROTEIN URINE: CPT

## 2017-09-09 PROCEDURE — G0378 HOSPITAL OBSERVATION PER HR: HCPCS

## 2017-09-09 PROCEDURE — 99255 IP/OBS CONSLTJ NEW/EST HI 80: CPT | Performed by: INTERNAL MEDICINE

## 2017-09-09 PROCEDURE — 82607 VITAMIN B-12: CPT

## 2017-09-09 PROCEDURE — 71260 CT THORAX DX C+: CPT

## 2017-09-09 PROCEDURE — 36415 COLL VENOUS BLD VENIPUNCTURE: CPT

## 2017-09-09 PROCEDURE — 6370000000 HC RX 637 (ALT 250 FOR IP): Performed by: INTERNAL MEDICINE

## 2017-09-09 PROCEDURE — 96365 THER/PROPH/DIAG IV INF INIT: CPT

## 2017-09-09 PROCEDURE — 83540 ASSAY OF IRON: CPT

## 2017-09-09 PROCEDURE — 82728 ASSAY OF FERRITIN: CPT

## 2017-09-09 PROCEDURE — 2580000003 HC RX 258: Performed by: INTERNAL MEDICINE

## 2017-09-09 PROCEDURE — 86901 BLOOD TYPING SEROLOGIC RH(D): CPT

## 2017-09-09 PROCEDURE — 83550 IRON BINDING TEST: CPT

## 2017-09-09 PROCEDURE — 86850 RBC ANTIBODY SCREEN: CPT

## 2017-09-09 PROCEDURE — 86920 COMPATIBILITY TEST SPIN: CPT

## 2017-09-09 PROCEDURE — 36430 TRANSFUSION BLD/BLD COMPNT: CPT

## 2017-09-09 PROCEDURE — 82746 ASSAY OF FOLIC ACID SERUM: CPT

## 2017-09-09 PROCEDURE — 85025 COMPLETE CBC W/AUTO DIFF WBC: CPT

## 2017-09-09 PROCEDURE — 86334 IMMUNOFIX E-PHORESIS SERUM: CPT

## 2017-09-09 PROCEDURE — 2580000003 HC RX 258: Performed by: EMERGENCY MEDICINE

## 2017-09-09 PROCEDURE — 82784 ASSAY IGA/IGD/IGG/IGM EACH: CPT

## 2017-09-09 PROCEDURE — 99253 IP/OBS CNSLTJ NEW/EST LOW 45: CPT | Performed by: INTERNAL MEDICINE

## 2017-09-09 PROCEDURE — 83883 ASSAY NEPHELOMETRY NOT SPEC: CPT

## 2017-09-09 PROCEDURE — 83735 ASSAY OF MAGNESIUM: CPT

## 2017-09-09 PROCEDURE — 84165 PROTEIN E-PHORESIS SERUM: CPT

## 2017-09-09 PROCEDURE — 6370000000 HC RX 637 (ALT 250 FOR IP): Performed by: FAMILY MEDICINE

## 2017-09-09 PROCEDURE — 1200000000 HC SEMI PRIVATE

## 2017-09-09 PROCEDURE — 94640 AIRWAY INHALATION TREATMENT: CPT

## 2017-09-09 PROCEDURE — 84155 ASSAY OF PROTEIN SERUM: CPT

## 2017-09-09 PROCEDURE — P9016 RBC LEUKOCYTES REDUCED: HCPCS

## 2017-09-09 PROCEDURE — 6360000004 HC RX CONTRAST MEDICATION: Performed by: EMERGENCY MEDICINE

## 2017-09-09 PROCEDURE — 84166 PROTEIN E-PHORESIS/URINE/CSF: CPT

## 2017-09-09 PROCEDURE — 86900 BLOOD TYPING SEROLOGIC ABO: CPT

## 2017-09-09 PROCEDURE — 82232 ASSAY OF BETA-2 PROTEIN: CPT

## 2017-09-09 RX ORDER — SODIUM CHLORIDE 9 MG/ML
INJECTION, SOLUTION INTRAVENOUS CONTINUOUS
Status: DISCONTINUED | OUTPATIENT
Start: 2017-09-09 | End: 2017-09-13 | Stop reason: HOSPADM

## 2017-09-09 RX ORDER — FLUTICASONE PROPIONATE 50 MCG
1 SPRAY, SUSPENSION (ML) NASAL DAILY
Status: DISCONTINUED | OUTPATIENT
Start: 2017-09-09 | End: 2017-09-13 | Stop reason: HOSPADM

## 2017-09-09 RX ORDER — ASPIRIN 81 MG/1
81 TABLET, CHEWABLE ORAL DAILY
Status: DISCONTINUED | OUTPATIENT
Start: 2017-09-09 | End: 2017-09-13 | Stop reason: HOSPADM

## 2017-09-09 RX ORDER — POTASSIUM CHLORIDE 20 MEQ/1
40 TABLET, EXTENDED RELEASE ORAL PRN
Status: DISCONTINUED | OUTPATIENT
Start: 2017-09-09 | End: 2017-09-13 | Stop reason: HOSPADM

## 2017-09-09 RX ORDER — POTASSIUM CHLORIDE 20MEQ/15ML
40 LIQUID (ML) ORAL PRN
Status: DISCONTINUED | OUTPATIENT
Start: 2017-09-09 | End: 2017-09-13 | Stop reason: HOSPADM

## 2017-09-09 RX ORDER — ECHINACEA PURPUREA EXTRACT 125 MG
1 TABLET ORAL PRN
Status: DISCONTINUED | OUTPATIENT
Start: 2017-09-09 | End: 2017-09-13 | Stop reason: HOSPADM

## 2017-09-09 RX ORDER — ACETAMINOPHEN 325 MG/1
650 TABLET ORAL EVERY 4 HOURS PRN
Status: DISCONTINUED | OUTPATIENT
Start: 2017-09-09 | End: 2017-09-13 | Stop reason: HOSPADM

## 2017-09-09 RX ORDER — ONDANSETRON 4 MG/1
4 TABLET, FILM COATED ORAL EVERY 8 HOURS PRN
Status: DISCONTINUED | OUTPATIENT
Start: 2017-09-09 | End: 2017-09-13 | Stop reason: HOSPADM

## 2017-09-09 RX ORDER — MAGNESIUM SULFATE 1 G/100ML
1 INJECTION INTRAVENOUS PRN
Status: DISCONTINUED | OUTPATIENT
Start: 2017-09-09 | End: 2017-09-13 | Stop reason: HOSPADM

## 2017-09-09 RX ORDER — ALBUTEROL SULFATE 90 UG/1
2 AEROSOL, METERED RESPIRATORY (INHALATION) EVERY 6 HOURS PRN
Status: DISCONTINUED | OUTPATIENT
Start: 2017-09-09 | End: 2017-09-13 | Stop reason: HOSPADM

## 2017-09-09 RX ORDER — PREDNISONE 20 MG/1
40 TABLET ORAL DAILY
Status: COMPLETED | OUTPATIENT
Start: 2017-09-09 | End: 2017-09-13

## 2017-09-09 RX ORDER — SODIUM CHLORIDE 0.9 % (FLUSH) 0.9 %
10 SYRINGE (ML) INJECTION PRN
Status: DISCONTINUED | OUTPATIENT
Start: 2017-09-09 | End: 2017-09-13 | Stop reason: HOSPADM

## 2017-09-09 RX ORDER — ONDANSETRON 2 MG/ML
4 INJECTION INTRAMUSCULAR; INTRAVENOUS EVERY 8 HOURS PRN
Status: DISCONTINUED | OUTPATIENT
Start: 2017-09-09 | End: 2017-09-13 | Stop reason: HOSPADM

## 2017-09-09 RX ORDER — CITALOPRAM 20 MG/1
40 TABLET ORAL DAILY
Status: DISCONTINUED | OUTPATIENT
Start: 2017-09-09 | End: 2017-09-13 | Stop reason: HOSPADM

## 2017-09-09 RX ORDER — LEVOFLOXACIN 750 MG/1
750 TABLET ORAL ONCE
Status: COMPLETED | OUTPATIENT
Start: 2017-09-09 | End: 2017-09-09

## 2017-09-09 RX ORDER — DOCUSATE SODIUM 100 MG/1
100 CAPSULE, LIQUID FILLED ORAL 2 TIMES DAILY PRN
Status: DISCONTINUED | OUTPATIENT
Start: 2017-09-09 | End: 2017-09-13 | Stop reason: HOSPADM

## 2017-09-09 RX ORDER — LEVOFLOXACIN 500 MG/1
500 TABLET, FILM COATED ORAL DAILY
Status: COMPLETED | OUTPATIENT
Start: 2017-09-10 | End: 2017-09-12

## 2017-09-09 RX ORDER — PANTOPRAZOLE SODIUM 40 MG/1
40 TABLET, DELAYED RELEASE ORAL DAILY
Status: DISCONTINUED | OUTPATIENT
Start: 2017-09-09 | End: 2017-09-13 | Stop reason: HOSPADM

## 2017-09-09 RX ORDER — POTASSIUM CHLORIDE 7.45 MG/ML
10 INJECTION INTRAVENOUS PRN
Status: DISCONTINUED | OUTPATIENT
Start: 2017-09-09 | End: 2017-09-13 | Stop reason: HOSPADM

## 2017-09-09 RX ORDER — SODIUM CHLORIDE 0.9 % (FLUSH) 0.9 %
10 SYRINGE (ML) INJECTION EVERY 12 HOURS SCHEDULED
Status: DISCONTINUED | OUTPATIENT
Start: 2017-09-09 | End: 2017-09-13 | Stop reason: HOSPADM

## 2017-09-09 RX ORDER — SENNA PLUS 8.6 MG/1
1 TABLET ORAL 2 TIMES DAILY
Status: DISCONTINUED | OUTPATIENT
Start: 2017-09-09 | End: 2017-09-13 | Stop reason: HOSPADM

## 2017-09-09 RX ORDER — 0.9 % SODIUM CHLORIDE 0.9 %
250 INTRAVENOUS SOLUTION INTRAVENOUS ONCE
Status: COMPLETED | OUTPATIENT
Start: 2017-09-09 | End: 2017-09-10

## 2017-09-09 RX ADMIN — FLUTICASONE PROPIONATE 1 SPRAY: 50 SPRAY, METERED NASAL at 09:03

## 2017-09-09 RX ADMIN — CITALOPRAM 40 MG: 20 TABLET, FILM COATED ORAL at 09:02

## 2017-09-09 RX ADMIN — Medication 1 TABLET: at 09:03

## 2017-09-09 RX ADMIN — SODIUM CHLORIDE: 9 INJECTION, SOLUTION INTRAVENOUS at 13:44

## 2017-09-09 RX ADMIN — Medication 10 ML: at 21:05

## 2017-09-09 RX ADMIN — SODIUM CHLORIDE 250 ML: 0.9 INJECTION, SOLUTION INTRAVENOUS at 22:35

## 2017-09-09 RX ADMIN — MOMETASONE FUROATE AND FORMOTEROL FUMARATE DIHYDRATE 2 PUFF: 200; 5 AEROSOL RESPIRATORY (INHALATION) at 19:53

## 2017-09-09 RX ADMIN — SENNA 8.6 MG: 8.6 TABLET, COATED ORAL at 21:04

## 2017-09-09 RX ADMIN — POTASSIUM CHLORIDE 40 MEQ: 40 SOLUTION ORAL at 16:02

## 2017-09-09 RX ADMIN — IOVERSOL 80 ML: 741 INJECTION INTRA-ARTERIAL; INTRAVENOUS at 00:39

## 2017-09-09 RX ADMIN — PANTOPRAZOLE SODIUM 40 MG: 40 TABLET, DELAYED RELEASE ORAL at 09:03

## 2017-09-09 RX ADMIN — LEVOFLOXACIN 750 MG: 750 TABLET, FILM COATED ORAL at 02:27

## 2017-09-09 RX ADMIN — SODIUM CHLORIDE: 9 INJECTION, SOLUTION INTRAVENOUS at 03:48

## 2017-09-09 RX ADMIN — SENNA 8.6 MG: 8.6 TABLET, COATED ORAL at 09:03

## 2017-09-09 RX ADMIN — PREDNISONE 40 MG: 20 TABLET ORAL at 21:04

## 2017-09-09 RX ADMIN — ASPIRIN 81 MG: 81 TABLET, CHEWABLE ORAL at 09:03

## 2017-09-09 ASSESSMENT — PAIN SCALES - GENERAL
PAINLEVEL_OUTOF10: 0

## 2017-09-10 LAB
ABO/RH: NORMAL
ANION GAP SERPL CALCULATED.3IONS-SCNC: 16 MMOL/L (ref 9–17)
ANTIBODY SCREEN: NEGATIVE
ARM BAND NUMBER: NORMAL
BLD PROD TYP BPU: NORMAL
BUN BLDV-MCNC: 9 MG/DL (ref 6–20)
BUN/CREAT BLD: ABNORMAL (ref 9–20)
CALCIUM SERPL-MCNC: 9.2 MG/DL (ref 8.6–10.4)
CHLORIDE BLD-SCNC: 100 MMOL/L (ref 98–107)
CO2: 18 MMOL/L (ref 20–31)
CREAT SERPL-MCNC: 0.46 MG/DL (ref 0.5–0.9)
CROSSMATCH RESULT: NORMAL
CULTURE: NORMAL
CULTURE: NORMAL
DISPENSE STATUS BLOOD BANK: NORMAL
EXPIRATION DATE: NORMAL
GFR AFRICAN AMERICAN: >60 ML/MIN
GFR NON-AFRICAN AMERICAN: >60 ML/MIN
GFR SERPL CREATININE-BSD FRML MDRD: ABNORMAL ML/MIN/{1.73_M2}
GFR SERPL CREATININE-BSD FRML MDRD: ABNORMAL ML/MIN/{1.73_M2}
GLUCOSE BLD-MCNC: 191 MG/DL (ref 70–99)
HCT VFR BLD CALC: 26.6 % (ref 36–46)
HCT VFR BLD CALC: 27.2 % (ref 36–46)
HEMOGLOBIN: 8.4 G/DL (ref 12–16)
HEMOGLOBIN: 8.6 G/DL (ref 12–16)
Lab: NORMAL
MCH RBC QN AUTO: 26.1 PG (ref 26–34)
MCHC RBC AUTO-ENTMCNC: 30.8 G/DL (ref 31–37)
MCV RBC AUTO: 84.8 FL (ref 80–100)
PDW BLD-RTO: 18.1 % (ref 12.5–15.4)
PLATELET # BLD: 614 K/UL (ref 140–450)
PMV BLD AUTO: 6.9 FL (ref 6–12)
POTASSIUM SERPL-SCNC: 3.8 MMOL/L (ref 3.7–5.3)
RBC # BLD: 3.21 M/UL (ref 4–5.2)
SODIUM BLD-SCNC: 134 MMOL/L (ref 135–144)
SPECIMEN DESCRIPTION: NORMAL
STATUS: NORMAL
TRANSFUSION STATUS: NORMAL
UNIT DIVISION: 0
UNIT NUMBER: NORMAL
WBC # BLD: 24.3 K/UL (ref 3.5–11)

## 2017-09-10 PROCEDURE — 36415 COLL VENOUS BLD VENIPUNCTURE: CPT

## 2017-09-10 PROCEDURE — 6370000000 HC RX 637 (ALT 250 FOR IP): Performed by: INTERNAL MEDICINE

## 2017-09-10 PROCEDURE — 99222 1ST HOSP IP/OBS MODERATE 55: CPT | Performed by: FAMILY MEDICINE

## 2017-09-10 PROCEDURE — 6370000000 HC RX 637 (ALT 250 FOR IP): Performed by: EMERGENCY MEDICINE

## 2017-09-10 PROCEDURE — 85027 COMPLETE CBC AUTOMATED: CPT

## 2017-09-10 PROCEDURE — G0378 HOSPITAL OBSERVATION PER HR: HCPCS

## 2017-09-10 PROCEDURE — 1200000000 HC SEMI PRIVATE

## 2017-09-10 PROCEDURE — 2580000003 HC RX 258: Performed by: EMERGENCY MEDICINE

## 2017-09-10 PROCEDURE — 80048 BASIC METABOLIC PNL TOTAL CA: CPT

## 2017-09-10 PROCEDURE — 99232 SBSQ HOSP IP/OBS MODERATE 35: CPT | Performed by: INTERNAL MEDICINE

## 2017-09-10 PROCEDURE — 94640 AIRWAY INHALATION TREATMENT: CPT

## 2017-09-10 PROCEDURE — 96366 THER/PROPH/DIAG IV INF ADDON: CPT

## 2017-09-10 PROCEDURE — 99232 SBSQ HOSP IP/OBS MODERATE 35: CPT | Performed by: FAMILY MEDICINE

## 2017-09-10 PROCEDURE — 85014 HEMATOCRIT: CPT

## 2017-09-10 PROCEDURE — 85018 HEMOGLOBIN: CPT

## 2017-09-10 RX ADMIN — Medication 1 TABLET: at 10:11

## 2017-09-10 RX ADMIN — MOMETASONE FUROATE AND FORMOTEROL FUMARATE DIHYDRATE 2 PUFF: 200; 5 AEROSOL RESPIRATORY (INHALATION) at 20:43

## 2017-09-10 RX ADMIN — Medication 10 ML: at 20:58

## 2017-09-10 RX ADMIN — ASPIRIN 81 MG: 81 TABLET, CHEWABLE ORAL at 10:12

## 2017-09-10 RX ADMIN — PANTOPRAZOLE SODIUM 40 MG: 40 TABLET, DELAYED RELEASE ORAL at 10:12

## 2017-09-10 RX ADMIN — TIOTROPIUM BROMIDE 18 MCG: 18 CAPSULE ORAL; RESPIRATORY (INHALATION) at 09:10

## 2017-09-10 RX ADMIN — SENNA 8.6 MG: 8.6 TABLET, COATED ORAL at 10:11

## 2017-09-10 RX ADMIN — PREDNISONE 40 MG: 20 TABLET ORAL at 10:11

## 2017-09-10 RX ADMIN — Medication 10 ML: at 10:12

## 2017-09-10 RX ADMIN — MOMETASONE FUROATE AND FORMOTEROL FUMARATE DIHYDRATE 2 PUFF: 200; 5 AEROSOL RESPIRATORY (INHALATION) at 09:12

## 2017-09-10 RX ADMIN — SENNA 8.6 MG: 8.6 TABLET, COATED ORAL at 20:58

## 2017-09-10 RX ADMIN — CITALOPRAM 40 MG: 20 TABLET, FILM COATED ORAL at 10:11

## 2017-09-10 RX ADMIN — FLUTICASONE PROPIONATE 1 SPRAY: 50 SPRAY, METERED NASAL at 10:11

## 2017-09-10 ASSESSMENT — PAIN SCALES - GENERAL: PAINLEVEL_OUTOF10: 0

## 2017-09-11 LAB — BETA-2 MICROGLOBULIN: 3.4 MG/L (ref 0.6–2.4)

## 2017-09-11 PROCEDURE — G8987 SELF CARE CURRENT STATUS: HCPCS

## 2017-09-11 PROCEDURE — 97535 SELF CARE MNGMENT TRAINING: CPT

## 2017-09-11 PROCEDURE — G8979 MOBILITY GOAL STATUS: HCPCS

## 2017-09-11 PROCEDURE — G0378 HOSPITAL OBSERVATION PER HR: HCPCS

## 2017-09-11 PROCEDURE — 6370000000 HC RX 637 (ALT 250 FOR IP): Performed by: INTERNAL MEDICINE

## 2017-09-11 PROCEDURE — G8988 SELF CARE GOAL STATUS: HCPCS

## 2017-09-11 PROCEDURE — 2580000003 HC RX 258: Performed by: EMERGENCY MEDICINE

## 2017-09-11 PROCEDURE — 99232 SBSQ HOSP IP/OBS MODERATE 35: CPT | Performed by: INTERNAL MEDICINE

## 2017-09-11 PROCEDURE — 1200000000 HC SEMI PRIVATE

## 2017-09-11 PROCEDURE — 97530 THERAPEUTIC ACTIVITIES: CPT

## 2017-09-11 PROCEDURE — 94640 AIRWAY INHALATION TREATMENT: CPT

## 2017-09-11 PROCEDURE — 97162 PT EVAL MOD COMPLEX 30 MIN: CPT

## 2017-09-11 PROCEDURE — 97165 OT EVAL LOW COMPLEX 30 MIN: CPT

## 2017-09-11 PROCEDURE — G8978 MOBILITY CURRENT STATUS: HCPCS

## 2017-09-11 PROCEDURE — 6370000000 HC RX 637 (ALT 250 FOR IP): Performed by: EMERGENCY MEDICINE

## 2017-09-11 RX ADMIN — Medication 10 ML: at 09:57

## 2017-09-11 RX ADMIN — FLUTICASONE PROPIONATE 1 SPRAY: 50 SPRAY, METERED NASAL at 09:55

## 2017-09-11 RX ADMIN — CITALOPRAM 40 MG: 20 TABLET, FILM COATED ORAL at 09:55

## 2017-09-11 RX ADMIN — ASPIRIN 81 MG: 81 TABLET, CHEWABLE ORAL at 09:55

## 2017-09-11 RX ADMIN — MOMETASONE FUROATE AND FORMOTEROL FUMARATE DIHYDRATE 2 PUFF: 200; 5 AEROSOL RESPIRATORY (INHALATION) at 21:34

## 2017-09-11 RX ADMIN — PANTOPRAZOLE SODIUM 40 MG: 40 TABLET, DELAYED RELEASE ORAL at 09:55

## 2017-09-11 RX ADMIN — Medication 1 TABLET: at 09:55

## 2017-09-11 RX ADMIN — PREDNISONE 40 MG: 20 TABLET ORAL at 09:55

## 2017-09-11 RX ADMIN — Medication 10 ML: at 21:15

## 2017-09-11 RX ADMIN — SENNA 8.6 MG: 8.6 TABLET, COATED ORAL at 09:55

## 2017-09-11 RX ADMIN — SENNA 8.6 MG: 8.6 TABLET, COATED ORAL at 21:15

## 2017-09-11 RX ADMIN — TIOTROPIUM BROMIDE 18 MCG: 18 CAPSULE ORAL; RESPIRATORY (INHALATION) at 08:45

## 2017-09-11 RX ADMIN — MOMETASONE FUROATE AND FORMOTEROL FUMARATE DIHYDRATE 2 PUFF: 200; 5 AEROSOL RESPIRATORY (INHALATION) at 08:45

## 2017-09-11 ASSESSMENT — PAIN SCALES - GENERAL: PAINLEVEL_OUTOF10: 0

## 2017-09-12 ENCOUNTER — APPOINTMENT (OUTPATIENT)
Dept: CT IMAGING | Age: 47
DRG: 140 | End: 2017-09-12
Payer: MEDICAID

## 2017-09-12 LAB
ABSOLUTE EOS #: 0 K/UL (ref 0–0.4)
ABSOLUTE LYMPH #: 2.4 K/UL (ref 1–4.8)
ABSOLUTE MONO #: 0.9 K/UL (ref 0.1–0.8)
ALBUMIN (CALCULATED): 2.9 G/DL (ref 3.2–5.2)
ALBUMIN PERCENT: 36 % (ref 45–65)
ALPHA 1 PERCENT: 6 % (ref 3–6)
ALPHA 2 PERCENT: 12 % (ref 6–13)
ALPHA-1-GLOBULIN: 0.5 G/DL (ref 0.1–0.4)
ALPHA-2-GLOBULIN: 1 G/DL (ref 0.5–0.9)
BASOPHILS # BLD: 0 %
BASOPHILS ABSOLUTE: 0 K/UL (ref 0–0.2)
BETA GLOBULIN: 1.3 G/DL (ref 0.5–1.1)
BETA PERCENT: 16 % (ref 11–19)
DIFFERENTIAL TYPE: ABNORMAL
EOSINOPHILS RELATIVE PERCENT: 0 %
GAMMA GLOBULIN %: 31 % (ref 9–20)
GAMMA GLOBULIN: 2.4 G/DL (ref 0.5–1.5)
HCT VFR BLD CALC: 28 % (ref 36–46)
HEMOGLOBIN: 8.8 G/DL (ref 12–16)
INR BLD: 1.2
LYMPHOCYTES # BLD: 8 %
MCH RBC QN AUTO: 26.5 PG (ref 26–34)
MCHC RBC AUTO-ENTMCNC: 31.5 G/DL (ref 31–37)
MCV RBC AUTO: 84.3 FL (ref 80–100)
MONOCYTES # BLD: 3 %
MORPHOLOGY: ABNORMAL
P E INTERPRETATION, U: NORMAL
PARTIAL THROMBOPLASTIN TIME: 27.1 SEC (ref 21.3–31.3)
PATHOLOGIST: ABNORMAL
PATHOLOGIST: NORMAL
PATHOLOGIST: NORMAL
PDW BLD-RTO: 18.1 % (ref 12.5–15.4)
PLATELET # BLD: 657 K/UL (ref 140–450)
PLATELET ESTIMATE: ABNORMAL
PMV BLD AUTO: 7.5 FL (ref 6–12)
PROTEIN ELECTROPHORESIS, SERUM: ABNORMAL
PROTHROMBIN TIME: 12.7 SEC (ref 9.4–12.6)
RBC # BLD: 3.32 M/UL (ref 4–5.2)
RBC # BLD: ABNORMAL 10*6/UL
SEG NEUTROPHILS: 89 %
SEGMENTED NEUTROPHILS ABSOLUTE COUNT: 26.7 K/UL (ref 1.8–7.7)
SERUM IFX INTERP: NORMAL
SPECIMEN TYPE: NORMAL
TOTAL PROT. SUM,%: 101 % (ref 98–102)
TOTAL PROT. SUM: 8.1 G/DL (ref 6.3–8.2)
TOTAL PROTEIN: 8 G/DL (ref 6.4–8.3)
URINE IFX INTERP: NORMAL
URINE IFX SPECIMEN: NORMAL
URINE TOTAL PROTEIN: 52 MG/DL
URINE TOTAL PROTEIN: 52 MG/DL
VOLUME: NORMAL ML
WBC # BLD: 30 K/UL (ref 3.5–11)
WBC # BLD: ABNORMAL 10*3/UL

## 2017-09-12 PROCEDURE — 6370000000 HC RX 637 (ALT 250 FOR IP): Performed by: RADIOLOGY

## 2017-09-12 PROCEDURE — 99232 SBSQ HOSP IP/OBS MODERATE 35: CPT | Performed by: INTERNAL MEDICINE

## 2017-09-12 PROCEDURE — 77012 CT SCAN FOR NEEDLE BIOPSY: CPT

## 2017-09-12 PROCEDURE — 36415 COLL VENOUS BLD VENIPUNCTURE: CPT

## 2017-09-12 PROCEDURE — 85730 THROMBOPLASTIN TIME PARTIAL: CPT

## 2017-09-12 PROCEDURE — 38221 DX BONE MARROW BIOPSIES: CPT

## 2017-09-12 PROCEDURE — 6370000000 HC RX 637 (ALT 250 FOR IP): Performed by: EMERGENCY MEDICINE

## 2017-09-12 PROCEDURE — 07DR3ZX EXTRACTION OF ILIAC BONE MARROW, PERCUTANEOUS APPROACH, DIAGNOSTIC: ICD-10-PCS | Performed by: INTERNAL MEDICINE

## 2017-09-12 PROCEDURE — 85610 PROTHROMBIN TIME: CPT

## 2017-09-12 PROCEDURE — G0378 HOSPITAL OBSERVATION PER HR: HCPCS

## 2017-09-12 PROCEDURE — 88305 TISSUE EXAM BY PATHOLOGIST: CPT

## 2017-09-12 PROCEDURE — 88184 FLOWCYTOMETRY/ TC 1 MARKER: CPT

## 2017-09-12 PROCEDURE — 88264 CHROMOSOME ANALYSIS 20-25: CPT

## 2017-09-12 PROCEDURE — 88237 TISSUE CULTURE BONE MARROW: CPT

## 2017-09-12 PROCEDURE — 85025 COMPLETE CBC W/AUTO DIFF WBC: CPT

## 2017-09-12 PROCEDURE — 88185 FLOWCYTOMETRY/TC ADD-ON: CPT

## 2017-09-12 PROCEDURE — 1200000000 HC SEMI PRIVATE

## 2017-09-12 PROCEDURE — 6370000000 HC RX 637 (ALT 250 FOR IP): Performed by: INTERNAL MEDICINE

## 2017-09-12 PROCEDURE — 88311 DECALCIFY TISSUE: CPT

## 2017-09-12 PROCEDURE — 94640 AIRWAY INHALATION TREATMENT: CPT

## 2017-09-12 PROCEDURE — 97116 GAIT TRAINING THERAPY: CPT

## 2017-09-12 PROCEDURE — 88313 SPECIAL STAINS GROUP 2: CPT

## 2017-09-12 PROCEDURE — 88280 CHROMOSOME KARYOTYPE STUDY: CPT

## 2017-09-12 PROCEDURE — 6360000002 HC RX W HCPCS: Performed by: RADIOLOGY

## 2017-09-12 PROCEDURE — 88360 TUMOR IMMUNOHISTOCHEM/MANUAL: CPT

## 2017-09-12 PROCEDURE — 6360000002 HC RX W HCPCS

## 2017-09-12 PROCEDURE — 97110 THERAPEUTIC EXERCISES: CPT

## 2017-09-12 PROCEDURE — 2580000003 HC RX 258: Performed by: EMERGENCY MEDICINE

## 2017-09-12 RX ORDER — FENTANYL CITRATE 50 UG/ML
INJECTION, SOLUTION INTRAMUSCULAR; INTRAVENOUS
Status: COMPLETED | OUTPATIENT
Start: 2017-09-12 | End: 2017-09-12

## 2017-09-12 RX ORDER — OXYCODONE HYDROCHLORIDE 5 MG/1
5 TABLET ORAL EVERY 4 HOURS PRN
Status: DISCONTINUED | OUTPATIENT
Start: 2017-09-12 | End: 2017-09-13 | Stop reason: HOSPADM

## 2017-09-12 RX ORDER — OXYCODONE HYDROCHLORIDE 5 MG/1
10 TABLET ORAL EVERY 4 HOURS PRN
Status: DISCONTINUED | OUTPATIENT
Start: 2017-09-12 | End: 2017-09-13 | Stop reason: HOSPADM

## 2017-09-12 RX ADMIN — SENNA 8.6 MG: 8.6 TABLET, COATED ORAL at 08:42

## 2017-09-12 RX ADMIN — PREDNISONE 40 MG: 20 TABLET ORAL at 08:42

## 2017-09-12 RX ADMIN — MOMETASONE FUROATE AND FORMOTEROL FUMARATE DIHYDRATE 2 PUFF: 200; 5 AEROSOL RESPIRATORY (INHALATION) at 20:10

## 2017-09-12 RX ADMIN — Medication 10 ML: at 20:26

## 2017-09-12 RX ADMIN — FENTANYL CITRATE 50 MCG: 50 INJECTION INTRAMUSCULAR; INTRAVENOUS at 10:19

## 2017-09-12 RX ADMIN — Medication 1 TABLET: at 08:42

## 2017-09-12 RX ADMIN — PANTOPRAZOLE SODIUM 40 MG: 40 TABLET, DELAYED RELEASE ORAL at 08:42

## 2017-09-12 RX ADMIN — OXYCODONE HYDROCHLORIDE 5 MG: 5 TABLET ORAL at 20:26

## 2017-09-12 RX ADMIN — FLUTICASONE PROPIONATE 1 SPRAY: 50 SPRAY, METERED NASAL at 08:43

## 2017-09-12 RX ADMIN — OXYCODONE HYDROCHLORIDE 5 MG: 5 TABLET ORAL at 15:14

## 2017-09-12 RX ADMIN — FENTANYL CITRATE 50 MCG: 50 INJECTION INTRAMUSCULAR; INTRAVENOUS at 10:14

## 2017-09-12 RX ADMIN — ASPIRIN 81 MG: 81 TABLET, CHEWABLE ORAL at 15:13

## 2017-09-12 RX ADMIN — FENTANYL CITRATE 50 MCG: 50 INJECTION INTRAMUSCULAR; INTRAVENOUS at 10:08

## 2017-09-12 RX ADMIN — CITALOPRAM 40 MG: 20 TABLET, FILM COATED ORAL at 08:42

## 2017-09-12 RX ADMIN — SENNA 8.6 MG: 8.6 TABLET, COATED ORAL at 20:26

## 2017-09-12 RX ADMIN — Medication 10 ML: at 08:43

## 2017-09-12 ASSESSMENT — PAIN SCALES - GENERAL
PAINLEVEL_OUTOF10: 6
PAINLEVEL_OUTOF10: 7
PAINLEVEL_OUTOF10: 5

## 2017-09-13 VITALS
DIASTOLIC BLOOD PRESSURE: 70 MMHG | TEMPERATURE: 98.1 F | HEART RATE: 75 BPM | RESPIRATION RATE: 12 BRPM | BODY MASS INDEX: 28.48 KG/M2 | SYSTOLIC BLOOD PRESSURE: 110 MMHG | HEIGHT: 57 IN | OXYGEN SATURATION: 93 % | WEIGHT: 132 LBS

## 2017-09-13 LAB
ALBUMIN, U: DETECTED
ALPHA 1, URINE: DETECTED
ALPHA 2, URINE: DETECTED
BETA URINE: DETECTED
FREE KAPPA LT CHAINS,UR: 114 MG/DL (ref 0.14–2.42)
FREE LAMBDA LT CHAINS,UR: 14 MG/DL (ref 0.02–0.67)
FREE UR LAMBDA EXCRETION/DAY: ABNORMAL MG/D
GAMMA GLOBULIN, URINE: DETECTED
HOURS COLLECTED: ABNORMAL
KAPPA LAMBDA URINE INTERP: ABNORMAL
KAPPA/LAMBDA RATIO,U: 8.14 RATIO (ref 2.04–10.37)
PROTEIN, TOTAL URINE: ABNORMAL MG/D (ref 10–140)
URINE FREE KAPPA EXCRETION/DAY: ABNORMAL MG/D
URINE VOLUME: ABNORMAL

## 2017-09-13 PROCEDURE — 99239 HOSP IP/OBS DSCHRG MGMT >30: CPT | Performed by: INTERNAL MEDICINE

## 2017-09-13 PROCEDURE — 99232 SBSQ HOSP IP/OBS MODERATE 35: CPT | Performed by: INTERNAL MEDICINE

## 2017-09-13 PROCEDURE — G0378 HOSPITAL OBSERVATION PER HR: HCPCS

## 2017-09-13 PROCEDURE — 6370000000 HC RX 637 (ALT 250 FOR IP): Performed by: RADIOLOGY

## 2017-09-13 PROCEDURE — 6370000000 HC RX 637 (ALT 250 FOR IP): Performed by: INTERNAL MEDICINE

## 2017-09-13 PROCEDURE — 97110 THERAPEUTIC EXERCISES: CPT

## 2017-09-13 PROCEDURE — 94640 AIRWAY INHALATION TREATMENT: CPT

## 2017-09-13 PROCEDURE — 2580000003 HC RX 258: Performed by: EMERGENCY MEDICINE

## 2017-09-13 PROCEDURE — 6370000000 HC RX 637 (ALT 250 FOR IP): Performed by: EMERGENCY MEDICINE

## 2017-09-13 PROCEDURE — 97116 GAIT TRAINING THERAPY: CPT

## 2017-09-13 RX ADMIN — TIOTROPIUM BROMIDE 18 MCG: 18 CAPSULE ORAL; RESPIRATORY (INHALATION) at 09:38

## 2017-09-13 RX ADMIN — CITALOPRAM 40 MG: 20 TABLET, FILM COATED ORAL at 08:52

## 2017-09-13 RX ADMIN — ASPIRIN 81 MG: 81 TABLET, CHEWABLE ORAL at 08:52

## 2017-09-13 RX ADMIN — Medication 1 TABLET: at 08:52

## 2017-09-13 RX ADMIN — PANTOPRAZOLE SODIUM 40 MG: 40 TABLET, DELAYED RELEASE ORAL at 10:07

## 2017-09-13 RX ADMIN — FLUTICASONE PROPIONATE 1 SPRAY: 50 SPRAY, METERED NASAL at 10:08

## 2017-09-13 RX ADMIN — PREDNISONE 40 MG: 20 TABLET ORAL at 08:52

## 2017-09-13 RX ADMIN — MOMETASONE FUROATE AND FORMOTEROL FUMARATE DIHYDRATE 2 PUFF: 200; 5 AEROSOL RESPIRATORY (INHALATION) at 09:38

## 2017-09-13 RX ADMIN — Medication 10 ML: at 10:08

## 2017-09-13 RX ADMIN — SENNA 8.6 MG: 8.6 TABLET, COATED ORAL at 08:52

## 2017-09-13 RX ADMIN — OXYCODONE HYDROCHLORIDE 5 MG: 5 TABLET ORAL at 13:18

## 2017-09-13 RX ADMIN — OXYCODONE HYDROCHLORIDE 5 MG: 5 TABLET ORAL at 06:39

## 2017-09-13 ASSESSMENT — PAIN SCALES - GENERAL
PAINLEVEL_OUTOF10: 6
PAINLEVEL_OUTOF10: 6

## 2017-09-14 LAB
BONE MARROW REPORT: NORMAL
CULTURE: NORMAL
FLOW CYTOMETRY BL: NORMAL
Lab: NORMAL
Lab: NORMAL
SPECIMEN DESCRIPTION: NORMAL
SPECIMEN DESCRIPTION: NORMAL
STATUS: NORMAL
STATUS: NORMAL

## 2017-09-15 ENCOUNTER — TELEPHONE (OUTPATIENT)
Dept: INTERNAL MEDICINE | Age: 47
End: 2017-09-15

## 2017-09-20 LAB — CHROMOSOME STUDY: NORMAL

## 2017-09-21 DIAGNOSIS — J06.9 VIRAL UPPER RESPIRATORY TRACT INFECTION: ICD-10-CM

## 2017-09-21 RX ORDER — LORATADINE 10 MG/1
TABLET ORAL
Qty: 30 TABLET | Refills: 0 | Status: SHIPPED | OUTPATIENT
Start: 2017-09-21 | End: 2017-10-19 | Stop reason: SDUPTHER

## 2017-09-21 RX ORDER — CITALOPRAM 40 MG/1
TABLET ORAL
Qty: 30 TABLET | Refills: 0 | Status: SHIPPED | OUTPATIENT
Start: 2017-09-21 | End: 2017-10-19 | Stop reason: SDUPTHER

## 2017-09-21 RX ORDER — FOLIC ACID 1 MG/1
TABLET ORAL
Qty: 30 TABLET | Refills: 0 | Status: SHIPPED | OUTPATIENT
Start: 2017-09-21 | End: 2017-10-20 | Stop reason: SDUPTHER

## 2017-09-21 RX ORDER — PANTOPRAZOLE SODIUM 40 MG/1
TABLET, DELAYED RELEASE ORAL
Qty: 90 TABLET | Refills: 0 | Status: SHIPPED | OUTPATIENT
Start: 2017-09-21 | End: 2017-10-19 | Stop reason: SDUPTHER

## 2017-09-21 RX ORDER — ASPIRIN 81 MG
TABLET,CHEWABLE ORAL
Qty: 30 TABLET | Refills: 0 | Status: SHIPPED | OUTPATIENT
Start: 2017-09-21 | End: 2017-10-19 | Stop reason: SDUPTHER

## 2017-09-21 RX ORDER — FERROUS SULFATE 325(65) MG
TABLET ORAL
Qty: 60 TABLET | Refills: 0 | Status: SHIPPED | OUTPATIENT
Start: 2017-09-21 | End: 2017-10-19 | Stop reason: SDUPTHER

## 2017-09-21 RX ORDER — DOCUSATE SODIUM -SENNOSIDES 50; 8.6 MG/1; MG/1
TABLET, COATED ORAL
Qty: 60 TABLET | Refills: 0 | Status: SHIPPED | OUTPATIENT
Start: 2017-09-21 | End: 2017-10-19 | Stop reason: SDUPTHER

## 2017-09-22 ENCOUNTER — HOSPITAL ENCOUNTER (OUTPATIENT)
Facility: MEDICAL CENTER | Age: 47
End: 2017-09-22
Payer: MEDICAID

## 2017-09-25 ENCOUNTER — TELEPHONE (OUTPATIENT)
Dept: ONCOLOGY | Age: 47
End: 2017-09-25

## 2017-09-25 ENCOUNTER — HOSPITAL ENCOUNTER (OUTPATIENT)
Age: 47
Setting detail: SPECIMEN
Discharge: HOME | End: 2017-09-25
Payer: MEDICAID

## 2017-09-25 ENCOUNTER — OFFICE VISIT (OUTPATIENT)
Dept: ONCOLOGY | Age: 47
End: 2017-09-25
Payer: MEDICAID

## 2017-09-25 VITALS
HEART RATE: 129 BPM | TEMPERATURE: 97.9 F | DIASTOLIC BLOOD PRESSURE: 73 MMHG | WEIGHT: 121.2 LBS | RESPIRATION RATE: 20 BRPM | SYSTOLIC BLOOD PRESSURE: 121 MMHG | BODY MASS INDEX: 26.15 KG/M2

## 2017-09-25 DIAGNOSIS — R76.8 ELEVATED SERUM IMMUNOGLOBULIN FREE LIGHT CHAIN LEVEL: ICD-10-CM

## 2017-09-25 DIAGNOSIS — R91.8 MULTIPLE LUNG NODULES ON CT: Primary | Chronic | ICD-10-CM

## 2017-09-25 DIAGNOSIS — D63.8 ANEMIA OF CHRONIC DISEASE: ICD-10-CM

## 2017-09-25 LAB
-: ABNORMAL
AMORPHOUS: ABNORMAL
BACTERIA: ABNORMAL
BILIRUBIN URINE: ABNORMAL
CASTS UA: ABNORMAL /LPF
COLOR: ABNORMAL
COMMENT UA: ABNORMAL
CRYSTALS, UA: ABNORMAL /HPF
EPITHELIAL CELLS UA: ABNORMAL /HPF
GLUCOSE URINE: NEGATIVE
KETONES, URINE: NEGATIVE
LEUKOCYTE ESTERASE, URINE: ABNORMAL
MUCUS: ABNORMAL
NITRITE, URINE: NEGATIVE
OTHER OBSERVATIONS UA: ABNORMAL
PH UA: 6 (ref 5–8)
PROTEIN UA: ABNORMAL
RBC UA: ABNORMAL /HPF (ref 0–2)
RENAL EPITHELIAL, UA: ABNORMAL /HPF
SPECIFIC GRAVITY UA: 1.02 (ref 1–1.03)
TRICHOMONAS: ABNORMAL
TURBIDITY: ABNORMAL
URINE HGB: ABNORMAL
UROBILINOGEN, URINE: ABNORMAL
WBC UA: ABNORMAL /HPF (ref 0–5)
YEAST: ABNORMAL

## 2017-09-25 PROCEDURE — 87086 URINE CULTURE/COLONY COUNT: CPT

## 2017-09-25 PROCEDURE — 99212 OFFICE O/P EST SF 10 MIN: CPT

## 2017-09-25 PROCEDURE — 81001 URINALYSIS AUTO W/SCOPE: CPT

## 2017-09-25 PROCEDURE — 99214 OFFICE O/P EST MOD 30 MIN: CPT | Performed by: INTERNAL MEDICINE

## 2017-09-25 RX ORDER — OXYCODONE HYDROCHLORIDE AND ACETAMINOPHEN 5; 325 MG/1; MG/1
1 TABLET ORAL EVERY 6 HOURS PRN
Qty: 40 TABLET | Refills: 0 | Status: SHIPPED | OUTPATIENT
Start: 2017-09-25 | End: 2017-10-17 | Stop reason: SDUPTHER

## 2017-09-26 LAB
CULTURE: NORMAL
CULTURE: NORMAL
Lab: NORMAL
SPECIMEN DESCRIPTION: NORMAL
SPECIMEN DESCRIPTION: NORMAL
STATUS: NORMAL

## 2017-10-13 ENCOUNTER — APPOINTMENT (OUTPATIENT)
Dept: GENERAL RADIOLOGY | Age: 47
DRG: 663 | End: 2017-10-13
Payer: MEDICAID

## 2017-10-13 ENCOUNTER — HOSPITAL ENCOUNTER (INPATIENT)
Age: 47
LOS: 3 days | Discharge: HOME | DRG: 663 | End: 2017-10-16
Attending: EMERGENCY MEDICINE | Admitting: INTERNAL MEDICINE
Payer: MEDICAID

## 2017-10-13 DIAGNOSIS — M79.604 BILATERAL LEG PAIN: ICD-10-CM

## 2017-10-13 DIAGNOSIS — D64.89 ANEMIA DUE TO OTHER CAUSE, NOT CLASSIFIED: Primary | ICD-10-CM

## 2017-10-13 DIAGNOSIS — D64.9 NORMOCYTIC ANEMIA: Chronic | ICD-10-CM

## 2017-10-13 DIAGNOSIS — M79.605 BILATERAL LEG PAIN: ICD-10-CM

## 2017-10-13 LAB
ABSOLUTE EOS #: 0 K/UL (ref 0–0.4)
ABSOLUTE LYMPH #: 1.54 K/UL (ref 1–4.8)
ABSOLUTE MONO #: 2.12 K/UL (ref 0.1–0.8)
ANION GAP SERPL CALCULATED.3IONS-SCNC: 13 MMOL/L (ref 9–17)
BASOPHILS # BLD: 0 %
BASOPHILS ABSOLUTE: 0 K/UL (ref 0–0.2)
BUN BLDV-MCNC: 12 MG/DL (ref 6–20)
BUN/CREAT BLD: ABNORMAL (ref 9–20)
CALCIUM SERPL-MCNC: 8.9 MG/DL (ref 8.6–10.4)
CHLORIDE BLD-SCNC: 97 MMOL/L (ref 98–107)
CO2: 25 MMOL/L (ref 20–31)
CREAT SERPL-MCNC: 0.51 MG/DL (ref 0.5–0.9)
DIFFERENTIAL TYPE: ABNORMAL
EOSINOPHILS RELATIVE PERCENT: 0 %
GFR AFRICAN AMERICAN: >60 ML/MIN
GFR NON-AFRICAN AMERICAN: >60 ML/MIN
GFR SERPL CREATININE-BSD FRML MDRD: ABNORMAL ML/MIN/{1.73_M2}
GFR SERPL CREATININE-BSD FRML MDRD: ABNORMAL ML/MIN/{1.73_M2}
GLUCOSE BLD-MCNC: 79 MG/DL (ref 70–99)
HCT VFR BLD CALC: 20.3 % (ref 36–46)
HEMOGLOBIN: 6.4 G/DL (ref 12–16)
LYMPHOCYTES # BLD: 8 %
MCH RBC QN AUTO: 25.3 PG (ref 26–34)
MCHC RBC AUTO-ENTMCNC: 31.4 G/DL (ref 31–37)
MCV RBC AUTO: 80.5 FL (ref 80–100)
MONOCYTES # BLD: 11 %
MORPHOLOGY: ABNORMAL
PDW BLD-RTO: 19.4 % (ref 12.5–15.4)
PLATELET # BLD: 843 K/UL (ref 140–450)
PLATELET ESTIMATE: ABNORMAL
PMV BLD AUTO: 8 FL (ref 6–12)
POTASSIUM SERPL-SCNC: 3.4 MMOL/L (ref 3.7–5.3)
RBC # BLD: 2.52 M/UL (ref 4–5.2)
RBC # BLD: ABNORMAL 10*6/UL
SEG NEUTROPHILS: 81 %
SEGMENTED NEUTROPHILS ABSOLUTE COUNT: 15.64 K/UL (ref 1.8–7.7)
SODIUM BLD-SCNC: 135 MMOL/L (ref 135–144)
WBC # BLD: 19.3 K/UL (ref 3.5–11)
WBC # BLD: ABNORMAL 10*3/UL

## 2017-10-13 PROCEDURE — 86920 COMPATIBILITY TEST SPIN: CPT

## 2017-10-13 PROCEDURE — 80048 BASIC METABOLIC PNL TOTAL CA: CPT

## 2017-10-13 PROCEDURE — 1200000000 HC SEMI PRIVATE

## 2017-10-13 PROCEDURE — G0378 HOSPITAL OBSERVATION PER HR: HCPCS

## 2017-10-13 PROCEDURE — 71010 XR CHEST PORTABLE: CPT

## 2017-10-13 PROCEDURE — 36415 COLL VENOUS BLD VENIPUNCTURE: CPT

## 2017-10-13 PROCEDURE — 6370000000 HC RX 637 (ALT 250 FOR IP): Performed by: INTERNAL MEDICINE

## 2017-10-13 PROCEDURE — 86900 BLOOD TYPING SEROLOGIC ABO: CPT

## 2017-10-13 PROCEDURE — P9016 RBC LEUKOCYTES REDUCED: HCPCS

## 2017-10-13 PROCEDURE — 73590 X-RAY EXAM OF LOWER LEG: CPT

## 2017-10-13 PROCEDURE — 99284 EMERGENCY DEPT VISIT MOD MDM: CPT

## 2017-10-13 PROCEDURE — 86850 RBC ANTIBODY SCREEN: CPT

## 2017-10-13 PROCEDURE — 86901 BLOOD TYPING SEROLOGIC RH(D): CPT

## 2017-10-13 PROCEDURE — 6370000000 HC RX 637 (ALT 250 FOR IP): Performed by: PHYSICIAN ASSISTANT

## 2017-10-13 PROCEDURE — 85025 COMPLETE CBC W/AUTO DIFF WBC: CPT

## 2017-10-13 PROCEDURE — 94640 AIRWAY INHALATION TREATMENT: CPT

## 2017-10-13 PROCEDURE — 36430 TRANSFUSION BLD/BLD COMPNT: CPT

## 2017-10-13 RX ORDER — MAGNESIUM SULFATE 1 G/100ML
1 INJECTION INTRAVENOUS PRN
Status: DISCONTINUED | OUTPATIENT
Start: 2017-10-13 | End: 2017-10-16 | Stop reason: HOSPADM

## 2017-10-13 RX ORDER — MORPHINE SULFATE 2 MG/ML
2 INJECTION, SOLUTION INTRAMUSCULAR; INTRAVENOUS
Status: DISCONTINUED | OUTPATIENT
Start: 2017-10-13 | End: 2017-10-16 | Stop reason: HOSPADM

## 2017-10-13 RX ORDER — ACETAMINOPHEN 325 MG/1
650 TABLET ORAL EVERY 4 HOURS PRN
Status: DISCONTINUED | OUTPATIENT
Start: 2017-10-13 | End: 2017-10-16 | Stop reason: HOSPADM

## 2017-10-13 RX ORDER — POTASSIUM CHLORIDE 20MEQ/15ML
40 LIQUID (ML) ORAL PRN
Status: DISCONTINUED | OUTPATIENT
Start: 2017-10-13 | End: 2017-10-16 | Stop reason: HOSPADM

## 2017-10-13 RX ORDER — FOLIC ACID 1 MG/1
1 TABLET ORAL DAILY
Status: DISCONTINUED | OUTPATIENT
Start: 2017-10-13 | End: 2017-10-16 | Stop reason: HOSPADM

## 2017-10-13 RX ORDER — POTASSIUM CHLORIDE 20 MEQ/1
40 TABLET, EXTENDED RELEASE ORAL PRN
Status: DISCONTINUED | OUTPATIENT
Start: 2017-10-13 | End: 2017-10-16 | Stop reason: HOSPADM

## 2017-10-13 RX ORDER — ALBUTEROL SULFATE 90 UG/1
2 AEROSOL, METERED RESPIRATORY (INHALATION) EVERY 4 HOURS PRN
Status: DISCONTINUED | OUTPATIENT
Start: 2017-10-13 | End: 2017-10-16 | Stop reason: HOSPADM

## 2017-10-13 RX ORDER — ONDANSETRON 4 MG/1
4 TABLET, FILM COATED ORAL EVERY 8 HOURS PRN
Status: DISCONTINUED | OUTPATIENT
Start: 2017-10-13 | End: 2017-10-16 | Stop reason: HOSPADM

## 2017-10-13 RX ORDER — FLUTICASONE PROPIONATE 50 MCG
1 SPRAY, SUSPENSION (ML) NASAL DAILY
Status: DISCONTINUED | OUTPATIENT
Start: 2017-10-13 | End: 2017-10-16 | Stop reason: HOSPADM

## 2017-10-13 RX ORDER — ONDANSETRON 2 MG/ML
4 INJECTION INTRAMUSCULAR; INTRAVENOUS EVERY 6 HOURS PRN
Status: DISCONTINUED | OUTPATIENT
Start: 2017-10-13 | End: 2017-10-16 | Stop reason: HOSPADM

## 2017-10-13 RX ORDER — POTASSIUM CHLORIDE 7.45 MG/ML
10 INJECTION INTRAVENOUS PRN
Status: DISCONTINUED | OUTPATIENT
Start: 2017-10-13 | End: 2017-10-16 | Stop reason: HOSPADM

## 2017-10-13 RX ORDER — SODIUM CHLORIDE 0.9 % (FLUSH) 0.9 %
10 SYRINGE (ML) INJECTION EVERY 12 HOURS SCHEDULED
Status: DISCONTINUED | OUTPATIENT
Start: 2017-10-13 | End: 2017-10-13

## 2017-10-13 RX ORDER — OXYCODONE HYDROCHLORIDE 5 MG/1
5 TABLET ORAL ONCE
Status: COMPLETED | OUTPATIENT
Start: 2017-10-13 | End: 2017-10-13

## 2017-10-13 RX ORDER — HYDROCODONE BITARTRATE AND ACETAMINOPHEN 5; 325 MG/1; MG/1
1 TABLET ORAL EVERY 4 HOURS PRN
Status: DISCONTINUED | OUTPATIENT
Start: 2017-10-13 | End: 2017-10-16 | Stop reason: HOSPADM

## 2017-10-13 RX ORDER — BISACODYL 10 MG
10 SUPPOSITORY, RECTAL RECTAL DAILY PRN
Status: DISCONTINUED | OUTPATIENT
Start: 2017-10-13 | End: 2017-10-16 | Stop reason: HOSPADM

## 2017-10-13 RX ORDER — ECHINACEA PURPUREA EXTRACT 125 MG
1 TABLET ORAL PRN
Status: DISCONTINUED | OUTPATIENT
Start: 2017-10-13 | End: 2017-10-16 | Stop reason: HOSPADM

## 2017-10-13 RX ORDER — SODIUM CHLORIDE 0.9 % (FLUSH) 0.9 %
10 SYRINGE (ML) INJECTION EVERY 12 HOURS
Status: DISCONTINUED | OUTPATIENT
Start: 2017-10-13 | End: 2017-10-16 | Stop reason: HOSPADM

## 2017-10-13 RX ORDER — CITALOPRAM 20 MG/1
40 TABLET ORAL DAILY
Status: DISCONTINUED | OUTPATIENT
Start: 2017-10-13 | End: 2017-10-16 | Stop reason: HOSPADM

## 2017-10-13 RX ORDER — MORPHINE SULFATE 4 MG/ML
4 INJECTION, SOLUTION INTRAMUSCULAR; INTRAVENOUS
Status: DISCONTINUED | OUTPATIENT
Start: 2017-10-13 | End: 2017-10-16 | Stop reason: HOSPADM

## 2017-10-13 RX ORDER — LANOLIN ALCOHOL/MO/W.PET/CERES
325 CREAM (GRAM) TOPICAL
Status: DISCONTINUED | OUTPATIENT
Start: 2017-10-14 | End: 2017-10-16 | Stop reason: HOSPADM

## 2017-10-13 RX ORDER — SODIUM CHLORIDE 0.9 % (FLUSH) 0.9 %
10 SYRINGE (ML) INJECTION PRN
Status: DISCONTINUED | OUTPATIENT
Start: 2017-10-13 | End: 2017-10-16 | Stop reason: HOSPADM

## 2017-10-13 RX ADMIN — MOMETASONE FUROATE AND FORMOTEROL FUMARATE DIHYDRATE 2 PUFF: 200; 5 AEROSOL RESPIRATORY (INHALATION) at 21:00

## 2017-10-13 RX ADMIN — OXYCODONE HYDROCHLORIDE 5 MG: 5 TABLET ORAL at 14:24

## 2017-10-13 RX ADMIN — HYDROCODONE BITARTRATE AND ACETAMINOPHEN 1 TABLET: 5; 325 TABLET ORAL at 18:15

## 2017-10-13 RX ADMIN — POTASSIUM CHLORIDE 40 MEQ: 20 TABLET, EXTENDED RELEASE ORAL at 18:15

## 2017-10-13 RX ADMIN — HYDROCODONE BITARTRATE AND ACETAMINOPHEN 1 TABLET: 5; 325 TABLET ORAL at 22:04

## 2017-10-13 ASSESSMENT — PAIN DESCRIPTION - DESCRIPTORS
DESCRIPTORS: CONSTANT;ACHING
DESCRIPTORS: CONSTANT;ACHING;DISCOMFORT
DESCRIPTORS: ACHING;DISCOMFORT

## 2017-10-13 ASSESSMENT — PAIN DESCRIPTION - LOCATION
LOCATION: LEG
LOCATION: LEG;FOOT
LOCATION: FOOT;LEG

## 2017-10-13 ASSESSMENT — ENCOUNTER SYMPTOMS
EYES NEGATIVE: 1
ALLERGIC/IMMUNOLOGIC NEGATIVE: 1
GASTROINTESTINAL NEGATIVE: 1
SHORTNESS OF BREATH: 1

## 2017-10-13 ASSESSMENT — PAIN DESCRIPTION - FREQUENCY
FREQUENCY: CONTINUOUS
FREQUENCY: CONTINUOUS

## 2017-10-13 ASSESSMENT — PAIN SCALES - GENERAL
PAINLEVEL_OUTOF10: 10
PAINLEVEL_OUTOF10: 2
PAINLEVEL_OUTOF10: 4
PAINLEVEL_OUTOF10: 4
PAINLEVEL_OUTOF10: 5
PAINLEVEL_OUTOF10: 10
PAINLEVEL_OUTOF10: 8

## 2017-10-13 ASSESSMENT — PAIN DESCRIPTION - PROGRESSION
CLINICAL_PROGRESSION: GRADUALLY IMPROVING

## 2017-10-13 ASSESSMENT — PAIN DESCRIPTION - PAIN TYPE
TYPE: ACUTE PAIN

## 2017-10-13 ASSESSMENT — PAIN DESCRIPTION - ORIENTATION
ORIENTATION: RIGHT;LEFT

## 2017-10-13 ASSESSMENT — PAIN DESCRIPTION - ONSET
ONSET: ON-GOING
ONSET: ON-GOING

## 2017-10-13 NOTE — CARE COORDINATION
Case Management Initial Discharge Plan  Hudson ANDRADE Samples,         Readmission Risk              Readmission Risk:        22.75       Age 70 or Greater:  0    Admitted from SNF or Requires Paid or Family Care:  0    Currently has CHF,COPD,ARF,CRI,or is on dialysis:  0    Takes more than 5 Prescription Medications:  4    Takes Digoxin,Insulin,Anticoagulants,Narcotics or ASA/Plavix:  [address] in Past 12 Months:  10    On Disability:  3    Patient Considers own Health:  3.75            Met with:patient to discuss discharge plans. Information verified: address, contacts, phone number, , insurance Yes  PCP: Garland Burch MD  Date of last visit: 2 months ago    Insurance Provider: [facility]    Discharge Planning  Current Residence:  Private Residence  Living Arrangements:  Family Members   Home has 1 stories/6 stairs to climb into home  Support Systems:  Family Members  Current Services PTA:  None Supplier: na  Patient able to perform ADL's:Independent  DME used to aid ambulation prior to admission: none/during admission none    Potential Assistance Needed:  [address]: Joe   Potential Assistance Purchasing Medications:  No  Does patient want to participate in local refill/ meds to beds program?  No    Patient agreeable to home care: No  [place] of choice provided:  n/a      Type of Home Care Services:  None  Patient expects to be discharged to:  Home    Prior SNF/Rehab Placement and Facility: yes,  [facility]  Agreeable to SNF/Rehab: No  [place] of choice provided: n/a   Evaluation: n/a    Expected Discharge date: Follow Up Appointment: Best Day/ Time:      Transportation provider:   Transportation arrangements needed for discharge: No    Discharge Plan: Pt lives in a one-story home with her sister and aunt. Pt states she has used [address] in the past but does not feel like she needs their services at this time.  Plan to be

## 2017-10-13 NOTE — Clinical Note
Patient Class: Inpatient [101]  REQUIRED: Diagnosis: Profound anemia [307641]  Estimated Length of Stay: Estimated stay of more than 2 midnights  Future Attending Provider: Tiffany Newman [[id number]]  Telemetry Bed Required?: Yes

## 2017-10-13 NOTE — ED NOTES
Attempt to call [facility] Nurse, but unavailable to take report at this time. States to call back shortly.          Gretchen Mensah RN  10/13/17 8771

## 2017-10-13 NOTE — ED NOTES
Call placed again to floor to give report.    Report given to nurse David Collins 2C via telephone          Gagandebby Tyler, [facility]  10/13/17 0199

## 2017-10-13 NOTE — ED PROVIDER NOTES
[facility]     Emergency Department     Faculty Attestation    I performed a history and physical examination of the patient and discussed management with the resident. I reviewed the residents note and agree with the documented findings and plan of care. Any areas of disagreement are noted on the chart. I was personally present for the key portions of any procedures. I have documented in the chart those procedures where I was not present during the key portions. I have reviewed the emergency nurses triage note. I agree with the chief complaint, past medical history, past surgical history, allergies, medications, social and family history as documented unless otherwise noted below. Documentation of the HPI, Physical Exam and Medical Decision Making performed by medical students or scribes is based on my personal performance of the HPI, PE and MDM. For Physician Assistant/ Nurse Practitioner cases/documentation I have personally evaluated this patient and have completed at least one if not all key elements of the E/M (history, physical exam, and MDM). Additional findings are as noted. Vital signs:   Vitals:    10/13/17 1335   BP: 127/78   Pulse: 98   Resp: 18   Temp: 98.4 °F (36.9 °C)   SpO2: 8%      64-year-old female presents complaining of bilateral lower extremity pain. She states it's from her knees down. No history of any trauma. He states that she was unable to stand today because she was experiencing some much pain in her legs. She also complains of tingling in her feet bilaterally. She states that this pain has been going on for years, and it is just more severe today. On physical exam, the patient appears pale. Her abdomen is soft and nontender. Her legs are nontender to palpation. Distal pulses are palpable. She has some peeling skin overlying her knees and anterior tibialis. No obvious infection. We'll check x-rays and labs.   She has a history of anemia in the past as well. She also has recurrent ascites. Per the electronic health record, her mother and brother had similar issues, and eventually developed renal problems as well. We will check x-rays of her lower extremities and some basic labs.             Luis Clark M.D,  Attending Emergency  Physician             Luis Clark MD  10/13/17 4593

## 2017-10-13 NOTE — ED PROVIDER NOTES
pneumonia ([facility] [address] Depression; Difficult intravenous access; Disease of blood and blood forming organ; Edentulous; Full dentures; Gastric outlet obstruction; GERD (gastroesophageal reflux disease); Hearing loss; History of blood transfusion; Hydronephrosis, bilateral; Hypertension; Hypoglycemia; Lung nodule; Migraine; OCD (obsessive compulsive disorder); Osteoarthritis; Osteoporosis; Pelvic mass; Perforated nasal septum; Pneumonia; Psoriasis; PTSD (post-traumatic stress disorder); Scoliosis; Shortness of breath; Substance abuse; Thrombocytosis ([facility] [address] Urinary incontinence; and Wears glasses. has a past surgical history that includes Tubal ligation (1989); Tonsillectomy and adenoidectomy (1982); ERCP (5/16/2013); Upper gastrointestinal endoscopy; gastrectomy (2012); Paracentesis (Right, 01/2015-09/2015); Cystocopy (12/1/2015); Fixation Kyphoplasty (08/09/2016); Lung biopsy; insert nasal septal prosthesis (N/A, 4/14/2017); create eardrum opening,gen anesth (N/A, 4/14/2017); Abdomen surgery (09/2015); Cystoscopy (05/11/2017); Ureter stent placement (05/11/2017); Ureteroscopy (05/11/2017); Cystoscopy (Right, 5/11/2017); Cholecystectomy; hernia repair (07/18/2017); repair incisional hernia,reducible (N/A, 7/18/2017); and esophagogastroduodenoscopy transoral diagnostic (N/A, 8/30/2017). Social History     Social History    Marital status: Single     Spouse name: N/A    Number of children: N/A    Years of education: N/A     Occupational History    Not on file.      Social History Main Topics    Smoking status: Current Some Day Smoker     Packs/day: 0.25     Years: 30.00     Types: Cigarettes     Last attempt to quit: 12/1/2012    Smokeless tobacco: Never Used      Comment: pt states 3 cigs per day    Alcohol use No      Comment: quit in 1987    Drug use: No      Comment: quit marijuana 1987    Sexual activity: No     Other Topics Concern    Not on file     Social History Narrative    No narrative on file tablet Take 1 tablet by mouth every 8 hours as needed for Nausea 5/9/17   Kobi Manuel MD   albuterol (PROVENTIL) (5 MG/ML) 0.5% nebulizer solution Take 0.5 mLs by nebulization every 6 hours as needed for Wheezing 1/23/17   Alyse De Leon MD   sodium chloride (ALTAMIST SPRAY) 0.65 % nasal spray 1 spray by Nasal route as needed for Congestion 7/21/16   Carl Cruz MD       REVIEW OF SYSTEMS    (2-9 systems for level 4, 10 or more for level 5)      Review of Systems   Constitutional: Negative. HENT: Negative. Eyes: Negative. Respiratory: Positive for shortness of breath. Cardiovascular: Negative. Gastrointestinal: Negative. Endocrine: Negative. Genitourinary: Negative. Musculoskeletal: Negative. Bilateral lower leg pain   Skin: Negative. Allergic/Immunologic: Negative. Neurological: Negative. Hematological: Negative. Psychiatric/Behavioral: Negative. PHYSICAL EXAM   (up to 7 for level 4, 8 or more for level 5)      INITIAL VITALS:  height is 4' 9\" (1.448 m) and weight is 121 lb (54.9 kg). Her oral temperature is 98.4 °F (36.9 °C). Her blood pressure is 127/78 and her pulse is 98. Her respiration is 18 and oxygen saturation is 100%. Physical Exam   Constitutional: She is oriented to person, place, and time. Vital signs are normal. No distress. Patient appears very weak and has a lot of pallor. He can barely lift her legs up from the side of the bed or sit on the bedside without assistance. She moves very slowly   HENT:   Head: Normocephalic and atraumatic. Nose: Nose normal.   Mouth/Throat: Mucous membranes are normal.   Eyes: Conjunctivae are normal.   Neck: Trachea normal, normal range of motion and full passive range of motion without pain. Cardiovascular: Normal rate, regular rhythm, S1 normal, S2 normal, normal heart sounds and intact distal pulses. Exam reveals no gallop and no friction rub. No murmur heard.   Pulses:       Radial MEDICATIONS ORDERED:  Orders Placed This Encounter   Medications    oxyCODONE (ROXICODONE) immediate release tablet 5 mg    sodium chloride flush 0.9 % injection 10 mL       Controlled Substances Monitoring:      DIAGNOSTIC RESULTS / EMERGENCY DEPARTMENT COURSE / MDM     RADIOLOGY:   I directly visualized (with the attending physician) the following  images and reviewed the radiologist interpretations:  Xr Tibia Fibula Left (2 Views)    Result Date: 10/13/2017  EXAMINATION: 2 VIEWS OF THE LEFT TIBIA AND FIBULA 10/13/2017 2:28 pm COMPARISON: None. HISTORY: ORDERING SYSTEM PROVIDED HISTORY: h/o malignancy and lower extremity pain knees down to ankles, eval bony lesions. TECHNOLOGIST PROVIDED HISTORY: Reason for exam:->h/o malignancy and lower extremity pain knees down to ankles, eval bony lesions. Initial exam. FINDINGS: There is no evidence of acute fracture. There is normal alignment. No acute joint abnormality. No focal osseous lesion. No focal soft tissue abnormality. No acute osseous abnormality. Xr Tibia Fibula Right (2 Views)    Result Date: 10/13/2017  EXAMINATION: 2 VIEWS OF THE RIGHT TIBIA AND FIBULA 10/13/2017 2:28 pm COMPARISON: None. HISTORY: ORDERING SYSTEM PROVIDED HISTORY: h/o malignancy, 1 month pain circumferential knee down to ankle. eval bony lesion. TECHNOLOGIST PROVIDED HISTORY: Reason for exam:->h/o malignancy, 1 month pain circumferential knee down to ankle. eval bony lesion. Initial exam. FINDINGS: There is no evidence of acute fracture. There is normal alignment. No acute joint abnormality. No focal osseous lesion. No focal soft tissue abnormality. No acute osseous abnormality.      Xr Chest Portable    Result Date: 10/13/2017  EXAMINATION: SINGLE VIEW OF THE CHEST 10/13/2017 2:28 pm COMPARISON: September 8, 2017 HISTORY: ORDERING SYSTEM PROVIDED HISTORY: shortness of breath, eval for pneumonia FINDINGS: Persistent left retrocardiac mass which appears stable to mildly decreased in size. Right upper lobe airspace disease persists but now demonstrates more streaky patchy opacification and decrease nodularity. Persistent but mildly decreased right hilar prominence. No effusion or pneumothorax. Cardiomediastinal silhouette is stable with calcification aortic knob. Mild dextroscoliosis of the lower thoracic spine. Spinal augmentation along the thoracolumbar junction. *Persistent left retrocardiac mass which appears stable to mildly decreased in size. *Right upper lobe airspace disease persists but now demonstrates more streaky patchy opacification and decrease nodularity images developing pneumonia. *Persistent but mildly decreased right hilar prominence. RECOMMENDATION: Consider further evaluation with contrast-enhanced chest CT as clinically warranted     LABS:  Results for orders placed or performed during the hospital encounter of 10/13/17   CBC WITH AUTO DIFFERENTIAL   Result Value Ref Range    WBC 19.3 (H) 3.5 - 11.0 k/uL    RBC 2.52 (L) 4.0 - 5.2 m/uL    Hemoglobin 6.4 (LL) 12.0 - 16.0 g/dL    Hematocrit 20.3 (L) 36 - 46 %    MCV 80.5 80 - 100 fL    MCH 25.3 (L) 26 - 34 pg    MCHC 31.4 31 - 37 g/dL    RDW 19.4 (H) 12.5 - 15.4 %    Platelets 785 (H) 709 - 450 k/uL    MPV 8.0 6.0 - 12.0 fL    Differential Type NOT REPORTED     WBC Morphology NOT REPORTED     RBC Morphology NOT REPORTED     Platelet Estimate NOT REPORTED     Seg Neutrophils 81 %    Lymphocytes 8 %    Monocytes 11 %    Eosinophils % 0 %    Basophils 0 %    Segs Absolute 15.64 (H) 1.8 - 7.7 k/uL    Absolute Lymph # 1.54 1.0 - 4.8 k/uL    Absolute Mono # 2.12 (H) 0.1 - 0.8 k/uL    Absolute Eos # 0.00 0.0 - 0.4 k/uL    Basophils # 0.00 0.0 - 0.2 k/uL    Morphology ANISOCYTOSIS PRESENT    anemia    EMERGENCY DEPARTMENT COURSE:  Order x-rays and basic labs to evaluate patient's anemia. Have pre-evaluated. Patient's labs, her hemoglobin is 6.4. I went and talked with her about this.   We'll get her typed and crossmatched and plan on transfusing her, started in the emergency department. We'll also page [facility] as internal medicine is A 4 PM.  Patient is agreeable to have blood transfusion. Spoke with Dr. Anna Marie Carrizales and she is agreeable to admit pt to medicine. Explained that patient also has bed bugs    PROCEDURES:  None    FINAL IMPRESSION      1. Anemia due to other cause, not classified    2.  Bilateral leg pain          DISPOSITION / PLAN     DISPOSITION Decision To Admit  Admit to medicine  PATIENT REFERRED TO:  Andrea Perez MD  [address] [place] [address] [address] [phone]            DISCHARGE MEDICATIONS:  New Prescriptions    No medications on file       Suri Muhammad PA-C PA-C  Emergency Medicine Physician Assistant    (Please note that portions of this note were completed with a voice recognition program.  Efforts were made to edit the dictations but occasionally words are mis-transcribed.)     Suri Muhammad PA-C  10/13/17 [id number]

## 2017-10-13 NOTE — ED NOTES
Writer speaks with blood bank, states that they need to prepare blood, so better to send patient to the floor and will call [facility] nurse when blood becomes available    Patient updated on plan of care and processes  Nonsymptomatic at this time  Friend at bedside  GCS=15  Nondistressed      Call light within reach         [facility], RN  10/13/17 [phone]

## 2017-10-14 PROBLEM — D64.9 ABSOLUTE ANEMIA: Status: ACTIVE | Noted: 2017-08-15

## 2017-10-14 LAB
ABO/RH: NORMAL
ALBUMIN SERPL-MCNC: 2.1 G/DL (ref 3.5–5.2)
ALBUMIN/GLOBULIN RATIO: 0.4 (ref 1–2.5)
ALP BLD-CCNC: 309 U/L (ref 35–104)
ALT SERPL-CCNC: 7 U/L (ref 5–33)
ANION GAP SERPL CALCULATED.3IONS-SCNC: 12 MMOL/L (ref 9–17)
ANTIBODY SCREEN: NEGATIVE
ARM BAND NUMBER: NORMAL
AST SERPL-CCNC: 16 U/L
BILIRUB SERPL-MCNC: 0.58 MG/DL (ref 0.3–1.2)
BLD PROD TYP BPU: NORMAL
BLD PROD TYP BPU: NORMAL
BUN BLDV-MCNC: 12 MG/DL (ref 6–20)
BUN/CREAT BLD: ABNORMAL (ref 9–20)
CALCIUM SERPL-MCNC: 8.7 MG/DL (ref 8.6–10.4)
CHLORIDE BLD-SCNC: 97 MMOL/L (ref 98–107)
CO2: 25 MMOL/L (ref 20–31)
CREAT SERPL-MCNC: 0.52 MG/DL (ref 0.5–0.9)
CROSSMATCH RESULT: NORMAL
CROSSMATCH RESULT: NORMAL
DISPENSE STATUS BLOOD BANK: NORMAL
DISPENSE STATUS BLOOD BANK: NORMAL
EXPIRATION DATE: NORMAL
GFR AFRICAN AMERICAN: >60 ML/MIN
GFR NON-AFRICAN AMERICAN: >60 ML/MIN
GFR SERPL CREATININE-BSD FRML MDRD: ABNORMAL ML/MIN/{1.73_M2}
GFR SERPL CREATININE-BSD FRML MDRD: ABNORMAL ML/MIN/{1.73_M2}
GLUCOSE BLD-MCNC: 116 MG/DL (ref 70–99)
HCT VFR BLD CALC: 25.1 % (ref 36–46)
HEMOGLOBIN: 7.9 G/DL (ref 12–16)
INR BLD: 1.4
MCH RBC QN AUTO: 26.5 PG (ref 26–34)
MCHC RBC AUTO-ENTMCNC: 31.4 G/DL (ref 31–37)
MCV RBC AUTO: 84.5 FL (ref 80–100)
PDW BLD-RTO: 17.7 % (ref 12.5–15.4)
PLATELET # BLD: 815 K/UL (ref 140–450)
PMV BLD AUTO: 7 FL (ref 6–12)
POTASSIUM SERPL-SCNC: 3.5 MMOL/L (ref 3.7–5.3)
PROTHROMBIN TIME: 15.2 SEC (ref 9.4–12.6)
RBC # BLD: 2.97 M/UL (ref 4–5.2)
SODIUM BLD-SCNC: 134 MMOL/L (ref 135–144)
TOTAL PROTEIN: 7.6 G/DL (ref 6.4–8.3)
TRANSFUSION STATUS: NORMAL
TRANSFUSION STATUS: NORMAL
UNIT DIVISION: 0
UNIT DIVISION: 0
UNIT NUMBER: NORMAL
UNIT NUMBER: NORMAL
WBC # BLD: 16.1 K/UL (ref 3.5–11)

## 2017-10-14 PROCEDURE — P9016 RBC LEUKOCYTES REDUCED: HCPCS

## 2017-10-14 PROCEDURE — 85610 PROTHROMBIN TIME: CPT

## 2017-10-14 PROCEDURE — 99253 IP/OBS CNSLTJ NEW/EST LOW 45: CPT | Performed by: INTERNAL MEDICINE

## 2017-10-14 PROCEDURE — 97165 OT EVAL LOW COMPLEX 30 MIN: CPT

## 2017-10-14 PROCEDURE — 36430 TRANSFUSION BLD/BLD COMPNT: CPT

## 2017-10-14 PROCEDURE — G0008 ADMIN INFLUENZA VIRUS VAC: HCPCS | Performed by: INTERNAL MEDICINE

## 2017-10-14 PROCEDURE — 99222 1ST HOSP IP/OBS MODERATE 55: CPT | Performed by: FAMILY MEDICINE

## 2017-10-14 PROCEDURE — 6360000002 HC RX W HCPCS: Performed by: INTERNAL MEDICINE

## 2017-10-14 PROCEDURE — 6370000000 HC RX 637 (ALT 250 FOR IP): Performed by: FAMILY MEDICINE

## 2017-10-14 PROCEDURE — 97530 THERAPEUTIC ACTIVITIES: CPT

## 2017-10-14 PROCEDURE — 36415 COLL VENOUS BLD VENIPUNCTURE: CPT

## 2017-10-14 PROCEDURE — 2580000003 HC RX 258: Performed by: NURSE PRACTITIONER

## 2017-10-14 PROCEDURE — 94640 AIRWAY INHALATION TREATMENT: CPT

## 2017-10-14 PROCEDURE — G8979 MOBILITY GOAL STATUS: HCPCS

## 2017-10-14 PROCEDURE — G0378 HOSPITAL OBSERVATION PER HR: HCPCS

## 2017-10-14 PROCEDURE — 80053 COMPREHEN METABOLIC PANEL: CPT

## 2017-10-14 PROCEDURE — 90686 IIV4 VACC NO PRSV 0.5 ML IM: CPT | Performed by: INTERNAL MEDICINE

## 2017-10-14 PROCEDURE — 6370000000 HC RX 637 (ALT 250 FOR IP): Performed by: INTERNAL MEDICINE

## 2017-10-14 PROCEDURE — 97162 PT EVAL MOD COMPLEX 30 MIN: CPT

## 2017-10-14 PROCEDURE — 97535 SELF CARE MNGMENT TRAINING: CPT

## 2017-10-14 PROCEDURE — 2580000003 HC RX 258: Performed by: PHYSICIAN ASSISTANT

## 2017-10-14 PROCEDURE — 85027 COMPLETE CBC AUTOMATED: CPT

## 2017-10-14 PROCEDURE — G8978 MOBILITY CURRENT STATUS: HCPCS

## 2017-10-14 PROCEDURE — 1200000000 HC SEMI PRIVATE

## 2017-10-14 RX ORDER — 0.9 % SODIUM CHLORIDE 0.9 %
250 INTRAVENOUS SOLUTION INTRAVENOUS ONCE
Status: COMPLETED | OUTPATIENT
Start: 2017-10-14 | End: 2017-10-14

## 2017-10-14 RX ORDER — POLYETHYLENE GLYCOL 3350 17 G/17G
17 POWDER, FOR SOLUTION ORAL 2 TIMES DAILY
Status: DISCONTINUED | OUTPATIENT
Start: 2017-10-14 | End: 2017-10-16 | Stop reason: HOSPADM

## 2017-10-14 RX ORDER — 0.9 % SODIUM CHLORIDE 0.9 %
250 INTRAVENOUS SOLUTION INTRAVENOUS ONCE
Status: DISCONTINUED | OUTPATIENT
Start: 2017-10-14 | End: 2017-10-16 | Stop reason: HOSPADM

## 2017-10-14 RX ADMIN — FERROUS SULFATE TAB EC 325 MG (65 MG FE EQUIVALENT) 325 MG: 325 (65 FE) TABLET DELAYED RESPONSE at 08:00

## 2017-10-14 RX ADMIN — HYDROCODONE BITARTRATE AND ACETAMINOPHEN 1 TABLET: 5; 325 TABLET ORAL at 05:24

## 2017-10-14 RX ADMIN — FLUTICASONE PROPIONATE 1 SPRAY: 50 SPRAY, METERED NASAL at 08:54

## 2017-10-14 RX ADMIN — SODIUM CHLORIDE 250 ML: 9 INJECTION, SOLUTION INTRAVENOUS at 04:00

## 2017-10-14 RX ADMIN — POLYETHYLENE GLYCOL 3350 17 G: 17 POWDER, FOR SOLUTION ORAL at 21:30

## 2017-10-14 RX ADMIN — Medication 1 TABLET: at 08:55

## 2017-10-14 RX ADMIN — HYDROCODONE BITARTRATE AND ACETAMINOPHEN 1 TABLET: 5; 325 TABLET ORAL at 16:56

## 2017-10-14 RX ADMIN — MOMETASONE FUROATE AND FORMOTEROL FUMARATE DIHYDRATE 2 PUFF: 200; 5 AEROSOL RESPIRATORY (INHALATION) at 20:16

## 2017-10-14 RX ADMIN — Medication 10 ML: at 05:14

## 2017-10-14 RX ADMIN — INFLUENZA VIRUS VACCINE 0.5 ML: 15; 15; 15; 15 SUSPENSION INTRAMUSCULAR at 08:54

## 2017-10-14 RX ADMIN — MOMETASONE FUROATE AND FORMOTEROL FUMARATE DIHYDRATE 2 PUFF: 200; 5 AEROSOL RESPIRATORY (INHALATION) at 07:22

## 2017-10-14 RX ADMIN — POTASSIUM CHLORIDE 40 MEQ: 20 TABLET, EXTENDED RELEASE ORAL at 06:34

## 2017-10-14 RX ADMIN — HYDROCODONE BITARTRATE AND ACETAMINOPHEN 1 TABLET: 5; 325 TABLET ORAL at 11:03

## 2017-10-14 RX ADMIN — HYDROCODONE BITARTRATE AND ACETAMINOPHEN 1 TABLET: 5; 325 TABLET ORAL at 21:40

## 2017-10-14 RX ADMIN — CITALOPRAM 40 MG: 20 TABLET, FILM COATED ORAL at 08:55

## 2017-10-14 RX ADMIN — FOLIC ACID 1 MG: 1 TABLET ORAL at 08:55

## 2017-10-14 ASSESSMENT — PAIN DESCRIPTION - ORIENTATION
ORIENTATION: RIGHT;LEFT

## 2017-10-14 ASSESSMENT — PAIN SCALES - GENERAL
PAINLEVEL_OUTOF10: 3
PAINLEVEL_OUTOF10: 5
PAINLEVEL_OUTOF10: 3
PAINLEVEL_OUTOF10: 0
PAINLEVEL_OUTOF10: 8
PAINLEVEL_OUTOF10: 9
PAINLEVEL_OUTOF10: 3
PAINLEVEL_OUTOF10: 8
PAINLEVEL_OUTOF10: 0

## 2017-10-14 ASSESSMENT — PAIN DESCRIPTION - DESCRIPTORS: DESCRIPTORS: CONSTANT;SORE;TENDER

## 2017-10-14 ASSESSMENT — PAIN DESCRIPTION - PAIN TYPE: TYPE: ACUTE PAIN

## 2017-10-14 ASSESSMENT — PAIN DESCRIPTION - LOCATION
LOCATION: LEG
LOCATION: LEG
LOCATION: LEG;FOOT
LOCATION: LEG

## 2017-10-14 ASSESSMENT — PAIN DESCRIPTION - ONSET: ONSET: ON-GOING

## 2017-10-14 ASSESSMENT — PAIN DESCRIPTION - FREQUENCY: FREQUENCY: CONTINUOUS

## 2017-10-14 ASSESSMENT — PAIN DESCRIPTION - PROGRESSION: CLINICAL_PROGRESSION: GRADUALLY IMPROVING

## 2017-10-14 NOTE — H&P
Judd Romeo 17    HISTORY AND PHYSICAL EXAMINATION            Date:   10/14/2017  Patient name:  Pepito Law  Date of admission:  10/13/2017  1:26 PM  MRN:   [id number]  Account:  [de-identified]  YOB: 1970  PCP:    Carl Cruz MD  Room:   9615/4696-32  Code Status:    Full Code    Chief Complaint:     Chief Complaint   Patient presents with    Leg Pain     pt reports bilateral leg pain for the past week, pt reports unable to ambulate due to pain, states legs give out on her, pt denies injury     Foot Pain     pt reports bilateral foot pain, pt denies injury     Back Pain     pt reports lower back pain        History Obtained From:     patient    History of Present Illness: The patient is a 50 y.o. Non-/non  female who presents with Leg Pain (pt reports bilateral leg pain for the past week, pt reports unable to ambulate due to pain, states legs give out on her, pt denies injury ); Foot Pain (pt reports bilateral foot pain, pt denies injury ); and Back Pain (pt reports lower back pain )   and she is admitted to the hospital for the management of  Acute on chronic anemia. Symptoms started 2-3 days prior to presentation, patient is familiar with her lower extremity pain and coldness and their association with Hb drop according to her which alarmed her to come to ER for possible need of transfusion. Patient has a known chronic microcytic anemia with normal iron studies and extensive workup including bone marrow biopsy ,  she is following up with  as an outpatient. Patient also has history of lung mass that was biopsied with biopsy results negative for malignancy or TB, had also gastric mass with resection that showed inflammatory response rather than malignancy.   Currently she denies any chest pain, sob, nausea, vomiting, fever or chills          Past Medical History:     Past Medical History:   Diagnosis Date    Allergic rhinitis     Anxiety     Asthma DX PRIOR TO 1995    NO INHALER USE ONLY HAS TROUBLE IN EXTREME HOT OR COLD WEATHER    Blood circulation, collateral     Celiac disease     possible    Chronic back pain     low back pain     Cirrhosis of liver with ascites ([address] 3/30/2015    Constipation     CHRONIC    Cough 11/23/2016    Cryptogenic organizing pneumonia ([address] 11/23/2016    Depression 12/13/2012    on celexa per pcp    Difficult intravenous access     STATES LT ARM EASIER TO START IN, ENCOURAGED TO HYDRATE DAY PROIR     Disease of blood and blood forming organ     Thrombocytosis    Edentulous     does not wear her dentures    Full dentures     ENCOURAGED TO WEAR DOS    Gastric outlet obstruction 8/30/2017    GERD (gastroesophageal reflux disease)     Hearing loss     left ear    History of blood transfusion 01/2012    Hydronephrosis, bilateral 5/2/2015    Hypertension     Hypoglycemia 2014    Lung nodule     right, benign    Migraine     OCD (obsessive compulsive disorder)     Osteoarthritis     Osteoporosis     Pelvic mass April 2015    benign     Perforated nasal septum     Pneumonia     Psoriasis     PTSD (post-traumatic stress disorder) 1985    [facility]    Scoliosis     Shortness of breath 11/23/2016    with temp.  change    Substance abuse 1987    [facility]    Thrombocytosis ([address]     Urinary incontinence     PT STATES RESOLVED    Wears glasses         Past Surgical History:     Past Surgical History:   Procedure Laterality Date    ABDOMEN SURGERY  09/2015    MASS REMOVED AND HERNIA REPAIR    CHOLECYSTECTOMY      CYSTOSCOPY  12/1/2015    bilat retrograde, right ureteroscopy    CYSTOSCOPY  05/11/2017    CYSTOSCOPY Right 5/11/2017    CYSTOSCOPY, RIGHT URETEROSCOPY, RIGHT RETROGRADE PYELOGRAM, RIGHT STENT PLACEMENT performed by Sriram Lim MD at [address] [place] [address] Drive ERCP  5/16/2013    FIXATION KYPHOPLASTY  08/09/2016    T12, L-2, L-5    GASTRECTOMY  2012    Partial Gastrectomy    HERNIA REPAIR  07/18/2017    hernia repair with mash    LUNG BIOPSY      right upper lobe    PARACENTESIS Right 01/2015-09/2015    X 8    NC CREATE EARDRUM OPENING,GEN ANESTH N/A 4/14/2017    LEFT MYRINGOTOMY T-TUBE INSERTION performed by Efren Brannon MD at [address] ESOPHAGOGASTRODUODENOSCOPY TRANSORAL DIAGNOSTIC N/A 8/30/2017    EGD ESOPHAGOGASTRODUODENOSCOPY performed by Wellington Fernández MD at [address] [facility] N/A 4/14/2017    NASAL SEPTAL BUTTON INSERTION performed by Efren Brannon MD at [address] [facility] N/A 7/18/2017    VENTRAL [address] [facility] performed by Naveen Humphries DO at [address] [facility]  05/11/2017    URETEROSCOPY  05/11/2017        Medications Prior to Admission:     Prior to Admission medications    Medication Sig Start Date End Date Taking? Authorizing Provider   oxyCODONE-acetaminophen (PERCOCET) 5-325 MG per tablet Take 1 tablet by mouth every 6 hours as needed for Pain .  9/25/17 10/25/17  Candy Child MD   VENTOLIN  (90 Base) MCG/ACT inhaler INHALE 2 PUFFS INTO THE LUNGS EVERY 6 HOURS AS NEEDED FOR WHEEZING 9/22/17   Teri Lyman MD   loratadine (CLARITIN) 10 MG tablet TAKE 1 TAB BY MOUTH ONCE A DAY 9/21/17   Bronwyn Miller MD   folic acid (FOLVITE) 1 MG tablet TAKE 1 TAB BY MOUTH ONCE A DAY 9/21/17   Bronwyn Miller MD   citalopram (CELEXA) 40 MG tablet TAKE 1 TAB BY MOUTH ONCE A DAY 9/21/17   Bronwyn Miller MD   ferrous sulfate 325 (65 Fe) MG tablet TAKE 1 TAB BY MOUTH TWICE A DAY -WITH MEALS 9/21/17   Bronwyn Miller MD   SENEXON-S 8.6-50 MG per tablet TAKE 1 TAB BY MOUTH TWICE A DAY 9/21/17   Bronwyn Miller MD   ASPIRIN LOW DOSE 81 MG chewable tablet CHEW ONE TABLET ONCE A DAY 9/21/17   Lanie Qureshi Cristofer Nuñez MD   pantoprazole (PROTONIX) 40 MG tablet TAKE 1 TAB BY MOUTH ONCE A DAY 9/21/17   Gilberto Cuevas MD   docusate sodium (DOCQLACE) 100 MG capsule TAKE 1 CAPSULE BY MOUTH 2 TIMES DAILY AS NEEDED FOR CONSTIPATION 8/15/17   Selma Townsend MD   fluticasone Jacolyn Krish) 50 MCG/ACT nasal spray 1 spray by Nasal route daily 8/15/17   Selma Townsend MD   Calcium Carbonate-Vitamin D (OYSTER SHELL CALCIUM/D) 500-200 MG-UNIT TABS Take 1 tablet by mouth daily Please discontinue previous prescription of calcium 6/19/17 6/19/18  Gilberto Cuevas MD   ondansetron (ZOFRAN) 4 MG tablet Take 1 tablet by mouth every 8 hours as needed for Nausea 5/9/17   Emilee Michelle MD   albuterol (PROVENTIL) (5 MG/ML) 0.5% nebulizer solution Take 0.5 mLs by nebulization every 6 hours as needed for Wheezing 1/23/17   Essie Rodas MD   sodium chloride (ALTAMIST SPRAY) 0.65 % nasal spray 1 spray by Nasal route as needed for Congestion 7/21/16   Gilberto Cuevas MD        Allergies:     Latex; Bee venom; Benadryl [diphenhydramine-zinc acetate]; Tavist; and Tylenol [acetaminophen]    Social History:     Tobacco:    reports that she has been smoking Cigarettes. She has a 7.50 pack-year smoking history. She has never used smokeless tobacco.  Alcohol:      reports that she does not drink alcohol. Drug Use:  reports that she does not use drugs. Family History:     Family History   Problem Relation Age of Onset    Heart Disease Mother     Stroke Father      cerebral aneurysm       Review of Systems:     Positive and Negative as described in HPI. CONSTITUTIONAL:  negative for fevers, chills, sweats, fatigue, weight loss  HEENT:  negative for vision, hearing changes, runny nose, throat pain  RESPIRATORY:  negative for shortness of breath, cough, congestion, wheezing. CARDIOVASCULAR:  negative for chest pain, palpitations.   GASTROINTESTINAL:  negative for nausea, vomiting, diarrhea, constipation, change in bowel habits, abdominal pain GENITOURINARY:  negative for difficulty of urination, burning with urination, frequency   INTEGUMENT:  negative for rash, skin lesions, easy bruising   HEMATOLOGIC/LYMPHATIC:  negative for swelling/edema   ALLERGIC/IMMUNOLOGIC:  negative for urticaria , itching  ENDOCRINE:  negative increase in drinking, increase in urination, hot or cold intolerance  MUSCULOSKELETAL:  negative joint pains, muscle aches, swelling of joints  NEUROLOGICAL:  negative for headaches, dizziness, lightheadedness, numbness, pain, tingling extremities  BEHAVIOR/PSYCH:  negative for depression, anxiety    Physical Exam:   /76   Pulse 92   Temp 98.5 °F (36.9 °C) (Oral)   Resp 18   Ht 4' 9\" (1.448 m)   Wt 121 lb (54.9 kg)   LMP 2012   SpO2 97%   BMI 26.18 kg/m²   Temp (24hrs), Av.4 °F (36.9 °C), Min:97.5 °F (36.4 °C), Max:100.1 °F (37.8 °C)    No results for input(s): POCGLU in the last 72 hours. Intake/Output Summary (Last 24 hours) at 10/14/17 1400  Last data filed at 10/14/17 1100   Gross per 24 hour   Intake              980 ml   Output                0 ml   Net              980 ml       General Appearance:  alert, well appearing, and in no acute distress  Mental status: oriented to person, place, and time with normal affect  Head:  normocephalic, atraumatic. Eye: no icterus, redness, pupils equal and reactive, extraocular eye movements intact, conjunctiva clear  Ear: normal external ear, no discharge, hearing intact  Nose:  no drainage noted  Mouth: mucous membranes moist  Neck: supple, no carotid bruits, thyroid not palpable  Lungs: Bilateral equal air entry, clear to ausculation, no wheezing, rales or rhonchi, normal effort  Cardiovascular: normal rate, regular rhythm, no murmur, gallop, rub.   Abdomen: Soft, nontender, nondistended, normal bowel sounds, no hepatomegaly or splenomegaly  Neurologic: There are no new focal motor or sensory deficits, normal muscle tone and bulk, no abnormal sensation, normal Bun/Cre Ratio NOT REPORTED 9 - 20    Calcium 8.9 8.6 - 10.4 mg/dL    Sodium 135 135 - 144 mmol/L    Potassium 3.4 (L) 3.7 - 5.3 mmol/L    Chloride 97 (L) 98 - 107 mmol/L    CO2 25 20 - 31 mmol/L    Anion Gap 13 9 - 17 mmol/L    GFR Non-African American >60 >60 mL/min    GFR African American >60 >60 mL/min    GFR Comment          GFR Staging NOT REPORTED    Comprehensive metabolic panel    Collection Time: 10/14/17  5:15 AM   Result Value Ref Range    Glucose 116 (H) 70 - 99 mg/dL    BUN 12 6 - 20 mg/dL    CREATININE 0.52 0.50 - 0.90 mg/dL    Bun/Cre Ratio NOT REPORTED 9 - 20    Calcium 8.7 8.6 - 10.4 mg/dL    Sodium 134 (L) 135 - 144 mmol/L    Potassium 3.5 (L) 3.7 - 5.3 mmol/L    Chloride 97 (L) 98 - 107 mmol/L    CO2 25 20 - 31 mmol/L    Anion Gap 12 9 - 17 mmol/L    Alkaline Phosphatase 309 (H) 35 - 104 U/L    ALT 7 5 - 33 U/L    AST 16 <32 U/L    Total Bilirubin 0.58 0.3 - 1.2 mg/dL    Total Protein 7.6 6.4 - 8.3 g/dL    Alb 2.1 (L) 3.5 - 5.2 g/dL    Albumin/Globulin Ratio 0.4 (L) 1.0 - 2.5    GFR Non-African American >60 >60 mL/min    GFR African American >60 >60 mL/min    GFR Comment          GFR Staging NOT REPORTED    CBC    Collection Time: 10/14/17  5:15 AM   Result Value Ref Range    WBC 16.1 (H) 3.5 - 11.0 k/uL    RBC 2.97 (L) 4.0 - 5.2 m/uL    Hemoglobin 7.9 (L) 12.0 - 16.0 g/dL    Hematocrit 25.1 (L) 36 - 46 %    MCV 84.5 80 - 100 fL    MCH 26.5 26 - 34 pg    MCHC 31.4 31 - 37 g/dL    RDW 17.7 (H) 12.5 - 15.4 %    Platelets 100 (H) 830 - 450 k/uL    MPV 7.0 6.0 - 12.0 fL   Protime-INR    Collection Time: 10/14/17  5:15 AM   Result Value Ref Range    Protime 15.2 (H) 9.4 - 12.6 sec    INR 1.4        Imaging/Diagnostics:      Assessment :      Primary Problem  Normocytic anemia    Active Hospital Problems    Diagnosis Date Noted    Acute pure red cell anemia (HCC) [D60.8] 10/14/2017    Hypertension [I10]     Anemia of chronic disease [D63.8] 08/15/2017    Osteoporosis [M81.0] 06/19/2017    COPD with acute exacerbation ([facility] [J44.1]     Depression [F32.9] 05/27/2016    Thrombocytosis ([facility] [D47.3] 03/30/2015    Normocytic anemia [D64.9] 12/18/2014    Smoking greater than 40 pack years [F17.210] 03/29/2012       Plan:     Patient status Admit as inpatient in the  Med/Surge    Acute/chronic normocytic anemia, Hb 6.9: S/P Transfusion of 2 UPRBCS, monitor Hb, Hem/Onc consult    Tobacco dependence: Tobacco cessation education     HTN: continue home meds    Depression: Continue Celexa    COPD: Continue Symbicort, discuss adding Spiriva    Osteoporosis: continue Ca, not on any bone modulator medication? ?    EPC for DVT prophylaxis    Consultations:   IP CONSULT TO HOSPITALIST  IP CONSULT TO ONCOLOGY    Patient is admitted as inpatient status because of co-morbidities listed above, severity of signs and symptoms as outlined, requirement for current medical therapies and most importantly because of direct risk to patient if care not provided in a hospital setting.     Selina Townsend MD  10/14/2017  2:00 PM    Copy sent to Dr. Loren Cuevas MD

## 2017-10-14 NOTE — PROGRESS NOTES
Physical Therapy    Facility/Department: [address] ORTHO/MED SURG  Initial Assessment    NAME: Vivi Opitz A Samples  : 1970  MRN: [id number]    Date of Service: 10/14/2017  Vivi Opitz A Samples is a 50 y.o. female who presents with Bilateral lower leg pain from the knee circumferentially all the way down. Patient states that she can barely walk. She lives at home with 3 other people. She's been complaining of some shortness of breath for 2 weeks as well with a nonproductive cough. She is a smoker, but is trying to quit. Patient Diagnosis(es): The primary encounter diagnosis was Anemia due to other cause, not classified. A diagnosis of Bilateral leg pain was also pertinent to this visit. has a past medical history of Allergic rhinitis; Anxiety; Asthma; Blood circulation, collateral; Celiac disease; Chronic back pain; Cirrhosis of liver with ascites ([address] Constipation; Cough; Cryptogenic organizing pneumonia ([address] Depression; Difficult intravenous access; Disease of blood and blood forming organ; Edentulous; Full dentures; Gastric outlet obstruction; GERD (gastroesophageal reflux disease); Hearing loss; History of blood transfusion; Hydronephrosis, bilateral; Hypertension; Hypoglycemia; Lung nodule; Migraine; OCD (obsessive compulsive disorder); Osteoarthritis; Osteoporosis; Pelvic mass; Perforated nasal septum; Pneumonia; Psoriasis; PTSD (post-traumatic stress disorder); Scoliosis; Shortness of breath; Substance abuse; Thrombocytosis ([address] Urinary incontinence; and Wears glasses. has a past surgical history that includes Tubal ligation (); Tonsillectomy and adenoidectomy (); ERCP (2013); Upper gastrointestinal endoscopy; gastrectomy (); Paracentesis (Right, 2015-2015); Cystocopy (2015); Fixation Kyphoplasty (2016); Lung biopsy; insert nasal septal prosthesis (N/A, 2017); create eardrum opening,gen anesth (N/A, 2017); Abdomen surgery (2015);  Cystoscopy for Short term goals: 10 visits  Short term goal 1: transfers with SBA  Short term goal 2: amb 150 ft with a RW x SBA  Short term goal 3: up and down 4 steps with SBA       Therapy Time   Individual Concurrent Group Co-treatment   Time In 0735         Time Out 0800         Minutes 25             [address] of [address], PT

## 2017-10-14 NOTE — CONSULTS
Inpatient consult to Oncology  Consult performed by: Luciana ANDRADE  Consult ordered by: Whit Rowe  Reason for consult: Pancytopenia                                                                  Today's Date: 10/14/2017  Patient Name: Vivi Opitz A Samples  Date of admission: 10/13/2017  1:26 PM  Patient's age: 50 y.o., 1970  Admission Dx: Profound anemia [D64.9]      Requesting Physician: Rosendo Cota MD   Reason for consult: Profound anemia    CHIEF COMPLAINT:  Profound anemia    History Obtained From:  Chart and patient    HISTORY OF PRESENT ILLNESS:      The patient is admitted with progressive weakness and was found to be severely anemic with hemoglobin under 7. She received blood transfusion. She is known to us with recurrent masses as well as severe ongoing anemia. She was managed previously by supportive care and her hemoglobin has been stable. She has elevated ferritin but This thought to be reactive. BRIEF CASE HISTORY:  This is a patient know to us, she follows at the Heme-Onc clinic. She was originally seen for ascites in the setting of a lung nodule and a gastric mass. Paracentesis followed by pathology found the ascitic fluid to be non- malignant. Similarly the resected abdominal mass and the biopsied lung mass were also found to be non malignant. The patient has required multiple paracentesis as the ascites continues to recur. Each time pathology was negative for malignancy and there were no findings consistent with any particular etiology. There was a concern about tuberculous ascites but the results were also negative in this case. The patient later developed an ovarian cystic mass which also appears to be benign. She is following with Dr Selma Pereyra for possible resection of that mass.  Since the onset of these medical problems the patient has been followed by a multidisciplinary team of specialists who despite repeated investigative measures have not been able to determine an 100 mL IVPB  1 g Intravenous PRN Andrew Harvey MD        acetaminophen (TYLENOL) tablet 650 mg  650 mg Oral Q4H PRN Andrew Harvey MD        HYDROcodone-acetaminophen (NORCO) 5-325 MG per tablet 1 tablet  1 tablet Oral Q4H PRN Andrew Harvey MD   1 tablet at 10/14/17 1103    morphine injection 2 mg  2 mg Intravenous Q2H PRN Andrew Harvey MD        Or    morphine (PF) injection 4 mg  4 mg Intravenous Q2H PRN Andrew Harvey MD        magnesium hydroxide (MILK OF MAGNESIA) 400 MG/5ML suspension 30 mL  30 mL Oral Daily PRN Andrew Harvey MD        bisacodyl (DULCOLAX) suppository 10 mg  10 mg Rectal Daily PRN Andrew Harvey MD        ondansetron (ZOFRAN) injection 4 mg  4 mg Intravenous Q6H PRN Andrew Harvey MD         Facility-Administered Medications Ordered in Other Encounters   Medication Dose Route Frequency Provider Last Rate Last Dose    lactated ringers infusion   Intravenous Continuous Zena Guillory MD 0 mL/hr at 12/01/15 1220      meperidine (DEMEROL) injection 12.5 mg  12.5 mg Intravenous Q5 Min PRN Joel Gutierres MD        fentaNYL (SUBLIMAZE) injection 50 mcg  50 mcg Intravenous Q5 Min PRN Joel Gutierres MD           Allergies:  Latex; Bee venom; Benadryl [diphenhydramine-zinc acetate]; Tavist; and Tylenol [acetaminophen]    REVIEW OF SYSTEMS:      Constitutional:  No fever or chills. No night sweats, no weight loss  Eyes:  No eye discharge, double vision, or eye pain   HEENT:  negative for  hearing loss, tinnitus, ear drainage, earaches,  epistaxis,  sore mouth, sore throat, hoarseness and voice change  Respiratory:  Intermittent productive cough, clear sputum, negative for  dyspnea, wheezing, hemoptysis, chest pain  Cardiovascular:  negative for  chest pain, dyspnea, palpitations, orthopnea, PND,   edema, syncope  Gastrointestinal:  Nausea vomiting and abdominal pain have subsided.   Genitourinary:  negative for frequency, dysuria, nocturia, urinary incontinence,  and hematuria  Integument/Breast: negative for rash, skin lesions, bruises. Hematologic/Lymphatic:  negative for easy bruising, bleeding, lymphadenopathy, petechiae and swelling/edema  Allergic/Immunologic:  negative for recurrent infections, urticaria, hay fever, angioedema, anaphylaxis   Endocrine:  negative for heat or cold intolerance, tremor, weight changes, change in bowel habits and hair loss  Musculoskeletal:  negative for  myalgias, arthralgias, pain, joint swelling,and bone pain  Neurological:  negative for headaches, dizziness, seizures, weakness, numbness, syncope, near syncope, pain and tingling  Behavior/Psych:  negative for anxiety        PHYSICAL EXAM:      /76   Pulse 92   Temp 98.5 °F (36.9 °C) (Oral)   Resp 18   Ht 4' 9\" (1.448 m)   Wt 121 lb (54.9 kg)   LMP 2012   SpO2 97%   BMI 26.18 kg/m²    Temp (24hrs), Av.4 °F (36.9 °C), Min:97.5 °F (36.4 °C), Max:100.1 °F (37.8 °C)      General appearance - tired  appearing,  In no pain or acute  distress  Mental status - good mood, alert and oriented  Eyes - pupils equal and reactive, extraocular eye movements intact  Ears - bilateral TM's and external ear canals normal  Nose - normal and patent, no erythema, discharge or polyps  Mouth - mucous membranes moist, pharynx normal without lesions  Neck - supple, no significant adenopathy  Lymphatics - no palpable lymphadenopathy, no hepatosplenomegaly  Chest - clear to auscultation, no wheezes, rales or rhonchi, symmetric air entry  Heart - normal rate, regular rhythm, normal S1, S2, no murmurs, rubs, clicks or gallops  Abdomen - no ascites appreciated. +BS.  No tenderness appreciated   Neurological - alert, oriented, normal speech, no focal findings or movement disorder noted  Musculoskeletal - no joint tenderness, deformity or swelling  Extremities - peripheral pulses normal, no pedal edema, no clubbing or cyanosis  Skin - normal coloration and turgor, no rashes, no suspicious skin lesions noted     DATA:      Labs:

## 2017-10-14 NOTE — PROGRESS NOTES
[facility]) -    Occupational Therapy Evaluation  Date: 10/14/17  Patient Name: Heather Law       Room: 0206/3090-92  MRN: [id number]  Account: [de-identified]   : 1970  (46 y.o.) Gender: female        Diagnosis: anemia, h/o microcytic anemia on iron, who presented to ED with LE weakness, BLE pain and legs give out,  and found to have Hgb of 6 (way below baseline). Admitted for transfusion, possible hemochromatosis  Additional Pertinent Hx: hgn today 7.9   she follows at the Heme-Onc clinic. She was originally seen for ascites in the setting of a lung nodule and a gastric mass. Paracentesis followed by pathology found the ascitic fluid to be non- malignant. Similarly the resected abdominal mass and the biopsied lung mass were also found to be non malignant. The patient has required multiple paracentesis as the ascites continues to recur. Each time pathology was negative for malignancy and there were no findings consistent with any particular etiology. There was a concern about tuberculous ascites but the results were also negative in this case. The patient later developed an ovarian cystic mass which also appears to be benign. She is following with Dr Svetlana Chadwick for possible resection of that mass. Since the onset of these medical problems the patient has been followed by a multidisciplinary team of specialists who despite repeated investigative measures have not been able to determine an etiology. Past Medical History:  has a past medical history of Allergic rhinitis; Anxiety; Asthma; Blood circulation, collateral; Celiac disease; Chronic back pain; Cirrhosis of liver with ascites ([address] Constipation; Cough; Cryptogenic organizing pneumonia ([address] Depression; Difficult intravenous access; Disease of blood and blood forming organ; Edentulous; Full dentures; Gastric outlet obstruction; GERD (gastroesophageal reflux disease); Hearing loss; History of blood transfusion;  Hydronephrosis, bilateral; Training, Endurance Training, Self-Care / ADL, Safety Education & Training, Home Management Training    OT Equipment Recommendations  Equipment Needed: No  OT Individual Minutes  Time In: 6060  Time Out: 1688  Minutes: 44    Electronically signed by Massimo Lui OT on 10/14/17 at 3:26 PM

## 2017-10-14 NOTE — PLAN OF CARE
Problem: Pain:  Goal: Pain level will decrease  Pain level will decrease to a 3 on a scale of 0-10 per pt. goal   Outcome: Met This Shift    Goal: Control of acute pain  Control of acute pain   Outcome: Met This Shift    Goal: Control of chronic pain  Control of chronic pain   Outcome: Met This Shift      Problem: Fluid Volume - Imbalance:  Goal: Absence of imbalanced fluid volume signs and symptoms  Absence of imbalanced fluid volume signs and symptoms   Outcome: Ongoing      Problem: Falls - Risk of  Goal: Absence of falls  Outcome: Met This Shift

## 2017-10-14 NOTE — PLAN OF CARE
ATELECTASIS     [x]  PREVENT ATELECTASIS  [x]   ASSESS BREATH SOUNDS  [x]   IMPLEMENT HYPERINFLATION PROTOCOL  []  INCENTIVE SPIROMETRY INSTRUCTION  [x]   PATIENT EDUCATION AS NEEDED

## 2017-10-15 LAB
ABSOLUTE EOS #: 0.1 K/UL (ref 0–0.4)
ABSOLUTE LYMPH #: 1.7 K/UL (ref 1–4.8)
ABSOLUTE MONO #: 1.1 K/UL (ref 0.1–1.2)
ANION GAP SERPL CALCULATED.3IONS-SCNC: 12 MMOL/L (ref 9–17)
BASOPHILS # BLD: 0 %
BASOPHILS ABSOLUTE: 0 K/UL (ref 0–0.2)
BUN BLDV-MCNC: 11 MG/DL (ref 6–20)
BUN/CREAT BLD: ABNORMAL (ref 9–20)
CALCIUM SERPL-MCNC: 9.2 MG/DL (ref 8.6–10.4)
CHLORIDE BLD-SCNC: 97 MMOL/L (ref 98–107)
CO2: 24 MMOL/L (ref 20–31)
CREAT SERPL-MCNC: 0.48 MG/DL (ref 0.5–0.9)
DIFFERENTIAL TYPE: ABNORMAL
EOSINOPHILS RELATIVE PERCENT: 1 %
FERRITIN: 3614 UG/L (ref 13–150)
GFR AFRICAN AMERICAN: >60 ML/MIN
GFR NON-AFRICAN AMERICAN: >60 ML/MIN
GFR SERPL CREATININE-BSD FRML MDRD: ABNORMAL ML/MIN/{1.73_M2}
GFR SERPL CREATININE-BSD FRML MDRD: ABNORMAL ML/MIN/{1.73_M2}
GLUCOSE BLD-MCNC: 89 MG/DL (ref 70–99)
HCT VFR BLD CALC: 24.7 % (ref 36–46)
HEMOGLOBIN: 7.7 G/DL (ref 12–16)
IRON SATURATION: 13 % (ref 20–55)
IRON: 14 UG/DL (ref 37–145)
LYMPHOCYTES # BLD: 9 %
MCH RBC QN AUTO: 26.5 PG (ref 26–34)
MCHC RBC AUTO-ENTMCNC: 31.2 G/DL (ref 31–37)
MCV RBC AUTO: 84.9 FL (ref 80–100)
MONOCYTES # BLD: 6 %
PDW BLD-RTO: 18.3 % (ref 12.5–15.4)
PLATELET # BLD: 759 K/UL (ref 140–450)
PLATELET ESTIMATE: ABNORMAL
PMV BLD AUTO: 7 FL (ref 6–12)
POTASSIUM SERPL-SCNC: 4.8 MMOL/L (ref 3.7–5.3)
RBC # BLD: 2.91 M/UL (ref 4–5.2)
RBC # BLD: ABNORMAL 10*6/UL
SEG NEUTROPHILS: 84 %
SEGMENTED NEUTROPHILS ABSOLUTE COUNT: 16.5 K/UL (ref 1.8–7.7)
SODIUM BLD-SCNC: 133 MMOL/L (ref 135–144)
TOTAL IRON BINDING CAPACITY: 109 UG/DL (ref 250–450)
UNSATURATED IRON BINDING CAPACITY: 95 UG/DL (ref 112–347)
WBC # BLD: 19.5 K/UL (ref 3.5–11)
WBC # BLD: ABNORMAL 10*3/UL

## 2017-10-15 PROCEDURE — 82728 ASSAY OF FERRITIN: CPT

## 2017-10-15 PROCEDURE — 99232 SBSQ HOSP IP/OBS MODERATE 35: CPT | Performed by: FAMILY MEDICINE

## 2017-10-15 PROCEDURE — 80048 BASIC METABOLIC PNL TOTAL CA: CPT

## 2017-10-15 PROCEDURE — G0378 HOSPITAL OBSERVATION PER HR: HCPCS

## 2017-10-15 PROCEDURE — 1200000000 HC SEMI PRIVATE

## 2017-10-15 PROCEDURE — 99232 SBSQ HOSP IP/OBS MODERATE 35: CPT | Performed by: INTERNAL MEDICINE

## 2017-10-15 PROCEDURE — 6370000000 HC RX 637 (ALT 250 FOR IP): Performed by: INTERNAL MEDICINE

## 2017-10-15 PROCEDURE — 36415 COLL VENOUS BLD VENIPUNCTURE: CPT

## 2017-10-15 PROCEDURE — 83550 IRON BINDING TEST: CPT

## 2017-10-15 PROCEDURE — 2580000003 HC RX 258: Performed by: PHYSICIAN ASSISTANT

## 2017-10-15 PROCEDURE — 94640 AIRWAY INHALATION TREATMENT: CPT

## 2017-10-15 PROCEDURE — 83540 ASSAY OF IRON: CPT

## 2017-10-15 PROCEDURE — 85025 COMPLETE CBC W/AUTO DIFF WBC: CPT

## 2017-10-15 PROCEDURE — 6370000000 HC RX 637 (ALT 250 FOR IP): Performed by: FAMILY MEDICINE

## 2017-10-15 RX ADMIN — HYDROCODONE BITARTRATE AND ACETAMINOPHEN 1 TABLET: 5; 325 TABLET ORAL at 10:33

## 2017-10-15 RX ADMIN — FERROUS SULFATE TAB EC 325 MG (65 MG FE EQUIVALENT) 325 MG: 325 (65 FE) TABLET DELAYED RESPONSE at 10:32

## 2017-10-15 RX ADMIN — CITALOPRAM 40 MG: 20 TABLET, FILM COATED ORAL at 10:31

## 2017-10-15 RX ADMIN — FLUTICASONE PROPIONATE 1 SPRAY: 50 SPRAY, METERED NASAL at 10:32

## 2017-10-15 RX ADMIN — HYDROCODONE BITARTRATE AND ACETAMINOPHEN 1 TABLET: 5; 325 TABLET ORAL at 19:18

## 2017-10-15 RX ADMIN — HYDROCODONE BITARTRATE AND ACETAMINOPHEN 1 TABLET: 5; 325 TABLET ORAL at 23:48

## 2017-10-15 RX ADMIN — Medication 1 TABLET: at 10:31

## 2017-10-15 RX ADMIN — POLYETHYLENE GLYCOL 3350 17 G: 17 POWDER, FOR SOLUTION ORAL at 10:33

## 2017-10-15 RX ADMIN — FOLIC ACID 1 MG: 1 TABLET ORAL at 10:32

## 2017-10-15 RX ADMIN — Medication 10 ML: at 09:00

## 2017-10-15 RX ADMIN — HYDROCODONE BITARTRATE AND ACETAMINOPHEN 1 TABLET: 5; 325 TABLET ORAL at 05:21

## 2017-10-15 RX ADMIN — HYDROCODONE BITARTRATE AND ACETAMINOPHEN 1 TABLET: 5; 325 TABLET ORAL at 15:07

## 2017-10-15 RX ADMIN — MOMETASONE FUROATE AND FORMOTEROL FUMARATE DIHYDRATE 2 PUFF: 200; 5 AEROSOL RESPIRATORY (INHALATION) at 07:37

## 2017-10-15 ASSESSMENT — PAIN SCALES - GENERAL
PAINLEVEL_OUTOF10: 7
PAINLEVEL_OUTOF10: 5
PAINLEVEL_OUTOF10: 8
PAINLEVEL_OUTOF10: 0
PAINLEVEL_OUTOF10: 8
PAINLEVEL_OUTOF10: 8
PAINLEVEL_OUTOF10: 5
PAINLEVEL_OUTOF10: 8
PAINLEVEL_OUTOF10: 3

## 2017-10-15 NOTE — PLAN OF CARE
[facility]   [address]    Second Visit Note  For more detailed information please refer to the progress note of the day      10/15/2017    5:40 PM    Name:   Heather Law  MRN:     [id number]     Acct:      [de-identified]   Room:   [facility]2833-51   Day:  2  Admit Date:  10/13/2017  1:26 PM    PCP:   Gilberto Cuevas MD  Code Status:  Full Code        Pt vitals were reviewed   New labs were reviewed   Patient was seen, still has some LE pain  Hem onc note reviewed, ok to D/C    Updated plan :     1. Plan to D/C in am        Elyse Jacobson MD  10/15/2017  5:40 PM

## 2017-10-15 NOTE — PROGRESS NOTES
regular rhythm, normal S1, S2, no murmurs  Abdomen - soft, nontendemildly distended. Neurological - alert, oriented, normal speech, no focal findings or movement disorder noted  Extremities - peripheral pulses normal, no pedal edema, no clubbing or cyanosis  Skin - normal coloration and turgor, no rashes, no suspicious skin lesions noted         Data:    No intake/output data recorded. No intake/output data recorded. CBC:   Recent Labs      10/13/17   1433  10/14/17   0515  10/15/17   0445   WBC  19.3*  16.1*  19.5*   HGB  6.4*  7.9*  7.7*   PLT  843*  815*  759*     BMP:    Recent Labs      10/13/17   1620  10/14/17   0515  10/15/17   0445   NA  135  134*  133*   K  3.4*  3.5*  4.8   CL  97*  97*  97*   CO2  25  25  24   BUN  12  12  11   CREATININE  0.51  0.52  0.48*   GLUCOSE  79  116*  89     Hepatic:   Recent Labs      10/14/17   0515   AST  16   ALT  7   BILITOT  0.58   ALKPHOS  309*     INR:   Recent Labs      10/14/17   0515   INR  1.4     PTT:No results for input(s): PTT in the last 72 hours.           Problem Lists:   Primary Problem:  Normocytic anemia   Current Problems:  Active Hospital Problems    Diagnosis Date Noted    Acute pure red cell anemia (HCC) [D60.8] 10/14/2017    Hypertension [I10]     Bilateral leg pain [M79.604, M79.605]     Absolute anemia [D64.9] 08/15/2017    Osteoporosis [M81.0] 06/19/2017    COPD with acute exacerbation ([address] [J44.1]     Depression [F32.9] 05/27/2016    Thrombocytosis ([address] [D47.3] 03/30/2015    Normocytic anemia [D64.9] 12/18/2014    Smoking greater than 40 pack years [F17.210] 03/29/2012     PMH:  Past Medical History:   Diagnosis Date    Allergic rhinitis     Anxiety     Asthma DX PRIOR TO 1995    NO INHALER USE ONLY HAS TROUBLE IN EXTREME HOT OR COLD WEATHER    Blood circulation, collateral     Celiac disease     possible    Chronic back pain     low back pain     Cirrhosis of liver with ascites ([address] 3/30/2015    Constipation     CHRONIC outpatient             RAJIV SANCHEZ [facility] MD [place]  Hematologist/Medical Oncologist  [facility]  Cell: [phone]

## 2017-10-16 ENCOUNTER — APPOINTMENT (OUTPATIENT)
Dept: CT IMAGING | Age: 47
DRG: 663 | End: 2017-10-16
Payer: MEDICAID

## 2017-10-16 VITALS
OXYGEN SATURATION: 97 % | SYSTOLIC BLOOD PRESSURE: 112 MMHG | HEIGHT: 57 IN | WEIGHT: 121 LBS | RESPIRATION RATE: 18 BRPM | TEMPERATURE: 98.2 F | BODY MASS INDEX: 26.1 KG/M2 | HEART RATE: 90 BPM | DIASTOLIC BLOOD PRESSURE: 72 MMHG

## 2017-10-16 LAB
ABSOLUTE EOS #: 0.1 K/UL (ref 0–0.4)
ABSOLUTE LYMPH #: 1.5 K/UL (ref 1–4.8)
ABSOLUTE MONO #: 1.2 K/UL (ref 0.1–1.2)
ANION GAP SERPL CALCULATED.3IONS-SCNC: 14 MMOL/L (ref 9–17)
BASOPHILS # BLD: 0 %
BASOPHILS ABSOLUTE: 0 K/UL (ref 0–0.2)
BUN BLDV-MCNC: 11 MG/DL (ref 6–20)
BUN/CREAT BLD: ABNORMAL (ref 9–20)
CALCIUM SERPL-MCNC: 9.4 MG/DL (ref 8.6–10.4)
CHLORIDE BLD-SCNC: 97 MMOL/L (ref 98–107)
CO2: 20 MMOL/L (ref 20–31)
CREAT SERPL-MCNC: 0.55 MG/DL (ref 0.5–0.9)
DIFFERENTIAL TYPE: ABNORMAL
EOSINOPHILS RELATIVE PERCENT: 1 %
GFR AFRICAN AMERICAN: >60 ML/MIN
GFR NON-AFRICAN AMERICAN: >60 ML/MIN
GFR SERPL CREATININE-BSD FRML MDRD: ABNORMAL ML/MIN/{1.73_M2}
GFR SERPL CREATININE-BSD FRML MDRD: ABNORMAL ML/MIN/{1.73_M2}
GLUCOSE BLD-MCNC: 89 MG/DL (ref 70–99)
HCT VFR BLD CALC: 28.3 % (ref 36–46)
HEMOGLOBIN: 8.7 G/DL (ref 12–16)
LYMPHOCYTES # BLD: 8 %
MCH RBC QN AUTO: 26.4 PG (ref 26–34)
MCHC RBC AUTO-ENTMCNC: 30.6 G/DL (ref 31–37)
MCV RBC AUTO: 86.1 FL (ref 80–100)
MONOCYTES # BLD: 7 %
PDW BLD-RTO: 18.1 % (ref 12.5–15.4)
PLATELET # BLD: 624 K/UL (ref 140–450)
PLATELET ESTIMATE: ABNORMAL
PMV BLD AUTO: 7.2 FL (ref 6–12)
POTASSIUM SERPL-SCNC: 5.3 MMOL/L (ref 3.7–5.3)
RBC # BLD: 3.28 M/UL (ref 4–5.2)
RBC # BLD: ABNORMAL 10*6/UL
SEG NEUTROPHILS: 84 %
SEGMENTED NEUTROPHILS ABSOLUTE COUNT: 15.3 K/UL (ref 1.8–7.7)
SODIUM BLD-SCNC: 131 MMOL/L (ref 135–144)
WBC # BLD: 18.2 K/UL (ref 3.5–11)
WBC # BLD: ABNORMAL 10*3/UL

## 2017-10-16 PROCEDURE — 71260 CT THORAX DX C+: CPT

## 2017-10-16 PROCEDURE — 6370000000 HC RX 637 (ALT 250 FOR IP): Performed by: INTERNAL MEDICINE

## 2017-10-16 PROCEDURE — 99239 HOSP IP/OBS DSCHRG MGMT >30: CPT | Performed by: FAMILY MEDICINE

## 2017-10-16 PROCEDURE — 2580000003 HC RX 258: Performed by: INTERNAL MEDICINE

## 2017-10-16 PROCEDURE — 36415 COLL VENOUS BLD VENIPUNCTURE: CPT

## 2017-10-16 PROCEDURE — 6360000004 HC RX CONTRAST MEDICATION: Performed by: FAMILY MEDICINE

## 2017-10-16 PROCEDURE — 97110 THERAPEUTIC EXERCISES: CPT

## 2017-10-16 PROCEDURE — G0378 HOSPITAL OBSERVATION PER HR: HCPCS

## 2017-10-16 PROCEDURE — 94640 AIRWAY INHALATION TREATMENT: CPT

## 2017-10-16 PROCEDURE — 85025 COMPLETE CBC W/AUTO DIFF WBC: CPT

## 2017-10-16 PROCEDURE — 99231 SBSQ HOSP IP/OBS SF/LOW 25: CPT | Performed by: INTERNAL MEDICINE

## 2017-10-16 PROCEDURE — 97116 GAIT TRAINING THERAPY: CPT

## 2017-10-16 PROCEDURE — 94642 AEROSOL INHALATION TREATMENT: CPT

## 2017-10-16 PROCEDURE — 80048 BASIC METABOLIC PNL TOTAL CA: CPT

## 2017-10-16 RX ORDER — ALENDRONATE SODIUM 70 MG/1
70 TABLET ORAL
Qty: 4 TABLET | Refills: 3 | Status: CANCELLED | OUTPATIENT
Start: 2017-10-16

## 2017-10-16 RX ADMIN — IOPAMIDOL 75 ML: 755 INJECTION, SOLUTION INTRAVENOUS at 10:14

## 2017-10-16 RX ADMIN — HYDROCODONE BITARTRATE AND ACETAMINOPHEN 1 TABLET: 5; 325 TABLET ORAL at 04:05

## 2017-10-16 RX ADMIN — Medication 1 TABLET: at 08:19

## 2017-10-16 RX ADMIN — CITALOPRAM 40 MG: 20 TABLET, FILM COATED ORAL at 08:19

## 2017-10-16 RX ADMIN — HYDROCODONE BITARTRATE AND ACETAMINOPHEN 1 TABLET: 5; 325 TABLET ORAL at 12:13

## 2017-10-16 RX ADMIN — MOMETASONE FUROATE AND FORMOTEROL FUMARATE DIHYDRATE 2 PUFF: 200; 5 AEROSOL RESPIRATORY (INHALATION) at 10:38

## 2017-10-16 RX ADMIN — FERROUS SULFATE TAB EC 325 MG (65 MG FE EQUIVALENT) 325 MG: 325 (65 FE) TABLET DELAYED RESPONSE at 08:19

## 2017-10-16 RX ADMIN — HYDROCODONE BITARTRATE AND ACETAMINOPHEN 1 TABLET: 5; 325 TABLET ORAL at 08:24

## 2017-10-16 RX ADMIN — Medication 10 ML: at 08:20

## 2017-10-16 RX ADMIN — HYDROCODONE BITARTRATE AND ACETAMINOPHEN 1 TABLET: 5; 325 TABLET ORAL at 16:30

## 2017-10-16 RX ADMIN — FLUTICASONE PROPIONATE 1 SPRAY: 50 SPRAY, METERED NASAL at 08:19

## 2017-10-16 RX ADMIN — FOLIC ACID 1 MG: 1 TABLET ORAL at 08:19

## 2017-10-16 ASSESSMENT — PAIN DESCRIPTION - ORIENTATION: ORIENTATION: RIGHT;LEFT

## 2017-10-16 ASSESSMENT — PAIN SCALES - GENERAL
PAINLEVEL_OUTOF10: 7
PAINLEVEL_OUTOF10: 7
PAINLEVEL_OUTOF10: 3
PAINLEVEL_OUTOF10: 2
PAINLEVEL_OUTOF10: 4
PAINLEVEL_OUTOF10: 3
PAINLEVEL_OUTOF10: 4
PAINLEVEL_OUTOF10: 0

## 2017-10-16 ASSESSMENT — PAIN DESCRIPTION - DESCRIPTORS: DESCRIPTORS: ACHING

## 2017-10-16 ASSESSMENT — PAIN DESCRIPTION - FREQUENCY: FREQUENCY: INTERMITTENT

## 2017-10-16 ASSESSMENT — PAIN DESCRIPTION - LOCATION: LOCATION: LEG

## 2017-10-16 ASSESSMENT — PAIN DESCRIPTION - PAIN TYPE: TYPE: ACUTE PAIN

## 2017-10-16 NOTE — PROGRESS NOTES
Date:                           10/16/2017  Patient name:           Antonia Law  Date of admission:  10/13/2017  1:26 PM  MRN:   [id number]  YOB: 1970  PCP:                           Bronwyn Miller MD        Reason for consult: Anemia      Pt feeling well this morning  Not c/o any chest pain or breathing issues  C/o bilateral leg pain  No fever or chills  No night sweats  No itching  Able to tolerate food, no nausea or vomiting    REVIEW OF SYSTEMS:  General: no fever or night sweats, chronic fatigue   ENT: No double or blurred vision, no tinnitus or hearing problem, no dysphagia or sore throat   Respiratory: No chest pain, no shortness of breath, no cough or hemoptysis. Cardiovascular: Denies chest pain, PND or orthopnea. No L E swelling or palpitations. Gastrointestinal:    No nausea or vomiting,  chronic abdominal discomfort, no bleeding    Genitourinary: Denies dysuria, hematuria, frequency, urgency or incontinence. Neurological: Denies headaches, decreased LOC, no sensory or motor focal deficits. Musculoskeletal:  No arthralgia no back pain or joint swelling. Skin: There are no rashes or bleeding. Psychiatric:  No anxiety, no depression. Endocrine: no diabetes or thyroid disease. Hematologic: no bleeding , no adenopathy.         Objective:     Vitals: /72   Pulse 90   Temp 98.2 °F (36.8 °C) (Oral)   Resp 18   Ht 4' 9\" (1.448 m)   Wt 121 lb (54.9 kg)   LMP 04/18/2012   SpO2 97%   BMI 26.18 kg/m²   General appearance -  ill-appearing, pale, fatigued  mental status - AAO X3  Eyes - pupils equal and reactive, extraocular eye movements intact  Mouth - mucous membranes moist, pharynx normal without lesions  Neck - supple, no significant adenopathy  Lymphatics - no palpable lymphadenopathy, no hepatosplenomegaly  Chest - clear to auscultation, no wheezes, rales or rhonchi, symmetric air entry  Heart - normal rate, regular rhythm, normal S1, S2, no murmurs  Abdomen - soft, nontendemildly distended. Neurological - alert, oriented, normal speech, no focal findings or movement disorder noted  Extremities - peripheral pulses normal, no pedal edema, no clubbing or cyanosis  Skin - normal coloration and turgor, no rashes, no suspicious skin lesions noted         Data:    No intake/output data recorded. No intake/output data recorded. CBC:   Recent Labs      10/14/17   0515  10/15/17   0445  10/16/17   0640   WBC  16.1*  19.5*  18.2*   HGB  7.9*  7.7*  8.7*   PLT  815*  759*  624*     BMP:    Recent Labs      10/14/17   0515  10/15/17   0445  10/16/17   0603   NA  134*  133*  131*   K  3.5*  4.8  5.3   CL  97*  97*  97*   CO2  25  24  20   BUN  12  11  11   CREATININE  0.52  0.48*  0.55   GLUCOSE  116*  89  89     Hepatic:   Recent Labs      10/14/17   0515   AST  16   ALT  7   BILITOT  0.58   ALKPHOS  309*     INR:   Recent Labs      10/14/17   0515   INR  1.4     PTT:No results for input(s): PTT in the last 72 hours.           Problem Lists:   Primary Problem:  Normocytic anemia   Current Problems:  Active Hospital Problems    Diagnosis Date Noted    Acute pure red cell anemia (HCC) [D60.8] 10/14/2017    Hypertension [I10]     Bilateral leg pain [M79.604, M79.605]     Absolute anemia [D64.9] 08/15/2017    Osteoporosis [M81.0] 06/19/2017    COPD with acute exacerbation ([address] [J44.1]     Depression [F32.9] 05/27/2016    Thrombocytosis ([address] [D47.3] 03/30/2015    Normocytic anemia [D64.9] 12/18/2014    Smoking greater than 40 pack years [F17.210] 03/29/2012     PMH:  Past Medical History:   Diagnosis Date    Allergic rhinitis     Anxiety     Asthma DX PRIOR TO 1995    NO INHALER USE ONLY HAS TROUBLE IN EXTREME HOT OR COLD WEATHER    Blood circulation, collateral     Celiac disease     possible    Chronic back pain     low back pain     Cirrhosis of liver with ascites ([address] 3/30/2015    Constipation     CHRONIC    Cough 11/23/2016    Cryptogenic organizing pneumonia ([facility] 11/23/2016    Depression 12/13/2012    on celexa per pcp    Difficult intravenous access     STATES LT ARM EASIER TO START IN, ENCOURAGED TO HYDRATE DAY PROIR     Disease of blood and blood forming organ     Thrombocytosis    Edentulous     does not wear her dentures    Full dentures     ENCOURAGED TO WEAR DOS    Gastric outlet obstruction 8/30/2017    GERD (gastroesophageal reflux disease)     Hearing loss     left ear    History of blood transfusion 01/2012    Hydronephrosis, bilateral 5/2/2015    Hypertension     Hypoglycemia 2014    Lung nodule     right, benign    Migraine     OCD (obsessive compulsive disorder)     Osteoarthritis     Osteoporosis     Pelvic mass April 2015    benign     Perforated nasal septum     Pneumonia     Psoriasis     PTSD (post-traumatic stress disorder) 1985    [facility]    Scoliosis     Shortness of breath 11/23/2016    with temp. change    Substance abuse 1987    [facility]    Thrombocytosis ([facility]     Urinary incontinence     PT STATES RESOLVED    Wears glasses       Allergies: Allergies   Allergen Reactions    Latex Dermatitis    Bee Venom Anaphylaxis    Benadryl [Diphenhydramine-Zinc Acetate] Anaphylaxis    Tavist Anaphylaxis     Quits  Breathing  And  Low   Heart  beat    Tylenol [Acetaminophen] Other (See Comments)     Liver problems        Assessment    1. Acute on chronic anemia   2. 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 3 . Iron saturation is low, suggesting anemia of chronic illness complicated by acute illness. 4.  recurrent granulomatous lesions, no malignancy appreciated         Plan     1. Continue current care  2. Okay to discharge for my standpoint  3. Needs a follow-up CBC next week and follow up in hematology clinic next week  4. Hereditary hemochromatosis needs to be excluded  5.  We will discuss iron chelation as an outpatient depending on her iron studies and ferritin as an outpatient     Electronically signed by Racquel Churchill MD on 10/16/2017 at 10:34 AM  Attending Physician Statement  The patient was seen and examined during rounds, I have discussed the care of Paula Law, including pertinent history and exam findings with the resident. I have reviewed the key elements of all parts of the encounter with the resident. I agree with the assessment, and status of the problem list as documented.    Additional assessment/ plan        Brittny Hsu MD  Hematology Oncology  [phone]  Electronically signed by Geraldo Hall MD on 10/17/2017 at 8:06 AM

## 2017-10-16 NOTE — PLAN OF CARE
Problem: Respiratory  Intervention: Respiratory assessment  BRONCHOSPASM/BRONCHOCONSTRICTION     [x]         IMPROVE AERATION/BREATH SOUNDS  [x]   ADMINISTER BRONCHODILATOR THERAPY AS APPROPRIATE  [x]   ASSESS BREATH SOUNDS  []   IMPLEMENT AEROSOL/MDI PROTOCOL  [x]   PATIENT EDUCATION AS NEEDED

## 2017-10-16 NOTE — PROGRESS NOTES
Bilateral  Device: No Device  Assistance: Contact guard assistance  Comment: Lightheaded complaints during stair negotiation. Balance  Posture: Good  Sitting - Static: Good  Sitting - Dynamic: Good  Standing - Static: Fair;+  Standing - Dynamic: Fair;+  Comments: standing balance with RW       Exercises:    Seated LE exercise program: Long Arc Quads, hip abduction/adduction, heel/toe raises, and marches. Reps: 15 with one pound weights    Upper extremity exercises: Bicep curl, shoulder flexion/extension, punches, tricep curl, shoulder abduction/adduction. Reps: 20 with one pound weights   Standing ball toss at trampoline x15 reps CGA for balance   UBE seated x5 minutes   Cone finding x6 cones with reaching and bending to obtain. CGA for balance. Assessment   Body structures, Functions, Activity limitations: Decreased functional mobility ; Decreased strength;Decreased endurance;Decreased balance  Assessment: Pt with noted impaired endurance this date. Pt able to ambulate 100ft CGA with RW. Pt to return home with family assist at discharge. Prognosis: Good  Patient Education: Educated on importance of mobility  Barriers to Learning: None  REQUIRES PT FOLLOW UP: Yes  Activity Tolerance  Activity Tolerance: Patient limited by fatigue       Discharge Recommendations:  Home with assist PRN        Goals  Short term goals  Time Frame for Short term goals: 10 visits  Short term goal 1: transfers with SBA  Short term goal 2: amb 150 ft with a RW x SBA  Short term goal 3: up and down 4 steps with SBA    Plan    Plan  Times per week: 5-6x wk  Times per day: Daily  Current Treatment Recommendations: Strengthening, Functional Mobility Training, Gait Training, Safety Education & Training, Endurance Training, Stair training, Balance Training, Home Exercise Program, Patient/Caregiver Education & Training  Safety Devices  Type of devices:  All fall risk precautions in place, Gait belt  Restraints  Initially in place: No

## 2017-10-16 NOTE — DISCHARGE SUMMARY
has history of lung mass that was biopsied with biopsy results negative for malignancy or TB, had also gastric mass with resection that showed inflammatory response rather than malignancy. Currently she denies any chest pain, sob, nausea, vomiting, fever or chills     10/13: trasfused 2 UPRBCS  10/16: CT chest showing new nodules in RACHELLE and LLL    Significant therapeutic interventions:   Acute/chronic normocytic anemia : S/P Transfusion of 2 UPRBCS,  Hb today is 7.7, ferritin is 3,600 ( picture of iron overload vs acute Phase reactant ), will add iron studies ,  Thoughts of possible Hemochromatosis per Hem/Onc notes. Ok to D/C per hem/onc     Leukocytosis: chronic, leukemia ruled out.     Thrombocytosis: Chronic     Lung mass: chronic, biopsy negative for malignancy. Ordered f/u CT chest ( results detailed below)     Tobacco dependence: Tobacco cessation education , nicotine patch     HTN: continue home meds     Depression: Continue Celexa     COPD: Continue Symbicort, discuss adding Spiriva     Osteoporosis: continue Ca, not on any bone modulator medication? ? Discuss with PCP       Patient was updated regarding her CT chest finding, she is aware that there are new facility developing in the left upper lobe and left lower lobe of her lung, for her per documentation the patient has a follow-up appointment with Dr. Eloisa Archer on October 20, 2017. Also  Has a f/u appointment next week with Hem/ONC      Significant Diagnostic Studies:       Radiology:    Xr Tibia Fibula Left (2 Views) 10/13/2017  No acute osseous abnormality. Xr Tibia Fibula Right (2 Views) 10/13/2017  No acute osseous abnormality. Ct Chest W Contrast  10/16/2017      There is tiny pericardial fluid. Mediastinum: Small lymph nodes are present in the mediastinum. 1. Consolidations in both the posterior right upper and superior segment right lower lobe not significantly changed compared to evaluations of May 1, 2017.    2. irregular attenuating nodular foci are identified in the apico-posterior left upper lobe series 4, image 33 measuring up to 8 mm. 3. Findings are unfortunately nonspecific. 3. Left lower lobe consolidation could represent rounded atelectasis, however new satellite nodularity measuring up to 16 x 10 mm is concerning. 4.  The attenuation of the liver has increased compared to multiple previous examinations, which could indicate iron deposition; however, MRI would be more sensitive in the evaluation of this finding. Nonspecific and only partially evaluated 12 mm hypodensity within the left hepatic lobe as well as possible pneumobilia versus portal venous air. Xr Chest Portable: 10/13/2017  Persistent left retrocardiac mass which appears stable to mildly decreased in size. *Right upper lobe airspace disease persists but now demonstrates more streaky patchy opacification and decrease nodularity images developing pneumonia. *Persistent but mildly decreased right hilar prominence. Consultations:    Consults:     Final Specialist Recommendations/Findings:   IP CONSULT TO HOSPITALIST  IP CONSULT TO ONCOLOGY      The patient was seen and examined on day of discharge and this discharge summary is in conjunction with any daily progress note from day of discharge.     Discharge plan:     Disposition: Home    Physician Follow Up:     [facility] [address]  [address] [facility]  [phone]    In 1 week          In 1 week      Rema Calderon MD  [address]  [phone]    In 1 week      Sravan Harris MD  [address]  [phone]    On 10/20/2017      Rema Calderon MD  [address] [facility] [address] [phone]    Schedule an appointment as soon as possible for a visit in 1 week         Requiring Further Evaluation/Follow Up POST HOSPITALIZATION/Incidental Findings: CBC    Diet: regular diet    Activity: As tolerated    Instructions to Patient:     Discharge Medications:      Medication List      CONTINUE taking these medications    * albuterol (5 MG/ML) 0.5% nebulizer solution  Commonly known as:  PROVENTIL  Take 0.5 mLs by nebulization every 6 hours as needed for Wheezing     * VENTOLIN  (90 Base) MCG/ACT inhaler  Generic drug:  albuterol sulfate HFA  INHALE 2 PUFFS INTO THE LUNGS EVERY 6 HOURS AS NEEDED FOR WHEEZING     ASPIRIN LOW DOSE 81 MG chewable tablet  Generic drug:  aspirin  CHEW ONE TABLET ONCE A DAY     citalopram 40 MG tablet  Commonly known as:  CELEXA  TAKE 1 TAB BY MOUTH ONCE A DAY     docusate sodium 100 MG capsule  Commonly known as:  DOCQLACE  TAKE 1 CAPSULE BY MOUTH 2 TIMES DAILY AS NEEDED FOR CONSTIPATION     ferrous sulfate 325 (65 Fe) MG tablet  TAKE 1 TAB BY MOUTH TWICE A DAY -WITH MEALS     fluticasone 50 MCG/ACT nasal spray  Commonly known as:  FLONASE  1 spray by Nasal route daily     folic acid 1 MG tablet  Commonly known as:  FOLVITE  TAKE 1 TAB BY MOUTH ONCE A DAY     loratadine 10 MG tablet  Commonly known as:  CLARITIN  TAKE 1 TAB BY MOUTH ONCE A DAY     ondansetron 4 MG tablet  Commonly known as:  ZOFRAN  Take 1 tablet by mouth every 8 hours as needed for Nausea     oxyCODONE-acetaminophen 5-325 MG per tablet  Commonly known as:  PERCOCET  Take 1 tablet by mouth every 6 hours as needed for Pain . Oyster Shell Calcium/D 500-200 MG-UNIT Tabs  Take 1 tablet by mouth daily Please discontinue previous prescription of calcium     pantoprazole 40 MG tablet  Commonly known as:  PROTONIX  TAKE 1 TAB BY MOUTH ONCE A DAY     SENEXON-S 8.6-50 MG per tablet  Generic drug:  senna-docusate  TAKE 1 TAB BY MOUTH TWICE A DAY     sodium chloride 0.65 % nasal spray  Commonly known as:  ALTAMIST SPRAY  1 spray by Nasal route as needed for Congestion        * This list has 2 medication(s) that are the same as other medications prescribed for you.  Read the directions carefully, and ask your doctor or other care provider to review them with you. ASK your doctor about these medications    mometasone-formoterol 200-5 MCG/ACT inhaler  Commonly known as:  DULERA  Inhale 2 puffs into the lungs 2 times daily            Time Spent on discharge is  40 mins in patient examination, evaluation, counseling as well as medication reconciliation, prescriptions for required medications, discharge plan and follow up. Electronically signed by   Darrel Tay MD  10/16/2017  2:57 PM      Thank you Dr. Rimma Adkins MD for the opportunity to be involved in this patient's care.

## 2017-10-16 NOTE — PROGRESS NOTES
for malignancy or TB, had also gastric mass with resection that showed inflammatory response rather than malignancy. Currently she denies any chest pain, sob, nausea, vomiting, fever or chills    10/13: trasfused 2 UPRBCS  10/16: CT chest showing new nodules in RACHELLE and LLL       Review of Systems:   ROS   CONSTITUTIONAL:  negative for fevers, chills, sweats, fatigue, weight loss  HEENT:  negative for vision, hearing changes, runny nose, throat pain  RESPIRATORY:  negative for shortness of breath, cough, congestion, wheezing. CARDIOVASCULAR:  negative for chest pain, palpitations. GASTROINTESTINAL:  negative for nausea, vomiting, diarrhea, constipation, change in bowel habits, abdominal pain   GENITOURINARY:  negative for difficulty of urination, burning with urination, frequency   INTEGUMENT:  negative for rash, skin lesions, easy bruising   HEMATOLOGIC/LYMPHATIC:  negative for swelling/edema   ALLERGIC/IMMUNOLOGIC:  negative for urticaria , itching  ENDOCRINE:  negative increase in drinking, increase in urination, hot or cold intolerance  MUSCULOSKELETAL:  Still has leg pain    BEHAVIOR/PSYCH:  negative for depression, anxiety  Medications: Allergies:     Allergies   Allergen Reactions    Latex Dermatitis    Bee Venom Anaphylaxis    Benadryl [Diphenhydramine-Zinc Acetate] Anaphylaxis    Tavist Anaphylaxis     Quits  Breathing  And  Low   Heart  beat    Tylenol [Acetaminophen] Other (See Comments)     Liver problems       Current Meds:   Scheduled Meds:    sodium chloride  250 mL Intravenous Once    polyethylene glycol  17 g Oral BID    sodium chloride flush  10 mL Intravenous Q12H    calcium carbonate-vitamin D  1 tablet Oral Daily    fluticasone  1 spray Nasal Daily    mometasone-formoterol  2 puff Inhalation BID    citalopram  40 mg Oral Daily    ferrous sulfate  325 mg Oral Daily with breakfast    folic acid  1 mg Oral Daily     Continuous Infusions:    PRN Meds: albuterol, ondansetron, stable to mildly decreased in size. *Right upper lobe airspace disease persists but now demonstrates more streaky patchy opacification and decrease nodularity images developing pneumonia. *Persistent but mildly decreased right hilar prominence. RECOMMENDATION: Consider further evaluation with contrast-enhanced chest CT as clinically warranted       Physical Examination:      Physical Exam   General Appearance:  alert, well appearing, and in no acute distress  Mental status: oriented to person, place, and time with normal affect  HEENT: NC/AT , PERRLA, EOMI, MMM  Lungs: Bilateral equal air entry, clear to ausculation, no wheezing, rales or rhonchi, normal effort  Cardiovascular: normal rate, regular rhythm, no murmur, gallop, rub. Abdomen: Soft, nontender, nondistended, normal bowel sounds, no hepatomegaly or splenomegaly  Neurologic: There are no new focal motor or sensory deficits, normal muscle tone and bulk, no abnormal sensation, normal speech, cranial nerves II through XII grossly intact  Skin: No gross lesions, rashes, bruising or bleeding on exposed skin area  Extremities:  peripheral pulses palpable, no pedal edema or calf pain with palpation  Psych: normal affect   Assessment:        Principal Problem:    Normocytic anemia  Active Problems:    Smoking greater than 40 pack years    Thrombocytosis (HCC)    Depression    COPD with acute exacerbation (HCC)    Osteoporosis    Absolute anemia    Hypertension    Acute pure red cell anemia (HCC)    Bilateral leg pain      Plan:              Acute/chronic normocytic anemia : S/P Transfusion of 2 UPRBCS,  Hb today is 7.7, ferritin is 3,600 ( picture of iron overload vs acute Phase reactant ), will add iron studies ,  Thoughts of possible Hemochromatosis per Hem/Onc notes. Ok to D/C per hem/onc    Leukocytosis: chronic, leukemia ruled out. Thrombocytosis: Chronic    Lung mass: chronic, biopsy negative for malignancy.  Ordered f/u CT chest this am per recommendations    Tobacco dependence: Tobacco cessation education , nicotine patch     HTN: continue home meds     Depression: Continue Celexa     COPD: Continue Symbicort, discuss adding Spiriva     Osteoporosis: continue Ca, not on any bone modulator medication? ?     EPC for DVT prophylaxis     D/C planning today      Consults: IP CONSULT TO HOSPITALIST  IP CONSULT TO ONCOLOGY        Discussed care plan with nurse after getting input from the nurse.     Above plan discussed with the patient in room, who agreed to the above plan     Please call if any questions    Kassy Ledesma MD  [facility] Hospital[facility]  10/16/2017  7:23 AM

## 2017-10-16 NOTE — CARE COORDINATION
Met with pt to discuss dc planning  Pt declines home care at this time. States she is doing fine   Will continue to monitor.

## 2017-10-17 ENCOUNTER — TELEPHONE (OUTPATIENT)
Dept: INFUSION THERAPY | Facility: MEDICAL CENTER | Age: 47
End: 2017-10-17

## 2017-10-17 NOTE — TELEPHONE ENCOUNTER
Lucila Al phoned for refill on Percocet  Last filled #[phone] 09/25/17  Upcoming md exam 10/23/17  Pended to md for review

## 2017-10-17 NOTE — TELEPHONE ENCOUNTER
I TRIED TO CALL [facility] TO SCHEDULE A F/U APPT AND HAD TO LEAVE A MESSAGE TO CALL THE OFFICE TO SCHEDULE.

## 2017-10-18 ENCOUNTER — HOSPITAL ENCOUNTER (OUTPATIENT)
Facility: MEDICAL CENTER | Age: 47
End: 2017-10-18
Payer: MEDICAID

## 2017-10-18 RX ORDER — OXYCODONE HYDROCHLORIDE AND ACETAMINOPHEN 5; 325 MG/1; MG/1
1 TABLET ORAL EVERY 6 HOURS PRN
Qty: 40 TABLET | Refills: 0 | Status: SHIPPED | OUTPATIENT
Start: 2017-10-18 | End: 2017-11-02 | Stop reason: SDUPTHER

## 2017-10-18 RX ORDER — FOLIC ACID 1 MG/1
TABLET ORAL
Qty: 30 TABLET | Refills: 0 | Status: SHIPPED | OUTPATIENT
Start: 2017-10-18 | End: 2017-10-19 | Stop reason: SDUPTHER

## 2017-10-19 ENCOUNTER — HOSPITAL ENCOUNTER (EMERGENCY)
Age: 47
Discharge: HOME | End: 2017-10-19
Attending: EMERGENCY MEDICINE
Payer: MEDICAID

## 2017-10-19 VITALS
SYSTOLIC BLOOD PRESSURE: 114 MMHG | RESPIRATION RATE: 15 BRPM | BODY MASS INDEX: 27.05 KG/M2 | DIASTOLIC BLOOD PRESSURE: 74 MMHG | HEART RATE: 90 BPM | OXYGEN SATURATION: 97 % | TEMPERATURE: 97.2 F | WEIGHT: 125 LBS

## 2017-10-19 DIAGNOSIS — N30.00 ACUTE CYSTITIS WITHOUT HEMATURIA: ICD-10-CM

## 2017-10-19 DIAGNOSIS — R11.2 NON-INTRACTABLE VOMITING WITH NAUSEA, UNSPECIFIED VOMITING TYPE: Primary | ICD-10-CM

## 2017-10-19 LAB
-: ABNORMAL
ABSOLUTE EOS #: 0.1 K/UL (ref 0–0.4)
ABSOLUTE IMMATURE GRANULOCYTE: ABNORMAL K/UL (ref 0–0.3)
ABSOLUTE LYMPH #: 1.2 K/UL (ref 1–4.8)
ABSOLUTE MONO #: 1.1 K/UL (ref 0.1–1.2)
ALBUMIN SERPL-MCNC: 2.5 G/DL (ref 3.5–5.2)
ALBUMIN/GLOBULIN RATIO: 0.4 (ref 1–2.5)
ALP BLD-CCNC: 514 U/L (ref 35–104)
ALT SERPL-CCNC: 12 U/L (ref 5–33)
AMORPHOUS: ABNORMAL
ANION GAP SERPL CALCULATED.3IONS-SCNC: 13 MMOL/L (ref 9–17)
AST SERPL-CCNC: 46 U/L
BACTERIA: ABNORMAL
BASOPHILS # BLD: 0 %
BASOPHILS ABSOLUTE: 0 K/UL (ref 0–0.2)
BILIRUB SERPL-MCNC: 0.45 MG/DL (ref 0.3–1.2)
BILIRUBIN URINE: ABNORMAL
BUN BLDV-MCNC: 12 MG/DL (ref 6–20)
BUN/CREAT BLD: ABNORMAL (ref 9–20)
CALCIUM SERPL-MCNC: 9.5 MG/DL (ref 8.6–10.4)
CASTS UA: ABNORMAL /LPF (ref 0–2)
CHLORIDE BLD-SCNC: 93 MMOL/L (ref 98–107)
CO2: 25 MMOL/L (ref 20–31)
COLOR: YELLOW
COMMENT UA: ABNORMAL
CREAT SERPL-MCNC: 0.69 MG/DL (ref 0.5–0.9)
CRYSTALS, UA: ABNORMAL /HPF
DIFFERENTIAL TYPE: ABNORMAL
EKG ATRIAL RATE: 100 BPM
EKG P AXIS: 29 DEGREES
EKG P-R INTERVAL: 102 MS
EKG Q-T INTERVAL: 338 MS
EKG QRS DURATION: 76 MS
EKG QTC CALCULATION (BAZETT): 436 MS
EKG R AXIS: -16 DEGREES
EKG T AXIS: 152 DEGREES
EKG VENTRICULAR RATE: 100 BPM
EOSINOPHILS RELATIVE PERCENT: 1 %
EPITHELIAL CELLS UA: ABNORMAL /HPF (ref 0–5)
GFR AFRICAN AMERICAN: >60 ML/MIN
GFR NON-AFRICAN AMERICAN: >60 ML/MIN
GFR SERPL CREATININE-BSD FRML MDRD: ABNORMAL ML/MIN/{1.73_M2}
GFR SERPL CREATININE-BSD FRML MDRD: ABNORMAL ML/MIN/{1.73_M2}
GLUCOSE BLD-MCNC: 126 MG/DL (ref 70–99)
GLUCOSE URINE: NEGATIVE
HCG QUALITATIVE: NEGATIVE
HCT VFR BLD CALC: 27.4 % (ref 36–46)
HEMOGLOBIN: 8.6 G/DL (ref 12–16)
IMMATURE GRANULOCYTES: ABNORMAL %
KETONES, URINE: ABNORMAL
LEUKOCYTE ESTERASE, URINE: ABNORMAL
LIPASE: 26 U/L (ref 13–60)
LYMPHOCYTES # BLD: 8 %
MCH RBC QN AUTO: 26.5 PG (ref 26–34)
MCHC RBC AUTO-ENTMCNC: 31.3 G/DL (ref 31–37)
MCV RBC AUTO: 84.5 FL (ref 80–100)
MONOCYTES # BLD: 8 %
MUCUS: ABNORMAL
NITRITE, URINE: NEGATIVE
OTHER OBSERVATIONS UA: ABNORMAL
PDW BLD-RTO: 18.6 % (ref 12.5–15.4)
PH UA: 6 (ref 5–8)
PLATELET # BLD: 784 K/UL (ref 140–450)
PLATELET ESTIMATE: ABNORMAL
PMV BLD AUTO: 6.7 FL (ref 6–12)
POC TROPONIN I: 0 NG/ML (ref 0–0.1)
POC TROPONIN INTERP: NORMAL
POTASSIUM SERPL-SCNC: 4.1 MMOL/L (ref 3.7–5.3)
PROTEIN UA: ABNORMAL
RBC # BLD: 3.24 M/UL (ref 4–5.2)
RBC # BLD: ABNORMAL 10*6/UL
RBC UA: ABNORMAL /HPF (ref 0–2)
RENAL EPITHELIAL, UA: ABNORMAL /HPF
SEG NEUTROPHILS: 83 %
SEGMENTED NEUTROPHILS ABSOLUTE COUNT: 12.7 K/UL (ref 1.8–7.7)
SODIUM BLD-SCNC: 131 MMOL/L (ref 135–144)
SPECIFIC GRAVITY UA: 1.02 (ref 1–1.03)
TOTAL PROTEIN: 8.9 G/DL (ref 6.4–8.3)
TRICHOMONAS: ABNORMAL
TROPONIN INTERP: NORMAL
TROPONIN T: <0.03 NG/ML
TURBIDITY: ABNORMAL
URINE HGB: ABNORMAL
UROBILINOGEN, URINE: ABNORMAL
WBC # BLD: 15.1 K/UL (ref 3.5–11)
WBC # BLD: ABNORMAL 10*3/UL
WBC UA: ABNORMAL /HPF (ref 0–5)
YEAST: ABNORMAL

## 2017-10-19 PROCEDURE — 85025 COMPLETE CBC W/AUTO DIFF WBC: CPT

## 2017-10-19 PROCEDURE — 6370000000 HC RX 637 (ALT 250 FOR IP): Performed by: EMERGENCY MEDICINE

## 2017-10-19 PROCEDURE — 93005 ELECTROCARDIOGRAM TRACING: CPT

## 2017-10-19 PROCEDURE — 81001 URINALYSIS AUTO W/SCOPE: CPT

## 2017-10-19 PROCEDURE — 96375 TX/PRO/DX INJ NEW DRUG ADDON: CPT

## 2017-10-19 PROCEDURE — 84703 CHORIONIC GONADOTROPIN ASSAY: CPT

## 2017-10-19 PROCEDURE — 80053 COMPREHEN METABOLIC PANEL: CPT

## 2017-10-19 PROCEDURE — 6360000002 HC RX W HCPCS: Performed by: EMERGENCY MEDICINE

## 2017-10-19 PROCEDURE — 99284 EMERGENCY DEPT VISIT MOD MDM: CPT

## 2017-10-19 PROCEDURE — 84484 ASSAY OF TROPONIN QUANT: CPT

## 2017-10-19 PROCEDURE — 2580000003 HC RX 258: Performed by: EMERGENCY MEDICINE

## 2017-10-19 PROCEDURE — 87086 URINE CULTURE/COLONY COUNT: CPT

## 2017-10-19 PROCEDURE — 96374 THER/PROPH/DIAG INJ IV PUSH: CPT

## 2017-10-19 PROCEDURE — 83690 ASSAY OF LIPASE: CPT

## 2017-10-19 RX ORDER — OXYCODONE HYDROCHLORIDE AND ACETAMINOPHEN 5; 325 MG/1; MG/1
1 TABLET ORAL ONCE
Status: COMPLETED | OUTPATIENT
Start: 2017-10-19 | End: 2017-10-19

## 2017-10-19 RX ORDER — MORPHINE SULFATE 2 MG/ML
4 INJECTION, SOLUTION INTRAMUSCULAR; INTRAVENOUS ONCE
Status: DISCONTINUED | OUTPATIENT
Start: 2017-10-19 | End: 2017-10-19

## 2017-10-19 RX ORDER — 0.9 % SODIUM CHLORIDE 0.9 %
1000 INTRAVENOUS SOLUTION INTRAVENOUS ONCE
Status: COMPLETED | OUTPATIENT
Start: 2017-10-19 | End: 2017-10-19

## 2017-10-19 RX ORDER — CEPHALEXIN 500 MG/1
500 CAPSULE ORAL 4 TIMES DAILY
Qty: 28 CAPSULE | Refills: 0 | Status: SHIPPED | OUTPATIENT
Start: 2017-10-19 | End: 2017-10-26

## 2017-10-19 RX ORDER — FOLIC ACID 1 MG/1
TABLET ORAL
Qty: 30 TABLET | Refills: 0 | Status: CANCELLED | OUTPATIENT
Start: 2017-10-19

## 2017-10-19 RX ORDER — ONDANSETRON 2 MG/ML
4 INJECTION INTRAMUSCULAR; INTRAVENOUS ONCE
Status: COMPLETED | OUTPATIENT
Start: 2017-10-19 | End: 2017-10-19

## 2017-10-19 RX ORDER — PROMETHAZINE HYDROCHLORIDE 25 MG/ML
25 INJECTION, SOLUTION INTRAMUSCULAR; INTRAVENOUS ONCE
Status: COMPLETED | OUTPATIENT
Start: 2017-10-19 | End: 2017-10-19

## 2017-10-19 RX ADMIN — PROMETHAZINE HYDROCHLORIDE 25 MG: 25 INJECTION INTRAMUSCULAR; INTRAVENOUS at 07:11

## 2017-10-19 RX ADMIN — SODIUM CHLORIDE 1000 ML: 9 INJECTION, SOLUTION INTRAVENOUS at 06:16

## 2017-10-19 RX ADMIN — OXYCODONE HYDROCHLORIDE AND ACETAMINOPHEN 1 TABLET: 5; 325 TABLET ORAL at 09:18

## 2017-10-19 RX ADMIN — ONDANSETRON 4 MG: 2 INJECTION INTRAMUSCULAR; INTRAVENOUS at 06:17

## 2017-10-19 ASSESSMENT — ENCOUNTER SYMPTOMS
BACK PAIN: 0
NAUSEA: 1
TROUBLE SWALLOWING: 0
RHINORRHEA: 0
ABDOMINAL PAIN: 0
VOMITING: 1
SHORTNESS OF BREATH: 0
DIARRHEA: 0
CONSTIPATION: 0

## 2017-10-19 ASSESSMENT — PAIN DESCRIPTION - ORIENTATION: ORIENTATION: LEFT;RIGHT

## 2017-10-19 ASSESSMENT — PAIN DESCRIPTION - PAIN TYPE: TYPE: ACUTE PAIN

## 2017-10-19 ASSESSMENT — PAIN DESCRIPTION - DESCRIPTORS: DESCRIPTORS: DISCOMFORT;CRAMPING

## 2017-10-19 ASSESSMENT — PAIN SCALES - GENERAL
PAINLEVEL_OUTOF10: 6
PAINLEVEL_OUTOF10: 10

## 2017-10-19 NOTE — ED NOTES
Second trop obtained and pending, pt requesting \"a pain pill\" for \"my legs\"\"they always hurt me\". Resident updated.       Everton Alcantara RN  10/19/17 [id number]

## 2017-10-19 NOTE — ED PROVIDER NOTES
[facility]  Faculty Handoff       Handoff taken on the following patient    Pt Name: Tamy Law  PCP:  Janet Christopher MD      [facility]       Chief Complaint   Patient presents with    Nausea    Emesis         CURRENT MEDICATIONS     Previous Medications  Previous Medications    ALBUTEROL (PROVENTIL) (5 MG/ML) 0.5% NEBULIZER SOLUTION    Take 0.5 mLs by nebulization every 6 hours as needed for Wheezing    ASPIRIN LOW DOSE 81 MG CHEWABLE TABLET    CHEW ONE TABLET ONCE A DAY    CALCIUM CARBONATE-VITAMIN D (OYSTER SHELL CALCIUM/D) 500-200 MG-UNIT TABS    Take 1 tablet by mouth daily Please discontinue previous prescription of calcium    CITALOPRAM (CELEXA) 40 MG TABLET    TAKE 1 TAB BY MOUTH ONCE A DAY    DOCUSATE SODIUM (DOCQLACE) 100 MG CAPSULE    TAKE 1 CAPSULE BY MOUTH 2 TIMES DAILY AS NEEDED FOR CONSTIPATION    FERROUS SULFATE 325 (65 FE) MG TABLET    TAKE 1 TAB BY MOUTH TWICE A DAY -WITH MEALS    FLUTICASONE (FLONASE) 50 MCG/ACT NASAL SPRAY    1 spray by Nasal route daily    FOLIC ACID (FOLVITE) 1 MG TABLET    TAKE 1 TAB BY MOUTH ONCE A DAY    FOLIC ACID (FOLVITE) 1 MG TABLET    TAKE 1 TAB BY MOUTH ONCE A DAY    LORATADINE (CLARITIN) 10 MG TABLET    TAKE 1 TAB BY MOUTH ONCE A DAY    ONDANSETRON (ZOFRAN) 4 MG TABLET    Take 1 tablet by mouth every 8 hours as needed for Nausea    OXYCODONE-ACETAMINOPHEN (PERCOCET) 5-325 MG PER TABLET    Take 1 tablet by mouth every 6 hours as needed for Pain .     PANTOPRAZOLE (PROTONIX) 40 MG TABLET    TAKE 1 TAB BY MOUTH ONCE A DAY    SENEXON-S 8.6-50 MG PER TABLET    TAKE 1 TAB BY MOUTH TWICE A DAY    SODIUM CHLORIDE (ALTAMIST SPRAY) 0.65 % NASAL SPRAY    1 spray by Nasal route as needed for Congestion    VENTOLIN  (90 BASE) MCG/ACT INHALER    INHALE 2 PUFFS INTO THE LUNGS EVERY 6 HOURS AS NEEDED FOR WHEEZING       Encounter Medications  Orders Placed This Encounter   Medications    0.9 % sodium chloride bolus

## 2017-10-19 NOTE — ED PROVIDER NOTES
The Troponin-I (POC) results cannot be compared to the Troponin-T results. EKG 12 Lead   Result Value Ref Range    Ventricular Rate 100 BPM    Atrial Rate 100 BPM    P-R Interval 102 ms    QRS Duration 76 ms    Q-T Interval 338 ms    QTc Calculation (Bazett) 436 ms    P Axis 29 degrees    R Axis -16 degrees    T Axis 152 degrees       Xr Tibia Fibula Left (2 Views)    Result Date: 10/13/2017  EXAMINATION: 2 VIEWS OF THE LEFT TIBIA AND FIBULA 10/13/2017 2:28 pm COMPARISON: None. HISTORY: ORDERING SYSTEM PROVIDED HISTORY: h/o malignancy and lower extremity pain knees down to ankles, eval bony lesions. TECHNOLOGIST PROVIDED HISTORY: Reason for exam:->h/o malignancy and lower extremity pain knees down to ankles, eval bony lesions. Initial exam. FINDINGS: There is no evidence of acute fracture. There is normal alignment. No acute joint abnormality. No focal osseous lesion. No focal soft tissue abnormality. No acute osseous abnormality. Xr Tibia Fibula Right (2 Views)    Result Date: 10/13/2017  EXAMINATION: 2 VIEWS OF THE RIGHT TIBIA AND FIBULA 10/13/2017 2:28 pm COMPARISON: None. HISTORY: ORDERING SYSTEM PROVIDED HISTORY: h/o malignancy, 1 month pain circumferential knee down to ankle. eval bony lesion. TECHNOLOGIST PROVIDED HISTORY: Reason for exam:->h/o malignancy, 1 month pain circumferential knee down to ankle. eval bony lesion. Initial exam. FINDINGS: There is no evidence of acute fracture. There is normal alignment. No acute joint abnormality. No focal osseous lesion. No focal soft tissue abnormality. No acute osseous abnormality. Ct Chest W Contrast    Result Date: 10/16/2017  EXAMINATION: CT OF THE CHEST WITH CONTRAST 10/16/2017 9:15 am TECHNIQUE: CT of the chest was performed with the administration of intravenous contrast. Multiplanar reformatted images are provided for review.  Dose modulation, iterative reconstruction, and/or weight based adjustment of the mA/kV was utilized to reduce for hemochromatosis. The attenuation of the liver has increased compared to multiple previous examinations, which could indicate iron deposition; however, MRI would be more sensitive in the evaluation of this finding. 6. Nonspecific and only partially evaluated 12 mm hypodensity within the left hepatic lobe as well as possible pneumobilia versus portal venous air. RECOMMENDATIONS: Ongoing monitoring is recommended, with additional consideration of PET-CT or biopsy depending on the patient's symptoms. Additionally, workup for atypical inflammatory or infectious pathology is recommended. Xr Chest Portable    Result Date: 10/13/2017  EXAMINATION: SINGLE VIEW OF THE CHEST 10/13/2017 2:28 pm COMPARISON: September 8, 2017 HISTORY: ORDERING SYSTEM PROVIDED HISTORY: shortness of breath, eval for pneumonia FINDINGS: Persistent left retrocardiac mass which appears stable to mildly decreased in size. Right upper lobe airspace disease persists but now demonstrates more streaky patchy opacification and decrease nodularity. Persistent but mildly decreased right hilar prominence. No effusion or pneumothorax. Cardiomediastinal silhouette is stable with calcification aortic knob. Mild dextroscoliosis of the lower thoracic spine. Spinal augmentation along the thoracolumbar junction. *Persistent left retrocardiac mass which appears stable to mildly decreased in size. *Right upper lobe airspace disease persists but now demonstrates more streaky patchy opacification and decrease nodularity images developing pneumonia. *Persistent but mildly decreased right hilar prominence. RECOMMENDATION: Consider further evaluation with contrast-enhanced chest CT as clinically warranted       RECENT VITALS:     Temp: 97.2 °F (36.2 °C),  Pulse: 90, Resp: 15, BP: 114/74, SpO2: 97 %    This patient is a 50 y.o. Female with  the complains of acute exacerbation of chronic nausea, emesis.   Cardiac workup has been unremarkable, abdominal labs unchanged from baseline. Patient has been treated with Zofran, fluid bolus. OUTSTANDING TASKS / RECOMMENDATIONS:    1. Awaiting urinalysis  2. Symptom control  3. FINAL IMPRESSION:     1. Non-intractable vomiting with nausea, unspecified vomiting type    2.  Acute cystitis without hematuria        DISPOSITION:         DISPOSITION:  [x]  Home   []  Nursing Facility   []  Transfer -    []  Admission -     FOLLOW-UP: Alberto Burk MD  [place] [id number] [phone]    Call today       DISCHARGE MEDICATIONS: Discharge Medication List as of 10/19/2017 10:28 AM      START taking these medications    Details   cephALEXin (KEFLEX) 500 MG capsule Take 1 capsule by mouth 4 times daily for 7 days, Disp-28 capsule, R-0Print                Ramy Arevalo MD  Emergency Medicine Resident  [facility]       Ramy Arevalo MD  Resident  10/19/17 [id number]

## 2017-10-19 NOTE — TELEPHONE ENCOUNTER
From: [facility]  Sent: 10/19/2017 4:22 PM EDT  Subject: Medication Renewal Request    Bernardo Kocher. Samples would like a refill of the following medications:  sodium chloride (ALTAMIST SPRAY) 0.65 % nasal spray Gaylan Goldberg, MD]  Calcium Carbonate-Vitamin D (OYSTER SHELL CALCIUM/D) 500-200 MG-UNIT TABS Gaylan Goldberg, MD]  loratadine (CLARITIN) 10 MG tablet Gaylan Goldberg, MD]  folic acid (FOLVITE) 1 MG tablet Gaylan Goldberg, MD]  citalopram (CELEXA) 40 MG tablet Gaylan Goldberg, MD]  ferrous sulfate 325 (65 Fe) MG tablet Gaylan Goldberg, MD]  SENEXON-S 8.6-50 MG per tablet Gaylan Goldberg, MD]  ASPIRIN LOW DOSE 81 MG chewable tablet Gaylan Goldberg, MD]  pantoprazole (PROTONIX) 40 MG tablet Gaylan Goldberg, MD]  folic acid (FOLVITE) 1 MG tablet Gaylan Goldberg, MD]    Preferred pharmacy: [address] [place] [address] [phone] F [phone]    Comment:  I need refills on all meds marked. Thank you, Jason Law.

## 2017-10-19 NOTE — ED PROVIDER NOTES
of breath; Substance abuse; Thrombocytosis ([facility] [address] Urinary incontinence; and Wears glasses. has a past surgical history that includes Tubal ligation (1989); Tonsillectomy and adenoidectomy (1982); ERCP (5/16/2013); Upper gastrointestinal endoscopy; gastrectomy (2012); Paracentesis (Right, 01/2015-09/2015); Cystocopy (12/1/2015); Fixation Kyphoplasty (08/09/2016); Lung biopsy; insert nasal septal prosthesis (N/A, 4/14/2017); create eardrum opening,gen anesth (N/A, 4/14/2017); Abdomen surgery (09/2015); Cystoscopy (05/11/2017); Ureter stent placement (05/11/2017); Ureteroscopy (05/11/2017); Cystoscopy (Right, 5/11/2017); Cholecystectomy; hernia repair (07/18/2017); repair incisional hernia,reducible (N/A, 7/18/2017); and esophagogastroduodenoscopy transoral diagnostic (N/A, 8/30/2017). Social History     Social History    Marital status: Single     Spouse name: N/A    Number of children: N/A    Years of education: N/A     Occupational History    Not on file. Social History Main Topics    Smoking status: Current Some Day Smoker     Packs/day: 0.25     Years: 30.00     Types: Cigarettes     Last attempt to quit: 12/1/2012    Smokeless tobacco: Never Used      Comment: pt states 3 cigs per day    Alcohol use No      Comment: quit in 1987    Drug use: No      Comment: quit marijuana 1987    Sexual activity: No     Other Topics Concern    Not on file     Social History Narrative    No narrative on file       I counseled the patient against using tobacco products. Family History   Problem Relation Age of Onset    Heart Disease Mother     Stroke Father      cerebral aneurysm       Allergies:  Latex; Bee venom; Benadryl [diphenhydramine-zinc acetate]; Tavist; and Tylenol [acetaminophen]    Home Medications:  Prior to Admission medications    Medication Sig Start Date End Date Taking?  Authorizing Provider   oxyCODONE-acetaminophen (PERCOCET) 5-325 MG per tablet Take 1 tablet by mouth every 6 hours as needed for Pain .  10/18/17 11/17/17  Candy Child MD   folic acid (FOLVITE) 1 MG tablet TAKE 1 TAB BY MOUTH ONCE A DAY 10/18/17   Cedric Chew MD   VENTOLIN  (90 Base) MCG/ACT inhaler INHALE 2 PUFFS INTO THE LUNGS EVERY 6 HOURS AS NEEDED FOR WHEEZING 9/22/17   Marie Cruz MD   loratadine (CLARITIN) 10 MG tablet TAKE 1 TAB BY MOUTH ONCE A DAY 9/21/17   Cedric Chew MD   folic acid (FOLVITE) 1 MG tablet TAKE 1 TAB BY MOUTH ONCE A DAY 9/21/17   Cedric Chew MD   citalopram (CELEXA) 40 MG tablet TAKE 1 TAB BY MOUTH ONCE A DAY 9/21/17   Cedric Chew MD   ferrous sulfate 325 (65 Fe) MG tablet TAKE 1 TAB BY MOUTH TWICE A DAY -WITH MEALS 9/21/17   Cedric Chew MD   SENEXON-S 8.6-50 MG per tablet TAKE 1 TAB BY MOUTH TWICE A DAY 9/21/17   Cedric Chew MD   ASPIRIN LOW DOSE 81 MG chewable tablet CHEW ONE TABLET ONCE A DAY 9/21/17   Cedric Chew MD   pantoprazole (PROTONIX) 40 MG tablet TAKE 1 TAB BY MOUTH ONCE A DAY 9/21/17   Cedric Chew MD   docusate sodium (DOCQLACE) 100 MG capsule TAKE 1 CAPSULE BY MOUTH 2 TIMES DAILY AS NEEDED FOR CONSTIPATION 8/15/17   Mayra Mera MD   fluticasone [facility]) 50 MCG/ACT nasal spray 1 spray by Nasal route daily 8/15/17   Mayra Mera MD   Calcium Carbonate-Vitamin D (OYSTER SHELL CALCIUM/D) 500-200 MG-UNIT TABS Take 1 tablet by mouth daily Please discontinue previous prescription of calcium 6/19/17 6/19/18  Cedric Chew MD   ondansetron (ZOFRAN) 4 MG tablet Take 1 tablet by mouth every 8 hours as needed for Nausea 5/9/17   Russ Rosales MD   albuterol (PROVENTIL) (5 MG/ML) 0.5% nebulizer solution Take 0.5 mLs by nebulization every 6 hours as needed for Wheezing 1/23/17   Charlee Dance, MD   sodium chloride (ALTAMIST SPRAY) 0.65 % nasal spray 1 spray by Nasal route as needed for Congestion 7/21/16   Cedric Chew MD       REVIEW OF SYSTEMS    (2-9 systems for level 4, 10 or more for level 5)      Review of Systems Constitutional: Negative for chills, diaphoresis and fever. HENT: Negative for congestion, rhinorrhea and trouble swallowing. Eyes: Negative for visual disturbance. Respiratory: Negative for shortness of breath. Cardiovascular: Negative for chest pain. Gastrointestinal: Positive for nausea and vomiting. Negative for abdominal pain, constipation and diarrhea. Genitourinary: Negative for difficulty urinating, dysuria, frequency and hematuria. Musculoskeletal: Negative for back pain, neck pain and neck stiffness. Skin: Negative for wound. Neurological: Negative for dizziness, weakness, light-headedness, numbness and headaches. PHYSICAL EXAM   (up to 7 for level 4, 8 or more for level 5)      INITIAL VITALS:   /74   Pulse 90   Temp 97.2 °F (36.2 °C) (Oral)   Resp 15   Wt 125 lb (56.7 kg)   LMP 04/18/2012   SpO2 100%   BMI 27.05 kg/m²     Physical Exam   Constitutional: She is oriented to person, place, and time. She appears well-developed and well-nourished. No distress. Thin white female   HENT:   Head: Normocephalic and atraumatic. Right Ear: External ear normal.   Left Ear: External ear normal.   Nose: Nose normal.   Mouth/Throat: Oropharynx is clear and moist. No oropharyngeal exudate. Eyes: Conjunctivae are normal. Right eye exhibits no discharge. Left eye exhibits no discharge. No scleral icterus. Neck: Normal range of motion. Neck supple. Cardiovascular: Normal rate, regular rhythm, normal heart sounds and intact distal pulses. Pulmonary/Chest: Effort normal and breath sounds normal. No stridor. No respiratory distress. She has no wheezes. She has no rales. She exhibits no tenderness. Abdominal: Soft. She exhibits no distension and no mass. There is no tenderness. There is no guarding. No pulsatile mass. No pain over mcburney's point. Negative pineda's sign. Musculoskeletal: Normal range of motion. She exhibits no edema.    No midline spinal tenderness to palpation, step off or deformity. No CVA tenderness. Compartments soft. Neurological: She is alert and oriented to person, place, and time. No focal sensory or motor deficit. Moves all extremities. Normal gait. Normal speech. Skin: Skin is warm and dry. She is not diaphoretic.        DIFFERENTIAL  DIAGNOSIS     PLAN (LABS / IMAGING / EKG):  Orders Placed This Encounter   Procedures    CBC WITH AUTO DIFFERENTIAL    Comprehensive Metabolic Panel    LIPASE    HCG Qualitative, Serum    Troponin    UA W/REFLEX CULTURE    Continuous Pulse Oximetry    POCT troponin    EKG 12 Lead    Insert peripheral IV       MEDICATIONS ORDERED:  Orders Placed This Encounter   Medications    0.9 % sodium chloride bolus    ondansetron (ZOFRAN) injection 4 mg    promethazine (PHENERGAN) injection 25 mg       DDX: Small bowel obstruction, DKA, Abdominal (gastroenteritis, appendicitis, gallbladder, pancreatitis, PUD, perforation), ICH, meningitis, vertigo, hyperemesis, abnormal lytes, EtOH intoxication/ tox, post-tussive, ACS/ MI    DIAGNOSTIC RESULTS / EMERGENCY DEPARTMENT COURSE / MDM     LABS:  Results for orders placed or performed during the hospital encounter of 10/19/17   CBC WITH AUTO DIFFERENTIAL   Result Value Ref Range    WBC 15.1 (H) 3.5 - 11.0 k/uL    RBC 3.24 (L) 4.0 - 5.2 m/uL    Hemoglobin 8.6 (L) 12.0 - 16.0 g/dL    Hematocrit 27.4 (L) 36 - 46 %    MCV 84.5 80 - 100 fL    MCH 26.5 26 - 34 pg    MCHC 31.3 31 - 37 g/dL    RDW 18.6 (H) 12.5 - 15.4 %    Platelets 230 (H) 524 - 450 k/uL    MPV 6.7 6.0 - 12.0 fL    Differential Type NOT REPORTED     Immature Granulocytes NOT REPORTED 0 %    Absolute Immature Granulocyte NOT REPORTED 0.00 - 0.30 k/uL    WBC Morphology NOT REPORTED     RBC Morphology ANISOCYTOSIS PRESENT     Platelet Estimate NOT REPORTED     Seg Neutrophils 83 %    Lymphocytes 8 %    Monocytes 8 %    Eosinophils % 1 %    Basophils 0 %    Segs Absolute 12.70 (H) 1.8 - 7.7 k/uL    Absolute Lymph # 1. 20 1.0 - 4.8 k/uL    Absolute Mono # 1.10 0.1 - 1.2 k/uL    Absolute Eos # 0.10 0.0 - 0.4 k/uL    Basophils # 0.00 0.0 - 0.2 k/uL   Comprehensive Metabolic Panel   Result Value Ref Range    Glucose 126 (H) 70 - 99 mg/dL    BUN 12 6 - 20 mg/dL    CREATININE 0.69 0.50 - 0.90 mg/dL    Bun/Cre Ratio NOT REPORTED 9 - 20    Calcium 9.5 8.6 - 10.4 mg/dL    Sodium 131 (L) 135 - 144 mmol/L    Potassium 4.1 3.7 - 5.3 mmol/L    Chloride 93 (L) 98 - 107 mmol/L    CO2 25 20 - 31 mmol/L    Anion Gap 13 9 - 17 mmol/L    Alkaline Phosphatase 514 (H) 35 - 104 U/L    ALT 12 5 - 33 U/L    AST 46 (H) <32 U/L    Total Bilirubin 0.45 0.3 - 1.2 mg/dL    Total Protein 8.9 (H) 6.4 - 8.3 g/dL    Alb 2.5 (L) 3.5 - 5.2 g/dL    Albumin/Globulin Ratio 0.4 (L) 1.0 - 2.5    GFR Non-African American >60 >60 mL/min    GFR African American >60 >60 mL/min    GFR Comment          GFR Staging NOT REPORTED    LIPASE   Result Value Ref Range    Lipase 26 13 - 60 U/L   HCG Qualitative, Serum   Result Value Ref Range    hCG Qual NEGATIVE NEG   Troponin   Result Value Ref Range    Troponin T <0.03 <0.03 ng/mL    Troponin Interp               IMPRESSION: The patient is a 53-year-old female who presents for evaluation of nausea and vomiting. Vital signs show an initial tachycardia but on my repeat exam showed a heart rate approximately 90 bpm.  Vital signs are otherwise stable. Mucous membranes dry. Abdomen soft and nontender. Lungs clear to auscultation. CBC, BMP, and LFTs are unchanged from baseline. Lipase is unremarkable. HCG is negative. Troponin is negative ×2. EKG shows normal sinus rhythm. The patient was treated with Zofran and Phenergan with some improvement in her symptoms. She was also treated with IV fluids. The patient was signed out to Dr. Clifford Berry. I suspect her symptoms are secondary to her chronic nausea and vomiting. Low suspicion for bowel obstruction or sepsis.     RADIOLOGY:  None    EKG  EKG Interpretation    Interpreted by

## 2017-10-19 NOTE — ED PROVIDER NOTES
[facility]     Emergency Department     Faculty Attestation    I performed a history and physical examination of the patient and discussed management with the resident. I reviewed the residents note and agree with the documented findings and plan of care. Any areas of disagreement are noted on the chart. I was personally present for the key portions of any procedures. I have documented in the chart those procedures where I was not present during the key portions. I have reviewed the emergency nurses triage note. I agree with the chief complaint, past medical history, past surgical history, allergies, medications, social and family history as documented unless otherwise noted below. Documentation of the HPI, Physical Exam and Medical Decision Making performed by medical students or scribes is based on my personal performance of the HPI, PE and MDM. For Physician Assistant/ Nurse Practitioner cases/documentation I have personally evaluated this patient and have completed at least one if not all key elements of the E/M (history, physical exam, and MDM). Additional findings are as noted.         Sean North MD  Attending Emergency  Physician             Betina Quintero MD  10/19/17 0308 Ori Loco MD  10/19/17 [id number]

## 2017-10-19 NOTE — ED NOTES
Report recd from [facility], pt medicated for nausea, will monitor.      Denice Segura RN  10/19/17 [id number]

## 2017-10-20 ENCOUNTER — OFFICE VISIT (OUTPATIENT)
Dept: PULMONOLOGY | Age: 47
End: 2017-10-20
Payer: MEDICAID

## 2017-10-20 VITALS
OXYGEN SATURATION: 97 % | RESPIRATION RATE: 14 BRPM | WEIGHT: 114 LBS | DIASTOLIC BLOOD PRESSURE: 70 MMHG | BODY MASS INDEX: 24.59 KG/M2 | SYSTOLIC BLOOD PRESSURE: 106 MMHG | HEIGHT: 57 IN | HEART RATE: 105 BPM

## 2017-10-20 VITALS — OXYGEN SATURATION: 97 % | HEART RATE: 105 BPM | HEIGHT: 57 IN | WEIGHT: 114 LBS | BODY MASS INDEX: 24.59 KG/M2

## 2017-10-20 DIAGNOSIS — J98.4 RESTRICTIVE LUNG DISEASE: ICD-10-CM

## 2017-10-20 DIAGNOSIS — R06.89 DYSPNEA AND RESPIRATORY ABNORMALITIES: ICD-10-CM

## 2017-10-20 DIAGNOSIS — J84.116 CRYPTOGENIC ORGANIZING PNEUMONIA (HCC): Primary | ICD-10-CM

## 2017-10-20 DIAGNOSIS — J44.1 COPD WITH ACUTE EXACERBATION (HCC): Primary | ICD-10-CM

## 2017-10-20 DIAGNOSIS — R91.8 LUNG MASS: ICD-10-CM

## 2017-10-20 DIAGNOSIS — R06.00 DYSPNEA AND RESPIRATORY ABNORMALITIES: ICD-10-CM

## 2017-10-20 LAB
CULTURE: NORMAL
CULTURE: NORMAL
Lab: NORMAL
SPECIMEN DESCRIPTION: NORMAL
STATUS: NORMAL

## 2017-10-20 PROCEDURE — 94060 EVALUATION OF WHEEZING: CPT | Performed by: INTERNAL MEDICINE

## 2017-10-20 PROCEDURE — 4004F PT TOBACCO SCREEN RCVD TLK: CPT | Performed by: INTERNAL MEDICINE

## 2017-10-20 PROCEDURE — G8484 FLU IMMUNIZE NO ADMIN: HCPCS | Performed by: INTERNAL MEDICINE

## 2017-10-20 PROCEDURE — G8427 DOCREV CUR MEDS BY ELIG CLIN: HCPCS | Performed by: INTERNAL MEDICINE

## 2017-10-20 PROCEDURE — G8420 CALC BMI NORM PARAMETERS: HCPCS | Performed by: INTERNAL MEDICINE

## 2017-10-20 PROCEDURE — 99214 OFFICE O/P EST MOD 30 MIN: CPT | Performed by: INTERNAL MEDICINE

## 2017-10-20 PROCEDURE — 94726 PLETHYSMOGRAPHY LUNG VOLUMES: CPT | Performed by: INTERNAL MEDICINE

## 2017-10-20 PROCEDURE — 94729 DIFFUSING CAPACITY: CPT | Performed by: INTERNAL MEDICINE

## 2017-10-20 PROCEDURE — 1111F DSCHRG MED/CURRENT MED MERGE: CPT | Performed by: INTERNAL MEDICINE

## 2017-10-20 NOTE — PROGRESS NOTES
4/14/2017    LEFT MYRINGOTOMY T-TUBE INSERTION performed by Jo Ann Schulte MD at [address] [facility] ESOPHAGOGASTRODUODENOSCOPY TRANSORAL DIAGNOSTIC N/A 8/30/2017    EGD ESOPHAGOGASTRODUODENOSCOPY performed by Harry Bower MD at [facility] N/A 4/14/2017    NASAL SEPTAL BUTTON INSERTION performed by Jo Ann Schulte MD at [facility] N/A 7/18/2017    VENTRAL [id number] [place] [address] performed by Divya Rivera DO at [address] [facility]  05/11/2017    URETEROSCOPY  05/11/2017              Not in a hospital admission. Allergies   Allergen Reactions    Latex Dermatitis    Bee Venom Anaphylaxis    Benadryl [Diphenhydramine-Zinc Acetate] Anaphylaxis    Tavist Anaphylaxis     Quits  Breathing  And  Low   Heart  beat    Tylenol [Acetaminophen] Other (See Comments)     Liver problems     History   Smoking Status    Current Some Day Smoker    Packs/day: 0.25    Years: 30.00    Types: Cigarettes    Last attempt to quit: 12/1/2012   Smokeless Tobacco    Never Used     Comment: pt states 3 cigs per day     Prior to Admission medications    Medication Sig Start Date End Date Taking? Authorizing Provider   umeclidinium-vilanterol (ANORO ELLIPTA) 62.5-25 MCG/INH AEPB inhaler Inhale 1 puff into the lungs daily 10/20/17  Yes Belem Westbrook MD   cephALEXin (KEFLEX) 500 MG capsule Take 1 capsule by mouth 4 times daily for 7 days 10/19/17 10/26/17 Yes Giuseppe Barragan MD   oxyCODONE-acetaminophen (PERCOCET) 5-325 MG per tablet Take 1 tablet by mouth every 6 hours as needed for Pain .  10/18/17 11/17/17 Yes Marcus Mckee MD   folic acid (FOLVITE) 1 MG tablet TAKE 1 TAB BY MOUTH ONCE A DAY 10/18/17  Yes Shabbir Oh MD   VENTOLIN  (90 Base) MCG/ACT inhaler INHALE 2 PUFFS INTO THE LUNGS EVERY 6

## 2017-10-23 RX ORDER — PANTOPRAZOLE SODIUM 40 MG/1
40 TABLET, DELAYED RELEASE ORAL DAILY
Qty: 90 TABLET | Refills: 1 | Status: SHIPPED | OUTPATIENT
Start: 2017-10-23

## 2017-10-23 RX ORDER — LORATADINE 10 MG/1
10 TABLET ORAL DAILY
Qty: 30 TABLET | Refills: 5 | Status: SHIPPED | OUTPATIENT
Start: 2017-10-23

## 2017-10-23 RX ORDER — B-COMPLEX WITH VITAMIN C
1 TABLET ORAL DAILY
Qty: 30 TABLET | Refills: 5 | Status: SHIPPED | OUTPATIENT
Start: 2017-10-23 | End: 2018-01-01

## 2017-10-23 RX ORDER — FOLIC ACID 1 MG/1
1 TABLET ORAL DAILY
Qty: 30 TABLET | Refills: 5 | Status: SHIPPED | OUTPATIENT
Start: 2017-10-23

## 2017-10-23 RX ORDER — CITALOPRAM 40 MG/1
40 TABLET ORAL DAILY
Qty: 30 TABLET | Refills: 5 | Status: SHIPPED | OUTPATIENT
Start: 2017-10-23

## 2017-10-23 RX ORDER — FERROUS SULFATE 325(65) MG
325 TABLET ORAL 2 TIMES DAILY
Qty: 60 TABLET | Refills: 5 | Status: ON HOLD | OUTPATIENT
Start: 2017-10-23 | End: 2017-12-07 | Stop reason: HOSPADM

## 2017-10-23 RX ORDER — AMOXICILLIN 250 MG
1 CAPSULE ORAL DAILY
Qty: 60 TABLET | Refills: 3 | Status: ON HOLD | OUTPATIENT
Start: 2017-10-23 | End: 2017-12-16 | Stop reason: HOSPADM

## 2017-10-23 RX ORDER — ECHINACEA PURPUREA EXTRACT 125 MG
1 TABLET ORAL PRN
Qty: 1 BOTTLE | Refills: 3 | Status: SHIPPED | OUTPATIENT
Start: 2017-10-23

## 2017-10-23 RX ORDER — ASPIRIN 81 MG/1
81 TABLET, CHEWABLE ORAL DAILY
Qty: 30 TABLET | Refills: 5 | Status: ON HOLD | OUTPATIENT
Start: 2017-10-23 | End: 2018-01-01 | Stop reason: HOSPADM

## 2017-10-27 ENCOUNTER — HOSPITAL ENCOUNTER (OUTPATIENT)
Facility: MEDICAL CENTER | Age: 47
End: 2017-10-27
Payer: MEDICAID

## 2017-11-02 RX ORDER — OXYCODONE HYDROCHLORIDE AND ACETAMINOPHEN 5; 325 MG/1; MG/1
1 TABLET ORAL EVERY 6 HOURS PRN
Qty: 40 TABLET | Refills: 0 | Status: SHIPPED | OUTPATIENT
Start: 2017-11-02 | End: 2017-11-17 | Stop reason: SDUPTHER

## 2017-11-10 ENCOUNTER — HOSPITAL ENCOUNTER (INPATIENT)
Age: 47
LOS: 2 days | Discharge: HOME | DRG: 663 | End: 2017-11-12
Attending: EMERGENCY MEDICINE | Admitting: INTERNAL MEDICINE
Payer: MEDICAID

## 2017-11-10 ENCOUNTER — HOSPITAL ENCOUNTER (OUTPATIENT)
Facility: MEDICAL CENTER | Age: 47
End: 2017-11-10

## 2017-11-10 DIAGNOSIS — D64.89 ANEMIA DUE TO OTHER CAUSE, NOT CLASSIFIED: Primary | ICD-10-CM

## 2017-11-10 LAB
ABSOLUTE EOS #: 0.21 K/UL (ref 0–0.4)
ABSOLUTE IMMATURE GRANULOCYTE: 0 K/UL (ref 0–0.3)
ABSOLUTE LYMPH #: 1.85 K/UL (ref 1–4.8)
ABSOLUTE MONO #: 0.82 K/UL (ref 0.1–0.8)
ANION GAP SERPL CALCULATED.3IONS-SCNC: 14 MMOL/L (ref 9–17)
BASOPHILS # BLD: 0 %
BASOPHILS ABSOLUTE: 0 K/UL (ref 0–0.2)
BUN BLDV-MCNC: 13 MG/DL (ref 6–20)
BUN/CREAT BLD: ABNORMAL (ref 9–20)
CALCIUM SERPL-MCNC: 9.4 MG/DL (ref 8.6–10.4)
CHLORIDE BLD-SCNC: 95 MMOL/L (ref 98–107)
CO2: 26 MMOL/L (ref 20–31)
CREAT SERPL-MCNC: 0.58 MG/DL (ref 0.5–0.9)
DIFFERENTIAL TYPE: ABNORMAL
EOSINOPHILS RELATIVE PERCENT: 1 %
GFR AFRICAN AMERICAN: >60 ML/MIN
GFR NON-AFRICAN AMERICAN: >60 ML/MIN
GFR SERPL CREATININE-BSD FRML MDRD: ABNORMAL ML/MIN/{1.73_M2}
GFR SERPL CREATININE-BSD FRML MDRD: ABNORMAL ML/MIN/{1.73_M2}
GLUCOSE BLD-MCNC: 100 MG/DL (ref 70–99)
HCT VFR BLD CALC: 20 % (ref 36.3–47.1)
HEMOGLOBIN: 5.5 G/DL (ref 11.9–15.1)
IMMATURE GRANULOCYTES: 0 %
LYMPHOCYTES # BLD: 9 %
MCH RBC QN AUTO: 24.8 PG (ref 25.2–33.5)
MCHC RBC AUTO-ENTMCNC: 27.5 G/DL (ref 28.4–34.8)
MCV RBC AUTO: 90.1 FL (ref 82.6–102.9)
MONOCYTES # BLD: 4 %
MORPHOLOGY: ABNORMAL
PDW BLD-RTO: 17.5 % (ref 11.8–14.4)
PLATELET # BLD: 989 K/UL (ref 138–453)
PLATELET ESTIMATE: ABNORMAL
PMV BLD AUTO: 9.2 FL (ref 8.1–13.5)
POTASSIUM SERPL-SCNC: 4.1 MMOL/L (ref 3.7–5.3)
RBC # BLD: 2.22 M/UL (ref 3.95–5.11)
RBC # BLD: ABNORMAL 10*6/UL
SEG NEUTROPHILS: 86 %
SEGMENTED NEUTROPHILS ABSOLUTE COUNT: 17.72 K/UL (ref 1.8–7.7)
SODIUM BLD-SCNC: 135 MMOL/L (ref 135–144)
TROPONIN INTERP: NORMAL
TROPONIN T: <0.03 NG/ML
WBC # BLD: 20.6 K/UL (ref 3.5–11.3)
WBC # BLD: ABNORMAL 10*3/UL

## 2017-11-10 PROCEDURE — 99284 EMERGENCY DEPT VISIT MOD MDM: CPT

## 2017-11-10 PROCEDURE — 86901 BLOOD TYPING SEROLOGIC RH(D): CPT

## 2017-11-10 PROCEDURE — 80048 BASIC METABOLIC PNL TOTAL CA: CPT

## 2017-11-10 PROCEDURE — 85025 COMPLETE CBC W/AUTO DIFF WBC: CPT

## 2017-11-10 PROCEDURE — 86850 RBC ANTIBODY SCREEN: CPT

## 2017-11-10 PROCEDURE — 86900 BLOOD TYPING SEROLOGIC ABO: CPT

## 2017-11-10 PROCEDURE — 36430 TRANSFUSION BLD/BLD COMPNT: CPT

## 2017-11-10 PROCEDURE — 2580000003 HC RX 258: Performed by: STUDENT IN AN ORGANIZED HEALTH CARE EDUCATION/TRAINING PROGRAM

## 2017-11-10 PROCEDURE — 86920 COMPATIBILITY TEST SPIN: CPT

## 2017-11-10 PROCEDURE — 1200000000 HC SEMI PRIVATE

## 2017-11-10 PROCEDURE — 84484 ASSAY OF TROPONIN QUANT: CPT

## 2017-11-10 RX ORDER — 0.9 % SODIUM CHLORIDE 0.9 %
1000 INTRAVENOUS SOLUTION INTRAVENOUS ONCE
Status: COMPLETED | OUTPATIENT
Start: 2017-11-10 | End: 2017-11-11

## 2017-11-10 RX ORDER — 0.9 % SODIUM CHLORIDE 0.9 %
250 INTRAVENOUS SOLUTION INTRAVENOUS ONCE
Status: DISCONTINUED | OUTPATIENT
Start: 2017-11-10 | End: 2017-11-11

## 2017-11-10 RX ADMIN — SODIUM CHLORIDE 1000 ML: 9 INJECTION, SOLUTION INTRAVENOUS at 23:18

## 2017-11-10 ASSESSMENT — ENCOUNTER SYMPTOMS
DIARRHEA: 0
NAUSEA: 0
COUGH: 0
ABDOMINAL PAIN: 0
CONSTIPATION: 0
SHORTNESS OF BREATH: 0
STRIDOR: 0
ABDOMINAL DISTENTION: 0
BLOOD IN STOOL: 0
WHEEZING: 0
BACK PAIN: 0
VOMITING: 0

## 2017-11-11 LAB
ABSOLUTE RETIC #: 0.02 M/UL (ref 0.03–0.08)
ALBUMIN SERPL-MCNC: 2 G/DL (ref 3.5–5.2)
ALBUMIN/GLOBULIN RATIO: 0.3 (ref 1–2.5)
ALP BLD-CCNC: 433 U/L (ref 35–104)
ALT SERPL-CCNC: 7 U/L (ref 5–33)
ANION GAP SERPL CALCULATED.3IONS-SCNC: 16 MMOL/L (ref 9–17)
AST SERPL-CCNC: 18 U/L
BILIRUB SERPL-MCNC: 0.58 MG/DL (ref 0.3–1.2)
BUN BLDV-MCNC: 11 MG/DL (ref 6–20)
BUN/CREAT BLD: ABNORMAL (ref 9–20)
CALCIUM SERPL-MCNC: 8.7 MG/DL (ref 8.6–10.4)
CHLORIDE BLD-SCNC: 98 MMOL/L (ref 98–107)
CO2: 22 MMOL/L (ref 20–31)
CREAT SERPL-MCNC: 0.5 MG/DL (ref 0.5–0.9)
EKG ATRIAL RATE: 90 BPM
EKG P AXIS: 40 DEGREES
EKG P-R INTERVAL: 114 MS
EKG Q-T INTERVAL: 392 MS
EKG QRS DURATION: 78 MS
EKG QTC CALCULATION (BAZETT): 479 MS
EKG R AXIS: 0 DEGREES
EKG T AXIS: 90 DEGREES
EKG VENTRICULAR RATE: 90 BPM
GFR AFRICAN AMERICAN: >60 ML/MIN
GFR NON-AFRICAN AMERICAN: >60 ML/MIN
GFR SERPL CREATININE-BSD FRML MDRD: ABNORMAL ML/MIN/{1.73_M2}
GFR SERPL CREATININE-BSD FRML MDRD: ABNORMAL ML/MIN/{1.73_M2}
GLUCOSE BLD-MCNC: 87 MG/DL (ref 70–99)
HCT VFR BLD CALC: 25.3 % (ref 36.3–47.1)
HEMOGLOBIN: 7.3 G/DL (ref 11.9–15.1)
HEMOGLOBIN: 7.4 G/DL (ref 11.9–15.1)
HEMOGLOBIN: 7.5 G/DL (ref 11.9–15.1)
IMMATURE RETIC FRACT: 18.6 % (ref 2.7–18.3)
INR BLD: 1.3
MCH RBC QN AUTO: 26.2 PG (ref 25.2–33.5)
MCHC RBC AUTO-ENTMCNC: 29.6 G/DL (ref 28.4–34.8)
MCV RBC AUTO: 88.5 FL (ref 82.6–102.9)
PDW BLD-RTO: 15.9 % (ref 11.8–14.4)
PLATELET # BLD: 862 K/UL (ref 138–453)
PMV BLD AUTO: 9.2 FL (ref 8.1–13.5)
POTASSIUM SERPL-SCNC: 3.4 MMOL/L (ref 3.7–5.3)
PROTHROMBIN TIME: 13.9 SEC (ref 9.4–12.6)
RBC # BLD: 2.86 M/UL (ref 3.95–5.11)
RETIC %: 0.9 % (ref 0.5–1.9)
RETIC HEMOGLOBIN: 21 PG (ref 28.2–35.7)
SODIUM BLD-SCNC: 136 MMOL/L (ref 135–144)
TOTAL PROTEIN: 8 G/DL (ref 6.4–8.3)
WBC # BLD: 16.2 K/UL (ref 3.5–11.3)

## 2017-11-11 PROCEDURE — 6370000000 HC RX 637 (ALT 250 FOR IP): Performed by: NURSE PRACTITIONER

## 2017-11-11 PROCEDURE — 36430 TRANSFUSION BLD/BLD COMPNT: CPT

## 2017-11-11 PROCEDURE — 99222 1ST HOSP IP/OBS MODERATE 55: CPT | Performed by: INTERNAL MEDICINE

## 2017-11-11 PROCEDURE — 85610 PROTHROMBIN TIME: CPT

## 2017-11-11 PROCEDURE — 1200000000 HC SEMI PRIVATE

## 2017-11-11 PROCEDURE — 85045 AUTOMATED RETICULOCYTE COUNT: CPT

## 2017-11-11 PROCEDURE — 2580000003 HC RX 258: Performed by: NURSE PRACTITIONER

## 2017-11-11 PROCEDURE — G0378 HOSPITAL OBSERVATION PER HR: HCPCS

## 2017-11-11 PROCEDURE — 36415 COLL VENOUS BLD VENIPUNCTURE: CPT

## 2017-11-11 PROCEDURE — 86900 BLOOD TYPING SEROLOGIC ABO: CPT

## 2017-11-11 PROCEDURE — 85018 HEMOGLOBIN: CPT

## 2017-11-11 PROCEDURE — 93005 ELECTROCARDIOGRAM TRACING: CPT

## 2017-11-11 PROCEDURE — 94640 AIRWAY INHALATION TREATMENT: CPT

## 2017-11-11 PROCEDURE — 80053 COMPREHEN METABOLIC PANEL: CPT

## 2017-11-11 PROCEDURE — P9016 RBC LEUKOCYTES REDUCED: HCPCS

## 2017-11-11 PROCEDURE — 85027 COMPLETE CBC AUTOMATED: CPT

## 2017-11-11 RX ORDER — FLUTICASONE PROPIONATE 50 MCG
1 SPRAY, SUSPENSION (ML) NASAL DAILY
Status: DISCONTINUED | OUTPATIENT
Start: 2017-11-11 | End: 2017-11-12 | Stop reason: HOSPADM

## 2017-11-11 RX ORDER — ONDANSETRON 4 MG/1
4 TABLET, FILM COATED ORAL EVERY 8 HOURS PRN
Status: CANCELLED | OUTPATIENT
Start: 2017-11-11

## 2017-11-11 RX ORDER — FOLIC ACID 1 MG/1
1 TABLET ORAL DAILY
Status: DISCONTINUED | OUTPATIENT
Start: 2017-11-11 | End: 2017-11-12 | Stop reason: HOSPADM

## 2017-11-11 RX ORDER — ALBUTEROL SULFATE 2.5 MG/3ML
2.5 SOLUTION RESPIRATORY (INHALATION) EVERY 4 HOURS PRN
Status: DISCONTINUED | OUTPATIENT
Start: 2017-11-11 | End: 2017-11-12 | Stop reason: HOSPADM

## 2017-11-11 RX ORDER — CITALOPRAM 20 MG/1
40 TABLET ORAL DAILY
Status: DISCONTINUED | OUTPATIENT
Start: 2017-11-11 | End: 2017-11-12 | Stop reason: HOSPADM

## 2017-11-11 RX ORDER — PANTOPRAZOLE SODIUM 40 MG/1
40 TABLET, DELAYED RELEASE ORAL DAILY
Status: DISCONTINUED | OUTPATIENT
Start: 2017-11-11 | End: 2017-11-12 | Stop reason: HOSPADM

## 2017-11-11 RX ORDER — CETIRIZINE HYDROCHLORIDE 10 MG/1
10 TABLET ORAL DAILY
Status: DISCONTINUED | OUTPATIENT
Start: 2017-11-11 | End: 2017-11-12 | Stop reason: HOSPADM

## 2017-11-11 RX ORDER — ONDANSETRON 2 MG/ML
4 INJECTION INTRAMUSCULAR; INTRAVENOUS EVERY 6 HOURS PRN
Status: DISCONTINUED | OUTPATIENT
Start: 2017-11-11 | End: 2017-11-12 | Stop reason: HOSPADM

## 2017-11-11 RX ORDER — SODIUM CHLORIDE 9 MG/ML
INJECTION, SOLUTION INTRAVENOUS CONTINUOUS
Status: DISCONTINUED | OUTPATIENT
Start: 2017-11-11 | End: 2017-11-12 | Stop reason: HOSPADM

## 2017-11-11 RX ORDER — 0.9 % SODIUM CHLORIDE 0.9 %
250 INTRAVENOUS SOLUTION INTRAVENOUS ONCE
Status: COMPLETED | OUTPATIENT
Start: 2017-11-11 | End: 2017-11-11

## 2017-11-11 RX ORDER — ECHINACEA PURPUREA EXTRACT 125 MG
1 TABLET ORAL PRN
Status: CANCELLED | OUTPATIENT
Start: 2017-11-11

## 2017-11-11 RX ORDER — SODIUM CHLORIDE 0.9 % (FLUSH) 0.9 %
10 SYRINGE (ML) INJECTION PRN
Status: DISCONTINUED | OUTPATIENT
Start: 2017-11-11 | End: 2017-11-12 | Stop reason: HOSPADM

## 2017-11-11 RX ORDER — ALBUTEROL SULFATE 2.5 MG/3ML
2.5 SOLUTION RESPIRATORY (INHALATION)
Status: DISCONTINUED | OUTPATIENT
Start: 2017-11-11 | End: 2017-11-11

## 2017-11-11 RX ORDER — B-COMPLEX WITH VITAMIN C
1 TABLET ORAL DAILY
Status: CANCELLED | OUTPATIENT
Start: 2017-11-11

## 2017-11-11 RX ORDER — LANOLIN ALCOHOL/MO/W.PET/CERES
325 CREAM (GRAM) TOPICAL 2 TIMES DAILY
Status: DISCONTINUED | OUTPATIENT
Start: 2017-11-11 | End: 2017-11-12 | Stop reason: HOSPADM

## 2017-11-11 RX ORDER — SENNA AND DOCUSATE SODIUM 50; 8.6 MG/1; MG/1
1 TABLET, FILM COATED ORAL DAILY
Status: DISCONTINUED | OUTPATIENT
Start: 2017-11-11 | End: 2017-11-12 | Stop reason: HOSPADM

## 2017-11-11 RX ORDER — SODIUM CHLORIDE 0.9 % (FLUSH) 0.9 %
10 SYRINGE (ML) INJECTION EVERY 12 HOURS SCHEDULED
Status: DISCONTINUED | OUTPATIENT
Start: 2017-11-11 | End: 2017-11-12 | Stop reason: HOSPADM

## 2017-11-11 RX ADMIN — PANTOPRAZOLE SODIUM 40 MG: 40 TABLET, DELAYED RELEASE ORAL at 09:50

## 2017-11-11 RX ADMIN — FERROUS SULFATE TAB EC 325 MG (65 MG FE EQUIVALENT) 325 MG: 325 (65 FE) TABLET DELAYED RESPONSE at 09:50

## 2017-11-11 RX ADMIN — CITALOPRAM 40 MG: 20 TABLET, FILM COATED ORAL at 09:50

## 2017-11-11 RX ADMIN — FOLIC ACID 1 MG: 1 TABLET ORAL at 09:50

## 2017-11-11 RX ADMIN — FERROUS SULFATE TAB EC 325 MG (65 MG FE EQUIVALENT) 325 MG: 325 (65 FE) TABLET DELAYED RESPONSE at 02:46

## 2017-11-11 RX ADMIN — SODIUM CHLORIDE 250 ML: 9 INJECTION, SOLUTION INTRAVENOUS at 03:14

## 2017-11-11 RX ADMIN — FERROUS SULFATE TAB EC 325 MG (65 MG FE EQUIVALENT) 325 MG: 325 (65 FE) TABLET DELAYED RESPONSE at 20:15

## 2017-11-11 RX ADMIN — FLUTICASONE PROPIONATE 1 SPRAY: 50 SPRAY, METERED NASAL at 09:50

## 2017-11-11 RX ADMIN — OLODATEROL RESPIMAT INHALATION SPRAY 2 PUFF: 2.5 SPRAY, METERED RESPIRATORY (INHALATION) at 08:54

## 2017-11-11 RX ADMIN — DOCUSATE SODIUM -SENNOSIDES 1 TABLET: 50; 8.6 TABLET, COATED ORAL at 09:50

## 2017-11-11 RX ADMIN — TIOTROPIUM BROMIDE 18 MCG: 18 CAPSULE ORAL; RESPIRATORY (INHALATION) at 08:57

## 2017-11-11 RX ADMIN — CETIRIZINE HYDROCHLORIDE 10 MG: 10 TABLET ORAL at 09:50

## 2017-11-11 RX ADMIN — SODIUM CHLORIDE: 9 INJECTION, SOLUTION INTRAVENOUS at 03:13

## 2017-11-11 ASSESSMENT — ENCOUNTER SYMPTOMS
ABDOMINAL PAIN: 0
BLURRED VISION: 0
COUGH: 0
SPUTUM PRODUCTION: 0
NAUSEA: 0
HEMOPTYSIS: 0
SHORTNESS OF BREATH: 1
DOUBLE VISION: 0
BLOOD IN STOOL: 0
BACK PAIN: 1
VOMITING: 0

## 2017-11-11 ASSESSMENT — PAIN SCALES - GENERAL
PAINLEVEL_OUTOF10: 0

## 2017-11-11 NOTE — H&P
for hematuria. Musculoskeletal: Positive for back pain, joint pain and myalgias. The patient has chronic arthralgias and myalgias     Skin: Negative for itching and rash. Neurological: Positive for weakness. Negative for dizziness and focal weakness. Endo/Heme/Allergies: Does not bruise/bleed easily. Psychiatric/Behavioral: Positive for depression. Physical Exam:   /81   Pulse 89   Temp 98.5 °F (36.9 °C) (Oral)   Resp 20   Ht 4' 9\" (1.448 m)   Wt 114 lb (51.7 kg)   LMP 2012   SpO2 99%   BMI 24.67 kg/m²   Temp (24hrs), Av.8 °F (37.1 °C), Min:97 °F (36.1 °C), Max:99.2 °F (37.3 °C)    No results for input(s): POCGLU in the last 72 hours. Intake/Output Summary (Last 24 hours) at 17 1007  Last data filed at 17 0636   Gross per 24 hour   Intake          1170.83 ml   Output              200 ml   Net           970.83 ml       Physical Exam   Constitutional: She is oriented to person, place, and time. No distress. HENT:   Head: Normocephalic and atraumatic. Eyes: Conjunctivae are normal. No scleral icterus. Neck: Neck supple. No JVD present. Cardiovascular: Normal rate and regular rhythm. Pulmonary/Chest: Effort normal and breath sounds normal. No respiratory distress. She has no wheezes. Abdominal: Soft. Bowel sounds are normal. She exhibits no distension. There is no tenderness. There is no rebound and no guarding. Musculoskeletal: She exhibits no edema or tenderness. Neurological: She is alert and oriented to person, place, and time. Skin: Skin is warm and dry. She is not diaphoretic. There is pallor.    Psychiatric:   Affect flat       Structural Examination:   Scoliosis  No kyphosis  Chronic paraspinal reactivity  ROM is decreased    Investigations:      Laboratory Testing:  Recent Results (from the past 24 hour(s))   CBC Auto Differential    Collection Time: 11/10/17 10:51 PM   Result Value Ref Range    WBC 20.6 (H) 3.5 - 11.3 k/uL    RBC 2.22 (L) 3.95 - 5.11 m/uL    Hemoglobin 5.5 (LL) 11.9 - 15.1 g/dL    Hematocrit 20.0 (L) 36.3 - 47.1 %    MCV 90.1 82.6 - 102.9 fL    MCH 24.8 (L) 25.2 - 33.5 pg    MCHC 27.5 (L) 28.4 - 34.8 g/dL    RDW 17.5 (H) 11.8 - 14.4 %    Platelets 348 (H) 532 - 453 k/uL    MPV 9.2 8.1 - 13.5 fL    Differential Type NOT REPORTED     WBC Morphology NOT REPORTED     RBC Morphology NOT REPORTED     Platelet Estimate NOT REPORTED     Immature Granulocytes 0 0 %    Seg Neutrophils 86 %    Lymphocytes 9 %    Monocytes 4 %    Eosinophils % 1 %    Basophils 0 %    Absolute Immature Granulocyte 0.00 0.00 - 0.30 k/uL    Segs Absolute 17.72 (H) 1.8 - 7.7 k/uL    Absolute Lymph # 1.85 1.0 - 4.8 k/uL    Absolute Mono # 0.82 (H) 0.1 - 0.8 k/uL    Absolute Eos # 0.21 0.0 - 0.4 k/uL    Basophils # 0.00 0.0 - 0.2 k/uL    Morphology 2+    BASIC METABOLIC PANEL    Collection Time: 11/10/17 10:51 PM   Result Value Ref Range    Glucose 100 (H) 70 - 99 mg/dL    BUN 13 6 - 20 mg/dL    CREATININE 0.58 0.50 - 0.90 mg/dL    Bun/Cre Ratio NOT REPORTED 9 - 20    Calcium 9.4 8.6 - 10.4 mg/dL    Sodium 135 135 - 144 mmol/L    Potassium 4.1 3.7 - 5.3 mmol/L    Chloride 95 (L) 98 - 107 mmol/L    CO2 26 20 - 31 mmol/L    Anion Gap 14 9 - 17 mmol/L    GFR Non-African American >60 >60 mL/min    GFR African American >60 >60 mL/min    GFR Comment          GFR Staging NOT REPORTED    Troponin    Collection Time: 11/10/17 10:51 PM   Result Value Ref Range    Troponin T <0.03 <0.03 ng/mL    Troponin Interp         TYPE AND SCREEN    Collection Time: 11/10/17 11:17 PM   Result Value Ref Range    Expiration Date 11/13/2017     Arm Band Number [id number]     ABO/Rh O POSITIVE     Antibody Screen NEGATIVE     Unit Number [id number]     Product Code Leukocyte Reduced Red Cell     Unit Divison 0     Dispense Status ISSUED     Transfusion Status OK TO TRANSFUSE     Crossmatch Result COMPATIBLE     Unit Number [id number]     Product Code Leukocyte Reduced Red Cell light chain level    Hypertension    Acute pure red cell anemia (HCC)    History of blood transfusion        Plan:     Admit  Transfuse as needed  Hematology reevaluation. bpm  Will monitor and control Blood Pressure  Data base in progress   Has follow-up with Dr. Shiv Melendez for pulmonary nodules  Please see orders    Consultations:   IP CONSULT TO HOSPITALIST  IP CONSULT TO HEM/ONC     Patient is admitted as inpatient status because of co-morbidities listed above, severity of signs and symptoms as outlined, requirement for current medical therapies and most importantly because of direct risk to patient if care not provided in a hospital setting.     Pretty Cunningham DO  11/11/2017  10:07 AM    Copy sent to Dr. Addy Helms MD

## 2017-11-11 NOTE — CONSULTS
tablet by mouth daily 10/23/17  Yes Mundo Mckinney MD   aspirin (ASPIRIN LOW DOSE) 81 MG chewable tablet Take 1 tablet by mouth daily 10/23/17  Yes Mundo Mckinney MD   pantoprazole (PROTONIX) 40 MG tablet Take 1 tablet by mouth daily 10/23/17  Yes Mundo Mckinney MD   folic acid (FOLVITE) 1 MG tablet Take 1 tablet by mouth daily 10/23/17  Yes Mundo Mckinney MD   umeclidinium-vilanterol (ANORO ELLIPTA) 62.5-25 MCG/INH AEPB inhaler Inhale 1 puff into the lungs daily 10/20/17  Yes Lindsey Bernard MD   VENTOLIN  (90 Base) MCG/ACT inhaler INHALE 2 PUFFS INTO THE LUNGS EVERY 6 HOURS AS NEEDED FOR WHEEZING 9/22/17  Yes Lindsey Bernard MD   docusate sodium (DOCQLACE) 100 MG capsule TAKE 1 CAPSULE BY MOUTH 2 TIMES DAILY AS NEEDED FOR CONSTIPATION 8/15/17  Yes Teressa Gómez MD   fluticasone (FLONASE) 50 MCG/ACT nasal spray 1 spray by Nasal route daily 8/15/17  Yes Teressa Gómez MD   ondansetron (ZOFRAN) 4 MG tablet Take 1 tablet by mouth every 8 hours as needed for Nausea 5/9/17  Yes Brinda Watson MD   albuterol (PROVENTIL) (5 MG/ML) 0.5% nebulizer solution Take 0.5 mLs by nebulization every 6 hours as needed for Wheezing 1/23/17   Neil Marti MD     Current Facility-Administered Medications   Medication Dose Route Frequency Provider Last Rate Last Dose    citalopram (CELEXA) tablet 40 mg  40 mg Oral Daily Nathanel Tamez, NP   40 mg at 11/11/17 0950    ferrous sulfate EC tablet 325 mg  325 mg Oral BID Nathanel Tamez, NP   325 mg at 11/11/17 0950    fluticasone (FLONASE) 50 MCG/ACT nasal spray 1 spray  1 spray Nasal Daily Nathanel Dashawn, NP   1 spray at 41/02/66 6187    folic acid (FOLVITE) tablet 1 mg  1 mg Oral Daily Nattania Tamez, NP   1 mg at 11/11/17 0950    cetirizine (ZYRTEC) tablet 10 mg  10 mg Oral Daily Nathanel Tamez, NP   10 mg at 11/11/17 0950    sennosides-docusate sodium (SENOKOT-S) 8.6-50 MG tablet 1 tablet  1 tablet Oral Daily Lauren Perez Marina Malcolm, NP   1 tablet at 11/11/17 0950    pantoprazole (PROTONIX) tablet 40 mg  40 mg Oral Daily Gleda Carmel, NP   40 mg at 11/11/17 0950    0.9 % sodium chloride infusion   Intravenous Continuous Ez Gamble DO 75 mL/hr at 11/11/17 1114      sodium chloride flush 0.9 % injection 10 mL  10 mL Intravenous 2 times per day Gleda Mynor, NP        sodium chloride flush 0.9 % injection 10 mL  10 mL Intravenous PRN Gleda Mynor, NP        [facility]ron [facility]) injection 4 mg  4 mg Intravenous Q6H PRN Latisha Mynor NP        tiotropium [facility]) inhalation capsule 18 mcg  18 mcg Inhalation Daily Gleda Carmel, NP   18 mcg at 11/11/17 0857    olodaterol (STRIVERDI RESPIMAT) inhaler 2 puff  2 puff Inhalation Daily Latisha Lwoe NP   2 puff at 11/11/17 0854    0.9 % sodium chloride bolus  250 mL Intravenous Once Rebekahda Mynor NP 20 mL/hr at 11/11/17 0314 250 mL at 11/11/17 0314    albuterol (PROVENTIL) nebulizer solution 2.5 mg  2.5 mg Nebulization Q4H PRN Latisha Lowe NP         Facility-Administered Medications Ordered in Other Encounters   Medication Dose Route Frequency Provider Last Rate Last Dose    lactated ringers infusion   Intravenous Continuous Niesha Bustillos MD 0 mL/hr at 12/01/15 1220      meperidine (DEMEROL) injection 12.5 mg  12.5 mg Intravenous Q5 Min PRCÉSAR Welch MD        fentaNYL (SUBLIMAZE) injection 50 mcg  50 mcg Intravenous Q5 Min PRN Lisbet Welch MD           Allergies:  Latex; Bee venom; Benadryl [diphenhydramine-zinc acetate]; Tavist; and Tylenol [acetaminophen]    REVIEW OF SYSTEMS:      Constitutional:  No fever or chills.  No night sweats, no weight loss  Eyes:  No eye discharge, double vision, or eye pain   HEENT:  negative for  hearing loss, tinnitus, ear drainage, earaches,  epistaxis,  sore mouth, sore throat, hoarseness and voice change  Respiratory:  Intermittent productive cough, clear sputum, negative for  dyspnea, wheezing, hemoptysis, chest pain  Cardiovascular:  negative for  chest pain, dyspnea, palpitations, orthopnea, PND,   edema, syncope  Gastrointestinal:  Nausea vomiting and abdominal pain have subsided. Genitourinary:  negative for frequency, dysuria, nocturia, urinary incontinence,  and hematuria  Integument/Breast:  negative for rash, skin lesions, bruises. Hematologic/Lymphatic:  negative for easy bruising, bleeding, lymphadenopathy, petechiae and swelling/edema  Allergic/Immunologic:  negative for recurrent infections, urticaria, hay fever, angioedema, anaphylaxis   Endocrine:  negative for heat or cold intolerance, tremor, weight changes, change in bowel habits and hair loss  Musculoskeletal:  negative for  myalgias, arthralgias, pain, joint swelling,and bone pain  Neurological:  negative for headaches, dizziness, seizures, weakness, numbness, syncope, near syncope, pain and tingling  Behavior/Psych:  negative for anxiety        PHYSICAL EXAM:      /83   Pulse 93   Temp 98.8 °F (37.1 °C) (Oral)   Resp 18   Ht 4' 9\" (1.448 m)   Wt 114 lb (51.7 kg)   LMP 2012   SpO2 93%   BMI 24.67 kg/m²    Temp (24hrs), Av.8 °F (37.1 °C), Min:97 °F (36.1 °C), Max:99.2 °F (37.3 °C)      General appearance - tired  appearing,  In no pain or acute  Distress, pale  Mental status - good mood, alert and oriented  Eyes - pupils equal and reactive, extraocular eye movements intact  Ears - bilateral TM's and external ear canals normal  Nose - normal and patent, no erythema, discharge or polyps  Mouth - mucous membranes moist, pharynx normal without lesions  Neck - supple, no significant adenopathy  Lymphatics - no palpable lymphadenopathy, no hepatosplenomegaly  Chest - clear to auscultation, no wheezes, rales or rhonchi, symmetric air entry  Heart - normal rate, regular rhythm, normal S1, S2, no murmurs, rubs, clicks or gallops  Abdomen - no ascites appreciated. +BS.  No tenderness appreciated   Neurological - alert, oriented, normal speech, no focal findings or movement disorder noted  Musculoskeletal - no joint tenderness, deformity or swelling  Extremities - peripheral pulses normal, no pedal edema, no clubbing or cyanosis  Skin -pale    DATA:      Labs:     CBC:   Recent Labs      11/10/17   2251  11/11/17   0655  11/11/17   1112   WBC  20.6*  16.2*   --    HGB  5.5*  7.5*  7.3*   HCT  20.0*  25.3*   --    PLT  989*  862*   --      BMP:   Recent Labs      11/10/17   2251  11/11/17   0655   NA  135  136   K  4.1  3.4*   CO2  26  22   BUN  13  11   CREATININE  0.58  0.50   LABGLOM  >60  >60   GLUCOSE  100*  87     PT/INR:   Recent Labs      11/11/17   0655   PROTIME  13.9*   INR  1.3     APTT:  No results for input(s): APTT in the last 72 hours. LIVER PROFILE:  Recent Labs      11/11/17   0655   AST  18   ALT  7   LABALBU  2.0*      Imaging  Impression   IMPRESSION:   CT ABD and Pelvis 6/22/15   1. Increased extent, though not of maximal caliber, of dilated left upper quadrant small bowel. Discrete transition zone is not identified, though chronic partial or early or intermittent small bowel obstruction is suspected. 2. Increased size of upper ventral abdominal wall hernia, containing nonobstructed transverse colon and small bowel. Fluid density extending into the hernia may be mesenteric, rather than intraluminal, and hernia incarceration cannot be excluded. 3. Interval development of small amount of left upper and lower quadrant free intraperitoneal fluid. 4. Large abdominal cystic mass appears minimally changed in size. This finding is suspicious for neoplasm, though of unclear anatomic origin given its appearance on prior studies. It could represent loculated intraperitoneal fluid. 5. Increased size of right lower lobe and new left lower lobe pulmonary nodules suggest progression of metastases.          Report electronically signed by Ioncencia Palma MD   2       16.     [id number]               100       17.     CD57               8       18.                    1       19.     FMC7               18       20.     Kappa               11       21.     Lambda               9         This test was developed and its performance characteristics determined   by [facility].  It has not   been cleared or approved by the U.S. Food and Drug Administration. The FDA does not require this test to go through premarket FDA review. This test is used for clinical purposes.  It should not be regarded   as investigational or for research.  This laboratory is certified   under the Clinical Laboratory Improvement Amendments of 1988 (CLIA) as   qualified to perform high complexity clinical laboratory testing.                             Final Diagnosis   PERIPHERAL BLOOD:   -NEUTROPHILIA (26,700/UL). -MODERATE THROMBOCYTOSIS. -MODERATE NORMOCYTIC ANEMIA. BONE MARROW BIOPSY, ASPIRATE SMEAR AND CLOT SECTION:   -NORMOCELLULAR TRILINEAGE HEMATOPOIETIC MARROW WITH MILDLY LEFT   SHIFTED MYELOID MATURATION, NORMAL ERYTHROID AND MEGAKARYOCYTIC   LINEAGES, AND MARKEDLY INCREASED IRON STORES. -   10% PLASMA CELLS; NEGATIVE FOR PLASMA CELL MONOTYPIA BY   IMMUNOHISTOCHEMISTRY.        Diagnosis Comment   PATIENT HAS HAD PERSISTENT NEUTROPHILIA AND THROMBOCYTOSIS FOR AT   LEAST 2 YEARS, AS WELL AS, MODERATE ANEMIA.  BCR-ABL AND GENNARO-2 ARE   BOTH NEGATIVE.  ANEMIA OF CHRONIC DISEASE/CHRONIC INFLAMMATION,   REACTIVE NEUTROPHILIA AND THROMBOCYTOSIS ARE FAVORED.  THE PATIENT IS   NOTED TO HAVE SMALL SERUM MONOCLONAL IGG KAPPA PARAPROTEIN OF 0.13G/DL   AND URINE IGG KAPPA OF 2.3 MG/DL,  WHICH IN REGARD TO THE BONE MARROW,   APPEARS MOST COMPATIBLE WITH A SMALL, NON-DETECTABLE MONOCLONAL PLASMA   CELL POPULATION IN A BACKGROUND OF RELATIVELY PROMINENT REACTIVE   PLASMACYTOSIS.  THE PATIENT IS ALSO NOTED TO HAVE PERSISTENT ELEVATED   ALKALINE PHOSPHATASE

## 2017-11-11 NOTE — PLAN OF CARE
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 Ted Alvares for pulmonary nodules    IP CONSULT TO HOSPITALIST  IP CONSULT TO [address] [place] [address],   11/11/2017  3:12 PM

## 2017-11-11 NOTE — PROGRESS NOTES
Jazlyn Aj, PPatient Assessment complete. Anemia [D64.9] . Vitals:    11/11/17 0418   BP: 124/76   Pulse: 90   Resp: 18   Temp: 98.7 °F (37.1 °C)   SpO2: 97%   . Patients home meds are   Prior to Admission medications    Medication Sig Start Date End Date Taking? Authorizing Provider   oxyCODONE-acetaminophen (PERCOCET) 5-325 MG per tablet Take 1 tablet by mouth every 6 hours as needed for Pain .  11/2/17 12/2/17 Yes Kavon Valadez MD   sodium chloride (ALTAMIST SPRAY) 0.65 % nasal spray 1 spray by Nasal route as needed for Congestion 10/23/17  Yes Oralia Aase, MD   Calcium Carbonate-Vitamin D (OYSTER SHELL CALCIUM/D) 500-200 MG-UNIT TABS Take 1 tablet by mouth daily Please discontinue previous prescription of calcium 10/23/17 10/23/18 Yes Oralia Aase, MD   loratadine (CLARITIN) 10 MG tablet Take 1 tablet by mouth daily 10/23/17  Yes Oralia Aase, MD   citalopram (CELEXA) 40 MG tablet Take 1 tablet by mouth daily 10/23/17  Yes Oralia Aase, MD   ferrous sulfate 325 (65 Fe) MG tablet Take 1 tablet by mouth 2 times daily 10/23/17  Yes Oralia Aase, MD   senna-docusate (SENEXON-S) 8.6-50 MG per tablet Take 1 tablet by mouth daily 10/23/17  Yes Oralia Aase, MD   aspirin (ASPIRIN LOW DOSE) 81 MG chewable tablet Take 1 tablet by mouth daily 10/23/17  Yes Oralia Aase, MD   pantoprazole (PROTONIX) 40 MG tablet Take 1 tablet by mouth daily 10/23/17  Yes Oralia Aase, MD   folic acid (FOLVITE) 1 MG tablet Take 1 tablet by mouth daily 10/23/17  Yes Oralia Aase, MD   umeclidinium-vilanterol (ANORO ELLIPTA) 62.5-25 MCG/INH AEPB inhaler Inhale 1 puff into the lungs daily 10/20/17  Yes Tone Valero MD   VENTOLIN  (90 Base) MCG/ACT inhaler INHALE 2 PUFFS INTO THE LUNGS EVERY 6 HOURS AS NEEDED FOR WHEEZING 9/22/17  Yes Tone Valero MD   docusate sodium (DOCQLACE) 100 MG capsule TAKE 1 CAPSULE BY MOUTH 2 TIMES DAILY AS NEEDED FOR CONSTIPATION 8/15/17  Yes Fabiola Chamberlain atelectasis  Basilar aeration []  Atelectasis or absent basilar breath sounds   Incentive Spirometry Volume  (Per IBW)   []  Greater than or equal to 15ml/Kg []  less than 15ml/Kg []  less than 15ml/Kg   Surgery within last 2 weeks []  None or general   []  Abdominal or thoracic surgery  []  Abdominal or thoracic   Chronic Pulmonary Historyre []  No []  Yes []  Yes     []  Secretion Management Assessment  Score 1 2 3   Bilateral Breath Sounds   []  Occasional Rhonchi []  Scattered Rhonchi []  Course Rhonchi and/or poor aeration   Sputum    []  Small amount of thin secretions []  Moderate amount of viscous secretions []  Copius, Viscious Yellow/ Secretions   CXR as reported by physician []  clear  []  Unavailable []  Infiltrates and/or consolidation  []  Unavailable []  Mucus Plugging and or lobar consolidation  []  Unavailable   Cough []  Strong, productive cough []  Weak productive cough []  No cough or weak non-productive cough   Jazlyn Herrmnan  4:32 AM                            FEMALE                                  MALE                            FEV1 Predicted Normal Values                        FEV1 Predicted Normal Values          Age                                     Height in Feet and Inches       Age                                     Height in Feet and Inches       4' 11\" 5' 1\" 5' 3\" 5' 5\" 5' 7\" 5' 9\" 5' 11\" 6' 1\"  4' 11\" 5' 1\" 5' 3\" 5' 5\" 5' 7\" 5' 9\" 5' 11\" 6' 1\"   42 - 45 2.49 2.66 2.84 3.03 3.22 3.42 3.62 3.83 42 - 45 2.82 3.03 3.26 3.49 3.72 3.96 4.22 4.47   46 - 49 2.40 2.57 2.76 2.94 3.14 3.33 3.54 3.75 46 - 49 2.70 2.92 3.14 3.37 3.61 3.85 4.10 4.36   50 - 53 2.31 2.48 2.66 2.85 3.04 3.24 3.45 3.66 50 - 53 2.58 2.80 3.02 3.25 3.49 3.73 3.98 4.24   54 - 57 2.21 2.38 2.57 2.75 2.95 3.14 3.35 3.56 54 - 57 2.46 2.67 2.89 3.12 3.36 3.60 3.85 4.11   58 - 61 2.10 2.28 2.46 2.65 2.84 3.04 3.24 3.45 58 - 61 2.32 2.54 2.76 2.99 3.23 3.47 3.72 3.98   62 - 65 1.99 2.17 2.35 2.54 2.73 2.93 3.13 3.34 62 -

## 2017-11-11 NOTE — CARE COORDINATION
Case Management Initial Discharge Plan  Lucila ANDRADE Samples,         Readmission Risk              Readmission Risk:        21       Age 72 or Greater:  0    Admitted from SNF or Requires Paid or Family Care:  0    Currently has CHF,COPD,ARF,CRI,or is on dialysis:  4    Takes more than 5 Prescription Medications:  4    Takes Digoxin,Insulin,Anticoagulants,Narcotics or ASA/Plavix:  [address] in Past 12 Months:  10    On Disability:  0    Patient Considers own Health:  5            Met with:patient to discuss discharge plans.    Information verified: address, contacts, phone number, , insurance Yes  PCP: Cyndi Das MD  Date of last visit: has appointment scheduled      Insurance Provider: [facility]     Discharge Planning  Current Residence:  Private Residence  Living Arrangements:  Family Members   Home has one stories/minimal  stairs to climb  Support Systems:  Family Members  Current Services PTA:  None Supplier: none   Patient able to perform ADL's:Independent  DME used to aid ambulation prior to admission: none /during admission none     Potential Assistance Needed:  N/A    Pharmacy: [facility]    Potential Assistance Purchasing Medications:  No  Does patient want to participate in local refill/ meds to beds program?  Yes    Patient agreeable to home care: No  [place] of choice provided:  n/a      Type of Home Care Services:  None  Patient expects to be discharged to:  Private Residence    Prior SNF/Rehab Placement and Facility: no   Agreeable to SNF/Rehab: No  [place] of choice provided: n/a   Evaluation: n/a    Expected Discharge date:  17  Follow Up Appointment: Best Day/ Time: Monday AM    Transportation provider: Sister can provide   Transportation arrangements needed for discharge: No    Discharge Plan: Plan to discharge to home independently; has follow up         Electronically signed by Kami Dominguez RN on 17 at 9:58 AM

## 2017-11-11 NOTE — ED NOTES
Pt ambulated to room 21 with assistance. Pt c/o fatigue since this morning. States she woke up and has been fatigued and has been short of breath since then. Pt states she has a hx of anemia and states she last received blood in the last month. Pt appears pale with cool skin. Denies any pain at this time. Placed on monitor. Will continue to monitor.      Talib Dewitt RN  11/10/17 2161

## 2017-11-11 NOTE — ED PROVIDER NOTES
[facility]//MED SURG  Emergency Department Encounter  Emergency Medicine Resident     Pt Name: Heather Law  MRN: [id number]  [place] 1970  Date of evaluation: 11/10/17  PCP:  Gilberto Cuevas MD    [address] [place] [address]       Chief Complaint   Patient presents with    Fatigue       HISTORY OF PRESENT ILLNESS  (Location/Symptom, Timing/Onset, Context/Setting, Quality, Duration, Modifying Factors, Severity.)      Gwendolyn Dancer A Samples is a 50 y.o. female who presents with Fatigue. She notes she has a history of anemia requiring blood transfusions her last being approximately one month ago. She awoke this morning feeling increasingly fatigued and her family told her she looked pale. She denies any pain, increased SOB, fevers, chills, NVD. PAST MEDICAL / SURGICAL / SOCIAL / FAMILY HISTORY      has a past medical history of Allergic rhinitis; Anxiety; Asthma; Blood circulation, collateral; Celiac disease; Chronic back pain; Cirrhosis of liver with ascites ([address] Constipation; Cough; Cryptogenic organizing pneumonia ([address] Depression; Difficult intravenous access; Disease of blood and blood forming organ; Edentulous; Full dentures; Gastric outlet obstruction; GERD (gastroesophageal reflux disease); Hearing loss; History of blood transfusion; Hydronephrosis, bilateral; Hypertension; Hypoglycemia; Lung nodule; Migraine; OCD (obsessive compulsive disorder); Osteoarthritis; Osteoporosis; Pelvic mass; Perforated nasal septum; Pneumonia; Psoriasis; PTSD (post-traumatic stress disorder); Scoliosis; Shortness of breath; Substance abuse; Thrombocytosis ([address] Urinary incontinence; and Wears glasses. has a past surgical history that includes Tubal ligation (1989); Tonsillectomy and adenoidectomy (1982); ERCP (5/16/2013); Upper gastrointestinal endoscopy; gastrectomy (2012); Paracentesis (Right, 01/2015-09/2015); Cystocopy (12/1/2015); Fixation Kyphoplasty (08/09/2016);  Lung biopsy; insert nasal septal prosthesis (N/A, 4/14/2017); create eardrum opening,gen anesth (N/A, 4/14/2017); Abdomen surgery (09/2015); Cystoscopy (05/11/2017); Ureter stent placement (05/11/2017); Ureteroscopy (05/11/2017); Cystoscopy (Right, 5/11/2017); Cholecystectomy; hernia repair (07/18/2017); repair incisional hernia,reducible (N/A, 7/18/2017); and esophagogastroduodenoscopy transoral diagnostic (N/A, 8/30/2017). Social History     Social History    Marital status: Single     Spouse name: N/A    Number of children: N/A    Years of education: N/A     Occupational History    Not on file. Social History Main Topics    Smoking status: Current Some Day Smoker     Packs/day: 0.25     Years: 30.00     Types: Cigarettes     Last attempt to quit: 12/1/2012    Smokeless tobacco: Never Used      Comment: pt states 3 cigs per day    Alcohol use No      Comment: quit in 1987    Drug use: No      Comment: quit marijuana 1987    Sexual activity: No     Other Topics Concern    Not on file     Social History Narrative    No narrative on file       Patient was advised to stop smoking or to avoid tobacco use    Family History   Problem Relation Age of Onset    Heart Disease Mother     Stroke Father      cerebral aneurysm       Allergies:  Latex; Bee venom; Benadryl [diphenhydramine-zinc acetate]; Tavist; and Tylenol [acetaminophen]    Home Medications:  Prior to Admission medications    Medication Sig Start Date End Date Taking? Authorizing Provider   oxyCODONE-acetaminophen (PERCOCET) 5-325 MG per tablet Take 1 tablet by mouth every 6 hours as needed for Pain .  11/2/17 12/2/17 Yes Paul Lucero MD   sodium chloride (ALTAMIST SPRAY) 0.65 % nasal spray 1 spray by Nasal route as needed for Congestion 10/23/17  Yes Carl Cruz MD   Calcium Carbonate-Vitamin D (OYSTER SHELL CALCIUM/D) 500-200 MG-UNIT TABS Take 1 tablet by mouth daily Please discontinue previous prescription of calcium 10/23/17 10/23/18 Yes Carl Cruz MD loratadine (CLARITIN) 10 MG tablet Take 1 tablet by mouth daily 10/23/17  Yes Gilberto Cuevas MD   citalopram (CELEXA) 40 MG tablet Take 1 tablet by mouth daily 10/23/17  Yes Gilberto Cuevas MD   ferrous sulfate 325 (65 Fe) MG tablet Take 1 tablet by mouth 2 times daily 10/23/17  Yes Gilberto Ceuvas MD   senna-docusate (SENEXON-S) 8.6-50 MG per tablet Take 1 tablet by mouth daily 10/23/17  Yes Gilberto Cuevas MD   aspirin (ASPIRIN LOW DOSE) 81 MG chewable tablet Take 1 tablet by mouth daily 10/23/17  Yes Gilberto Cuevas MD   pantoprazole (PROTONIX) 40 MG tablet Take 1 tablet by mouth daily 10/23/17  Yes Gilberto Cuevas MD   folic acid (FOLVITE) 1 MG tablet Take 1 tablet by mouth daily 10/23/17  Yes Gilberto Cuevas MD   umeclidinium-vilanterol (ANORO ELLIPTA) 62.5-25 MCG/INH AEPB inhaler Inhale 1 puff into the lungs daily 10/20/17  Yes Elvis Wynn MD   VENTOLIN  (90 Base) MCG/ACT inhaler INHALE 2 PUFFS INTO THE LUNGS EVERY 6 HOURS AS NEEDED FOR WHEEZING 9/22/17  Yes Elvis Wynn MD   docusate sodium (DOCQLACE) 100 MG capsule TAKE 1 CAPSULE BY MOUTH 2 TIMES DAILY AS NEEDED FOR CONSTIPATION 8/15/17  Yes Selma Townsend MD   fluticasone (FLONASE) 50 MCG/ACT nasal spray 1 spray by Nasal route daily 8/15/17  Yes Selma Townsend MD   ondansetron (ZOFRAN) 4 MG tablet Take 1 tablet by mouth every 8 hours as needed for Nausea 5/9/17  Yes Emilee Michelle MD   albuterol (PROVENTIL) (5 MG/ML) 0.5% nebulizer solution Take 0.5 mLs by nebulization every 6 hours as needed for Wheezing 1/23/17   Essie Rodas MD       REVIEW OF SYSTEMS    (2-9 systems for level 4, 10 or more for level 5)      Review of Systems   Constitutional: Positive for activity change and fatigue. Negative for appetite change, chills and fever. Respiratory: Negative for cough, shortness of breath (patient has a history of COPD she notes her breathing is at baseline currently. ), wheezing and stridor.     Cardiovascular: Negative for chest pain and palpitations. Gastrointestinal: Negative for abdominal distention, abdominal pain, blood in stool, constipation, diarrhea, nausea and vomiting. Genitourinary: Negative for dysuria, frequency and hematuria. Musculoskeletal: Positive for myalgias (b/l chronic leg pain, unchanged). Negative for back pain, gait problem and joint swelling. Neurological: Negative for dizziness, syncope, speech difficulty and headaches. PHYSICAL EXAM   (up to 7 for level 4, 8 or more for level 5)      INITIAL VITALS:   /81   Pulse 89   Temp 98.5 °F (36.9 °C) (Oral)   Resp 20   Ht 4' 9\" (1.448 m)   Wt 114 lb (51.7 kg)   LMP 04/18/2012   SpO2 99%   BMI 24.67 kg/m²     Physical Exam   Constitutional: She is oriented to person, place, and time. She appears well-developed and well-nourished. No distress. HENT:   Head: Normocephalic and atraumatic. Eyes: EOM are normal. Pupils are equal, round, and reactive to light. Pale conjunctiva    Cardiovascular: Normal rate, regular rhythm, normal heart sounds and intact distal pulses. Pulmonary/Chest: Effort normal and breath sounds normal. No respiratory distress. She has no wheezes. Abdominal: Soft. Bowel sounds are normal. There is no tenderness. There is no rebound. Musculoskeletal: Normal range of motion. She exhibits no edema. Neurological: She is alert and oriented to person, place, and time. No cranial nerve deficit. Skin: Skin is warm and dry. She is not diaphoretic. There is pallor. Psychiatric: She has a normal mood and affect. Thought content normal.   Nursing note and vitals reviewed.       DIFFERENTIAL  DIAGNOSIS     PLAN (LABS / IMAGING / EKG):  Orders Placed This Encounter   Procedures    CBC Auto Differential    BASIC METABOLIC PANEL    Troponin    Comprehensive metabolic panel    CBC    Protime-INR    Hemoglobin    Diet NPO Effective Now Exceptions are: Ice Chips, Sips with Meds    Vital signs per unit routine    Notify physician    Bedrest with bathroom privileges    Daily weights    Intake and output    Nursing to document all stools for color and consistency    Tobacco cessation education protocol    Place intermittent pneumatic compression device    Telemetry Monitoring    Full Code    Inpatient consult to Hospitalist    Initiate Oxygen Therapy Protocol    Initiate RT Protocol    Respiratory care evaluation only    MDI Treatment    HHN Treatment    EKG 12 Lead    TYPE AND SCREEN    PREPARE RBC (CROSSMATCH) Number of Units: 2, 2 Units    PREPARE RBC (CROSSMATCH) Number of Units: 2, 2 Units    PATIENT STATUS (FROM ED OR OR/PROCEDURAL) Inpatient       MEDICATIONS ORDERED:  Orders Placed This Encounter   Medications    0.9 % sodium chloride bolus    DISCONTD: 0.9 % sodium chloride bolus    citalopram (CELEXA) tablet 40 mg    ferrous sulfate EC tablet 325 mg    fluticasone (FLONASE) 50 MCG/ACT nasal spray 1 spray    folic acid (FOLVITE) tablet 1 mg    cetirizine (ZYRTEC) tablet 10 mg    sennosides-docusate sodium (SENOKOT-S) 8.6-50 MG tablet 1 tablet    pantoprazole (PROTONIX) tablet 40 mg    DISCONTD: umeclidinium-vilanterol (ANORO ELLIPTA) 62.5-25 MCG/INH inhaler 1 puff    0.9 % sodium chloride infusion    sodium chloride flush 0.9 % injection 10 mL    sodium chloride flush 0.9 % injection 10 mL    ondansetron (ZOFRAN) injection 4 mg    DISCONTD: albuterol (PROVENTIL) nebulizer solution 2.5 mg    tiotropium (SPIRIVA) inhalation capsule 18 mcg    olodaterol (STRIVERDI RESPIMAT) inhaler 2 puff    0.9 % sodium chloride bolus    albuterol (PROVENTIL) nebulizer solution 2.5 mg       DDX: Anemia, ACS, dehydration, gastroenteritis, COPD exacerbation, Sepsis,      DIAGNOSTIC RESULTS / EMERGENCY DEPARTMENT COURSE / MDM     LABS:  Results for orders placed or performed during the hospital encounter of 11/10/17   CBC Auto Differential   Result Value Ref Range    WBC 20.6 (H) 3.5 - 11.3 k/uL    RBC 2.0 (L) 3.5 - 5.2 g/dL    Albumin/Globulin Ratio 0.3 (L) 1.0 - 2.5    GFR Non-African American >60 >60 mL/min    GFR African American >60 >60 mL/min    GFR Comment          GFR Staging NOT REPORTED    CBC   Result Value Ref Range    WBC 16.2 (H) 3.5 - 11.3 k/uL    RBC 2.86 (L) 3.95 - 5.11 m/uL    Hemoglobin 7.5 (L) 11.9 - 15.1 g/dL    Hematocrit 25.3 (L) 36.3 - 47.1 %    MCV 88.5 82.6 - 102.9 fL    MCH 26.2 25.2 - 33.5 pg    MCHC 29.6 28.4 - 34.8 g/dL    RDW 15.9 (H) 11.8 - 14.4 %    Platelets 085 (H) 864 - 453 k/uL    MPV 9.2 8.1 - 13.5 fL   Protime-INR   Result Value Ref Range    Protime 13.9 (H) 9.4 - 12.6 sec    INR 1.3    TYPE AND SCREEN   Result Value Ref Range    Expiration Date 11/13/2017     Arm Band Number [id number]     ABO/Rh O POSITIVE     Antibody Screen NEGATIVE     Unit Number [id number]     Product Code Leukocyte Reduced Red Cell     Unit Divison 0     Dispense Status ISSUED     Transfusion Status OK TO TRANSFUSE     Crossmatch Result COMPATIBLE     Unit Number [id number]     Product Code Leukocyte Reduced Red Cell     Unit Divison 0     Dispense Status ISSUED     Transfusion Status OK TO TRANSFUSE     Crossmatch Result COMPATIBLE        IMPRESSION: Acute on Chronic anemia. Patient was found to have a hemoglobin of 5.5. She was typed and screened for 2 units of packed red blood cells and admitted to [facility] service for further evaluation and treatment. RADIOLOGY:  No results found. EKG  NSR   Vent rate: 90  When compared with previous EKG  no significant change was found      All EKG's are interpreted by the Emergency Department Physician who either signs or Co-signs this chart in the absence of a cardiologist.    EMERGENCY DEPARTMENT COURSE:  ED Course as of Nov 11 0807   Fri Nov 10, 2017   2345 Patient previously type and screened, will transfuse 2 units PRBCs. [facility] called and accepted the admission.   Hemoglobin Quant: (!!) 5.5 [BL]   Sat Nov 11, 2017   0010 Hematocrit: (!) 20.0

## 2017-11-12 VITALS
OXYGEN SATURATION: 97 % | BODY MASS INDEX: 24.59 KG/M2 | WEIGHT: 114 LBS | HEART RATE: 82 BPM | SYSTOLIC BLOOD PRESSURE: 124 MMHG | RESPIRATION RATE: 16 BRPM | DIASTOLIC BLOOD PRESSURE: 78 MMHG | HEIGHT: 57 IN | TEMPERATURE: 98.6 F

## 2017-11-12 LAB
ABO/RH: NORMAL
ABSOLUTE EOS #: 0 K/UL (ref 0–0.4)
ABSOLUTE IMMATURE GRANULOCYTE: 0 K/UL (ref 0–0.3)
ABSOLUTE LYMPH #: 1.96 K/UL (ref 1–4.8)
ABSOLUTE MONO #: 0.91 K/UL (ref 0.1–0.8)
ABSOLUTE RETIC #: 0.03 M/UL (ref 0.03–0.08)
ANION GAP SERPL CALCULATED.3IONS-SCNC: 15 MMOL/L (ref 9–17)
ANTIBODY SCREEN: NEGATIVE
ARM BAND NUMBER: NORMAL
BASOPHILS # BLD: 0 %
BASOPHILS ABSOLUTE: 0 K/UL (ref 0–0.2)
BLD PROD TYP BPU: NORMAL
BLD PROD TYP BPU: NORMAL
BUN BLDV-MCNC: 9 MG/DL (ref 6–20)
BUN/CREAT BLD: ABNORMAL (ref 9–20)
CALCIUM SERPL-MCNC: 8.7 MG/DL (ref 8.6–10.4)
CHLORIDE BLD-SCNC: 100 MMOL/L (ref 98–107)
CO2: 21 MMOL/L (ref 20–31)
CREAT SERPL-MCNC: 0.48 MG/DL (ref 0.5–0.9)
CROSSMATCH RESULT: NORMAL
CROSSMATCH RESULT: NORMAL
DATE, STOOL #1: NORMAL
DATE, STOOL #2: NORMAL
DATE, STOOL #3: NORMAL
DIFFERENTIAL TYPE: ABNORMAL
DISPENSE STATUS BLOOD BANK: NORMAL
DISPENSE STATUS BLOOD BANK: NORMAL
EOSINOPHILS RELATIVE PERCENT: 0 %
EXPIRATION DATE: NORMAL
GFR AFRICAN AMERICAN: >60 ML/MIN
GFR NON-AFRICAN AMERICAN: >60 ML/MIN
GFR SERPL CREATININE-BSD FRML MDRD: ABNORMAL ML/MIN/{1.73_M2}
GFR SERPL CREATININE-BSD FRML MDRD: ABNORMAL ML/MIN/{1.73_M2}
GLUCOSE BLD-MCNC: 90 MG/DL (ref 70–99)
HCT VFR BLD CALC: 26.9 % (ref 36.3–47.1)
HEMOCCULT SP1 STL QL: NEGATIVE
HEMOCCULT SP2 STL QL: NORMAL
HEMOCCULT SP3 STL QL: NORMAL
HEMOGLOBIN: 7 G/DL (ref 11.9–15.1)
HEMOGLOBIN: 7.3 G/DL (ref 11.9–15.1)
HEMOGLOBIN: 7.5 G/DL (ref 11.9–15.1)
IMMATURE GRANULOCYTES: 0 %
IMMATURE RETIC FRACT: 19.9 % (ref 2.7–18.3)
LYMPHOCYTES # BLD: 13 %
MCH RBC QN AUTO: 25.8 PG (ref 25.2–33.5)
MCHC RBC AUTO-ENTMCNC: 27.9 G/DL (ref 28.4–34.8)
MCV RBC AUTO: 92.4 FL (ref 82.6–102.9)
MONOCYTES # BLD: 6 %
MORPHOLOGY: ABNORMAL
PDW BLD-RTO: 16.8 % (ref 11.8–14.4)
PLATELET # BLD: 858 K/UL (ref 138–453)
PLATELET ESTIMATE: ABNORMAL
PMV BLD AUTO: 8.8 FL (ref 8.1–13.5)
POTASSIUM SERPL-SCNC: 3.4 MMOL/L (ref 3.7–5.3)
RBC # BLD: 2.91 M/UL (ref 3.95–5.11)
RBC # BLD: ABNORMAL 10*6/UL
RETIC %: 0.9 % (ref 0.5–1.9)
RETIC HEMOGLOBIN: 19.6 PG (ref 28.2–35.7)
SEG NEUTROPHILS: 81 %
SEGMENTED NEUTROPHILS ABSOLUTE COUNT: 12.23 K/UL (ref 1.8–7.7)
SODIUM BLD-SCNC: 136 MMOL/L (ref 135–144)
TIME, STOOL #1: NORMAL
TIME, STOOL #2: NORMAL
TIME, STOOL #3: NORMAL
TRANSFUSION STATUS: NORMAL
TRANSFUSION STATUS: NORMAL
UNIT DIVISION: 0
UNIT DIVISION: 0
UNIT NUMBER: NORMAL
UNIT NUMBER: NORMAL
WBC # BLD: 15.1 K/UL (ref 3.5–11.3)
WBC # BLD: ABNORMAL 10*3/UL

## 2017-11-12 PROCEDURE — 36415 COLL VENOUS BLD VENIPUNCTURE: CPT

## 2017-11-12 PROCEDURE — 80048 BASIC METABOLIC PNL TOTAL CA: CPT

## 2017-11-12 PROCEDURE — G0378 HOSPITAL OBSERVATION PER HR: HCPCS

## 2017-11-12 PROCEDURE — 6370000000 HC RX 637 (ALT 250 FOR IP): Performed by: NURSE PRACTITIONER

## 2017-11-12 PROCEDURE — 85025 COMPLETE CBC W/AUTO DIFF WBC: CPT

## 2017-11-12 PROCEDURE — 99232 SBSQ HOSP IP/OBS MODERATE 35: CPT | Performed by: INTERNAL MEDICINE

## 2017-11-12 PROCEDURE — 85045 AUTOMATED RETICULOCYTE COUNT: CPT

## 2017-11-12 PROCEDURE — 94762 N-INVAS EAR/PLS OXIMTRY CONT: CPT

## 2017-11-12 PROCEDURE — G0328 FECAL BLOOD SCRN IMMUNOASSAY: HCPCS

## 2017-11-12 PROCEDURE — 6370000000 HC RX 637 (ALT 250 FOR IP): Performed by: INTERNAL MEDICINE

## 2017-11-12 PROCEDURE — 85018 HEMOGLOBIN: CPT

## 2017-11-12 RX ORDER — TRAMADOL HYDROCHLORIDE 50 MG/1
50 TABLET ORAL EVERY 6 HOURS PRN
Status: DISCONTINUED | OUTPATIENT
Start: 2017-11-12 | End: 2017-11-12 | Stop reason: HOSPADM

## 2017-11-12 RX ORDER — POTASSIUM CHLORIDE 20 MEQ/1
40 TABLET, EXTENDED RELEASE ORAL ONCE
Status: COMPLETED | OUTPATIENT
Start: 2017-11-12 | End: 2017-11-12

## 2017-11-12 RX ORDER — TRAMADOL HYDROCHLORIDE 50 MG/1
100 TABLET ORAL EVERY 6 HOURS PRN
Status: DISCONTINUED | OUTPATIENT
Start: 2017-11-12 | End: 2017-11-12 | Stop reason: HOSPADM

## 2017-11-12 RX ADMIN — TRAMADOL HYDROCHLORIDE 100 MG: 50 TABLET, FILM COATED ORAL at 12:05

## 2017-11-12 RX ADMIN — PANTOPRAZOLE SODIUM 40 MG: 40 TABLET, DELAYED RELEASE ORAL at 08:24

## 2017-11-12 RX ADMIN — FLUTICASONE PROPIONATE 1 SPRAY: 50 SPRAY, METERED NASAL at 08:24

## 2017-11-12 RX ADMIN — CITALOPRAM 40 MG: 20 TABLET, FILM COATED ORAL at 08:24

## 2017-11-12 RX ADMIN — TIOTROPIUM BROMIDE 18 MCG: 18 CAPSULE ORAL; RESPIRATORY (INHALATION) at 08:42

## 2017-11-12 RX ADMIN — FOLIC ACID 1 MG: 1 TABLET ORAL at 08:24

## 2017-11-12 RX ADMIN — POTASSIUM CHLORIDE 40 MEQ: 20 TABLET, EXTENDED RELEASE ORAL at 12:06

## 2017-11-12 RX ADMIN — CETIRIZINE HYDROCHLORIDE 10 MG: 10 TABLET ORAL at 08:24

## 2017-11-12 RX ADMIN — DOCUSATE SODIUM -SENNOSIDES 1 TABLET: 50; 8.6 TABLET, COATED ORAL at 08:24

## 2017-11-12 RX ADMIN — FERROUS SULFATE TAB EC 325 MG (65 MG FE EQUIVALENT) 325 MG: 325 (65 FE) TABLET DELAYED RESPONSE at 08:24

## 2017-11-12 RX ADMIN — TRAMADOL HYDROCHLORIDE 100 MG: 50 TABLET, FILM COATED ORAL at 06:20

## 2017-11-12 ASSESSMENT — PAIN SCALES - GENERAL
PAINLEVEL_OUTOF10: 8
PAINLEVEL_OUTOF10: 0
PAINLEVEL_OUTOF10: 5

## 2017-11-12 ASSESSMENT — ENCOUNTER SYMPTOMS
BLOOD IN STOOL: 0
SHORTNESS OF BREATH: 1
VOMITING: 0
NAUSEA: 0
SPUTUM PRODUCTION: 0
ABDOMINAL PAIN: 0
COUGH: 0

## 2017-11-12 ASSESSMENT — PAIN DESCRIPTION - PAIN TYPE
TYPE: ACUTE PAIN
TYPE: CHRONIC PAIN

## 2017-11-12 ASSESSMENT — PAIN DESCRIPTION - LOCATION: LOCATION: LEG

## 2017-11-12 ASSESSMENT — PAIN DESCRIPTION - DESCRIPTORS: DESCRIPTORS: ACHING

## 2017-11-12 ASSESSMENT — PAIN DESCRIPTION - ORIENTATION: ORIENTATION: RIGHT;LEFT

## 2017-11-12 NOTE — ONCOLOGY
I received a call from [facility], if patient can be d/c. Hgb improved, okay form hematology standpoint to d/c and follow with Dr. Josie Grigsby, consider repeat bone marrow bx, outpatient. Please call with questions.

## 2017-11-12 NOTE — DISCHARGE SUMMARY
emergency room  She reports increasing weakness, fatigue, and exercise intolerance  She had hematology evaluation this week  There has been no clinical blood loss  She reports that her bone marrow simply isn't producing red blood cells     Per Dr. Erika Bragg 10/17/17  \"1. Acute on chronic anemia   2. 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 3 . Iron saturation is low, suggesting anemia of chronic illness complicated by acute illness. 4.  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 bpm  Will monitor and control Blood Pressure  Data base in progress   Has follow-up with Dr. Elizabeth Hicks for pulmonary nodules  Please see orders      Significant Diagnostic Studies:   Labs / Micro:   Hematology:  Recent Labs      11/10/17   2251  11/11/17   0655   11/11/17   1841  11/12/17   0040  11/12/17   0622   WBC  20.6*  16.2*   --    --    --   15.1*   HGB  5.5*  7.5*   < >  7.4*  7.0*  7.5*   HCT  20.0*  25.3*   --    --    --   26.9*   PLT  989*  862*   --    --    --   858*   INR   --   1.3   --    --    --    --     < > = values in this interval not displayed.      Chemistry:  Recent Labs      11/10/17   2251  11/11/17   0655  11/12/17   0622   NA  135  136  136   K  4.1  3.4*  3.4*   CL  95*  98  100   CO2  26  22  21   GLUCOSE  100*  87  90   BUN  13  11  9   CREATININE 0. 58  0.50  0.48*   CALCIUM  9.4  8.7  8.7     Recent Labs      11/11/17   0655   PROT  8.0   LABALBU  2.0*   AST  18   ALT  7   ALKPHOS  433*   BILITOT  0.58       Radiology:    Xr Tibia Fibula Left (2 Views)    Result Date: 10/13/2017  EXAMINATION: 2 VIEWS OF THE LEFT TIBIA AND FIBULA 10/13/2017 2:28 pm COMPARISON: None. HISTORY: ORDERING SYSTEM PROVIDED HISTORY: h/o malignancy and lower extremity pain knees down to ankles, eval bony lesions. TECHNOLOGIST PROVIDED HISTORY: Reason for exam:->h/o malignancy and lower extremity pain knees down to ankles, eval bony lesions. Initial exam. FINDINGS: There is no evidence of acute fracture. There is normal alignment. No acute joint abnormality. No focal osseous lesion. No focal soft tissue abnormality. No acute osseous abnormality. Xr Tibia Fibula Right (2 Views)    Result Date: 10/13/2017  EXAMINATION: 2 VIEWS OF THE RIGHT TIBIA AND FIBULA 10/13/2017 2:28 pm COMPARISON: None. HISTORY: ORDERING SYSTEM PROVIDED HISTORY: h/o malignancy, 1 month pain circumferential knee down to ankle. eval bony lesion. TECHNOLOGIST PROVIDED HISTORY: Reason for exam:->h/o malignancy, 1 month pain circumferential knee down to ankle. eval bony lesion. Initial exam. FINDINGS: There is no evidence of acute fracture. There is normal alignment. No acute joint abnormality. No focal osseous lesion. No focal soft tissue abnormality. No acute osseous abnormality. Ct Chest W Contrast    Result Date: 10/16/2017  EXAMINATION: CT OF THE CHEST WITH CONTRAST 10/16/2017 9:15 am TECHNIQUE: CT of the chest was performed with the administration of intravenous contrast. Multiplanar reformatted images are provided for review. Dose modulation, iterative reconstruction, and/or weight based adjustment of the mA/kV was utilized to reduce the radiation dose to as low as reasonably achievable. COMPARISON: Multiple previous CT evaluations dating back to 12/16/2014.  HISTORY: ORDERING SYSTEM PROVIDED HISTORY: NODULE ON CT, LUNG FINDINGS: Mediastinum: Small lymph nodes are present in the mediastinum. There is tiny pericardial fluid. Scattered atherosclerosis. Lungs/pleura: Irregular appearing nodular opacification involving the posterior segment of the right upper lobe and superior segment of the right lower lobe are again identified, not significantly changed compared to multiple previous evaluations dating back to May 2017, but notably progressed compared to prior of December 2014. Additionally, a chronic appearing consolidation involving the posterior basal left lower lobe is identified. This appears to be pleural based with vessels curving into the consolidated area. Upper Abdomen: 12 mm hypodensity within the left hepatic lobe is suboptimally evaluated (series 301, image 72). Small portal venous air versus pneumobilia. Soft Tissues/Bones: Perivertebral augmentation. 1. Consolidations in both the posterior right upper and superior segment right lower lobe not significantly changed compared to evaluations of May 1, 2017. 2. Since that time however irregular attenuating nodular foci are identified in the apico-posterior left upper lobe series 4, image 33 measuring up to 8 mm. 3. Findings are unfortunately nonspecific. Given the appearance a neoplasm is of course considered; however, chronic lung consolidations do have other etiologies that include tuberculosis and atypical inflammatory/infectious etiologies such as fungal disease. According to the medical record, there has been a previous biopsy in the right upper lobe that excluded neoplasm or tuberculosis. 4. Left lower lobe consolidation could represent rounded atelectasis, however new satellite nodularity measuring up to 16 x 10 mm is concerning. 5. There is apparently a clinical concern for hemochromatosis.   The attenuation of the liver has increased compared to multiple previous examinations, which could indicate iron deposition; however, MRI would be more sensitive in the evaluation of this finding. 6. Nonspecific and only partially evaluated 12 mm hypodensity within the left hepatic lobe as well as possible pneumobilia versus portal venous air. RECOMMENDATIONS: Ongoing monitoring is recommended, with additional consideration of PET-CT or biopsy depending on the patient's symptoms. Additionally, workup for atypical inflammatory or infectious pathology is recommended. Xr Chest Portable    Result Date: 10/13/2017  EXAMINATION: SINGLE VIEW OF THE CHEST 10/13/2017 2:28 pm COMPARISON: September 8, 2017 HISTORY: ORDERING SYSTEM PROVIDED HISTORY: shortness of breath, eval for pneumonia FINDINGS: Persistent left retrocardiac mass which appears stable to mildly decreased in size. Right upper lobe airspace disease persists but now demonstrates more streaky patchy opacification and decrease nodularity. Persistent but mildly decreased right hilar prominence. No effusion or pneumothorax. Cardiomediastinal silhouette is stable with calcification aortic knob. Mild dextroscoliosis of the lower thoracic spine. Spinal augmentation along the thoracolumbar junction. *Persistent left retrocardiac mass which appears stable to mildly decreased in size. *Right upper lobe airspace disease persists but now demonstrates more streaky patchy opacification and decrease nodularity images developing pneumonia. *Persistent but mildly decreased right hilar prominence. RECOMMENDATION: Consider further evaluation with contrast-enhanced chest CT as clinically warranted         Consultations:    Consults:     Final Specialist Recommendations/Findings:   IP CONSULT TO HOSPITALIST  IP CONSULT TO HEM/ONC      The patient was seen and examined on day of discharge and this discharge summary is in conjunction with any daily progress note from day of discharge.     Discharge plan:   Discharge planning:  The patient responded to medical treatment  They were discharged in improved tolerated    Discharge Medications:      Medication List      CONTINUE taking these medications    * albuterol (5 MG/ML) 0.5% nebulizer solution  Commonly known as:  PROVENTIL  Take 0.5 mLs by nebulization every 6 hours as needed for Wheezing     * VENTOLIN  (90 Base) MCG/ACT inhaler  Generic drug:  albuterol sulfate HFA  INHALE 2 PUFFS INTO THE LUNGS EVERY 6 HOURS AS NEEDED FOR WHEEZING     aspirin 81 MG chewable tablet  Commonly known as:  ASPIRIN LOW DOSE  Take 1 tablet by mouth daily     citalopram 40 MG tablet  Commonly known as:  CELEXA  Take 1 tablet by mouth daily     docusate sodium 100 MG capsule  Commonly known as:  DOCQLACE  TAKE 1 CAPSULE BY MOUTH 2 TIMES DAILY AS NEEDED FOR CONSTIPATION     ferrous sulfate 325 (65 Fe) MG tablet  Take 1 tablet by mouth 2 times daily     fluticasone 50 MCG/ACT nasal spray  Commonly known as:  FLONASE  1 spray by Nasal route daily     folic acid 1 MG tablet  Commonly known as:  FOLVITE  Take 1 tablet by mouth daily     loratadine 10 MG tablet  Commonly known as:  CLARITIN  Take 1 tablet by mouth daily     ondansetron 4 MG tablet  Commonly known as:  ZOFRAN  Take 1 tablet by mouth every 8 hours as needed for Nausea     oxyCODONE-acetaminophen 5-325 MG per tablet  Commonly known as:  PERCOCET  Take 1 tablet by mouth every 6 hours as needed for Pain .      Oyster Shell Calcium/D 500-200 MG-UNIT Tabs  Take 1 tablet by mouth daily Please discontinue previous prescription of calcium     pantoprazole 40 MG tablet  Commonly known as:  PROTONIX  Take 1 tablet by mouth daily     senna-docusate 8.6-50 MG per tablet  Commonly known as:  SENEXON-S  Take 1 tablet by mouth daily     sodium chloride 0.65 % nasal spray  Commonly known as:  ALTAMIST SPRAY  1 spray by Nasal route as needed for Congestion     umeclidinium-vilanterol 62.5-25 MCG/INH Aepb inhaler  Commonly known as:  ANORO ELLIPTA  Inhale 1 puff into the lungs daily        * This list has 2 medication(s) that are the same as other medications prescribed for you. Read the directions carefully, and ask your doctor or other care provider to review them with you. Time Spent on discharge is  17 mins in patient examination, evaluation, counseling, medication reconciliation, discharge plan and follow up. Electronically signed by   Jl Saldana DO  11/12/2017  12:03 PM      Thank you Dr. Jenn Owens MD for the opportunity to be involved in this patient's care.

## 2017-11-12 NOTE — PROGRESS NOTES
organizing pneumonia ([facility] Depression; Difficult intravenous access; Disease of blood and blood forming organ; Edentulous; Full dentures; Gastric outlet obstruction; GERD (gastroesophageal reflux disease); Hearing loss; History of blood transfusion; Hydronephrosis, bilateral; Hypertension; Hypoglycemia; Lung nodule; Migraine; OCD (obsessive compulsive disorder); Osteoarthritis; Osteoporosis; Pelvic mass; Perforated nasal septum; Pneumonia; Psoriasis; PTSD (post-traumatic stress disorder); Scoliosis; Shortness of breath; Substance abuse; Thrombocytosis ([facility] Urinary incontinence; and Wears glasses. Social History:   reports that she has been smoking Cigarettes. She has a 7.50 pack-year smoking history. She has never used smokeless tobacco. She reports that she does not drink alcohol or use drugs. Family History:   Family History   Problem Relation Age of Onset    Heart Disease Mother     Stroke Father      cerebral aneurysm       Medications: Allergies:     Allergies   Allergen Reactions    Latex Dermatitis    Bee Venom Anaphylaxis    Benadryl [Diphenhydramine-Zinc Acetate] Anaphylaxis    Tavist Anaphylaxis     Quits  Breathing  And  Low   Heart  beat    Tylenol [Acetaminophen] Other (See Comments)     Liver problems       Current Meds:   Scheduled Meds:    potassium chloride  40 mEq Oral Once    citalopram  40 mg Oral Daily    ferrous sulfate  325 mg Oral BID    fluticasone  1 spray Nasal Daily    folic acid  1 mg Oral Daily    cetirizine  10 mg Oral Daily    sennosides-docusate sodium  1 tablet Oral Daily    pantoprazole  40 mg Oral Daily    sodium chloride flush  10 mL Intravenous 2 times per day    tiotropium  18 mcg Inhalation Daily    olodaterol  2 puff Inhalation Daily     Continuous Infusions:    sodium chloride 75 mL/hr at 11/11/17 1114     PRN Meds: traMADol **OR** traMADol, sodium chloride flush, ondansetron, albuterol      Review of Systems:     Review of Systems nodules  Will discharge when arrangements complete and ok with other services.   Follow-up with PCP in one week, Will MD Gaurang  Notify PCP of discharge   Follow-up Dr La Sethi one week     IP CONSULT TO HOSPITALIST  IP CONSULT TO [address] [place] [address] Drive, DO  11/12/2017  9:58 AM

## 2017-11-12 NOTE — PLAN OF CARE
[address] [place] [address]       Second Visit Note  For more detailed information please refer to the progress note of the day      11/12/2017    11:58 AM    Name:   Heather Law  MRN:     [id number]     Mary Jo:      [de-identified]   Room:   [address] [place] [address] Day:  2  Admit Date:  11/10/2017 10:09 PM    PCP:   Gilberto Cuevas MD  Code Status:  Full Code    Remains stable  Dr Kang Head has cleared patient for DC      CURRENT MEDS:     traMADol (ULTRAM) tablet 50 mg Q6H PRN   Or    traMADol (ULTRAM) tablet 100 mg Q6H PRN   potassium chloride (KLOR-CON M) extended release tablet 40 mEq Once   citalopram (CELEXA) tablet 40 mg Daily   ferrous sulfate EC tablet 325 mg BID   fluticasone (FLONASE) 50 MCG/ACT nasal spray 1 spray Daily   folic acid (FOLVITE) tablet 1 mg Daily   cetirizine (ZYRTEC) tablet 10 mg Daily   sennosides-docusate sodium (SENOKOT-S) 8.6-50 MG tablet 1 tablet Daily   pantoprazole (PROTONIX) tablet 40 mg Daily   0.9 % sodium chloride infusion Continuous   sodium chloride flush 0.9 % injection 10 mL 2 times per day   sodium chloride flush 0.9 % injection 10 mL PRN   ondansetron (ZOFRAN) injection 4 mg Q6H PRN   tiotropium (SPIRIVA) inhalation capsule 18 mcg Daily   olodaterol (STRIVERDI RESPIMAT) inhaler 2 puff Daily   albuterol (PROVENTIL) nebulizer solution 2.5 mg Q4H PRN     lactated ringers infusion Continuous   meperidine (DEMEROL) injection 12.5 mg Q5 Min PRN   fentaNYL (SUBLIMAZE) injection 50 mcg Q5 Min PRN       VITALS:  /78   Pulse 82   Temp 98.6 °F (37 °C) (Oral)   Resp 16   Ht 4' 9\" (1.448 m)   Wt 114 lb (51.7 kg)   LMP 04/18/2012   SpO2 97%   BMI 24.67 kg/m²     LABS:   Hematology:  Recent Labs      11/10/17   2251  11/11/17   0655   11/11/17   1841  11/12/17   0040  11/12/17   0622   WBC  20.6*  16.2*   --    --    --   15.1*   HGB  5.5*  7.5*   < >  7.4*  7.0*  7.5*   HCT  20.0*  25.3*   --    --    --   26.9*   PLT  989*  862*   --    --    --   858*   INR   --   1.3 --    --    --    --     < > = values in this interval not displayed. 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 Talat Guerra for pulmonary nodules  Will discharge when arrangements complete and ok with other services.   Follow-up with PCP in one week, Loren Cuevas MD  Notify PCP of discharge   Follow-up Dr Rina Zuluaga one week     IP CONSULT TO HOSPITALIST  IP CONSULT TO [address] [place] [address],   11/12/2017  11:58 AM

## 2017-11-13 ENCOUNTER — OFFICE VISIT (OUTPATIENT)
Dept: ONCOLOGY | Age: 47
End: 2017-11-13
Payer: MEDICAID

## 2017-11-13 ENCOUNTER — TELEPHONE (OUTPATIENT)
Dept: ONCOLOGY | Age: 47
End: 2017-11-13

## 2017-11-13 VITALS
RESPIRATION RATE: 18 BRPM | WEIGHT: 118 LBS | DIASTOLIC BLOOD PRESSURE: 75 MMHG | HEART RATE: 92 BPM | SYSTOLIC BLOOD PRESSURE: 116 MMHG | BODY MASS INDEX: 25.53 KG/M2 | TEMPERATURE: 98 F

## 2017-11-13 DIAGNOSIS — R91.8 MULTIPLE LUNG NODULES ON CT: Primary | Chronic | ICD-10-CM

## 2017-11-13 DIAGNOSIS — D64.9 NORMOCYTIC ANEMIA: Chronic | ICD-10-CM

## 2017-11-13 LAB — SURGICAL PATHOLOGY REPORT: NORMAL

## 2017-11-13 PROCEDURE — G8484 FLU IMMUNIZE NO ADMIN: HCPCS | Performed by: INTERNAL MEDICINE

## 2017-11-13 PROCEDURE — 99212 OFFICE O/P EST SF 10 MIN: CPT

## 2017-11-13 PROCEDURE — G8427 DOCREV CUR MEDS BY ELIG CLIN: HCPCS | Performed by: INTERNAL MEDICINE

## 2017-11-13 PROCEDURE — 4004F PT TOBACCO SCREEN RCVD TLK: CPT | Performed by: INTERNAL MEDICINE

## 2017-11-13 PROCEDURE — 99214 OFFICE O/P EST MOD 30 MIN: CPT | Performed by: INTERNAL MEDICINE

## 2017-11-13 PROCEDURE — 1111F DSCHRG MED/CURRENT MED MERGE: CPT | Performed by: INTERNAL MEDICINE

## 2017-11-13 PROCEDURE — G8417 CALC BMI ABV UP PARAM F/U: HCPCS | Performed by: INTERNAL MEDICINE

## 2017-11-13 NOTE — PROGRESS NOTES
DIAGNOSIS:   1. Anemia, microcytic with normal iron studies  2. Thrombocytosis  3. Gastric mass with resection showing inflammatory/organizing inflammation, no malignancy. 4. Ascites   5. Pulmonary mass unknown etiology. TB testing was negative ,   6. Small bowel obstruction related to adhesion and treated conservatively   7. Lung mass, biopsy is also negative   CURRENT THERAPY:  Removal of the gastric mass, benign histology  Conservative measures with management of small bowel obstruction. Paracentesis with negative cytology   Removal of ovarian mass, pathology is also benign    BRIEF CASE HISTORY:   Dominguez Law is a very pleasant 50 y.o. female who was admitted to the hospital with Abdominal pain and progressive ascites. She previously presented with gastric mass that was sent to [facility] for resection. Surgery was done and showed organizing inflammation with monocytic infiltration. No malignancy was appreciated. The patient underwent paracentesis. Workup showed an upper lobe pulmonary mass. We were concerned about TB so that testing was done and was negative. She developed ileus/small bowel obstruction that was treated conservatively. She was also found to have microcytic anemia, however iron studies showed slightly decreased iron saturation but the ferritin was elevated. She also had thrombocytosis that was thought to be reactive . We decided conservative management. And after discharge we want to follow her as an outpatient   She presented again with ascites, and she had lung mass, biopsy again was negative   She developed recurrent ascites. Paracentesis was negative, CT scan showed ovarian mass. We sent her to [address] [place] [address] For evaluation. She underwent surgery, or masses are benign with chronic inflammation. She also had a small bowel obstruction that was treated surgically    INTERIM HISTORY: The patient comes in today for a follow up. She was transfused over the weekend. Facility-Administered Medications: lactated ringers, meperidine, and fentanyl. ALLERGIES:  is allergic to latex; bee venom; benadryl [diphenhydramine-zinc acetate]; tavist; and tylenol [acetaminophen]. FAMILY HISTORY: today she was able to give me family history from her biological mother. Her mother and maternal aunt both had similar presentation with ascites, and they both developed renal disease. Her brother is starting to have recurrent ascites as well. .     SOCIAL HISTORY:  reports that she has been smoking Cigarettes. She has a 7.50 pack-year smoking history. She has never used smokeless tobacco. She reports that she does not drink alcohol or use drugs. REVIEW OF SYSTEMS:  General: no fever or night sweats, she is gaining weight as discussed. She has right sided groin pain   Eyes: No double or blurred vision. Ears: no tinnitus or hearing problem,    Throat: no dysphagia or sore throat   Respiratory:Chronic shortness of breath and cough, no hemoptysis   Cardiovascular: Denies chest pain, PND or orthopnea. No L E swelling or palpitations. Gastrointestinal:   Mild nausea and abdominal distention. No diarrhea. Genitourinary: Denies dysuria, hematuria, frequency, urgency or incontinence. Neurological: Denies headaches, decreased LOC, no sensory or motor focal deficits. Musculoskeletal:  No arthralgia no back pain or joint swelling. Skin: There are no rashes or bleeding. Psychiatric: She was very depressed during hospitalization but now is feeling better   Endocrine: no diabetes or thyroid disease. Hematologic: no bleeding , no adenopathy. PHYSICAL EXAM: Shows an ill appearing 50y.o.-year-old female who is not in pain or distress. Vital Signs: Blood pressure 116/75, pulse 92, temperature 98 °F (36.7 °C), temperature source Oral, resp. rate 18, weight 118 lb (53.5 kg), last menstrual period 04/18/2012, not currently breastfeeding. HEENT: Normocephalic and atraumatic.  Pupils are equal, left   hepatic lobe as well as possible pneumobilia versus portal venous air. REVIEW OF LABORATORY DATA:   Lab Results   Component Value Date    WBC 15.1 (H) 11/12/2017    HGB 7.3 (L) 11/12/2017    HCT 26.9 (L) 11/12/2017    MCV 92.4 11/12/2017     (H) 11/12/2017   '  Lab Results   Component Value Date    IRON 14 (L) 10/15/2017    TIBC 109 (L) 10/15/2017    FERRITIN 3,614 (H) 10/15/2017       Chemistry        Component Value Date/Time     11/12/2017 0622    K 3.4 (L) 11/12/2017 0622     11/12/2017 0622    CO2 21 11/12/2017 0622    BUN 9 11/12/2017 0622    CREATININE 0.48 (L) 11/12/2017 0622        Component Value Date/Time    CALCIUM 8.7 11/12/2017 0622    ALKPHOS 433 (H) 11/11/2017 0655    AST 18 11/11/2017 0655    ALT 7 11/11/2017 0655    BILITOT 0.58 11/11/2017 0655        PATHOLOGY:   11/12/2017:          IMPRESSION:   1. Microcytic anemia, without clear evidence of iron deficiency. Anemia has not improved with improvement in nutritional status. Most likely related to anemia of chronic illness  2. Thrombocytosis, likely reactive  3. Small bowel obstruction/ileus, treated by recent surgery   4. Pulmonary mass of unknown etiology, negative biopsy  5. History of gastric mass showing inflammatory changes  6. Cystic pelvic mass. Removed with negative histology     PLAN:   1. We discussed her recent bone marrow biopsy results are negative. 2. I explained that all test results are negative and the cause of her anemia is inconclusive. 3. I shared options including continuing with transfusions or referral to larger [facility] or [facility].   4. After much discussion she requested referral to [facility] and continue with transfusions as needed. 5. I am writing for CBC and transfusion to be done in 1 week. 6. Return in 2 months.

## 2017-11-14 LAB — PATHOLOGIST REVIEW: NORMAL

## 2017-11-16 ENCOUNTER — OFFICE VISIT (OUTPATIENT)
Dept: INTERNAL MEDICINE | Age: 47
End: 2017-11-16
Payer: MEDICAID

## 2017-11-16 VITALS
SYSTOLIC BLOOD PRESSURE: 111 MMHG | BODY MASS INDEX: 24.25 KG/M2 | HEIGHT: 57 IN | WEIGHT: 112.4 LBS | HEART RATE: 111 BPM | DIASTOLIC BLOOD PRESSURE: 78 MMHG

## 2017-11-16 DIAGNOSIS — R16.0 LIVER MASS: Primary | Chronic | ICD-10-CM

## 2017-11-16 DIAGNOSIS — R91.8 MULTIPLE LUNG NODULES ON CT: Primary | Chronic | ICD-10-CM

## 2017-11-16 PROCEDURE — 4004F PT TOBACCO SCREEN RCVD TLK: CPT | Performed by: INTERNAL MEDICINE

## 2017-11-16 PROCEDURE — 1111F DSCHRG MED/CURRENT MED MERGE: CPT | Performed by: INTERNAL MEDICINE

## 2017-11-16 PROCEDURE — G8420 CALC BMI NORM PARAMETERS: HCPCS | Performed by: INTERNAL MEDICINE

## 2017-11-16 PROCEDURE — 99213 OFFICE O/P EST LOW 20 MIN: CPT | Performed by: INTERNAL MEDICINE

## 2017-11-16 PROCEDURE — G8427 DOCREV CUR MEDS BY ELIG CLIN: HCPCS | Performed by: INTERNAL MEDICINE

## 2017-11-16 PROCEDURE — G8484 FLU IMMUNIZE NO ADMIN: HCPCS | Performed by: INTERNAL MEDICINE

## 2017-11-16 NOTE — PATIENT INSTRUCTIONS
-Follow-up appointment scheduled for 2/20/18, AVS given to patient.     It is very important that you keep your appointments, please contact us if you are unable to keep your scheduled appt ---Robina Snell

## 2017-11-16 NOTE — PROGRESS NOTES
Visit Information    Have you changed or started any medications since your last visit including any over-the-counter medicines, vitamins, or herbal medicines? no   Are you having any side effects from any of your medications? -  no  Have you stopped taking any of your medications? Is so, why? -  no    Have you seen any other physician or provider since your last visit? Yes - Records Obtained  Have you had any other diagnostic tests since your last visit? Yes - Records Obtained  Have you been seen in the emergency room and/or had an admission to a hospital since we last saw you? Yes - Records Obtained  Have you had your routine dental cleaning in the past 6 months? no    Have you activated your [employer] account? If not, what are your barriers?  Yes     Patient Care Team:  Teresa Nixon MD as PCP - Phil Kraus MD as Consulting Physician (Hematology and Oncology)  Sravan Harris MD as Consulting Physician (Pulmonology)  Teresa Nixon MD as Referring Physician (Internal Medicine)    Medical History Review  Past Medical, Family, and Social History reviewed and does contribute to the patient presenting condition    Health Maintenance   Topic Date Due    Cervical cancer screen  03/07/2020    Lipid screen  01/07/2021    DTaP/Tdap/Td vaccine (2 - Td) 06/30/2025    Flu vaccine  Completed    Pneumococcal med risk  Completed    HIV screen  Completed

## 2017-11-16 NOTE — PROGRESS NOTES
Subjective:      Patient ID: Rochelle Law is a 50 y.o. female. HPI pt here to follow up on  Multiple hospital admissions for symptomatic anemia and dyspnea sec to it  She has been extensively worked up but no cause has been found  Has recently received multiple transfusions  Last hospital admission was last week, where her hemoglobin on admission was 5.5 it was 7.5 on discharge  Was seen by hematology after that, they have referred her to [facility] for further workup, in the meantime they have recommended transfusions on an as-needed basis     Other medical issues  htn- not on any meds. 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 Also has multiple lung nodules, biopsy negative for malignancy in the past.  Pulmonary recently started her on Anoro Ellipta, that is helping her symptoms a lot. Also ordered repeat CT in 3 months. His follow-up appointment in Jan 2018. Depression-seems to be doing good. On Celexa 40 mg. Now follows at [facility]    Was also admitted in May 2017 with flank pain, was found to have right hydronephrosis-received a stent, has been doing okay since then. Review of Systems   Negative for fever,  Respiratory: Negative for cough, shortness of breath, wheezing and stridor. Cardiovascular: Negative for chest pain, palpitations and leg swelling. Gastrointestinal: Negative for nausea, vomiting, abdominal pain, diarrhea and constipation. Objective:   Physical Exam  Constitutional: She is oriented to person, place, and time. She appears well-developed. No distress. Cardiovascular: Normal rate and regular rhythm. Pulmonary/Chest: Effort normal and breath sounds normal. No stridor. No respiratory distress. no wheezes. no rales.

## 2017-11-17 RX ORDER — OXYCODONE HYDROCHLORIDE AND ACETAMINOPHEN 5; 325 MG/1; MG/1
1 TABLET ORAL EVERY 6 HOURS PRN
Qty: 40 TABLET | Refills: 0 | Status: ON HOLD | OUTPATIENT
Start: 2017-11-17 | End: 2017-11-26 | Stop reason: HOSPADM

## 2017-11-20 ENCOUNTER — TELEPHONE (OUTPATIENT)
Dept: ONCOLOGY | Age: 47
End: 2017-11-20

## 2017-11-20 ENCOUNTER — HOSPITAL ENCOUNTER (OUTPATIENT)
Age: 47
Discharge: HOME | End: 2017-11-20
Payer: MEDICAID

## 2017-11-20 DIAGNOSIS — D64.9 NORMOCYTIC ANEMIA: Primary | Chronic | ICD-10-CM

## 2017-11-20 DIAGNOSIS — R16.0 LIVER MASS: Chronic | ICD-10-CM

## 2017-11-20 LAB
ABO/RH: NORMAL
ABSOLUTE EOS #: 0.23 K/UL (ref 0–0.4)
ABSOLUTE IMMATURE GRANULOCYTE: 0 K/UL (ref 0–0.3)
ABSOLUTE LYMPH #: 2.03 K/UL (ref 1–4.8)
ABSOLUTE MONO #: 1.81 K/UL (ref 0.1–0.8)
ANTIBODY SCREEN: NEGATIVE
ARM BAND NUMBER: NORMAL
BASOPHILS # BLD: 0 %
BASOPHILS ABSOLUTE: 0 K/UL (ref 0–0.2)
DIFFERENTIAL TYPE: ABNORMAL
EOSINOPHILS RELATIVE PERCENT: 1 %
EXPIRATION DATE: NORMAL
HCT VFR BLD CALC: 27.7 % (ref 36.3–47.1)
HEMOGLOBIN: 7.5 G/DL (ref 11.9–15.1)
IMMATURE GRANULOCYTES: 0 %
LYMPHOCYTES # BLD: 9 %
MCH RBC QN AUTO: 25.9 PG (ref 25.2–33.5)
MCHC RBC AUTO-ENTMCNC: 27.1 G/DL (ref 28.4–34.8)
MCV RBC AUTO: 95.5 FL (ref 82.6–102.9)
MONOCYTES # BLD: 8 %
MORPHOLOGY: ABNORMAL
PDW BLD-RTO: 17.7 % (ref 11.8–14.4)
PLATELET # BLD: ABNORMAL K/UL (ref 138–453)
PLATELET ESTIMATE: ABNORMAL
PLATELET, FLUORESCENCE: 848 K/UL (ref 138–453)
PLATELET, IMMATURE FRACTION: 3.3 % (ref 1.1–10.3)
PMV BLD AUTO: ABNORMAL FL (ref 8.1–13.5)
RBC # BLD: 2.9 M/UL (ref 3.95–5.11)
RBC # BLD: ABNORMAL 10*6/UL
SEG NEUTROPHILS: 82 %
SEGMENTED NEUTROPHILS ABSOLUTE COUNT: 18.53 K/UL (ref 1.8–7.7)
WBC # BLD: 22.6 K/UL (ref 3.5–11.3)
WBC # BLD: ABNORMAL 10*3/UL

## 2017-11-20 PROCEDURE — 85025 COMPLETE CBC W/AUTO DIFF WBC: CPT

## 2017-11-20 PROCEDURE — 36415 COLL VENOUS BLD VENIPUNCTURE: CPT

## 2017-11-20 PROCEDURE — 86901 BLOOD TYPING SEROLOGIC RH(D): CPT

## 2017-11-20 PROCEDURE — 86850 RBC ANTIBODY SCREEN: CPT

## 2017-11-20 PROCEDURE — 86900 BLOOD TYPING SEROLOGIC ABO: CPT

## 2017-11-20 NOTE — TELEPHONE ENCOUNTER
Working on generic schedule. 1622-Face to face with Dr. Shawna Rodriguez and informed of today's hemoglobin level of 7.5. No orders received. RV 1/22/18.

## 2017-11-21 ENCOUNTER — TELEPHONE (OUTPATIENT)
Dept: ONCOLOGY | Age: 47
End: 2017-11-21

## 2017-11-25 ENCOUNTER — HOSPITAL ENCOUNTER (INPATIENT)
Age: 47
LOS: 2 days | Discharge: HOME | DRG: 660 | End: 2017-11-27
Attending: EMERGENCY MEDICINE | Admitting: INTERNAL MEDICINE
Payer: MEDICAID

## 2017-11-25 DIAGNOSIS — D72.820 LYMPHOCYTOSIS: ICD-10-CM

## 2017-11-25 DIAGNOSIS — D61.9: ICD-10-CM

## 2017-11-25 DIAGNOSIS — D64.9 ANEMIA, UNSPECIFIED TYPE: Primary | ICD-10-CM

## 2017-11-25 LAB
ABSOLUTE EOS #: 0 K/UL (ref 0–0.44)
ABSOLUTE IMMATURE GRANULOCYTE: 0.2 K/UL (ref 0–0.3)
ABSOLUTE LYMPH #: 2.04 K/UL (ref 1.1–3.7)
ABSOLUTE MONO #: 1.22 K/UL (ref 0.1–1.2)
ABSOLUTE RETIC #: 0.03 M/UL (ref 0.03–0.08)
ALBUMIN SERPL-MCNC: 2.5 G/DL (ref 3.5–5.2)
ALBUMIN/GLOBULIN RATIO: 0.4 (ref 1–2.5)
ALP BLD-CCNC: 610 U/L (ref 35–104)
ALT SERPL-CCNC: 6 U/L (ref 5–33)
ANION GAP SERPL CALCULATED.3IONS-SCNC: 16 MMOL/L (ref 9–17)
AST SERPL-CCNC: 17 U/L
BASOPHILS # BLD: 0 % (ref 0–2)
BASOPHILS ABSOLUTE: 0 K/UL (ref 0–0.2)
BILIRUB SERPL-MCNC: 0.55 MG/DL (ref 0.3–1.2)
BILIRUBIN DIRECT: 0.31 MG/DL
BILIRUBIN, INDIRECT: 0.24 MG/DL (ref 0–1)
BUN BLDV-MCNC: 19 MG/DL (ref 6–20)
BUN/CREAT BLD: ABNORMAL (ref 9–20)
CALCIUM SERPL-MCNC: 10 MG/DL (ref 8.6–10.4)
CHLORIDE BLD-SCNC: 92 MMOL/L (ref 98–107)
CO2: 23 MMOL/L (ref 20–31)
CREAT SERPL-MCNC: 0.61 MG/DL (ref 0.5–0.9)
DIFFERENTIAL TYPE: ABNORMAL
EOSINOPHILS RELATIVE PERCENT: 0 % (ref 1–4)
FERRITIN: 4329 UG/L (ref 13–150)
GFR AFRICAN AMERICAN: >60 ML/MIN
GFR NON-AFRICAN AMERICAN: >60 ML/MIN
GFR SERPL CREATININE-BSD FRML MDRD: ABNORMAL ML/MIN/{1.73_M2}
GFR SERPL CREATININE-BSD FRML MDRD: ABNORMAL ML/MIN/{1.73_M2}
GLOBULIN: ABNORMAL G/DL (ref 1.5–3.8)
GLUCOSE BLD-MCNC: 96 MG/DL (ref 70–99)
HCT VFR BLD CALC: 24 % (ref 36.3–47.1)
HEMOGLOBIN: 6.8 G/DL (ref 11.9–15.1)
IMMATURE GRANULOCYTES: 1 %
IMMATURE RETIC FRACT: 7.1 % (ref 2.7–18.3)
IRON SATURATION: 15 % (ref 20–55)
IRON: 20 UG/DL (ref 37–145)
LYMPHOCYTES # BLD: 10 % (ref 24–43)
MCH RBC QN AUTO: 25.6 PG (ref 25.2–33.5)
MCHC RBC AUTO-ENTMCNC: 28.3 G/DL (ref 28.4–34.8)
MCV RBC AUTO: 90.2 FL (ref 82.6–102.9)
MONOCYTES # BLD: 6 % (ref 3–12)
MORPHOLOGY: ABNORMAL
PDW BLD-RTO: 17.5 % (ref 11.8–14.4)
PLATELET # BLD: 910 K/UL (ref 138–453)
PLATELET ESTIMATE: ABNORMAL
PMV BLD AUTO: 8.9 FL (ref 8.1–13.5)
POTASSIUM SERPL-SCNC: 4 MMOL/L (ref 3.7–5.3)
RBC # BLD: 2.66 M/UL (ref 3.95–5.11)
RBC # BLD: ABNORMAL 10*6/UL
RETIC %: 1.2 % (ref 0.5–1.9)
RETIC HEMOGLOBIN: 20.6 PG (ref 28.2–35.7)
SEG NEUTROPHILS: 83 % (ref 36–65)
SEGMENTED NEUTROPHILS ABSOLUTE COUNT: 16.94 K/UL (ref 1.5–8.1)
SODIUM BLD-SCNC: 131 MMOL/L (ref 135–144)
TOTAL IRON BINDING CAPACITY: 133 UG/DL (ref 250–450)
TOTAL PROTEIN: 9.4 G/DL (ref 6.4–8.3)
UNSATURATED IRON BINDING CAPACITY: 113 UG/DL (ref 112–347)
WBC # BLD: 20.4 K/UL (ref 3.5–11.3)
WBC # BLD: ABNORMAL 10*3/UL

## 2017-11-25 PROCEDURE — 80048 BASIC METABOLIC PNL TOTAL CA: CPT

## 2017-11-25 PROCEDURE — 99284 EMERGENCY DEPT VISIT MOD MDM: CPT

## 2017-11-25 PROCEDURE — 86900 BLOOD TYPING SEROLOGIC ABO: CPT

## 2017-11-25 PROCEDURE — 83540 ASSAY OF IRON: CPT

## 2017-11-25 PROCEDURE — 6370000000 HC RX 637 (ALT 250 FOR IP): Performed by: STUDENT IN AN ORGANIZED HEALTH CARE EDUCATION/TRAINING PROGRAM

## 2017-11-25 PROCEDURE — P9016 RBC LEUKOCYTES REDUCED: HCPCS

## 2017-11-25 PROCEDURE — 86850 RBC ANTIBODY SCREEN: CPT

## 2017-11-25 PROCEDURE — 2580000003 HC RX 258: Performed by: INTERNAL MEDICINE

## 2017-11-25 PROCEDURE — 2580000003 HC RX 258: Performed by: EMERGENCY MEDICINE

## 2017-11-25 PROCEDURE — 6360000002 HC RX W HCPCS: Performed by: STUDENT IN AN ORGANIZED HEALTH CARE EDUCATION/TRAINING PROGRAM

## 2017-11-25 PROCEDURE — 85045 AUTOMATED RETICULOCYTE COUNT: CPT

## 2017-11-25 PROCEDURE — 85025 COMPLETE CBC W/AUTO DIFF WBC: CPT

## 2017-11-25 PROCEDURE — 80076 HEPATIC FUNCTION PANEL: CPT

## 2017-11-25 PROCEDURE — 36430 TRANSFUSION BLD/BLD COMPNT: CPT

## 2017-11-25 PROCEDURE — 86920 COMPATIBILITY TEST SPIN: CPT

## 2017-11-25 PROCEDURE — 82728 ASSAY OF FERRITIN: CPT

## 2017-11-25 PROCEDURE — 83550 IRON BINDING TEST: CPT

## 2017-11-25 PROCEDURE — 86901 BLOOD TYPING SEROLOGIC RH(D): CPT

## 2017-11-25 PROCEDURE — 1200000000 HC SEMI PRIVATE

## 2017-11-25 RX ORDER — FENTANYL CITRATE 50 UG/ML
25 INJECTION, SOLUTION INTRAMUSCULAR; INTRAVENOUS
Status: DISCONTINUED | OUTPATIENT
Start: 2017-11-25 | End: 2017-11-27 | Stop reason: HOSPADM

## 2017-11-25 RX ORDER — 0.9 % SODIUM CHLORIDE 0.9 %
250 INTRAVENOUS SOLUTION INTRAVENOUS ONCE
Status: COMPLETED | OUTPATIENT
Start: 2017-11-25 | End: 2017-11-26

## 2017-11-25 RX ORDER — LANOLIN ALCOHOL/MO/W.PET/CERES
325 CREAM (GRAM) TOPICAL 2 TIMES DAILY
Status: DISCONTINUED | OUTPATIENT
Start: 2017-11-25 | End: 2017-11-26

## 2017-11-25 RX ORDER — SODIUM CHLORIDE 9 MG/ML
INJECTION, SOLUTION INTRAVENOUS CONTINUOUS
Status: DISCONTINUED | OUTPATIENT
Start: 2017-11-25 | End: 2017-11-27

## 2017-11-25 RX ORDER — SENNA AND DOCUSATE SODIUM 50; 8.6 MG/1; MG/1
1 TABLET, FILM COATED ORAL DAILY
Status: DISCONTINUED | OUTPATIENT
Start: 2017-11-25 | End: 2017-11-27 | Stop reason: HOSPADM

## 2017-11-25 RX ORDER — FOLIC ACID 1 MG/1
1 TABLET ORAL DAILY
Status: DISCONTINUED | OUTPATIENT
Start: 2017-11-25 | End: 2017-11-27 | Stop reason: HOSPADM

## 2017-11-25 RX ORDER — PANTOPRAZOLE SODIUM 40 MG/1
40 TABLET, DELAYED RELEASE ORAL DAILY
Status: DISCONTINUED | OUTPATIENT
Start: 2017-11-25 | End: 2017-11-27 | Stop reason: HOSPADM

## 2017-11-25 RX ORDER — ONDANSETRON 2 MG/ML
4 INJECTION INTRAMUSCULAR; INTRAVENOUS EVERY 6 HOURS PRN
Status: DISCONTINUED | OUTPATIENT
Start: 2017-11-25 | End: 2017-11-27 | Stop reason: HOSPADM

## 2017-11-25 RX ORDER — FLUTICASONE PROPIONATE 50 MCG
1 SPRAY, SUSPENSION (ML) NASAL DAILY
Status: DISCONTINUED | OUTPATIENT
Start: 2017-11-25 | End: 2017-11-27 | Stop reason: HOSPADM

## 2017-11-25 RX ORDER — FENTANYL CITRATE 50 UG/ML
50 INJECTION, SOLUTION INTRAMUSCULAR; INTRAVENOUS
Status: DISCONTINUED | OUTPATIENT
Start: 2017-11-25 | End: 2017-11-27 | Stop reason: HOSPADM

## 2017-11-25 RX ORDER — SODIUM CHLORIDE 0.9 % (FLUSH) 0.9 %
10 SYRINGE (ML) INJECTION PRN
Status: DISCONTINUED | OUTPATIENT
Start: 2017-11-25 | End: 2017-11-27 | Stop reason: HOSPADM

## 2017-11-25 RX ORDER — CITALOPRAM 20 MG/1
40 TABLET ORAL DAILY
Status: DISCONTINUED | OUTPATIENT
Start: 2017-11-25 | End: 2017-11-27 | Stop reason: HOSPADM

## 2017-11-25 RX ORDER — ASPIRIN 81 MG/1
81 TABLET, CHEWABLE ORAL DAILY
Status: DISCONTINUED | OUTPATIENT
Start: 2017-11-25 | End: 2017-11-27 | Stop reason: HOSPADM

## 2017-11-25 RX ORDER — SODIUM CHLORIDE 0.9 % (FLUSH) 0.9 %
10 SYRINGE (ML) INJECTION EVERY 12 HOURS SCHEDULED
Status: DISCONTINUED | OUTPATIENT
Start: 2017-11-25 | End: 2017-11-27 | Stop reason: HOSPADM

## 2017-11-25 RX ADMIN — FOLIC ACID 1 MG: 1 TABLET ORAL at 20:59

## 2017-11-25 RX ADMIN — PANTOPRAZOLE SODIUM 40 MG: 40 TABLET, DELAYED RELEASE ORAL at 20:59

## 2017-11-25 RX ADMIN — FERROUS SULFATE TAB EC 325 MG (65 MG FE EQUIVALENT) 325 MG: 325 (65 FE) TABLET DELAYED RESPONSE at 20:59

## 2017-11-25 RX ADMIN — CITALOPRAM 40 MG: 20 TABLET, FILM COATED ORAL at 20:59

## 2017-11-25 RX ADMIN — Medication 1 TABLET: at 20:59

## 2017-11-25 RX ADMIN — SODIUM CHLORIDE 250 ML: 9 INJECTION, SOLUTION INTRAVENOUS at 18:30

## 2017-11-25 RX ADMIN — SODIUM CHLORIDE: 9 INJECTION, SOLUTION INTRAVENOUS at 21:29

## 2017-11-25 RX ADMIN — DOCUSATE SODIUM -SENNOSIDES 1 TABLET: 50; 8.6 TABLET, COATED ORAL at 20:59

## 2017-11-25 RX ADMIN — ENOXAPARIN SODIUM 40 MG: 40 INJECTION SUBCUTANEOUS at 20:54

## 2017-11-25 RX ADMIN — ASPIRIN 81 MG: 81 TABLET, CHEWABLE ORAL at 21:00

## 2017-11-25 ASSESSMENT — ENCOUNTER SYMPTOMS
RHINORRHEA: 0
COUGH: 0
VOMITING: 0
ABDOMINAL PAIN: 0
SORE THROAT: 0
DIARRHEA: 0
NAUSEA: 0
EYE REDNESS: 0
EYE DISCHARGE: 0
CHEST TIGHTNESS: 0
SHORTNESS OF BREATH: 0
BLOOD IN STOOL: 0

## 2017-11-25 NOTE — ED PROVIDER NOTES
[facility]  ED  Emergency Department Encounter  Emergency Medicine Resident     Pt Name: Kun Law  MRN: [id number]  [place] 1970  Date of evaluation: 11/25/17  PCP:  Hardeep Reed MD    [address] [place] [address]       Chief Complaint   Patient presents with    Anemia     weak and probable needs an tranfusion       HISTORY OF PRESENT ILLNESS  (Location/Symptom, Timing/Onset, Context/Setting, Quality, Duration, Modifying Factors, Severity.)      Angela Law is a 50 y.o. female who presents with Complaints of generalized weakness/fatigue. Patient has a history of anemia and is currently being worked up by [employer], of which she is getting bone marrow biopsy in the next few weeks. Patient states that when she feels like this her hemoglobin is usually around 5. Patient denies any other complaints at this time besides fatigue. Patient has extensive past medical history of osteoporosis, cirrhosis, and lupus. PAST MEDICAL / SURGICAL / SOCIAL / FAMILY HISTORY      has a past medical history of Allergic rhinitis; Anxiety; Asthma; Blood circulation, collateral; Celiac disease; Chronic back pain; Chronic kidney disease; Cirrhosis of liver with ascites ([address] Constipation; Cough; Cryptogenic organizing pneumonia ([address] Depression; Difficult intravenous access; Disease of blood and blood forming organ; Edentulous; Full dentures; Gastric outlet obstruction; GERD (gastroesophageal reflux disease); Hearing loss; History of blood transfusion; Hydronephrosis, bilateral; Hypertension; Hypoglycemia; Lung nodule; Migraine; OCD (obsessive compulsive disorder); Osteoarthritis; Osteoporosis; Pelvic mass; Perforated nasal septum; Pneumonia; Psoriasis; PTSD (post-traumatic stress disorder); Scoliosis; Shortness of breath; Substance abuse; Thrombocytosis ([address] Urinary incontinence; and Wears glasses. has a past surgical history that includes Tubal ligation (1989);  Tonsillectomy and adenoidectomy (1982); ERCP (5/16/2013); Upper gastrointestinal endoscopy; gastrectomy (2012); Paracentesis (Right, 01/2015-09/2015); Cystocopy (12/1/2015); Fixation Kyphoplasty (08/09/2016); Lung biopsy; insert nasal septal prosthesis (N/A, 4/14/2017); create eardrum opening,gen anesth (N/A, 4/14/2017); Abdomen surgery (09/2015); Cystoscopy (05/11/2017); Ureter stent placement (05/11/2017); Ureteroscopy (05/11/2017); Cystoscopy (Right, 5/11/2017); Cholecystectomy; hernia repair (07/18/2017); repair incisional hernia,reducible (N/A, 7/18/2017); and esophagogastroduodenoscopy transoral diagnostic (N/A, 8/30/2017). Social History     Social History    Marital status: Single     Spouse name: N/A    Number of children: N/A    Years of education: N/A     Occupational History    Not on file. Social History Main Topics    Smoking status: Current Some Day Smoker     Packs/day: 0.25     Years: 30.00     Types: Cigarettes     Last attempt to quit: 12/1/2012    Smokeless tobacco: Never Used      Comment: pt states 3 cigs per day    Alcohol use No      Comment: quit in 1987    Drug use: No      Comment: quit marijuana 1987    Sexual activity: No     Other Topics Concern    Not on file     Social History Narrative    No narrative on file       Family History   Problem Relation Age of Onset    Heart Disease Mother     Stroke Father      cerebral aneurysm       Allergies:  Latex; Bee venom; Benadryl [diphenhydramine-zinc acetate]; Tavist; and Tylenol [acetaminophen]    Home Medications:  Prior to Admission medications    Medication Sig Start Date End Date Taking? Authorizing Provider   oxyCODONE-acetaminophen (PERCOCET) 5-325 MG per tablet Take 1 tablet by mouth every 6 hours as needed for Pain .  11/17/17 12/17/17 Yes Sawyer Peres MD   sodium chloride (ALTAMIST SPRAY) 0.65 % nasal spray 1 spray by Nasal route as needed for Congestion 10/23/17  Yes Andrea Perez MD   Calcium Carbonate-Vitamin D (OYSTER SHELL CALCIUM/D) 500-200 MG-UNIT TABS Take 1 tablet by mouth daily Please discontinue previous prescription of calcium 10/23/17 10/23/18 Yes Aleta Alvarez MD   loratadine (CLARITIN) 10 MG tablet Take 1 tablet by mouth daily 10/23/17  Yes Aleta Alvarez MD   citalopram (CELEXA) 40 MG tablet Take 1 tablet by mouth daily 10/23/17  Yes Aleta Alvarez MD   ferrous sulfate 325 (65 Fe) MG tablet Take 1 tablet by mouth 2 times daily 10/23/17  Yes Aleta Alvarez MD   senna-docusate (SENEXON-S) 8.6-50 MG per tablet Take 1 tablet by mouth daily 10/23/17  Yes Aleta Alvarez MD   aspirin (ASPIRIN LOW DOSE) 81 MG chewable tablet Take 1 tablet by mouth daily 10/23/17  Yes Aleta Alvarez MD   pantoprazole (PROTONIX) 40 MG tablet Take 1 tablet by mouth daily 10/23/17  Yes Aleta Alvarez MD   folic acid (FOLVITE) 1 MG tablet Take 1 tablet by mouth daily 10/23/17  Yes Aleta Alvarez MD   umeclidinium-vilanterol (ANORO ELLIPTA) 62.5-25 MCG/INH AEPB inhaler Inhale 1 puff into the lungs daily 10/20/17  Yes Chance Mora MD   VENTOLIN  (90 Base) MCG/ACT inhaler INHALE 2 PUFFS INTO THE LUNGS EVERY 6 HOURS AS NEEDED FOR WHEEZING 9/22/17  Yes Chance Mora MD   docusate sodium (DOCQLACE) 100 MG capsule TAKE 1 CAPSULE BY MOUTH 2 TIMES DAILY AS NEEDED FOR CONSTIPATION 8/15/17  Yes Montserrat Moreno MD   fluticasone (FLONASE) 50 MCG/ACT nasal spray 1 spray by Nasal route daily 8/15/17  Yes Montserrat Moreno MD   ondansetron (ZOFRAN) 4 MG tablet Take 1 tablet by mouth every 8 hours as needed for Nausea 5/9/17  Yes Vilma Uriostegui MD   albuterol (PROVENTIL) (5 MG/ML) 0.5% nebulizer solution Take 0.5 mLs by nebulization every 6 hours as needed for Wheezing 1/23/17  Yes Brittney Duke MD       REVIEW OF SYSTEMS    (2-9 systems for level 4, 10 or more for level 5)      Review of Systems   Constitutional: Positive for fatigue. Negative for chills and fever. HENT: Negative for congestion, rhinorrhea and sore throat. Eyes: Negative for discharge and redness. Respiratory: Negative for cough, chest tightness and shortness of breath. Cardiovascular: Negative for chest pain. Gastrointestinal: Negative for abdominal pain, blood in stool, diarrhea, nausea and vomiting. Endocrine: Negative for polydipsia and polyuria. Genitourinary: Negative for dysuria and hematuria. Musculoskeletal: Negative for neck pain and neck stiffness. Skin: Negative for rash and wound. Allergic/Immunologic: Negative for immunocompromised state. Neurological: Negative for weakness, numbness and headaches. Hematological: Negative for adenopathy. Psychiatric/Behavioral: Negative for agitation and behavioral problems. PHYSICAL EXAM   (up to 7 for level 4, 8 or more for level 5)      INITIAL VITALS:   /69   Pulse 105   Temp 97.9 °F (36.6 °C)   Resp 16   Ht 4' 9\" (1.448 m)   Wt 112 lb (50.8 kg)   LMP 04/18/2012   SpO2 98%   BMI 24.24 kg/m²     Physical Exam   Constitutional: She is oriented to person, place, and time. She appears well-developed and well-nourished. No distress. Generalized fatigue, pale, pale conjunctiva and oral mucosa   HENT:   Head: Normocephalic and atraumatic. Eyes: Conjunctivae and EOM are normal. Pupils are equal, round, and reactive to light. Neck: Normal range of motion. Neck supple. No JVD present. No tracheal deviation present. Cardiovascular: Regular rhythm, normal heart sounds and intact distal pulses. Pulmonary/Chest: Effort normal and breath sounds normal. No stridor. No respiratory distress. She has no wheezes. She has no rales. She exhibits no tenderness. Abdominal: Soft. Bowel sounds are normal. She exhibits no distension. There is no tenderness. There is no rebound and no guarding. Musculoskeletal: Normal range of motion. She exhibits no edema. Neurological: She is alert and oriented to person, place, and time. No cranial nerve deficit. Skin: Skin is warm and dry.  No mg/dL    Sodium 131 (L) 135 - 144 mmol/L    Potassium 4.0 3.7 - 5.3 mmol/L    Chloride 92 (L) 98 - 107 mmol/L    CO2 23 20 - 31 mmol/L    Anion Gap 16 9 - 17 mmol/L    GFR Non-African American >60 >60 mL/min    GFR African American >60 >60 mL/min    GFR Comment          GFR Staging NOT REPORTED        IMPRESSION/EMERGENCY DEPARTMENT COURSE:    Plan is to obtain blood work to evaluate anemia and if necessary transfuse. Laboratory workup showed leukocytosis, patient is afebrile, tachycardic, with a hemoglobin 6.8. We will transfuse 2 units. He sees and consult internal medicine for admission for for further evaluation. Patient is resting comfortably in no acute distress. Awaiting transfer to the floor. ED Course        RADIOLOGY:  None    PROCEDURES:  None    CONSULTS:  IP CONSULT TO INTERNAL MEDICINE    CRITICAL CARE:  None    FINAL IMPRESSION      1. Anemia, unspecified type          DISPOSITION / PLAN     DISPOSITION     PATIENT REFERRED TO:  No follow-up provider specified.     DISCHARGE MEDICATIONS:  New Prescriptions    No medications on file       Alma Anne DO  Emergency Medicine Resident    (Please note that portions of this note were completed with a voice recognition program.  Efforts were made to edit the dictations but occasionally words are mis-transcribed.)     Alma Anne DO  11/25/17 [id number]

## 2017-11-25 NOTE — ED NOTES
Report called to [address] to [address] [place] [address] per jelly Pagan, ESSENCE  11/25/17 [id number]

## 2017-11-26 LAB
ABSOLUTE EOS #: 0.13 K/UL (ref 0–0.44)
ABSOLUTE IMMATURE GRANULOCYTE: 0.13 K/UL (ref 0–0.3)
ABSOLUTE LYMPH #: 1.93 K/UL (ref 1.1–3.7)
ABSOLUTE MONO #: 1.49 K/UL (ref 0.1–1.2)
ANION GAP SERPL CALCULATED.3IONS-SCNC: 15 MMOL/L (ref 9–17)
BASOPHILS # BLD: 0 % (ref 0–2)
BASOPHILS ABSOLUTE: 0.05 K/UL (ref 0–0.2)
BUN BLDV-MCNC: 18 MG/DL (ref 6–20)
BUN/CREAT BLD: ABNORMAL (ref 9–20)
CALCIUM SERPL-MCNC: 9.9 MG/DL (ref 8.6–10.4)
CHLORIDE BLD-SCNC: 96 MMOL/L (ref 98–107)
CO2: 24 MMOL/L (ref 20–31)
CREAT SERPL-MCNC: 0.56 MG/DL (ref 0.5–0.9)
DIFFERENTIAL TYPE: ABNORMAL
EOSINOPHILS RELATIVE PERCENT: 1 % (ref 1–4)
GFR AFRICAN AMERICAN: >60 ML/MIN
GFR NON-AFRICAN AMERICAN: >60 ML/MIN
GFR SERPL CREATININE-BSD FRML MDRD: ABNORMAL ML/MIN/{1.73_M2}
GFR SERPL CREATININE-BSD FRML MDRD: ABNORMAL ML/MIN/{1.73_M2}
GGT: 92 U/L (ref 5–36)
GLUCOSE BLD-MCNC: 99 MG/DL (ref 70–99)
HCT VFR BLD CALC: 22.9 % (ref 36.3–47.1)
HCT VFR BLD CALC: 24.1 % (ref 36.3–47.1)
HCT VFR BLD CALC: 28.6 % (ref 36.3–47.1)
HEMOGLOBIN: 6.8 G/DL (ref 11.9–15.1)
HEMOGLOBIN: 7.1 G/DL (ref 11.9–15.1)
HEMOGLOBIN: 8.4 G/DL (ref 11.9–15.1)
IMMATURE GRANULOCYTES: 1 %
LYMPHOCYTES # BLD: 10 % (ref 24–43)
MCH RBC QN AUTO: 25.9 PG (ref 25.2–33.5)
MCHC RBC AUTO-ENTMCNC: 29.5 G/DL (ref 28.4–34.8)
MCV RBC AUTO: 88 FL (ref 82.6–102.9)
MONOCYTES # BLD: 8 % (ref 3–12)
PDW BLD-RTO: 17.2 % (ref 11.8–14.4)
PLATELET # BLD: 846 K/UL (ref 138–453)
PLATELET ESTIMATE: ABNORMAL
PMV BLD AUTO: 9.5 FL (ref 8.1–13.5)
POTASSIUM SERPL-SCNC: 3.7 MMOL/L (ref 3.7–5.3)
RBC # BLD: 2.74 M/UL (ref 3.95–5.11)
RBC # BLD: ABNORMAL 10*6/UL
SEG NEUTROPHILS: 80 % (ref 36–65)
SEGMENTED NEUTROPHILS ABSOLUTE COUNT: 15.42 K/UL (ref 1.5–8.1)
SODIUM BLD-SCNC: 135 MMOL/L (ref 135–144)
WBC # BLD: 19.2 K/UL (ref 3.5–11.3)
WBC # BLD: ABNORMAL 10*3/UL

## 2017-11-26 PROCEDURE — 99223 1ST HOSP IP/OBS HIGH 75: CPT | Performed by: INTERNAL MEDICINE

## 2017-11-26 PROCEDURE — 97162 PT EVAL MOD COMPLEX 30 MIN: CPT

## 2017-11-26 PROCEDURE — 36430 TRANSFUSION BLD/BLD COMPNT: CPT

## 2017-11-26 PROCEDURE — 85018 HEMOGLOBIN: CPT

## 2017-11-26 PROCEDURE — 82977 ASSAY OF GGT: CPT

## 2017-11-26 PROCEDURE — 6370000000 HC RX 637 (ALT 250 FOR IP): Performed by: STUDENT IN AN ORGANIZED HEALTH CARE EDUCATION/TRAINING PROGRAM

## 2017-11-26 PROCEDURE — 85014 HEMATOCRIT: CPT

## 2017-11-26 PROCEDURE — 6360000002 HC RX W HCPCS: Performed by: STUDENT IN AN ORGANIZED HEALTH CARE EDUCATION/TRAINING PROGRAM

## 2017-11-26 PROCEDURE — 99253 IP/OBS CNSLTJ NEW/EST LOW 45: CPT | Performed by: INTERNAL MEDICINE

## 2017-11-26 PROCEDURE — 94762 N-INVAS EAR/PLS OXIMTRY CONT: CPT

## 2017-11-26 PROCEDURE — 36415 COLL VENOUS BLD VENIPUNCTURE: CPT

## 2017-11-26 PROCEDURE — 94640 AIRWAY INHALATION TREATMENT: CPT

## 2017-11-26 PROCEDURE — G8978 MOBILITY CURRENT STATUS: HCPCS

## 2017-11-26 PROCEDURE — 85025 COMPLETE CBC W/AUTO DIFF WBC: CPT

## 2017-11-26 PROCEDURE — 6360000002 HC RX W HCPCS: Performed by: INTERNAL MEDICINE

## 2017-11-26 PROCEDURE — 80048 BASIC METABOLIC PNL TOTAL CA: CPT

## 2017-11-26 PROCEDURE — G8979 MOBILITY GOAL STATUS: HCPCS

## 2017-11-26 PROCEDURE — P9016 RBC LEUKOCYTES REDUCED: HCPCS

## 2017-11-26 PROCEDURE — 2580000003 HC RX 258: Performed by: STUDENT IN AN ORGANIZED HEALTH CARE EDUCATION/TRAINING PROGRAM

## 2017-11-26 PROCEDURE — 97530 THERAPEUTIC ACTIVITIES: CPT

## 2017-11-26 PROCEDURE — 1200000000 HC SEMI PRIVATE

## 2017-11-26 RX ORDER — 0.9 % SODIUM CHLORIDE 0.9 %
250 INTRAVENOUS SOLUTION INTRAVENOUS ONCE
Status: COMPLETED | OUTPATIENT
Start: 2017-11-26 | End: 2017-11-27

## 2017-11-26 RX ADMIN — FENTANYL CITRATE 50 MCG: 50 INJECTION INTRAMUSCULAR; INTRAVENOUS at 18:37

## 2017-11-26 RX ADMIN — OLODATEROL RESPIMAT INHALATION SPRAY 2 PUFF: 2.5 SPRAY, METERED RESPIRATORY (INHALATION) at 08:04

## 2017-11-26 RX ADMIN — Medication 1 TABLET: at 08:35

## 2017-11-26 RX ADMIN — FENTANYL CITRATE 50 MCG: 50 INJECTION INTRAMUSCULAR; INTRAVENOUS at 22:53

## 2017-11-26 RX ADMIN — ENOXAPARIN SODIUM 40 MG: 40 INJECTION SUBCUTANEOUS at 08:35

## 2017-11-26 RX ADMIN — PANTOPRAZOLE SODIUM 40 MG: 40 TABLET, DELAYED RELEASE ORAL at 08:35

## 2017-11-26 RX ADMIN — FENTANYL CITRATE 50 MCG: 50 INJECTION INTRAMUSCULAR; INTRAVENOUS at 13:35

## 2017-11-26 RX ADMIN — FENTANYL CITRATE 50 MCG: 50 INJECTION INTRAMUSCULAR; INTRAVENOUS at 15:37

## 2017-11-26 RX ADMIN — FENTANYL CITRATE 50 MCG: 50 INJECTION INTRAMUSCULAR; INTRAVENOUS at 14:32

## 2017-11-26 RX ADMIN — SODIUM CHLORIDE 250 ML: 9 INJECTION, SOLUTION INTRAVENOUS at 11:51

## 2017-11-26 RX ADMIN — ASPIRIN 81 MG: 81 TABLET, CHEWABLE ORAL at 08:35

## 2017-11-26 RX ADMIN — FERROUS SULFATE TAB EC 325 MG (65 MG FE EQUIVALENT) 325 MG: 325 (65 FE) TABLET DELAYED RESPONSE at 08:35

## 2017-11-26 RX ADMIN — FLUTICASONE PROPIONATE 1 SPRAY: 50 SPRAY, METERED NASAL at 08:35

## 2017-11-26 RX ADMIN — FOLIC ACID 1 MG: 1 TABLET ORAL at 08:35

## 2017-11-26 RX ADMIN — FENTANYL CITRATE 50 MCG: 50 INJECTION INTRAMUSCULAR; INTRAVENOUS at 02:00

## 2017-11-26 RX ADMIN — FENTANYL CITRATE 50 MCG: 50 INJECTION INTRAMUSCULAR; INTRAVENOUS at 00:26

## 2017-11-26 RX ADMIN — FENTANYL CITRATE 50 MCG: 50 INJECTION INTRAMUSCULAR; INTRAVENOUS at 07:23

## 2017-11-26 RX ADMIN — FENTANYL CITRATE 50 MCG: 50 INJECTION INTRAMUSCULAR; INTRAVENOUS at 19:51

## 2017-11-26 RX ADMIN — DOCUSATE SODIUM -SENNOSIDES 1 TABLET: 50; 8.6 TABLET, COATED ORAL at 08:35

## 2017-11-26 RX ADMIN — TIOTROPIUM BROMIDE 18 MCG: 18 CAPSULE ORAL; RESPIRATORY (INHALATION) at 08:04

## 2017-11-26 RX ADMIN — CITALOPRAM 40 MG: 20 TABLET, FILM COATED ORAL at 08:35

## 2017-11-26 ASSESSMENT — PAIN SCALES - GENERAL
PAINLEVEL_OUTOF10: 7
PAINLEVEL_OUTOF10: 10
PAINLEVEL_OUTOF10: 9
PAINLEVEL_OUTOF10: 8
PAINLEVEL_OUTOF10: 7
PAINLEVEL_OUTOF10: 9

## 2017-11-26 NOTE — PLAN OF CARE
Problem: Nutrition  Goal: Optimal nutrition therapy  Outcome: Ongoing  Nutrition Problem: Severe malnutrition  Intervention: Food and/or Nutrient Delivery: Continue current diet, Start ONS  Nutritional Goals: Intake greater than 50%

## 2017-11-26 NOTE — PROGRESS NOTES
[facility]  Occupational Therapy Not Seen Note    Patient not available for Occupational Therapy due to:    [] Testing:    [] Hemodialysis    [x] Blood Transfusion in Progress    []Refusal by Patient:    [] Surgery/Procedure:    [] Strict Bedrest    [] Sedation    [] Spine Precautions     [] Pt being transferred to palliative care at this time. Spoke with pt/family and OT services to be defered. [] Pt independent with functional mobility and functional tasks.  Pt with no OT acute care needs at this time, will defer OT eval.    [] Other     Next Scheduled Treatment: Herlinda PM    Signature: Electronically signed by Rafael Faust OT on 11/26/2017 at 11:03 AM

## 2017-11-26 NOTE — PLAN OF CARE
Problem: Falls - Risk of  Goal: Absence of falls  Outcome: Ongoing      Problem: Infection:  Goal: Will remain free from infection  Will remain free from infection   Outcome: Ongoing      Problem: Daily Care:  Goal: Daily care needs are met  Daily care needs are met   Outcome: Ongoing

## 2017-11-26 NOTE — H&P
 Cryptogenic organizing pneumonia ([facility] 11/23/2016    Depression 12/13/2012    on celexa per pcp    Difficult intravenous access     STATES LT ARM EASIER TO START IN, ENCOURAGED TO HYDRATE DAY PROIR     Disease of blood and blood forming organ     Thrombocytosis    Edentulous     does not wear her dentures    Full dentures     ENCOURAGED TO WEAR DOS    Gastric outlet obstruction 8/30/2017    GERD (gastroesophageal reflux disease)     Hearing loss     left ear    History of blood transfusion 01/2012    Hydronephrosis, bilateral 5/2/2015    Hypertension     Hypoglycemia 2014    Lung nodule     right, benign    Migraine     OCD (obsessive compulsive disorder)     Osteoarthritis     Osteoporosis     Pelvic mass April 2015    benign     Perforated nasal septum     Pneumonia     Psoriasis     PTSD (post-traumatic stress disorder) 1985    [facility]    Scoliosis     Shortness of breath 11/23/2016    with temp.  change    [facility] 1987    [facility]    Thrombocytosis ([facility]     Urinary incontinence     PT STATES RESOLVED    Wears glasses        PAST SURGICAL HISTORY:        Procedure Laterality Date    ABDOMEN SURGERY  09/2015    MASS REMOVED AND HERNIA REPAIR    CHOLECYSTECTOMY      CYSTOSCOPY  12/1/2015    bilat retrograde, right ureteroscopy    CYSTOSCOPY  05/11/2017    CYSTOSCOPY Right 5/11/2017    CYSTOSCOPY, RIGHT URETEROSCOPY, RIGHT RETROGRADE PYELOGRAM, RIGHT STENT PLACEMENT performed by Anna Rivera MD at [address] [place] [address] Drive ERCP  5/16/2013    FIXATION KYPHOPLASTY  08/09/2016    T12, L-2, L-5    GASTRECTOMY  2012    Partial Gastrectomy    HERNIA REPAIR  07/18/2017    hernia [facility] with [facility]    LUNG BIOPSY      right upper lobe    PARACENTESIS Right 01/2015-09/2015    X 8    SC CREATE EARDRUM OPENING,GEN ANESTH N/A 4/14/2017    LEFT MYRINGOTOMY T-TUBE INSERTION performed by Cisco Crowe MD at [address] ESOPHAGOGASTRODUODENOSCOPY TRANSORAL DIAGNOSTIC N/A 8/30/2017 hours as needed for Nausea  albuterol (PROVENTIL) (5 MG/ML) 0.5% nebulizer solution, Take 0.5 mLs by nebulization every 6 hours as needed for Wheezing    Allergies:  Latex; Bee venom; Benadryl [diphenhydramine-zinc acetate]; Tavist; and Tylenol [acetaminophen]    SOCIAL HISTORY:   TOBACCO:   reports that she has been smoking Cigarettes. She has a 7.50 pack-year smoking history. She has never used smokeless tobacco.  ETOH:   reports that she does not drink alcohol. DRUGS:   reports that she does not use drugs. FAMILY HISTORY:       Problem Relation Age of Onset    Heart Disease Mother     Stroke Father      cerebral aneurysm       REVIEW OF SYSTEMS:  CONSTITUTIONAL:  Positive for  Fatigue, negative for  fevers, chills, and weight loss  RESPIRATORY:  negative for  dry cough, cough with sputum, dyspnea, wheezing, hemoptysis and chest pain  CARDIOVASCULAR:  negative for  chest pain, dyspnea, palpitations, orthopnea, PND, edema, syncope  GASTROINTESTINAL:  negative for nausea, vomiting, change in bowel habits, diarrhea, constipation, abdominal pain and jaundice  GENITOURINARY:  negative for frequency, dysuria, nocturia, urinary incontinence and hematuria  NEUROLOGICAL:  negative for headaches, dizziness, seizures, speech problems, visual disturbance, weakness and numbness    PHYSICAL EXAM:  /69   Pulse 103   Temp 98.8 °F (37.1 °C) (Oral)   Resp 16   Ht 4' 9\" (1.448 m)   Wt 107 lb 11.2 oz (48.9 kg)   LMP 04/18/2012   SpO2 96%   BMI 23.31 kg/m²   CONSTITUTIONAL:  awake, alert, cooperative, no apparent distress, and appears stated age.  Pallor +  LUNGS:  No increased work of breathing, good air exchange, clear to auscultation bilaterally, no crackles or wheezing  CARDIOVASCULAR:  Normal apical impulse, regular rate and rhythm, normal S1 and S2,   ABDOMEN:  No scars, normal bowel sounds, soft, non-distended, non-tender, no masses palpated, no hepatosplenomegally  NEUROLOGIC:  Awake, alert, oriented to name, place and time. Motor is 5 out of 5 bilaterally. Sensory is intact. ASSESSMENT  Principal Problem:    TIN (chronic hypoplastic anemia) (HCC)  Active Problems:    Smoking greater than 40 pack years    Thrombocytosis (HCC)    Leukocytosis    Multiple lung nodules on CT            PLAN:    1. Acute on Chronic Anemia - Hb - 6.8. Received 1 unit of PRBC. Iron and saturation low. Reticulocytes normal. F/U wit peripheral smear. Monitor H/H. Hemonc consulted. Appreciate recommendations. 2. High Ferritin - 4,329, secondary to the multiple blood transfusions  3. Thrombocytosis - 910. Bone marrow biopsy showed plasma cell population. Monoclonal. IGG Kappa . 13 G/DL. 4. Multiple pulmonary nodules - Biopsy negative for malignancy. Follows with Dr. Elizabeth Hicks as outpatient. 5. Chronically elevated Alkaline Phosphatase. 6. Reactive Leukocytosis  7. Osteoporosis - Steroid induced. DEXA -2.7. On Prolia. 8. Active Smoker-  Encourage cessation. Diet: General Diet. DVT prophylaxis - Lovenox   PT/OT evaluation and treatment  .  Discharge planning       Ifeoma Caldera, PGY- 1, Internal Medicine Resident  [facility]

## 2017-11-26 NOTE — PROGRESS NOTES
Nutrition Assessment    Type and Reason for Visit: Positive Nutrition Screen, Initial    Nutrition Recommendations: Recommend Ensure supplements on all General trays. Agree with liberalized diet. Malnutrition Assessment:  · Malnutrition Status: Meets the criteria for severe malnutrition  · Context: Chronic illness  · Findings of the 6 clinical characteristics of malnutrition (Minimum of 2 out of 6 clinical characteristics is required to make the diagnosis of moderate or severe Protein Calorie Malnutrition based on AND/ASPEN Guidelines):  1. Energy Intake- ,      2. Weight Loss-20% loss or greater, in 1 year  3. Fat Loss-Moderate subcutaneous fat loss, Orbital  4. Muscle Loss-Moderate muscle mass loss, Temples (temporalis muscle)    Nutrition Diagnosis:   · Problem: Severe malnutrition  · Etiology: related to  (unknown etiology)     Signs and symptoms:  as evidenced by Weight loss greater than or equal to 20% in 1 year, Moderate muscle loss, Moderate loss of subcutaneous fat    Nutrition Assessment:  · Subjective Assessment: Pt referred due to wt loss. Review of old charts shows pt is down 64# over the past 9 months. She states her appetite is fine and she's been eating her normal amounts but she can't seem to gain weight. Observed nonselect breakfast tray-pt consumed only bites.   · Current Nutrition Therapies:  · Oral Diet Orders: General   · Oral Diet intake: 1-25%  · Anthropometric Measures:  · Ht: 4' 9\" (144.8 cm)   · Current Body Wt: 110 lb (49.9 kg)  · Usual Body Wt: 174 lb (78.9 kg)  · Ideal Body Wt: 89 lb (40.4 kg), % Ideal Body 124%  · BMI Classification: BMI 18.5 - 24.9 Normal Weight  · Comparative Standards (Estimated Nutrition Needs):  · Estimated Daily Total Kcal: 8462-3563 kcal  · Estimated Daily Protein (g): 60-70 g    Estimated Intake vs Estimated Needs: Intake Less Than Needs    Nutrition Risk Level: High    Nutrition Interventions:   Continue current diet, Start ONS       Nutrition

## 2017-11-26 NOTE — PROGRESS NOTES
managed non-surgically. 7/18/2017 the pt was taken to the operating room and her hernia was repaired. - right hydronephrosis secondary to upj obstruction.  Stent was placed at that time by Dr. Kamryn Castellanos Problem:    TIN (chronic hypoplastic anemia) ([facility]  Active Problems:    Smoking greater than 40 pack years    Thrombocytosis ([address]    Leukocytosis    Multiple lung nodules on CT      - transfuse for acute on chr aneami.   - vitamines   - hem/onc eval

## 2017-11-26 NOTE — PROGRESS NOTES
DATE: 2017    NAME: Alejandro Law  MRN: [id number]   : 1970    Patient not seen this date for Physical Therapy due to:  [x] Blood transfusion in progress  [] Hemodialysis  [] Patient Declined  [] Spine Precautions   [] Strict Bedrest  [] Surgery/ Procedure  [] Testing      [] Other        [] PT being discontinued at this time. Patient independent. No further needs. [] PT being discontinued at this time as the patient has been transferred to palliative care. No further needs.     Diamond Flor, PT, DPT

## 2017-11-26 NOTE — CONSULTS
Inpatient consult to Hem/Onc  Consult performed by: Terri ANDRADE  Consult ordered by: Hugh Huynh  Reason for consult: Recurrent anemia                                                                Today's Date: 11/26/2017  Patient Name: Vickie Law  Date of admission: 11/25/2017  4:48 PM  Patient's age: 50 y.o., 1970  Admission Dx: Anemia [D64.9]  Anemia [D64.9]      Requesting Physician: Lamont Whitten MD   Reason for consult: Profound anemia    CHIEF COMPLAINT:  Profound anemia    History Obtained From:  Chart and patient    HISTORY OF PRESENT ILLNESS:      The patient is admitted with progressive weakness and was found to be severely anemic with hemoglobin under 7. She received blood transfusion. She is known to us with recurrent masses as well as severe ongoing anemia. She was managed previously by supportive care and her hemoglobin has been stable. She has elevated ferritin but This thought to be reactive. Recent bone marrow aspiration and biopsy showed reactive plasma cell population up to 10% but with small monoclonal protein in the blood. The picture is not compatible with myeloma. The patient has hypoplastic anemia. We are in the process of evaluating her in combination with [facility] clinic. BRIEF CASE HISTORY:  This is a patient know to us, she follows at the Heme-Onc clinic. She was originally seen for ascites in the setting of a lung nodule and a gastric mass. Paracentesis followed by pathology found the ascitic fluid to be non- malignant. Similarly the resected abdominal mass and the biopsied lung mass were also found to be non malignant. The patient has required multiple paracentesis as the ascites continues to recur. Each time pathology was negative for malignancy and there were no findings consistent with any particular etiology. There was a concern about tuberculous ascites but the results were also negative in this case.  The patient later developed an history includes Heart Disease in her mother; Stroke in her father. Social History:   reports that she has been smoking Cigarettes. She has a 7.50 pack-year smoking history. She has never used smokeless tobacco. She reports that she does not drink alcohol or use drugs. Medications:    Prior to Admission medications    Medication Sig Start Date End Date Taking?  Authorizing Provider   sodium chloride (ALTAMIST SPRAY) 0.65 % nasal spray 1 spray by Nasal route as needed for Congestion 10/23/17  Yes Aleta Alvarez MD   Calcium Carbonate-Vitamin D (OYSTER SHELL CALCIUM/D) 500-200 MG-UNIT TABS Take 1 tablet by mouth daily Please discontinue previous prescription of calcium 10/23/17 10/23/18 Yes Aleta Alvarez MD   loratadine (CLARITIN) 10 MG tablet Take 1 tablet by mouth daily 10/23/17  Yes Aleta Alvarez MD   citalopram (CELEXA) 40 MG tablet Take 1 tablet by mouth daily 10/23/17  Yes Aleta Alvarez MD   ferrous sulfate 325 (65 Fe) MG tablet Take 1 tablet by mouth 2 times daily 10/23/17  Yes Aleta Alvarez MD   senna-docusate (SENEXON-S) 8.6-50 MG per tablet Take 1 tablet by mouth daily 10/23/17  Yes Aleta Alvarez MD   aspirin (ASPIRIN LOW DOSE) 81 MG chewable tablet Take 1 tablet by mouth daily 10/23/17  Yes Aleta Alvarez MD   pantoprazole (PROTONIX) 40 MG tablet Take 1 tablet by mouth daily 10/23/17  Yes Aleta Alvarez MD   folic acid (FOLVITE) 1 MG tablet Take 1 tablet by mouth daily 10/23/17  Yes Aleta Alvarez MD   umeclidinium-vilanterol (ANORO ELLIPTA) 62.5-25 MCG/INH AEPB inhaler Inhale 1 puff into the lungs daily 10/20/17  Yes Chance Mora MD   VENTOLIN  (90 Base) MCG/ACT inhaler INHALE 2 PUFFS INTO THE LUNGS EVERY 6 HOURS AS NEEDED FOR WHEEZING 9/22/17  Yes Chance Mora MD   docusate sodium (DOCQLACE) 100 MG capsule TAKE 1 CAPSULE BY MOUTH 2 TIMES DAILY AS NEEDED FOR CONSTIPATION 8/15/17  Yes Montserrat Moreno MD   fluticasone (FLONASE) 50 MCG/ACT nasal spray 1 spray by 10 mL  10 mL Intravenous 2 times per day Queen Trinh MD        sodium chloride flush 0.9 % injection 10 mL  10 mL Intravenous PRN Queen Trinh MD        magnesium hydroxide (MILK OF MAGNESIA) 400 MG/5ML suspension 30 mL  30 mL Oral Daily PRN Queen Trinh MD        ondansetron (ZOFRAN) injection 4 mg  4 mg Intravenous Q6H PRN Queen Trinh MD        enoxaparin (LOVENOX) injection 40 mg  40 mg Subcutaneous Daily Queen Trinh MD   40 mg at 11/26/17 6942    aspirin chewable tablet 81 mg  81 mg Oral Daily Queen Trinh, MD   81 mg at 11/26/17 0835    tiotropium (SPIRIVA) inhalation capsule 18 mcg  18 mcg Inhalation Daily Queen Trinh MD   18 mcg at 11/26/17 0804    olodaterol (STRIVERDI RESPIMAT) inhaler 2 puff  2 puff Inhalation Daily Queen Trinh MD   2 puff at 11/26/17 0804     Facility-Administered Medications Ordered in Other Encounters   Medication Dose Route Frequency Provider Last Rate Last Dose    lactated ringers infusion   Intravenous Continuous Dedra Juarez MD 0 mL/hr at 12/01/15 1220      meperidine (DEMEROL) injection 12.5 mg  12.5 mg Intravenous Q5 Min PRN Milo Elaine MD        fentaNYL (SUBLIMAZE) injection 50 mcg  50 mcg Intravenous Q5 Min PRN Milo Elaine MD           Allergies:  Latex; Bee venom; Benadryl [diphenhydramine-zinc acetate]; Tavist; and Tylenol [acetaminophen]    REVIEW OF SYSTEMS:      Constitutional:  No fever or chills.  No night sweats, no weight loss  Eyes:  No eye discharge, double vision, or eye pain   HEENT:  negative for  hearing loss, tinnitus, ear drainage, earaches,  epistaxis,  sore mouth, sore throat, hoarseness and voice change  Respiratory:  Intermittent productive cough, clear sputum, negative for  dyspnea, wheezing, hemoptysis, chest pain  Cardiovascular:  negative for  chest pain, dyspnea, palpitations, orthopnea, PND,   edema, syncope  Gastrointestinal:  Nausea vomiting and abdominal pain have edema, no clubbing or cyanosis  Skin - normal coloration and turgor, no rashes, no suspicious skin lesions noted     DATA:      Labs:     CBC:   Recent Labs      11/25/17 1719 11/26/17 0521 11/26/17   0833   WBC  20.4*  19.2*   --    HGB  6.8*  7.1*  6.8*   HCT  24.0*  24.1*  22.9*   PLT  910*  846*   --      BMP:   Recent Labs      11/25/17 1719 11/26/17   0521   NA  131*  135   K  4.0  3.7   CO2  23  24   BUN  19  18   CREATININE  0.61  0.56   LABGLOM  >60  >60   GLUCOSE  96  99     PT/INR:   No results for input(s): PROTIME, INR in the last 72 hours. APTT:  No results for input(s): APTT in the last 72 hours. LIVER PROFILE:  Recent Labs      11/25/17 1719   AST  17   ALT  6   LABALBU  2.5*      Imaging  Impression   IMPRESSION:   CT ABD and Pelvis 6/22/15   1. Increased extent, though not of maximal caliber, of dilated left upper quadrant small bowel. Discrete transition zone is not identified, though chronic partial or early or intermittent small bowel obstruction is suspected. 2. Increased size of upper ventral abdominal wall hernia, containing nonobstructed transverse colon and small bowel. Fluid density extending into the hernia may be mesenteric, rather than intraluminal, and hernia incarceration cannot be excluded. 3. Interval development of small amount of left upper and lower quadrant free intraperitoneal fluid. 4. Large abdominal cystic mass appears minimally changed in size. This finding is suspicious for neoplasm, though of unclear anatomic origin given its appearance on prior studies. It could represent loculated intraperitoneal fluid. 5. Increased size of right lower lobe and new left lower lobe pulmonary nodules suggest progression of metastases.          Report electronically signed by Izzy Torres MD on 6/22/2015 12:34 AM         Primary Problem  TIN (chronic hypoplastic anemia) [facility])    Active Hospital Problems    Diagnosis Date Noted    TIN (chronic hypoplastic anemia) ([facility] [D61.9] 11/25/2017    Multiple lung nodules on CT [R91.8] 01/13/2016    Leukocytosis [D72.829] 06/22/2015    Thrombocytosis ([facility] [D47.3] 03/30/2015    Smoking greater than 40 pack years [F17.210] 03/29/2012     IMPRESSION:    1. Anemia, multifactorial, likely anemia of chronic illness  2. Acute drop, no evidence of active bleeding. 3. Thrombocytosis, chronic, likely related to chronic inflammation  4. 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 Elevated ferritin    RECOMMENDATIONS:  Continue supportive care with transfusion  We will continue to watch her ferritin count of acute illness. I'm concerned that he has hemochromatosis. We will review the bone marrow aspiration and biopsy  It is worth noting that the plasma cells seen in the bone marrow were not monoclonal  We will follow with you. Ultimately, she is likely to be referred to [facility] clinic for further diagnosis.     Kerri Child MD  Hematologist/Medical Oncologist  [facility]  Cell: [phone]

## 2017-11-26 NOTE — PROGRESS NOTES
Physical Therapy    Facility/Department: [facility] RE[facility]//MED SURG  Initial Assessment    NAME: Kristin Law  : 1970  MRN: [id number]    Date of Service: 2017    Patient Diagnosis(es): The primary encounter diagnosis was Anemia, unspecified type. A diagnosis of TIN (chronic hypoplastic anemia) (HCC) was also pertinent to this visit. has a past medical history of Allergic rhinitis; Anxiety; Asthma; Blood circulation, collateral; Celiac disease; Chronic back pain; Chronic kidney disease; Cirrhosis of liver with ascites ([address] Constipation; Cough; Cryptogenic organizing pneumonia ([address] Depression; Difficult intravenous access; Disease of blood and blood forming organ; Edentulous; Full dentures; Gastric outlet obstruction; GERD (gastroesophageal reflux disease); Hearing loss; History of blood transfusion; Hydronephrosis, bilateral; Hypertension; Hypoglycemia; Lung nodule; Migraine; OCD (obsessive compulsive disorder); Osteoarthritis; Osteoporosis; Pelvic mass; Perforated nasal septum; Pneumonia; Psoriasis; PTSD (post-traumatic stress disorder); Scoliosis; Shortness of breath; Substance abuse; Thrombocytosis ([address] Urinary incontinence; and Wears glasses. has a past surgical history that includes Tubal ligation (); Tonsillectomy and adenoidectomy (); ERCP (2013); Upper gastrointestinal endoscopy; gastrectomy (); Paracentesis (Right, 2015-2015); Cystocopy (2015); Fixation Kyphoplasty (2016); Lung biopsy; insert nasal septal prosthesis (N/A, 2017); create eardrum opening,gen anesth (N/A, 2017); Abdomen surgery (2015); Cystoscopy (2017); Ureter stent placement (2017); Ureteroscopy (2017); Cystoscopy (Right, 2017);  Cholecystectomy; hernia repair (2017); repair incisional hernia,reducible (N/A, 2017); and esophagogastroduodenoscopy transoral diagnostic (N/A, 8/30/2017). Restrictions  Restrictions/Precautions  Restrictions/Precautions: General Precautions, Fall Risk     Vision/Hearing  Vision: Within Functional Limits  Hearing: Within functional limits       Subjective  General  Chart Reviewed: Yes  Response To Previous Treatment: Not applicable  Family / Caregiver Present: No  Follows Commands: Within Functional Limits  Pain Screening  Patient Currently in Pain: Yes (8)    Orientation  Orientation  Overall Orientation Status: Within Normal Limits    Social/Functional History  Social/Functional History  Lives With: Family  Type of Home: House  Home Layout: One level, Laundry in basement  Home Access: Stairs to enter with rails  Entrance Stairs - Number of Steps: 6  Entrance Stairs - Rails: Both  Home Equipment: Rolling walker, Cane, Quad cane  Receives Help From: Family  ADL Assistance: Independent  Homemaking Assistance: Independent  Homemaking Responsibilities: Yes  Ambulation Assistance: Independent  Transfer Assistance: Independent  Active : No     Objective  AROM RLE (degrees)  RLE AROM: WFL  AROM LLE (degrees)  LLE AROM : WFL     Strength RLE  Comment: grossly 4-/5  Strength LLE  Comment: grossly 4-/5     Bed mobility  Supine to Sit: Stand by assistance  Sit to Supine: Stand by assistance     Transfers  Sit to Stand: Contact guard assistance  Stand to sit: Contact guard assistance     Ambulation  Ambulation?: Yes  Ambulation 1  Surface: level tile  Device:  (pushed IV pole)  Assistance: Contact guard assistance  Distance: 7ft x 1; 25ft x 1  Comments: Steady while pushing pole; fatigue reported     Balance  Sitting - Dynamic: Good  Standing - Static: Good  Standing - Dynamic: Fair;Good        Assessment   Assessment: 51 y/o female with complaints of general weakness and fatigue; will benefit from PT.    Treatment Diagnosis: general weakness, difficulty walking  Prognosis: Good  Decision Making: Medium Complexity  Patient Education: PT eval and POC  REQUIRES PT FOLLOW UP: Yes  Activity Tolerance  Activity Tolerance: Patient Tolerated treatment well         Plan   Plan  Times per week: 5 x week  Times per day: Daily  Current Treatment Recommendations: Strengthening, Balance Training, Endurance Training, Neuromuscular Re-education, Home Exercise Program, Safety Education & Training, Functional Mobility Training, Transfer Training, Gait Training, Stair training  Safety Devices  Type of devices: All fall risk precautions in place    G-Code  PT G-Codes  Functional Assessment Tool Used: [place] Tool  Score: 20  Functional Limitation: Mobility: Walking and moving around  Mobility: Walking and Moving Around Current Status (): At least 20 percent but less than 40 percent impaired, limited or restricted  Mobility: Walking and Moving Around Goal Status (): At least 1 percent but less than 20 percent impaired, limited or restricted    Goals  Short term goals  Time Frame for Short term goals: 14 visits  Short term goal 1: Pt to ambulate 250ft without AD and no LOB. Short term goal 2: Pt to tolerate 20-30 min ther ex/act for improved strength and endurance for ADL's. Short term goal 3: Pt to demonstrate good dynamic standing balance for decrease risk for falls. Short term goal 4: Pt to ascend/descend 6 steps with handrails to assist with safe entering of the home.    Patient Goals   Patient goals : Home following discharge       Therapy Time   Individual Concurrent Group Co-treatment   Time In 1341         Time Out 1405         Minutes 24                 Daniela Calix PT, DPT

## 2017-11-26 NOTE — PLAN OF CARE
Problem: Falls - Risk of  Goal: Absence of falls  Outcome: Ongoing      Problem: Infection:  Goal: Will remain free from infection  Will remain free from infection   Outcome: Ongoing      Problem: Pain:  Goal: Pain level will decrease  Pain level will decrease   Outcome: Ongoing    Goal: Control of acute pain  Control of acute pain   Outcome: Ongoing    Goal: Control of chronic pain  Control of chronic pain   Outcome: Ongoing      Problem: Musculor/Skeletal Functional Status  Goal: Highest potential functional level  Outcome: Ongoing

## 2017-11-27 VITALS
WEIGHT: 114.7 LBS | HEIGHT: 57 IN | DIASTOLIC BLOOD PRESSURE: 71 MMHG | RESPIRATION RATE: 15 BRPM | SYSTOLIC BLOOD PRESSURE: 119 MMHG | HEART RATE: 87 BPM | OXYGEN SATURATION: 94 % | BODY MASS INDEX: 24.75 KG/M2 | TEMPERATURE: 98.2 F

## 2017-11-27 LAB
ABO/RH: NORMAL
ABSOLUTE EOS #: 0.1 K/UL (ref 0–0.44)
ABSOLUTE IMMATURE GRANULOCYTE: 0.2 K/UL (ref 0–0.3)
ABSOLUTE LYMPH #: 1.42 K/UL (ref 1.1–3.7)
ABSOLUTE MONO #: 1.31 K/UL (ref 0.1–1.2)
ANION GAP SERPL CALCULATED.3IONS-SCNC: 15 MMOL/L (ref 9–17)
ANTIBODY SCREEN: NEGATIVE
ARM BAND NUMBER: NORMAL
BASOPHILS # BLD: 0 % (ref 0–2)
BASOPHILS ABSOLUTE: 0.07 K/UL (ref 0–0.2)
BLD PROD TYP BPU: NORMAL
BLD PROD TYP BPU: NORMAL
BUN BLDV-MCNC: 14 MG/DL (ref 6–20)
BUN/CREAT BLD: ABNORMAL (ref 9–20)
CALCIUM SERPL-MCNC: 9.9 MG/DL (ref 8.6–10.4)
CHLORIDE BLD-SCNC: 98 MMOL/L (ref 98–107)
CO2: 22 MMOL/L (ref 20–31)
CREAT SERPL-MCNC: 0.47 MG/DL (ref 0.5–0.9)
CROSSMATCH RESULT: NORMAL
CROSSMATCH RESULT: NORMAL
DIFFERENTIAL TYPE: ABNORMAL
DISPENSE STATUS BLOOD BANK: NORMAL
DISPENSE STATUS BLOOD BANK: NORMAL
EOSINOPHILS RELATIVE PERCENT: 1 % (ref 1–4)
EXPIRATION DATE: NORMAL
GFR AFRICAN AMERICAN: >60 ML/MIN
GFR NON-AFRICAN AMERICAN: >60 ML/MIN
GFR SERPL CREATININE-BSD FRML MDRD: ABNORMAL ML/MIN/{1.73_M2}
GFR SERPL CREATININE-BSD FRML MDRD: ABNORMAL ML/MIN/{1.73_M2}
GLUCOSE BLD-MCNC: 89 MG/DL (ref 70–99)
HCT VFR BLD CALC: 25.9 % (ref 36.3–47.1)
HCT VFR BLD CALC: 28.1 % (ref 36.3–47.1)
HEMOGLOBIN: 8 G/DL (ref 11.9–15.1)
HEMOGLOBIN: 8.5 G/DL (ref 11.9–15.1)
IMMATURE GRANULOCYTES: 1 %
LYMPHOCYTES # BLD: 8 % (ref 24–43)
MCH RBC QN AUTO: 26.2 PG (ref 25.2–33.5)
MCHC RBC AUTO-ENTMCNC: 30.2 G/DL (ref 28.4–34.8)
MCV RBC AUTO: 86.5 FL (ref 82.6–102.9)
MONOCYTES # BLD: 7 % (ref 3–12)
PDW BLD-RTO: 16.9 % (ref 11.8–14.4)
PLATELET # BLD: 683 K/UL (ref 138–453)
PLATELET ESTIMATE: ABNORMAL
PMV BLD AUTO: 9.2 FL (ref 8.1–13.5)
POTASSIUM SERPL-SCNC: 4.2 MMOL/L (ref 3.7–5.3)
RBC # BLD: 3.25 M/UL (ref 3.95–5.11)
RBC # BLD: ABNORMAL 10*6/UL
SEG NEUTROPHILS: 83 % (ref 36–65)
SEGMENTED NEUTROPHILS ABSOLUTE COUNT: 15.84 K/UL (ref 1.5–8.1)
SODIUM BLD-SCNC: 135 MMOL/L (ref 135–144)
SURGICAL PATHOLOGY REPORT: NORMAL
TRANSFUSION STATUS: NORMAL
TRANSFUSION STATUS: NORMAL
UNIT DIVISION: 0
UNIT DIVISION: 0
UNIT NUMBER: NORMAL
UNIT NUMBER: NORMAL
WBC # BLD: 18.9 K/UL (ref 3.5–11.3)
WBC # BLD: ABNORMAL 10*3/UL

## 2017-11-27 PROCEDURE — 97535 SELF CARE MNGMENT TRAINING: CPT

## 2017-11-27 PROCEDURE — 6370000000 HC RX 637 (ALT 250 FOR IP): Performed by: STUDENT IN AN ORGANIZED HEALTH CARE EDUCATION/TRAINING PROGRAM

## 2017-11-27 PROCEDURE — 85018 HEMOGLOBIN: CPT

## 2017-11-27 PROCEDURE — G8987 SELF CARE CURRENT STATUS: HCPCS

## 2017-11-27 PROCEDURE — 85025 COMPLETE CBC W/AUTO DIFF WBC: CPT

## 2017-11-27 PROCEDURE — 94640 AIRWAY INHALATION TREATMENT: CPT

## 2017-11-27 PROCEDURE — 80048 BASIC METABOLIC PNL TOTAL CA: CPT

## 2017-11-27 PROCEDURE — P9016 RBC LEUKOCYTES REDUCED: HCPCS

## 2017-11-27 PROCEDURE — 6360000002 HC RX W HCPCS: Performed by: INTERNAL MEDICINE

## 2017-11-27 PROCEDURE — 94762 N-INVAS EAR/PLS OXIMTRY CONT: CPT

## 2017-11-27 PROCEDURE — 99232 SBSQ HOSP IP/OBS MODERATE 35: CPT | Performed by: INTERNAL MEDICINE

## 2017-11-27 PROCEDURE — 36415 COLL VENOUS BLD VENIPUNCTURE: CPT

## 2017-11-27 PROCEDURE — 6360000002 HC RX W HCPCS: Performed by: STUDENT IN AN ORGANIZED HEALTH CARE EDUCATION/TRAINING PROGRAM

## 2017-11-27 PROCEDURE — 85014 HEMATOCRIT: CPT

## 2017-11-27 PROCEDURE — 86900 BLOOD TYPING SEROLOGIC ABO: CPT

## 2017-11-27 PROCEDURE — 97166 OT EVAL MOD COMPLEX 45 MIN: CPT

## 2017-11-27 PROCEDURE — G8988 SELF CARE GOAL STATUS: HCPCS

## 2017-11-27 RX ORDER — OXYCODONE HYDROCHLORIDE AND ACETAMINOPHEN 5; 325 MG/1; MG/1
1 TABLET ORAL EVERY 6 HOURS PRN
Status: DISCONTINUED | OUTPATIENT
Start: 2017-11-27 | End: 2017-11-27 | Stop reason: HOSPADM

## 2017-11-27 RX ADMIN — ONDANSETRON 4 MG: 2 INJECTION, SOLUTION INTRAMUSCULAR; INTRAVENOUS at 03:35

## 2017-11-27 RX ADMIN — ENOXAPARIN SODIUM 40 MG: 40 INJECTION SUBCUTANEOUS at 08:28

## 2017-11-27 RX ADMIN — FENTANYL CITRATE 50 MCG: 50 INJECTION INTRAMUSCULAR; INTRAVENOUS at 10:14

## 2017-11-27 RX ADMIN — Medication 1 TABLET: at 08:28

## 2017-11-27 RX ADMIN — FLUTICASONE PROPIONATE 1 SPRAY: 50 SPRAY, METERED NASAL at 08:28

## 2017-11-27 RX ADMIN — FOLIC ACID 1 MG: 1 TABLET ORAL at 08:28

## 2017-11-27 RX ADMIN — FENTANYL CITRATE 50 MCG: 50 INJECTION INTRAMUSCULAR; INTRAVENOUS at 08:27

## 2017-11-27 RX ADMIN — CITALOPRAM 40 MG: 20 TABLET, FILM COATED ORAL at 08:28

## 2017-11-27 RX ADMIN — OLODATEROL RESPIMAT INHALATION SPRAY 2 PUFF: 2.5 SPRAY, METERED RESPIRATORY (INHALATION) at 08:43

## 2017-11-27 RX ADMIN — FENTANYL CITRATE 50 MCG: 50 INJECTION INTRAMUSCULAR; INTRAVENOUS at 03:36

## 2017-11-27 RX ADMIN — FENTANYL CITRATE 50 MCG: 50 INJECTION INTRAMUSCULAR; INTRAVENOUS at 01:40

## 2017-11-27 RX ADMIN — PANTOPRAZOLE SODIUM 40 MG: 40 TABLET, DELAYED RELEASE ORAL at 08:27

## 2017-11-27 RX ADMIN — ASPIRIN 81 MG: 81 TABLET, CHEWABLE ORAL at 08:27

## 2017-11-27 RX ADMIN — OXYCODONE HYDROCHLORIDE AND ACETAMINOPHEN 1 TABLET: 5; 325 TABLET ORAL at 10:46

## 2017-11-27 RX ADMIN — FENTANYL CITRATE 50 MCG: 50 INJECTION INTRAMUSCULAR; INTRAVENOUS at 06:50

## 2017-11-27 RX ADMIN — DOCUSATE SODIUM -SENNOSIDES 1 TABLET: 50; 8.6 TABLET, COATED ORAL at 08:27

## 2017-11-27 RX ADMIN — OXYCODONE HYDROCHLORIDE AND ACETAMINOPHEN 1 TABLET: 5; 325 TABLET ORAL at 17:29

## 2017-11-27 RX ADMIN — TIOTROPIUM BROMIDE 18 MCG: 18 CAPSULE ORAL; RESPIRATORY (INHALATION) at 08:42

## 2017-11-27 ASSESSMENT — PAIN SCALES - GENERAL
PAINLEVEL_OUTOF10: 7
PAINLEVEL_OUTOF10: 5
PAINLEVEL_OUTOF10: 6
PAINLEVEL_OUTOF10: 7
PAINLEVEL_OUTOF10: 7
PAINLEVEL_OUTOF10: 6

## 2017-11-27 ASSESSMENT — PAIN DESCRIPTION - LOCATION: LOCATION: LEG

## 2017-11-27 NOTE — FLOWSHEET NOTE
DATE: 2017    NAME: Kranthi Law  MRN: [id number]   : 1970    Patient not seen this date for Physical Therapy due to:  [] Blood transfusion in progress  [] Hemodialysis  []  Patient Declined  [] Spine Precautions   [] Strict Bedrest  [] Surgery/ Procedure  [] Testing      [x] Other: per RN pt will be discharging soon        [] PT being discontinued at this time. Patient independent. No further needs. [] PT being discontinued at this time as the patient has been transferred to palliative care. No further needs.     Tyra Waldrop, PTA

## 2017-11-27 NOTE — PROGRESS NOTES
Occupational Therapy   Occupational Therapy Initial Assessment  Date: 2017   Patient Name: Haether Law  MRN: [id number]     : 1970    Patient Diagnosis(es): The primary encounter diagnosis was Anemia, unspecified type. A diagnosis of TIN (chronic hypoplastic anemia) (HCC) was also pertinent to this visit. Copied from Emergency medicine note: Henry Arzate is a 50 y.o. female who presents with Complaints of generalized weakness/fatigue. Patient has a history of anemia and is currently being worked up by [facility], of which she is getting bone marrow biopsy in the next few weeks. Patient states that when she feels like this her hemoglobin is usually around 5. Patient denies any other complaints at this time besides fatigue. Patient has extensive past medical history of osteoporosis, cirrhosis, and lupus   has a past medical history of Allergic rhinitis; Anxiety; Asthma; Blood circulation, collateral; Celiac disease; Chronic back pain; Chronic kidney disease; Cirrhosis of liver with ascites ([address] Constipation; Cough; Cryptogenic organizing pneumonia ([address] Depression; Difficult intravenous access; Disease of blood and blood forming organ; Edentulous; Full dentures; Gastric outlet obstruction; GERD (gastroesophageal reflux disease); Hearing loss; History of blood transfusion; Hydronephrosis, bilateral; Hypertension; Hypoglycemia; Lung nodule; Migraine; OCD (obsessive compulsive disorder); Osteoarthritis; Osteoporosis; Pelvic mass; Perforated nasal septum; Pneumonia; Psoriasis; PTSD (post-traumatic stress disorder); Scoliosis; Shortness of breath; Substance abuse; Thrombocytosis ([address] Urinary incontinence; and Wears glasses. has a past surgical history that includes Tubal ligation (); Tonsillectomy and adenoidectomy (); ERCP (2013); Upper gastrointestinal endoscopy; gastrectomy (); Paracentesis (Right, 2015-2015); Cystocopy (2015);  Fixation Kyphoplasty Assistance: Independent  Active : No  Mode of Transportation: Car, Family (Sister or aunt assists with transportation)  Occupation: Unemployed  Leisure & Hobbies: Likes to go for drives and look at scenery, read books on tablet, play Pharmaxis on tablet     Objective   Vision: Impaired  Vision Exceptions: Wears glasses at all times  Hearing: Within functional limits    Orientation  Overall Orientation Status: Within Functional Limits     Balance  Sitting Balance: Independent  Standing Balance: Stand by assistance  Standing Balance  Time: ~ 3 min  Activity: adls  Sit to stand: Contact guard assistance  Stand to sit: Stand by assistance  Functional Mobility  Functional - Mobility Device: No device (pt pushing iv pole)  Activity: To/from bathroom  Assist Level: Contact guard assistance  Toilet Transfers  Toilet - Technique: Ambulating  Equipment Used: Standard toilet  Toilet Transfer: Contact guard assistance  ADL  Feeding: Independent;Setup  Grooming: Independent;Setup  UE Bathing: Independent;Setup  LE Bathing: Contact guard assistance;Setup  UE Dressing: Independent;Setup  LE Dressing: Stand by assistance;Setup  Toileting: Contact guard assistance  Tone RUE  RUE Tone: Normotonic  Tone LUE  LUE Tone: Normotonic  Coordination  Movements Are Fluid And Coordinated: Yes     Bed mobility  Rolling to Left: Independent  Supine to Sit: Independent  Sit to Supine: Independent  Transfers  Stand Step Transfers: Contact guard assistance  Sit to stand: Contact guard assistance  Stand to sit: Stand by assistance     Cognition  Overall Cognitive Status: WFL     Sensation  Overall Sensation Status: WFL (pt reprots B hand cramps with repetitive movement)      LUE PROM (degrees)  LUE PROM: WFL  LUE AROM (degrees)  LUE AROM : WFL  Left Hand PROM (degrees)  Left Hand PROM: WFL  Left Hand AROM (degrees)  Left Hand AROM: WFL  RUE PROM (degrees)  RUE PROM: WFL  RUE AROM (degrees)  RUE AROM : WFL  Right Hand PROM (degrees)  Right Hand dynamic mobility for adl completion  Short term goal 4: Dem good safety awareness tech for all transfers/mobility       Therapy Time   Individual Concurrent Group Co-treatment   Time In 0903         Time Out 0951         Minutes 48                 MITCHELL GELLER, OTR/L

## 2017-11-27 NOTE — PLAN OF CARE
Problem: Falls - Risk of  Goal: Absence of falls  Outcome: Ongoing      Problem: Infection:  Goal: Will remain free from infection  Will remain free from infection   Outcome: Ongoing      Problem: Daily Care:  Goal: Daily care needs are met  Daily care needs are met   Outcome: Ongoing      Problem: Pain:  Goal: Pain level will decrease  Pain level will decrease   Outcome: Ongoing    Goal: Control of acute pain  Control of acute pain   Outcome: Ongoing    Goal: Control of chronic pain  Control of chronic pain   Outcome: Ongoing      Problem: Nutrition  Goal: Optimal nutrition therapy  Outcome: Ongoing      Problem: Musculor/Skeletal Functional Status  Goal: Highest potential functional level  Outcome: Ongoing

## 2017-11-27 NOTE — PROGRESS NOTES
[address] [place] [address]   Department of Internal Medicine -   Internal Medicine Residency Program  ______________________________________________________________________    Patient: Sherren Rich Samples  YOB: 1970   MRN: [id number]    Acct: [de-identified]     Admit date: 2017  Today's date: 17    Admitting Diagnosis: TIN (chronic hypoplastic anemia) ([facility] [address]    Subjective:   Pt seen and Chart reviewed. No acute events overnight. Received 2 units of blood since admission. Hb maintained at 8. Patients feels better. States she is ready to be discharged. Fever:-  Temp (24hrs), Av °F (36.7 °C), Min:97.6 °F (36.4 °C), Max:98.6 °F (37 °C)      Blood pressure:   Systolic (44IXY), UWR:488 , Min:119 , MVY:416     Diastolic (75YZB), RWP:87, Min:71, Max:78      Urine output in the last 24 hours: In: 2454 [P.O.:590; I.V.:1864]  Out: -       Review of systems. CONSTITUTIONAL:  Positive for  Fatigue, negative for  fevers, chills, and weight loss  RESPIRATORY:  negative for  dry cough, cough with sputum, dyspnea, wheezing, hemoptysis and chest pain  CARDIOVASCULAR:  negative for  chest pain, dyspnea, palpitations, orthopnea, PND, edema, syncope  GASTROINTESTINAL:  negative for nausea, vomiting, change in bowel habits, diarrhea, constipation, abdominal pain and jaundice  GENITOURINARY:  negative for frequency, dysuria, nocturia, urinary incontinence and hematuria  NEUROLOGICAL:  negative for headaches, dizziness, seizures, speech problems, visual disturbance, weakness and numbness       Objective:   Vital Sign:  /71   Pulse 87   Temp 98.2 °F (36.8 °C) (Oral)   Resp 15   Ht 4' 9\" (1.448 m)   Wt 114 lb 11.2 oz (52 kg)   LMP 2012   SpO2 94%   BMI 24.82 kg/m²       Physical Exam:  CONSTITUTIONAL:  awake, alert, cooperative, no apparent distress, cachexic.  Pallor +  LUNGS:  No increased work of breathing, good air exchange, clear to auscultation bilaterally, no crackles or wheezing  CARDIOVASCULAR:  Normal apical impulse, regular rate and rhythm, normal S1 and S2,   ABDOMEN:  No scars, normal bowel sounds, soft, non-distended, non-tender, no masses palpated, no hepatosplenomegally  NEUROLOGIC:  Awake, alert, oriented to name, place and time. Motor is 5 out of 5 bilaterally. Sensory is intact    Medications:   Scheduled Medications   sodium chloride flush  10 mL Intravenous 2 times per day    calcium carbonate-vitamin D  1 tablet Oral Daily    citalopram  40 mg Oral Daily    fluticasone  1 spray Nasal Daily    folic acid  1 mg Oral Daily    sennosides-docusate sodium  1 tablet Oral Daily    pantoprazole  40 mg Oral Daily    sodium chloride flush  10 mL Intravenous 2 times per day    enoxaparin  40 mg Subcutaneous Daily    aspirin  81 mg Oral Daily    tiotropium  18 mcg Inhalation Daily    olodaterol  2 puff Inhalation Daily       PRN Medications    sodium chloride flush 10 mL PRN   fentanNYL 25 mcg Q1H PRN   Or     fentanNYL 50 mcg Q1H PRN   albuterol 2.5 mg Q6H PRN   sodium chloride flush 10 mL PRN   magnesium hydroxide 30 mL Daily PRN   ondansetron 4 mg Q6H PRN       Diagnostic Labs and Imaging    CBC:  Recent Labs      11/25/17 1719 11/26/17   0521   11/26/17   1925  11/27/17   0558  11/27/17   0822   WBC  20.4*  19.2*   --    --   18.9*   --    HGB  6.8*  7.1*   < >  8.4*  8.5*  8.0*   PLT  910*  846*   --    --   683*   --     < > = values in this interval not displayed. BMP:   Recent Labs      11/25/17   1719 11/26/17   0521  11/27/17   0558   NA  131*  135  135   K  4.0  3.7  4.2   CL  92*  96*  98   CO2  23  24  22   BUN  19  18  14   CREATININE  0.61  0.56  0.47*   GLUCOSE  96  99  89     Hepatic:   Recent Labs      11/25/17 1719   AST  17   ALT  6   BILITOT  0.55   ALKPHOS  610*     INR: No results for input(s): INR in the last 72 hours. Troponin: No results for input(s): TROPONINI in the last 72 hours.     Assessment and Plan:   Principal Problem: TIN (chronic hypoplastic anemia) (HCC)  Active Problems:    Smoking greater than 40 pack years    Thrombocytosis (HCC)    Leukocytosis    Multiple lung nodules on CT    Plan    1. Acute on Chronic Anemia 2/2 to Hypoplastic Anemia - Hb stable at 8.0mg/dl. Received 2 unit of PRBC since admission. F/U wit peripheral smear. Monitor H/H. Hemonc on board. Appreciate recommendations. 2. Severe Protien Calorie Malnutrition in the setting of chronic illness - wt loss of 64lbs in 9 months. BMI -24. Dietician on board. Meal supplements. I/Os. 3. High Ferritin - reactive. 4,329. Continue monitoring. 4.  Thrombocytosis -. Bone marrow biopsy showed plasma cell population. Not monoclonal. Not myeloma. 5. Multiple pulmonary nodules - Biopsy negative for malignancy. Follows with Dr. Lela Jean as outpatient. 6. Chronically elevated Alkaline Phosphatase. GGT - 92. Last Liver US(8/22) did not show any ductal dilatation  7. Reactive Leukocytosis  8. Osteoporosis - DEXA -2.7. On Prolia. 9. Active Smoker-  Encourage cessation. Plan to discharge home independently today and to f/u in [facility].        Diet: General Diet. DVT prophylaxis - Lovenox   PT/OT evaluation and treatment  . Discharge planning      Charity Barone, PGY-1, Internal Medicine Resident  [address] [place] [address]    Attending Physician Statement    Active [facility] [place] Problems    Diagnosis Date Noted    TIN (chronic hypoplastic anemia) ([facility] [address] [D61.9] 11/25/2017    Multiple lung nodules on CT [R91.8] 01/13/2016    Leukocytosis [D72.829] 06/22/2015    Thrombocytosis ([facility] [address] [D47.3] 03/30/2015    Smoking greater than 40 pack years [F17.210] 03/29/2012       I have discussed the case, including pertinent history and exam findings with the resident and the team.  I have seen and examined the patient and the key elements of the encounter have been performed by me.   I agree with the assessment, plan and orders as

## 2017-11-28 ENCOUNTER — TELEPHONE (OUTPATIENT)
Dept: ONCOLOGY | Age: 47
End: 2017-11-28

## 2017-11-28 ENCOUNTER — TELEPHONE (OUTPATIENT)
Dept: INTERNAL MEDICINE | Age: 47
End: 2017-11-28

## 2017-11-28 LAB — PATHOLOGIST REVIEW: NORMAL

## 2017-11-28 NOTE — TELEPHONE ENCOUNTER
[facility] Transitions Initial Follow Up Call    Call within 2 business days of discharge: Yes    Patient: Abiola Reason Samples Patient : 1970   MRN: [id number]  Reason for Admission: Chronic hypoplastic anemia  Discharge Date: 17 RARS: Katherine @YURY([phone])@     Spoke with: Patient who states she had been feeling very tired so she went back to the hospital where she received 2 units of blood. States she is supposed to have a bone marrow transplant soon but is waiting to hear from [place] to get this scheduled. Denies needs at this time. Facility: [place] [address]    Non-face-to-face services provided:  Scheduled appointment with PCP-Dr. Jeffery Acosta 17  Assessment and support for treatment adherence and medication management-Medications reviewed.     Follow Up  Future Appointments  Date Time Provider Guillermo Muse   2017 10:30 AM [facility]   2018 2:30 PM STV CT  [facility] CT SCAN STV Radiolog   2018 4:00 PM Michael Balbuena MD Resp Spec [facility]   2018 2:00 PM Rach Marrero MD [facility]   2018 1:00 PM Cyndi Das MD [facility] Jose Michael RN

## 2017-11-30 RX ORDER — OXYCODONE HYDROCHLORIDE AND ACETAMINOPHEN 5; 325 MG/1; MG/1
1 TABLET ORAL EVERY 6 HOURS PRN
Qty: 40 TABLET | Refills: 0 | Status: SHIPPED | OUTPATIENT
Start: 2017-11-30 | End: 2017-12-11 | Stop reason: SDUPTHER

## 2017-12-05 ENCOUNTER — APPOINTMENT (OUTPATIENT)
Dept: GENERAL RADIOLOGY | Age: 47
DRG: 663 | End: 2017-12-05
Payer: MEDICAID

## 2017-12-05 ENCOUNTER — HOSPITAL ENCOUNTER (INPATIENT)
Age: 47
LOS: 2 days | Discharge: HOME | DRG: 663 | End: 2017-12-07
Attending: EMERGENCY MEDICINE | Admitting: INTERNAL MEDICINE
Payer: MEDICAID

## 2017-12-05 ENCOUNTER — TELEPHONE (OUTPATIENT)
Dept: INFUSION THERAPY | Facility: MEDICAL CENTER | Age: 47
End: 2017-12-05

## 2017-12-05 DIAGNOSIS — D64.9 ANEMIA, UNSPECIFIED TYPE: Primary | ICD-10-CM

## 2017-12-05 DIAGNOSIS — R11.2 NON-INTRACTABLE VOMITING WITH NAUSEA, UNSPECIFIED VOMITING TYPE: ICD-10-CM

## 2017-12-05 LAB
ABSOLUTE EOS #: 0.07 K/UL (ref 0–0.44)
ABSOLUTE IMMATURE GRANULOCYTE: 0.21 K/UL (ref 0–0.3)
ABSOLUTE LYMPH #: 1.73 K/UL (ref 1.1–3.7)
ABSOLUTE MONO #: 1.34 K/UL (ref 0.1–1.2)
ALBUMIN SERPL-MCNC: 2 G/DL (ref 3.5–5.2)
ALBUMIN/GLOBULIN RATIO: 0.3 (ref 1–2.5)
ALP BLD-CCNC: 593 U/L (ref 35–104)
ALT SERPL-CCNC: 8 U/L (ref 5–33)
ANION GAP SERPL CALCULATED.3IONS-SCNC: 16 MMOL/L (ref 9–17)
AST SERPL-CCNC: 15 U/L
BASOPHILS # BLD: 0 % (ref 0–2)
BASOPHILS ABSOLUTE: 0.05 K/UL (ref 0–0.2)
BILIRUB SERPL-MCNC: 0.86 MG/DL (ref 0.3–1.2)
BILIRUBIN DIRECT: 0.47 MG/DL
BILIRUBIN, INDIRECT: 0.39 MG/DL (ref 0–1)
BUN BLDV-MCNC: 18 MG/DL (ref 6–20)
BUN/CREAT BLD: ABNORMAL (ref 9–20)
CALCIUM SERPL-MCNC: 9.4 MG/DL (ref 8.6–10.4)
CHLORIDE BLD-SCNC: 92 MMOL/L (ref 98–107)
CO2: 23 MMOL/L (ref 20–31)
CREAT SERPL-MCNC: 0.57 MG/DL (ref 0.5–0.9)
DIFFERENTIAL TYPE: ABNORMAL
EOSINOPHILS RELATIVE PERCENT: 0 % (ref 1–4)
GFR AFRICAN AMERICAN: >60 ML/MIN
GFR NON-AFRICAN AMERICAN: >60 ML/MIN
GFR SERPL CREATININE-BSD FRML MDRD: ABNORMAL ML/MIN/{1.73_M2}
GFR SERPL CREATININE-BSD FRML MDRD: ABNORMAL ML/MIN/{1.73_M2}
GLOBULIN: ABNORMAL G/DL (ref 1.5–3.8)
GLUCOSE BLD-MCNC: 86 MG/DL (ref 70–99)
HCT VFR BLD CALC: 24.5 % (ref 36.3–47.1)
HCT VFR BLD CALC: 26 % (ref 36.3–47.1)
HEMOGLOBIN: 7 G/DL (ref 11.9–15.1)
HEMOGLOBIN: 7.9 G/DL (ref 11.9–15.1)
IMMATURE GRANULOCYTES: 1 %
LACTIC ACID, WHOLE BLOOD: 1.2 MMOL/L (ref 0.7–2.1)
LACTIC ACID: NORMAL MMOL/L
LIPASE: 27 U/L (ref 13–60)
LYMPHOCYTES # BLD: 8 % (ref 24–43)
MCH RBC QN AUTO: 26.1 PG (ref 25.2–33.5)
MCHC RBC AUTO-ENTMCNC: 28.6 G/DL (ref 28.4–34.8)
MCV RBC AUTO: 91.4 FL (ref 82.6–102.9)
MONOCYTES # BLD: 6 % (ref 3–12)
PDW BLD-RTO: 16.9 % (ref 11.8–14.4)
PLATELET # BLD: 900 K/UL (ref 138–453)
PLATELET ESTIMATE: ABNORMAL
PMV BLD AUTO: 8.7 FL (ref 8.1–13.5)
POTASSIUM SERPL-SCNC: 4.1 MMOL/L (ref 3.7–5.3)
RBC # BLD: 2.68 M/UL (ref 3.95–5.11)
RBC # BLD: ABNORMAL 10*6/UL
SEG NEUTROPHILS: 85 % (ref 36–65)
SEGMENTED NEUTROPHILS ABSOLUTE COUNT: 19.51 K/UL (ref 1.5–8.1)
SODIUM BLD-SCNC: 131 MMOL/L (ref 135–144)
TOTAL PROTEIN: 8.7 G/DL (ref 6.4–8.3)
WBC # BLD: 22.9 K/UL (ref 3.5–11.3)
WBC # BLD: ABNORMAL 10*3/UL

## 2017-12-05 PROCEDURE — G0378 HOSPITAL OBSERVATION PER HR: HCPCS

## 2017-12-05 PROCEDURE — 85018 HEMOGLOBIN: CPT

## 2017-12-05 PROCEDURE — P9016 RBC LEUKOCYTES REDUCED: HCPCS

## 2017-12-05 PROCEDURE — 86920 COMPATIBILITY TEST SPIN: CPT

## 2017-12-05 PROCEDURE — 96375 TX/PRO/DX INJ NEW DRUG ADDON: CPT

## 2017-12-05 PROCEDURE — 86850 RBC ANTIBODY SCREEN: CPT

## 2017-12-05 PROCEDURE — 2580000003 HC RX 258: Performed by: STUDENT IN AN ORGANIZED HEALTH CARE EDUCATION/TRAINING PROGRAM

## 2017-12-05 PROCEDURE — 83605 ASSAY OF LACTIC ACID: CPT

## 2017-12-05 PROCEDURE — 1200000000 HC SEMI PRIVATE

## 2017-12-05 PROCEDURE — 96374 THER/PROPH/DIAG INJ IV PUSH: CPT

## 2017-12-05 PROCEDURE — 80076 HEPATIC FUNCTION PANEL: CPT

## 2017-12-05 PROCEDURE — 99285 EMERGENCY DEPT VISIT HI MDM: CPT

## 2017-12-05 PROCEDURE — 86901 BLOOD TYPING SEROLOGIC RH(D): CPT

## 2017-12-05 PROCEDURE — 86900 BLOOD TYPING SEROLOGIC ABO: CPT

## 2017-12-05 PROCEDURE — 74022 RADEX COMPL AQT ABD SERIES: CPT

## 2017-12-05 PROCEDURE — 80048 BASIC METABOLIC PNL TOTAL CA: CPT

## 2017-12-05 PROCEDURE — 36415 COLL VENOUS BLD VENIPUNCTURE: CPT

## 2017-12-05 PROCEDURE — 2580000003 HC RX 258: Performed by: PHYSICIAN ASSISTANT

## 2017-12-05 PROCEDURE — 85025 COMPLETE CBC W/AUTO DIFF WBC: CPT

## 2017-12-05 PROCEDURE — 85014 HEMATOCRIT: CPT

## 2017-12-05 PROCEDURE — 6360000002 HC RX W HCPCS: Performed by: PHYSICIAN ASSISTANT

## 2017-12-05 PROCEDURE — 83690 ASSAY OF LIPASE: CPT

## 2017-12-05 PROCEDURE — 6370000000 HC RX 637 (ALT 250 FOR IP): Performed by: STUDENT IN AN ORGANIZED HEALTH CARE EDUCATION/TRAINING PROGRAM

## 2017-12-05 PROCEDURE — 36430 TRANSFUSION BLD/BLD COMPNT: CPT

## 2017-12-05 RX ORDER — FLUTICASONE PROPIONATE 50 MCG
1 SPRAY, SUSPENSION (ML) NASAL DAILY
Status: DISCONTINUED | OUTPATIENT
Start: 2017-12-05 | End: 2017-12-07 | Stop reason: HOSPADM

## 2017-12-05 RX ORDER — ECHINACEA PURPUREA EXTRACT 125 MG
1 TABLET ORAL PRN
Status: DISCONTINUED | OUTPATIENT
Start: 2017-12-05 | End: 2017-12-07 | Stop reason: HOSPADM

## 2017-12-05 RX ORDER — SENNA AND DOCUSATE SODIUM 50; 8.6 MG/1; MG/1
1 TABLET, FILM COATED ORAL DAILY
Status: DISCONTINUED | OUTPATIENT
Start: 2017-12-05 | End: 2017-12-07 | Stop reason: HOSPADM

## 2017-12-05 RX ORDER — ALBUTEROL SULFATE 90 UG/1
1 AEROSOL, METERED RESPIRATORY (INHALATION) EVERY 4 HOURS PRN
Status: DISCONTINUED | OUTPATIENT
Start: 2017-12-05 | End: 2017-12-07 | Stop reason: HOSPADM

## 2017-12-05 RX ORDER — LANOLIN ALCOHOL/MO/W.PET/CERES
325 CREAM (GRAM) TOPICAL 2 TIMES DAILY
Status: DISCONTINUED | OUTPATIENT
Start: 2017-12-05 | End: 2017-12-07 | Stop reason: HOSPADM

## 2017-12-05 RX ORDER — SODIUM CHLORIDE 0.9 % (FLUSH) 0.9 %
10 SYRINGE (ML) INJECTION PRN
Status: DISCONTINUED | OUTPATIENT
Start: 2017-12-05 | End: 2017-12-07 | Stop reason: HOSPADM

## 2017-12-05 RX ORDER — PANTOPRAZOLE SODIUM 40 MG/1
40 TABLET, DELAYED RELEASE ORAL DAILY
Status: DISCONTINUED | OUTPATIENT
Start: 2017-12-05 | End: 2017-12-07 | Stop reason: HOSPADM

## 2017-12-05 RX ORDER — SODIUM CHLORIDE 0.9 % (FLUSH) 0.9 %
10 SYRINGE (ML) INJECTION EVERY 12 HOURS SCHEDULED
Status: DISCONTINUED | OUTPATIENT
Start: 2017-12-05 | End: 2017-12-07 | Stop reason: HOSPADM

## 2017-12-05 RX ORDER — 0.9 % SODIUM CHLORIDE 0.9 %
500 INTRAVENOUS SOLUTION INTRAVENOUS ONCE
Status: COMPLETED | OUTPATIENT
Start: 2017-12-05 | End: 2017-12-05

## 2017-12-05 RX ORDER — FENTANYL CITRATE 50 UG/ML
50 INJECTION, SOLUTION INTRAMUSCULAR; INTRAVENOUS ONCE
Status: COMPLETED | OUTPATIENT
Start: 2017-12-05 | End: 2017-12-05

## 2017-12-05 RX ORDER — OXYCODONE HYDROCHLORIDE AND ACETAMINOPHEN 5; 325 MG/1; MG/1
1 TABLET ORAL EVERY 6 HOURS PRN
Status: DISCONTINUED | OUTPATIENT
Start: 2017-12-05 | End: 2017-12-07 | Stop reason: HOSPADM

## 2017-12-05 RX ORDER — ONDANSETRON 4 MG/1
4 TABLET, FILM COATED ORAL EVERY 8 HOURS PRN
Status: DISCONTINUED | OUTPATIENT
Start: 2017-12-05 | End: 2017-12-07 | Stop reason: HOSPADM

## 2017-12-05 RX ORDER — ONDANSETRON 2 MG/ML
4 INJECTION INTRAMUSCULAR; INTRAVENOUS ONCE
Status: COMPLETED | OUTPATIENT
Start: 2017-12-05 | End: 2017-12-05

## 2017-12-05 RX ORDER — DOCUSATE SODIUM 100 MG/1
100 CAPSULE, LIQUID FILLED ORAL DAILY
Status: DISCONTINUED | OUTPATIENT
Start: 2017-12-05 | End: 2017-12-07 | Stop reason: HOSPADM

## 2017-12-05 RX ORDER — SODIUM CHLORIDE 0.9 % (FLUSH) 0.9 %
10 SYRINGE (ML) INJECTION EVERY 12 HOURS
Status: DISCONTINUED | OUTPATIENT
Start: 2017-12-05 | End: 2017-12-07 | Stop reason: HOSPADM

## 2017-12-05 RX ORDER — CITALOPRAM 20 MG/1
40 TABLET ORAL DAILY
Status: DISCONTINUED | OUTPATIENT
Start: 2017-12-05 | End: 2017-12-07 | Stop reason: HOSPADM

## 2017-12-05 RX ORDER — 0.9 % SODIUM CHLORIDE 0.9 %
250 INTRAVENOUS SOLUTION INTRAVENOUS ONCE
Status: COMPLETED | OUTPATIENT
Start: 2017-12-05 | End: 2017-12-06

## 2017-12-05 RX ORDER — CETIRIZINE HYDROCHLORIDE 5 MG/1
5 TABLET ORAL DAILY
Status: DISCONTINUED | OUTPATIENT
Start: 2017-12-05 | End: 2017-12-07 | Stop reason: HOSPADM

## 2017-12-05 RX ORDER — FOLIC ACID 1 MG/1
1 TABLET ORAL DAILY
Status: DISCONTINUED | OUTPATIENT
Start: 2017-12-05 | End: 2017-12-07 | Stop reason: HOSPADM

## 2017-12-05 RX ADMIN — OXYCODONE HYDROCHLORIDE AND ACETAMINOPHEN 1 TABLET: 5; 325 TABLET ORAL at 18:20

## 2017-12-05 RX ADMIN — SODIUM CHLORIDE 250 ML: 9 INJECTION, SOLUTION INTRAVENOUS at 18:33

## 2017-12-05 RX ADMIN — SODIUM CHLORIDE 500 ML: 9 INJECTION, SOLUTION INTRAVENOUS at 14:55

## 2017-12-05 RX ADMIN — FENTANYL CITRATE 50 MCG: 50 INJECTION INTRAMUSCULAR; INTRAVENOUS at 14:57

## 2017-12-05 RX ADMIN — ONDANSETRON 4 MG: 2 INJECTION INTRAMUSCULAR; INTRAVENOUS at 14:55

## 2017-12-05 ASSESSMENT — PAIN DESCRIPTION - LOCATION: LOCATION: ABDOMEN

## 2017-12-05 ASSESSMENT — ENCOUNTER SYMPTOMS
NAUSEA: 1
WHEEZING: 0
EYE DISCHARGE: 0
ABDOMINAL PAIN: 1
COLOR CHANGE: 0
VOMITING: 1
COUGH: 0
SORE THROAT: 0
BACK PAIN: 0
EYE PAIN: 0
RHINORRHEA: 0
EYE ITCHING: 0

## 2017-12-05 ASSESSMENT — PAIN DESCRIPTION - FREQUENCY: FREQUENCY: CONTINUOUS

## 2017-12-05 ASSESSMENT — PAIN SCALES - GENERAL
PAINLEVEL_OUTOF10: 8

## 2017-12-05 ASSESSMENT — PAIN DESCRIPTION - DESCRIPTORS: DESCRIPTORS: ACHING

## 2017-12-05 ASSESSMENT — PAIN DESCRIPTION - PAIN TYPE: TYPE: ACUTE PAIN

## 2017-12-05 NOTE — ED PROVIDER NOTES
[address] [facility]     Emergency Department     Faculty Attestation    I performed a history and physical examination of the patient and discussed management with the resident. I reviewed the residents note and agree with the documented findings and plan of care. Any areas of disagreement are noted on the chart. I was personally present for the key portions of any procedures. I have documented in the chart those procedures where I was not present during the key portions. I have reviewed the emergency nurses triage note. I agree with the chief complaint, past medical history, past surgical history, allergies, medications, social and family history as documented unless otherwise noted below. For Physician Assistant/ Nurse Practitioner cases/documentation I have personally evaluated this patient and have completed at least one if not all key elements of the E/M (history, physical exam, and MDM). Additional findings are as noted. I have personally seen and evaluated the patient. I find the patient's history and physical exam are consistent with the NP/PA documentation. I agree with the care provided, treatment rendered, disposition and follow-up plan. Recurrent visits for nausea vomiting history of nonspecific myeloid disorder. Currently hemoglobin 7. Critical Care     Francis Bolton M.D.   Attending Emergency  Physician                Gurdeep Soto MD  12/05/17 0902

## 2017-12-05 NOTE — ED PROVIDER NOTES
[facility]  Emergency Department Encounter  Mid Level Provider     Pt Name: Colton Law  MRN: [id number]  [place] 1970  Date of evaluation: 12/5/17  PCP:  Mark Merlos MD    [address] [place] [address]       Chief Complaint   Patient presents with    Emesis     pt c/o vomiting x 1 and states that her blood count may be low, states that she is transfused 1-2 times per month       HISTORY OF PRESENT ILLNESS  (Location/Symptom, Timing/Onset, Context/Setting, Quality, Duration, Modifying Factors, Severity.)      Ada Law is a 50 y.o. female who presents with Abdominal pain, vomiting times one. Patient states that she feels that her \"blood count\" may be low. Patient states that she feels tired and has difficulty with her energy which normally happens with a drop in her red blood cell count. Patient states that she is postpartum to the emergency room one to 2 times per month for blood transfusions. She states that she has seen a hematologist in the past but there is nothing sent for her to get infusions outside of the emergency department. Patient states that they do not know why her Red blood cells drop. She denies any liver problems. Patient denies any kidney problems. It should be noted however her chart reports that she has chronic kidney disease in cirrhosis of the liver. Patient states that she had an episode of vomiting last night. She states that she had no abdominal pain prior to this. She states that she did eat something today and felt queasy afterwards but did not vomit. She denies any blood in her emesis. She denies any diarrhea. Patient denies any alcohol use. She denies any fevers or chills. No chest pain. She denies any dysuria urgency or frequency. Patient reports that she has not had a menstrual cycle since 2012. She has had multiple surgeries on her abdomen including a surgery to remove part of her stomach due to a benign tumor, a fibroid, Hernia repair ×2. She does report a previous history of small bowel obstruction. She states that she is still passing gas. PAST MEDICAL / SURGICAL / SOCIAL / FAMILY HISTORY      has a past medical history of Allergic rhinitis; Anxiety; Asthma; Blood circulation, collateral; Celiac disease; Chronic back pain; Chronic kidney disease; Cirrhosis of liver with ascites ([address] Constipation; Cough; Cryptogenic organizing pneumonia ([address] Depression; Difficult intravenous access; Disease of blood and blood forming organ; Edentulous; Full dentures; Gastric outlet obstruction; GERD (gastroesophageal reflux disease); Hearing loss; History of blood transfusion; Hydronephrosis, bilateral; Hypertension; Hypoglycemia; Lung nodule; Migraine; OCD (obsessive compulsive disorder); Osteoarthritis; Osteoporosis; Pelvic mass; Perforated nasal septum; Pneumonia; Psoriasis; PTSD (post-traumatic stress disorder); Scoliosis; Shortness of breath; Substance abuse; Thrombocytosis ([address] Urinary incontinence; and Wears glasses. has a past surgical history that includes Tubal ligation (1989); Tonsillectomy and adenoidectomy (1982); ERCP (5/16/2013); Upper gastrointestinal endoscopy; gastrectomy (2012); Paracentesis (Right, 01/2015-09/2015); Cystocopy (12/1/2015); Fixation Kyphoplasty (08/09/2016); Lung biopsy; insert nasal septal prosthesis (N/A, 4/14/2017); create eardrum opening,gen anesth (N/A, 4/14/2017); Abdomen surgery (09/2015); Cystoscopy (05/11/2017); Ureter stent placement (05/11/2017); Ureteroscopy (05/11/2017); Cystoscopy (Right, 5/11/2017); Cholecystectomy; hernia repair (07/18/2017); repair incisional hernia,reducible (N/A, 7/18/2017); and esophagogastroduodenoscopy transoral diagnostic (N/A, 8/30/2017). Social History     Social History    Marital status: Single     Spouse name: N/A    Number of children: N/A    Years of education: N/A     Occupational History    Not on file.      Social History Main Topics    Smoking status: Current Some Day Smoker     Packs/day: 0.25     Years: 30.00     Types: Cigarettes     Last attempt to quit: 12/1/2012    Smokeless tobacco: Never Used      Comment: pt states 3 cigs per day    Alcohol use No      Comment: quit in 1987    Drug use: No      Comment: quit marijuana 1987    Sexual activity: No     Other Topics Concern    Not on file     Social History Narrative    No narrative on file       Family History   Problem Relation Age of Onset    Heart Disease Mother     Stroke Father      cerebral aneurysm       Allergies:  Latex; Bee venom; Benadryl [diphenhydramine-zinc acetate]; Tavist; and Tylenol [acetaminophen]    Home Medications:  Prior to Admission medications    Medication Sig Start Date End Date Taking? Authorizing Provider   oxyCODONE-acetaminophen (PERCOCET) 5-325 MG per tablet Take 1 tablet by mouth every 6 hours as needed for Pain .  11/30/17 12/30/17 Yes Margot Hart MD   sodium chloride (ALTAMIST SPRAY) 0.65 % nasal spray 1 spray by Nasal route as needed for Congestion 10/23/17  Yes Gilberto Cuevas MD   Calcium Carbonate-Vitamin D (OYSTER SHELL CALCIUM/D) 500-200 MG-UNIT TABS Take 1 tablet by mouth daily Please discontinue previous prescription of calcium 10/23/17 10/23/18 Yes Gilberto Cuevas MD   loratadine (CLARITIN) 10 MG tablet Take 1 tablet by mouth daily 10/23/17  Yes Gilberto Cuevas MD   citalopram (CELEXA) 40 MG tablet Take 1 tablet by mouth daily 10/23/17  Yes Gilberto Cuevas MD   ferrous sulfate 325 (65 Fe) MG tablet Take 1 tablet by mouth 2 times daily 10/23/17  Yes Gilberto Cuevas MD   senna-docusate (SENEXON-S) 8.6-50 MG per tablet Take 1 tablet by mouth daily 10/23/17  Yes Gilberto Cuevas MD   aspirin (ASPIRIN LOW DOSE) 81 MG chewable tablet Take 1 tablet by mouth daily 10/23/17  Yes Gilberto Cuevas MD   pantoprazole (PROTONIX) 40 MG tablet Take 1 tablet by mouth daily 10/23/17  Yes Gilberto Cuevas MD   folic acid (FOLVITE) 1 MG tablet Take 1 tablet by mouth daily 10/23/17  Yes Garland Burch MD   umeclidinium-vilanterol [facility] ELLIPTA) 62.5-25 MCG/INH AEPB inhaler Inhale 1 puff into the lungs daily 10/20/17  Yes Pancho Thomson MD   VENTOLIN  (90 Base) MCG/ACT inhaler INHALE 2 PUFFS INTO THE LUNGS EVERY 6 HOURS AS NEEDED FOR WHEEZING 9/22/17  Yes Pancho Thomson MD   docusate sodium (DOCQLACE) 100 MG capsule TAKE 1 CAPSULE BY MOUTH 2 TIMES DAILY AS NEEDED FOR CONSTIPATION 8/15/17  Yes Shahzad Talley MD   fluticasone (FLONASE) 50 MCG/ACT nasal spray 1 spray by Nasal route daily 8/15/17  Yes Shahzad Talley MD   ondansetron (ZOFRAN) 4 MG tablet Take 1 tablet by mouth every 8 hours as needed for Nausea 5/9/17  Yes Naveed Caldwell MD   albuterol (PROVENTIL) (5 MG/ML) 0.5% nebulizer solution Take 0.5 mLs by nebulization every 6 hours as needed for Wheezing 1/23/17  Yes Bruna Garrett MD       patient's medication list has been reviewed as entered by the nursing staff. REVIEW OF SYSTEMS    (2-9 systems for level 4, 10 or more for level 5)      Review of Systems   Constitutional: Negative for chills and fever. HENT: Negative for ear pain, rhinorrhea and sore throat. Eyes: Negative for pain, discharge and itching. Respiratory: Negative for cough and wheezing. Cardiovascular: Negative for chest pain and palpitations. Gastrointestinal: Positive for abdominal pain, nausea and vomiting. Genitourinary: Negative for difficulty urinating, dysuria, vaginal bleeding and vaginal discharge. Musculoskeletal: Negative for back pain and myalgias. Skin: Negative for color change and wound. Neurological: Negative for dizziness and headaches. Psychiatric/Behavioral: Negative for dysphoric mood. PHYSICAL EXAM   (up to 7 for level 4, 8 or more for level 5)      INITIAL VITALS:  height is 4' 9\" (1.448 m) and weight is 112 lb (50.8 kg). Her oral temperature is 98.8 °F (37.1 °C). Her blood pressure is 113/70 and her pulse is 95.  Her respiration is 16 and Encounter   Medications    0.9 % sodium chloride bolus    ondansetron (ZOFRAN) injection 4 mg    fentaNYL (SUBLIMAZE) injection 50 mcg    sodium chloride flush 0.9 % injection 10 mL    sodium chloride flush 0.9 % injection 10 mL    albuterol (PROVENTIL) nebulizer solution 2.5 mg    calcium carbonate-vitamin D (CALTRATE) 600-400 MG-UNIT per tab 1 tablet    citalopram (CELEXA) tablet 40 mg    docusate sodium (COLACE) capsule 100 mg    ferrous sulfate EC tablet 325 mg    fluticasone (FLONASE) 50 MCG/ACT nasal spray 1 spray    folic acid (FOLVITE) tablet 1 mg    cetirizine (ZYRTEC) tablet 5 mg    ondansetron (ZOFRAN) tablet 4 mg    oxyCODONE-acetaminophen (PERCOCET) 5-325 MG per tablet 1 tablet    pantoprazole (PROTONIX) tablet 40 mg    sennosides-docusate sodium (SENOKOT-S) 8.6-50 MG tablet 1 tablet    sodium chloride (OCEAN) 0.65 % nasal spray 1 spray    umeclidinium-vilanterol (ANORO ELLIPTA) 62.5-25 MCG/INH inhaler 1 puff    albuterol sulfate  (90 Base) MCG/ACT inhaler 1 puff    0.9 % sodium chloride bolus    sodium chloride flush 0.9 % injection 10 mL    sodium chloride flush 0.9 % injection 10 mL       Controlled Substances Monitoring:      DIAGNOSTIC RESULTS / EMERGENCY DEPARTMENT COURSE / MDM   Patient with a history of anemia. Apparently had followed up at [facility] scheduled for January but there is no plan in place for blood transfusions in the meantime. She denies any bleeding from any orifices. Will admit patient for transfusion, care planning      RADIOLOGY:   I directly visualized (with the attending physician) the following  images and reviewed the radiologist interpretations:  Xr Acute Abd Series Chest 1 Vw    Result Date: 12/5/2017  1. No acute abnormalities are seen in the abdomen 2. Double-J stent remains in place on the right side 3.  Increased nodularity in the right lung and stable masslike consolidation in the left lower lobe again could represent a neoplastic process. A multifocal infectious process could also cause this appearance. XR Acute Abd Series Chest 1 VW   Preliminary Result   1. No acute abnormalities are seen in the abdomen   2. Double-J stent remains in place on the right side   3. Increased nodularity in the right lung and stable masslike consolidation   in the left lower lobe again could represent a neoplastic process. A   multifocal infectious process could also cause this appearance.              LABS:  Results for orders placed or performed during the hospital encounter of 12/05/17   CBC Auto Differential   Result Value Ref Range    WBC 22.9 (H) 3.5 - 11.3 k/uL    RBC 2.68 (L) 3.95 - 5.11 m/uL    Hemoglobin 7.0 (LL) 11.9 - 15.1 g/dL    Hematocrit 24.5 (L) 36.3 - 47.1 %    MCV 91.4 82.6 - 102.9 fL    MCH 26.1 25.2 - 33.5 pg    MCHC 28.6 28.4 - 34.8 g/dL    RDW 16.9 (H) 11.8 - 14.4 %    Platelets 237 (H) 892 - 453 k/uL    MPV 8.7 8.1 - 13.5 fL    Differential Type NOT REPORTED     WBC Morphology NOT REPORTED     RBC Morphology ANISOCYTOSIS PRESENT     Platelet Estimate NOT REPORTED     Seg Neutrophils 85 (H) 36 - 65 %    Lymphocytes 8 (L) 24 - 43 %    Monocytes 6 3 - 12 %    Eosinophils % 0 (L) 1 - 4 %    Basophils 0 0 - 2 %    Immature Granulocytes 1 (H) 0 %    Segs Absolute 19.51 (H) 1.50 - 8.10 k/uL    Absolute Lymph # 1.73 1.10 - 3.70 k/uL    Absolute Mono # 1.34 (H) 0.10 - 1.20 k/uL    Absolute Eos # 0.07 0.00 - 0.44 k/uL    Basophils # 0.05 0.00 - 0.20 k/uL    Absolute Immature Granulocyte 0.21 0.00 - 0.30 k/uL   Basic Metabolic Panel   Result Value Ref Range    Glucose 86 70 - 99 mg/dL    BUN 18 6 - 20 mg/dL    CREATININE 0.57 0.50 - 0.90 mg/dL    Bun/Cre Ratio NOT REPORTED 9 - 20    Calcium 9.4 8.6 - 10.4 mg/dL    Sodium 131 (L) 135 - 144 mmol/L    Potassium 4.1 3.7 - 5.3 mmol/L    Chloride 92 (L) 98 - 107 mmol/L    CO2 23 20 - 31 mmol/L    Anion Gap 16 9 - 17 mmol/L    GFR Non-African American >60 >60 mL/min    GFR African American >60 >60 mL/min    GFR Comment          GFR Staging NOT REPORTED    LIPASE   Result Value Ref Range    Lipase 27 13 - 60 U/L   HEPATIC FUNCTION PANEL   Result Value Ref Range    Alb 2.0 (L) 3.5 - 5.2 g/dL    Alkaline Phosphatase 593 (H) 35 - 104 U/L    ALT 8 5 - 33 U/L    AST 15 <32 U/L    Total Bilirubin 0.86 0.3 - 1.2 mg/dL    Bilirubin, Direct 0.47 (H) <0.31 mg/dL    Bilirubin, Indirect 0.39 0.00 - 1.00 mg/dL    Total Protein 8.7 (H) 6.4 - 8.3 g/dL    Globulin NOT REPORTED 1.5 - 3.8 g/dL    Albumin/Globulin Ratio 0.3 (L) 1.0 - 2.5   Lactic Acid, Plasma   Result Value Ref Range    Lactic Acid NOT REPORTED mmol/L    Lactic Acid, Whole Blood 1.2 0.7 - 2.1 mmol/L   TYPE AND SCREEN   Result Value Ref Range    Expiration Date 12/08/2017     Arm Band Number [id number]     ABO/Rh O POSITIVE     Antibody Screen NEGATIVE     Unit Number [id number]     Product Code Leukocyte Reduced Red Cell     Unit Divison 0     Dispense Status ISSUED     Transfusion Status OK TO TRANSFUSE     Crossmatch Result COMPATIBLE          CONSULTS:  None    PROCEDURES:  None    FINAL IMPRESSION      1. Anemia, unspecified type    2.  Non-intractable vomiting with nausea, unspecified vomiting type          DISPOSITION / PLAN     DISPOSITION Admitted    PATIENT REFERRED TO:  Rae Braswell MD  [address] [address] [phone]            DISCHARGE MEDICATIONS:  Current Discharge Medication List          Frances Rocha PA-C   Emergency Medicine Physician Assistant    (Please note that portions of this note were completed with a voice recognition program.  Efforts were made to edit the dictations but occasionally words are mis-transcribed.)       Frances Rocha PA-C  12/05/17 7180

## 2017-12-05 NOTE — H&P
Difficult intravenous access     STATES LT ARM EASIER TO START IN, ENCOURAGED TO HYDRATE DAY PROIR     Disease of blood and blood forming organ     Thrombocytosis    Edentulous     does not wear her dentures    Full dentures     ENCOURAGED TO WEAR DOS    Gastric outlet obstruction 8/30/2017    GERD (gastroesophageal reflux disease)     Hearing loss     left ear    History of blood transfusion 01/2012    Hydronephrosis, bilateral 5/2/2015    Hypertension     Hypoglycemia 2014    Lung nodule     right, benign    Migraine     OCD (obsessive compulsive disorder)     Osteoarthritis     Osteoporosis     Pelvic mass April 2015    benign     Perforated nasal septum     Pneumonia     Psoriasis     PTSD (post-traumatic stress disorder) 1985    [facility]    Scoliosis     Shortness of breath 11/23/2016    with temp.  change    Substance abuse 1987    [facility]    Thrombocytosis ([address]     Urinary incontinence     PT STATES RESOLVED    Wears glasses        SURGICAL HISTORY:  Past Surgical History:   Procedure Laterality Date    ABDOMEN SURGERY  09/2015    MASS REMOVED AND HERNIA REPAIR    CHOLECYSTECTOMY      CYSTOSCOPY  12/1/2015    bilat retrograde, right ureteroscopy    CYSTOSCOPY  05/11/2017    CYSTOSCOPY Right 5/11/2017    CYSTOSCOPY, RIGHT URETEROSCOPY, RIGHT RETROGRADE PYELOGRAM, RIGHT STENT PLACEMENT performed by Amber Levy MD at [address] [place] [address] ERCP  5/16/2013    FIXATION KYPHOPLASTY  08/09/2016    T12, L-2, L-5    GASTRECTOMY  2012    Partial Gastrectomy    HERNIA REPAIR  07/18/2017    hernia repair with [facility]    LUNG BIOPSY      right upper lobe    PARACENTESIS Right 01/2015-09/2015    X 8    MO CREATE EARDRUM OPENING,GEN ANESTH N/A 4/14/2017    LEFT MYRINGOTOMY T-TUBE INSERTION performed by Shavonne Hutson MD at [address] [place] ESOPHAGOGASTRODUODENOSCOPY TRANSORAL DIAGNOSTIC N/A 8/30/2017    EGD ESOPHAGOGASTRODUODENOSCOPY performed by Reny Luna MD at [facility] 108 (90 Base) MCG/ACT inhaler INHALE 2 PUFFS INTO THE LUNGS EVERY 6 HOURS AS NEEDED FOR WHEEZING 9/22/17  Yes Almas Osborne MD   docusate sodium (DOCQLACE) 100 MG capsule TAKE 1 CAPSULE BY MOUTH 2 TIMES DAILY AS NEEDED FOR CONSTIPATION 8/15/17  Yes Rich Sullivan MD   fluticasone (FLONASE) 50 MCG/ACT nasal spray 1 spray by Nasal route daily 8/15/17  Yes Rich Sullivan MD   ondansetron (ZOFRAN) 4 MG tablet Take 1 tablet by mouth every 8 hours as needed for Nausea 5/9/17  Yes Bashir Arias MD   albuterol (PROVENTIL) (5 MG/ML) 0.5% nebulizer solution Take 0.5 mLs by nebulization every 6 hours as needed for Wheezing 1/23/17  Yes Po Amador MD     Scheduled Meds:  Continuous Infusions:  PRN Meds:    Allergies   Allergen Reactions    Latex Dermatitis    Bee Venom Anaphylaxis    Benadryl [Diphenhydramine-Zinc Acetate] Anaphylaxis    Tavist Anaphylaxis     Quits  Breathing  And  Low   Heart  beat    Tylenol [Acetaminophen] Other (See Comments)     Liver problems       SOCIAL HISTORY:   Social History     Social History    Marital status: Single     Spouse name: N/A    Number of children: N/A    Years of education: N/A     Occupational History    Not on file.      Social History Main Topics    Smoking status: Current Some Day Smoker     Packs/day: 0.25     Years: 30.00     Types: Cigarettes     Last attempt to quit: 12/1/2012    Smokeless tobacco: Never Used      Comment: pt states 3 cigs per day    Alcohol use No      Comment: quit in 1987    Drug use: No      Comment: quit marijuana 1987    Sexual activity: No     Other Topics Concern    Not on file     Social History Narrative    No narrative on file       FAMILY HISTORY:   Family History   Problem Relation Age of Onset    Heart Disease Mother     Stroke Father      cerebral aneurysm       REVIEW OF SYSTEMS:  · Constitutional: Positive for fatigue, negative for fevers, chills  · Eyes: Negative for visual changes, diplopia  · ENT: Negative for mouth results for input(s): CKMB, CKMBINDEX, TROPONINI in the last 72 hours.     Invalid input(s): CKTOTAL;3  FASTING LIPID PANEL:  Lab Results   Component Value Date    CHOL 83 01/07/2016    HDL 12 (L) 01/07/2016    TRIG 62 01/07/2016     LFTS  Recent Labs      12/05/17   1449   ALKPHOS  593*   ALT  8   AST  15   BILITOT  0.86   BILIDIR  0.47*   LABALBU  2.0*       AMYLASE/LIPASE/AMMONIA  Recent Labs      12/05/17   1449   LIPASE  27     ASSESSMENT:     Patient Active Problem List    Diagnosis Date Noted    Aspiration pneumonia of right upper lobe due to vomit ([address] 11/23/2016     Priority: High    Dyspnea and respiratory abnormalities 11/23/2016     Priority: High    TIN (chronic hypoplastic anemia) (HCC) 11/25/2017    Acute pure red cell anemia (HCC) 10/14/2017    Hypertension     Bilateral leg pain     Elevated serum immunoglobulin free light chain level 09/25/2017    Iron deficiency anemia due to chronic blood loss     Gastric outlet obstruction 08/30/2017    Gastroenteritis 08/29/2017    Aspiration pneumonia due to vomit ([address] 08/21/2017    Hematuria 08/17/2017    Acute cystitis with hematuria 08/17/2017    Hypoalbuminemia due to protein-calorie malnutrition (HCC) 08/17/2017    Hypokalemia 08/17/2017    Hyponatremia 08/17/2017    Celiac disease     Gastroesophageal reflux disease without esophagitis     Essential hypertension     Acute sinusitis 08/15/2017    Suprapubic pain 08/15/2017    Absolute anemia 08/15/2017    Incisional hernia 07/18/2017    Pulmonary infiltrates on CXR     Sepsis ([address]     Osteoporosis 06/19/2017    Ureteropelvic junction (UPJ) obstruction, right 06/04/2017    COPD with acute exacerbation ([address]     Anxiety 05/27/2016    Depression 05/27/2016    Anemia 01/14/2016    Multiple lung nodules on CT 01/13/2016    Liver mass     Nausea and vomiting     Leukocytosis 06/22/2015    Thrombocytosis ([address] 03/30/2015    Cirrhosis of liver with ascites ([address] 03/30/2015

## 2017-12-06 LAB
ABO/RH: NORMAL
ANION GAP SERPL CALCULATED.3IONS-SCNC: 15 MMOL/L (ref 9–17)
ANTIBODY SCREEN: NEGATIVE
ARM BAND NUMBER: NORMAL
BLD PROD TYP BPU: NORMAL
BUN BLDV-MCNC: 16 MG/DL (ref 6–20)
BUN/CREAT BLD: ABNORMAL (ref 9–20)
CALCIUM SERPL-MCNC: 8.9 MG/DL (ref 8.6–10.4)
CHLORIDE BLD-SCNC: 95 MMOL/L (ref 98–107)
CO2: 22 MMOL/L (ref 20–31)
CREAT SERPL-MCNC: 0.51 MG/DL (ref 0.5–0.9)
CROSSMATCH RESULT: NORMAL
DISPENSE STATUS BLOOD BANK: NORMAL
EXPIRATION DATE: NORMAL
GFR AFRICAN AMERICAN: >60 ML/MIN
GFR NON-AFRICAN AMERICAN: >60 ML/MIN
GFR SERPL CREATININE-BSD FRML MDRD: ABNORMAL ML/MIN/{1.73_M2}
GFR SERPL CREATININE-BSD FRML MDRD: ABNORMAL ML/MIN/{1.73_M2}
GLUCOSE BLD-MCNC: 96 MG/DL (ref 70–99)
HCT VFR BLD CALC: 24.9 % (ref 36.3–47.1)
HCT VFR BLD CALC: 26.1 % (ref 36.3–47.1)
HEMOGLOBIN: 7.3 G/DL (ref 11.9–15.1)
HEMOGLOBIN: 7.6 G/DL (ref 11.9–15.1)
POTASSIUM SERPL-SCNC: 3.8 MMOL/L (ref 3.7–5.3)
SODIUM BLD-SCNC: 132 MMOL/L (ref 135–144)
TRANSFUSION STATUS: NORMAL
UNIT DIVISION: 0
UNIT NUMBER: NORMAL

## 2017-12-06 PROCEDURE — G0378 HOSPITAL OBSERVATION PER HR: HCPCS

## 2017-12-06 PROCEDURE — 1200000000 HC SEMI PRIVATE

## 2017-12-06 PROCEDURE — 2580000003 HC RX 258: Performed by: STUDENT IN AN ORGANIZED HEALTH CARE EDUCATION/TRAINING PROGRAM

## 2017-12-06 PROCEDURE — 6370000000 HC RX 637 (ALT 250 FOR IP): Performed by: STUDENT IN AN ORGANIZED HEALTH CARE EDUCATION/TRAINING PROGRAM

## 2017-12-06 PROCEDURE — 80048 BASIC METABOLIC PNL TOTAL CA: CPT

## 2017-12-06 PROCEDURE — 36415 COLL VENOUS BLD VENIPUNCTURE: CPT

## 2017-12-06 PROCEDURE — 85018 HEMOGLOBIN: CPT

## 2017-12-06 PROCEDURE — 85014 HEMATOCRIT: CPT

## 2017-12-06 PROCEDURE — 6360000002 HC RX W HCPCS: Performed by: STUDENT IN AN ORGANIZED HEALTH CARE EDUCATION/TRAINING PROGRAM

## 2017-12-06 PROCEDURE — 99255 IP/OBS CONSLTJ NEW/EST HI 80: CPT | Performed by: INTERNAL MEDICINE

## 2017-12-06 PROCEDURE — 99223 1ST HOSP IP/OBS HIGH 75: CPT | Performed by: INTERNAL MEDICINE

## 2017-12-06 RX ORDER — ONDANSETRON 2 MG/ML
4 INJECTION INTRAMUSCULAR; INTRAVENOUS EVERY 8 HOURS PRN
Status: DISCONTINUED | OUTPATIENT
Start: 2017-12-06 | End: 2017-12-07 | Stop reason: HOSPADM

## 2017-12-06 RX ADMIN — ONDANSETRON 4 MG: 2 INJECTION INTRAMUSCULAR; INTRAVENOUS at 18:01

## 2017-12-06 RX ADMIN — FLUTICASONE PROPIONATE 1 SPRAY: 50 SPRAY, METERED NASAL at 08:52

## 2017-12-06 RX ADMIN — FERROUS SULFATE TAB EC 325 MG (65 MG FE EQUIVALENT) 325 MG: 325 (65 FE) TABLET DELAYED RESPONSE at 21:53

## 2017-12-06 RX ADMIN — Medication 10 ML: at 21:54

## 2017-12-06 RX ADMIN — ONDANSETRON HYDROCHLORIDE 4 MG: 4 TABLET, FILM COATED ORAL at 09:32

## 2017-12-06 RX ADMIN — DOCUSATE SODIUM -SENNOSIDES 1 TABLET: 50; 8.6 TABLET, COATED ORAL at 08:52

## 2017-12-06 RX ADMIN — FOLIC ACID 1 MG: 1 TABLET ORAL at 08:52

## 2017-12-06 RX ADMIN — FERROUS SULFATE TAB EC 325 MG (65 MG FE EQUIVALENT) 325 MG: 325 (65 FE) TABLET DELAYED RESPONSE at 08:52

## 2017-12-06 RX ADMIN — CITALOPRAM 40 MG: 20 TABLET, FILM COATED ORAL at 08:52

## 2017-12-06 RX ADMIN — Medication 1 TABLET: at 08:52

## 2017-12-06 RX ADMIN — Medication 10 ML: at 08:55

## 2017-12-06 RX ADMIN — DOCUSATE SODIUM 100 MG: 100 CAPSULE ORAL at 08:52

## 2017-12-06 RX ADMIN — CETIRIZINE HYDROCHLORIDE 5 MG: 5 TABLET, FILM COATED ORAL at 08:52

## 2017-12-06 RX ADMIN — OXYCODONE HYDROCHLORIDE AND ACETAMINOPHEN 1 TABLET: 5; 325 TABLET ORAL at 21:53

## 2017-12-06 RX ADMIN — PANTOPRAZOLE SODIUM 40 MG: 40 TABLET, DELAYED RELEASE ORAL at 08:52

## 2017-12-06 ASSESSMENT — PAIN DESCRIPTION - PROGRESSION: CLINICAL_PROGRESSION: NOT CHANGED

## 2017-12-06 ASSESSMENT — PAIN DESCRIPTION - ORIENTATION: ORIENTATION: RIGHT;LEFT

## 2017-12-06 ASSESSMENT — PAIN DESCRIPTION - PAIN TYPE: TYPE: ACUTE PAIN

## 2017-12-06 ASSESSMENT — PAIN DESCRIPTION - FREQUENCY: FREQUENCY: CONTINUOUS

## 2017-12-06 ASSESSMENT — PAIN DESCRIPTION - ONSET: ONSET: ON-GOING

## 2017-12-06 ASSESSMENT — PAIN DESCRIPTION - DESCRIPTORS: DESCRIPTORS: ACHING;DISCOMFORT

## 2017-12-06 ASSESSMENT — PAIN SCALES - GENERAL: PAINLEVEL_OUTOF10: 6

## 2017-12-06 ASSESSMENT — PAIN DESCRIPTION - LOCATION: LOCATION: LEG

## 2017-12-06 NOTE — CONSULTS
_                 Today's Date: 12/6/2017  Patient Name: Vickie Law  Date of admission: 12/5/2017  2:16 PM  Patient's age: 50 y.o., 1970  Admission Dx: Anemia [D64.9]  Anemia [D64.9]    Reason for Consult: patient co-management  Requesting Physician: Dinesh Peters MD    CHIEF COMPLAINT:  Vomiting, low hb     History Obtained From:  patient, electronic medical record    HISTORY OF PRESENT ILLNESS:      The patient is a 50 y.o.  female who is admitted to the hospital for nausea and vomiting. which is a recurrent problem for her. And also her hb keeps on dropping and requires transfusions about 1-2 times per month. On admission she had temp of 100 and she says at home it was about 102, also her hb was 7 and she is s/p 1 PRBC. She is known to us for thrombocytosis - most likely reactive, anemia - hypoplastic - with falling hb requiring frequent transfusions   She has elevated ferritin but this is thought to be reactive. Recent bone marrow aspiration and biopsy showed reactive plasma cell population up to 10% but with small monoclonal protein in the blood. The picture is not compatible with myeloma. The patient has hypoplastic anemia. We are in the process of evaluating her in combination with [facility].         BRIEF CASE HISTORY:  This is a patient know to us, she follows at the Heme-Onc clinic. She was originally seen for ascites in the setting of a lung nodule and a gastric mass. Paracentesis followed by pathology found the ascitic fluid to be non- malignant. Similarly the resected abdominal mass and the biopsied lung mass were also found to be non malignant. The patient has required multiple paracentesis as the ascites continues to recur. Each time pathology was negative for malignancy and there were no findings consistent with any particular etiology.  There was a concern about tuberculous ascites but the esophagogastroduodenoscopy transoral diagnostic (N/A, 8/30/2017). Medications:    Prior to Admission medications    Medication Sig Start Date End Date Taking? Authorizing Provider   oxyCODONE-acetaminophen (PERCOCET) 5-325 MG per tablet Take 1 tablet by mouth every 6 hours as needed for Pain .  11/30/17 12/30/17 Yes Romulo Dela Cruz MD   sodium chloride (ALTAMIST SPRAY) 0.65 % nasal spray 1 spray by Nasal route as needed for Congestion 10/23/17  Yes Diane Gonzalez MD   Calcium Carbonate-Vitamin D (OYSTER SHELL CALCIUM/D) 500-200 MG-UNIT TABS Take 1 tablet by mouth daily Please discontinue previous prescription of calcium 10/23/17 10/23/18 Yes Diane Gonzalez MD   loratadine (CLARITIN) 10 MG tablet Take 1 tablet by mouth daily 10/23/17  Yes Diane Gonzalez MD   citalopram (CELEXA) 40 MG tablet Take 1 tablet by mouth daily 10/23/17  Yes Diane Gonzalez MD   ferrous sulfate 325 (65 Fe) MG tablet Take 1 tablet by mouth 2 times daily 10/23/17  Yes Diane Gonzalez MD   senna-docusate (SENEXON-S) 8.6-50 MG per tablet Take 1 tablet by mouth daily 10/23/17  Yes Diane Gonzalez MD   aspirin (ASPIRIN LOW DOSE) 81 MG chewable tablet Take 1 tablet by mouth daily 10/23/17  Yes Diane Gonzalez MD   pantoprazole (PROTONIX) 40 MG tablet Take 1 tablet by mouth daily 10/23/17  Yes Diane Gonzalez MD   folic acid (FOLVITE) 1 MG tablet Take 1 tablet by mouth daily 10/23/17  Yes Diane Gonzalez MD   umeclidinium-vilanterol (ANORO ELLIPTA) 62.5-25 MCG/INH AEPB inhaler Inhale 1 puff into the lungs daily 10/20/17  Yes Almas Osborne MD   VENTOLIN  (90 Base) MCG/ACT inhaler INHALE 2 PUFFS INTO THE LUNGS EVERY 6 HOURS AS NEEDED FOR WHEEZING 9/22/17  Yes Almas Osborne MD   docusate sodium (DOCQLACE) 100 MG capsule TAKE 1 CAPSULE BY MOUTH 2 TIMES DAILY AS NEEDED FOR CONSTIPATION 8/15/17  Yes Rich Sullivan MD   fluticasone (FLONASE) 50 MCG/ACT nasal spray 1 spray by Nasal route daily 8/15/17  Yes Rich Sullivan MD ondansetron (ZOFRAN) 4 MG tablet Take 1 tablet by mouth every 8 hours as needed for Nausea 5/9/17  Yes Earlene Polo MD   albuterol (PROVENTIL) (5 MG/ML) 0.5% nebulizer solution Take 0.5 mLs by nebulization every 6 hours as needed for Wheezing 1/23/17  Yes Zonia Martinez MD     Current Facility-Administered Medications   Medication Dose Route Frequency Provider Last Rate Last Dose    sodium chloride flush 0.9 % injection 10 mL  10 mL Intravenous 2 times per day Benson Gallegos MD        sodium chloride flush 0.9 % injection 10 mL  10 mL Intravenous PRN Benson Gallegos MD        albuterol (PROVENTIL) nebulizer solution 2.5 mg  2.5 mg Nebulization Q6H PRN Sahil Irish, DPM        calcium carbonate-vitamin D (CALTRATE) 600-400 MG-UNIT per tab 1 tablet  1 tablet Oral Daily Sahil Irish, DPM   1 tablet at 12/06/17 0852    citalopram (CELEXA) tablet 40 mg  40 mg Oral Daily Sahil Irish, DPM   40 mg at 12/06/17 1129    docusate sodium (COLACE) capsule 100 mg  100 mg Oral Daily Sahil Irish, DPM   100 mg at 12/06/17 8857    ferrous sulfate EC tablet 325 mg  325 mg Oral BID Sahil Irish, DPM   325 mg at 12/06/17 0852    fluticasone (FLONASE) 50 MCG/ACT nasal spray 1 spray  1 spray Nasal Daily Sahil Irish, DPM   1 spray at 05/58/64 3937    folic acid (FOLVITE) tablet 1 mg  1 mg Oral Daily Sahil Irish, DPM   1 mg at 12/06/17 5916    cetirizine (ZYRTEC) tablet 5 mg  5 mg Oral Daily Sahil Irish, DPM   5 mg at 12/06/17 0852    ondansetron (ZOFRAN) tablet 4 mg  4 mg Oral Q8H PRN Sahil Irish, DPM   4 mg at 12/06/17 0932    oxyCODONE-acetaminophen (PERCOCET) 5-325 MG per tablet 1 tablet  1 tablet Oral Q6H PRN Sahil Irish, DPM   1 tablet at 12/05/17 1820    pantoprazole (PROTONIX) tablet 40 mg  40 mg Oral Daily Sahil Irish, DPM   40 mg at 12/06/17 0852    sennosides-docusate sodium (SENOKOT-S) 8.6-50 MG tablet 1 tablet  1 tablet Oral Daily PHILLIP SoriaM   1 tablet at 12/06/17 0852    sodium chloride incontinence, and hematuria   Integument: negative for rash, skin lesions, bruises.    Hematologic/Lymphatic: negative for easy bruising, bleeding, lymphadenopathy, or petechiae   Endocrine: negative for heat or cold intolerance,weight changes, change in bowel habits and hair loss   Musculoskeletal: negative for myalgias, arthralgias, pain, joint swelling,and bone pain   Neurological: negative for headaches, dizziness, seizures, weakness, numbness        PHYSICAL EXAM:        /73   Pulse 92   Temp 99.5 °F (37.5 °C)   Resp 16   Ht 4' 9\" (1.448 m)   Wt 112 lb (50.8 kg)   LMP 2012   SpO2 93%   BMI 24.24 kg/m²    Temp (24hrs), Av.9 °F (37.2 °C), Min:98.1 °F (36.7 °C), Max:100.3 °F (37.9 °C)      General appearance - well appearing, no in pain or distress, frail   Mental status - alert and cooperative   Eyes - pupils equal and reactive, extraocular eye movements intact   Ears - bilateral TM's and external ear canals normal   Mouth - mucous membranes moist, pharynx normal without lesions   Neck - supple, no significant adenopathy   Lymphatics - no palpable lymphadenopathy, no hepatosplenomegaly   Chest - clear to auscultation, no wheezes, rales or rhonchi, symmetric air entry   Heart - normal rate, regular rhythm, normal S1, S2, no murmurs  Abdomen - soft, nontender, nondistended, no masses or organomegaly   Neurological - alert, oriented, normal speech, no focal findings or movement disorder noted   Musculoskeletal - no joint tenderness, deformity or swelling   Extremities - peripheral pulses normal, no pedal edema, no clubbing or cyanosis   Skin - normal coloration and turgor, no rashes, no suspicious skin lesions noted ,      DATA:      Labs:     CBC:   Recent Labs      17   1449  17   2212  17   0607   WBC  22.9*   --    --    RBC  2.68*   --    --    HGB  7.0*  7.9*  7.6*   HCT  24.5*  26.0*  26.1*   MCV  91.4   --    --    RDW  16.9*   --    --    PLT  900*   --    --

## 2017-12-06 NOTE — DISCHARGE SUMMARY
[address] [place] [address]     Department of Internal Medicine - Staff Internal Medicine Service    INPATIENT DISCHARGE SUMMARY        Patient Identification:  Aj Law is a 50 y.o. female. :  1970  MRN: [id number]     Acct: [de-identified]   Admit Date:  2017  Discharge date and time: 2017  5:55 PM   Attending Provider: Ariana att. providers found                                     Admission Diagnoses:   Anemia [D64.9]  Anemia [D64.9]    Discharge Diagnoses:   Principal Problem:    TIN (chronic hypoplastic anemia) ([facility] [address]  Active Problems:    Smoking greater than 40 pack years    Thrombocytosis ([facility] [address]    Leukocytosis    Multiple lung nodules on CT       Consults:   hematology/oncology    Brief Inpatient course:    History was obtained from chart review and the patient.       Pedro Law is a 50 y.o. with PMH of Acute pure red cell anemia received multiple blood transfusions , osteoporosis, cirrhosis, lupus presented with complaints of feeling weak and tired and she knows it when her Hb goes low. In the ED patient was afebrile, tachycardic with Hb 6.8. 1 unit of blood was transfused. Patient has had lung nodule, gastric mas, ovarian cystic mass in the past which were all negative for malignancy. Being followed outpatient by [facility] with no clear etiology as yet. Patient also had cryptogenic necrotizing pneumonia, was treated with long term steroids and she has developed Osteoporosis with DEXA -2.7 and is on Prolia. Bone biopsy () showed   NORMOCELLULAR TRILINEAGE HEMATOPOIETIC MARROW WITH MILDLY LEFT   SHIFTED MYELOID MATURATION, NORMAL ERYTHROID AND MEGAKARYOCYTIC   LINEAGES, AND MARKEDLY INCREASED IRON STORES  BCR - ABL and GENNARO -2 are both negative. SMALL SERUM MONOCLONAL IGG KAPPA PARAPROTEIN OF 0.13G/DL   AND URINE IGG KAPPA OF 2.3 MG/DL  Which were thought to be compatible with plasma cell population. Hemonc was on board.  After receiving 2 units of PRBCs her

## 2017-12-06 NOTE — FLOWSHEET NOTE
Stopped by to check on pt and see how they were doing. Pt stated that she wasn't doing too well today. Pt shared what was going on.  offered prayer and pt excepted. Pt thanked  for coming.

## 2017-12-06 NOTE — PROGRESS NOTES
[address] [facility]   Department of Internal Medicine -   Internal Medicine Residency Program  ______________________________________________________________________    Patient: Alberta Law  YOB: 1970   MRN: [id number]    Acct: [de-identified]     Admit date: 2017  Today's date: 17    Admitting Diagnosis: <principal problem not specified>    Subjective:     Pt seen and Chart reviewed. Pt was transfused overnight. Hb elevated this AM- 7.6  Mentation improved today      Fever:-  Temp (24hrs), Av.8 °F (37.1 °C), Min:98.1 °F (36.7 °C), Max:100.3 °F (37.9 °C)    Blood pressure:   Systolic (23VWX), GPD:200 , Min:101 , HTV:852     Diastolic (25EQG), HPD:67, Min:70, Max:80      Urine output in the last 24 hours: In: 320 [Blood:320]  Out: -     Fluids:None  Feeding:General diet  Analgesics:percocet   DVT Prophylaxis: epc cuffs   GI Prophylaxis:  protonix  Glycemic Control:well controlled  Bowel management:none   Indwelling catheter:none       Review of systems. · Constitutional: Positive for fatigue, negative for fevers, chills   · Cardiovascular: Negative for lightheadedness/orthostatic symptoms ,chest pain, dyspnea on exertion, palpitations or loss of consciousness. · Respiratory: Negative for cough or wheezing, sputum production, hemoptysis,   · Gastrointestinal: Negative for nausea/vomiting, change in bowel habits, abdominal pain  · Genitourinary:Negative dysuria, trouble voiding, hematuria.   · Neurological: Negative for headache, dizziness,numbness/tingling    Objective:   Vital Sign:  /71   Pulse 88   Temp 98.3 °F (36.8 °C) (Oral)   Resp 18   Ht 4' 9\" (1.448 m)   Wt 112 lb (50.8 kg)   LMP 2012   SpO2 95%   BMI 24.24 kg/m²       Physical Exam:  General appearance:   alert, well appearing, and in no distress  Mental Status: alert, oriented to person, place, and time  Neurologic:  alert, oriented, normal speech, no focal findings or movement disorder noted  Lungs:  clear to auscultation, no wheezes, rales or rhonchi, symmetric air entry  Heart[de-identified] normal rate, regular rhythm, normal S1, S2, no murmurs, rubs, clicks or gallops  Abdomen:  soft, nontender, nondistended, no masses or organomegaly  Extremities: peripheral pulses normal, no pedal edema, no clubbing or cyanosis       Medications:   Scheduled Medications   sodium chloride flush  10 mL Intravenous 2 times per day    calcium carbonate-vitamin D  1 tablet Oral Daily    citalopram  40 mg Oral Daily    docusate sodium  100 mg Oral Daily    ferrous sulfate  325 mg Oral BID    fluticasone  1 spray Nasal Daily    folic acid  1 mg Oral Daily    cetirizine  5 mg Oral Daily    pantoprazole  40 mg Oral Daily    sennosides-docusate sodium  1 tablet Oral Daily    umeclidinium-vilanterol  1 puff Inhalation Daily    sodium chloride flush  10 mL Intravenous Q12H       PRN Medications  sodium chloride flush 10 mL PRN   albuterol 2.5 mg Q6H PRN   ondansetron 4 mg Q8H PRN   oxyCODONE-acetaminophen 1 tablet Q6H PRN   sodium chloride 1 spray PRN   albuterol sulfate HFA 1 puff Q4H PRN   sodium chloride flush 10 mL PRN       Diagnostic Labs and Imaging    CBC:  Recent Labs      12/05/17   1449  12/05/17   2212  12/06/17   0607   WBC  22.9*   --    --    HGB  7.0*  7.9*  7.6*   PLT  900*   --    --      BMP: Recent Labs      12/05/17   1449  12/06/17   0607   NA  131*  132*   K  4.1  3.8   CL  92*  95*   CO2  23  22   BUN  18  16   CREATININE  0.57  0.51   GLUCOSE  86  96     Hepatic: Recent Labs      12/05/17   1449   AST  15   ALT  8   BILITOT  0.86   ALKPHOS  593*     INR: No results for input(s): INR in the last 72 hours. Troponin: No results for input(s): TROPONINI in the last 72 hours. Assessment and Plan: Active Problems:    Anemia    Pt received HD last night. Hb this AM 7.6.   Pt mentation improved   F/up [facility] heme/onc center  Smoking cessation  Anticipate discharge this AM    Sahil Live

## 2017-12-06 NOTE — PLAN OF CARE
Problem: Pain:  Goal: Pain level will decrease  Pain level will decrease   Outcome: Ongoing  Pain level assessment complete. Pt rated pain at 0/10. Pt educated on pain scale and control interventions. PRN pain medication given per pt request. Pt instructed to call out with new onset of pain or unrelieved pain. Will continue to monitor. Problem: Falls - Risk of  Goal: Absence of falls  Outcome: Ongoing  Patient assessed for fall risk and fall precautions initiated as needed. Patient instructed about safety devices and allowed to make decisions related to safey. Environment kept free of clutter and adequate lighting provided. Bed in lowest position with brakes locked. Call light in reach. Patient ID band correct and in place. Patient transferred with appropriate methods. Patient free of falls during shift. Will continue to monitor.

## 2017-12-07 VITALS
TEMPERATURE: 98.9 F | OXYGEN SATURATION: 96 % | BODY MASS INDEX: 24.16 KG/M2 | HEIGHT: 57 IN | HEART RATE: 93 BPM | SYSTOLIC BLOOD PRESSURE: 113 MMHG | RESPIRATION RATE: 16 BRPM | WEIGHT: 112 LBS | DIASTOLIC BLOOD PRESSURE: 74 MMHG

## 2017-12-07 LAB
HCT VFR BLD CALC: 25.7 % (ref 36.3–47.1)
HEMOGLOBIN: 7.5 G/DL (ref 11.9–15.1)

## 2017-12-07 PROCEDURE — 6370000000 HC RX 637 (ALT 250 FOR IP): Performed by: STUDENT IN AN ORGANIZED HEALTH CARE EDUCATION/TRAINING PROGRAM

## 2017-12-07 PROCEDURE — 85018 HEMOGLOBIN: CPT

## 2017-12-07 PROCEDURE — 85014 HEMATOCRIT: CPT

## 2017-12-07 PROCEDURE — G0378 HOSPITAL OBSERVATION PER HR: HCPCS

## 2017-12-07 PROCEDURE — 99232 SBSQ HOSP IP/OBS MODERATE 35: CPT | Performed by: INTERNAL MEDICINE

## 2017-12-07 PROCEDURE — 36415 COLL VENOUS BLD VENIPUNCTURE: CPT

## 2017-12-07 PROCEDURE — 2580000003 HC RX 258: Performed by: STUDENT IN AN ORGANIZED HEALTH CARE EDUCATION/TRAINING PROGRAM

## 2017-12-07 PROCEDURE — 99238 HOSP IP/OBS DSCHRG MGMT 30/<: CPT | Performed by: INTERNAL MEDICINE

## 2017-12-07 RX ADMIN — PANTOPRAZOLE SODIUM 40 MG: 40 TABLET, DELAYED RELEASE ORAL at 08:11

## 2017-12-07 RX ADMIN — FLUTICASONE PROPIONATE 1 SPRAY: 50 SPRAY, METERED NASAL at 08:12

## 2017-12-07 RX ADMIN — DOCUSATE SODIUM 100 MG: 100 CAPSULE ORAL at 08:11

## 2017-12-07 RX ADMIN — Medication 10 ML: at 08:12

## 2017-12-07 RX ADMIN — CETIRIZINE HYDROCHLORIDE 5 MG: 5 TABLET, FILM COATED ORAL at 08:12

## 2017-12-07 RX ADMIN — CITALOPRAM 40 MG: 20 TABLET, FILM COATED ORAL at 08:11

## 2017-12-07 RX ADMIN — FOLIC ACID 1 MG: 1 TABLET ORAL at 08:11

## 2017-12-07 RX ADMIN — Medication 1 TABLET: at 08:12

## 2017-12-07 RX ADMIN — FERROUS SULFATE TAB EC 325 MG (65 MG FE EQUIVALENT) 325 MG: 325 (65 FE) TABLET DELAYED RESPONSE at 08:11

## 2017-12-07 NOTE — PROGRESS NOTES
or rhonchi, symmetric air entry  Heart[de-identified] normal rate, regular rhythm, normal S1, S2, no murmurs, rubs, clicks or gallops  Abdomen:  soft, nontender, nondistended, no masses or organomegaly  Extremities: peripheral pulses normal, no pedal edema, no clubbing or cyanosis       Medications:   Scheduled Medications   sodium chloride flush  10 mL Intravenous 2 times per day    calcium carbonate-vitamin D  1 tablet Oral Daily    citalopram  40 mg Oral Daily    docusate sodium  100 mg Oral Daily    ferrous sulfate  325 mg Oral BID    fluticasone  1 spray Nasal Daily    folic acid  1 mg Oral Daily    cetirizine  5 mg Oral Daily    pantoprazole  40 mg Oral Daily    sennosides-docusate sodium  1 tablet Oral Daily    umeclidinium-vilanterol  1 puff Inhalation Daily    sodium chloride flush  10 mL Intravenous Q12H       PRN Medications    ondansetron 4 mg Q8H PRN   sodium chloride flush 10 mL PRN   albuterol 2.5 mg Q6H PRN   ondansetron 4 mg Q8H PRN   oxyCODONE-acetaminophen 1 tablet Q6H PRN   sodium chloride 1 spray PRN   albuterol sulfate HFA 1 puff Q4H PRN   sodium chloride flush 10 mL PRN       Diagnostic Labs and Imaging    CBC:  Recent Labs      12/05/17   1449   12/06/17   0607  12/06/17   1840  12/07/17   0610   WBC  22.9*   --    --    --    --    HGB  7.0*   < >  7.6*  7.3*  7.5*   PLT  900*   --    --    --    --     < > = values in this interval not displayed. BMP:   Recent Labs      12/05/17   1449  12/06/17   0607   NA  131*  132*   K  4.1  3.8   CL  92*  95*   CO2  23  22   BUN  18  16   CREATININE  0.57  0.51   GLUCOSE  86  96     Hepatic:   Recent Labs      12/05/17   1449   AST  15   ALT  8   BILITOT  0.86   ALKPHOS  593*     INR: No results for input(s): INR in the last 72 hours. Troponin: No results for input(s): TROPONINI in the last 72 hours. Assessment and Plan:    Active Problems:    Anemia    Made appt with [facility] for further workup of anemia  Dr. Zoe Herron for

## 2017-12-07 NOTE — PROGRESS NOTES
_                 Today's Date: 12/7/2017  Patient Name: Gwendolyn Dancer A Samples  Date of admission: 12/5/2017  2:16 PM  Patient's age: 50 y.o., 1970  Admission Dx: Anemia [D64.9]  Anemia [D64.9]    Reason for Consult: patient co-management  Requesting Physician: Earline Castaneda MD    CHIEF COMPLAINT:  Vomiting, low hb     SUBJECTIVE:  . Seen and examined. No events overnight. Clinically stable. No active bleeding. She has weakness and fatigue. No dizziness. No palpitation. No new no other complaints. BRIEF CASE HISTORY:    The patient is a 50 y.o.  female who is admitted to the hospital for nausea and vomiting. which is a recurrent problem for her. And also her hb keeps on dropping and requires transfusions about 1-2 times per month. On admission she had temp of 100 and she says at home it was about 102, also her hb was 7 and she is s/p 1 PRBC. She is known to us for thrombocytosis - most likely reactive, anemia - hypoplastic - with falling hb requiring frequent transfusions   She has elevated ferritin but this is thought to be reactive. Recent bone marrow aspiration and biopsy showed reactive plasma cell population up to 10% but with small monoclonal protein in the blood. The picture is not compatible with myeloma. The patient has hypoplastic anemia. We are in the process of evaluating her in combination with [facility].         BRIEF CASE HISTORY:  This is a patient know to us, she follows at the Heme-Onc clinic. She was originally seen for ascites in the setting of a lung nodule and a gastric mass. Paracentesis followed by pathology found the ascitic fluid to be non- malignant. Similarly the resected abdominal mass and the biopsied lung mass were also found to be non malignant. The patient has required multiple paracentesis as the ascites continues to recur.  Each time pathology was negative for malignancy and there were no findings consistent with any particular etiology. There was a concern about tuberculous ascites but the results were also negative in this case. The patient later developed an ovarian cystic mass which also appears to be benign. She is following with Dr Melida Mckeon for possible resection of that mass. Since the onset of these medical problems the patient has been followed by a multidisciplinary team of specialists who despite repeated investigative measures have not been able to determine an etiology.        Past Medical History:   has a past medical history of Allergic rhinitis; Anxiety; Asthma; Blood circulation, collateral; Celiac disease; Chronic back pain; Chronic kidney disease; Cirrhosis of liver with ascites ([address] Constipation; Cough; Cryptogenic organizing pneumonia ([address] Depression; Difficult intravenous access; Disease of blood and blood forming organ; Edentulous; Full dentures; Gastric outlet obstruction; GERD (gastroesophageal reflux disease); Hearing loss; History of blood transfusion; Hydronephrosis, bilateral; Hypertension; Hypoglycemia; Lung nodule; Migraine; OCD (obsessive compulsive disorder); Osteoarthritis; Osteoporosis; Pelvic mass; Perforated nasal septum; Pneumonia; Psoriasis; PTSD (post-traumatic stress disorder); Scoliosis; Shortness of breath; Substance abuse; Thrombocytosis ([address] Urinary incontinence; and Wears glasses. Past Surgical History:   has a past surgical history that includes Tubal ligation (1989); Tonsillectomy and adenoidectomy (1982); ERCP (5/16/2013); Upper gastrointestinal endoscopy; gastrectomy (2012); Paracentesis (Right, 01/2015-09/2015); Cystocopy (12/1/2015); Fixation Kyphoplasty (08/09/2016); Lung biopsy; insert nasal septal prosthesis (N/A, 4/14/2017); create eardrum opening,gen anesth (N/A, 4/14/2017); Abdomen surgery (09/2015); Cystoscopy (05/11/2017); Ureter stent placement (05/11/2017); Ureteroscopy (05/11/2017);  Cystoscopy (Right, 5/11/2017); Cholecystectomy; hernia repair (07/18/2017); repair incisional hernia,reducible (N/A, 7/18/2017); and esophagogastroduodenoscopy transoral diagnostic (N/A, 8/30/2017). Medications:    Prior to Admission medications    Medication Sig Start Date End Date Taking? Authorizing Provider   oxyCODONE-acetaminophen (PERCOCET) 5-325 MG per tablet Take 1 tablet by mouth every 6 hours as needed for Pain .  11/30/17 12/30/17 Yes Sawyer Peres MD   sodium chloride (ALTAMIST SPRAY) 0.65 % nasal spray 1 spray by Nasal route as needed for Congestion 10/23/17  Yes Andrea Perez MD   Calcium Carbonate-Vitamin D (OYSTER SHELL CALCIUM/D) 500-200 MG-UNIT TABS Take 1 tablet by mouth daily Please discontinue previous prescription of calcium 10/23/17 10/23/18 Yes Andrea Perez MD   loratadine (CLARITIN) 10 MG tablet Take 1 tablet by mouth daily 10/23/17  Yes Andrea Perez MD   citalopram (CELEXA) 40 MG tablet Take 1 tablet by mouth daily 10/23/17  Yes Andrea Perez MD   senna-docusate (SENEXON-S) 8.6-50 MG per tablet Take 1 tablet by mouth daily 10/23/17  Yes Andrea Perez MD   aspirin (ASPIRIN LOW DOSE) 81 MG chewable tablet Take 1 tablet by mouth daily 10/23/17  Yes Andrea Perez MD   pantoprazole (PROTONIX) 40 MG tablet Take 1 tablet by mouth daily 10/23/17  Yes Andrea Perez MD   folic acid (FOLVITE) 1 MG tablet Take 1 tablet by mouth daily 10/23/17  Yes Andrea Perez MD   umeclidinium-vilanterol (ANORO ELLIPTA) 62.5-25 MCG/INH AEPB inhaler Inhale 1 puff into the lungs daily 10/20/17  Yes Paul Bangura MD   VENTOLIN  (90 Base) MCG/ACT inhaler INHALE 2 PUFFS INTO THE LUNGS EVERY 6 HOURS AS NEEDED FOR WHEEZING 9/22/17  Yes Paul Bangura MD   docusate sodium (DOCQLACE) 100 MG capsule TAKE 1 CAPSULE BY MOUTH 2 TIMES DAILY AS NEEDED FOR CONSTIPATION 8/15/17  Yes Steven Ocasio MD   fluticasone (FLONASE) 50 MCG/ACT nasal spray 1 spray by Nasal route daily 8/15/17  Yes Steven Ocasio MD --    --    --    HGB  7.0*   < >  7.6*  7.3*  7.5*   HCT  24.5*   < >  26.1*  24.9*  25.7*   MCV  91.4   --    --    --    --    RDW  16.9*   --    --    --    --    PLT  900*   --    --    --    --     < > = values in this interval not displayed. BMP:   Recent Labs      12/05/17   1449  12/06/17   0607   NA  131*  132*   K  4.1  3.8   CL  92*  95*   CO2  23  22   BUN  18  16   CREATININE  0.57  0.51     ANEMIA STUDIES  No results for input(s): LABIRON, TIBC, FERRITIN, XHODJNYP71, FOLATE, OCCULTBLD in the last 72 hours. PT/INR: No results for input(s): PROTIME, INR in the last 72 hours. APTT: No results for input(s): APTT in the last 72 hours. LFTS  Recent Labs      12/05/17   1449   ALKPHOS  593*   ALT  8   AST  15   BILITOT  0.86   BILIDIR  0.47*   LABALBU  2.0*       Radiology      Primary Problem  <principal problem not specified>    Active Hospital Problems    Diagnosis Date Noted    Anemia [D64.9] 01/14/2016         IMPRESSION:   1. Anemia, multifactorial, likely anemia of chronic illness  2. Acute drop, no evidence of active bleeding. 3. Thrombocytosis, chronic, likely related to chronic inflammation  4. 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 Elevated ferritin  6. H/o positive lupus     RECOMMENDATIONS:  1. I had a detailed discussion with the patient and personally went over the results of your lab workup, and imaging studies. 2. Supportive care with transfusion as needed. 3. Previous Bone Marrow test from September 2017 was not diagnostic. May need to be repeated. 4. Patient is clinically stable and will be discharged today. 5. Patient was already scheduled for consult at [facility]. 6. Will follow as outpatient with Dr. Fausto Joshua. 7. Patient's questions were answered to the best of her satisfaction and she verbalized full understanding and agreement.                                     [address] [facility]

## 2017-12-08 ENCOUNTER — APPOINTMENT (OUTPATIENT)
Dept: GENERAL RADIOLOGY | Age: 47
End: 2017-12-08
Payer: MEDICAID

## 2017-12-08 ENCOUNTER — HOSPITAL ENCOUNTER (EMERGENCY)
Age: 47
Discharge: HOME | End: 2017-12-08
Attending: EMERGENCY MEDICINE
Payer: MEDICAID

## 2017-12-08 ENCOUNTER — TELEPHONE (OUTPATIENT)
Dept: INTERNAL MEDICINE | Age: 47
End: 2017-12-08

## 2017-12-08 VITALS
HEART RATE: 97 BPM | WEIGHT: 112 LBS | BODY MASS INDEX: 24.16 KG/M2 | DIASTOLIC BLOOD PRESSURE: 67 MMHG | RESPIRATION RATE: 16 BRPM | OXYGEN SATURATION: 97 % | HEIGHT: 57 IN | SYSTOLIC BLOOD PRESSURE: 110 MMHG | TEMPERATURE: 97 F

## 2017-12-08 DIAGNOSIS — S03.00XA TMJ (DISLOCATION OF TEMPOROMANDIBULAR JOINT), INITIAL ENCOUNTER: Primary | ICD-10-CM

## 2017-12-08 PROCEDURE — 96372 THER/PROPH/DIAG INJ SC/IM: CPT

## 2017-12-08 PROCEDURE — 70100 X-RAY EXAM OF JAW <4VIEWS: CPT

## 2017-12-08 PROCEDURE — 6360000002 HC RX W HCPCS: Performed by: PHYSICIAN ASSISTANT

## 2017-12-08 PROCEDURE — 21480 CLTX TMPRMAND DISLC 1ST/SBSQ: CPT

## 2017-12-08 PROCEDURE — 70110 X-RAY EXAM OF JAW 4/> VIEWS: CPT

## 2017-12-08 PROCEDURE — 99284 EMERGENCY DEPT VISIT MOD MDM: CPT

## 2017-12-08 RX ORDER — MORPHINE SULFATE 2 MG/ML
2 INJECTION, SOLUTION INTRAMUSCULAR; INTRAVENOUS ONCE
Status: COMPLETED | OUTPATIENT
Start: 2017-12-08 | End: 2017-12-08

## 2017-12-08 RX ORDER — MORPHINE SULFATE 2 MG/ML
2 INJECTION, SOLUTION INTRAMUSCULAR; INTRAVENOUS ONCE
Status: DISCONTINUED | OUTPATIENT
Start: 2017-12-08 | End: 2017-12-08

## 2017-12-08 RX ORDER — FENTANYL CITRATE 50 UG/ML
25 INJECTION, SOLUTION INTRAMUSCULAR; INTRAVENOUS ONCE
Status: COMPLETED | OUTPATIENT
Start: 2017-12-08 | End: 2017-12-08

## 2017-12-08 RX ADMIN — MORPHINE SULFATE 2 MG: 2 INJECTION, SOLUTION INTRAMUSCULAR; INTRAVENOUS at 21:43

## 2017-12-08 RX ADMIN — FENTANYL CITRATE 25 MCG: 50 INJECTION, SOLUTION INTRAMUSCULAR; INTRAVENOUS at 22:39

## 2017-12-08 ASSESSMENT — PAIN DESCRIPTION - FREQUENCY: FREQUENCY: CONTINUOUS

## 2017-12-08 ASSESSMENT — PAIN DESCRIPTION - ONSET: ONSET: ON-GOING

## 2017-12-08 ASSESSMENT — PAIN DESCRIPTION - LOCATION: LOCATION: JAW

## 2017-12-08 ASSESSMENT — ENCOUNTER SYMPTOMS
RESPIRATORY NEGATIVE: 1
ALLERGIC/IMMUNOLOGIC NEGATIVE: 1
GASTROINTESTINAL NEGATIVE: 1

## 2017-12-08 ASSESSMENT — PAIN SCALES - GENERAL
PAINLEVEL_OUTOF10: 5
PAINLEVEL_OUTOF10: 6

## 2017-12-08 ASSESSMENT — PAIN DESCRIPTION - PROGRESSION: CLINICAL_PROGRESSION: GRADUALLY WORSENING

## 2017-12-08 ASSESSMENT — PAIN DESCRIPTION - PAIN TYPE: TYPE: ACUTE PAIN

## 2017-12-08 NOTE — TELEPHONE ENCOUNTER
Vick [facility] Transitions Initial Follow Up Call    Call within 2 business days of discharge: Yes    Patient: Antonia Law Patient : 1970   MRN: [id number]  Reason for Admission:chronic hypoplastic anemia  Discharge Date: 17 RARS: Katherine @YURY([id number])@     Spoke with: Patient. She states she got dizzy earlier today and fell when she got up. Denies hurting self or hitting head. States her brother and sister were at the house and able to help her. States she does not use a walker. Asked if she felt she could use home care to come out and help see what she needs in the house but patient refused. States she has all medications right now. She is scheduled [facility] for bone marrow biopsy. Facility: [place] [address]    Non-face-to-face services provided:  Scheduled appointment with PCP-Dr. Dina Cleary 18  Assessment and support for treatment adherence and medication management-Medications reviewed.     Follow Up  Future Appointments  Date Time Provider Guillermo Muse   2017 1:30 PM Bronwyn Miller MD [facility]   2018 2:30 PM STV CT  STVZ CT SCAN [facility] Radiolog   2018 4:00 PM Teri Lyman MD Resp Spec [facility]   2018 2:00 PM [facility]   2018 1:00 PM Bronwyn Miller MD [facility] Deborah Robles RN

## 2017-12-09 NOTE — ED PROVIDER NOTES
[facility]     Emergency Department     Faculty Attestation    I performed a history and physical examination of the patient and discussed management with the resident. I reviewed the residents note and agree with the documented findings and plan of care. Any areas of disagreement are noted on the chart. I was personally present for the key portions of any procedures. I have documented in the chart those procedures where I was not present during the key portions. I have reviewed the emergency nurses triage note. I agree with the chief complaint, past medical history, past surgical history, allergies, medications, social and family history as documented unless otherwise noted below. Documentation of the HPI, Physical Exam and Medical Decision Making performed by medical students or scribes is based on my personal performance of the HPI, PE and MDM. For Physician Assistant/ Nurse Practitioner cases/documentation I have personally evaluated this patient and have completed at least one if not all key elements of the E/M (history, physical exam, and MDM). Additional findings are as noted. Vital signs:   Vitals:    12/08/17 1858   BP: 110/67   Pulse: 97   Resp: 16   Temp: 97 °F (36.1 °C)   SpO2: 97%      Probable jaws dislocation. Imaging pending. No trauma. Patient states that she just fell asleep while eating popcorn. Ortho Injury  Date/Time: 12/8/2017 11:00 PM  Performed by: Aby Ga by: Elba Driscoll   Consent: Verbal consent obtained.   Risks and benefits: risks, benefits and alternatives were discussed  Consent given by: patient  Patient understanding: patient states understanding of the procedure being performed  Patient consent: the patient's understanding of the procedure matches consent given  Procedure consent: procedure consent matches procedure scheduled  Relevant documents: relevant documents present and verified  Test results: test results available and properly labeled  Imaging studies: imaging studies available  Patient identity confirmed: verbally with patient  Injury location: jaw  Pre-procedure neurovascular assessment: neurovascularly intact    Anesthesia:  Local anesthesia used: no    Sedation:  Patient sedated: no  Post-procedure neurovascular assessment: post-procedure neurovascularly intact  Patient tolerance: Patient tolerated the procedure well with no immediate complications  Comments: Dislocated jaw, felt like the left TMJ was out of place. Gentle traction used and the jaw slipped back in place. Patient is no able to open and close her mouth.               Matthew Sims M.D,  Attending Emergency  Physician           Matthew Sims MD  12/08/17 2001       Matthew Sims MD  12/08/17 6049

## 2017-12-09 NOTE — ED NOTES
Pt resting on cot, no distress noted, medicated for pain per orders. Rr even an unlabored.       Nirmal Aguilera RN  12/08/17 1228

## 2017-12-09 NOTE — DISCHARGE SUMMARY
[address] [place] [address]     Department of Internal Medicine - Staff Internal Medicine Service    INPATIENT DISCHARGE SUMMARY      PATIENT IDENTIFICATION:  NAME:  Kranthi Law   :   1970  MRN:    [id number]     Acct:    [de-identified]   Admit Date:  2017  Discharge date:  2017  4:30 PM   Attending Provider: No att. providers found                                     REASON FOR HOSPITALIZATION:   Kranthi Law is a 50 y.o. female who presented with significant past medical history anemia requiring multiple blood transfusions and follows with Dr. Maldonado Hough presented with generalized weakness, and fatigue x 2 days. Pt also complains that she had 1 episode of emesis earlier today but is no longer feeling nausea. Pt states the symptoms are of her usual low Hg. Denies any active bleeding, chest pain, shortness of breath, leg swelling.     In ED patients VS were stable and Hb was 7.0 and 1 unit of blood was transfused. Pt has chronic thrombocytosis likely related to chronic inflammation. Abdomen xrays negative for acute abnormalities.      Patient has had lung nodule, gastric mas, ovarian cystic mass in the past which were all negative for malignancy. Being followed outpatient by [facility] with no clear etiology as yet. Patient also had cryptogenic necrotizing pneumonia, was treated with long term steroids and she has developed Osteoporosis with DEXA -2.7 and is on Prolia. DIAGNOSES:  Primary:   Anemia [D64.9]  Anemia [D64.9]    Secondary: Active Hospital Problems    Diagnosis Date Noted    Anemia [D64.9] 2016       TREATMENT:  Brief Inpatient Course: Pt was transfused twice over her course of admission. Consultation was placed to hematology and they recommended patient follow up with [place] [facility]. Appointment was made for patient to follow up in [facility] hematology group. Pt left in stable condition.      Consults:   hematology/oncology    Procedures: Blood transfusion    Any Hospital Acquired Infections: none    PATIENT'S DISCHARGE CONDITION:      PATIENT/FAMILY INSTRUCTIONS:   Discharge Medication List as of 12/7/2017  3:05 PM      CONTINUE these medications which have NOT CHANGED    Details   oxyCODONE-acetaminophen (PERCOCET) 5-325 MG per tablet Take 1 tablet by mouth every 6 hours as needed for Pain ., Disp-40 tablet, R-0Normal      sodium chloride (ALTAMIST SPRAY) 0.65 % nasal spray 1 spray by Nasal route as needed for Congestion, Disp-1 Bottle, R-3Normal      Calcium Carbonate-Vitamin D (OYSTER SHELL CALCIUM/D) 500-200 MG-UNIT TABS Take 1 tablet by mouth daily Please discontinue previous prescription of calcium, Disp-30 tablet, R-5Normal      loratadine (CLARITIN) 10 MG tablet Take 1 tablet by mouth daily, Disp-30 tablet, R-5Normal      citalopram (CELEXA) 40 MG tablet Take 1 tablet by mouth daily, Disp-30 tablet, R-5Normal      senna-docusate (SENEXON-S) 8.6-50 MG per tablet Take 1 tablet by mouth daily, Disp-60 tablet, R-3Normal      aspirin (ASPIRIN LOW DOSE) 81 MG chewable tablet Take 1 tablet by mouth daily, Disp-30 tablet, R-5Normal      pantoprazole (PROTONIX) 40 MG tablet Take 1 tablet by mouth daily, Disp-90 tablet, [id number]      folic acid (FOLVITE) 1 MG tablet Take 1 tablet by mouth daily, Disp-30 tablet, R-5Normal      umeclidinium-vilanterol (ANORO ELLIPTA) 62.5-25 MCG/INH AEPB inhaler Inhale 1 puff into the lungs daily, Disp-1 each, R-5Normal      VENTOLIN  (90 Base) MCG/ACT inhaler INHALE 2 PUFFS INTO THE LUNGS EVERY 6 HOURS AS NEEDED FOR WHEEZING, Disp-1 Inhaler, R-0Normal      docusate sodium (DOCQLACE) 100 MG capsule TAKE 1 CAPSULE BY MOUTH 2 TIMES DAILY AS NEEDED FOR CONSTIPATION, Disp-30 capsule, R-3Normal      fluticasone (FLONASE) 50 MCG/ACT nasal spray 1 spray by Nasal route daily, Disp-1 Bottle, R-3Normal      ondansetron (ZOFRAN) 4 MG tablet Take 1 tablet by mouth every 8 hours as needed for Nausea, Disp-8 tablet, R-0Print

## 2017-12-09 NOTE — ED PROVIDER NOTES
of blood transfusion; Hydronephrosis, bilateral; Hypertension; Hypoglycemia; Lung nodule; Migraine; OCD (obsessive compulsive disorder); Osteoarthritis; Osteoporosis; Pelvic mass; Perforated nasal septum; Pneumonia; Psoriasis; PTSD (post-traumatic stress disorder); Scoliosis; Shortness of breath; Substance abuse; Thrombocytosis ([address] Urinary incontinence; and Wears glasses. Reviewed   has a past surgical history that includes Tubal ligation (1989); Tonsillectomy and adenoidectomy (1982); ERCP (5/16/2013); Upper gastrointestinal endoscopy; gastrectomy (2012); Paracentesis (Right, 01/2015-09/2015); Cystocopy (12/1/2015); Fixation Kyphoplasty (08/09/2016); Lung biopsy; insert nasal septal prosthesis (N/A, 4/14/2017); create eardrum opening,gen anesth (N/A, 4/14/2017); Abdomen surgery (09/2015); Cystoscopy (05/11/2017); Ureter stent placement (05/11/2017); Ureteroscopy (05/11/2017); Cystoscopy (Right, 5/11/2017); Cholecystectomy; hernia repair (07/18/2017); repair incisional hernia,reducible (N/A, 7/18/2017); and esophagogastroduodenoscopy transoral diagnostic (N/A, 8/30/2017). Reviewed  Social History     Social History    Marital status: Single     Spouse name: N/A    Number of children: N/A    Years of education: N/A     Occupational History    Not on file. Social History Main Topics    Smoking status: Current Some Day Smoker     Packs/day: 0.25     Years: 30.00     Types: Cigarettes     Last attempt to quit: 12/1/2012    Smokeless tobacco: Never Used      Comment: pt states 3 cigs per day    Alcohol use No      Comment: quit in 1987    Drug use: No      Comment: quit marijuana 1987    Sexual activity: No     Other Topics Concern    Not on file     Social History Narrative    No narrative on file       Family History   Problem Relation Age of Onset    Heart Disease Mother     Stroke Father      cerebral aneurysm       Allergies:  Latex; Bee venom; Benadryl [diphenhydramine-zinc acetate];  Tavist; and Tylenol [acetaminophen]    Home Medications:  Prior to Admission medications    Medication Sig Start Date End Date Taking? Authorizing Provider   oxyCODONE-acetaminophen (PERCOCET) 5-325 MG per tablet Take 1 tablet by mouth every 6 hours as needed for Pain .  11/30/17 12/30/17  Candy Child MD   sodium chloride (ALTAMIST SPRAY) 0.65 % nasal spray 1 spray by Nasal route as needed for Congestion 10/23/17   Diane Gonzalez MD   Calcium Carbonate-Vitamin D (OYSTER SHELL CALCIUM/D) 500-200 MG-UNIT TABS Take 1 tablet by mouth daily Please discontinue previous prescription of calcium 10/23/17 10/23/18  Diane Gonzalez MD   loratadine (CLARITIN) 10 MG tablet Take 1 tablet by mouth daily 10/23/17   Diane Gonzalez MD   citalopram (CELEXA) 40 MG tablet Take 1 tablet by mouth daily 10/23/17   Diane Gonzalez MD   senna-docusate (SENEXON-S) 8.6-50 MG per tablet Take 1 tablet by mouth daily 10/23/17   Diane Gonzalez MD   aspirin (ASPIRIN LOW DOSE) 81 MG chewable tablet Take 1 tablet by mouth daily 10/23/17   Diane Gonzalez MD   pantoprazole (PROTONIX) 40 MG tablet Take 1 tablet by mouth daily 10/23/17   Diane Gonzalez MD   folic acid (FOLVITE) 1 MG tablet Take 1 tablet by mouth daily 10/23/17   Diane Gonzalez MD   umeclidinium-vilanterol (ANORO ELLIPTA) 62.5-25 MCG/INH AEPB inhaler Inhale 1 puff into the lungs daily 10/20/17   Almas Osborne MD   VENTOLIN  (90 Base) MCG/ACT inhaler INHALE 2 PUFFS INTO THE LUNGS EVERY 6 HOURS AS NEEDED FOR WHEEZING 9/22/17   Almas Osborne MD   docusate sodium (DOCQLACE) 100 MG capsule TAKE 1 CAPSULE BY MOUTH 2 TIMES DAILY AS NEEDED FOR CONSTIPATION 8/15/17   Rich Sullivan MD   fluticasone (FLONASE) 50 MCG/ACT nasal spray 1 spray by Nasal route daily 8/15/17   Rich Sullivan MD   ondansetron (ZOFRAN) 4 MG tablet Take 1 tablet by mouth every 8 hours as needed for Nausea 5/9/17   Champ Muniz MD   albuterol (PROVENTIL) (5 MG/ML) 0.5% nebulizer solution Take 0.5 mLs by nebulization every 6 hours as needed for Wheezing 1/23/17   Scarlett Pires MD       REVIEW OF SYSTEMS    (2-9 systems for level 4, 10 or more for level 5)      Review of Systems   Constitutional: Negative. HENT: Negative. Inability to close mouth   Respiratory: Negative. Cardiovascular: Negative. Gastrointestinal: Negative. Genitourinary: Negative. Skin: Negative. Allergic/Immunologic: Negative. Neurological: Negative. Hematological: Negative. Anemia       PHYSICAL EXAM   (up to 7 for level 4, 8 or more for level 5)      INITIAL VITALS:  height is 4' 9\" (1.448 m) and weight is 112 lb (50.8 kg). Her oral temperature is 97 °F (36.1 °C). Her blood pressure is 110/67 and her pulse is 97. Her respiration is 16 and oxygen saturation is 97%. Physical Exam   Constitutional: She is oriented to person, place, and time. Vital signs are normal. She appears well-developed and well-nourished. Thin appearing patient. She has her mouth open and cannot close it. HENT:   Head: Normocephalic and atraumatic. Right Ear: External ear normal.   Left Ear: External ear normal.   Nose: Nose normal.   Mouth/Throat: Oropharynx is clear and moist and mucous membranes are normal. No oropharyngeal exudate. Patients mouth is open, has difficulty talking and cannot move her jaw. She is able to open wider but cannot close even slightly. Tenderness bilaterally TMJ's. Patient has no teeth. Tolerating secretions well. No trismus. Posterior oropharynx is nonerythematous and nonedematous. Tonsils are surgically abscent. Uvula is midline, nondeviated, nonedematous. Submandibular space non-edematous. Eyes: Conjunctivae are normal.   Neck: Trachea normal, normal range of motion and full passive range of motion without pain. Cardiovascular: Normal rate, regular rhythm, S1 normal, S2 normal, normal heart sounds and intact distal pulses.     Pulses:       Radial pulses are 2+ on the dislocation please. FINDINGS: Bony structures are diffusely osteoporotic which lowers sensitivity for detecting nondisplaced fracture. Edentulous mandible is noted. The temporomandibular joints are only partially included on the radiograph. Repeat examination with better centering over the temporomandibular joint should be obtained for better assessment. There are no obvious fractures identified. The coronoid processes are intact. The remainder of mandible is unremarkable. The temporomandibular joints are not completely included on the radiographs. Repeat examination with better centered over temporal mandibular joint could be performed. Edentulous mandible is noted with no obvious fracture. Xr Acute Abd Series Chest 1 Vw    Result Date: 12/6/2017  EXAMINATION: ACUTE ABDOMINAL SERIES WITH SINGLE  VIEW OF THE CHEST 12/5/2017 3:29 pm COMPARISON: 07/16/2017 chest radiograph HISTORY: ORDERING SYSTEM PROVIDED HISTORY: abdominal pain, r/o air fluid levels TECHNOLOGIST PROVIDED HISTORY: Reason for exam:->abdominal pain, r/o air fluid levels FINDINGS: Chest:  The heart size is stable. Numerous nodular opacification is seen in the right lung and a large retrocardiac opacification seen in the left lower lobe. The nodular opacifications in the right lung have increased. Left lower lobe opacification is stable. Abdomen:  Double-J stent remains in place on the right side. Gas is seen in the stomach. Gas and stool are seen in nondilated colon. Surgical staples are seen centrally in the abdomen and in the pelvis. 1. No acute abnormalities are seen in the abdomen 2. Double-J stent remains in place on the right side 3. Increased nodularity in the right lung and stable masslike consolidation in the left lower lobe again could represent a neoplastic process. A multifocal infectious process could also cause this appearance. LABS:  No results found for this visit on 12/08/17.     EMERGENCY DEPARTMENT

## 2017-12-11 RX ORDER — OXYCODONE HYDROCHLORIDE AND ACETAMINOPHEN 5; 325 MG/1; MG/1
1 TABLET ORAL EVERY 6 HOURS PRN
Qty: 40 TABLET | Refills: 0 | Status: SHIPPED | OUTPATIENT
Start: 2017-12-11 | End: 2017-01-01 | Stop reason: SDUPTHER

## 2017-12-14 ENCOUNTER — APPOINTMENT (OUTPATIENT)
Dept: GENERAL RADIOLOGY | Age: 47
DRG: 663 | End: 2017-12-14
Payer: MEDICAID

## 2017-12-14 ENCOUNTER — HOSPITAL ENCOUNTER (INPATIENT)
Age: 47
LOS: 2 days | Discharge: HOME | DRG: 663 | End: 2017-12-16
Attending: EMERGENCY MEDICINE | Admitting: INTERNAL MEDICINE
Payer: MEDICAID

## 2017-12-14 DIAGNOSIS — D64.9 ANEMIA, UNSPECIFIED TYPE: Primary | ICD-10-CM

## 2017-12-14 DIAGNOSIS — E83.52 HYPERCALCEMIA: ICD-10-CM

## 2017-12-14 DIAGNOSIS — D51.9 ANEMIA DUE TO VITAMIN B12 DEFICIENCY, UNSPECIFIED B12 DEFICIENCY TYPE: Chronic | ICD-10-CM

## 2017-12-14 DIAGNOSIS — D75.839 THROMBOCYTOSIS: ICD-10-CM

## 2017-12-14 DIAGNOSIS — D72.829 LEUKOCYTOSIS, UNSPECIFIED TYPE: ICD-10-CM

## 2017-12-14 DIAGNOSIS — E87.1 HYPONATREMIA: ICD-10-CM

## 2017-12-14 LAB
ABSOLUTE EOS #: 0.11 K/UL (ref 0–0.44)
ABSOLUTE IMMATURE GRANULOCYTE: 0.19 K/UL (ref 0–0.3)
ABSOLUTE LYMPH #: 1.21 K/UL (ref 1.1–3.7)
ABSOLUTE MONO #: 1.27 K/UL (ref 0.1–1.2)
ALBUMIN SERPL-MCNC: 2.3 G/DL (ref 3.5–5.2)
ALBUMIN/GLOBULIN RATIO: 0.3 (ref 1–2.5)
ALP BLD-CCNC: 622 U/L (ref 35–104)
ALT SERPL-CCNC: 6 U/L (ref 5–33)
ANION GAP SERPL CALCULATED.3IONS-SCNC: 13 MMOL/L (ref 9–17)
ANION GAP SERPL CALCULATED.3IONS-SCNC: 14 MMOL/L (ref 9–17)
AST SERPL-CCNC: 15 U/L
BASOPHILS # BLD: 0 % (ref 0–2)
BASOPHILS ABSOLUTE: 0.06 K/UL (ref 0–0.2)
BILIRUB SERPL-MCNC: 0.95 MG/DL (ref 0.3–1.2)
BILIRUBIN DIRECT: 0.23 MG/DL
BILIRUBIN, INDIRECT: 0.72 MG/DL (ref 0–1)
BUN BLDV-MCNC: 20 MG/DL (ref 6–20)
BUN BLDV-MCNC: 20 MG/DL (ref 6–20)
BUN/CREAT BLD: ABNORMAL (ref 9–20)
BUN/CREAT BLD: ABNORMAL (ref 9–20)
CALCIUM SERPL-MCNC: 10.5 MG/DL (ref 8.6–10.4)
CALCIUM SERPL-MCNC: 9.3 MG/DL (ref 8.6–10.4)
CHLORIDE BLD-SCNC: 89 MMOL/L (ref 98–107)
CHLORIDE BLD-SCNC: 94 MMOL/L (ref 98–107)
CO2: 22 MMOL/L (ref 20–31)
CO2: 23 MMOL/L (ref 20–31)
CREAT SERPL-MCNC: 0.56 MG/DL (ref 0.5–0.9)
CREAT SERPL-MCNC: 0.59 MG/DL (ref 0.5–0.9)
DIFFERENTIAL TYPE: ABNORMAL
EKG ATRIAL RATE: 109 BPM
EKG P AXIS: 38 DEGREES
EKG P-R INTERVAL: 130 MS
EKG Q-T INTERVAL: 332 MS
EKG QRS DURATION: 70 MS
EKG QTC CALCULATION (BAZETT): 447 MS
EKG R AXIS: 8 DEGREES
EKG T AXIS: 38 DEGREES
EKG VENTRICULAR RATE: 109 BPM
EOSINOPHILS RELATIVE PERCENT: 1 % (ref 1–4)
GFR AFRICAN AMERICAN: >60 ML/MIN
GFR AFRICAN AMERICAN: >60 ML/MIN
GFR NON-AFRICAN AMERICAN: >60 ML/MIN
GFR NON-AFRICAN AMERICAN: >60 ML/MIN
GFR SERPL CREATININE-BSD FRML MDRD: ABNORMAL ML/MIN/{1.73_M2}
GLOBULIN: ABNORMAL G/DL (ref 1.5–3.8)
GLUCOSE BLD-MCNC: 100 MG/DL (ref 70–99)
GLUCOSE BLD-MCNC: 102 MG/DL (ref 70–99)
HCG QUALITATIVE: NEGATIVE
HCT VFR BLD CALC: 22.8 % (ref 36.3–47.1)
HEMOGLOBIN: 7.1 G/DL (ref 11.9–15.1)
IMMATURE GRANULOCYTES: 1 %
LIPASE: 23 U/L (ref 13–60)
LYMPHOCYTES # BLD: 6 % (ref 24–43)
MCH RBC QN AUTO: 26.8 PG (ref 25.2–33.5)
MCHC RBC AUTO-ENTMCNC: 30.7 G/DL (ref 28.4–34.8)
MCV RBC AUTO: 87.4 FL (ref 82.6–102.9)
MONOCYTES # BLD: 6 % (ref 3–12)
PDW BLD-RTO: 17.2 % (ref 11.8–14.4)
PLATELET # BLD: 978 K/UL (ref 138–453)
PLATELET ESTIMATE: ABNORMAL
PMV BLD AUTO: 9.4 FL (ref 8.1–13.5)
POTASSIUM SERPL-SCNC: 3.9 MMOL/L (ref 3.7–5.3)
POTASSIUM SERPL-SCNC: 5.2 MMOL/L (ref 3.7–5.3)
RBC # BLD: 2.61 M/UL (ref 3.95–5.11)
RBC # BLD: ABNORMAL 10*6/UL
SEG NEUTROPHILS: 86 % (ref 36–65)
SEGMENTED NEUTROPHILS ABSOLUTE COUNT: 17.47 K/UL (ref 1.5–8.1)
SODIUM BLD-SCNC: 124 MMOL/L (ref 135–144)
SODIUM BLD-SCNC: 131 MMOL/L (ref 135–144)
TOTAL PROTEIN: 9.2 G/DL (ref 6.4–8.3)
WBC # BLD: 20.3 K/UL (ref 3.5–11.3)
WBC # BLD: ABNORMAL 10*3/UL

## 2017-12-14 PROCEDURE — 36415 COLL VENOUS BLD VENIPUNCTURE: CPT

## 2017-12-14 PROCEDURE — 99285 EMERGENCY DEPT VISIT HI MDM: CPT

## 2017-12-14 PROCEDURE — 94640 AIRWAY INHALATION TREATMENT: CPT

## 2017-12-14 PROCEDURE — P9016 RBC LEUKOCYTES REDUCED: HCPCS

## 2017-12-14 PROCEDURE — 2580000003 HC RX 258: Performed by: INTERNAL MEDICINE

## 2017-12-14 PROCEDURE — 83690 ASSAY OF LIPASE: CPT

## 2017-12-14 PROCEDURE — 6370000000 HC RX 637 (ALT 250 FOR IP): Performed by: INTERNAL MEDICINE

## 2017-12-14 PROCEDURE — 80048 BASIC METABOLIC PNL TOTAL CA: CPT

## 2017-12-14 PROCEDURE — 99254 IP/OBS CNSLTJ NEW/EST MOD 60: CPT | Performed by: INTERNAL MEDICINE

## 2017-12-14 PROCEDURE — 80076 HEPATIC FUNCTION PANEL: CPT

## 2017-12-14 PROCEDURE — 2060000000 HC ICU INTERMEDIATE R&B

## 2017-12-14 PROCEDURE — 85025 COMPLETE CBC W/AUTO DIFF WBC: CPT

## 2017-12-14 PROCEDURE — 84703 CHORIONIC GONADOTROPIN ASSAY: CPT

## 2017-12-14 PROCEDURE — 2580000003 HC RX 258: Performed by: EMERGENCY MEDICINE

## 2017-12-14 PROCEDURE — 86850 RBC ANTIBODY SCREEN: CPT

## 2017-12-14 PROCEDURE — 96375 TX/PRO/DX INJ NEW DRUG ADDON: CPT

## 2017-12-14 PROCEDURE — 93005 ELECTROCARDIOGRAM TRACING: CPT

## 2017-12-14 PROCEDURE — 6360000002 HC RX W HCPCS: Performed by: EMERGENCY MEDICINE

## 2017-12-14 PROCEDURE — 96376 TX/PRO/DX INJ SAME DRUG ADON: CPT

## 2017-12-14 PROCEDURE — 99223 1ST HOSP IP/OBS HIGH 75: CPT | Performed by: INTERNAL MEDICINE

## 2017-12-14 PROCEDURE — 71010 XR CHEST PORTABLE: CPT

## 2017-12-14 PROCEDURE — G0378 HOSPITAL OBSERVATION PER HR: HCPCS

## 2017-12-14 PROCEDURE — 36430 TRANSFUSION BLD/BLD COMPNT: CPT

## 2017-12-14 PROCEDURE — 86900 BLOOD TYPING SEROLOGIC ABO: CPT

## 2017-12-14 PROCEDURE — 86920 COMPATIBILITY TEST SPIN: CPT

## 2017-12-14 PROCEDURE — 96374 THER/PROPH/DIAG INJ IV PUSH: CPT

## 2017-12-14 PROCEDURE — 6360000002 HC RX W HCPCS: Performed by: INTERNAL MEDICINE

## 2017-12-14 PROCEDURE — 86901 BLOOD TYPING SEROLOGIC RH(D): CPT

## 2017-12-14 RX ORDER — SODIUM CHLORIDE 9 MG/ML
INJECTION, SOLUTION INTRAVENOUS CONTINUOUS
Status: DISCONTINUED | OUTPATIENT
Start: 2017-12-14 | End: 2017-12-16 | Stop reason: HOSPADM

## 2017-12-14 RX ORDER — POTASSIUM CHLORIDE 20 MEQ/1
40 TABLET, EXTENDED RELEASE ORAL PRN
Status: DISCONTINUED | OUTPATIENT
Start: 2017-12-14 | End: 2017-12-16 | Stop reason: HOSPADM

## 2017-12-14 RX ORDER — CITALOPRAM 20 MG/1
40 TABLET ORAL DAILY
Status: DISCONTINUED | OUTPATIENT
Start: 2017-12-14 | End: 2017-12-16 | Stop reason: HOSPADM

## 2017-12-14 RX ORDER — PANTOPRAZOLE SODIUM 40 MG/1
40 TABLET, DELAYED RELEASE ORAL DAILY
Status: DISCONTINUED | OUTPATIENT
Start: 2017-12-14 | End: 2017-12-16 | Stop reason: HOSPADM

## 2017-12-14 RX ORDER — SODIUM CHLORIDE 0.9 % (FLUSH) 0.9 %
10 SYRINGE (ML) INJECTION EVERY 12 HOURS SCHEDULED
Status: DISCONTINUED | OUTPATIENT
Start: 2017-12-14 | End: 2017-12-16 | Stop reason: HOSPADM

## 2017-12-14 RX ORDER — SODIUM CHLORIDE 0.9 % (FLUSH) 0.9 %
10 SYRINGE (ML) INJECTION PRN
Status: DISCONTINUED | OUTPATIENT
Start: 2017-12-14 | End: 2017-12-16 | Stop reason: HOSPADM

## 2017-12-14 RX ORDER — ONDANSETRON 2 MG/ML
4 INJECTION INTRAMUSCULAR; INTRAVENOUS EVERY 6 HOURS PRN
Status: DISCONTINUED | OUTPATIENT
Start: 2017-12-14 | End: 2017-12-16 | Stop reason: HOSPADM

## 2017-12-14 RX ORDER — ASPIRIN 81 MG/1
81 TABLET, CHEWABLE ORAL DAILY
Status: DISCONTINUED | OUTPATIENT
Start: 2017-12-14 | End: 2017-12-16 | Stop reason: HOSPADM

## 2017-12-14 RX ORDER — 0.9 % SODIUM CHLORIDE 0.9 %
250 INTRAVENOUS SOLUTION INTRAVENOUS ONCE
Status: DISCONTINUED | OUTPATIENT
Start: 2017-12-14 | End: 2017-12-14

## 2017-12-14 RX ORDER — 0.9 % SODIUM CHLORIDE 0.9 %
1000 INTRAVENOUS SOLUTION INTRAVENOUS ONCE
Status: COMPLETED | OUTPATIENT
Start: 2017-12-14 | End: 2017-12-14

## 2017-12-14 RX ORDER — ONDANSETRON 2 MG/ML
4 INJECTION INTRAMUSCULAR; INTRAVENOUS ONCE
Status: COMPLETED | OUTPATIENT
Start: 2017-12-14 | End: 2017-12-14

## 2017-12-14 RX ORDER — 0.9 % SODIUM CHLORIDE 0.9 %
500 INTRAVENOUS SOLUTION INTRAVENOUS ONCE
Status: DISCONTINUED | OUTPATIENT
Start: 2017-12-14 | End: 2017-12-14

## 2017-12-14 RX ORDER — BISACODYL 10 MG
10 SUPPOSITORY, RECTAL RECTAL DAILY PRN
Status: DISCONTINUED | OUTPATIENT
Start: 2017-12-14 | End: 2017-12-16 | Stop reason: HOSPADM

## 2017-12-14 RX ORDER — POTASSIUM CHLORIDE 7.45 MG/ML
10 INJECTION INTRAVENOUS PRN
Status: DISCONTINUED | OUTPATIENT
Start: 2017-12-14 | End: 2017-12-16 | Stop reason: HOSPADM

## 2017-12-14 RX ORDER — NICOTINE 21 MG/24HR
1 PATCH, TRANSDERMAL 24 HOURS TRANSDERMAL DAILY PRN
Status: DISCONTINUED | OUTPATIENT
Start: 2017-12-14 | End: 2017-12-16 | Stop reason: HOSPADM

## 2017-12-14 RX ORDER — FOLIC ACID 1 MG/1
1 TABLET ORAL DAILY
Status: DISCONTINUED | OUTPATIENT
Start: 2017-12-14 | End: 2017-12-16 | Stop reason: HOSPADM

## 2017-12-14 RX ORDER — MAGNESIUM SULFATE 1 G/100ML
1 INJECTION INTRAVENOUS PRN
Status: DISCONTINUED | OUTPATIENT
Start: 2017-12-14 | End: 2017-12-16 | Stop reason: HOSPADM

## 2017-12-14 RX ORDER — FLUTICASONE PROPIONATE 50 MCG
1 SPRAY, SUSPENSION (ML) NASAL DAILY
Status: DISCONTINUED | OUTPATIENT
Start: 2017-12-14 | End: 2017-12-16 | Stop reason: HOSPADM

## 2017-12-14 RX ORDER — POTASSIUM CHLORIDE 20MEQ/15ML
40 LIQUID (ML) ORAL PRN
Status: DISCONTINUED | OUTPATIENT
Start: 2017-12-14 | End: 2017-12-16 | Stop reason: HOSPADM

## 2017-12-14 RX ORDER — OXYCODONE HYDROCHLORIDE AND ACETAMINOPHEN 5; 325 MG/1; MG/1
1 TABLET ORAL EVERY 6 HOURS PRN
Status: DISCONTINUED | OUTPATIENT
Start: 2017-12-14 | End: 2017-12-16 | Stop reason: HOSPADM

## 2017-12-14 RX ORDER — MORPHINE SULFATE 4 MG/ML
4 INJECTION, SOLUTION INTRAMUSCULAR; INTRAVENOUS
Status: DISCONTINUED | OUTPATIENT
Start: 2017-12-14 | End: 2017-12-16 | Stop reason: HOSPADM

## 2017-12-14 RX ORDER — MORPHINE SULFATE 2 MG/ML
2 INJECTION, SOLUTION INTRAMUSCULAR; INTRAVENOUS
Status: DISCONTINUED | OUTPATIENT
Start: 2017-12-14 | End: 2017-12-16 | Stop reason: HOSPADM

## 2017-12-14 RX ADMIN — FLUTICASONE PROPIONATE 1 SPRAY: 50 SPRAY, METERED NASAL at 17:11

## 2017-12-14 RX ADMIN — SODIUM CHLORIDE 1000 ML: 9 INJECTION, SOLUTION INTRAVENOUS at 13:59

## 2017-12-14 RX ADMIN — MORPHINE SULFATE 2 MG: 2 INJECTION, SOLUTION INTRAMUSCULAR; INTRAVENOUS at 20:24

## 2017-12-14 RX ADMIN — Medication 1 TABLET: at 16:31

## 2017-12-14 RX ADMIN — ONDANSETRON 4 MG: 2 INJECTION INTRAMUSCULAR; INTRAVENOUS at 13:59

## 2017-12-14 RX ADMIN — TIOTROPIUM BROMIDE 18 MCG: 18 CAPSULE ORAL; RESPIRATORY (INHALATION) at 17:27

## 2017-12-14 RX ADMIN — MORPHINE SULFATE 2 MG: 2 INJECTION, SOLUTION INTRAMUSCULAR; INTRAVENOUS at 18:28

## 2017-12-14 RX ADMIN — FOLIC ACID 1 MG: 1 TABLET ORAL at 16:31

## 2017-12-14 RX ADMIN — OLODATEROL RESPIMAT INHALATION SPRAY 2 PUFF: 2.5 SPRAY, METERED RESPIRATORY (INHALATION) at 17:27

## 2017-12-14 RX ADMIN — PANTOPRAZOLE SODIUM 40 MG: 40 TABLET, DELAYED RELEASE ORAL at 16:31

## 2017-12-14 RX ADMIN — ASPIRIN 81 MG: 81 TABLET, CHEWABLE ORAL at 16:31

## 2017-12-14 RX ADMIN — SODIUM CHLORIDE: 9 INJECTION, SOLUTION INTRAVENOUS at 17:04

## 2017-12-14 RX ADMIN — CITALOPRAM 40 MG: 20 TABLET, FILM COATED ORAL at 16:31

## 2017-12-14 ASSESSMENT — ENCOUNTER SYMPTOMS
NAUSEA: 1
CONSTIPATION: 0
ABDOMINAL PAIN: 0
VOMITING: 1
RHINORRHEA: 0
SHORTNESS OF BREATH: 0
DIARRHEA: 0
COUGH: 0
EYE PAIN: 0

## 2017-12-14 ASSESSMENT — PAIN SCALES - GENERAL
PAINLEVEL_OUTOF10: 6
PAINLEVEL_OUTOF10: 6
PAINLEVEL_OUTOF10: 0

## 2017-12-14 NOTE — ED PROVIDER NOTES
[facility]     Emergency Department     Faculty Attestation    I performed a history and physical examination of the patient and discussed management with the resident. I have reviewed and agree with the residents findings including all diagnostic interpretations, and treatment plans as written. Any areas of disagreement are noted on the chart. I was personally present for the key portions of any procedures. I have documented in the chart those procedures where I was not present during the key portions. I have reviewed the emergency nurses triage note. I agree with the chief complaint, past medical history, past surgical history, allergies, medications, social and family history as documented unless otherwise noted below. Documentation of the HPI, Physical Exam and Medical Decision Making performed by avery is based on my personal performance of the HPI, PE and MDM. For Physician Assistant/ Nurse Practitioner cases/documentation I have personally evaluated this patient and have completed at least one if not all key elements of the E/M (history, physical exam, and MDM). Additional findings are as noted. Primary Care Physician: Lisbeth Bowen MD    History: This is a 50 y.o. female who presents to the Emergency Department with complaint of generalized weakness and fatigue, ongoing and worsening. Patient has a history of chronic anemia, with recent blood transfusion. She is followed by hematology oncology, but no identifiable source of bleeding, but also has a history of thrombocytopenia. Patient comes in just feeling more fatigued than usual.  Denies any abdominal pain, no chest pain or shortness of breath, no lightheadedness or dizziness, no syncopal episodes. No focalizing weakness but general.  She denies any dysuria or hematuria. Physical:   height is 4' 9\" (1.448 m) and weight is 112 lb (50.8 kg). Her oral temperature is 97.3 °F (36.3 °C). Her blood pressure is 111/73 and her pulse is 110. Her respiration is 15 and oxygen saturation is 93%. Patient is a cachectic, very pale-appearing female, 64-year-old. Appears much older than her stated age  Pallor noted to the bilateral conjunctiva  Cardiovascular: Tachycardic, but regular rhythm, no murmurs or gallops or rubs  Respiratory: Lungs are clear to auscultation bilaterally without any rales, wheezes, rhonchi  Abdomen: Abdomen is cachectic, but nontender to palpation, nondistended without rebound and no guarding noted  No swelling noted in the lower extremity as bilaterally. Impression: fatigue, symptomatic anemia    Plan: cbc, bmp, UA, cardiac monitor, ekg,   Heme/onc consult, PRBC transfusion        EKG Interpretation    Interpreted by me    Patient's EKG shows sinus tachycardia with a ventricular rate of 109, normal axis. MT intervals 1:30 QRS of 70, QTC of 447, there are no ST elevations or depressions noted, no T-wave changes noted, there is a Q wave noted in lead 3. Patient EKG compared to November 10, 2017, that shows a normal sinus rhythm at that time, with left axis deviation, and LVH. MT intervals 114, QRS is 78, QTC of 479. There are no ST elevations noted, there is a Q wave noted in lead 3, and a nonspecific T-wave change in V2.       Milo Terry D.O, M.P.H  Attending Emergency Medicine Physician         Milo Terry,   12/14/17 [id number]

## 2017-12-14 NOTE — ED PROVIDER NOTES
[facility] 4B STEPDOWN  Emergency Department Encounter  Emergency Medicine Resident     Pt Name: Manuela Law  MRN: [id number]  [place] 1970  Date of evaluation: 12/14/17  PCP:  Sarah Valle MD    CHIEF COMPLAINT       Chief Complaint   Patient presents with    Fatigue       HISTORY OF PRESENT ILLNESS  (Location/Symptom, Timing/Onset, Context/Setting, Quality, Duration, Modifying Factors, Severity.)      Edita Law is a 50 y.o. female who presents with weakness, nausea, vomiting. Patient has history of chronic anemia and according to patient's friend patient has been appearing more weak recently. Patient admits to nausea and episode of nonbloody emesis yesterday. Patient has had blood transfusions and last was on a previous admission in early December. Patient states heme/onc not sure where the anemia is coming from. Patient denies any blood in her bowel movements. Patient denies any blood in her vomit. States she was referred to hematology at [facility] for further testing. Patient states her follow-up as an February. Patient has decreased appetite but has been drinking fluids. Patient denies any fevers, belly pain, chest pain, or shortness of breath. Patient does describe pins and needle sensation in her fingers but denies any actual numbness. Patient denies any focal weakness. PAST MEDICAL / SURGICAL / SOCIAL / FAMILY HISTORY      has a past medical history of Allergic rhinitis; Anxiety; Asthma; Blood circulation, collateral; Celiac disease; Chronic back pain; Chronic kidney disease; Cirrhosis of liver with ascites ([address] Constipation; Cough; Cryptogenic organizing pneumonia ([address] Depression; Difficult intravenous access; Disease of blood and blood forming organ; Edentulous; Full dentures; Gastric outlet obstruction; GERD (gastroesophageal reflux disease); Hearing loss; History of blood transfusion; Hydronephrosis, bilateral; Hypertension; Hypoglycemia;  Lung nodule; Migraine; OCD Taking? Authorizing Provider   oxyCODONE-acetaminophen (PERCOCET) 5-325 MG per tablet Take 1 tablet by mouth every 6 hours as needed for Pain .  12/11/17 1/10/18  Candy Child MD   sodium chloride (ALTAMIST SPRAY) 0.65 % nasal spray 1 spray by Nasal route as needed for Congestion 10/23/17   Saloni Ocasio MD   Calcium Carbonate-Vitamin D (OYSTER SHELL CALCIUM/D) 500-200 MG-UNIT TABS Take 1 tablet by mouth daily Please discontinue previous prescription of calcium 10/23/17 10/23/18  Saloni Ocasio MD   loratadine (CLARITIN) 10 MG tablet Take 1 tablet by mouth daily 10/23/17   Saloni Ocasio MD   citalopram (CELEXA) 40 MG tablet Take 1 tablet by mouth daily 10/23/17   Saloni Ocasio MD   senna-docusate (SENEXON-S) 8.6-50 MG per tablet Take 1 tablet by mouth daily 10/23/17   Saloni Ocasio MD   aspirin (ASPIRIN LOW DOSE) 81 MG chewable tablet Take 1 tablet by mouth daily 10/23/17   Saloni Ocasio MD   pantoprazole (PROTONIX) 40 MG tablet Take 1 tablet by mouth daily 10/23/17   Saloni Ocasio MD   folic acid (FOLVITE) 1 MG tablet Take 1 tablet by mouth daily 10/23/17   Saloni Ocasio MD   umeclidinium-vilanterol (ANORO ELLIPTA) 62.5-25 MCG/INH AEPB inhaler Inhale 1 puff into the lungs daily 10/20/17   Kandace Amaral MD   VENTOLIN  (90 Base) MCG/ACT inhaler INHALE 2 PUFFS INTO THE LUNGS EVERY 6 HOURS AS NEEDED FOR WHEEZING 9/22/17   Kandace Amaral MD   docusate sodium (DOCQLACE) 100 MG capsule TAKE 1 CAPSULE BY MOUTH 2 TIMES DAILY AS NEEDED FOR CONSTIPATION 8/15/17   Natalie Morgan MD   fluticasone (FLONASE) 50 MCG/ACT nasal spray 1 spray by Nasal route daily 8/15/17   Natalie Morgan MD   ondansetron (ZOFRAN) 4 MG tablet Take 1 tablet by mouth every 8 hours as needed for Nausea 5/9/17   Cal Ariza MD   albuterol (PROVENTIL) (5 MG/ML) 0.5% nebulizer solution Take 0.5 mLs by nebulization every 6 hours as needed for Wheezing 1/23/17   Maggie Bagley MD       REVIEW OF SYSTEMS    (2-9 aspirin chewable tablet 81 mg    calcium carbonate-vitamin D (CALTRATE) 600-400 MG-UNIT per tab 1 tablet    citalopram (CELEXA) tablet 40 mg    fluticasone (FLONASE) 50 MCG/ACT nasal spray 1 spray    folic acid (FOLVITE) tablet 1 mg    oxyCODONE-acetaminophen (PERCOCET) 5-325 MG per tablet 1 tablet    pantoprazole (PROTONIX) tablet 40 mg    DISCONTD: umeclidinium-vilanterol (ANORO ELLIPTA) 62.5-25 MCG/INH inhaler 1 puff    sodium chloride flush 0.9 % injection 10 mL    sodium chloride flush 0.9 % injection 10 mL    OR Linked Order Group     potassium chloride (KLOR-CON M) extended release tablet 40 mEq     potassium chloride 20 MEQ/15ML (10%) oral solution 40 mEq     potassium chloride 10 mEq/100 mL IVPB (Peripheral Line)    magnesium sulfate 1 g in dextrose 5% 100 mL IVPB    OR Linked Order Group     morphine injection 2 mg     morphine (PF) injection 4 mg    magnesium hydroxide (MILK OF MAGNESIA) 400 MG/5ML suspension 30 mL    bisacodyl (DULCOLAX) suppository 10 mg    ondansetron (ZOFRAN) injection 4 mg    nicotine (NICODERM CQ) 21 MG/24HR 1 patch     If indicated/pateint smokes    AND Linked Order Group     tiotropium (SPIRIVA) inhalation capsule 18 mcg     olodaterol (STRIVERDI RESPIMAT) inhaler 2 puff    0.9 % sodium chloride infusion       DDX: Anemia, electrolyte abnormality, dehydration, poor nutrition, cardiac arrhythmia, ACS    DIAGNOSTIC RESULTS / EMERGENCY DEPARTMENT COURSE / MDM     LABS:  Results for orders placed or performed during the hospital encounter of 12/14/17   CBC WITH AUTO DIFFERENTIAL   Result Value Ref Range    WBC 20.3 (H) 3.5 - 11.3 k/uL    RBC 2.61 (L) 3.95 - 5.11 m/uL    Hemoglobin 7.1 (L) 11.9 - 15.1 g/dL    Hematocrit 22.8 (L) 36.3 - 47.1 %    MCV 87.4 82.6 - 102.9 fL    MCH 26.8 25.2 - 33.5 pg    MCHC 30.7 28.4 - 34.8 g/dL    RDW 17.2 (H) 11.8 - 14.4 %    Platelets 905 (H) 724 - 453 k/uL    MPV 9.4 8.1 - 13.5 fL    Differential Type NOT REPORTED     WBC

## 2017-12-14 NOTE — CONSULTS
mLs by nebulization every 6 hours as needed for Wheezing 1/23/17   Altagracia Hedrick MD     Current Facility-Administered Medications   Medication Dose Route Frequency Provider Last Rate Last Dose    sodium chloride flush 0.9 % injection 10 mL  10 mL Intravenous 2 times per day Didi Rasmussen MD        sodium chloride flush 0.9 % injection 10 mL  10 mL Intravenous PRN Didi Rasmussen MD        albuterol (PROVENTIL) nebulizer solution 2.5 mg  2.5 mg Nebulization Q6H PRN Didi Rasmussen MD        aspirin chewable tablet 81 mg  81 mg Oral Daily Didi Rasmussen MD   81 mg at 12/14/17 1631    calcium carbonate-vitamin D (CALTRATE) 600-400 MG-UNIT per tab 1 tablet  1 tablet Oral Daily Didi Rasmussen MD   1 tablet at 12/14/17 1631    citalopram (CELEXA) tablet 40 mg  40 mg Oral Daily Didi Rasmussen MD   40 mg at 12/14/17 1631    fluticasone (FLONASE) 50 MCG/ACT nasal spray 1 spray  1 spray Nasal Daily Didi Rasmussen MD        folic acid (FOLVITE) tablet 1 mg  1 mg Oral Daily Didi Rasmussen MD   1 mg at 12/14/17 1631    oxyCODONE-acetaminophen (PERCOCET) 5-325 MG per tablet 1 tablet  1 tablet Oral Q6H PRN Didi Rasmussen MD        pantoprazole (PROTONIX) tablet 40 mg  40 mg Oral Daily Didi Rasmussen MD   40 mg at 12/14/17 1631    sodium chloride flush 0.9 % injection 10 mL  10 mL Intravenous 2 times per day Didi Rasmussen MD        sodium chloride flush 0.9 % injection 10 mL  10 mL Intravenous PRN Didi Rasmussen MD        potassium chloride (KLOR-CON M) extended release tablet 40 mEq  40 mEq Oral PRN Didi Rasmussen MD        Or    potassium chloride 20 MEQ/15ML (10%) oral solution 40 mEq  40 mEq Oral PRN Didi Rasmussen MD        Or    potassium chloride 10 mEq/100 mL IVPB (Peripheral Line)  10 mEq Intravenous PRN Didi Rasmussen MD        magnesium sulfate 1 g in dextrose 5% 100 mL IVPB  1 g Intravenous PRN Didi Rasmussen MD        morphine injection 2 mg  2 mg Intravenous Q2H PRN Didi Rasmussen MD

## 2017-12-15 PROBLEM — G62.9 PERIPHERAL NEUROPATHY DUE TO INFLAMMATION: Status: ACTIVE | Noted: 2017-12-15

## 2017-12-15 PROBLEM — M79.2 PERIPHERAL NEUROPATHY DUE TO INFLAMMATION: Status: ACTIVE | Noted: 2017-12-15

## 2017-12-15 PROBLEM — E86.0 DEHYDRATION WITH HYPONATREMIA: Status: ACTIVE | Noted: 2017-08-17

## 2017-12-15 PROBLEM — E87.1 HYPONATREMIA: Status: RESOLVED | Noted: 2017-08-17 | Resolved: 2017-12-15

## 2017-12-15 LAB
ABO/RH: NORMAL
ALBUMIN SERPL-MCNC: 2.2 G/DL (ref 3.5–5.2)
ALBUMIN/GLOBULIN RATIO: 0.4 (ref 1–2.5)
ALP BLD-CCNC: 600 U/L (ref 35–104)
ALT SERPL-CCNC: 6 U/L (ref 5–33)
ANION GAP SERPL CALCULATED.3IONS-SCNC: 17 MMOL/L (ref 9–17)
ANTIBODY SCREEN: NEGATIVE
ARM BAND NUMBER: NORMAL
AST SERPL-CCNC: 11 U/L
BILIRUB SERPL-MCNC: 0.94 MG/DL (ref 0.3–1.2)
BLD PROD TYP BPU: NORMAL
BUN BLDV-MCNC: 17 MG/DL (ref 6–20)
BUN/CREAT BLD: ABNORMAL (ref 9–20)
CALCIUM SERPL-MCNC: 9.4 MG/DL (ref 8.6–10.4)
CHLORIDE BLD-SCNC: 94 MMOL/L (ref 98–107)
CO2: 20 MMOL/L (ref 20–31)
CREAT SERPL-MCNC: 0.52 MG/DL (ref 0.5–0.9)
CROSSMATCH RESULT: NORMAL
DISPENSE STATUS BLOOD BANK: NORMAL
EXPIRATION DATE: NORMAL
FOLATE: >20 NG/ML
GFR AFRICAN AMERICAN: >60 ML/MIN
GFR NON-AFRICAN AMERICAN: >60 ML/MIN
GFR SERPL CREATININE-BSD FRML MDRD: ABNORMAL ML/MIN/{1.73_M2}
GFR SERPL CREATININE-BSD FRML MDRD: ABNORMAL ML/MIN/{1.73_M2}
GLUCOSE BLD-MCNC: 101 MG/DL (ref 70–99)
HCT VFR BLD CALC: 24.7 % (ref 36.3–47.1)
HEMOGLOBIN: 7.3 G/DL (ref 11.9–15.1)
MCH RBC QN AUTO: 27.1 PG (ref 25.2–33.5)
MCHC RBC AUTO-ENTMCNC: 29.6 G/DL (ref 28.4–34.8)
MCV RBC AUTO: 91.8 FL (ref 82.6–102.9)
PDW BLD-RTO: 17.5 % (ref 11.8–14.4)
PLATELET # BLD: 884 K/UL (ref 138–453)
PMV BLD AUTO: 9.2 FL (ref 8.1–13.5)
POTASSIUM SERPL-SCNC: 4.1 MMOL/L (ref 3.7–5.3)
RBC # BLD: 2.69 M/UL (ref 3.95–5.11)
SODIUM BLD-SCNC: 131 MMOL/L (ref 135–144)
TOTAL PROTEIN: 8.3 G/DL (ref 6.4–8.3)
TRANSFUSION STATUS: NORMAL
UNIT DIVISION: 0
UNIT NUMBER: NORMAL
VITAMIN B-12: 398 PG/ML (ref 232–1245)
WBC # BLD: 18.1 K/UL (ref 3.5–11.3)

## 2017-12-15 PROCEDURE — 6360000002 HC RX W HCPCS: Performed by: INTERNAL MEDICINE

## 2017-12-15 PROCEDURE — G0378 HOSPITAL OBSERVATION PER HR: HCPCS

## 2017-12-15 PROCEDURE — 82746 ASSAY OF FOLIC ACID SERUM: CPT

## 2017-12-15 PROCEDURE — 6370000000 HC RX 637 (ALT 250 FOR IP): Performed by: INTERNAL MEDICINE

## 2017-12-15 PROCEDURE — 99232 SBSQ HOSP IP/OBS MODERATE 35: CPT | Performed by: INTERNAL MEDICINE

## 2017-12-15 PROCEDURE — 96376 TX/PRO/DX INJ SAME DRUG ADON: CPT

## 2017-12-15 PROCEDURE — 2060000000 HC ICU INTERMEDIATE R&B

## 2017-12-15 PROCEDURE — 85027 COMPLETE CBC AUTOMATED: CPT

## 2017-12-15 PROCEDURE — 2580000003 HC RX 258: Performed by: INTERNAL MEDICINE

## 2017-12-15 PROCEDURE — 94640 AIRWAY INHALATION TREATMENT: CPT

## 2017-12-15 PROCEDURE — 80053 COMPREHEN METABOLIC PANEL: CPT

## 2017-12-15 PROCEDURE — 36415 COLL VENOUS BLD VENIPUNCTURE: CPT

## 2017-12-15 PROCEDURE — 82607 VITAMIN B-12: CPT

## 2017-12-15 RX ADMIN — Medication 1 TABLET: at 08:10

## 2017-12-15 RX ADMIN — TIOTROPIUM BROMIDE 18 MCG: 18 CAPSULE ORAL; RESPIRATORY (INHALATION) at 08:00

## 2017-12-15 RX ADMIN — MORPHINE SULFATE 2 MG: 2 INJECTION, SOLUTION INTRAMUSCULAR; INTRAVENOUS at 03:03

## 2017-12-15 RX ADMIN — OLODATEROL RESPIMAT INHALATION SPRAY 2 PUFF: 2.5 SPRAY, METERED RESPIRATORY (INHALATION) at 08:01

## 2017-12-15 RX ADMIN — MORPHINE SULFATE 2 MG: 2 INJECTION, SOLUTION INTRAMUSCULAR; INTRAVENOUS at 08:19

## 2017-12-15 RX ADMIN — FOLIC ACID 1 MG: 1 TABLET ORAL at 08:10

## 2017-12-15 RX ADMIN — SODIUM CHLORIDE: 9 INJECTION, SOLUTION INTRAVENOUS at 05:20

## 2017-12-15 RX ADMIN — CITALOPRAM 40 MG: 20 TABLET, FILM COATED ORAL at 08:10

## 2017-12-15 RX ADMIN — PANTOPRAZOLE SODIUM 40 MG: 40 TABLET, DELAYED RELEASE ORAL at 08:10

## 2017-12-15 RX ADMIN — MORPHINE SULFATE 2 MG: 2 INJECTION, SOLUTION INTRAMUSCULAR; INTRAVENOUS at 13:44

## 2017-12-15 RX ADMIN — FLUTICASONE PROPIONATE 1 SPRAY: 50 SPRAY, METERED NASAL at 08:10

## 2017-12-15 RX ADMIN — Medication 10 ML: at 21:54

## 2017-12-15 RX ADMIN — MORPHINE SULFATE 2 MG: 2 INJECTION, SOLUTION INTRAMUSCULAR; INTRAVENOUS at 06:19

## 2017-12-15 RX ADMIN — ASPIRIN 81 MG: 81 TABLET, CHEWABLE ORAL at 08:10

## 2017-12-15 RX ADMIN — OXYCODONE HYDROCHLORIDE AND ACETAMINOPHEN 1 TABLET: 5; 325 TABLET ORAL at 16:49

## 2017-12-15 ASSESSMENT — PAIN SCALES - GENERAL
PAINLEVEL_OUTOF10: 4
PAINLEVEL_OUTOF10: 7
PAINLEVEL_OUTOF10: 0
PAINLEVEL_OUTOF10: 3
PAINLEVEL_OUTOF10: 6
PAINLEVEL_OUTOF10: 8
PAINLEVEL_OUTOF10: 6
PAINLEVEL_OUTOF10: 4
PAINLEVEL_OUTOF10: 6
PAINLEVEL_OUTOF10: 5

## 2017-12-15 ASSESSMENT — PAIN DESCRIPTION - PROGRESSION: CLINICAL_PROGRESSION: GRADUALLY WORSENING

## 2017-12-15 NOTE — FLOWSHEET NOTE
12/15/17 1700   Encounter Summary   Services provided to: Patient   Referral/Consult From: [facility] Family members   Continue Visiting (12/15/2017)   Complexity of Encounter Low   Length of Encounter 15 minutes   Routine   Type Initial   Assessment Calm; Hopeful  (Distant)   Intervention Active listening;Explored feelings, thoughts, concerns;Prayer;Sustaining presence/ Ministry of presence   Outcome Acceptance; Hopeful;Expressed gratitude     This patient was visited in her room, and she seemed to be very ill. This patient was weak and seemed distant because of it. This patient shared the fact that her family has visited with her. This patient was open to prayer, and she is listed as [employer]. The chaplains are available for any follow-up if the patient requests it.

## 2017-12-15 NOTE — H&P
bilateral 5/2/2015    Hypertension     Hypoglycemia 2014    Lung nodule     right, benign    Migraine     OCD (obsessive compulsive disorder)     Osteoarthritis     Osteoporosis     Pelvic mass April 2015    benign     Perforated nasal septum     Pneumonia     Psoriasis     PTSD (post-traumatic stress disorder) 1985    [place]ISON    Scoliosis     Shortness of breath 11/23/2016    with temp.  change    Substance abuse 1987 [place]    Thrombocytosis ([address]     Urinary incontinence     PT STATES RESOLVED    Wears glasses         Past Surgical History:     Past Surgical History:   Procedure Laterality Date    ABDOMEN SURGERY  09/2015    MASS REMOVED AND HERNIA REPAIR    CHOLECYSTECTOMY      CYSTOSCOPY  12/1/2015    bilat retrograde, right ureteroscopy    CYSTOSCOPY  05/11/2017    CYSTOSCOPY Right 5/11/2017    CYSTOSCOPY, RIGHT URETEROSCOPY, RIGHT RETROGRADE PYELOGRAM, RIGHT STENT PLACEMENT performed by Magdaleno Morrison MD at [address] [facility]  5/16/2013    FIXATION KYPHOPLASTY  08/09/2016    T12, L-2, L-5    GASTRECTOMY  2012    Partial Gastrectomy    HERNIA REPAIR  07/18/2017    hernia repair with mash    LUNG BIOPSY      right upper lobe    PARACENTESIS Right 01/2015-09/2015    X 8    NM CREATE EARDRUM OPENING,GEN ANESTH N/A 4/14/2017    LEFT MYRINGOTOMY T-TUBE INSERTION performed by Rik Saul MD at [address] [facility] ESOPHAGOGASTRODUODENOSCOPY TRANSORAL DIAGNOSTIC N/A 8/30/2017    EGD ESOPHAGOGASTRODUODENOSCOPY performed by Christina Chairez MD at [facility] N/A 4/14/2017    NASAL SEPTAL BUTTON INSERTION performed by Rik Saul MD at [address] [place] [address] N/A 7/18/2017    VENTRAL INCISIONAL HERNIA REPAIR X2  WITH MESH performed by Allyssa Valdez DO at [address] [facility]  05/11/2017   

## 2017-12-16 VITALS
RESPIRATION RATE: 14 BRPM | WEIGHT: 110.8 LBS | TEMPERATURE: 98.2 F | SYSTOLIC BLOOD PRESSURE: 117 MMHG | HEART RATE: 87 BPM | BODY MASS INDEX: 23.9 KG/M2 | HEIGHT: 57 IN | DIASTOLIC BLOOD PRESSURE: 68 MMHG | OXYGEN SATURATION: 97 %

## 2017-12-16 PROCEDURE — 6370000000 HC RX 637 (ALT 250 FOR IP): Performed by: INTERNAL MEDICINE

## 2017-12-16 PROCEDURE — G0378 HOSPITAL OBSERVATION PER HR: HCPCS

## 2017-12-16 PROCEDURE — 99239 HOSP IP/OBS DSCHRG MGMT >30: CPT | Performed by: INTERNAL MEDICINE

## 2017-12-16 RX ORDER — GABAPENTIN 300 MG/1
300 CAPSULE ORAL NIGHTLY
Qty: 30 CAPSULE | Refills: 0 | Status: SHIPPED | OUTPATIENT
Start: 2017-12-16 | End: 2018-01-01 | Stop reason: ALTCHOICE

## 2017-12-16 RX ADMIN — ASPIRIN 81 MG: 81 TABLET, CHEWABLE ORAL at 08:13

## 2017-12-16 RX ADMIN — PANTOPRAZOLE SODIUM 40 MG: 40 TABLET, DELAYED RELEASE ORAL at 08:13

## 2017-12-16 RX ADMIN — OXYCODONE HYDROCHLORIDE AND ACETAMINOPHEN 1 TABLET: 5; 325 TABLET ORAL at 08:18

## 2017-12-16 RX ADMIN — CITALOPRAM 40 MG: 20 TABLET, FILM COATED ORAL at 08:13

## 2017-12-16 RX ADMIN — Medication 1 TABLET: at 08:13

## 2017-12-16 RX ADMIN — FOLIC ACID 1 MG: 1 TABLET ORAL at 08:13

## 2017-12-16 RX ADMIN — FLUTICASONE PROPIONATE 1 SPRAY: 50 SPRAY, METERED NASAL at 08:13

## 2017-12-16 ASSESSMENT — PAIN DESCRIPTION - ORIENTATION: ORIENTATION: RIGHT;LEFT

## 2017-12-16 ASSESSMENT — PAIN DESCRIPTION - DESCRIPTORS: DESCRIPTORS: ACHING;DISCOMFORT

## 2017-12-16 ASSESSMENT — PAIN DESCRIPTION - PAIN TYPE: TYPE: CHRONIC PAIN

## 2017-12-16 ASSESSMENT — PAIN DESCRIPTION - PROGRESSION: CLINICAL_PROGRESSION: NOT CHANGED

## 2017-12-16 ASSESSMENT — PAIN DESCRIPTION - FREQUENCY: FREQUENCY: CONTINUOUS

## 2017-12-16 ASSESSMENT — PAIN DESCRIPTION - LOCATION: LOCATION: LEG;HIP

## 2017-12-16 ASSESSMENT — PAIN SCALES - GENERAL: PAINLEVEL_OUTOF10: 7

## 2017-12-16 NOTE — DISCHARGE SUMMARY
[facility]    Discharge Summary     Patient ID: Stephanie Law  :  1970   MRN: [id number]     ACCOUNT:  [de-identified]   Patient's PCP: Matt Najjar, MD  Admit Date: 2017   Discharge Date: 1/3/2018     Length of Stay: 2  Code Status:  Full Code  Admitting Physician: Orlin Shaw MD  Discharge Physician: Gaye Villegas MD     Active Discharge Diagnoses:     Primary Problem  Anemia      Hospital Problems  Active Hospital Problems    Diagnosis Date Noted    Peripheral neuropathy due to inflammation ([facility] [M79.2, G62.9] 12/15/2017     Priority: High    Hyponatremia [E87.1] 2017     Priority: Medium    Thrombocytosis ([facility] [D47.3] 2015     Priority: Medium    LA (lupus anticoagulant) disorder ([facility] [D68.62] 2013     Priority: Low    Osteoporosis [M81.0] 2017    Anemia [D64.9] 2016    Leukocytosis [D72.829] 2015    Protein-calorie malnutrition, severe ([facility] [E43] 2015       Admission Condition: Fair  Discharged Condition: fair  Hospital Stay:     Hospital Course: 49 y/o presents to ED with c/o fatigue, generalized weaknessx 2-3 d progressively getting worse. Denies CP/dizziness/syncope. +decreased appetite x months, increasing ROE xyears progressively getting worse.+tingling over b/l finger tips and toes. Has prior h/o Anemia, thrombocytosis, leukocytsis , lung and ovarian masses (negative for malignancy) of unclear etiology . Has Severe PCM sec to Anemia of chronic Dx. W/u with hematology Dr Clarke Gutiérrez and is scheduled to see [facility] hematology per pt. - At Admission: Hb 7.1,WBC 20.3, Platelets 669.   -Has multiple admissions with symptomatic anemia requiring blood transfusions with unclear etiology of blood loss/leukocytosis/thrombocytosis. - received 1U PRBC which improved her symptoms, Hb 7.1 to 7. 3. Hem Onc recommended no further transfusions.    During admission, she c/o

## 2017-12-16 NOTE — CARE COORDINATION
Discharge [facility] Case Management Department  Written by: Anjana Monsalve RN    Patient Name: Kilo Law  Attending Provider: Loren Garcia MD  Admit Date: 2017 12:10 PM  MRN: [id number]  Account: [de-identified]                     : 1970  Discharge Date:        Disposition: home independently.      Anjana Monsalve RN

## 2017-12-16 NOTE — PROGRESS NOTES
[facility]  Occupational Therapy Not Seen Note    Patient not available for Occupational Therapy due to:    [] Testing:    [] Hemodialysis    [] Blood Transfusion in Progress    [x]Refusal by Patient: Pt declines participation in therapy at this time due to B LE pain and fatigue. Pt provided encouragement and educated in importance of participation in therapy services, pt continues to decline. [] Surgery/Procedure:    [] Strict Bedrest    [] Sedation    [] Spine Precautions     [] Pt being transferred to palliative care at this time. Spoke with pt/family and OT services to be defered. [] Pt independent with functional mobility and functional tasks.  Pt with no OT acute care needs at this time, will defer OT eval.    Next Scheduled Treatment: Will check back 12/16/2017    Signature: ANKUR Donovan/RAFAELA
Physical Therapy  DATE: 2017    NAME: Blanca Law  MRN: [id number]   : 1970    Patient not seen this date for Physical Therapy due to:  [] Blood transfusion in progress  [] Hemodialysis  []  Patient Declined  [] Spine Precautions   [] Strict Bedrest  [] Surgery/ Procedure  [] Testing      [x] Other  Patient being discharged home. [] PT being discontinued at this time. Patient independent. No further needs. [] PT being discontinued at this time as the patient has been transferred to palliative care. No further needs.     Maria Del Rosario Harp, PT
40 mg Oral Daily    fluticasone  1 spray Nasal Daily    folic acid  1 mg Oral Daily    pantoprazole  40 mg Oral Daily    sodium chloride flush  10 mL Intravenous 2 times per day    tiotropium  18 mcg Inhalation Daily    And    olodaterol  2 puff Inhalation Daily     Continuous Infusions:    sodium chloride 100 mL/hr at 12/15/17 0520     PRN Meds: sodium chloride flush, albuterol, oxyCODONE-acetaminophen, sodium chloride flush, potassium chloride **OR** potassium chloride **OR** potassium chloride, magnesium sulfate, morphine **OR** morphine, magnesium hydroxide, bisacodyl, ondansetron, nicotine    Data:     Past Medical History:   has a past medical history of Allergic rhinitis; Anxiety; Asthma; Blood circulation, collateral; Celiac disease; Chronic back pain; Chronic kidney disease; Cirrhosis of liver with ascites ([address] Constipation; Cough; Cryptogenic organizing pneumonia ([address] Depression; Difficult intravenous access; Disease of blood and blood forming organ; Edentulous; Full dentures; Gastric outlet obstruction; GERD (gastroesophageal reflux disease); Hearing loss; History of blood transfusion; Hydronephrosis, bilateral; Hypertension; Hypoglycemia; Lung nodule; Migraine; OCD (obsessive compulsive disorder); Osteoarthritis; Osteoporosis; Pelvic mass; Perforated nasal septum; Peripheral neuropathy due to inflammation ([address] Pneumonia; Psoriasis; PTSD (post-traumatic stress disorder); Scoliosis; Shortness of breath; Substance abuse; Thrombocytosis ([address] Urinary incontinence; and Wears glasses. Social History:   reports that she has been smoking Cigarettes. She has a 7.50 pack-year smoking history. She has never used smokeless tobacco. She reports that she does not drink alcohol or use drugs.      Family History:   Family History   Problem Relation Age of Onset    Heart Disease Mother     Stroke Father      cerebral aneurysm       Vitals:  /71   Pulse 86   Temp 98.1 °F (36.7 °C) (Oral)
studies. 2. Previous Bone Marrow test from September 2017 was not diagnostic. May need to be repeated. 3. Monitor Hb, Transfuse for Hb <7, s/p 1 PRBC  4. Patient has appointment at [facility] in feb 2018  5. Will follow as outpatient with Dr. Sawyer Salinas with cbc and get transfusion in the office. No needfor port at present. 6. Patient's questions were answered to the best of her satisfaction and she verbalized full understanding and agreement. Delaney Martinez MD  PGY-2, Internal medicine resident  [facility], [place], [place]    Attending Physician Statement   I have discussed the care of this patient, including pertinent history and exam findings, with the resident. I have seen and examined the patient and the key elements of all parts of the encounter have been performed by me. I agree with the assessment, plan and orders as documented by the resident and with changes made to the note.     Manolo Knox MD  Hematology/Oncology    Cell: [phone]

## 2017-12-18 ENCOUNTER — TELEPHONE (OUTPATIENT)
Dept: INTERNAL MEDICINE | Age: 47
End: 2017-12-18

## 2017-12-20 ENCOUNTER — HOSPITAL ENCOUNTER (EMERGENCY)
Age: 47
Discharge: HOME | End: 2017-12-21
Attending: EMERGENCY MEDICINE
Payer: MEDICAID

## 2017-12-20 VITALS
HEART RATE: 89 BPM | BODY MASS INDEX: 23.73 KG/M2 | DIASTOLIC BLOOD PRESSURE: 73 MMHG | HEIGHT: 57 IN | OXYGEN SATURATION: 99 % | SYSTOLIC BLOOD PRESSURE: 128 MMHG | RESPIRATION RATE: 21 BRPM | WEIGHT: 110 LBS | TEMPERATURE: 97.7 F

## 2017-12-20 DIAGNOSIS — R11.2 NON-INTRACTABLE VOMITING WITH NAUSEA, UNSPECIFIED VOMITING TYPE: Primary | ICD-10-CM

## 2017-12-20 LAB
ABSOLUTE EOS #: 0 K/UL (ref 0–0.4)
ABSOLUTE IMMATURE GRANULOCYTE: 0.25 K/UL (ref 0–0.3)
ABSOLUTE LYMPH #: 1.74 K/UL (ref 1–4.8)
ABSOLUTE MONO #: 0.5 K/UL (ref 0.1–0.8)
ALBUMIN SERPL-MCNC: 2.2 G/DL (ref 3.5–5.2)
ALBUMIN/GLOBULIN RATIO: 0.3 (ref 1–2.5)
ALP BLD-CCNC: 628 U/L (ref 35–104)
ALT SERPL-CCNC: 7 U/L (ref 5–33)
ANION GAP SERPL CALCULATED.3IONS-SCNC: 15 MMOL/L (ref 9–17)
AST SERPL-CCNC: 24 U/L
BASOPHILS # BLD: 0 % (ref 0–2)
BASOPHILS ABSOLUTE: 0 K/UL (ref 0–0.2)
BILIRUB SERPL-MCNC: 0.76 MG/DL (ref 0.3–1.2)
BUN BLDV-MCNC: 15 MG/DL (ref 6–20)
BUN/CREAT BLD: ABNORMAL (ref 9–20)
CALCIUM SERPL-MCNC: 10.5 MG/DL (ref 8.6–10.4)
CHLORIDE BLD-SCNC: 88 MMOL/L (ref 98–107)
CO2: 25 MMOL/L (ref 20–31)
CREAT SERPL-MCNC: 0.58 MG/DL (ref 0.5–0.9)
DIFFERENTIAL TYPE: ABNORMAL
DIRECT EXAM: NORMAL
EKG ATRIAL RATE: 80 BPM
EKG P AXIS: 35 DEGREES
EKG P-R INTERVAL: 114 MS
EKG Q-T INTERVAL: 378 MS
EKG QRS DURATION: 76 MS
EKG QTC CALCULATION (BAZETT): 435 MS
EKG R AXIS: 19 DEGREES
EKG T AXIS: 39 DEGREES
EKG VENTRICULAR RATE: 80 BPM
EOSINOPHILS RELATIVE PERCENT: 0 % (ref 1–4)
GFR AFRICAN AMERICAN: >60 ML/MIN
GFR NON-AFRICAN AMERICAN: >60 ML/MIN
GFR SERPL CREATININE-BSD FRML MDRD: ABNORMAL ML/MIN/{1.73_M2}
GFR SERPL CREATININE-BSD FRML MDRD: ABNORMAL ML/MIN/{1.73_M2}
GLUCOSE BLD-MCNC: 119 MG/DL (ref 70–99)
HCG QUALITATIVE: NEGATIVE
HCT VFR BLD CALC: 24.5 % (ref 36.3–47.1)
HEMOGLOBIN: 7.3 G/DL (ref 11.9–15.1)
IMMATURE GRANULOCYTES: 1 %
LIPASE: 18 U/L (ref 13–60)
LYMPHOCYTES # BLD: 7 % (ref 24–44)
Lab: NORMAL
MCH RBC QN AUTO: 26.7 PG (ref 25.2–33.5)
MCHC RBC AUTO-ENTMCNC: 29.8 G/DL (ref 28.4–34.8)
MCV RBC AUTO: 89.7 FL (ref 82.6–102.9)
MONOCYTES # BLD: 2 % (ref 1–7)
MORPHOLOGY: ABNORMAL
PDW BLD-RTO: 17.7 % (ref 11.8–14.4)
PLATELET # BLD: 885 K/UL (ref 138–453)
PLATELET ESTIMATE: ABNORMAL
PMV BLD AUTO: 9.9 FL (ref 8.1–13.5)
POTASSIUM SERPL-SCNC: 4.7 MMOL/L (ref 3.7–5.3)
RBC # BLD: 2.73 M/UL (ref 3.95–5.11)
RBC # BLD: ABNORMAL 10*6/UL
SEG NEUTROPHILS: 90 % (ref 36–66)
SEGMENTED NEUTROPHILS ABSOLUTE COUNT: 22.41 K/UL (ref 1.8–7.7)
SODIUM BLD-SCNC: 128 MMOL/L (ref 135–144)
SPECIMEN DESCRIPTION: NORMAL
STATUS: NORMAL
TOTAL PROTEIN: 9.2 G/DL (ref 6.4–8.3)
WBC # BLD: 24.9 K/UL (ref 3.5–11.3)
WBC # BLD: ABNORMAL 10*3/UL

## 2017-12-20 PROCEDURE — 85025 COMPLETE CBC W/AUTO DIFF WBC: CPT

## 2017-12-20 PROCEDURE — 86900 BLOOD TYPING SEROLOGIC ABO: CPT

## 2017-12-20 PROCEDURE — 93005 ELECTROCARDIOGRAM TRACING: CPT

## 2017-12-20 PROCEDURE — 80053 COMPREHEN METABOLIC PANEL: CPT

## 2017-12-20 PROCEDURE — 84703 CHORIONIC GONADOTROPIN ASSAY: CPT

## 2017-12-20 PROCEDURE — 86901 BLOOD TYPING SEROLOGIC RH(D): CPT

## 2017-12-20 PROCEDURE — 96374 THER/PROPH/DIAG INJ IV PUSH: CPT

## 2017-12-20 PROCEDURE — 96361 HYDRATE IV INFUSION ADD-ON: CPT

## 2017-12-20 PROCEDURE — 87804 INFLUENZA ASSAY W/OPTIC: CPT

## 2017-12-20 PROCEDURE — 86850 RBC ANTIBODY SCREEN: CPT

## 2017-12-20 PROCEDURE — 6360000002 HC RX W HCPCS: Performed by: EMERGENCY MEDICINE

## 2017-12-20 PROCEDURE — 96376 TX/PRO/DX INJ SAME DRUG ADON: CPT

## 2017-12-20 PROCEDURE — 96375 TX/PRO/DX INJ NEW DRUG ADDON: CPT

## 2017-12-20 PROCEDURE — 86920 COMPATIBILITY TEST SPIN: CPT

## 2017-12-20 PROCEDURE — 2580000003 HC RX 258: Performed by: EMERGENCY MEDICINE

## 2017-12-20 PROCEDURE — 99284 EMERGENCY DEPT VISIT MOD MDM: CPT

## 2017-12-20 PROCEDURE — 83690 ASSAY OF LIPASE: CPT

## 2017-12-20 RX ORDER — PROMETHAZINE HYDROCHLORIDE 25 MG/ML
6.25 INJECTION, SOLUTION INTRAMUSCULAR; INTRAVENOUS ONCE
Status: COMPLETED | OUTPATIENT
Start: 2017-12-20 | End: 2017-12-20

## 2017-12-20 RX ORDER — PROMETHAZINE HYDROCHLORIDE 25 MG/1
25 TABLET ORAL EVERY 6 HOURS PRN
Qty: 10 TABLET | Refills: 0 | Status: SHIPPED | OUTPATIENT
Start: 2017-12-20 | End: 2017-01-01

## 2017-12-20 RX ORDER — ONDANSETRON 4 MG/1
4 TABLET, ORALLY DISINTEGRATING ORAL EVERY 8 HOURS PRN
Qty: 15 TABLET | Refills: 0 | Status: SHIPPED | OUTPATIENT
Start: 2017-12-20 | End: 2018-01-01 | Stop reason: SDUPTHER

## 2017-12-20 RX ORDER — ONDANSETRON 2 MG/ML
4 INJECTION INTRAMUSCULAR; INTRAVENOUS ONCE
Status: COMPLETED | OUTPATIENT
Start: 2017-12-20 | End: 2017-12-20

## 2017-12-20 RX ORDER — 0.9 % SODIUM CHLORIDE 0.9 %
1000 INTRAVENOUS SOLUTION INTRAVENOUS ONCE
Status: COMPLETED | OUTPATIENT
Start: 2017-12-20 | End: 2017-12-20

## 2017-12-20 RX ADMIN — PROMETHAZINE HYDROCHLORIDE 6.25 MG: 25 INJECTION INTRAMUSCULAR; INTRAVENOUS at 21:28

## 2017-12-20 RX ADMIN — SODIUM CHLORIDE 1000 ML: 9 INJECTION, SOLUTION INTRAVENOUS at 21:20

## 2017-12-20 RX ADMIN — PROMETHAZINE HYDROCHLORIDE 6.25 MG: 25 INJECTION INTRAMUSCULAR; INTRAVENOUS at 22:57

## 2017-12-20 RX ADMIN — ONDANSETRON 4 MG: 2 INJECTION INTRAMUSCULAR; INTRAVENOUS at 22:56

## 2017-12-20 ASSESSMENT — ENCOUNTER SYMPTOMS
NAUSEA: 1
VOMITING: 1
COUGH: 0
ABDOMINAL PAIN: 1
EYE PAIN: 0
COLOR CHANGE: 0

## 2017-12-20 ASSESSMENT — PAIN DESCRIPTION - PROGRESSION: CLINICAL_PROGRESSION: GRADUALLY WORSENING

## 2017-12-20 ASSESSMENT — PAIN DESCRIPTION - ONSET: ONSET: ON-GOING

## 2017-12-20 ASSESSMENT — PAIN DESCRIPTION - ORIENTATION: ORIENTATION: MID;UPPER

## 2017-12-20 ASSESSMENT — PAIN DESCRIPTION - PAIN TYPE: TYPE: ACUTE PAIN

## 2017-12-20 ASSESSMENT — PAIN DESCRIPTION - DESCRIPTORS: DESCRIPTORS: CRAMPING;SHARP

## 2017-12-20 ASSESSMENT — PAIN DESCRIPTION - LOCATION: LOCATION: ABDOMEN

## 2017-12-20 ASSESSMENT — PAIN SCALES - GENERAL: PAINLEVEL_OUTOF10: 2

## 2017-12-20 ASSESSMENT — PAIN DESCRIPTION - FREQUENCY: FREQUENCY: CONTINUOUS

## 2017-12-21 ENCOUNTER — OFFICE VISIT (OUTPATIENT)
Dept: INTERNAL MEDICINE | Age: 47
End: 2017-12-21
Payer: MEDICAID

## 2017-12-21 VITALS
RESPIRATION RATE: 14 BRPM | DIASTOLIC BLOOD PRESSURE: 67 MMHG | HEIGHT: 57 IN | TEMPERATURE: 98 F | BODY MASS INDEX: 22.65 KG/M2 | HEART RATE: 104 BPM | SYSTOLIC BLOOD PRESSURE: 104 MMHG | WEIGHT: 105 LBS | OXYGEN SATURATION: 99 %

## 2017-12-21 VITALS
HEART RATE: 116 BPM | WEIGHT: 105 LBS | BODY MASS INDEX: 22.72 KG/M2 | DIASTOLIC BLOOD PRESSURE: 75 MMHG | SYSTOLIC BLOOD PRESSURE: 106 MMHG

## 2017-12-21 DIAGNOSIS — R11.2 INTRACTABLE VOMITING WITH NAUSEA, UNSPECIFIED VOMITING TYPE: ICD-10-CM

## 2017-12-21 DIAGNOSIS — R11.15 NON-INTRACTABLE CYCLICAL VOMITING WITH NAUSEA: Primary | ICD-10-CM

## 2017-12-21 DIAGNOSIS — R19.7 DIARRHEA, UNSPECIFIED TYPE: Primary | ICD-10-CM

## 2017-12-21 LAB
ANION GAP SERPL CALCULATED.3IONS-SCNC: 17 MMOL/L (ref 9–17)
BUN BLDV-MCNC: 16 MG/DL (ref 6–20)
BUN/CREAT BLD: ABNORMAL (ref 9–20)
CALCIUM SERPL-MCNC: 9.7 MG/DL (ref 8.6–10.4)
CHLORIDE BLD-SCNC: 96 MMOL/L (ref 98–107)
CO2: 23 MMOL/L (ref 20–31)
CREAT SERPL-MCNC: 0.57 MG/DL (ref 0.5–0.9)
GFR AFRICAN AMERICAN: >60 ML/MIN
GFR NON-AFRICAN AMERICAN: >60 ML/MIN
GFR SERPL CREATININE-BSD FRML MDRD: ABNORMAL ML/MIN/{1.73_M2}
GFR SERPL CREATININE-BSD FRML MDRD: ABNORMAL ML/MIN/{1.73_M2}
GLUCOSE BLD-MCNC: 84 MG/DL (ref 70–99)
POTASSIUM SERPL-SCNC: 4.8 MMOL/L (ref 3.7–5.3)
SODIUM BLD-SCNC: 136 MMOL/L (ref 135–144)

## 2017-12-21 PROCEDURE — 6360000002 HC RX W HCPCS: Performed by: EMERGENCY MEDICINE

## 2017-12-21 PROCEDURE — 99213 OFFICE O/P EST LOW 20 MIN: CPT | Performed by: INTERNAL MEDICINE

## 2017-12-21 PROCEDURE — G8420 CALC BMI NORM PARAMETERS: HCPCS | Performed by: INTERNAL MEDICINE

## 2017-12-21 PROCEDURE — 99283 EMERGENCY DEPT VISIT LOW MDM: CPT

## 2017-12-21 PROCEDURE — 1111F DSCHRG MED/CURRENT MED MERGE: CPT | Performed by: INTERNAL MEDICINE

## 2017-12-21 PROCEDURE — G8484 FLU IMMUNIZE NO ADMIN: HCPCS | Performed by: INTERNAL MEDICINE

## 2017-12-21 PROCEDURE — 2580000003 HC RX 258: Performed by: EMERGENCY MEDICINE

## 2017-12-21 PROCEDURE — 4004F PT TOBACCO SCREEN RCVD TLK: CPT | Performed by: INTERNAL MEDICINE

## 2017-12-21 PROCEDURE — 80048 BASIC METABOLIC PNL TOTAL CA: CPT

## 2017-12-21 PROCEDURE — 96372 THER/PROPH/DIAG INJ SC/IM: CPT

## 2017-12-21 PROCEDURE — G8427 DOCREV CUR MEDS BY ELIG CLIN: HCPCS | Performed by: INTERNAL MEDICINE

## 2017-12-21 RX ORDER — HALOPERIDOL 5 MG/ML
5 INJECTION INTRAMUSCULAR ONCE
Status: COMPLETED | OUTPATIENT
Start: 2017-12-21 | End: 2017-12-21

## 2017-12-21 RX ORDER — 0.9 % SODIUM CHLORIDE 0.9 %
500 INTRAVENOUS SOLUTION INTRAVENOUS ONCE
Status: COMPLETED | OUTPATIENT
Start: 2017-12-21 | End: 2017-12-21

## 2017-12-21 RX ADMIN — SODIUM CHLORIDE 500 ML: 0.9 INJECTION, SOLUTION INTRAVENOUS at 15:37

## 2017-12-21 RX ADMIN — HALOPERIDOL LACTATE 5 MG: 5 INJECTION, SOLUTION INTRAMUSCULAR at 15:19

## 2017-12-21 ASSESSMENT — ENCOUNTER SYMPTOMS
COUGH: 0
RHINORRHEA: 0
ABDOMINAL PAIN: 1
SHORTNESS OF BREATH: 0
NAUSEA: 1
DIARRHEA: 1
COLOR CHANGE: 0
BLOOD IN STOOL: 0
WHEEZING: 0
VOMITING: 1

## 2017-12-21 ASSESSMENT — PAIN SCALES - GENERAL: PAINLEVEL_OUTOF10: 8

## 2017-12-21 ASSESSMENT — PAIN DESCRIPTION - PAIN TYPE: TYPE: ACUTE PAIN

## 2017-12-21 ASSESSMENT — PAIN DESCRIPTION - DESCRIPTORS: DESCRIPTORS: DISCOMFORT

## 2017-12-21 ASSESSMENT — PAIN DESCRIPTION - LOCATION: LOCATION: ABDOMEN

## 2017-12-21 NOTE — ED PROVIDER NOTES
[facility]     Emergency Department     Faculty Attestation    I performed a history and physical examination of the patient and discussed management with the resident. I reviewed the residents note and agree with the documented findings and plan of care. Any areas of disagreement are noted on the chart. I was personally present for the key portions of any procedures. I have documented in the chart those procedures where I was not present during the key portions. I have reviewed the emergency nurses triage note. I agree with the chief complaint, past medical history, past surgical history, allergies, medications, social and family history as documented unless otherwise noted below. For Physician Assistant/ Nurse Practitioner cases/documentation I have personally evaluated this patient and have completed at least one if not all key elements of the E/M (history, physical exam, and MDM). Additional findings are as noted. I have personally seen and evaluated the patient. I find the patient's history and physical exam are consistent with the NP/PA documentation. I agree with the care provided, treatment rendered, disposition and follow-up plan. Patient further complains primarily of epigastric discomfort and nausea. Currently abdomen is soft and nontender to deep palpation. No peritoneal signs. Critical Care     Kai Mortimer, M.D.   Attending Emergency  Physician              Jayda Maher MD  12/20/17 0303

## 2017-12-21 NOTE — ED NOTES
Pt resting on cart, remains in no distress. Awaiting further orders, will continue to monitor.      Amalia Mohamud RN  12/21/17 1986

## 2017-12-21 NOTE — ED NOTES
RN at bedside to swab patient for rapid flu test. Pt sleeping upon RN entry, RR even and unlabored, NAD. Vitals stable. Pt updated on poc, verbalized understanding. Denied any other needs at this time.       Bryn Tripathi RN  12/20/17 7660

## 2017-12-21 NOTE — ED PROVIDER NOTES
[facility] ED  Emergency Department  Emergency Medicine Resident Sign-out     Care of Jason Law was assumed from Dr. Sadie Gomez and is being seen for Abdominal Pain (diffuse abdominal pain with N/V/D since yesterday. Recently admitted for same. Afebrile) and Emesis  . The patient's initial evaluation and plan have been discussed with the prior provider who initially evaluated the patient. EMERGENCY DEPARTMENT COURSE / MEDICAL DECISION MAKING:       MEDICATIONS GIVEN:  Orders Placed This Encounter   Medications    0.9 % sodium chloride bolus    haloperidol lactate (HALDOL) injection 5 mg       LABS / RADIOLOGY:     Labs Reviewed   BASIC METABOLIC PANEL - Abnormal; Notable for the following:        Result Value    Chloride 96 (*)     All other components within normal limits       Xr Mandible (<4 Views)    Result Date: 12/8/2017  EXAMINATION: 4 VIEWS OF THE MANDIBLE 12/8/2017 10:14 pm COMPARISON: 1 hour prior HISTORY: ORDERING SYSTEM PROVIDED HISTORY: please repeat to include the tmj please TECHNOLOGIST PROVIDED HISTORY: Reason for exam:->please repeat to include the tmj please FINDINGS: Temporomandibular joints appear intact. Temporomandibular joints appear intact. Xr Mandible (min 4 Views)    Result Date: 12/8/2017  EXAMINATION: 4 VIEWS OF THE MANDIBLE 12/8/2017 8:46 pm COMPARISON: None HISTORY: ORDERING SYSTEM PROVIDED HISTORY: jaw dislocation after eating popcorn. no teeth, eval for dislocation please. TECHNOLOGIST PROVIDED HISTORY: Reason for exam:->jaw dislocation after eating popcorn. no teeth, eval for dislocation please. FINDINGS: Bony structures are diffusely osteoporotic which lowers sensitivity for detecting nondisplaced fracture. Edentulous mandible is noted. The temporomandibular joints are only partially included on the radiograph. Repeat examination with better centering over the temporomandibular joint should be obtained for better assessment.   There are no obvious fractures identified. The coronoid processes are intact. The remainder of mandible is unremarkable. The temporomandibular joints are not completely included on the radiographs. Repeat examination with better centered over temporal mandibular joint could be performed. Edentulous mandible is noted with no obvious fracture. Xr Acute Abd Series Chest 1 Vw    Result Date: 12/6/2017  EXAMINATION: ACUTE ABDOMINAL SERIES WITH SINGLE  VIEW OF THE CHEST 12/5/2017 3:29 pm COMPARISON: 07/16/2017 chest radiograph HISTORY: ORDERING SYSTEM PROVIDED HISTORY: abdominal pain, r/o air fluid levels TECHNOLOGIST PROVIDED HISTORY: Reason for exam:->abdominal pain, r/o air fluid levels FINDINGS: Chest:  The heart size is stable. Numerous nodular opacification is seen in the right lung and a large retrocardiac opacification seen in the left lower lobe. The nodular opacifications in the right lung have increased. Left lower lobe opacification is stable. Abdomen:  Double-J stent remains in place on the right side. Gas is seen in the stomach. Gas and stool are seen in nondilated colon. Surgical staples are seen centrally in the abdomen and in the pelvis. 1. No acute abnormalities are seen in the abdomen 2. Double-J stent remains in place on the right side 3. Increased nodularity in the right lung and stable masslike consolidation in the left lower lobe again could represent a neoplastic process. A multifocal infectious process could also cause this appearance. Xr Chest Portable    Result Date: 12/14/2017  EXAMINATION: SINGLE VIEW OF THE CHEST 12/14/2017 1:30 pm COMPARISON: None. HISTORY: ORDERING SYSTEM PROVIDED HISTORY: vomiting, rule out perf TECHNOLOGIST PROVIDED HISTORY: Reason for exam:->vomiting, rule out perf FINDINGS: Patchy nodular opacities in the right parahilar upper lobe are redemonstrated, somewhat more discrete on current examination.   There are minimum streaky densities in the retrocardiac left lower lobe. Minimal atelectatic changes in the left costophrenic angle noted. Lungs are extremely poorly aerated. No sizable pleural effusions identified. Bony structures are diffusely osteoporotic. Cardiac contour is normal.  There are aortic knob calcifications. Dextroscoliosis at lower thoracic spine present. Low lung volumes with slightly improved, more discrete nodular opacities in the right parahilar upper lobes. RECENT VITALS:     Temp: 98 °F (36.7 °C),  Pulse: 104, Resp: 14, BP: 104/67, SpO2: 99 %    This patient is a 50 y.o. Female with chronic diffuse abdominal pain with nausea and vomiting. Seen in the ED earlier today for the same complaint and discharged. Recent admission for the same. Does not follow with a GI specialist. Treated with Haldol and tolerating orals in the ED. Discharged. OUTSTANDING TASKS / RECOMMENDATIONS:    1. Pending departure from department.       FINAL IMPRESSION:     1. Non-intractable cyclical vomiting with nausea        DISPOSITION:         DISPOSITION:  [x]  Discharge   []  Transfer -    []  Admission -     []  Against Medical Advice   []  Eloped   FOLLOW-UP: Nikolas Willingham MD  [address] [place] [address]  [phone]    Schedule an appointment as soon as possible for a visit in 2 days  For re-evaluation    [facility]  [address] [place] [address]  [phone]  Go to   As needed     DISCHARGE MEDICATIONS: New Prescriptions    No medications on file          Amalia Yanez MD  Emergency Medicine Resident  [address] [facility]      Amalia Yanez MD  Resident  12/21/17 9901

## 2017-12-21 NOTE — ED PROVIDER NOTES
[facility]  ED  Emergency Department Encounter  Emergency Medicine Resident     Pt Name: Chris Law  MRN: [id number]  [place] 1970  Date of evaluation: 12/21/17  PCP:  Benson Lee MD    CHIEF COMPLAINT       Chief Complaint   Patient presents with    Abdominal Pain     diffuse abdominal pain with N/V/D since yesterday. Recently admitted for same. Afebrile    Emesis       HISTORY OF PRESENT ILLNESS  (Location/Symptom, Timing/Onset, Context/Setting, Quality, Duration, Modifying Factors, Severity.)      Chris Law is a 50 y.o. female who presents with Complaints of left upper quadrant and epigastric abdominal pain began today. Patient was recently discharged from the emergency department early this morning for similar complaints. At that time, her lab work was nonspecific. She says that she followed up with her primary care physician who wanted her tested for C. Diff. She says that shortly after she left the care physician's office, she developed nausea and vomiting again. She says she's had multiple episodes of nonbloody nonbilious vomiting and has had 2 episodes of nonbloody diarrhea. She says that she typically gets see symptoms and usually needs admitted for IV fluid and antiemetic treatment. She says that she's been taking Zofran and Phenergan without relief. Patient has a significant history for anemia and follows with a hematologist.  She also has a history significant for hypertension and multiple blood transfusions secondary to her anemia. PAST MEDICAL / SURGICAL / SOCIAL / FAMILY HISTORY      has a past medical history of Allergic rhinitis; Anxiety; Asthma; Blood circulation, collateral; Celiac disease; Chronic back pain; Chronic kidney disease; Cirrhosis of liver with ascites ([address] Constipation; Cough; Cryptogenic organizing pneumonia ([address] Depression; Difficult intravenous access; Disease of blood and blood forming organ; Edentulous;  Full dentures; Gastric outlet obstruction; GERD (gastroesophageal reflux disease); Hearing loss; History of blood transfusion; Hydronephrosis, bilateral; Hypertension; Hypoglycemia; Lung nodule; Migraine; OCD (obsessive compulsive disorder); Osteoarthritis; Osteoporosis; Pelvic mass; Perforated nasal septum; Peripheral neuropathy due to inflammation ([facility] Pneumonia; Psoriasis; PTSD (post-traumatic stress disorder); Scoliosis; Shortness of breath; Substance abuse; Thrombocytosis ([address] Urinary incontinence; and Wears glasses. has a past surgical history that includes Tubal ligation (1989); Tonsillectomy and adenoidectomy (1982); ERCP (5/16/2013); Upper gastrointestinal endoscopy; gastrectomy (2012); Paracentesis (Right, 01/2015-09/2015); Cystocopy (12/1/2015); Fixation Kyphoplasty (08/09/2016); Lung biopsy; insert nasal septal prosthesis (N/A, 4/14/2017); create eardrum opening,gen anesth (N/A, 4/14/2017); Abdomen surgery (09/2015); Cystoscopy (05/11/2017); Ureter stent placement (05/11/2017); Ureteroscopy (05/11/2017); Cystoscopy (Right, 5/11/2017); Cholecystectomy; hernia repair (07/18/2017); repair incisional hernia,reducible (N/A, 7/18/2017); and esophagogastroduodenoscopy transoral diagnostic (N/A, 8/30/2017). Social History     Social History    Marital status: Single     Spouse name: N/A    Number of children: N/A    Years of education: N/A     Occupational History    Not on file.      Social History Main Topics    Smoking status: Current Some Day Smoker     Packs/day: 0.25     Years: 30.00     Types: Cigarettes     Last attempt to quit: 12/1/2012    Smokeless tobacco: Never Used      Comment: pt states 3 cigs per day    Alcohol use No      Comment: quit in 1987    Drug use: No      Comment: quit marijuana 1987    Sexual activity: No     Other Topics Concern    Not on file     Social History Narrative    No narrative on file       Family History   Problem Relation Age of Onset    Heart Disease Mother     Stroke Father      cerebral aneurysm       Allergies:  Latex; Bee venom; Benadryl [diphenhydramine-zinc acetate]; Tavist; and Tylenol [acetaminophen]    Home Medications:  Prior to Admission medications    Medication Sig Start Date End Date Taking? Authorizing Provider   ondansetron (ZOFRAN-ODT) 4 MG disintegrating tablet Take 1 tablet by mouth every 8 hours as needed for Nausea or Vomiting 12/20/17   Coco Clark MD   promethazine (PHENERGAN) 25 MG tablet Take 1 tablet by mouth every 6 hours as needed for Nausea 12/20/17 12/27/17  Coco Clark MD   gabapentin (NEURONTIN) 300 MG capsule Take 1 capsule by mouth nightly 12/16/17 12/16/18  Sherolyn Sandifer, MD   oxyCODONE-acetaminophen (PERCOCET) 5-325 MG per tablet Take 1 tablet by mouth every 6 hours as needed for Pain .  12/11/17 1/10/18  Diamond Lerner MD   sodium chloride (ALTAMIST SPRAY) 0.65 % nasal spray 1 spray by Nasal route as needed for Congestion 10/23/17   Cedric Chew MD   Calcium Carbonate-Vitamin D (OYSTER SHELL CALCIUM/D) 500-200 MG-UNIT TABS Take 1 tablet by mouth daily Please discontinue previous prescription of calcium 10/23/17 10/23/18  Cedric Chew MD   loratadine (CLARITIN) 10 MG tablet Take 1 tablet by mouth daily 10/23/17   Cedric Chew MD   citalopram (CELEXA) 40 MG tablet Take 1 tablet by mouth daily 10/23/17   Cedric Chew MD   aspirin (ASPIRIN LOW DOSE) 81 MG chewable tablet Take 1 tablet by mouth daily 10/23/17   Cedric Chew MD   pantoprazole (PROTONIX) 40 MG tablet Take 1 tablet by mouth daily 10/23/17   Cedric Chew MD   folic acid (FOLVITE) 1 MG tablet Take 1 tablet by mouth daily 10/23/17   Cedric Chew MD   umeclidinium-vilanterol (ANORO ELLIPTA) 62.5-25 MCG/INH AEPB inhaler Inhale 1 puff into the lungs daily 10/20/17   Marie Cruz MD   VENTOLIN  (90 Base) MCG/ACT inhaler INHALE 2 PUFFS INTO THE LUNGS EVERY 6 HOURS AS NEEDED FOR WHEEZING 9/22/17   Marie Cruz MD docusate sodium (DOCQLACE) 100 MG capsule TAKE 1 CAPSULE BY MOUTH 2 TIMES DAILY AS NEEDED FOR CONSTIPATION 8/15/17   Lorenzo Marroquin MD   fluticasone Lanie Ba) 50 MCG/ACT nasal spray 1 spray by Nasal route daily 8/15/17   Lorenzo Marroquin MD   ondansetron [facility]) 4 MG tablet Take 1 tablet by mouth every 8 hours as needed for Nausea 5/9/17   Yasmine Harris MD   albuterol (PROVENTIL) (5 MG/ML) 0.5% nebulizer solution Take 0.5 mLs by nebulization every 6 hours as needed for Wheezing 1/23/17   Geraldine Arredondo MD       REVIEW OF SYSTEMS    (2-9 systems for level 4, 10 or more for level 5)      Review of Systems   Constitutional: Negative for chills and fever. HENT: Negative for congestion and rhinorrhea. Eyes: Negative for visual disturbance. Respiratory: Negative for cough, shortness of breath and wheezing. Cardiovascular: Negative for chest pain and palpitations. Gastrointestinal: Positive for abdominal pain, diarrhea, nausea and vomiting. Negative for blood in stool. Genitourinary: Negative for dysuria, hematuria, vaginal bleeding and vaginal discharge. Musculoskeletal: Negative for arthralgias and myalgias. Skin: Negative for color change and rash. Neurological: Negative for dizziness, weakness, light-headedness, numbness and headaches. Psychiatric/Behavioral: Negative for agitation and confusion. PHYSICAL EXAM   (up to 7 for level 4, 8 or more for level 5)      INITIAL VITALS:   /67   Pulse 104   Temp 98 °F (36.7 °C) (Oral)   Resp 14   Ht 4' 9\" (1.448 m)   Wt 105 lb (47.6 kg)   LMP 04/18/2012   SpO2 99%   BMI 22.72 kg/m²     Physical Exam   Constitutional: She is oriented to person, place, and time. She appears well-developed and well-nourished. HENT:   Head: Normocephalic and atraumatic. Eyes: Conjunctivae and EOM are normal.   Neck: Normal range of motion. Cardiovascular: Regular rhythm and normal heart sounds. Exam reveals no gallop and no friction rub.     No murmur On initial evaluation, patient was mildly tachycardic with a heart rate in the low 100s. She had mild tenderness to palpation in the left upper quadrant and epigastric region. On review of old records, as noted the patient has been to the emergency department multiple times for similar complaints in the past with no specific diagnosis as to the cause of her recurrent nausea, vomiting, abdominal pain. Given the patient was recently seen in the emergency department today, we'll not repeat CBC or lipase. We will repeat BMP given the patient has had vomiting and is mildly tachycardic and was noted to be mildly hyponatremic on the previous testing. Patient says that she's never had Haldol for management of her vomiting. Will try Haldol for the patient given that she says that Zofran and Phenergan have not been working. She was reevaluated after Haldol. Tachycardia is likely secondary to dehydration which had resolved after IV fluid bolus. Electrolytes were unremarkable. She was tolerating by mouth intake. Symptoms likely secondary to cyclic vomiting. She says that her abdominal pain had resolved. We will discharge patient home with instructions come back to emergency department if her symptoms recurred. She says that she has Zofran and Phenergan for home and does not need a refill. She is instructed to follow-up with a primary care physician for reevaluation in the next few days. All questions and concerns were addressed. PROCEDURES:  None    Procedures    CONSULTS:  None    CRITICAL CARE:  None     FINAL IMPRESSION      1.  Non-intractable cyclical vomiting with nausea          DISPOSITION / PLAN     DISPOSITION Decision To Discharge 12/21/2017 04:44:10 PM      PATIENT REFERRED TO:  Cyndi Das MD  [address] [place] [address] 909 [phone]    Schedule an appointment as soon as possible for a visit in 2 days  For re-evaluation    [facility]  [address] [facility]

## 2017-12-21 NOTE — ED NOTES
Pt to ER with c/o diffuse abdominal pain and N/V/D since yesterday. Pt discharged yesterday from here for the same. Pt afebrile upon arrival. Placed on continuous monitor, no distress noted, rr even and NL. Dr. Chintan Watts at bedside for eval, will continue to monitor.      Ji Kirkland RN  12/21/17 [id number]

## 2017-12-21 NOTE — ED NOTES
Pt actively vomiting, pt medicated. Sister at bedside. Pt updated on poc, verbalized understanding. Denied any other needs at this time. Call light in reach. Blood sent to lab via tube system.      Bryn Tripathi RN  12/20/17 [id number]

## 2017-12-21 NOTE — PROGRESS NOTES
Subjective:      Patient ID: Alejandro Law is a 50 y.o. female. HPI   pt here to follow up on  ER visit yesterday for Vomiting,initial blood work was unremarkable  She was given prescription for Zofran and Phenergan which she has not filled yet  Patient continues to feel nauseous and continues to throw up, also has diarrhea since yesterday  In the past she had been admitted multiple times with similar presentation and was found to have small bowel obstruction  Currently she is very distressed because of the symptoms and feels depressed  She also has lost a lot of weight. Multiple hospital admissions for symptomatic anemia and dyspnea sec to it  She has been extensively worked up but no cause has been found  Has recently received multiple transfusions  Last hospital admission was last week, where her hemoglobin on admission was 7.1 it was 7.3 on discharge  Was seen by hematology , they have referred her to [address] [place] [address] for further workup-has appointment there in February 2018, in the meantime they have recommended transfusions on an as-needed basis  At the time of the admission, she was also complaining of tingling hence was started on gabapentin. Other medical issues  htn- not on any meds. 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 Also has multiple lung nodules, biopsy negative for malignancy in the past.  Pulmonary recently started her on Anoro Ellipta, that is helping her symptoms a lot. Also ordered repeat CT in 3 months. His follow-up appointment in Jan 2018. Depression-.  On Celexa 40 mg. also follows at [facility] but feels very depressed today because of her ongoing symptoms    Was also admitted in May 2017 with flank pain, was found to have right hydronephrosis-received a stent, has been doing okay since then. Review of Systems   Negative for fever,  Respiratory: Negative for cough, shortness of breath, wheezing and stridor. Cardiovascular: Negative for chest pain, palpitations and leg swelling. Gastrointestinal: Negative for constipation. Objective:   Physical Exam  Constitutional: She is oriented to person, place, and time. She appears well-developed. No distress. Cardiovascular: Normal rate and regular rhythm. Pulmonary/Chest: Effort normal and breath sounds normal. No stridor. No respiratory distress. no wheezes. no rales. Abdominal: Soft, Surgical site looks clean. No swelling or discharge. Extremities-no pedal edema    Assessment:     1. Diarrhea, unspecified type    2. Intractable vomiting with nausea, unspecified vomiting type          Plan:   1. Will order stool C. diff . given the multiple hospital admissions . 2.  Patient to  Zofran and Phenergan as ordered by the ER. I will order x-ray KUB. I have explained to her that she will need to go to the ER for hydration if vomiting and diarrhea are persistent and she cannot take anything orally, she voiced understanding. 3. Continue follow-up with oncology. Has been referred to [facility]. Transfusion as needed. 3. [id number]/16 PAP-02/14, TDAP-06/15, pneumovax-08/16 Flu shot-10/17  HIV-neg  4. Return in about 1 week (around 12/28/2017).

## 2017-12-21 NOTE — PROGRESS NOTES
Visit Information    Have you changed or started any medications since your last visit including any over-the-counter medicines, vitamins, or herbal medicines? no   Have you stopped taking any of your medications? Is so, why? -  no  Are you having any side effects from any of your medications? - no    Have you seen any other physician or provider since your last visit? yes -    Have you had any other diagnostic tests since your last visit? yes -    Have you been seen in the emergency room and/or had an admission in a hospital since we last saw you?  yes - [facility]   Have you had your routine dental cleaning in the past 6 months?  no     Do you have an active MyChart account? If no, what is the barrier?   Yes    Patient Care Team:  Corinna Martel MD as PCP - Boyd Sanabria MD as Consulting Physician (Hematology and Oncology)  Lynda Larkin MD as Consulting Physician (Pulmonology)  Corinna Maretl MD as Referring Physician (Internal Medicine)    Medical History Review  Past Medical, Family, and Social History reviewed and does contribute to the patient presenting condition    Health Maintenance   Topic Date Due    Cervical cancer screen  03/07/2020    Lipid screen  01/07/2021    DTaP/Tdap/Td vaccine (2 - Td) 06/30/2025    Flu vaccine  Completed    Pneumococcal med risk  Completed    HIV screen  Completed

## 2017-12-21 NOTE — ED TRIAGE NOTES
Pt to ED A&OX4, ambulatory with assistance from wheelchair to bed RR even and unlabored, skin W/D/I, c/o epigastric abdominal pain with nausea and positive emesis with thin, bile consistency that started earlier today and has not subsided. Pt placed on monitor, IV established and blood drawn for labs. NS bolus initiated. Assessment completed, Vitals Recorded. Awaiting further orders.

## 2017-12-21 NOTE — ED PROVIDER NOTES
[address] [facility]     Emergency Department     Faculty Attestation    I performed a history and physical examination of the patient and discussed management with the resident. I reviewed the residents note and agree with the documented findings and plan of care. Any areas of disagreement are noted on the chart. I was personally present for the key portions of any procedures. I have documented in the chart those procedures where I was not present during the key portions. I have reviewed the emergency nurses triage note. I agree with the chief complaint, past medical history, past surgical history, allergies, medications, social and family history as documented unless otherwise noted below. For Physician Assistant/ Nurse Practitioner cases/documentation I have personally evaluated this patient and have completed at least one if not all key elements of the E/M (history, physical exam, and MDM). Additional findings are as noted. Patient discharged from the hospital this morning, continues to have vomiting, denies abdominal pain, abdomen soft and nontender, mucous membranes are dry, conjunctival pallor is present.         Meg Allan MD  Attending Emergency  Physician              Imani Zazueta MD  12/21/17 9965

## 2017-12-21 NOTE — ED PROVIDER NOTES
[facility] ED  eMERGENCY dEPARTMENT eNCOUnter  Resident    Pt Name: Balbir Law  MRN: [id number]  [place] 1970  Date of evaluation: 12/20/2017  PCP:  Oralia Aase, MD    [address] [place] [address]       Chief Complaint   Patient presents with    Abdominal Pain    Emesis    Nausea         HISTORY OF PRESENT ILLNESS    Yovani Law is a 50 y.o. female who presents With chief complaint of nausea and vomiting. Symptoms began this morning and it gradually been worsening. She is also complaining of epigastric pain that was made completely better with home Percocet. He has had innumerable amounts of vomiting. No blood in it. Nothing is made her pain worse. She described as a moderate aching sensation when it was present. No history of cyclic vomiting. REVIEW OF SYSTEMS       Review of Systems   Constitutional: Negative for fever. HENT: Negative for congestion. Eyes: Negative for pain. Respiratory: Negative for cough. Cardiovascular: Negative for chest pain and palpitations. Gastrointestinal: Positive for abdominal pain, nausea and vomiting. Genitourinary: Negative for dysuria. Musculoskeletal: Negative for neck pain. Skin: Negative for color change and rash. Neurological: Negative for headaches. Hematological: Does not bruise/bleed easily. PAST MEDICAL HISTORY    has a past medical history of Allergic rhinitis; Anxiety; Asthma; Blood circulation, collateral; Celiac disease; Chronic back pain; Chronic kidney disease; Cirrhosis of liver with ascites ([address] Constipation; Cough; Cryptogenic organizing pneumonia ([address] Depression; Difficult intravenous access; Disease of blood and blood forming organ; Edentulous; Full dentures; Gastric outlet obstruction; GERD (gastroesophageal reflux disease); Hearing loss; History of blood transfusion; Hydronephrosis, bilateral; Hypertension; Hypoglycemia;  Lung nodule; Migraine; OCD (obsessive compulsive disorder); VITALS:  height is 4' 9\" (1.448 m) and weight is 110 lb (49.9 kg). Her oral temperature is 97.7 °F (36.5 °C). Her blood pressure is 128/73 and her pulse is 89. Her respiration is 21 and oxygen saturation is 99%. Physical Exam   Constitutional: She is oriented to person, place, and time. She appears well-developed and well-nourished. She appears distressed (actively vomiting). HENT:   Head: Normocephalic and atraumatic. Eyes: Conjunctivae and EOM are normal. Pupils are equal, round, and reactive to light. Right eye exhibits no discharge. Left eye exhibits no discharge. Neck: Normal range of motion. Neck supple. No tracheal deviation present. Cardiovascular: Normal rate, regular rhythm and normal heart sounds. No murmur heard. Pulmonary/Chest: Effort normal and breath sounds normal. No respiratory distress. She has no wheezes. She has no rales. Abdominal: Soft. Bowel sounds are normal. She exhibits no distension. There is no tenderness. Musculoskeletal: Normal range of motion. She exhibits no tenderness. Neurological: She is alert and oriented to person, place, and time. No cranial nerve deficit. Skin: Skin is warm and dry. No rash noted. She is not diaphoretic. Psychiatric: She has a normal mood and affect. Nursing note and vitals reviewed. DIFFERENTIAL DIAGNOSIS/MDM:   Obstruction, gastritis, viral illness, anemia    Patient hemodynamically stable but actively vomiting. She was treated with IV fluids, Zofran and Phenergan. Belly labs will be obtained. Abdomen is benign and will not require imaging. Patient able to tolerate by mouth, then will be discharged. On reexamination, the patient's abdomen remained soft and nonsurgical.  She was able to tolerate by mouth after being given 2 doses of Phenergan and 1 of Zofran. She is agreeable to plan of discharge and discharged with Zofran and Phenergan refills.     DIAGNOSTIC RESULTS     EKG: All EKG's are interpreted by the Emergency

## 2017-12-21 NOTE — PATIENT INSTRUCTIONS
RTC on 12/28/17 Script for Xray given to pt. No appt is needed. Please get xray done at a [address] for lab given to pt, no fasting required. Pt will get labs done before next appt. An After Visit Summary was printed and given to the patient. CB      LABORATORY INSTRUCTIONS    Your doctor has ordered blood or urine testing. You can get this testing done at the Lab located on the first floor of the [facility], or at any other [facility]. Please stop at Main Registration, before going to the lab, as you must be registered first.     Please get this lab done before your next visit. You may eat or drink before this test.       X-RAY INSTRUCTIONS    Your doctor has ordered an X-ray for you. This will be done at [facility], located at [address] Report to the Admitting Department, located on the main floor of the medical center behind the information desk. Please remember to bring your X-ray order with you. FOR THIS TESTING, YOU DO NOT NEED AN APPOINTMENT.

## 2017-12-21 NOTE — ED NOTES
Pt provided warm blankets. Requested lights dimmed. Denied any other needs.         Delon Hayes RN  12/20/17 9021

## 2017-12-22 ENCOUNTER — HOSPITAL ENCOUNTER (EMERGENCY)
Age: 47
Discharge: HOME | End: 2017-12-22
Attending: EMERGENCY MEDICINE
Payer: MEDICAID

## 2017-12-22 ENCOUNTER — HOSPITAL ENCOUNTER (OUTPATIENT)
Dept: ONCOLOGY | Age: 47
Discharge: HOME | End: 2017-12-23
Attending: INTERNAL MEDICINE | Admitting: INTERNAL MEDICINE
Payer: MEDICAID

## 2017-12-22 ENCOUNTER — HOSPITAL ENCOUNTER (OUTPATIENT)
Age: 47
Discharge: HOME | End: 2017-12-22
Payer: MEDICAID

## 2017-12-22 ENCOUNTER — HOSPITAL ENCOUNTER (OUTPATIENT)
Dept: GENERAL RADIOLOGY | Age: 47
Discharge: HOME | End: 2017-12-22
Payer: MEDICAID

## 2017-12-22 ENCOUNTER — TELEPHONE (OUTPATIENT)
Dept: ONCOLOGY | Age: 47
End: 2017-12-22

## 2017-12-22 ENCOUNTER — TELEPHONE (OUTPATIENT)
Dept: INTERNAL MEDICINE | Age: 47
End: 2017-12-22

## 2017-12-22 VITALS
SYSTOLIC BLOOD PRESSURE: 124 MMHG | HEART RATE: 86 BPM | OXYGEN SATURATION: 98 % | TEMPERATURE: 98.1 F | RESPIRATION RATE: 16 BRPM | DIASTOLIC BLOOD PRESSURE: 75 MMHG

## 2017-12-22 DIAGNOSIS — D75.839 THROMBOCYTOSIS: ICD-10-CM

## 2017-12-22 DIAGNOSIS — E87.1 HYPONATREMIA: ICD-10-CM

## 2017-12-22 DIAGNOSIS — D64.9 ANEMIA, UNSPECIFIED TYPE: Primary | ICD-10-CM

## 2017-12-22 DIAGNOSIS — D72.829 LEUKOCYTOSIS, UNSPECIFIED TYPE: ICD-10-CM

## 2017-12-22 DIAGNOSIS — D51.9 ANEMIA DUE TO VITAMIN B12 DEFICIENCY, UNSPECIFIED B12 DEFICIENCY TYPE: Primary | Chronic | ICD-10-CM

## 2017-12-22 DIAGNOSIS — R11.2 INTRACTABLE VOMITING WITH NAUSEA, UNSPECIFIED VOMITING TYPE: ICD-10-CM

## 2017-12-22 DIAGNOSIS — D51.9 ANEMIA DUE TO VITAMIN B12 DEFICIENCY, UNSPECIFIED B12 DEFICIENCY TYPE: Chronic | ICD-10-CM

## 2017-12-22 LAB
ABO/RH: NORMAL
ANION GAP SERPL CALCULATED.3IONS-SCNC: 29 MMOL/L (ref 9–17)
ANTIBODY SCREEN: NEGATIVE
ARM BAND NUMBER: NORMAL
BLD PROD TYP BPU: NORMAL
BUN BLDV-MCNC: 17 MG/DL (ref 6–20)
BUN/CREAT BLD: ABNORMAL (ref 9–20)
CALCIUM SERPL-MCNC: 10.1 MG/DL (ref 8.6–10.4)
CHLORIDE BLD-SCNC: 93 MMOL/L (ref 98–107)
CO2: 16 MMOL/L (ref 20–31)
CREAT SERPL-MCNC: 0.61 MG/DL (ref 0.5–0.9)
CROSSMATCH RESULT: NORMAL
DISPENSE STATUS BLOOD BANK: NORMAL
EXPIRATION DATE: NORMAL
GFR AFRICAN AMERICAN: >60 ML/MIN
GFR NON-AFRICAN AMERICAN: >60 ML/MIN
GFR SERPL CREATININE-BSD FRML MDRD: ABNORMAL ML/MIN/{1.73_M2}
GFR SERPL CREATININE-BSD FRML MDRD: ABNORMAL ML/MIN/{1.73_M2}
GLUCOSE BLD-MCNC: 71 MG/DL (ref 70–99)
HCT VFR BLD CALC: 24.5 % (ref 36.3–47.1)
HEMOGLOBIN: 7 G/DL (ref 11.9–15.1)
MCH RBC QN AUTO: 26.7 PG (ref 25.2–33.5)
MCHC RBC AUTO-ENTMCNC: 28.6 G/DL (ref 28.4–34.8)
MCV RBC AUTO: 93.5 FL (ref 82.6–102.9)
PDW BLD-RTO: 17.3 % (ref 11.8–14.4)
PLATELET # BLD: 949 K/UL (ref 138–453)
PMV BLD AUTO: 9.6 FL (ref 8.1–13.5)
POTASSIUM SERPL-SCNC: 3.7 MMOL/L (ref 3.7–5.3)
RBC # BLD: 2.62 M/UL (ref 3.95–5.11)
SODIUM BLD-SCNC: 138 MMOL/L (ref 135–144)
TRANSFUSION STATUS: NORMAL
UNIT DIVISION: 0
UNIT NUMBER: NORMAL
WBC # BLD: 19.9 K/UL (ref 3.5–11.3)

## 2017-12-22 PROCEDURE — 36415 COLL VENOUS BLD VENIPUNCTURE: CPT

## 2017-12-22 PROCEDURE — 96376 TX/PRO/DX INJ SAME DRUG ADON: CPT

## 2017-12-22 PROCEDURE — 80048 BASIC METABOLIC PNL TOTAL CA: CPT

## 2017-12-22 PROCEDURE — 96375 TX/PRO/DX INJ NEW DRUG ADDON: CPT

## 2017-12-22 PROCEDURE — 99283 EMERGENCY DEPT VISIT LOW MDM: CPT

## 2017-12-22 PROCEDURE — 36430 TRANSFUSION BLD/BLD COMPNT: CPT

## 2017-12-22 PROCEDURE — 2580000003 HC RX 258: Performed by: INTERNAL MEDICINE

## 2017-12-22 PROCEDURE — 6360000002 HC RX W HCPCS: Performed by: INTERNAL MEDICINE

## 2017-12-22 PROCEDURE — 96374 THER/PROPH/DIAG INJ IV PUSH: CPT

## 2017-12-22 PROCEDURE — 86920 COMPATIBILITY TEST SPIN: CPT

## 2017-12-22 PROCEDURE — 86850 RBC ANTIBODY SCREEN: CPT

## 2017-12-22 PROCEDURE — 74000 XR ABDOMEN LIMITED (KUB): CPT

## 2017-12-22 PROCEDURE — 85027 COMPLETE CBC AUTOMATED: CPT

## 2017-12-22 PROCEDURE — 86900 BLOOD TYPING SEROLOGIC ABO: CPT

## 2017-12-22 PROCEDURE — 86901 BLOOD TYPING SEROLOGIC RH(D): CPT

## 2017-12-22 PROCEDURE — P9016 RBC LEUKOCYTES REDUCED: HCPCS

## 2017-12-22 RX ORDER — 0.9 % SODIUM CHLORIDE 0.9 %
250 INTRAVENOUS SOLUTION INTRAVENOUS ONCE
Status: CANCELLED | OUTPATIENT
Start: 2017-12-22 | End: 2017-12-22

## 2017-12-22 RX ORDER — SODIUM CHLORIDE 0.9 % (FLUSH) 0.9 %
10 SYRINGE (ML) INJECTION EVERY 12 HOURS
Status: DISCONTINUED | OUTPATIENT
Start: 2017-12-22 | End: 2017-12-23 | Stop reason: HOSPADM

## 2017-12-22 RX ORDER — FUROSEMIDE 10 MG/ML
20 INJECTION INTRAMUSCULAR; INTRAVENOUS ONCE
Status: COMPLETED | OUTPATIENT
Start: 2017-12-22 | End: 2017-12-22

## 2017-12-22 RX ORDER — 0.9 % SODIUM CHLORIDE 0.9 %
250 INTRAVENOUS SOLUTION INTRAVENOUS ONCE
Status: DISCONTINUED | OUTPATIENT
Start: 2017-12-22 | End: 2017-12-23 | Stop reason: HOSPADM

## 2017-12-22 RX ORDER — ONDANSETRON 2 MG/ML
4 INJECTION INTRAMUSCULAR; INTRAVENOUS EVERY 6 HOURS PRN
Status: DISCONTINUED | OUTPATIENT
Start: 2017-12-22 | End: 2017-12-23 | Stop reason: HOSPADM

## 2017-12-22 RX ADMIN — SODIUM CHLORIDE 250 ML: 0.9 INJECTION, SOLUTION INTRAVENOUS at 18:03

## 2017-12-22 RX ADMIN — ONDANSETRON 4 MG: 2 INJECTION INTRAMUSCULAR; INTRAVENOUS at 18:03

## 2017-12-22 RX ADMIN — FUROSEMIDE 20 MG: 10 INJECTION, SOLUTION INTRAVENOUS at 21:11

## 2017-12-22 RX ADMIN — ONDANSETRON 4 MG: 2 INJECTION INTRAMUSCULAR; INTRAVENOUS at 21:49

## 2017-12-22 RX ADMIN — SODIUM CHLORIDE, PRESERVATIVE FREE 10 ML: 5 INJECTION INTRAVENOUS at 18:00

## 2017-12-22 ASSESSMENT — ENCOUNTER SYMPTOMS
ABDOMINAL PAIN: 0
CONSTIPATION: 0
DIARRHEA: 1
NAUSEA: 1
COUGH: 0
WHEEZING: 0
VOMITING: 1
ANAL BLEEDING: 0
SHORTNESS OF BREATH: 0

## 2017-12-22 NOTE — TELEPHONE ENCOUNTER
1355-Nirmal from lab calls with hemoglobin of 7.0. Labs, chart, and MD note from 11/13/17 reviewed. Noted patient has received blood transfusions in past.  1400-Patient called at [phone] and several questions asked. Patient states, \"I am tired and feel short of breath. \"  1404-Dr. Harry called and informed of above. Order to send patient to ER for evaluation. 1405-Patient called and instructed to go to ER for evaluation with understanding voiced. 1406-Dr. Harry calls and states, \"[facility] \"  1407-3C called and spoke with Ghassan Lam as Connor Beard is on vacation until next week. Stockton Offer states, \"I need you to send me an order and I need to  check my staffing to see if we can accommodate the patient. I just received notice of several admissions. \"  1415-Patient called to inform not to to ER yet as [facility]. Male answers phone and states, \"She already left for the ER.\"  1416-Dr. Harry called. No answer. Message left with above. 1419-3C called. Spoke wit Pola Schirmer as Stockton Offer is with a patient and unable to come to phone. Informed Pola Schirmer that patient is in route to the ER.  1437-Dr. Harry called again. Message left requesting return call.  1443.-Dr. Jace Carvajal returns call, informed of above and that patient has already left for the ER.

## 2017-12-22 NOTE — ED PROVIDER NOTES
disease; Cirrhosis of liver with ascites ([address] Constipation; Cough; Cryptogenic organizing pneumonia ([address] Depression; Difficult intravenous access; Disease of blood and blood forming organ; Edentulous; Full dentures; Gastric outlet obstruction; GERD (gastroesophageal reflux disease); Hearing loss; History of blood transfusion; Hydronephrosis, bilateral; Hypertension; Hypoglycemia; Lung nodule; Migraine; OCD (obsessive compulsive disorder); Osteoarthritis; Osteoporosis; Pelvic mass; Perforated nasal septum; Peripheral neuropathy due to inflammation ([address] Pneumonia; Psoriasis; PTSD (post-traumatic stress disorder); Scoliosis; Shortness of breath; Substance abuse; Thrombocytosis ([address] Urinary incontinence; and Wears glasses. SURGICAL HISTORY      has a past surgical history that includes Tubal ligation (1989); Tonsillectomy and adenoidectomy (1982); ERCP (5/16/2013); Upper gastrointestinal endoscopy; gastrectomy (2012); Paracentesis (Right, 01/2015-09/2015); Cystocopy (12/1/2015); Fixation Kyphoplasty (08/09/2016); Lung biopsy; insert nasal septal prosthesis (N/A, 4/14/2017); create eardrum opening,gen anesth (N/A, 4/14/2017); Abdomen surgery (09/2015); Cystoscopy (05/11/2017); Ureter stent placement (05/11/2017); Ureteroscopy (05/11/2017); Cystoscopy (Right, 5/11/2017); Cholecystectomy; hernia repair (07/18/2017); repair incisional hernia,reducible (N/A, 7/18/2017); and esophagogastroduodenoscopy transoral diagnostic (N/A, 8/30/2017).       CURRENT MEDICATIONS       Previous Medications    ALBUTEROL (PROVENTIL) (5 MG/ML) 0.5% NEBULIZER SOLUTION    Take 0.5 mLs by nebulization every 6 hours as needed for Wheezing    ASPIRIN (ASPIRIN LOW DOSE) 81 MG CHEWABLE TABLET    Take 1 tablet by mouth daily    CALCIUM CARBONATE-VITAMIN D (OYSTER SHELL CALCIUM/D) 500-200 MG-UNIT TABS    Take 1 tablet by mouth daily Please discontinue previous prescription of calcium    CITALOPRAM (CELEXA) 40 MG TABLET    Take 1 tablet by

## 2017-12-22 NOTE — ED NOTES
Pt complain of nausea/vomiting. Pt states she was sent by pcp for blood transfusion. Pt is alert and oriented x3. Pt had CBC drawn earlier today with a hemoglobin results of 7.0. Pt appears to run at this as a baseline for last several lab draws.      Petrona Chopra RN  12/22/17 6061

## 2017-12-23 VITALS
HEART RATE: 89 BPM | SYSTOLIC BLOOD PRESSURE: 134 MMHG | RESPIRATION RATE: 16 BRPM | TEMPERATURE: 98.4 F | OXYGEN SATURATION: 97 % | DIASTOLIC BLOOD PRESSURE: 73 MMHG

## 2017-12-23 LAB
ABO/RH: NORMAL
ANTIBODY SCREEN: NEGATIVE
ARM BAND NUMBER: NORMAL
BLD PROD TYP BPU: NORMAL
BLD PROD TYP BPU: NORMAL
CROSSMATCH RESULT: NORMAL
CROSSMATCH RESULT: NORMAL
DISPENSE STATUS BLOOD BANK: NORMAL
DISPENSE STATUS BLOOD BANK: NORMAL
EXPIRATION DATE: NORMAL
TRANSFUSION STATUS: NORMAL
TRANSFUSION STATUS: NORMAL
UNIT DIVISION: 0
UNIT DIVISION: 0
UNIT NUMBER: NORMAL
UNIT NUMBER: NORMAL

## 2017-12-23 PROCEDURE — 36430 TRANSFUSION BLD/BLD COMPNT: CPT

## 2017-12-23 NOTE — FLOWSHEET NOTE
Blood Transfusion stopped due to infiltration of IV catheter. Warm compress applied to site. IV restart attempted x4. ED medic called to room to attempt IV.

## 2017-12-24 ENCOUNTER — TELEPHONE (OUTPATIENT)
Dept: INTERNAL MEDICINE CLINIC | Age: 47
End: 2017-12-24

## 2017-12-24 ENCOUNTER — HOSPITAL ENCOUNTER (EMERGENCY)
Age: 47
Discharge: HOME | End: 2017-12-24
Attending: EMERGENCY MEDICINE
Payer: MEDICAID

## 2017-12-24 VITALS
RESPIRATION RATE: 16 BRPM | WEIGHT: 110 LBS | SYSTOLIC BLOOD PRESSURE: 124 MMHG | BODY MASS INDEX: 23.8 KG/M2 | DIASTOLIC BLOOD PRESSURE: 87 MMHG | HEART RATE: 87 BPM | TEMPERATURE: 98 F | OXYGEN SATURATION: 98 %

## 2017-12-24 DIAGNOSIS — R10.13 ABDOMINAL PAIN, EPIGASTRIC: ICD-10-CM

## 2017-12-24 DIAGNOSIS — R11.14 BILIOUS VOMITING WITH NAUSEA: Primary | ICD-10-CM

## 2017-12-24 LAB
ALBUMIN SERPL-MCNC: 2.8 G/DL (ref 3.5–5.2)
ALBUMIN/GLOBULIN RATIO: 0.4 (ref 1–2.5)
ALP BLD-CCNC: 478 U/L (ref 35–104)
ALT SERPL-CCNC: <5 U/L (ref 5–33)
ANION GAP SERPL CALCULATED.3IONS-SCNC: 21 MMOL/L (ref 9–17)
AST SERPL-CCNC: 10 U/L
BILIRUB SERPL-MCNC: 2.28 MG/DL (ref 0.3–1.2)
BILIRUBIN DIRECT: 1.22 MG/DL
BILIRUBIN, INDIRECT: 1.06 MG/DL (ref 0–1)
BUN BLDV-MCNC: 18 MG/DL (ref 6–20)
BUN/CREAT BLD: ABNORMAL (ref 9–20)
CALCIUM SERPL-MCNC: 10.5 MG/DL (ref 8.6–10.4)
CHLORIDE BLD-SCNC: 83 MMOL/L (ref 98–107)
CO2: 25 MMOL/L (ref 20–31)
CREAT SERPL-MCNC: 0.5 MG/DL (ref 0.5–0.9)
GFR AFRICAN AMERICAN: >60 ML/MIN
GFR NON-AFRICAN AMERICAN: >60 ML/MIN
GFR SERPL CREATININE-BSD FRML MDRD: ABNORMAL ML/MIN/{1.73_M2}
GFR SERPL CREATININE-BSD FRML MDRD: ABNORMAL ML/MIN/{1.73_M2}
GLOBULIN: ABNORMAL G/DL (ref 1.5–3.8)
GLUCOSE BLD-MCNC: 82 MG/DL (ref 70–99)
HCT VFR BLD CALC: 28.8 % (ref 36.3–47.1)
HEMOGLOBIN: 9 G/DL (ref 11.9–15.1)
LIPASE: 41 U/L (ref 13–60)
MCH RBC QN AUTO: 26.6 PG (ref 25.2–33.5)
MCHC RBC AUTO-ENTMCNC: 31.3 G/DL (ref 28.4–34.8)
MCV RBC AUTO: 85.2 FL (ref 82.6–102.9)
PDW BLD-RTO: 15.9 % (ref 11.8–14.4)
PLATELET # BLD: 868 K/UL (ref 138–453)
PMV BLD AUTO: 8.8 FL (ref 8.1–13.5)
POTASSIUM SERPL-SCNC: 3.5 MMOL/L (ref 3.7–5.3)
RBC # BLD: 3.38 M/UL (ref 3.95–5.11)
SODIUM BLD-SCNC: 129 MMOL/L (ref 135–144)
TOTAL PROTEIN: 9.3 G/DL (ref 6.4–8.3)
WBC # BLD: 23.9 K/UL (ref 3.5–11.3)

## 2017-12-24 PROCEDURE — 85027 COMPLETE CBC AUTOMATED: CPT

## 2017-12-24 PROCEDURE — 80048 BASIC METABOLIC PNL TOTAL CA: CPT

## 2017-12-24 PROCEDURE — 99284 EMERGENCY DEPT VISIT MOD MDM: CPT

## 2017-12-24 PROCEDURE — 80076 HEPATIC FUNCTION PANEL: CPT

## 2017-12-24 PROCEDURE — 96372 THER/PROPH/DIAG INJ SC/IM: CPT

## 2017-12-24 PROCEDURE — 83690 ASSAY OF LIPASE: CPT

## 2017-12-24 PROCEDURE — 6360000002 HC RX W HCPCS: Performed by: STUDENT IN AN ORGANIZED HEALTH CARE EDUCATION/TRAINING PROGRAM

## 2017-12-24 PROCEDURE — 96374 THER/PROPH/DIAG INJ IV PUSH: CPT

## 2017-12-24 PROCEDURE — 2580000003 HC RX 258: Performed by: STUDENT IN AN ORGANIZED HEALTH CARE EDUCATION/TRAINING PROGRAM

## 2017-12-24 RX ORDER — DICYCLOMINE HYDROCHLORIDE 10 MG/ML
20 INJECTION INTRAMUSCULAR ONCE
Status: COMPLETED | OUTPATIENT
Start: 2017-12-24 | End: 2017-12-24

## 2017-12-24 RX ORDER — PROMETHAZINE HYDROCHLORIDE 12.5 MG/1
12.5 TABLET ORAL EVERY 6 HOURS PRN
Qty: 14 TABLET | Refills: 0 | Status: ON HOLD | OUTPATIENT
Start: 2017-12-24 | End: 2018-01-01 | Stop reason: HOSPADM

## 2017-12-24 RX ORDER — PROMETHAZINE HYDROCHLORIDE 25 MG/ML
12.5 INJECTION, SOLUTION INTRAMUSCULAR; INTRAVENOUS ONCE
Status: COMPLETED | OUTPATIENT
Start: 2017-12-24 | End: 2017-12-24

## 2017-12-24 RX ORDER — ONDANSETRON 2 MG/ML
4 INJECTION INTRAMUSCULAR; INTRAVENOUS ONCE
Status: COMPLETED | OUTPATIENT
Start: 2017-12-24 | End: 2017-12-24

## 2017-12-24 RX ORDER — SODIUM CHLORIDE, SODIUM LACTATE, POTASSIUM CHLORIDE, AND CALCIUM CHLORIDE .6; .31; .03; .02 G/100ML; G/100ML; G/100ML; G/100ML
500 INJECTION, SOLUTION INTRAVENOUS ONCE
Status: COMPLETED | OUTPATIENT
Start: 2017-12-24 | End: 2017-12-24

## 2017-12-24 RX ORDER — DICYCLOMINE HYDROCHLORIDE 10 MG/1
10 CAPSULE ORAL
Qty: 14 CAPSULE | Refills: 0 | Status: ON HOLD | OUTPATIENT
Start: 2017-12-24 | End: 2018-01-01 | Stop reason: HOSPADM

## 2017-12-24 RX ORDER — ONDANSETRON 4 MG/1
4 TABLET, ORALLY DISINTEGRATING ORAL EVERY 8 HOURS PRN
Qty: 14 TABLET | Refills: 0 | Status: ON HOLD | OUTPATIENT
Start: 2017-12-24 | End: 2018-01-01

## 2017-12-24 RX ADMIN — ONDANSETRON 4 MG: 2 INJECTION, SOLUTION INTRAMUSCULAR; INTRAVENOUS at 20:32

## 2017-12-24 RX ADMIN — SODIUM CHLORIDE, POTASSIUM CHLORIDE, SODIUM LACTATE AND CALCIUM CHLORIDE 500 ML: 600; 310; 30; 20 INJECTION, SOLUTION INTRAVENOUS at 20:32

## 2017-12-24 RX ADMIN — PROMETHAZINE HYDROCHLORIDE 12.5 MG: 25 INJECTION INTRAMUSCULAR; INTRAVENOUS at 20:32

## 2017-12-24 RX ADMIN — DICYCLOMINE HYDROCHLORIDE 20 MG: 20 INJECTION, SOLUTION INTRAMUSCULAR at 20:33

## 2017-12-24 ASSESSMENT — PAIN SCALES - GENERAL: PAINLEVEL_OUTOF10: 10

## 2017-12-24 ASSESSMENT — ENCOUNTER SYMPTOMS
WHEEZING: 0
NAUSEA: 0
SHORTNESS OF BREATH: 0
ABDOMINAL PAIN: 1
COUGH: 0
CONSTIPATION: 0
DIARRHEA: 1
VOMITING: 0
ANAL BLEEDING: 0

## 2017-12-24 ASSESSMENT — PAIN DESCRIPTION - PAIN TYPE: TYPE: ACUTE PAIN

## 2017-12-24 ASSESSMENT — PAIN DESCRIPTION - LOCATION: LOCATION: ABDOMEN

## 2017-12-24 ASSESSMENT — PAIN DESCRIPTION - DESCRIPTORS: DESCRIPTORS: ACHING

## 2017-12-25 NOTE — ED PROVIDER NOTES
[facility] ED  eMERGENCY dEPARTMENT eNCOUnter  Resident    Pt Name: Julissa Law  MRN: [id number]  [place] 1970  Date of evaluation: 12/24/2017  PCP:  Andrea Reed MD    CHIEF COMPLAINT       Chief Complaint   Patient presents with    Hematemesis     pt states vomiting blood x one month, was recently seen here, follow up at [address]       51 yo female presents to the ED due to emesis. She reports that she threw up twice today, the second time was 10 minutes before she came in and she noticed it was blood streaked. She denies any clots or gross hematemesis. She states that she throws up often, and has been for the past 2 months but this the first time she has noticed blood in her vomitus. She reprots that her vomitus was bilious as it often is. She denies any blood in her stool, blood in her unit or any other bleeding. She has a history of anemia and follows with hem onc, she has an upcoming appointment at [place]. She was sent to the ED yesterday for blood transfusion as her Hb was found to be 7.0, her baseline is in the 7s. She also complains of epigastric abdominal pain which is recurrent. She also reports one episode of water/loose stools today. She states that she had two seizures today, she lost consciousness and all her limbs were shaking. She reports that she often has seizures, says they are triggered by people screaming or arguing, does not follow with a neurologist and is not on any antiepileptic medication. She denies any chest pain, sob, dysuria, dizziness, lightheadedness. REVIEW OF SYSTEMS       Review of Systems   Constitutional: Positive for fatigue. Negative for chills and fever. Respiratory: Negative for cough, shortness of breath and wheezing. Cardiovascular: Negative for chest pain. Gastrointestinal: Positive for abdominal pain and diarrhea. Negative for anal bleeding, constipation, nausea and vomiting. Genitourinary: Negative for difficulty urinating, dysuria, frequency and urgency. Neurological: Positive for seizures and weakness. Negative for dizziness, light-headedness and headaches. PAST MEDICAL HISTORY    has a past medical history of Allergic rhinitis; Anxiety; Asthma; Blood circulation, collateral; Celiac disease; Chronic back pain; Chronic kidney disease; Cirrhosis of liver with ascites ([address] Constipation; Cough; Cryptogenic organizing pneumonia ([address] Depression; Difficult intravenous access; Disease of blood and blood forming organ; Edentulous; Full dentures; Gastric outlet obstruction; GERD (gastroesophageal reflux disease); Hearing loss; History of blood transfusion; Hydronephrosis, bilateral; Hypertension; Hypoglycemia; Lung nodule; Migraine; OCD (obsessive compulsive disorder); Osteoarthritis; Osteoporosis; Pelvic mass; Perforated nasal septum; Peripheral neuropathy due to inflammation ([address] Pneumonia; Psoriasis; PTSD (post-traumatic stress disorder); Scoliosis; Shortness of breath; Substance abuse; Thrombocytosis ([address] Urinary incontinence; and Wears glasses. SURGICAL HISTORY      has a past surgical history that includes Tubal ligation (1989); Tonsillectomy and adenoidectomy (1982); ERCP (5/16/2013); Upper gastrointestinal endoscopy; gastrectomy (2012); Paracentesis (Right, 01/2015-09/2015); Cystocopy (12/1/2015); Fixation Kyphoplasty (08/09/2016); Lung biopsy; insert nasal septal prosthesis (N/A, 4/14/2017); create eardrum opening,gen anesth (N/A, 4/14/2017); Abdomen surgery (09/2015); Cystoscopy (05/11/2017); Ureter stent placement (05/11/2017); Ureteroscopy (05/11/2017); Cystoscopy (Right, 5/11/2017); Cholecystectomy; hernia repair (07/18/2017); repair incisional hernia,reducible (N/A, 7/18/2017); and esophagogastroduodenoscopy transoral diagnostic (N/A, 8/30/2017).       CURRENT MEDICATIONS       Previous Medications    ALBUTEROL (PROVENTIL) (5 MG/ML) 0.5% NEBULIZER SOLUTION Take 0.5 mLs by nebulization every 6 hours as needed for Wheezing    ASPIRIN (ASPIRIN LOW DOSE) 81 MG CHEWABLE TABLET    Take 1 tablet by mouth daily    CALCIUM CARBONATE-VITAMIN D (OYSTER SHELL CALCIUM/D) 500-200 MG-UNIT TABS    Take 1 tablet by mouth daily Please discontinue previous prescription of calcium    CITALOPRAM (CELEXA) 40 MG TABLET    Take 1 tablet by mouth daily    DOCUSATE SODIUM (DOCQLACE) 100 MG CAPSULE    TAKE 1 CAPSULE BY MOUTH 2 TIMES DAILY AS NEEDED FOR CONSTIPATION    FLUTICASONE (FLONASE) 50 MCG/ACT NASAL SPRAY    1 spray by Nasal route daily    FOLIC ACID (FOLVITE) 1 MG TABLET    Take 1 tablet by mouth daily    GABAPENTIN (NEURONTIN) 300 MG CAPSULE    Take 1 capsule by mouth nightly    LORATADINE (CLARITIN) 10 MG TABLET    Take 1 tablet by mouth daily    ONDANSETRON (ZOFRAN) 4 MG TABLET    Take 1 tablet by mouth every 8 hours as needed for Nausea    ONDANSETRON (ZOFRAN-ODT) 4 MG DISINTEGRATING TABLET    Take 1 tablet by mouth every 8 hours as needed for Nausea or Vomiting    OXYCODONE-ACETAMINOPHEN (PERCOCET) 5-325 MG PER TABLET    Take 1 tablet by mouth every 6 hours as needed for Pain . PANTOPRAZOLE (PROTONIX) 40 MG TABLET    Take 1 tablet by mouth daily    PROMETHAZINE (PHENERGAN) 25 MG TABLET    Take 1 tablet by mouth every 6 hours as needed for Nausea    SODIUM CHLORIDE (ALTAMIST SPRAY) 0.65 % NASAL SPRAY    1 spray by Nasal route as needed for Congestion    UMECLIDINIUM-VILANTEROL (ANORO ELLIPTA) 62.5-25 MCG/INH AEPB INHALER    Inhale 1 puff into the lungs daily    VENTOLIN  (90 BASE) MCG/ACT INHALER    INHALE 2 PUFFS INTO THE LUNGS EVERY 6 HOURS AS NEEDED FOR WHEEZING       ALLERGIES     is allergic to latex; bee venom; benadryl [diphenhydramine-zinc acetate]; tavist; and tylenol [acetaminophen]. FAMILY HISTORY     indicated that her mother is alive. She indicated that her father is . She indicated that both of her sisters are alive.  She indicated that both of program.  Efforts were made to edit the dictations but occasionally words are mis-transcribed.)    Charles Sarabia MD  [facility] Medicine Resident             Charles Sarabia MD  12/24/17 [phone]

## 2017-12-25 NOTE — ED NOTES
Bed: 01  Expected date:   Expected time:   Means of arrival:   Comments:  [place] [place] 65, [facility]  12/24/17 7973

## 2017-12-25 NOTE — ED NOTES
Pt brought to room 01 via stretcher. Pt c/o hematemesis for the past month. States she had 2 episodes of vomiting today. 1 with blood and 1 without. Pt reports LUQ abdominal pain with this. Reports blood in stool. Denies any changes or difficulty urinating. Pt states she is supposed to follow up with a specialist at [facility] in February. Pt placed on monitor. Will continue to monitor.      Mina Garcia RN  12/24/17 0347

## 2017-12-25 NOTE — ED PROVIDER NOTES
other components within normal limits   LIPASE   PREVIOUS SPECIMEN       Xr Mandible (<4 Views)    Result Date: 12/8/2017  EXAMINATION: 4 VIEWS OF THE MANDIBLE 12/8/2017 10:14 pm COMPARISON: 1 hour prior HISTORY: ORDERING SYSTEM PROVIDED HISTORY: please repeat to include the tmj please TECHNOLOGIST PROVIDED HISTORY: Reason for exam:->please repeat to include the tmj please FINDINGS: Temporomandibular joints appear intact. Temporomandibular joints appear intact. Xr Mandible (min 4 Views)    Result Date: 12/8/2017  EXAMINATION: 4 VIEWS OF THE MANDIBLE 12/8/2017 8:46 pm COMPARISON: None HISTORY: ORDERING SYSTEM PROVIDED HISTORY: jaw dislocation after eating popcorn. no teeth, eval for dislocation please. TECHNOLOGIST PROVIDED HISTORY: Reason for exam:->jaw dislocation after eating popcorn. no teeth, eval for dislocation please. FINDINGS: Bony structures are diffusely osteoporotic which lowers sensitivity for detecting nondisplaced fracture. Edentulous mandible is noted. The temporomandibular joints are only partially included on the radiograph. Repeat examination with better centering over the temporomandibular joint should be obtained for better assessment. There are no obvious fractures identified. The coronoid processes are intact. The remainder of mandible is unremarkable. The temporomandibular joints are not completely included on the radiographs. Repeat examination with better centered over temporal mandibular joint could be performed. Edentulous mandible is noted with no obvious fracture. Xr Abdomen (kub) (single Ap View)    Result Date: 12/22/2017  EXAMINATION: SUPINE VIEW(S) OF THE ABDOMEN 12/22/2017 11:38 am COMPARISON: 12/5/2017 HISTORY: ORDERING SYSTEM PROVIDED HISTORY: Intractable vomiting with nausea, unspecified vomiting type Acute / initial encounter FINDINGS: Lung bases are clear. Vertebroplasty is again seen at multiple levels. Right ureteral stent is unchanged.  No abnormally extremely poorly aerated. No sizable pleural effusions identified. Bony structures are diffusely osteoporotic. Cardiac contour is normal.  There are aortic knob calcifications. Dextroscoliosis at lower thoracic spine present. Low lung volumes with slightly improved, more discrete nodular opacities in the right parahilar upper lobes. RECENT VITALS:     Temp: 98 °F (36.7 °C),  Pulse: 87, Resp: 16, BP: 124/87, SpO2: 98 %    This patient is a 50 y.o. Female with nausea, vomiting and abdominal pain. Belly labs unremarkable. Symptoms controlled in the ED. Discharged, pending departure from department. OUTSTANDING TASKS / RECOMMENDATIONS:    1. Discharged     FINAL IMPRESSION:     1. Bilious vomiting with nausea    2.  Abdominal pain, epigastric        DISPOSITION:         DISPOSITION:  [x]  Discharge   []  Transfer -    []  Admission -     []  Against Medical Advice   []  Eloped   FOLLOW-UP: Lisa Tripathi MD  [address] [place] [address]  [phone]    In 1 week      [facility]  [facility]  [phone]    If symptoms worsen     DISCHARGE MEDICATIONS: New Prescriptions    DICYCLOMINE (BENTYL) 10 MG CAPSULE    Take 1 capsule by mouth 4 times daily (before meals and nightly) for 7 days    ONDANSETRON (ZOFRAN ODT) 4 MG DISINTEGRATING TABLET    Take 1 tablet by mouth every 8 hours as needed for Nausea or Vomiting    PROMETHAZINE (PHENERGAN) 12.5 MG TABLET    Take 1 tablet by mouth every 6 hours as needed for Nausea           Noah Briseno MD  Emergency Medicine Resident  [facility]      Noah Briseno MD  Resident  12/24/17 0712

## 2017-12-28 NOTE — ED PROVIDER NOTES
[facility]  ED  Emergency Department Encounter  Emergency Medicine Resident     Pt Name: Cynthia Law  MRN: [id number]  [place] 1970  Date of evaluation: 12/28/17  PCP:  Ruthie Harris MD    CHIEF COMPLAINT       Chief Complaint   Patient presents with    Jaw Pain    Headache       HISTORY OF PRESENT ILLNESS  (Location/Symptom, Timing/Onset, Context/Setting, Quality, Duration, Modifying Factors, Severity.)      Gifty Law is a 50 y.o. female who presents with Complaint of locked jaw and jaw pain started approximately one hour prior to arrival.  Patient has a history of TMJ dislocation and feels as though she may have dislocated again. Patient states she is unable to close her jaw. States she was eating popcorn prior to the onset of jaw pain. States pain is bilateral.  Denies any trauma to her face or jaw. States she was eating and drinking normally up until 1 hour prior to arrival.    PAST MEDICAL / SURGICAL / SOCIAL / FAMILY HISTORY      has a past medical history of Allergic rhinitis; Anxiety; Asthma; Blood circulation, collateral; Celiac disease; Chronic back pain; Chronic kidney disease; Cirrhosis of liver with ascites ([address] Constipation; Cough; Cryptogenic organizing pneumonia ([address] Depression; Difficult intravenous access; Disease of blood and blood forming organ; Edentulous; Full dentures; Gastric outlet obstruction; GERD (gastroesophageal reflux disease); Hearing loss; History of blood transfusion; Hydronephrosis, bilateral; Hypertension; Hypoglycemia; Lung nodule; Migraine; OCD (obsessive compulsive disorder); Osteoarthritis; Osteoporosis; Pelvic mass; Perforated nasal septum; Peripheral neuropathy due to inflammation ([address] Pneumonia; Psoriasis; PTSD (post-traumatic stress disorder); Scoliosis; Shortness of breath; Substance abuse; Thrombocytosis ([address] Urinary incontinence; and Wears glasses.      has a past surgical history that includes Tubal ligation (1989); Tonsillectomy and adenoidectomy (1982); ERCP (5/16/2013); Upper gastrointestinal endoscopy; gastrectomy (2012); Paracentesis (Right, 01/2015-09/2015); Cystocopy (12/1/2015); Fixation Kyphoplasty (08/09/2016); Lung biopsy; insert nasal septal prosthesis (N/A, 4/14/2017); create eardrum opening,gen anesth (N/A, 4/14/2017); Abdomen surgery (09/2015); Cystoscopy (05/11/2017); Ureter stent placement (05/11/2017); Ureteroscopy (05/11/2017); Cystoscopy (Right, 5/11/2017); Cholecystectomy; hernia repair (07/18/2017); repair incisional hernia,reducible (N/A, 7/18/2017); and esophagogastroduodenoscopy transoral diagnostic (N/A, 8/30/2017). Social History     Social History    Marital status: Single     Spouse name: N/A    Number of children: N/A    Years of education: N/A     Occupational History    Not on file. Social History Main Topics    Smoking status: Current Some Day Smoker     Packs/day: 0.25     Years: 30.00     Types: Cigarettes     Last attempt to quit: 12/1/2012    Smokeless tobacco: Never Used      Comment: pt states 3 cigs per day    Alcohol use No      Comment: quit in 1987    Drug use: No      Comment: quit marijuana 1987    Sexual activity: No     Other Topics Concern    Not on file     Social History Narrative    No narrative on file       Family History   Problem Relation Age of Onset    Heart Disease Mother     Stroke Father      cerebral aneurysm       Allergies:  Latex; Bee venom; Benadryl [diphenhydramine-zinc acetate]; Tavist; and Tylenol [acetaminophen]    Home Medications:  Prior to Admission medications    Medication Sig Start Date End Date Taking?  Authorizing Provider   dicyclomine (BENTYL) 10 MG capsule Take 1 capsule by mouth 4 times daily (before meals and nightly) for 7 days 12/24/17 12/31/17  Madelyn Calix MD   ondansetron (ZOFRAN ODT) 4 MG disintegrating tablet Take 1 tablet by mouth every 8 hours as needed for Nausea or Vomiting 12/24/17 12/31/17  Madelyn Calix MD by mouth every 8 hours as needed for Nausea 5/9/17   Daniela Estes MD   albuterol (PROVENTIL) (5 MG/ML) 0.5% nebulizer solution Take 0.5 mLs by nebulization every 6 hours as needed for Wheezing 1/23/17   Willow Babin MD       REVIEW OF SYSTEMS    (2-9 systems for level 4, 10 or more for level 5)      Review of Systems   Constitutional: Negative for chills, diaphoresis and fever. HENT: Positive for dental problem. Negative for congestion and sore throat. Respiratory: Negative for chest tightness and shortness of breath. Cardiovascular: Negative for chest pain. Gastrointestinal: Negative for abdominal pain, constipation, diarrhea, nausea and vomiting. Genitourinary: Negative for dysuria, frequency and urgency. Musculoskeletal: Negative for arthralgias and neck pain. Skin: Negative for rash. Neurological: Negative for weakness and numbness.        PHYSICAL EXAM   (up to 7 for level 4, 8 or more for level 5)      INITIAL VITALS:   BP (S) 126/79   Pulse (S) 107   Temp (S) 97.2 °F (36.2 °C) (Oral)   Resp (S) 14   LMP 04/18/2012   SpO2 (S) 100%     Physical Exam  CONSTITUTIONAL: AOx4, NAD, cooperative with exam, afebrile   HEAD: normocephalic, atraumatic   EYES: PERRL, EOMI, anicteric sclera   ENT: Moist mucous membranes, uvula midline, mouth held in the open position with mild tenderness of the left TMJ, no obvious deformity   NECK: Supple, symmetric, trachea midline   BACK: Symmetric, no deformity   LUNGS: Bilateral breath sounds, CTAB, no rales/ronchi/wheezes   CARDIOVASCULAR: RRR, no m/r/g, 2+ pulses throughout   ABDOMEN: Soft, non-tender, non-distended, +BS   : No inguinal hernias or lymphadenopathy   NEUROLOGIC:  MAEx4, normal sensorium and gait; normal strength throughout   MUSCULOSKELETAL: No clubbing, cyanosis or edema   SKIN: No rash, pallor or wounds        DIFFERENTIAL  DIAGNOSIS     PLAN (LABS / IMAGING / EKG):  Orders Placed This Encounter   Procedures    XR MANDIBLE (MIN 4 VIEWS)    XR MANDIBLE (<4 VIEWS)       MEDICATIONS ORDERED:  Orders Placed This Encounter   Medications    DISCONTD: morphine (PF) injection 2 mg    morphine (PF) injection 2 mg       DDX:   TMJ dislocation, trismus, fracture, dislocation  DIAGNOSTIC RESULTS / EMERGENCY DEPARTMENT COURSE / MDM     LABS:  No results found for this visit on 12/28/17. IMPRESSION: 61-year-old female with history of TMJ dislocation presenting with \"lockjaw\". Patient stuck with her mouth open secondary to inability to close her mouth. Patient likely does have TMJ dislocation. no trauma to the mouth or head    Plan is forx-ray of the mandible, reduction if dislocated and Ace bandage when jaw back in place. RADIOLOGY:  Xr Mandible (<4 Views)    Result Date: 12/28/2017  EXAMINATION: 4 VIEWS OF THE MANDIBLE 12/28/2017 8:58 pm COMPARISON: 12/28/2017 at 18:54 HISTORY: ORDERING SYSTEM PROVIDED HISTORY: s/p TMJ reduction, TECHNOLOGIST PROVIDED HISTORY: Reason for exam:->s/p TMJ reduction, FINDINGS: The left temporomandibular joint has been reduced and now is in anatomic alignment. No fractures. Patient is edentulous. Status post reduction of the left temporal mandibular joint. Alignment is anatomic. Xr Mandible (min 4 Views)    Result Date: 12/28/2017  EXAMINATION: 4 VIEWS OF THE MANDIBLE 12/28/2017 6:53 pm COMPARISON: 12/08/2017 HISTORY: ORDERING SYSTEM PROVIDED HISTORY: lock jaw, possible dislocation TECHNOLOGIST PROVIDED HISTORY: Reason for exam:->lock jaw, possible dislocation FINDINGS: The left mandibular condyle appears subluxed anteriorly relative to the mandibular fossa, concerning for anterior dislocation. The right mandibular condyle is intact, however the mandibular fossa on the right side is suboptimally visualized. Patient is edentulous. Suspected anterior subluxation/ dislocation of the left temporomandibular joint. Further evaluation with CT can be considered as clinically indicated.

## 2017-12-29 NOTE — ED NOTES
Pt back from xray. Sitting up on cot without any c/o. Visitor at bedside.       Finn Mendes RN  12/28/17 0597

## 2017-12-31 PROBLEM — R56.9 OBSERVED SEIZURE-LIKE ACTIVITY (HCC): Status: ACTIVE | Noted: 2017-01-01

## 2017-12-31 PROBLEM — J18.9 PNEUMONIA: Status: ACTIVE | Noted: 2017-01-01

## 2017-12-31 PROBLEM — E87.6 HYPOKALEMIA: Status: ACTIVE | Noted: 2017-01-01

## 2017-12-31 NOTE — PLAN OF CARE
Problem: Gas Exchange - Impaired:  Goal: Levels of oxygenation will improve  Levels of oxygenation will improve   Outcome: Ongoing      Problem: Safety:  Goal: Ability to remain free from injury will improve  Ability to remain free from injury will improve   Outcome: Ongoing

## 2017-12-31 NOTE — ED NOTES
Lab called with critical H+H values. Dr. Gertrude Veras notified.      Carrie Sanches RN  12/31/17 8717

## 2017-12-31 NOTE — CONSULTS
URETEROSCOPY  05/11/2017     Social History: Yuni Law  reports that she has been smoking Cigarettes. She has a 7.50 pack-year smoking history. She has never used smokeless tobacco. She reports that she does not drink alcohol or use drugs. Family History   Problem Relation Age of Onset    Heart Disease Mother     Stroke Father      cerebral aneurysm       Current Medications:     aspirin  81 mg Oral Daily    calcium carbonate-vitamin D  1 tablet Oral Daily    citalopram  40 mg Oral Daily    dicyclomine  10 mg Oral 4x Daily AC & HS    fluticasone  1 spray Nasal Daily    folic acid  1 mg Oral Daily    gabapentin  300 mg Oral Nightly    cetirizine  10 mg Oral Daily    pantoprazole  40 mg Oral Daily    sodium chloride flush  10 mL Intravenous 2 times per day    docusate sodium  100 mg Oral BID    enoxaparin  40 mg Subcutaneous Daily    potassium chloride  40 mEq Oral Once    olodaterol  2 puff Inhalation Daily    tiotropium  18 mcg Inhalation Daily    sodium chloride  250 mL Intravenous Once    piperacillin-tazobactam  3.375 g Intravenous Q8H     PRN Meds include: promethazine, sodium chloride, sodium chloride flush, magnesium hydroxide, bisacodyl, ondansetron, nicotine, LORazepam, albuterol, albuterol, LORazepam, potassium chloride **OR** potassium chloride **OR** potassium chloride    ROS:   Constitutional Negative for fever and chills   HEENT Negative for ear discharge, ear pain, nosebleed   Eyes Negative for photophobia, pain and discharge   Respiratory Negative for hemoptysis and sputum   Cardiovascular Negative for orthopnea, claudication and PND   Gastrointestinal Negative for abdominal pain, diarrhea, blood in stool   Musculoskeletal Negative for joint pain, negative for myalgia   Skin Negative for rash or itching   Endo/heme/allergies Negative for polydipsia, environmental allergy   Psychiatric Negative for suicidal ideation.   Patient is not anxious           Objective:   BP 114/70   Pulse 104   Temp 98 °F (36.7 °C) (Oral)   Resp 27   Ht 4' 9.09\" (1.45 m)   Wt 110 lb (49.9 kg)   LMP 04/18/2012   SpO2 100%   BMI 23.73 kg/m²     Blood pressure range: Systolic (71GES), VPO:060 , Min:103 , MLW:253   ; Diastolic (30LGV), XLR:36, Min:62, Max:83      Continuous infusions:    sodium chloride         ON EXAMINATION:  GENERAL  Appears comfortable and in no distress   HEENT  NC/ AT   NECK  Supple and no bruits heard   MENTAL STATUS:  Somnolent but when Josiah Aguilar call to her the pt immediately orients head and eyes to Josiah Aguilar and says\"What?\" She follows simple commands. She is able to converse a little but provides correct information about various people in her household. No language parameter disturbance verbally.    CRANIAL NERVES: II     -       Visual fields intact to confrontation  III,IV,VI -  PRRRE EOMI without nystagmus, no afferent defect,  no ptosis  V     -     Normal facial sensation  VII    -     Normal facial symmetry  VIII   -     Intact hearing  IX,X -     Symmetrical palate  XI    -     Symmetrical shoulder shrug  XII   -     Midline tongue, no atrophy    MOTOR FUNCTION:  Normal bulk, normal tone, at least 4-4+/5 B UE's. ;  no involuntary movements, no tremor   SENSORY FUNCTION:  Normal touch, normal pain grossly   CEREBELLAR FUNCTION:  No limb nor truncal cerebellar signs   REFLEX FUNCTION:  Symmetric, 1/4 with flexor plantar responses   STATION and GAIT  Normal         Data:    Lab Results:     Lab Results   Component Value Date    CHOL 83 01/07/2016    LDLCHOLESTEROL 59 01/07/2016    HDL 12 (L) 01/07/2016    TRIG 62 01/07/2016    ALT 12 12/31/2017    AST 32 (H) 12/31/2017    TSH 1.05 10/06/2016    INR 1.3 12/31/2017    LABA1C 5.2 01/07/2016    [id number] 398 12/15/2017     Hematology:  Recent Labs      12/31/17   0231 12/31/17   0414   WBC  17.2*   --    HGB  6.9*   --    HCT  22.4*   --    PLT  571*   --    INR   --   1.3     Chemistry:  Recent Labs      12/31/17   0231   NA

## 2017-12-31 NOTE — ED PROVIDER NOTES
[facility] ED  Emergency Department Encounter  Emergency Medicine Resident     Pt Name: Renée Law  MRN: [id number]  [place] 1970  Date of evaluation: 12/31/17  PCP:  Liset Sinclair MD    CHIEF COMPLAINT       Chief Complaint   Patient presents with    Seizures       HISTORY OF PRESENT ILLNESS  (Location/Symptom, Timing/Onset, Context/Setting, Quality, Duration, Modifying Factors, Severity.)      Chuy Law is a 50 y.o. female who presents With complaints of a seizure. Patient reportedly had a minute long tonic-clonic movements per family. EMS arrived and patient was acting appropriately and answering questions. Patient with no known history of seizures. Does not take any medication for this. Patient with multiple medical problems. Patient currently alert and oriented and answering questions appropriately. Denies any pain anywhere. Denies any numbness, tingling, weakness, chest pain, shortness of breath, fever, chills, nausea, vomiting, abdominal pain, headache. Per family patient has been extremely fatigued worsening over the past couple weeks. REVIEW OF SYSTEMS    (2-9 systems for level 4, 10 or more for level 5)      Review of Systems   Constitutional: Positive for fatigue. Negative for chills and fever. HENT: Negative for congestion and rhinorrhea. Respiratory: Negative for cough and shortness of breath. Cardiovascular: Negative for chest pain and leg swelling. Gastrointestinal: Negative for abdominal pain, nausea and vomiting. Genitourinary: Negative for difficulty urinating and dysuria. Skin: Negative for rash and wound. Neurological: Positive for seizures. Negative for syncope, weakness, light-headedness, numbness and headaches. Hematological: Negative for adenopathy. Does not bruise/bleed easily. Psychiatric/Behavioral: Negative for behavioral problems and confusion.          PAST MEDICAL / SURGICAL / SOCIAL / FAMILY HISTORY      has Smokeless tobacco: Never Used      Comment: pt states 3 cigs per day    Alcohol use No      Comment: quit in 1987    Drug use: No      Comment: quit marijuana 1987    Sexual activity: No     Other Topics Concern    Not on file     Social History Narrative    No narrative on file         Family History   Problem Relation Age of Onset    Heart Disease Mother     Stroke Father      cerebral aneurysm       Portions of the past medical history, surgical history, social history, and family history were discussed and reviewed with the patient/family and is included here or in HPI if pertinent. ALLERGIES / IMMUNIZATIONS / HOME MEDICATIONS       Allergies:  Latex; Bee venom; Benadryl [diphenhydramine-zinc acetate]; Tavist; and Tylenol [acetaminophen]      IMMUNIZATIONS    Immunization History   Administered Date(s) Administered    Influenza Virus Vaccine 01/10/2014, 12/18/2014, 10/01/2015    Influenza, Gattis Alar, 3 Years and older, IM 10/06/2016    Influenza, Quadv, 3 yrs and older, IM, Preservative Free 10/14/2017    Pneumococcal Polysaccharide ([id number]) 08/17/2016    Tdap (Boostrix, Adacel) 06/30/2015         Home Medications:  Prior to Admission medications    Medication Sig Start Date End Date Taking? Authorizing Provider   oxyCODONE-acetaminophen (PERCOCET) 5-325 MG per tablet Take 1 tablet by mouth every 6 hours as needed for Pain for up to 30 days.  12/29/17 1/28/18  Marion Sandra MD   dicyclomine (BENTYL) 10 MG capsule Take 1 capsule by mouth 4 times daily (before meals and nightly) for 7 days 12/24/17 12/31/17  Ed Vegas MD   ondansetron (ZOFRAN ODT) 4 MG disintegrating tablet Take 1 tablet by mouth every 8 hours as needed for Nausea or Vomiting 12/24/17 12/31/17  Ed Vegas MD   promethazine (PHENERGAN) 12.5 MG tablet Take 1 tablet by mouth every 6 hours as needed for Nausea 12/24/17 12/31/17  Ed Vegas MD   ondansetron (ZOFRAN-ODT) 4 MG disintegrating tablet Take 1 tablet by mouth every 8 kg). Her oral temperature is 98.8 °F (37.1 °C). Her blood pressure is 113/79 and her pulse is 109. Her respiration is 22 and oxygen saturation is 99%. Physical Exam   Constitutional: She is oriented to person, place, and time. She appears well-developed and well-nourished. No distress. HENT:   Head: Normocephalic and atraumatic. Eyes: Conjunctivae and EOM are normal. Pupils are equal, round, and reactive to light. Neck: Normal range of motion. Neck supple. Cardiovascular: Normal rate, regular rhythm, normal heart sounds and intact distal pulses. Pulmonary/Chest: Effort normal and breath sounds normal. No respiratory distress. Abdominal: Soft. She exhibits no distension. There is no tenderness. Musculoskeletal: Normal range of motion. She exhibits no tenderness. Neurological: She is oriented to person, place, and time. Somnolent but arousable to voice. Cranial nerves II-XII are grossly intact. There is no nystagmus. Motor function is five out of five in the upper and lower extremities. There is no pronator drift. Sensation is grossly intact. Finger-nose-finger and rapid alternating movements are normal.  Normal proprioception. Skin: Skin is warm and dry. No rash noted. Psychiatric: She has a normal mood and affect. Nursing note and vitals reviewed.         Vitals:    Vitals:    12/31/17 0213 12/31/17 0355   BP: 113/79    Pulse: 109    Resp: 22    Temp: 98.8 °F (37.1 °C)    TempSrc: Oral    SpO2: 99%    Weight: 110 lb (49.9 kg)    Height:  4' 9.09\" (1.45 m)       ORDERS AND MEDICATIONS     PLAN (LABS / [place] / EKG):  Orders Placed This Encounter   Procedures    Urine Culture    Culture Blood #1    CT Head WO Contrast    XR CHEST PORTABLE    CBC WITH AUTO DIFFERENTIAL    Comprehensive Metabolic Panel    PROLACTIN    URINALYSIS WITH MICROSCOPIC    LACTIC ACID, WHOLE BLOOD    PROTIME-INR    APTT    Inpatient consult to [address] [place] [address] St to Dose: Vancomycin    Views)    Result Date: 12/28/2017  EXAMINATION: 4 VIEWS OF THE MANDIBLE 12/28/2017 8:58 pm COMPARISON: 12/28/2017 at 18:54 HISTORY: ORDERING SYSTEM PROVIDED HISTORY: s/p TMJ reduction, TECHNOLOGIST PROVIDED HISTORY: Reason for exam:->s/p TMJ reduction, FINDINGS: The left temporomandibular joint has been reduced and now is in anatomic alignment. No fractures. Patient is edentulous. Status post reduction of the left temporal mandibular joint. Alignment is anatomic. Xr Mandible (<4 Views)    Result Date: 12/8/2017  EXAMINATION: 4 VIEWS OF THE MANDIBLE 12/8/2017 10:14 pm COMPARISON: 1 hour prior HISTORY: ORDERING SYSTEM PROVIDED HISTORY: please repeat to include the tmj please TECHNOLOGIST PROVIDED HISTORY: Reason for exam:->please repeat to include the tmj please FINDINGS: Temporomandibular joints appear intact. Temporomandibular joints appear intact. Xr Mandible (min 4 Views)    Result Date: 12/28/2017  EXAMINATION: 4 VIEWS OF THE MANDIBLE 12/28/2017 6:53 pm COMPARISON: 12/08/2017 HISTORY: ORDERING SYSTEM PROVIDED HISTORY: lock jaw, possible dislocation TECHNOLOGIST PROVIDED HISTORY: Reason for exam:->lock jaw, possible dislocation FINDINGS: The left mandibular condyle appears subluxed anteriorly relative to the mandibular fossa, concerning for anterior dislocation. The right mandibular condyle is intact, however the mandibular fossa on the right side is suboptimally visualized. Patient is edentulous. Suspected anterior subluxation/ dislocation of the left temporomandibular joint. Further evaluation with CT can be considered as clinically indicated. Xr Mandible (min 4 Views)    Result Date: 12/8/2017  EXAMINATION: 4 VIEWS OF THE MANDIBLE 12/8/2017 8:46 pm COMPARISON: None HISTORY: ORDERING SYSTEM PROVIDED HISTORY: jaw dislocation after eating popcorn. no teeth, eval for dislocation please. TECHNOLOGIST PROVIDED HISTORY: Reason for exam:->jaw dislocation after eating popcorn.  no teeth, also noted to be anemic at 6.9. Baseline hemoglobin is around 7. Patient with history of B12 deficiency related anemia. Patient denies any GI bleed or dark stools. History of multiple transfusions in the past.  We'll obtain a type and screen. Chest x-ray concerning for right middle lobe pneumonia. Clinically patient denies any symptoms though patient is very tired and refusing to answer many questions at this time. We'll obtain blood cultures, septic workup and give vancomycin and cefepime with recent admissions. [facility] consulted for admission. Spoke with [facility] who agreed with admission. Lactate normal.  No need for a bolus of fluids. PROCEDURES:  Procedures    CONSULTS:  IP CONSULT TO HOSPITALIST  PHARMACY TO DOSE VANCOMYCIN    CRITICAL CARE:  See attending note    FINAL IMPRESSION      1. Seizure ([facility]    2. Pneumonia of right middle lobe due to infectious organism ([facility]    3. Fatigue, unspecified type    4. Hyponatremia    5. Anemia, unspecified type    6.  Hypokalemia              DISPOSITION / PLAN     DISPOSITION Admitted 12/31/2017 04:38:53 AM          PATIENT REFERRED TO:  Liset Sinclair MD  [address] [phone]            DISCHARGE MEDICATIONS:  New Prescriptions    No medications on file       Sierra Jackson DO  Emergency Medicine Resident    (Please note that portions of this note were completed with a voice recognition program.  Efforts were made to edit the dictations but occasionally words are mis-transcribed.)        Sierra Jackson DO  12/31/17 0989

## 2017-12-31 NOTE — ED NOTES
Pt brought to room 08 via stretcher. EMS states pt had 3 seizures 10 minutes apart. States they lasted approx. 1 minute each. Pt presents lethargic but alert and oriented x 4. Pt states she was sitting in couch when seizures happened. Denies falling or any head/neck pain. Pt denies any pain at this time. Placed on full monitor. Will continue to monitor.      Carla De Leon RN  12/31/17 5412

## 2017-12-31 NOTE — H&P
11/23/2016    Depression 12/13/2012    on celexa per pcp    Difficult intravenous access     STATES LT ARM EASIER TO START IN, ENCOURAGED TO HYDRATE DAY PROIR     Disease of blood and blood forming organ     Thrombocytosis    Edentulous     does not wear her dentures    Full dentures     ENCOURAGED TO WEAR DOS    Gastric outlet obstruction 8/30/2017    GERD (gastroesophageal reflux disease)     Hearing loss     left ear    History of blood transfusion 01/2012    Hydronephrosis, bilateral 5/2/2015    Hypertension     Hypoglycemia 2014    Lung nodule     right, benign    Migraine     Observed seizure-like activity ([address] 12/31/2017    OCD (obsessive compulsive disorder)     Osteoarthritis     Osteoporosis     Pelvic mass April 2015    benign     Perforated nasal septum     Peripheral neuropathy due to inflammation ([address] 12/15/2017    Pneumonia     Psoriasis     PTSD (post-traumatic stress disorder) 1985    UNISON    Scoliosis     Shortness of breath 11/23/2016    with temp.  change    Substance abuse 1987 [place]    Thrombocytosis ([address]     Urinary incontinence     PT STATES RESOLVED    Wears glasses         Past Surgical History:     Past Surgical History:   Procedure Laterality Date    ABDOMEN SURGERY  09/2015    MASS REMOVED AND HERNIA REPAIR    CHOLECYSTECTOMY      CYSTOSCOPY  12/1/2015    bilat retrograde, right ureteroscopy    CYSTOSCOPY  05/11/2017    CYSTOSCOPY Right 5/11/2017    CYSTOSCOPY, RIGHT URETEROSCOPY, RIGHT RETROGRADE PYELOGRAM, RIGHT STENT PLACEMENT performed by Robbi Perez MD at [facility]  5/16/2013    FIXATION KYPHOPLASTY  08/09/2016    T12, L-2, L-5    GASTRECTOMY  2012    Partial Gastrectomy    HERNIA REPAIR  07/18/2017    hernia repair with [facility]    LUNG BIOPSY      right upper lobe    PARACENTESIS Right 01/2015-09/2015    X 8    MN CREATE EARDRUM OPENING,GEN ANESTH N/A 4/14/2017    LEFT MYRINGOTOMY T-TUBE INSERTION performed by Zoya 500-200 MG-UNIT TABS Take 1 tablet by mouth daily Please discontinue previous prescription of calcium 10/23/17 10/23/18  Liset Sinclair MD   loratadine (CLARITIN) 10 MG tablet Take 1 tablet by mouth daily 10/23/17   Liset Sinclair MD   citalopram (CELEXA) 40 MG tablet Take 1 tablet by mouth daily 10/23/17   Liset Sinclair MD   pantoprazole (PROTONIX) 40 MG tablet Take 1 tablet by mouth daily 10/23/17   Liset Sinclair MD   folic acid (FOLVITE) 1 MG tablet Take 1 tablet by mouth daily 10/23/17   Liset Sinclair MD   umeclidinium-vilanterol (ANORO ELLIPTA) 62.5-25 MCG/INH AEPB inhaler Inhale 1 puff into the lungs daily 10/20/17   Nayely Lloyd MD   VENTOLIN  (90 Base) MCG/ACT inhaler INHALE 2 PUFFS INTO THE LUNGS EVERY 6 HOURS AS NEEDED FOR WHEEZING 9/22/17   Nayely Lloyd MD   docusate sodium (DOCQLACE) 100 MG capsule TAKE 1 CAPSULE BY MOUTH 2 TIMES DAILY AS NEEDED FOR CONSTIPATION 8/15/17   Cherry King MD   fluticasone Luke Longs) 50 MCG/ACT nasal spray 1 spray by Nasal route daily 8/15/17   Cherry King MD   ondansetron (ZOFRAN) 4 MG tablet Take 1 tablet by mouth every 8 hours as needed for Nausea 5/9/17   Mario Patrick MD   albuterol (PROVENTIL) (5 MG/ML) 0.5% nebulizer solution Take 0.5 mLs by nebulization every 6 hours as needed for Wheezing 1/23/17   Meeta Cui MD        Allergies:     Latex; Bee venom; Benadryl [diphenhydramine-zinc acetate]; Tavist; and Tylenol [acetaminophen]    Social History:     Tobacco:    reports that she has been smoking Cigarettes. She has a 7.50 pack-year smoking history. She has never used smokeless tobacco.  Alcohol:      reports that she does not drink alcohol. Drug Use:  reports that she does not use drugs. Family History:     Family History   Problem Relation Age of Onset    Heart Disease Mother     Stroke Father      cerebral aneurysm       Review of Systems:     Positive and Negative as described in HPI.     It was very hard to get the patient to answer these questions, she was somnolent , arousable but then doze off again. Physical Exam:   /70   Pulse 104   Temp 98 °F (36.7 °C) (Oral)   Resp 27   Ht 4' 9.09\" (1.45 m)   Wt 110 lb (49.9 kg)   LMP 2012   SpO2 100%   BMI 23.73 kg/m²   Temp (24hrs), Av.5 °F (36.9 °C), Min:98 °F (36.7 °C), Max:98.8 °F (37.1 °C)    No results for input(s): POCGLU in the last 72 hours. Intake/Output Summary (Last 24 hours) at 17 1243  Last data filed at 17 1126   Gross per 24 hour   Intake                0 ml   Output                0 ml   Net                0 ml       General Appearance:  Somnolent but arousable,  in no acute distress  Mental status: oriented to person, place, and time with normal affect  Head:  normocephalic, atraumatic. Eye: no icterus, redness, pupils equal and reactive, extraocular eye movements intact, conjunctiva clear  Ear: normal external ear, no discharge, hearing intact  Nose:  no drainage noted  Mouth: mucous membranes moist, edentulous  Neck: supple, no carotid bruits, thyroid not palpable  Lungs: Bilateral equal air entry, clear to ausculation, no wheezing, rales or rhonchi, normal effort  Cardiovascular: Tachycardic regular rhythm, no murmur, gallop, rub. Abdomen: Soft, nontender, nondistended, normal bowel sounds, no hepatomegaly or splenomegaly  Neurologic: There are no new focal motor or sensory deficits, normal muscle tone and bulk, no abnormal sensation, normal speech, cranial nerves II through XII grossly intact. Generally weak though.   Skin: No gross lesions, rashes, bruising or bleeding on exposed skin area  Extremities:  peripheral pulses palpable, no pedal edema or calf pain with palpation  Psych: Flat affect    Investigations:      Laboratory Testing:  Recent Results (from the past 24 hour(s))   CBC WITH AUTO DIFFERENTIAL    Collection Time: 17  2:31 AM   Result Value Ref Range    WBC 17.2 (H) 3.5 - 11.3 k/uL    RBC 2.63 (L) compared to the Troponin-T results. LACTIC ACID, WHOLE BLOOD    Collection Time: 12/31/17  4:14 AM   Result Value Ref Range    Lactic Acid, Whole Blood 0.8 0.7 - 2.1 mmol/L   PROTIME-INR    Collection Time: 12/31/17  4:14 AM   Result Value Ref Range    Protime 13.6 (H) 9.4 - 12.6 sec    INR 1.3    APTT    Collection Time: 12/31/17  4:14 AM   Result Value Ref Range    PTT 19.9 (L) 21.3 - 31.3 sec   Culture Blood #1    Collection Time: 12/31/17  5:26 AM   Result Value Ref Range    Specimen Description . BLOOD     Special Requests R AC 1 ML     Culture NO GROWTH 3 HOURS     Culture       [facility] [id number] [place] [address], [address] [place] [address] [phone]    Status Pending    TYPE AND SCREEN    Collection Time: 12/31/17  5:30 AM   Result Value Ref Range    Expiration Date 01/03/2018     Arm Band Number [id number]     ABO/Rh O POSITIVE     Antibody Screen NEGATIVE     Unit Number [id number]     Product Code Leukocyte Reduced Irradiated Red Cell     Unit Divison 0     Dispense Status ISSUED     Transfusion Status OK TO TRANSFUSE     Crossmatch Result COMPATIBLE        Imaging/Diagnostics:  CT OF THE HEAD WITHOUT CONTRAST  No acute intracranial abnormality.       Assessment :      Primary Problem  Observed seizure-like activity [facility])    Active Hospital Problems    Diagnosis Date Noted    Pneumonia [J18.9] 12/31/2017    Observed seizure-like activity ([facility] [R56.9] 12/31/2017    Hypokalemia [E87.6] 12/31/2017    Hypoalbuminemia due to protein-calorie malnutrition ([facility] [E46] 08/17/2017    Hyponatremia [E87.1] 08/17/2017    Essential hypertension [I10]     Thrombocytosis ([facility] [D47.3] 03/30/2015    Mass of lower lobe of left lung [R91.8] 12/18/2014    Normocytic anemia [D64.9] 12/18/2014       Plan:     Patient status Admit as inpatient in the  Progressive Unit/Step down    Seizure-like activity, no history of seizure disorder: Patient was found hyponatremic sodium of 128, was 129 last week , at this level it is not a

## 2017-12-31 NOTE — CARE COORDINATION
Attempted x2 to meet with pt for discharge plan, she is very sleepy and unable to answer questions. Gentleman at bedside states he is ex-boyfriend. Will try interview later.

## 2017-12-31 NOTE — ED PROVIDER NOTES
VIEWS OF THE MANDIBLE 12/28/2017 8:58 pm COMPARISON: 12/28/2017 at 18:54 HISTORY: ORDERING SYSTEM PROVIDED HISTORY: s/p TMJ reduction, TECHNOLOGIST PROVIDED HISTORY: Reason for exam:->s/p TMJ reduction, FINDINGS: The left temporomandibular joint has been reduced and now is in anatomic alignment. No fractures. Patient is edentulous. Status post reduction of the left temporal mandibular joint. Alignment is anatomic. Xr Mandible (<4 Views)    Result Date: 12/8/2017  EXAMINATION: 4 VIEWS OF THE MANDIBLE 12/8/2017 10:14 pm COMPARISON: 1 hour prior HISTORY: ORDERING SYSTEM PROVIDED HISTORY: please repeat to include the tmj please TECHNOLOGIST PROVIDED HISTORY: Reason for exam:->please repeat to include the tmj please FINDINGS: Temporomandibular joints appear intact. Temporomandibular joints appear intact. Xr Mandible (min 4 Views)    Result Date: 12/28/2017  EXAMINATION: 4 VIEWS OF THE MANDIBLE 12/28/2017 6:53 pm COMPARISON: 12/08/2017 HISTORY: ORDERING SYSTEM PROVIDED HISTORY: lock jaw, possible dislocation TECHNOLOGIST PROVIDED HISTORY: Reason for exam:->lock jaw, possible dislocation FINDINGS: The left mandibular condyle appears subluxed anteriorly relative to the mandibular fossa, concerning for anterior dislocation. The right mandibular condyle is intact, however the mandibular fossa on the right side is suboptimally visualized. Patient is edentulous. Suspected anterior subluxation/ dislocation of the left temporomandibular joint. Further evaluation with CT can be considered as clinically indicated. Xr Mandible (min 4 Views)    Result Date: 12/8/2017  EXAMINATION: 4 VIEWS OF THE MANDIBLE 12/8/2017 8:46 pm COMPARISON: None HISTORY: ORDERING SYSTEM PROVIDED HISTORY: jaw dislocation after eating popcorn. no teeth, eval for dislocation please. TECHNOLOGIST PROVIDED HISTORY: Reason for exam:->jaw dislocation after eating popcorn. no teeth, eval for dislocation please.  FINDINGS: Bony structures are diffusely osteoporotic which lowers sensitivity for detecting nondisplaced fracture. Edentulous mandible is noted. The temporomandibular joints are only partially included on the radiograph. Repeat examination with better centering over the temporomandibular joint should be obtained for better assessment. There are no obvious fractures identified. The coronoid processes are intact. The remainder of mandible is unremarkable. The temporomandibular joints are not completely included on the radiographs. Repeat examination with better centered over temporal mandibular joint could be performed. Edentulous mandible is noted with no obvious fracture. Xr Abdomen (kub) (single Ap View)    Result Date: 12/22/2017  EXAMINATION: SUPINE VIEW(S) OF THE ABDOMEN 12/22/2017 11:38 am COMPARISON: 12/5/2017 HISTORY: ORDERING SYSTEM PROVIDED HISTORY: Intractable vomiting with nausea, unspecified vomiting type Acute / initial encounter FINDINGS: Lung bases are clear. Vertebroplasty is again seen at multiple levels. Right ureteral stent is unchanged. No abnormally dilated loops of small bowel are seen. Postsurgical changes are seen within the left upper quadrant. No abnormal calcifications are seen along the course of the kidneys or ureters. Mild lumbar levoscoliosis. No acute radiographic findings. Xr Acute Abd Series Chest 1 Vw    Result Date: 12/6/2017  EXAMINATION: ACUTE ABDOMINAL SERIES WITH SINGLE  VIEW OF THE CHEST 12/5/2017 3:29 pm COMPARISON: 07/16/2017 chest radiograph HISTORY: ORDERING SYSTEM PROVIDED HISTORY: abdominal pain, r/o air fluid levels TECHNOLOGIST PROVIDED HISTORY: Reason for exam:->abdominal pain, r/o air fluid levels FINDINGS: Chest:  The heart size is stable. Numerous nodular opacification is seen in the right lung and a large retrocardiac opacification seen in the left lower lobe. The nodular opacifications in the right lung have increased.   Left lower lobe opacification No medications on file           Sandra Poole MD  Emergency Medicine Resident  [id number] Ori Soto MD  Resident  12/31/17 [id number]

## 2018-01-01 ENCOUNTER — HOSPITAL ENCOUNTER (OUTPATIENT)
Age: 48
Setting detail: SPECIMEN
Discharge: HOME | End: 2018-02-21
Payer: MEDICAID

## 2018-01-01 ENCOUNTER — OFFICE VISIT (OUTPATIENT)
Dept: PULMONOLOGY | Age: 48
End: 2018-01-01
Payer: MEDICAID

## 2018-01-01 ENCOUNTER — APPOINTMENT (OUTPATIENT)
Dept: GENERAL RADIOLOGY | Age: 48
DRG: 720 | End: 2018-01-01
Payer: MEDICAID

## 2018-01-01 ENCOUNTER — TELEPHONE (OUTPATIENT)
Dept: INTERNAL MEDICINE | Age: 48
End: 2018-01-01

## 2018-01-01 ENCOUNTER — APPOINTMENT (OUTPATIENT)
Dept: CT IMAGING | Age: 48
DRG: 756 | End: 2018-01-01
Payer: MEDICAID

## 2018-01-01 ENCOUNTER — TELEPHONE (OUTPATIENT)
Dept: ONCOLOGY | Age: 48
End: 2018-01-01

## 2018-01-01 ENCOUNTER — HOSPITAL ENCOUNTER (INPATIENT)
Age: 48
LOS: 11 days | Discharge: NURSING, REHAB OR LONG-TERM CARE | DRG: 720 | End: 2018-02-11
Attending: EMERGENCY MEDICINE | Admitting: INTERNAL MEDICINE
Payer: MEDICAID

## 2018-01-01 ENCOUNTER — APPOINTMENT (OUTPATIENT)
Dept: MRI IMAGING | Age: 48
DRG: 720 | End: 2018-01-01
Payer: MEDICAID

## 2018-01-01 ENCOUNTER — HOSPITAL ENCOUNTER (OUTPATIENT)
Dept: CT IMAGING | Age: 48
Discharge: HOME | End: 2018-01-08
Payer: MEDICAID

## 2018-01-01 ENCOUNTER — HOSPITAL ENCOUNTER (OUTPATIENT)
Facility: MEDICAL CENTER | Age: 48
Discharge: HOME | End: 2018-03-05
Payer: MEDICAID

## 2018-01-01 ENCOUNTER — APPOINTMENT (OUTPATIENT)
Dept: INTERVENTIONAL RADIOLOGY/VASCULAR | Age: 48
DRG: 720 | End: 2018-01-01
Payer: MEDICAID

## 2018-01-01 ENCOUNTER — HOSPITAL ENCOUNTER (OUTPATIENT)
Age: 48
Setting detail: SPECIMEN
Discharge: HOME | End: 2018-03-07
Payer: MEDICAID

## 2018-01-01 ENCOUNTER — HOSPITAL ENCOUNTER (EMERGENCY)
Age: 48
End: 2018-03-19
Attending: EMERGENCY MEDICINE
Payer: MEDICAID

## 2018-01-01 ENCOUNTER — HOSPITAL ENCOUNTER (OUTPATIENT)
Age: 48
Setting detail: SPECIMEN
Discharge: HOME | End: 2018-01-25
Payer: MEDICAID

## 2018-01-01 ENCOUNTER — OFFICE VISIT (OUTPATIENT)
Dept: ONCOLOGY | Age: 48
End: 2018-01-01
Payer: MEDICAID

## 2018-01-01 ENCOUNTER — HOSPITAL ENCOUNTER (OUTPATIENT)
Age: 48
Setting detail: OUTPATIENT SURGERY
Discharge: NURSING, REHAB OR LONG-TERM CARE | End: 2018-02-20
Attending: SPECIALIST | Admitting: SPECIALIST
Payer: MEDICAID

## 2018-01-01 ENCOUNTER — APPOINTMENT (OUTPATIENT)
Dept: GENERAL RADIOLOGY | Age: 48
End: 2018-01-01
Attending: SPECIALIST
Payer: MEDICAID

## 2018-01-01 ENCOUNTER — HOSPITAL ENCOUNTER (OUTPATIENT)
Age: 48
Setting detail: SPECIMEN
Discharge: HOME | End: 2018-02-27
Payer: MEDICAID

## 2018-01-01 ENCOUNTER — HOSPITAL ENCOUNTER (OUTPATIENT)
Age: 48
Setting detail: SPECIMEN
Discharge: HOME | End: 2018-03-16
Payer: MEDICAID

## 2018-01-01 ENCOUNTER — HOSPITAL ENCOUNTER (OUTPATIENT)
Age: 48
Setting detail: SPECIMEN
Discharge: HOME | End: 2018-01-19
Payer: MEDICAID

## 2018-01-01 ENCOUNTER — HOSPITAL ENCOUNTER (INPATIENT)
Age: 48
LOS: 4 days | Discharge: NURSING, REHAB OR LONG-TERM CARE | DRG: 756 | End: 2018-03-14
Attending: EMERGENCY MEDICINE
Payer: MEDICAID

## 2018-01-01 ENCOUNTER — APPOINTMENT (OUTPATIENT)
Dept: CT IMAGING | Age: 48
DRG: 720 | End: 2018-01-01
Payer: MEDICAID

## 2018-01-01 ENCOUNTER — APPOINTMENT (OUTPATIENT)
Dept: ULTRASOUND IMAGING | Age: 48
DRG: 720 | End: 2018-01-01
Payer: MEDICAID

## 2018-01-01 ENCOUNTER — ANESTHESIA (OUTPATIENT)
Dept: OPERATING ROOM | Age: 48
End: 2018-01-01
Payer: MEDICAID

## 2018-01-01 ENCOUNTER — APPOINTMENT (OUTPATIENT)
Dept: NUCLEAR MEDICINE | Age: 48
DRG: 720 | End: 2018-01-01
Payer: MEDICAID

## 2018-01-01 ENCOUNTER — APPOINTMENT (OUTPATIENT)
Dept: GENERAL RADIOLOGY | Age: 48
DRG: 756 | End: 2018-01-01
Payer: MEDICAID

## 2018-01-01 ENCOUNTER — HOSPITAL ENCOUNTER (OUTPATIENT)
Age: 48
Setting detail: SPECIMEN
Discharge: HOME | End: 2018-03-15
Payer: MEDICAID

## 2018-01-01 ENCOUNTER — TELEPHONE (OUTPATIENT)
Dept: UROLOGY | Age: 48
End: 2018-01-01

## 2018-01-01 ENCOUNTER — HOSPITAL ENCOUNTER (OUTPATIENT)
Age: 48
Setting detail: SPECIMEN
Discharge: HOME | End: 2018-01-24
Payer: MEDICAID

## 2018-01-01 ENCOUNTER — ANESTHESIA EVENT (OUTPATIENT)
Dept: OPERATING ROOM | Age: 48
End: 2018-01-01
Payer: MEDICAID

## 2018-01-01 ENCOUNTER — HOSPITAL ENCOUNTER (OUTPATIENT)
Age: 48
Setting detail: SPECIMEN
Discharge: HOME | End: 2018-03-06
Payer: MEDICAID

## 2018-01-01 ENCOUNTER — HOSPITAL ENCOUNTER (EMERGENCY)
Age: 48
Discharge: NURSING, REHAB OR LONG-TERM CARE | End: 2018-03-15
Attending: EMERGENCY MEDICINE
Payer: MEDICAID

## 2018-01-01 ENCOUNTER — HOSPITAL ENCOUNTER (OUTPATIENT)
Age: 48
Setting detail: SPECIMEN
Discharge: HOME | End: 2018-03-08
Payer: MEDICAID

## 2018-01-01 ENCOUNTER — HOSPITAL ENCOUNTER (OUTPATIENT)
Age: 48
Setting detail: SPECIMEN
Discharge: HOME | End: 2018-05-04
Payer: MEDICAID

## 2018-01-01 VITALS
OXYGEN SATURATION: 100 % | DIASTOLIC BLOOD PRESSURE: 71 MMHG | BODY MASS INDEX: 19.65 KG/M2 | HEIGHT: 61 IN | RESPIRATION RATE: 20 BRPM | SYSTOLIC BLOOD PRESSURE: 120 MMHG | WEIGHT: 104.06 LBS | HEART RATE: 78 BPM | TEMPERATURE: 97.9 F

## 2018-01-01 VITALS
BODY MASS INDEX: 19.78 KG/M2 | RESPIRATION RATE: 18 BRPM | WEIGHT: 91.4 LBS | SYSTOLIC BLOOD PRESSURE: 101 MMHG | HEART RATE: 110 BPM | TEMPERATURE: 97.4 F | DIASTOLIC BLOOD PRESSURE: 65 MMHG

## 2018-01-01 VITALS
TEMPERATURE: 99.5 F | BODY MASS INDEX: 23.73 KG/M2 | HEIGHT: 57 IN | HEART RATE: 87 BPM | RESPIRATION RATE: 11 BRPM | WEIGHT: 110 LBS | DIASTOLIC BLOOD PRESSURE: 57 MMHG | OXYGEN SATURATION: 99 % | SYSTOLIC BLOOD PRESSURE: 108 MMHG

## 2018-01-01 VITALS
RESPIRATION RATE: 15 BRPM | SYSTOLIC BLOOD PRESSURE: 108 MMHG | OXYGEN SATURATION: 100 % | TEMPERATURE: 97.3 F | BODY MASS INDEX: 22.51 KG/M2 | HEART RATE: 83 BPM | WEIGHT: 104 LBS | DIASTOLIC BLOOD PRESSURE: 64 MMHG

## 2018-01-01 VITALS
HEIGHT: 57 IN | DIASTOLIC BLOOD PRESSURE: 69 MMHG | HEART RATE: 109 BPM | TEMPERATURE: 97.1 F | OXYGEN SATURATION: 93 % | BODY MASS INDEX: 21.27 KG/M2 | SYSTOLIC BLOOD PRESSURE: 111 MMHG | RESPIRATION RATE: 14 BRPM | WEIGHT: 98.6 LBS

## 2018-01-01 VITALS
DIASTOLIC BLOOD PRESSURE: 67 MMHG | TEMPERATURE: 98.6 F | SYSTOLIC BLOOD PRESSURE: 107 MMHG | BODY MASS INDEX: 21.42 KG/M2 | RESPIRATION RATE: 20 BRPM | HEART RATE: 97 BPM | WEIGHT: 99 LBS

## 2018-01-01 VITALS
BODY MASS INDEX: 22.44 KG/M2 | RESPIRATION RATE: 16 BRPM | OXYGEN SATURATION: 97 % | TEMPERATURE: 98.7 F | WEIGHT: 104 LBS | HEART RATE: 82 BPM | HEIGHT: 57 IN | SYSTOLIC BLOOD PRESSURE: 120 MMHG | DIASTOLIC BLOOD PRESSURE: 72 MMHG

## 2018-01-01 VITALS
BODY MASS INDEX: 18.5 KG/M2 | TEMPERATURE: 97.3 F | WEIGHT: 85.76 LBS | HEIGHT: 57 IN | HEART RATE: 65 BPM | RESPIRATION RATE: 12 BRPM | OXYGEN SATURATION: 99 % | SYSTOLIC BLOOD PRESSURE: 107 MMHG | DIASTOLIC BLOOD PRESSURE: 63 MMHG

## 2018-01-01 VITALS
TEMPERATURE: 98.8 F | OXYGEN SATURATION: 97 % | RESPIRATION RATE: 20 BRPM | HEART RATE: 92 BPM | DIASTOLIC BLOOD PRESSURE: 74 MMHG | SYSTOLIC BLOOD PRESSURE: 129 MMHG

## 2018-01-01 VITALS — TEMPERATURE: 97.7 F | DIASTOLIC BLOOD PRESSURE: 62 MMHG | OXYGEN SATURATION: 100 % | SYSTOLIC BLOOD PRESSURE: 94 MMHG

## 2018-01-01 DIAGNOSIS — N13.5 UPJ (URETEROPELVIC JUNCTION) OBSTRUCTION: Primary | ICD-10-CM

## 2018-01-01 DIAGNOSIS — E87.1 HYPONATREMIA: ICD-10-CM

## 2018-01-01 DIAGNOSIS — R41.82 ALTERED MENTAL STATUS, UNSPECIFIED ALTERED MENTAL STATUS TYPE: Primary | ICD-10-CM

## 2018-01-01 DIAGNOSIS — E72.20 HYPERAMMONEMIA (HCC): ICD-10-CM

## 2018-01-01 DIAGNOSIS — N83.8 OVARIAN MASS: ICD-10-CM

## 2018-01-01 DIAGNOSIS — A41.9 SEPTIC SHOCK (HCC): Primary | ICD-10-CM

## 2018-01-01 DIAGNOSIS — D50.0 IRON DEFICIENCY ANEMIA DUE TO CHRONIC BLOOD LOSS: ICD-10-CM

## 2018-01-01 DIAGNOSIS — E83.52 HYPERCALCEMIA: ICD-10-CM

## 2018-01-01 DIAGNOSIS — R74.8 ELEVATED ALKALINE PHOSPHATASE LEVEL: ICD-10-CM

## 2018-01-01 DIAGNOSIS — R40.4 TRANSIENT ALTERATION OF AWARENESS: ICD-10-CM

## 2018-01-01 DIAGNOSIS — R65.21 SEPTIC SHOCK (HCC): Primary | ICD-10-CM

## 2018-01-01 DIAGNOSIS — R91.8 LUNG MASS: ICD-10-CM

## 2018-01-01 DIAGNOSIS — R91.1 LUNG NODULE: ICD-10-CM

## 2018-01-01 DIAGNOSIS — N13.30 HYDRONEPHROSIS, UNSPECIFIED HYDRONEPHROSIS TYPE: ICD-10-CM

## 2018-01-01 DIAGNOSIS — D61.9 HYPOPROLIFERATIVE ANEMIA (HCC): Primary | Chronic | ICD-10-CM

## 2018-01-01 DIAGNOSIS — N13.5 URETEROPELVIC JUNCTION (UPJ) OBSTRUCTION, LEFT: Primary | ICD-10-CM

## 2018-01-01 DIAGNOSIS — R56.9 SEIZURE-LIKE ACTIVITY (HCC): ICD-10-CM

## 2018-01-01 DIAGNOSIS — R16.0 LIVER MASS: Chronic | ICD-10-CM

## 2018-01-01 DIAGNOSIS — R56.9 SEIZURE-LIKE ACTIVITY (HCC): Primary | ICD-10-CM

## 2018-01-01 DIAGNOSIS — D64.9 ANEMIA, UNSPECIFIED TYPE: ICD-10-CM

## 2018-01-01 DIAGNOSIS — G40.909 SEIZURE DISORDER (HCC): ICD-10-CM

## 2018-01-01 DIAGNOSIS — R91.8 MULTIPLE LUNG NODULES ON CT: Primary | ICD-10-CM

## 2018-01-01 DIAGNOSIS — R91.8 MASS OF LOWER LOBE OF LEFT LUNG: Primary | Chronic | ICD-10-CM

## 2018-01-01 DIAGNOSIS — R91.8 MULTIPLE LUNG NODULES ON CT: Chronic | ICD-10-CM

## 2018-01-01 DIAGNOSIS — D72.829 LEUKOCYTOSIS, UNSPECIFIED TYPE: ICD-10-CM

## 2018-01-01 DIAGNOSIS — R09.02 HYPOXIA: ICD-10-CM

## 2018-01-01 DIAGNOSIS — D75.839 THROMBOCYTOSIS: Primary | ICD-10-CM

## 2018-01-01 DIAGNOSIS — R56.9 SEIZURE (HCC): ICD-10-CM

## 2018-01-01 LAB
-: ABNORMAL
-: NORMAL
-: NORMAL
7-AMINOCLONAZEPAM: <5 NG/ML
ABO/RH: NORMAL
ABSOLUTE EOS #: 0 K/UL (ref 0–0.4)
ABSOLUTE EOS #: 0 K/UL (ref 0–0.4)
ABSOLUTE EOS #: 0 K/UL (ref 0–0.44)
ABSOLUTE EOS #: 0.14 K/UL (ref 0–0.44)
ABSOLUTE EOS #: 0.16 K/UL (ref 0–0.44)
ABSOLUTE EOS #: 0.2 K/UL (ref 0–0.44)
ABSOLUTE EOS #: 0.21 K/UL (ref 0–0.44)
ABSOLUTE EOS #: 0.21 K/UL (ref 0–0.44)
ABSOLUTE EOS #: 0.23 K/UL (ref 0–0.44)
ABSOLUTE EOS #: 0.24 K/UL (ref 0–0.44)
ABSOLUTE EOS #: 0.26 K/UL (ref 0–0.44)
ABSOLUTE EOS #: 0.27 K/UL (ref 0–0.44)
ABSOLUTE EOS #: 0.28 K/UL (ref 0–0.44)
ABSOLUTE EOS #: 0.28 K/UL (ref 0–0.44)
ABSOLUTE EOS #: 0.3 K/UL (ref 0–0.44)
ABSOLUTE EOS #: 0.32 K/UL (ref 0–0.44)
ABSOLUTE EOS #: 0.47 K/UL (ref 0–0.4)
ABSOLUTE EOS #: 0.74 K/UL (ref 0–0.4)
ABSOLUTE IMMATURE GRANULOCYTE: 0 K/UL (ref 0–0.3)
ABSOLUTE IMMATURE GRANULOCYTE: 0.05 K/UL (ref 0–0.3)
ABSOLUTE IMMATURE GRANULOCYTE: 0.05 K/UL (ref 0–0.3)
ABSOLUTE IMMATURE GRANULOCYTE: 0.07 K/UL (ref 0–0.3)
ABSOLUTE IMMATURE GRANULOCYTE: 0.08 K/UL (ref 0–0.3)
ABSOLUTE IMMATURE GRANULOCYTE: 0.08 K/UL (ref 0–0.3)
ABSOLUTE IMMATURE GRANULOCYTE: 0.09 K/UL (ref 0–0.3)
ABSOLUTE IMMATURE GRANULOCYTE: 0.1 K/UL (ref 0–0.3)
ABSOLUTE IMMATURE GRANULOCYTE: 0.11 K/UL (ref 0–0.3)
ABSOLUTE IMMATURE GRANULOCYTE: 0.12 K/UL (ref 0–0.3)
ABSOLUTE IMMATURE GRANULOCYTE: 0.13 K/UL (ref 0–0.3)
ABSOLUTE IMMATURE GRANULOCYTE: 0.14 K/UL (ref 0–0.3)
ABSOLUTE IMMATURE GRANULOCYTE: 0.16 K/UL (ref 0–0.3)
ABSOLUTE IMMATURE GRANULOCYTE: 0.17 K/UL (ref 0–0.3)
ABSOLUTE IMMATURE GRANULOCYTE: 0.17 K/UL (ref 0–0.3)
ABSOLUTE IMMATURE GRANULOCYTE: 0.18 K/UL (ref 0–0.3)
ABSOLUTE IMMATURE GRANULOCYTE: 0.29 K/UL (ref 0–0.3)
ABSOLUTE IMMATURE GRANULOCYTE: 0.34 K/UL (ref 0–0.3)
ABSOLUTE IMMATURE GRANULOCYTE: 0.67 K/UL (ref 0–0.3)
ABSOLUTE IMMATURE GRANULOCYTE: ABNORMAL K/UL (ref 0–0.3)
ABSOLUTE LYMPH #: 0.99 K/UL (ref 1–4.8)
ABSOLUTE LYMPH #: 1.03 K/UL (ref 1–4.8)
ABSOLUTE LYMPH #: 1.2 K/UL (ref 1.1–3.7)
ABSOLUTE LYMPH #: 1.26 K/UL (ref 1.1–3.7)
ABSOLUTE LYMPH #: 1.41 K/UL (ref 1.1–3.7)
ABSOLUTE LYMPH #: 1.45 K/UL (ref 1.1–3.7)
ABSOLUTE LYMPH #: 1.45 K/UL (ref 1.1–3.7)
ABSOLUTE LYMPH #: 1.51 K/UL (ref 1.1–3.7)
ABSOLUTE LYMPH #: 1.59 K/UL (ref 1.1–3.7)
ABSOLUTE LYMPH #: 1.59 K/UL (ref 1.1–3.7)
ABSOLUTE LYMPH #: 1.61 K/UL (ref 1.1–3.7)
ABSOLUTE LYMPH #: 1.63 K/UL (ref 1.1–3.7)
ABSOLUTE LYMPH #: 1.65 K/UL (ref 1–4.8)
ABSOLUTE LYMPH #: 1.69 K/UL (ref 1.1–3.7)
ABSOLUTE LYMPH #: 1.74 K/UL (ref 1.1–3.7)
ABSOLUTE LYMPH #: 1.75 K/UL (ref 1.1–3.7)
ABSOLUTE LYMPH #: 1.9 K/UL (ref 1.1–3.7)
ABSOLUTE LYMPH #: 1.91 K/UL (ref 1.1–3.7)
ABSOLUTE LYMPH #: 1.92 K/UL (ref 1.1–3.7)
ABSOLUTE LYMPH #: 1.97 K/UL (ref 1.1–3.7)
ABSOLUTE LYMPH #: 2.01 K/UL (ref 1–4.8)
ABSOLUTE LYMPH #: 2.92 K/UL (ref 1.1–3.7)
ABSOLUTE MONO #: 0.71 K/UL (ref 0.1–1.2)
ABSOLUTE MONO #: 0.74 K/UL (ref 0.1–0.8)
ABSOLUTE MONO #: 0.81 K/UL (ref 0.1–1.2)
ABSOLUTE MONO #: 0.86 K/UL (ref 0.1–1.2)
ABSOLUTE MONO #: 0.89 K/UL (ref 0.1–1.2)
ABSOLUTE MONO #: 0.94 K/UL (ref 0.1–1.2)
ABSOLUTE MONO #: 0.96 K/UL (ref 0.1–1.2)
ABSOLUTE MONO #: 0.97 K/UL (ref 0.1–1.2)
ABSOLUTE MONO #: 1.01 K/UL (ref 0.1–1.2)
ABSOLUTE MONO #: 1.02 K/UL (ref 0.1–1.2)
ABSOLUTE MONO #: 1.03 K/UL (ref 0.1–0.8)
ABSOLUTE MONO #: 1.04 K/UL (ref 0.1–1.2)
ABSOLUTE MONO #: 1.06 K/UL (ref 0.1–1.2)
ABSOLUTE MONO #: 1.08 K/UL (ref 0.1–1.2)
ABSOLUTE MONO #: 1.11 K/UL (ref 0.1–1.2)
ABSOLUTE MONO #: 1.11 K/UL (ref 0.1–1.2)
ABSOLUTE MONO #: 1.14 K/UL (ref 0.1–1.2)
ABSOLUTE MONO #: 1.25 K/UL (ref 0.1–1.2)
ABSOLUTE MONO #: 1.27 K/UL (ref 0.1–1.2)
ABSOLUTE MONO #: 1.42 K/UL (ref 0.2–0.8)
ABSOLUTE MONO #: 2.01 K/UL (ref 0.1–0.8)
ABSOLUTE MONO #: 2.34 K/UL (ref 0.1–1.2)
ABSOLUTE RETIC #: 0.03 M/UL (ref 0.02–0.1)
ACETAMINOPHEN LEVEL: <10 UG/ML (ref 10–30)
ALBUMIN (CALCULATED): 2.3 G/DL (ref 3.2–5.2)
ALBUMIN PERCENT: 34 % (ref 45–65)
ALBUMIN SERPL-MCNC: 1.7 G/DL (ref 3.5–5.2)
ALBUMIN SERPL-MCNC: 1.7 G/DL (ref 3.5–5.2)
ALBUMIN SERPL-MCNC: 1.8 G/DL (ref 3.5–5.2)
ALBUMIN SERPL-MCNC: 1.9 G/DL (ref 3.5–5.2)
ALBUMIN SERPL-MCNC: 2 G/DL (ref 3.5–5.2)
ALBUMIN SERPL-MCNC: 2.1 G/DL (ref 3.5–5.2)
ALBUMIN SERPL-MCNC: 2.2 G/DL (ref 3.5–5.2)
ALBUMIN SERPL-MCNC: 2.2 G/DL (ref 3.5–5.2)
ALBUMIN SERPL-MCNC: 2.3 G/DL (ref 3.5–5.2)
ALBUMIN SERPL-MCNC: 2.5 G/DL (ref 3.5–5.2)
ALBUMIN SERPL-MCNC: 2.7 G/DL (ref 3.5–5.2)
ALBUMIN SERPL-MCNC: 3.3 G/DL (ref 3.5–5.2)
ALBUMIN/GLOBULIN RATIO: 0.3 (ref 1–2.5)
ALBUMIN/GLOBULIN RATIO: 0.3 (ref 1–2.5)
ALBUMIN/GLOBULIN RATIO: 0.4 (ref 1–2.5)
ALBUMIN/GLOBULIN RATIO: 0.5 (ref 1–2.5)
ALBUMIN/GLOBULIN RATIO: 0.5 (ref 1–2.5)
ALBUMIN/GLOBULIN RATIO: ABNORMAL (ref 1–2.5)
ALK PHOS BONE SPECIFIC: 106 U/L (ref 0–55)
ALK PHOS OTHER CALC: 0 U/L
ALK PHOSPHATASE: 816 U/L (ref 40–120)
ALKALINE PHOSPHATASE LIVER FRACTION: 710 U/L (ref 0–94)
ALLEN TEST: ABNORMAL
ALLEN TEST: POSITIVE
ALP BLD-CCNC: 1023 U/L (ref 35–104)
ALP BLD-CCNC: 369 U/L (ref 35–104)
ALP BLD-CCNC: 380 U/L (ref 35–104)
ALP BLD-CCNC: 452 U/L (ref 35–104)
ALP BLD-CCNC: 479 U/L (ref 35–104)
ALP BLD-CCNC: 506 U/L (ref 35–104)
ALP BLD-CCNC: 562 U/L (ref 35–104)
ALP BLD-CCNC: 581 U/L (ref 35–104)
ALP BLD-CCNC: 676 U/L (ref 35–104)
ALP BLD-CCNC: 758 U/L (ref 35–104)
ALP BLD-CCNC: 837 U/L (ref 35–104)
ALP BLD-CCNC: 907 U/L (ref 35–104)
ALPHA 1 PERCENT: 7 % (ref 3–6)
ALPHA 2 PERCENT: 12 % (ref 6–13)
ALPHA HYDROXYALPRAZOLAM: <5 NG/ML
ALPHA-1-GLOBULIN: 0.4 G/DL (ref 0.1–0.4)
ALPHA-2-GLOBULIN: 0.8 G/DL (ref 0.5–0.9)
ALPRAZOLAM: <5 NG/ML
ALT SERPL-CCNC: 10 U/L (ref 5–33)
ALT SERPL-CCNC: 11 U/L (ref 5–33)
ALT SERPL-CCNC: 11 U/L (ref 5–33)
ALT SERPL-CCNC: 12 U/L (ref 5–33)
ALT SERPL-CCNC: 13 U/L (ref 5–33)
ALT SERPL-CCNC: 16 U/L (ref 5–33)
ALT SERPL-CCNC: 21 U/L (ref 5–33)
ALT SERPL-CCNC: 6 U/L (ref 5–33)
ALT SERPL-CCNC: 7 U/L (ref 5–33)
ALT SERPL-CCNC: 8 U/L (ref 5–33)
ALT SERPL-CCNC: 8 U/L (ref 5–33)
ALT SERPL-CCNC: 9 U/L (ref 5–33)
AMMONIA: 25 UMOL/L (ref 11–51)
AMMONIA: 33 UMOL/L (ref 11–51)
AMMONIA: 36 UMOL/L (ref 11–51)
AMMONIA: 83 UMOL/L (ref 11–51)
AMORPHOUS: ABNORMAL
AMORPHOUS: NORMAL
AMORPHOUS: NORMAL
AMPHETAMINE SCREEN URINE: NEGATIVE
AMPHETAMINE SCREEN URINE: POSITIVE
AMPHETAMINE: NEGATIVE NG/ML
ANION GAP SERPL CALCULATED.3IONS-SCNC: 10 MMOL/L (ref 9–17)
ANION GAP SERPL CALCULATED.3IONS-SCNC: 11 MMOL/L (ref 9–17)
ANION GAP SERPL CALCULATED.3IONS-SCNC: 12 MMOL/L (ref 9–17)
ANION GAP SERPL CALCULATED.3IONS-SCNC: 13 MMOL/L (ref 9–17)
ANION GAP SERPL CALCULATED.3IONS-SCNC: 14 MMOL/L (ref 9–17)
ANION GAP SERPL CALCULATED.3IONS-SCNC: 15 MMOL/L (ref 9–17)
ANION GAP SERPL CALCULATED.3IONS-SCNC: 16 MMOL/L (ref 9–17)
ANION GAP SERPL CALCULATED.3IONS-SCNC: 18 MMOL/L (ref 9–17)
ANION GAP SERPL CALCULATED.3IONS-SCNC: 19 MMOL/L (ref 9–17)
ANION GAP SERPL CALCULATED.3IONS-SCNC: 22 MMOL/L (ref 9–17)
ANION GAP SERPL CALCULATED.3IONS-SCNC: 27 MMOL/L (ref 9–17)
ANION GAP: 15 MMOL/L (ref 7–16)
ANION GAP: NORMAL MMOL/L (ref 7–16)
ANTI-NUCLEAR ANTIBODY (ANA): NEGATIVE
ANTIBODY SCREEN: NEGATIVE
ARM BAND NUMBER: NORMAL
AST SERPL-CCNC: 11 U/L
AST SERPL-CCNC: 12 U/L
AST SERPL-CCNC: 15 U/L
AST SERPL-CCNC: 16 U/L
AST SERPL-CCNC: 18 U/L
AST SERPL-CCNC: 22 U/L
AST SERPL-CCNC: 32 U/L
AST SERPL-CCNC: 60 U/L
BACTERIA: ABNORMAL
BACTERIA: NORMAL
BACTERIA: NORMAL
BARBITURATE SCREEN URINE: NEGATIVE
BARBITURATE SCREEN URINE: NEGATIVE
BARBITURATES: NEGATIVE NG/ML
BASOPHILS # BLD: 0 % (ref 0–2)
BASOPHILS # BLD: 1 %
BASOPHILS # BLD: 1 % (ref 0–2)
BASOPHILS ABSOLUTE: 0 K/UL (ref 0–0.2)
BASOPHILS ABSOLUTE: 0.05 K/UL (ref 0–0.2)
BASOPHILS ABSOLUTE: 0.05 K/UL (ref 0–0.2)
BASOPHILS ABSOLUTE: 0.06 K/UL (ref 0–0.2)
BASOPHILS ABSOLUTE: 0.07 K/UL (ref 0–0.2)
BASOPHILS ABSOLUTE: 0.08 K/UL (ref 0–0.2)
BASOPHILS ABSOLUTE: 0.08 K/UL (ref 0–0.2)
BASOPHILS ABSOLUTE: 0.1 K/UL (ref 0–0.2)
BASOPHILS ABSOLUTE: 0.24 K/UL (ref 0–0.2)
BASOPHILS ABSOLUTE: 0.25 K/UL (ref 0–0.2)
BENZODIAZEPINE SCREEN, URINE: NEGATIVE
BENZODIAZEPINE SCREEN, URINE: POSITIVE
BENZODIAZEPINES: POSITIVE NG/ML
BETA GLOBULIN: 1 G/DL (ref 0.5–1.1)
BETA PERCENT: 15 % (ref 11–19)
BILIRUB SERPL-MCNC: 0.31 MG/DL (ref 0.3–1.2)
BILIRUB SERPL-MCNC: 0.35 MG/DL (ref 0.3–1.2)
BILIRUB SERPL-MCNC: 0.37 MG/DL (ref 0.3–1.2)
BILIRUB SERPL-MCNC: 0.46 MG/DL (ref 0.3–1.2)
BILIRUB SERPL-MCNC: 0.53 MG/DL (ref 0.3–1.2)
BILIRUB SERPL-MCNC: 0.57 MG/DL (ref 0.3–1.2)
BILIRUB SERPL-MCNC: 0.64 MG/DL (ref 0.3–1.2)
BILIRUB SERPL-MCNC: 0.7 MG/DL (ref 0.3–1.2)
BILIRUB SERPL-MCNC: 0.8 MG/DL (ref 0.3–1.2)
BILIRUB SERPL-MCNC: 0.85 MG/DL (ref 0.3–1.2)
BILIRUB SERPL-MCNC: 0.91 MG/DL (ref 0.3–1.2)
BILIRUB SERPL-MCNC: 1.08 MG/DL (ref 0.3–1.2)
BILIRUBIN DIRECT: 0.12 MG/DL
BILIRUBIN DIRECT: 0.16 MG/DL
BILIRUBIN DIRECT: 0.19 MG/DL
BILIRUBIN DIRECT: 0.26 MG/DL
BILIRUBIN DIRECT: 0.33 MG/DL
BILIRUBIN DIRECT: 0.39 MG/DL
BILIRUBIN DIRECT: 0.5 MG/DL
BILIRUBIN DIRECT: <0.08 MG/DL
BILIRUBIN URINE: ABNORMAL
BILIRUBIN URINE: ABNORMAL
BILIRUBIN URINE: NEGATIVE
BILIRUBIN, INDIRECT: 0.15 MG/DL (ref 0–1)
BILIRUBIN, INDIRECT: 0.25 MG/DL (ref 0–1)
BILIRUBIN, INDIRECT: 0.27 MG/DL (ref 0–1)
BILIRUBIN, INDIRECT: 0.31 MG/DL (ref 0–1)
BILIRUBIN, INDIRECT: 0.31 MG/DL (ref 0–1)
BILIRUBIN, INDIRECT: 0.41 MG/DL (ref 0–1)
BILIRUBIN, INDIRECT: 0.66 MG/DL (ref 0–1)
BILIRUBIN, INDIRECT: ABNORMAL MG/DL (ref 0–1)
BLD PROD TYP BPU: NORMAL
BLOOD BANK SPECIMEN: NORMAL
BUN BLDV-MCNC: 10 MG/DL (ref 6–20)
BUN BLDV-MCNC: 11 MG/DL (ref 6–20)
BUN BLDV-MCNC: 13 MG/DL (ref 6–20)
BUN BLDV-MCNC: 14 MG/DL (ref 6–20)
BUN BLDV-MCNC: 14 MG/DL (ref 6–20)
BUN BLDV-MCNC: 15 MG/DL (ref 6–20)
BUN BLDV-MCNC: 17 MG/DL (ref 6–20)
BUN BLDV-MCNC: 17 MG/DL (ref 6–20)
BUN BLDV-MCNC: 18 MG/DL (ref 6–20)
BUN BLDV-MCNC: 19 MG/DL (ref 6–20)
BUN BLDV-MCNC: 20 MG/DL (ref 6–20)
BUN BLDV-MCNC: 20 MG/DL (ref 6–20)
BUN BLDV-MCNC: 24 MG/DL (ref 6–20)
BUN BLDV-MCNC: 30 MG/DL (ref 6–20)
BUN BLDV-MCNC: 30 MG/DL (ref 6–20)
BUN BLDV-MCNC: 8 MG/DL (ref 6–20)
BUN BLDV-MCNC: 9 MG/DL (ref 6–20)
BUN BLDV-MCNC: 9 MG/DL (ref 6–20)
BUN/CREAT BLD: 22 (ref 9–20)
BUN/CREAT BLD: 28 (ref 9–20)
BUN/CREAT BLD: 34 (ref 9–20)
BUN/CREAT BLD: ABNORMAL (ref 9–20)
BUN/CREAT BLD: NORMAL (ref 9–20)
BUPRENORPHINE URINE: ABNORMAL
BUPRENORPHINE URINE: ABNORMAL
BUPRENORPHINE: NEGATIVE NG/ML
CALCIUM IONIZED: 1.35 MMOL/L (ref 1.13–1.33)
CALCIUM IONIZED: 1.42 MMOL/L (ref 1.13–1.33)
CALCIUM IONIZED: 1.58 MMOL/L (ref 1.13–1.33)
CALCIUM SERPL-MCNC: 10.1 MG/DL (ref 8.6–10.4)
CALCIUM SERPL-MCNC: 10.6 MG/DL (ref 8.6–10.4)
CALCIUM SERPL-MCNC: 10.8 MG/DL (ref 8.6–10.4)
CALCIUM SERPL-MCNC: 11.1 MG/DL (ref 8.6–10.4)
CALCIUM SERPL-MCNC: 11.4 MG/DL (ref 8.6–10.4)
CALCIUM SERPL-MCNC: 8.4 MG/DL (ref 8.6–10.4)
CALCIUM SERPL-MCNC: 8.7 MG/DL (ref 8.6–10.4)
CALCIUM SERPL-MCNC: 8.7 MG/DL (ref 8.6–10.4)
CALCIUM SERPL-MCNC: 8.8 MG/DL (ref 8.6–10.4)
CALCIUM SERPL-MCNC: 8.8 MG/DL (ref 8.6–10.4)
CALCIUM SERPL-MCNC: 8.9 MG/DL (ref 8.6–10.4)
CALCIUM SERPL-MCNC: 9 MG/DL (ref 8.6–10.4)
CALCIUM SERPL-MCNC: 9.1 MG/DL (ref 8.6–10.4)
CALCIUM SERPL-MCNC: 9.2 MG/DL (ref 8.6–10.4)
CALCIUM SERPL-MCNC: 9.2 MG/DL (ref 8.6–10.4)
CALCIUM SERPL-MCNC: 9.3 MG/DL (ref 8.6–10.4)
CALCIUM SERPL-MCNC: 9.4 MG/DL (ref 8.6–10.4)
CALCIUM SERPL-MCNC: 9.4 MG/DL (ref 8.6–10.4)
CALCIUM SERPL-MCNC: 9.6 MG/DL (ref 8.6–10.4)
CALCIUM SERPL-MCNC: 9.7 MG/DL (ref 8.6–10.4)
CALCIUM SERPL-MCNC: 9.8 MG/DL (ref 8.6–10.4)
CALCIUM SERPL-MCNC: 9.8 MG/DL (ref 8.6–10.4)
CALCIUM SERPL-MCNC: 9.9 MG/DL (ref 8.6–10.4)
CANNABINOID SCREEN URINE: NEGATIVE
CANNABINOID SCREEN URINE: NEGATIVE
CANNABINOID, BLOOD: <5 NG/ML
CARBOXYHEMOGLOBIN: 1.9 % (ref 0–5)
CASTS UA: ABNORMAL /LPF
CASTS UA: ABNORMAL /LPF (ref 0–2)
CASTS UA: ABNORMAL /LPF (ref 0–8)
CASTS UA: NORMAL /LPF (ref 0–2)
CASTS UA: NORMAL /LPF (ref 0–8)
CHLORDIAZEPOXIDE: <20 NG/ML
CHLORIDE BLD-SCNC: 100 MMOL/L (ref 98–107)
CHLORIDE BLD-SCNC: 101 MMOL/L (ref 98–107)
CHLORIDE BLD-SCNC: 102 MMOL/L (ref 98–107)
CHLORIDE BLD-SCNC: 102 MMOL/L (ref 98–107)
CHLORIDE BLD-SCNC: 103 MMOL/L (ref 98–107)
CHLORIDE BLD-SCNC: 105 MMOL/L (ref 98–107)
CHLORIDE BLD-SCNC: 106 MMOL/L (ref 98–107)
CHLORIDE BLD-SCNC: 88 MMOL/L (ref 98–107)
CHLORIDE BLD-SCNC: 90 MMOL/L (ref 98–107)
CHLORIDE BLD-SCNC: 91 MMOL/L (ref 98–107)
CHLORIDE BLD-SCNC: 93 MMOL/L (ref 98–107)
CHLORIDE BLD-SCNC: 94 MMOL/L (ref 98–107)
CHLORIDE BLD-SCNC: 96 MMOL/L (ref 98–107)
CHLORIDE BLD-SCNC: 96 MMOL/L (ref 98–107)
CHLORIDE BLD-SCNC: 97 MMOL/L (ref 98–107)
CHLORIDE BLD-SCNC: 98 MMOL/L (ref 98–107)
CHLORIDE BLD-SCNC: 98 MMOL/L (ref 98–107)
CHLORIDE BLD-SCNC: 99 MMOL/L (ref 98–107)
CLONAZEPAM: <5 NG/ML
CO2: 15 MMOL/L (ref 20–31)
CO2: 16 MMOL/L (ref 20–31)
CO2: 16 MMOL/L (ref 20–31)
CO2: 17 MMOL/L (ref 20–31)
CO2: 19 MMOL/L (ref 20–31)
CO2: 19 MMOL/L (ref 20–31)
CO2: 20 MMOL/L (ref 20–31)
CO2: 21 MMOL/L (ref 20–31)
CO2: 22 MMOL/L (ref 20–31)
CO2: 23 MMOL/L (ref 20–31)
CO2: 24 MMOL/L (ref 20–31)
CO2: 29 MMOL/L (ref 20–31)
COCAINE METABOLITE, URINE: NEGATIVE
COCAINE METABOLITE, URINE: NEGATIVE
COCAINE: NEGATIVE NG/ML
COLOR: ABNORMAL
COMMENT UA: ABNORMAL
CREAT SERPL-MCNC: 0.4 MG/DL (ref 0.5–0.9)
CREAT SERPL-MCNC: 0.44 MG/DL (ref 0.5–0.9)
CREAT SERPL-MCNC: 0.45 MG/DL (ref 0.5–0.9)
CREAT SERPL-MCNC: 0.46 MG/DL (ref 0.5–0.9)
CREAT SERPL-MCNC: 0.47 MG/DL (ref 0.5–0.9)
CREAT SERPL-MCNC: 0.49 MG/DL (ref 0.5–0.9)
CREAT SERPL-MCNC: 0.49 MG/DL (ref 0.5–0.9)
CREAT SERPL-MCNC: 0.51 MG/DL (ref 0.5–0.9)
CREAT SERPL-MCNC: 0.51 MG/DL (ref 0.5–0.9)
CREAT SERPL-MCNC: 0.52 MG/DL (ref 0.5–0.9)
CREAT SERPL-MCNC: 0.53 MG/DL (ref 0.5–0.9)
CREAT SERPL-MCNC: 0.55 MG/DL (ref 0.5–0.9)
CREAT SERPL-MCNC: 0.58 MG/DL (ref 0.5–0.9)
CREAT SERPL-MCNC: 0.59 MG/DL (ref 0.5–0.9)
CREAT SERPL-MCNC: 0.59 MG/DL (ref 0.5–0.9)
CREAT SERPL-MCNC: 0.67 MG/DL (ref 0.5–0.9)
CREAT SERPL-MCNC: 0.68 MG/DL (ref 0.5–0.9)
CREAT SERPL-MCNC: 0.74 MG/DL (ref 0.5–0.9)
CREAT SERPL-MCNC: 0.76 MG/DL (ref 0.5–0.9)
CREAT SERPL-MCNC: 0.76 MG/DL (ref 0.5–0.9)
CREAT SERPL-MCNC: 0.85 MG/DL (ref 0.5–0.9)
CREAT SERPL-MCNC: 0.86 MG/DL (ref 0.5–0.9)
CREAT SERPL-MCNC: 0.9 MG/DL (ref 0.5–0.9)
CREAT SERPL-MCNC: 0.9 MG/DL (ref 0.5–0.9)
CROSSMATCH RESULT: NORMAL
CRYSTALS, UA: ABNORMAL /HPF
CRYSTALS, UA: NORMAL /HPF
CRYSTALS, UA: NORMAL /HPF
CULTURE: ABNORMAL
CULTURE: NO GROWTH
CULTURE: NORMAL
DATE, STOOL #1: NORMAL
DATE, STOOL #2: NORMAL
DATE, STOOL #3: NORMAL
DIAZEPAM: <5 NG/ML
DIFFERENTIAL TYPE: ABNORMAL
DIRECT EXAM: ABNORMAL
DIRECT EXAM: NORMAL
DISPENSE STATUS BLOOD BANK: NORMAL
DRUGS OF ABUSE COMMENT: ABNORMAL
EKG ATRIAL RATE: 109 BPM
EKG ATRIAL RATE: 111 BPM
EKG ATRIAL RATE: 112 BPM
EKG ATRIAL RATE: 135 BPM
EKG ATRIAL RATE: 84 BPM
EKG P AXIS: 35 DEGREES
EKG P AXIS: 44 DEGREES
EKG P AXIS: 47 DEGREES
EKG P AXIS: 49 DEGREES
EKG P AXIS: 64 DEGREES
EKG P-R INTERVAL: 112 MS
EKG P-R INTERVAL: 116 MS
EKG P-R INTERVAL: 118 MS
EKG P-R INTERVAL: 118 MS
EKG P-R INTERVAL: 120 MS
EKG Q-T INTERVAL: 288 MS
EKG Q-T INTERVAL: 326 MS
EKG Q-T INTERVAL: 326 MS
EKG Q-T INTERVAL: 336 MS
EKG Q-T INTERVAL: 370 MS
EKG QRS DURATION: 74 MS
EKG QRS DURATION: 74 MS
EKG QRS DURATION: 80 MS
EKG QRS DURATION: 80 MS
EKG QRS DURATION: 92 MS
EKG QTC CALCULATION (BAZETT): 432 MS
EKG QTC CALCULATION (BAZETT): 437 MS
EKG QTC CALCULATION (BAZETT): 443 MS
EKG QTC CALCULATION (BAZETT): 444 MS
EKG QTC CALCULATION (BAZETT): 452 MS
EKG R AXIS: 11 DEGREES
EKG R AXIS: 38 DEGREES
EKG R AXIS: 42 DEGREES
EKG R AXIS: 47 DEGREES
EKG R AXIS: 54 DEGREES
EKG T AXIS: -2 DEGREES
EKG T AXIS: -44 DEGREES
EKG T AXIS: 55 DEGREES
EKG T AXIS: 57 DEGREES
EKG T AXIS: 81 DEGREES
EKG VENTRICULAR RATE: 109 BPM
EKG VENTRICULAR RATE: 111 BPM
EKG VENTRICULAR RATE: 112 BPM
EKG VENTRICULAR RATE: 135 BPM
EKG VENTRICULAR RATE: 84 BPM
EOSINOPHILS RELATIVE PERCENT: 0 % (ref 1–4)
EOSINOPHILS RELATIVE PERCENT: 1 % (ref 1–4)
EOSINOPHILS RELATIVE PERCENT: 2 % (ref 1–4)
EOSINOPHILS RELATIVE PERCENT: 3 % (ref 1–4)
EOSINOPHILS RELATIVE PERCENT: 3 % (ref 1–4)
EPITHELIAL CELLS UA: ABNORMAL /HPF (ref 0–5)
EPITHELIAL CELLS UA: NORMAL /HPF (ref 0–5)
EPITHELIAL CELLS UA: NORMAL /HPF (ref 0–5)
ETHANOL PERCENT: <0.01 %
ETHANOL: <10 MG/DL
EXPIRATION DATE: NORMAL
FERRITIN: 7335 UG/L (ref 13–150)
FIO2: 100
FIO2: 40
FIO2: ABNORMAL
FOLATE: >20 NG/ML
GAMMA GLOBULIN %: 32 % (ref 9–20)
GAMMA GLOBULIN: 2.1 G/DL (ref 0.5–1.5)
GFR AFRICAN AMERICAN: >60 ML/MIN
GFR NON-AFRICAN AMERICAN: >60 ML/MIN
GFR NON-AFRICAN AMERICAN: NORMAL ML/MIN
GFR NON-AFRICAN AMERICAN: NORMAL ML/MIN
GFR SERPL CREATININE-BSD FRML MDRD: >60 ML/MIN
GFR SERPL CREATININE-BSD FRML MDRD: >60 ML/MIN
GFR SERPL CREATININE-BSD FRML MDRD: ABNORMAL ML/MIN/{1.73_M2}
GFR SERPL CREATININE-BSD FRML MDRD: NORMAL ML/MIN
GFR SERPL CREATININE-BSD FRML MDRD: NORMAL ML/MIN
GFR SERPL CREATININE-BSD FRML MDRD: NORMAL ML/MIN/{1.73_M2}
GGT: 111 U/L (ref 5–36)
GGT: 158 U/L (ref 5–36)
GLOBULIN: ABNORMAL G/DL (ref 1.5–3.8)
GLUCOSE BLD-MCNC: 103 MG/DL (ref 65–105)
GLUCOSE BLD-MCNC: 104 MG/DL (ref 65–105)
GLUCOSE BLD-MCNC: 104 MG/DL (ref 65–105)
GLUCOSE BLD-MCNC: 111 MG/DL (ref 65–105)
GLUCOSE BLD-MCNC: 120 MG/DL (ref 65–105)
GLUCOSE BLD-MCNC: 123 MG/DL (ref 74–100)
GLUCOSE BLD-MCNC: 132 MG/DL (ref 65–105)
GLUCOSE BLD-MCNC: 149 MG/DL (ref 65–105)
GLUCOSE BLD-MCNC: 151 MG/DL (ref 74–100)
GLUCOSE BLD-MCNC: 152 MG/DL (ref 70–99)
GLUCOSE BLD-MCNC: 54 MG/DL (ref 70–99)
GLUCOSE BLD-MCNC: 56 MG/DL (ref 70–99)
GLUCOSE BLD-MCNC: 56 MG/DL (ref 70–99)
GLUCOSE BLD-MCNC: 59 MG/DL (ref 65–105)
GLUCOSE BLD-MCNC: 73 MG/DL (ref 70–99)
GLUCOSE BLD-MCNC: 74 MG/DL (ref 65–105)
GLUCOSE BLD-MCNC: 77 MG/DL (ref 65–105)
GLUCOSE BLD-MCNC: 77 MG/DL (ref 65–105)
GLUCOSE BLD-MCNC: 79 MG/DL (ref 70–99)
GLUCOSE BLD-MCNC: 80 MG/DL (ref 70–99)
GLUCOSE BLD-MCNC: 80 MG/DL (ref 70–99)
GLUCOSE BLD-MCNC: 81 MG/DL (ref 70–99)
GLUCOSE BLD-MCNC: 82 MG/DL (ref 70–99)
GLUCOSE BLD-MCNC: 83 MG/DL (ref 65–105)
GLUCOSE BLD-MCNC: 83 MG/DL (ref 70–99)
GLUCOSE BLD-MCNC: 83 MG/DL (ref 70–99)
GLUCOSE BLD-MCNC: 84 MG/DL (ref 65–105)
GLUCOSE BLD-MCNC: 84 MG/DL (ref 70–99)
GLUCOSE BLD-MCNC: 85 MG/DL (ref 65–105)
GLUCOSE BLD-MCNC: 86 MG/DL (ref 65–105)
GLUCOSE BLD-MCNC: 86 MG/DL (ref 70–99)
GLUCOSE BLD-MCNC: 86 MG/DL (ref 70–99)
GLUCOSE BLD-MCNC: 87 MG/DL (ref 65–105)
GLUCOSE BLD-MCNC: 87 MG/DL (ref 70–99)
GLUCOSE BLD-MCNC: 87 MG/DL (ref 74–100)
GLUCOSE BLD-MCNC: 88 MG/DL (ref 65–105)
GLUCOSE BLD-MCNC: 88 MG/DL (ref 70–99)
GLUCOSE BLD-MCNC: 89 MG/DL (ref 65–105)
GLUCOSE BLD-MCNC: 90 MG/DL (ref 70–99)
GLUCOSE BLD-MCNC: 91 MG/DL (ref 70–99)
GLUCOSE BLD-MCNC: 92 MG/DL (ref 70–99)
GLUCOSE BLD-MCNC: 93 MG/DL (ref 65–105)
GLUCOSE BLD-MCNC: 95 MG/DL (ref 65–105)
GLUCOSE BLD-MCNC: 95 MG/DL (ref 70–99)
GLUCOSE BLD-MCNC: 95 MG/DL (ref 70–99)
GLUCOSE BLD-MCNC: 96 MG/DL (ref 65–105)
GLUCOSE BLD-MCNC: 96 MG/DL (ref 70–99)
GLUCOSE BLD-MCNC: 96 MG/DL (ref 70–99)
GLUCOSE BLD-MCNC: 97 MG/DL (ref 65–105)
GLUCOSE BLD-MCNC: 97 MG/DL (ref 65–105)
GLUCOSE BLD-MCNC: 97 MG/DL (ref 70–99)
GLUCOSE BLD-MCNC: 99 MG/DL (ref 70–99)
GLUCOSE BLD-MCNC: 99 MG/DL (ref 70–99)
GLUCOSE BLD-MCNC: NORMAL MG/DL (ref 74–100)
GLUCOSE URINE: NEGATIVE
HAPTOGLOBIN: 276 MG/DL (ref 30–200)
HCO3 VENOUS: 16 MMOL/L (ref 22–29)
HCO3 VENOUS: 23.4 MMOL/L (ref 24–30)
HCT VFR BLD CALC: 20.8 % (ref 36.3–47.1)
HCT VFR BLD CALC: 21.6 % (ref 36.3–47.1)
HCT VFR BLD CALC: 21.6 % (ref 36.3–47.1)
HCT VFR BLD CALC: 22 % (ref 36.3–47.1)
HCT VFR BLD CALC: 22.5 % (ref 36.3–47.1)
HCT VFR BLD CALC: 22.6 % (ref 36.3–47.1)
HCT VFR BLD CALC: 22.9 % (ref 36.3–47.1)
HCT VFR BLD CALC: 23.5 % (ref 36.3–47.1)
HCT VFR BLD CALC: 23.6 % (ref 36.3–47.1)
HCT VFR BLD CALC: 23.8 % (ref 36–46)
HCT VFR BLD CALC: 23.9 % (ref 36.3–47.1)
HCT VFR BLD CALC: 24.3 % (ref 36.3–47.1)
HCT VFR BLD CALC: 24.6 % (ref 36.3–47.1)
HCT VFR BLD CALC: 24.8 % (ref 36.3–47.1)
HCT VFR BLD CALC: 25 % (ref 36.3–47.1)
HCT VFR BLD CALC: 25.1 % (ref 36.3–47.1)
HCT VFR BLD CALC: 25.2 % (ref 36.3–47.1)
HCT VFR BLD CALC: 25.2 % (ref 36.3–47.1)
HCT VFR BLD CALC: 25.4 % (ref 36–46)
HCT VFR BLD CALC: 25.6 % (ref 36.3–47.1)
HCT VFR BLD CALC: 25.7 % (ref 36.3–47.1)
HCT VFR BLD CALC: 25.9 % (ref 36.3–47.1)
HCT VFR BLD CALC: 26.1 % (ref 36.3–47.1)
HCT VFR BLD CALC: 26.2 % (ref 36.3–47.1)
HCT VFR BLD CALC: 26.3 % (ref 36.3–47.1)
HCT VFR BLD CALC: 26.3 % (ref 36.3–47.1)
HCT VFR BLD CALC: 26.3 % (ref 36–46)
HCT VFR BLD CALC: 26.5 % (ref 36.3–47.1)
HCT VFR BLD CALC: 26.7 % (ref 36.3–47.1)
HCT VFR BLD CALC: 26.8 % (ref 36.3–47.1)
HCT VFR BLD CALC: 26.8 % (ref 36.3–47.1)
HCT VFR BLD CALC: 27 % (ref 36–46)
HCT VFR BLD CALC: 27.2 % (ref 36.3–47.1)
HCT VFR BLD CALC: 27.3 % (ref 36.3–47.1)
HCT VFR BLD CALC: 27.6 % (ref 36.3–47.1)
HCT VFR BLD CALC: 27.6 % (ref 36.3–47.1)
HCT VFR BLD CALC: 27.7 % (ref 36.3–47.1)
HCT VFR BLD CALC: 27.8 % (ref 36.3–47.1)
HCT VFR BLD CALC: 28.3 % (ref 36.3–47.1)
HCT VFR BLD CALC: 29.8 % (ref 36.3–47.1)
HCT VFR BLD CALC: 30.8 % (ref 36.3–47.1)
HCT VFR BLD CALC: 30.8 % (ref 36–46)
HCT VFR BLD CALC: 32.6 % (ref 36.3–47.1)
HCT VFR BLD CALC: 44.5 % (ref 36.3–47.1)
HEMOCCULT SP1 STL QL: NEGATIVE
HEMOCCULT SP2 STL QL: NORMAL
HEMOCCULT SP3 STL QL: NORMAL
HEMOGLOBIN: 11.2 G/DL (ref 11.9–15.1)
HEMOGLOBIN: 13.6 G/DL (ref 11.9–15.1)
HEMOGLOBIN: 6.3 G/DL (ref 11.9–15.1)
HEMOGLOBIN: 6.4 G/DL (ref 11.9–15.1)
HEMOGLOBIN: 6.4 G/DL (ref 11.9–15.1)
HEMOGLOBIN: 6.8 G/DL (ref 11.9–15.1)
HEMOGLOBIN: 6.9 G/DL (ref 11.9–15.1)
HEMOGLOBIN: 6.9 G/DL (ref 11.9–15.1)
HEMOGLOBIN: 7.1 G/DL (ref 11.9–15.1)
HEMOGLOBIN: 7.2 G/DL (ref 11.9–15.1)
HEMOGLOBIN: 7.3 G/DL (ref 11.9–15.1)
HEMOGLOBIN: 7.4 G/DL (ref 11.9–15.1)
HEMOGLOBIN: 7.4 G/DL (ref 11.9–15.1)
HEMOGLOBIN: 7.5 G/DL (ref 11.9–15.1)
HEMOGLOBIN: 7.5 G/DL (ref 12–16)
HEMOGLOBIN: 7.6 G/DL (ref 11.9–15.1)
HEMOGLOBIN: 7.6 G/DL (ref 11.9–15.1)
HEMOGLOBIN: 7.7 G/DL (ref 11.9–15.1)
HEMOGLOBIN: 7.7 G/DL (ref 11.9–15.1)
HEMOGLOBIN: 7.8 G/DL (ref 11.9–15.1)
HEMOGLOBIN: 7.9 G/DL (ref 11.9–15.1)
HEMOGLOBIN: 8 G/DL (ref 11.9–15.1)
HEMOGLOBIN: 8.1 G/DL (ref 11.9–15.1)
HEMOGLOBIN: 8.1 G/DL (ref 12–16)
HEMOGLOBIN: 8.2 G/DL (ref 11.9–15.1)
HEMOGLOBIN: 8.3 G/DL (ref 11.9–15.1)
HEMOGLOBIN: 8.4 G/DL (ref 12–16)
HEMOGLOBIN: 8.5 G/DL (ref 11.9–15.1)
HEMOGLOBIN: 8.5 G/DL (ref 12–16)
HEMOGLOBIN: 8.9 G/DL (ref 11.9–15.1)
HEMOGLOBIN: 9.6 G/DL (ref 11.9–15.1)
HEMOGLOBIN: 9.9 G/DL (ref 12–16)
IMMATURE GRANULOCYTES: 0 %
IMMATURE GRANULOCYTES: 0 %
IMMATURE GRANULOCYTES: 1 %
IMMATURE GRANULOCYTES: 2 %
IMMATURE GRANULOCYTES: ABNORMAL %
IMMATURE RETIC FRACT: NORMAL %
INR BLD: 1.2
INR BLD: 1.2
INR BLD: 1.3
INR BLD: 1.3
INR BLD: 1.4
IRON SATURATION: 22 % (ref 20–55)
IRON: 40 UG/DL (ref 37–145)
KETONES, URINE: NEGATIVE
LACTIC ACID, WHOLE BLOOD: 0.4 MMOL/L (ref 0.7–2.1)
LACTIC ACID, WHOLE BLOOD: 0.8 MMOL/L (ref 0.7–2.1)
LACTIC ACID, WHOLE BLOOD: 1.2 MMOL/L (ref 0.7–2.1)
LACTIC ACID, WHOLE BLOOD: 11.3 MMOL/L (ref 0.7–2.1)
LACTIC ACID, WHOLE BLOOD: 7.1 MMOL/L (ref 0.7–2.1)
LACTIC ACID: ABNORMAL MMOL/L
LEUKOCYTE ESTERASE, URINE: ABNORMAL
LIPASE: 26 U/L (ref 13–60)
LIPASE: 26 U/L (ref 13–60)
LIPASE: 42 U/L (ref 13–60)
LORAZEPAM: 21 NG/ML
LYMPHOCYTES # BLD: 10 % (ref 24–43)
LYMPHOCYTES # BLD: 10 % (ref 24–43)
LYMPHOCYTES # BLD: 11 % (ref 24–43)
LYMPHOCYTES # BLD: 12 % (ref 24–43)
LYMPHOCYTES # BLD: 14 % (ref 24–43)
LYMPHOCYTES # BLD: 15 % (ref 24–43)
LYMPHOCYTES # BLD: 3 % (ref 24–44)
LYMPHOCYTES # BLD: 4 % (ref 24–44)
LYMPHOCYTES # BLD: 6 % (ref 24–44)
LYMPHOCYTES # BLD: 7 % (ref 24–43)
LYMPHOCYTES # BLD: 7 % (ref 24–44)
LYMPHOCYTES # BLD: 8 % (ref 24–43)
LYMPHOCYTES # BLD: 8 % (ref 24–43)
LYMPHOCYTES # BLD: 9 % (ref 24–43)
Lab: ABNORMAL
Lab: NORMAL
MAGNESIUM: 1.3 MG/DL (ref 1.6–2.6)
MAGNESIUM: 1.6 MG/DL (ref 1.6–2.6)
MAGNESIUM: 1.9 MG/DL (ref 1.6–2.6)
MAGNESIUM: 2.1 MG/DL (ref 1.6–2.6)
MCH RBC QN AUTO: 26.4 PG (ref 25.2–33.5)
MCH RBC QN AUTO: 26.4 PG (ref 25.2–33.5)
MCH RBC QN AUTO: 26.6 PG (ref 25.2–33.5)
MCH RBC QN AUTO: 26.7 PG (ref 25.2–33.5)
MCH RBC QN AUTO: 26.9 PG (ref 25.2–33.5)
MCH RBC QN AUTO: 26.9 PG (ref 25.2–33.5)
MCH RBC QN AUTO: 27.1 PG (ref 25.2–33.5)
MCH RBC QN AUTO: 27.2 PG (ref 25.2–33.5)
MCH RBC QN AUTO: 27.4 PG (ref 25.2–33.5)
MCH RBC QN AUTO: 27.4 PG (ref 25.2–33.5)
MCH RBC QN AUTO: 27.5 PG (ref 25.2–33.5)
MCH RBC QN AUTO: 27.5 PG (ref 25.2–33.5)
MCH RBC QN AUTO: 27.5 PG (ref 26–34)
MCH RBC QN AUTO: 27.6 PG (ref 26–34)
MCH RBC QN AUTO: 27.7 PG (ref 25.2–33.5)
MCH RBC QN AUTO: 27.8 PG (ref 25.2–33.5)
MCH RBC QN AUTO: 27.8 PG (ref 26–34)
MCH RBC QN AUTO: 27.9 PG (ref 25.2–33.5)
MCH RBC QN AUTO: 27.9 PG (ref 25.2–33.5)
MCH RBC QN AUTO: 28 PG (ref 25.2–33.5)
MCH RBC QN AUTO: 28.1 PG (ref 25.2–33.5)
MCH RBC QN AUTO: 28.2 PG (ref 26–34)
MCH RBC QN AUTO: 28.3 PG (ref 25.2–33.5)
MCH RBC QN AUTO: 28.3 PG (ref 25.2–33.5)
MCH RBC QN AUTO: 28.4 PG (ref 25.2–33.5)
MCH RBC QN AUTO: 28.4 PG (ref 26–34)
MCH RBC QN AUTO: 28.6 PG (ref 25.2–33.5)
MCH RBC QN AUTO: 33.5 PG (ref 25.2–33.5)
MCHC RBC AUTO-ENTMCNC: 27.5 G/DL (ref 28.4–34.8)
MCHC RBC AUTO-ENTMCNC: 28.2 G/DL (ref 28.4–34.8)
MCHC RBC AUTO-ENTMCNC: 28.4 G/DL (ref 28.4–34.8)
MCHC RBC AUTO-ENTMCNC: 28.5 G/DL (ref 28.4–34.8)
MCHC RBC AUTO-ENTMCNC: 28.6 G/DL (ref 28.4–34.8)
MCHC RBC AUTO-ENTMCNC: 28.9 G/DL (ref 28.4–34.8)
MCHC RBC AUTO-ENTMCNC: 29 G/DL (ref 28.4–34.8)
MCHC RBC AUTO-ENTMCNC: 29.1 G/DL (ref 28.4–34.8)
MCHC RBC AUTO-ENTMCNC: 29.1 G/DL (ref 28.4–34.8)
MCHC RBC AUTO-ENTMCNC: 29.2 G/DL (ref 28.4–34.8)
MCHC RBC AUTO-ENTMCNC: 29.3 G/DL (ref 28.4–34.8)
MCHC RBC AUTO-ENTMCNC: 29.4 G/DL (ref 28.4–34.8)
MCHC RBC AUTO-ENTMCNC: 29.7 G/DL (ref 28.4–34.8)
MCHC RBC AUTO-ENTMCNC: 29.7 G/DL (ref 28.4–34.8)
MCHC RBC AUTO-ENTMCNC: 30 G/DL (ref 28.4–34.8)
MCHC RBC AUTO-ENTMCNC: 30.1 G/DL (ref 28.4–34.8)
MCHC RBC AUTO-ENTMCNC: 30.2 G/DL (ref 28.4–34.8)
MCHC RBC AUTO-ENTMCNC: 30.3 G/DL (ref 28.4–34.8)
MCHC RBC AUTO-ENTMCNC: 30.3 G/DL (ref 28.4–34.8)
MCHC RBC AUTO-ENTMCNC: 30.5 G/DL (ref 28.4–34.8)
MCHC RBC AUTO-ENTMCNC: 30.6 G/DL (ref 28.4–34.8)
MCHC RBC AUTO-ENTMCNC: 30.8 G/DL (ref 28.4–34.8)
MCHC RBC AUTO-ENTMCNC: 30.9 G/DL (ref 28.4–34.8)
MCHC RBC AUTO-ENTMCNC: 31 G/DL (ref 28.4–34.8)
MCHC RBC AUTO-ENTMCNC: 31.1 G/DL (ref 31–37)
MCHC RBC AUTO-ENTMCNC: 31.2 G/DL (ref 28.4–34.8)
MCHC RBC AUTO-ENTMCNC: 31.5 G/DL (ref 31–37)
MCHC RBC AUTO-ENTMCNC: 31.9 G/DL (ref 31–37)
MCHC RBC AUTO-ENTMCNC: 32 G/DL (ref 28.4–34.8)
MCHC RBC AUTO-ENTMCNC: 32 G/DL (ref 28.4–34.8)
MCHC RBC AUTO-ENTMCNC: 32.1 G/DL (ref 31–37)
MCHC RBC AUTO-ENTMCNC: 32.2 G/DL (ref 31–37)
MCHC RBC AUTO-ENTMCNC: 34.4 G/DL (ref 28.4–34.8)
MCV RBC AUTO: 86.6 FL (ref 80–100)
MCV RBC AUTO: 86.7 FL (ref 82.6–102.9)
MCV RBC AUTO: 86.9 FL (ref 82.6–102.9)
MCV RBC AUTO: 87.2 FL (ref 80–100)
MCV RBC AUTO: 87.4 FL (ref 82.6–102.9)
MCV RBC AUTO: 87.8 FL (ref 82.6–102.9)
MCV RBC AUTO: 87.9 FL (ref 82.6–102.9)
MCV RBC AUTO: 88.2 FL (ref 80–100)
MCV RBC AUTO: 88.2 FL (ref 80–100)
MCV RBC AUTO: 88.7 FL (ref 80–100)
MCV RBC AUTO: 90.1 FL (ref 82.6–102.9)
MCV RBC AUTO: 90.7 FL (ref 82.6–102.9)
MCV RBC AUTO: 90.8 FL (ref 82.6–102.9)
MCV RBC AUTO: 90.9 FL (ref 82.6–102.9)
MCV RBC AUTO: 91.3 FL (ref 82.6–102.9)
MCV RBC AUTO: 91.5 FL (ref 82.6–102.9)
MCV RBC AUTO: 91.8 FL (ref 82.6–102.9)
MCV RBC AUTO: 92.7 FL (ref 82.6–102.9)
MCV RBC AUTO: 93.2 FL (ref 82.6–102.9)
MCV RBC AUTO: 93.3 FL (ref 82.6–102.9)
MCV RBC AUTO: 93.3 FL (ref 82.6–102.9)
MCV RBC AUTO: 93.5 FL (ref 82.6–102.9)
MCV RBC AUTO: 93.6 FL (ref 82.6–102.9)
MCV RBC AUTO: 93.6 FL (ref 82.6–102.9)
MCV RBC AUTO: 93.7 FL (ref 82.6–102.9)
MCV RBC AUTO: 93.8 FL (ref 82.6–102.9)
MCV RBC AUTO: 93.8 FL (ref 82.6–102.9)
MCV RBC AUTO: 94.2 FL (ref 82.6–102.9)
MCV RBC AUTO: 94.4 FL (ref 82.6–102.9)
MCV RBC AUTO: 94.9 FL (ref 82.6–102.9)
MCV RBC AUTO: 95.3 FL (ref 82.6–102.9)
MCV RBC AUTO: 95.3 FL (ref 82.6–102.9)
MCV RBC AUTO: 95.5 FL (ref 82.6–102.9)
MCV RBC AUTO: 97.6 FL (ref 82.6–102.9)
MCV RBC AUTO: 97.8 FL (ref 82.6–102.9)
MDMA URINE: ABNORMAL
MDMA URINE: ABNORMAL
METHADONE SCREEN, URINE: NEGATIVE
METHADONE SCREEN, URINE: NEGATIVE
METHADONE: NEGATIVE NG/ML
METHAMPHETAMINE, URINE: ABNORMAL
METHAMPHETAMINE, URINE: ABNORMAL
METHAMPHETAMINE: NEGATIVE NG/ML
METHEMOGLOBIN: ABNORMAL % (ref 0–1.5)
MIDAZOLAM: 444 NG/ML
MIDAZOLAM: 59 NG/ML
MITOCHONDRIAL ANTIBODY: 4.8 UNITS (ref 0–20)
MODE: ABNORMAL
MONOCYTES # BLD: 10 % (ref 3–12)
MONOCYTES # BLD: 10 % (ref 3–12)
MONOCYTES # BLD: 3 % (ref 1–7)
MONOCYTES # BLD: 3 % (ref 1–7)
MONOCYTES # BLD: 4 % (ref 3–12)
MONOCYTES # BLD: 5 % (ref 3–12)
MONOCYTES # BLD: 5 % (ref 3–12)
MONOCYTES # BLD: 6 % (ref 1–7)
MONOCYTES # BLD: 6 % (ref 1–7)
MONOCYTES # BLD: 6 % (ref 3–12)
MONOCYTES # BLD: 7 % (ref 3–12)
MONOCYTES # BLD: 7 % (ref 3–12)
MONOCYTES # BLD: 8 % (ref 3–12)
MONOCYTES # BLD: 9 % (ref 3–12)
MONOCYTES # BLD: 9 % (ref 3–12)
MORPHOLOGY: ABNORMAL
MORPHOLOGY: NORMAL
MRSA, DNA, NASAL: NORMAL
MRSA, DNA, NASAL: NORMAL
MUCUS: ABNORMAL
MUCUS: NORMAL
MUCUS: NORMAL
NEGATIVE BASE EXCESS, ART: 4 (ref 0–2)
NEGATIVE BASE EXCESS, ART: 6 (ref 0–2)
NEGATIVE BASE EXCESS, ART: 7 (ref 0–2)
NEGATIVE BASE EXCESS, VEN: 0.6 MMOL/L (ref 0–2)
NEGATIVE BASE EXCESS, VEN: 9 (ref 0–2)
NITRITE, URINE: NEGATIVE
NORDIAZEPAM: <20 NG/ML
NOTIFICATION TIME: ABNORMAL
NOTIFICATION: ABNORMAL
NRBC AUTOMATED: 0 PER 100 WBC
NRBC AUTOMATED: 0.1 PER 100 WBC
NRBC AUTOMATED: 0.1 PER 100 WBC
NRBC AUTOMATED: ABNORMAL PER 100 WBC
O2 DEVICE/FLOW/%: ABNORMAL
O2 SAT, VEN: 69 % (ref 60–85)
O2 SAT, VEN: 97.9 % (ref 60–85)
OPIATES, URINE: NEGATIVE
OPIATES, URINE: NEGATIVE
OPIATES: NEGATIVE NG/ML
ORGANISM: ABNORMAL
OTHER OBSERVATIONS UA: ABNORMAL
OTHER OBSERVATIONS UA: NORMAL
OTHER OBSERVATIONS UA: NORMAL
OXAZEPAM: <20 NG/ML
OXYCODONE SCREEN URINE: NEGATIVE
OXYCODONE SCREEN URINE: POSITIVE
OXYCODONE: NEGATIVE NG/ML
OXYHEMOGLOBIN: ABNORMAL % (ref 95–98)
PARTIAL THROMBOPLASTIN TIME: 28.1 SEC (ref 21.3–31.3)
PARTIAL THROMBOPLASTIN TIME: 30.1 SEC (ref 20.5–30.5)
PATHOLOGIST: ABNORMAL
PATHOLOGIST: NORMAL
PATIENT TEMP: 37
PATIENT TEMP: ABNORMAL
PCO2, VEN, TEMP ADJ: ABNORMAL MMHG (ref 39–55)
PCO2, VEN: 29.6 MM HG (ref 41–51)
PCO2, VEN: 38 (ref 39–55)
PDW BLD-RTO: 15.4 % (ref 11.5–14.5)
PDW BLD-RTO: 15.8 % (ref 11.8–14.4)
PDW BLD-RTO: 15.9 % (ref 11.8–14.4)
PDW BLD-RTO: 16 % (ref 11.5–14.5)
PDW BLD-RTO: 16 % (ref 11.8–14.4)
PDW BLD-RTO: 16.1 % (ref 11.8–14.4)
PDW BLD-RTO: 16.1 % (ref 11.8–14.4)
PDW BLD-RTO: 16.2 % (ref 11.8–14.4)
PDW BLD-RTO: 16.3 % (ref 11.8–14.4)
PDW BLD-RTO: 16.4 % (ref 11.8–14.4)
PDW BLD-RTO: 16.5 % (ref 11.5–14.5)
PDW BLD-RTO: 16.5 % (ref 11.5–14.5)
PDW BLD-RTO: 16.5 % (ref 11.8–14.4)
PDW BLD-RTO: 16.5 % (ref 11.8–14.4)
PDW BLD-RTO: 16.6 % (ref 11.8–14.4)
PDW BLD-RTO: 16.7 % (ref 11.8–14.4)
PDW BLD-RTO: 16.7 % (ref 11.8–14.4)
PDW BLD-RTO: 16.8 % (ref 11.5–14.5)
PDW BLD-RTO: 16.8 % (ref 11.8–14.4)
PDW BLD-RTO: 16.9 % (ref 11.8–14.4)
PDW BLD-RTO: 16.9 % (ref 11.8–14.4)
PDW BLD-RTO: 24.3 % (ref 11.8–14.4)
PEEP/CPAP: ABNORMAL
PH UA: 5 (ref 5–8)
PH UA: 5.5 (ref 5–8)
PH UA: 6.5 (ref 5–8)
PH VENOUS: 7.34 (ref 7.32–7.43)
PH VENOUS: 7.41 (ref 7.32–7.42)
PH, VEN, TEMP ADJ: ABNORMAL (ref 7.32–7.42)
PHENCYCLIDINE, URINE: NEGATIVE
PHENCYCLIDINE, URINE: NEGATIVE
PHENCYCLIDINE: NEGATIVE NG/ML
PHENYTOIN DATE LAST DOSE: ABNORMAL
PHENYTOIN DOSE AMOUNT: ABNORMAL
PHENYTOIN DOSE TIME: ABNORMAL
PHENYTOIN FREE: 1 UG/ML (ref 1–2)
PHENYTOIN FREE: 2.3 UG/ML (ref 1–2)
PHENYTOIN FREE: 2.9 UG/ML (ref 1–2)
PHENYTOIN FREE: 3.2 UG/ML (ref 1–2)
PHENYTOIN LEVEL: 11.7 UG/ML (ref 10–20)
PHENYTOIN LEVEL: 13.5 UG/ML (ref 10–20)
PHENYTOIN LEVEL: 14.4 UG/ML (ref 10–20)
PHENYTOIN LEVEL: 4.5 UG/ML (ref 10–20)
PHOSPHORUS: 1.7 MG/DL (ref 2.6–4.5)
PHOSPHORUS: 2.6 MG/DL (ref 2.6–4.5)
PHOSPHORUS: 3.2 MG/DL (ref 2.6–4.5)
PHOSPHORUS: 3.5 MG/DL (ref 2.6–4.5)
PHOSPHORUS: 4.2 MG/DL (ref 2.6–4.5)
PLATELET # BLD: 1091 K/UL (ref 130–400)
PLATELET # BLD: 113 K/UL (ref 138–453)
PLATELET # BLD: 445 K/UL (ref 138–453)
PLATELET # BLD: 537 K/UL (ref 138–453)
PLATELET # BLD: 540 K/UL (ref 138–453)
PLATELET # BLD: 562 K/UL (ref 130–400)
PLATELET # BLD: 567 K/UL (ref 138–453)
PLATELET # BLD: 571 K/UL (ref 138–453)
PLATELET # BLD: 576 K/UL (ref 138–453)
PLATELET # BLD: 605 K/UL (ref 138–453)
PLATELET # BLD: 609 K/UL (ref 138–453)
PLATELET # BLD: 613 K/UL (ref 138–453)
PLATELET # BLD: 618 K/UL (ref 138–453)
PLATELET # BLD: 620 K/UL (ref 138–453)
PLATELET # BLD: 630 K/UL (ref 138–453)
PLATELET # BLD: 637 K/UL (ref 138–453)
PLATELET # BLD: 661 K/UL (ref 138–453)
PLATELET # BLD: 664 K/UL (ref 138–453)
PLATELET # BLD: 669 K/UL (ref 138–453)
PLATELET # BLD: 701 K/UL (ref 138–453)
PLATELET # BLD: 707 K/UL (ref 138–453)
PLATELET # BLD: 708 K/UL (ref 138–453)
PLATELET # BLD: 738 K/UL (ref 138–453)
PLATELET # BLD: 756 K/UL (ref 138–453)
PLATELET # BLD: 780 K/UL (ref 138–453)
PLATELET # BLD: 807 K/UL (ref 138–453)
PLATELET # BLD: 812 K/UL (ref 138–453)
PLATELET # BLD: 816 K/UL (ref 130–400)
PLATELET # BLD: 824 K/UL (ref 138–453)
PLATELET # BLD: 836 K/UL (ref 130–400)
PLATELET # BLD: 918 K/UL (ref 130–400)
PLATELET # BLD: ABNORMAL K/UL (ref 138–453)
PLATELET ESTIMATE: ABNORMAL
PLATELET, FLUORESCENCE: 1046 K/UL (ref 138–453)
PLATELET, FLUORESCENCE: 1122 K/UL (ref 138–453)
PLATELET, FLUORESCENCE: 1511 K/UL (ref 138–453)
PLATELET, FLUORESCENCE: 674 K/UL (ref 138–453)
PLATELET, IMMATURE FRACTION: 1.1 % (ref 1.1–10.3)
PLATELET, IMMATURE FRACTION: 1.4 % (ref 1.1–10.3)
PLATELET, IMMATURE FRACTION: 1.6 % (ref 1.1–10.3)
PLATELET, IMMATURE FRACTION: 2.2 % (ref 1.1–10.3)
PMV BLD AUTO: 10.3 FL (ref 8.1–13.5)
PMV BLD AUTO: 6.5 FL (ref 6–12)
PMV BLD AUTO: 6.7 FL (ref 6–12)
PMV BLD AUTO: 6.7 FL (ref 6–12)
PMV BLD AUTO: 8.4 FL (ref 8.1–13.5)
PMV BLD AUTO: 8.6 FL (ref 8.1–13.5)
PMV BLD AUTO: 8.6 FL (ref 8.1–13.5)
PMV BLD AUTO: 8.7 FL (ref 8.1–13.5)
PMV BLD AUTO: 8.8 FL (ref 8.1–13.5)
PMV BLD AUTO: 8.8 FL (ref 8.1–13.5)
PMV BLD AUTO: 8.9 FL (ref 8.1–13.5)
PMV BLD AUTO: 9 FL (ref 8.1–13.5)
PMV BLD AUTO: 9.1 FL (ref 8.1–13.5)
PMV BLD AUTO: 9.1 FL (ref 8.1–13.5)
PMV BLD AUTO: 9.2 FL (ref 8.1–13.5)
PMV BLD AUTO: 9.4 FL (ref 8.1–13.5)
PMV BLD AUTO: 9.5 FL (ref 8.1–13.5)
PMV BLD AUTO: 9.6 FL (ref 8.1–13.5)
PMV BLD AUTO: 9.8 FL (ref 8.1–13.5)
PMV BLD AUTO: 9.8 FL (ref 8.1–13.5)
PMV BLD AUTO: ABNORMAL FL (ref 6–12)
PMV BLD AUTO: ABNORMAL FL (ref 6–12)
PMV BLD AUTO: ABNORMAL FL (ref 8.1–13.5)
PO2, VEN, TEMP ADJ: ABNORMAL MMHG (ref 30–50)
PO2, VEN: 37.3 MM HG (ref 30–50)
PO2, VEN: 95.9 (ref 30–50)
POC CHLORIDE: 103 MMOL/L (ref 98–107)
POC CHLORIDE: 111 MMOL/L (ref 98–107)
POC CREATININE: 0.42 MG/DL (ref 0.51–1.19)
POC CREATININE: 0.7 MG/DL (ref 0.51–1.19)
POC CREATININE: NORMAL MG/DL (ref 0.51–1.19)
POC CREATININE: NORMAL MG/DL (ref 0.51–1.19)
POC HCO3: 17.3 MMOL/L (ref 21–28)
POC HCO3: 19.5 MMOL/L (ref 21–28)
POC HCO3: 21 MMOL/L (ref 21–28)
POC HEMATOCRIT: 25 % (ref 36–46)
POC HEMATOCRIT: 29 % (ref 36–46)
POC HEMATOCRIT: NORMAL % (ref 36–46)
POC HEMATOCRIT: NORMAL % (ref 36–46)
POC HEMOGLOBIN: 8.5 G/DL (ref 12–16)
POC HEMOGLOBIN: 9.8 G/DL (ref 12–16)
POC HEMOGLOBIN: NORMAL G/DL (ref 12–16)
POC HEMOGLOBIN: NORMAL G/DL (ref 12–16)
POC IONIZED CALCIUM: 1.17 MMOL/L (ref 1.15–1.33)
POC IONIZED CALCIUM: NORMAL MMOL/L (ref 1.15–1.33)
POC LACTIC ACID: 0.65 MMOL/L (ref 0.56–1.39)
POC LACTIC ACID: 1.2 MMOL/L (ref 0.56–1.39)
POC LACTIC ACID: 6.67 MMOL/L (ref 0.56–1.39)
POC O2 SATURATION: 100 % (ref 94–98)
POC O2 SATURATION: 98 % (ref 94–98)
POC O2 SATURATION: 99 % (ref 94–98)
POC PCO2 TEMP: ABNORMAL MM HG
POC PCO2: 27.9 MM HG (ref 35–48)
POC PCO2: 28.8 MM HG (ref 35–48)
POC PCO2: 49.4 MM HG (ref 35–48)
POC PH TEMP: ABNORMAL
POC PH: 7.24 (ref 7.35–7.45)
POC PH: 7.4 (ref 7.35–7.45)
POC PH: 7.44 (ref 7.35–7.45)
POC PO2 TEMP: ABNORMAL MM HG
POC PO2: 134.1 MM HG (ref 83–108)
POC PO2: 368.3 MM HG (ref 83–108)
POC PO2: 94.1 MM HG (ref 83–108)
POC POTASSIUM: 4.2 MMOL/L (ref 3.5–4.5)
POC POTASSIUM: 4.4 MMOL/L (ref 3.5–4.5)
POC POTASSIUM: NORMAL MMOL/L (ref 3.5–4.5)
POC POTASSIUM: NORMAL MMOL/L (ref 3.5–4.5)
POC SODIUM: 129 MMOL/L (ref 138–146)
POC SODIUM: 134 MMOL/L (ref 138–146)
POC SODIUM: 139 MMOL/L (ref 138–146)
POC TROPONIN I: 0 NG/ML (ref 0–0.1)
POC TROPONIN I: 0 NG/ML (ref 0–0.1)
POC TROPONIN I: 0.01 NG/ML (ref 0–0.1)
POC TROPONIN INTERP: NORMAL
POSITIVE BASE EXCESS, ART: ABNORMAL (ref 0–3)
POSITIVE BASE EXCESS, VEN: ABNORMAL (ref 0–3)
POSITIVE BASE EXCESS, VEN: ABNORMAL MMOL/L (ref 0–2)
POTASSIUM SERPL-SCNC: 3.3 MMOL/L (ref 3.7–5.3)
POTASSIUM SERPL-SCNC: 3.4 MMOL/L (ref 3.7–5.3)
POTASSIUM SERPL-SCNC: 3.5 MMOL/L (ref 3.7–5.3)
POTASSIUM SERPL-SCNC: 3.5 MMOL/L (ref 3.7–5.3)
POTASSIUM SERPL-SCNC: 3.6 MMOL/L (ref 3.7–5.3)
POTASSIUM SERPL-SCNC: 3.8 MMOL/L (ref 3.7–5.3)
POTASSIUM SERPL-SCNC: 3.9 MMOL/L (ref 3.7–5.3)
POTASSIUM SERPL-SCNC: 4 MMOL/L (ref 3.7–5.3)
POTASSIUM SERPL-SCNC: 4.1 MMOL/L (ref 3.7–5.3)
POTASSIUM SERPL-SCNC: 4.1 MMOL/L (ref 3.7–5.3)
POTASSIUM SERPL-SCNC: 4.2 MMOL/L (ref 3.7–5.3)
POTASSIUM SERPL-SCNC: 4.2 MMOL/L (ref 3.7–5.3)
POTASSIUM SERPL-SCNC: 4.4 MMOL/L (ref 3.7–5.3)
POTASSIUM SERPL-SCNC: 4.5 MMOL/L (ref 3.7–5.3)
POTASSIUM SERPL-SCNC: 4.7 MMOL/L (ref 3.7–5.3)
POTASSIUM SERPL-SCNC: 4.7 MMOL/L (ref 3.7–5.3)
POTASSIUM SERPL-SCNC: 4.8 MMOL/L (ref 3.7–5.3)
POTASSIUM SERPL-SCNC: 5.1 MMOL/L (ref 3.7–5.3)
PROCALCITONIN: 1.61 NG/ML
PROLACTIN: 22.75 UG/L (ref 4.79–23.3)
PROLACTIN: 25.58 UG/L (ref 4.79–23.3)
PROLACTIN: 45.89 UG/L (ref 4.79–23.3)
PROPOXYPHENE, URINE: ABNORMAL
PROPOXYPHENE, URINE: ABNORMAL
PROTEIN ELECTROPHORESIS, SERUM: ABNORMAL
PROTEIN UA: ABNORMAL
PROTHROMBIN TIME: 12.7 SEC (ref 9–12)
PROTHROMBIN TIME: 13.3 SEC (ref 9.4–12.6)
PROTHROMBIN TIME: 13.7 SEC (ref 9.4–12.6)
PROTHROMBIN TIME: 14.1 SEC (ref 9.4–12.6)
PROTHROMBIN TIME: 14.6 SEC (ref 9.4–12.6)
PROTHROMBIN TIME: 14.7 SEC (ref 9.4–12.6)
PROTHROMBIN TIME: 14.9 SEC (ref 9.4–12.6)
PROTHROMBIN TIME: 15.1 SEC (ref 9.4–12.6)
PSV: ABNORMAL
PT. POSITION: ABNORMAL
PTH INTACT: 5.93 PG/ML (ref 15–65)
RBC # BLD: 2.37 M/UL (ref 3.95–5.11)
RBC # BLD: 2.41 M/UL (ref 3.95–5.11)
RBC # BLD: 2.43 M/UL (ref 3.95–5.11)
RBC # BLD: 2.47 M/UL (ref 3.95–5.11)
RBC # BLD: 2.53 M/UL (ref 3.95–5.11)
RBC # BLD: 2.56 M/UL (ref 3.95–5.11)
RBC # BLD: 2.58 M/UL (ref 3.95–5.11)
RBC # BLD: 2.59 M/UL (ref 3.95–5.11)
RBC # BLD: 2.67 M/UL (ref 3.95–5.11)
RBC # BLD: 2.7 M/UL (ref 3.95–5.11)
RBC # BLD: 2.73 M/UL (ref 3.95–5.11)
RBC # BLD: 2.73 M/UL (ref 4–5.2)
RBC # BLD: 2.75 M/UL (ref 3.95–5.11)
RBC # BLD: 2.76 M/UL (ref 3.95–5.11)
RBC # BLD: 2.77 M/UL (ref 3.95–5.11)
RBC # BLD: 2.79 M/UL (ref 3.95–5.11)
RBC # BLD: 2.82 M/UL (ref 3.95–5.11)
RBC # BLD: 2.85 M/UL (ref 3.95–5.11)
RBC # BLD: 2.86 M/UL (ref 3.95–5.11)
RBC # BLD: 2.88 M/UL (ref 4–5.2)
RBC # BLD: 2.9 M/UL (ref 3.95–5.11)
RBC # BLD: 2.92 M/UL (ref 3.95–5.11)
RBC # BLD: 2.95 M/UL (ref 3.95–5.11)
RBC # BLD: 2.95 M/UL (ref 3.95–5.11)
RBC # BLD: 2.96 M/UL (ref 3.95–5.11)
RBC # BLD: 2.97 M/UL (ref 3.95–5.11)
RBC # BLD: 2.99 M/UL (ref 4–5.2)
RBC # BLD: 3.04 M/UL (ref 4–5.2)
RBC # BLD: 3.12 M/UL (ref 3.95–5.11)
RBC # BLD: 3.2 M/UL (ref 3.95–5.11)
RBC # BLD: 3.34 M/UL (ref 3.95–5.11)
RBC # BLD: 3.42 M/UL (ref 3.95–5.11)
RBC # BLD: 3.56 M/UL (ref 4–5.2)
RBC # BLD: ABNORMAL 10*6/UL
RBC UA: ABNORMAL /HPF (ref 0–2)
RBC UA: ABNORMAL /HPF (ref 0–2)
RBC UA: ABNORMAL /HPF (ref 0–4)
RBC UA: NORMAL /HPF (ref 0–2)
RBC UA: NORMAL /HPF (ref 0–4)
RENAL EPITHELIAL, UA: ABNORMAL /HPF
RENAL EPITHELIAL, UA: NORMAL /HPF
RENAL EPITHELIAL, UA: NORMAL /HPF
RESPIRATORY RATE: ABNORMAL
RETIC %: 0.9 % (ref 0.5–2)
RETIC HEMOGLOBIN: NORMAL PG (ref 28.2–35.7)
SALICYLATE LEVEL: <1 MG/DL (ref 3–10)
SAMPLE SITE: ABNORMAL
SEG NEUTROPHILS: 70 % (ref 36–65)
SEG NEUTROPHILS: 73 % (ref 36–65)
SEG NEUTROPHILS: 75 % (ref 36–65)
SEG NEUTROPHILS: 76 % (ref 36–65)
SEG NEUTROPHILS: 77 % (ref 36–65)
SEG NEUTROPHILS: 77 % (ref 36–65)
SEG NEUTROPHILS: 78 % (ref 36–65)
SEG NEUTROPHILS: 79 % (ref 36–65)
SEG NEUTROPHILS: 80 % (ref 36–65)
SEG NEUTROPHILS: 80 % (ref 36–65)
SEG NEUTROPHILS: 81 % (ref 36–65)
SEG NEUTROPHILS: 82 % (ref 36–65)
SEG NEUTROPHILS: 84 % (ref 36–66)
SEG NEUTROPHILS: 85 % (ref 36–65)
SEG NEUTROPHILS: 85 % (ref 36–65)
SEG NEUTROPHILS: 86 % (ref 36–65)
SEG NEUTROPHILS: 86 % (ref 36–66)
SEG NEUTROPHILS: 89 % (ref 36–66)
SEG NEUTROPHILS: 93 % (ref 36–66)
SEGMENTED NEUTROPHILS ABSOLUTE COUNT: 10.26 K/UL (ref 1.5–8.1)
SEGMENTED NEUTROPHILS ABSOLUTE COUNT: 10.46 K/UL (ref 1.5–8.1)
SEGMENTED NEUTROPHILS ABSOLUTE COUNT: 10.94 K/UL (ref 1.5–8.1)
SEGMENTED NEUTROPHILS ABSOLUTE COUNT: 10.99 K/UL (ref 1.5–8.1)
SEGMENTED NEUTROPHILS ABSOLUTE COUNT: 11.14 K/UL (ref 1.5–8.1)
SEGMENTED NEUTROPHILS ABSOLUTE COUNT: 11.34 K/UL (ref 1.5–8.1)
SEGMENTED NEUTROPHILS ABSOLUTE COUNT: 11.34 K/UL (ref 1.5–8.1)
SEGMENTED NEUTROPHILS ABSOLUTE COUNT: 11.59 K/UL (ref 1.5–8.1)
SEGMENTED NEUTROPHILS ABSOLUTE COUNT: 12.56 K/UL (ref 1.5–8.1)
SEGMENTED NEUTROPHILS ABSOLUTE COUNT: 14.41 K/UL (ref 1.5–8.1)
SEGMENTED NEUTROPHILS ABSOLUTE COUNT: 14.63 K/UL (ref 1.5–8.1)
SEGMENTED NEUTROPHILS ABSOLUTE COUNT: 15.69 K/UL (ref 1.5–8.1)
SEGMENTED NEUTROPHILS ABSOLUTE COUNT: 17.67 K/UL (ref 1.5–8.1)
SEGMENTED NEUTROPHILS ABSOLUTE COUNT: 19.82 K/UL (ref 1.8–7.7)
SEGMENTED NEUTROPHILS ABSOLUTE COUNT: 22.08 K/UL (ref 1.8–7.7)
SEGMENTED NEUTROPHILS ABSOLUTE COUNT: 23.65 K/UL (ref 1.5–8.1)
SEGMENTED NEUTROPHILS ABSOLUTE COUNT: 28.81 K/UL (ref 1.8–7.7)
SEGMENTED NEUTROPHILS ABSOLUTE COUNT: 31.9 K/UL (ref 1.8–7.7)
SEGMENTED NEUTROPHILS ABSOLUTE COUNT: 7.66 K/UL (ref 1.5–8.1)
SEGMENTED NEUTROPHILS ABSOLUTE COUNT: 9.22 K/UL (ref 1.5–8.1)
SEGMENTED NEUTROPHILS ABSOLUTE COUNT: 9.64 K/UL (ref 1.5–8.1)
SEGMENTED NEUTROPHILS ABSOLUTE COUNT: 9.87 K/UL (ref 1.5–8.1)
SERUM IFX INTERP: NORMAL
SET RATE: ABNORMAL
SODIUM BLD-SCNC: 126 MMOL/L (ref 135–144)
SODIUM BLD-SCNC: 129 MMOL/L (ref 135–144)
SODIUM BLD-SCNC: 130 MMOL/L (ref 135–144)
SODIUM BLD-SCNC: 131 MMOL/L (ref 135–144)
SODIUM BLD-SCNC: 132 MMOL/L (ref 135–144)
SODIUM BLD-SCNC: 133 MMOL/L (ref 135–144)
SODIUM BLD-SCNC: 134 MMOL/L (ref 135–144)
SODIUM BLD-SCNC: 135 MMOL/L (ref 135–144)
SODIUM BLD-SCNC: 136 MMOL/L (ref 135–144)
SODIUM BLD-SCNC: 136 MMOL/L (ref 135–144)
SODIUM BLD-SCNC: 137 MMOL/L (ref 135–144)
SODIUM BLD-SCNC: 138 MMOL/L (ref 135–144)
SODIUM BLD-SCNC: 138 MMOL/L (ref 135–144)
SODIUM BLD-SCNC: 140 MMOL/L (ref 135–144)
SODIUM BLD-SCNC: 141 MMOL/L (ref 135–144)
SPECIFIC GRAVITY UA: 1.01 (ref 1–1.03)
SPECIFIC GRAVITY UA: 1.02 (ref 1–1.03)
SPECIFIC GRAVITY UA: 1.03 (ref 1–1.03)
SPECIMEN DESCRIPTION: ABNORMAL
SPECIMEN DESCRIPTION: NORMAL
STATUS: ABNORMAL
STATUS: NORMAL
TCO2 (CALC), ART: 18 MMOL/L (ref 22–29)
TCO2 (CALC), ART: 20 MMOL/L (ref 22–29)
TCO2 (CALC), ART: 23 MMOL/L (ref 22–29)
TEMAZEPAM: <20 NG/ML
TEST INFORMATION: ABNORMAL
TEST INFORMATION: ABNORMAL
TEXT FOR RESPIRATORY: ABNORMAL
THC: POSITIVE NG/ML
TIME, STOOL #1: NORMAL
TIME, STOOL #2: NORMAL
TIME, STOOL #3: NORMAL
TOTAL CO2, VENOUS: 17 MMOL/L (ref 23–30)
TOTAL HB: ABNORMAL G/DL (ref 12–16)
TOTAL IRON BINDING CAPACITY: 180 UG/DL (ref 250–450)
TOTAL PROT. SUM,%: 100 % (ref 98–102)
TOTAL PROT. SUM: 6.6 G/DL (ref 6.3–8.2)
TOTAL PROTEIN: 10 G/DL (ref 6.4–8.3)
TOTAL PROTEIN: 6.7 G/DL (ref 6.4–8.3)
TOTAL PROTEIN: 6.9 G/DL (ref 6.4–8.3)
TOTAL PROTEIN: 6.9 G/DL (ref 6.4–8.3)
TOTAL PROTEIN: 7.1 G/DL (ref 6.4–8.3)
TOTAL PROTEIN: 7.3 G/DL (ref 6.4–8.3)
TOTAL PROTEIN: 7.7 G/DL (ref 6.4–8.3)
TOTAL PROTEIN: 7.8 G/DL (ref 6.4–8.3)
TOTAL PROTEIN: 7.9 G/DL (ref 6.4–8.3)
TOTAL PROTEIN: 8.6 G/DL (ref 6.4–8.3)
TOTAL PROTEIN: 9.1 G/DL (ref 6.4–8.3)
TOTAL RATE: ABNORMAL
TOXIC TRICYCLIC SC,BLOOD: NEGATIVE
TRANSFERRIN: 102 MG/DL (ref 200–360)
TRANSFUSION STATUS: NORMAL
TRICHOMONAS: ABNORMAL
TRICHOMONAS: NORMAL
TRICHOMONAS: NORMAL
TRICYCLIC ANTIDEPRESSANTS, UR: ABNORMAL
TRICYCLIC ANTIDEPRESSANTS, UR: ABNORMAL
TRIGL SERPL-MCNC: 77 MG/DL
TROPONIN INTERP: NORMAL
TROPONIN T: <0.03 NG/ML
TSH SERPL DL<=0.05 MIU/L-ACNC: 1 MIU/L (ref 0.3–5)
TURBIDITY: ABNORMAL
TURBIDITY: CLEAR
UNIT DIVISION: 0
UNIT NUMBER: NORMAL
UNSATURATED IRON BINDING CAPACITY: 140 UG/DL (ref 112–347)
URINE HGB: ABNORMAL
URINE HGB: NEGATIVE
UROBILINOGEN, URINE: NORMAL
VANCOMYCIN TROUGH DATE LAST DOSE: ABNORMAL
VANCOMYCIN TROUGH DOSE AMOUNT: ABNORMAL
VANCOMYCIN TROUGH TIME LAST DOSE: ABNORMAL
VANCOMYCIN TROUGH: 24.3 UG/ML (ref 10–20)
VITAMIN B-12: 597 PG/ML (ref 232–1245)
VITAMIN D 25-HYDROXY: 23.5 NG/ML (ref 30–100)
VT: ABNORMAL
WBC # BLD: 10.7 K/UL (ref 3.5–11.3)
WBC # BLD: 10.9 K/UL (ref 3.5–11)
WBC # BLD: 12.1 K/UL (ref 3.5–11.3)
WBC # BLD: 12.5 K/UL (ref 3.5–11.3)
WBC # BLD: 12.6 K/UL (ref 3.5–11.3)
WBC # BLD: 12.7 K/UL (ref 3.5–11.3)
WBC # BLD: 12.9 K/UL (ref 3.5–11.3)
WBC # BLD: 13.3 K/UL (ref 3.5–11.3)
WBC # BLD: 13.5 K/UL (ref 3.5–11.3)
WBC # BLD: 14 K/UL (ref 3.5–11.3)
WBC # BLD: 14.1 K/UL (ref 3.5–11.3)
WBC # BLD: 14.1 K/UL (ref 3.5–11.3)
WBC # BLD: 14.2 K/UL (ref 3.5–11.3)
WBC # BLD: 14.5 K/UL (ref 3.5–11.3)
WBC # BLD: 14.6 K/UL (ref 3.5–11.3)
WBC # BLD: 15.9 K/UL (ref 3.5–11.3)
WBC # BLD: 16.5 K/UL (ref 3.5–11.3)
WBC # BLD: 17.2 K/UL (ref 3.5–11.3)
WBC # BLD: 18 K/UL (ref 3.5–11.3)
WBC # BLD: 18.1 K/UL (ref 3.5–11.3)
WBC # BLD: 18.5 K/UL (ref 3.5–11.3)
WBC # BLD: 18.7 K/UL (ref 3.5–11)
WBC # BLD: 19.4 K/UL (ref 3.5–11)
WBC # BLD: 19.6 K/UL (ref 3.5–11.3)
WBC # BLD: 19.8 K/UL (ref 3.5–11.3)
WBC # BLD: 20.6 K/UL (ref 3.5–11.3)
WBC # BLD: 20.9 K/UL (ref 3.5–11)
WBC # BLD: 23.6 K/UL (ref 3.5–11)
WBC # BLD: 24.8 K/UL (ref 3.5–11.3)
WBC # BLD: 29.2 K/UL (ref 3.5–11.3)
WBC # BLD: 33.5 K/UL (ref 3.5–11.3)
WBC # BLD: 34.3 K/UL (ref 3.5–11.3)
WBC # BLD: 6.6 K/UL (ref 3.5–11.3)
WBC # BLD: ABNORMAL 10*3/UL
WBC UA: ABNORMAL /HPF (ref 0–5)
WBC UA: NORMAL /HPF (ref 0–5)
WBC UA: NORMAL /HPF (ref 0–5)
YEAST: ABNORMAL
YEAST: NORMAL
YEAST: NORMAL

## 2018-01-01 PROCEDURE — 6360000002 HC RX W HCPCS: Performed by: EMERGENCY MEDICINE

## 2018-01-01 PROCEDURE — 36415 COLL VENOUS BLD VENIPUNCTURE: CPT

## 2018-01-01 PROCEDURE — 85027 COMPLETE CBC AUTOMATED: CPT

## 2018-01-01 PROCEDURE — 80053 COMPREHEN METABOLIC PANEL: CPT

## 2018-01-01 PROCEDURE — 83735 ASSAY OF MAGNESIUM: CPT

## 2018-01-01 PROCEDURE — 99291 CRITICAL CARE FIRST HOUR: CPT | Performed by: INTERNAL MEDICINE

## 2018-01-01 PROCEDURE — 82803 BLOOD GASES ANY COMBINATION: CPT

## 2018-01-01 PROCEDURE — G0480 DRUG TEST DEF 1-7 CLASSES: HCPCS

## 2018-01-01 PROCEDURE — 80076 HEPATIC FUNCTION PANEL: CPT

## 2018-01-01 PROCEDURE — 85025 COMPLETE CBC W/AUTO DIFF WBC: CPT

## 2018-01-01 PROCEDURE — 2060000000 HC ICU INTERMEDIATE R&B

## 2018-01-01 PROCEDURE — 2500000003 HC RX 250 WO HCPCS: Performed by: STUDENT IN AN ORGANIZED HEALTH CARE EDUCATION/TRAINING PROGRAM

## 2018-01-01 PROCEDURE — 94762 N-INVAS EAR/PLS OXIMTRY CONT: CPT

## 2018-01-01 PROCEDURE — 94770 HC ETCO2 MONITOR DAILY: CPT

## 2018-01-01 PROCEDURE — G8978 MOBILITY CURRENT STATUS: HCPCS

## 2018-01-01 PROCEDURE — 85610 PROTHROMBIN TIME: CPT

## 2018-01-01 PROCEDURE — 85055 RETICULATED PLATELET ASSAY: CPT

## 2018-01-01 PROCEDURE — 6360000002 HC RX W HCPCS: Performed by: INTERNAL MEDICINE

## 2018-01-01 PROCEDURE — 82140 ASSAY OF AMMONIA: CPT

## 2018-01-01 PROCEDURE — 2000000003 HC NEURO ICU R&B

## 2018-01-01 PROCEDURE — 99291 CRITICAL CARE FIRST HOUR: CPT | Performed by: PSYCHIATRY & NEUROLOGY

## 2018-01-01 PROCEDURE — 85014 HEMATOCRIT: CPT

## 2018-01-01 PROCEDURE — 6370000000 HC RX 637 (ALT 250 FOR IP): Performed by: STUDENT IN AN ORGANIZED HEALTH CARE EDUCATION/TRAINING PROGRAM

## 2018-01-01 PROCEDURE — 94002 VENT MGMT INPAT INIT DAY: CPT

## 2018-01-01 PROCEDURE — 97110 THERAPEUTIC EXERCISES: CPT

## 2018-01-01 PROCEDURE — 86900 BLOOD TYPING SEROLOGIC ABO: CPT

## 2018-01-01 PROCEDURE — 94640 AIRWAY INHALATION TREATMENT: CPT

## 2018-01-01 PROCEDURE — 6370000000 HC RX 637 (ALT 250 FOR IP): Performed by: EMERGENCY MEDICINE

## 2018-01-01 PROCEDURE — S0028 INJECTION, FAMOTIDINE, 20 MG: HCPCS | Performed by: EMERGENCY MEDICINE

## 2018-01-01 PROCEDURE — 84466 ASSAY OF TRANSFERRIN: CPT

## 2018-01-01 PROCEDURE — 84100 ASSAY OF PHOSPHORUS: CPT

## 2018-01-01 PROCEDURE — 80185 ASSAY OF PHENYTOIN TOTAL: CPT

## 2018-01-01 PROCEDURE — 85018 HEMOGLOBIN: CPT

## 2018-01-01 PROCEDURE — 87641 MR-STAPH DNA AMP PROBE: CPT

## 2018-01-01 PROCEDURE — 2580000003 HC RX 258: Performed by: FAMILY MEDICINE

## 2018-01-01 PROCEDURE — 6360000002 HC RX W HCPCS: Performed by: NURSE ANESTHETIST, CERTIFIED REGISTERED

## 2018-01-01 PROCEDURE — 76937 US GUIDE VASCULAR ACCESS: CPT

## 2018-01-01 PROCEDURE — 95819 EEG AWAKE AND ASLEEP: CPT

## 2018-01-01 PROCEDURE — 74177 CT ABD & PELVIS W/CONTRAST: CPT

## 2018-01-01 PROCEDURE — 80186 ASSAY OF PHENYTOIN FREE: CPT

## 2018-01-01 PROCEDURE — 93970 EXTREMITY STUDY: CPT

## 2018-01-01 PROCEDURE — 50431 NJX PX NFROSGRM &/URTRGRM: CPT

## 2018-01-01 PROCEDURE — 76705 ECHO EXAM OF ABDOMEN: CPT

## 2018-01-01 PROCEDURE — 6360000002 HC RX W HCPCS: Performed by: NURSE PRACTITIONER

## 2018-01-01 PROCEDURE — 2580000003 HC RX 258: Performed by: EMERGENCY MEDICINE

## 2018-01-01 PROCEDURE — 97535 SELF CARE MNGMENT TRAINING: CPT

## 2018-01-01 PROCEDURE — 99239 HOSP IP/OBS DSCHRG MGMT >30: CPT | Performed by: INTERNAL MEDICINE

## 2018-01-01 PROCEDURE — 6370000000 HC RX 637 (ALT 250 FOR IP): Performed by: FAMILY MEDICINE

## 2018-01-01 PROCEDURE — 2580000003 HC RX 258: Performed by: PSYCHIATRY & NEUROLOGY

## 2018-01-01 PROCEDURE — 87070 CULTURE OTHR SPECIMN AEROBIC: CPT

## 2018-01-01 PROCEDURE — 6360000002 HC RX W HCPCS

## 2018-01-01 PROCEDURE — P9016 RBC LEUKOCYTES REDUCED: HCPCS

## 2018-01-01 PROCEDURE — 80048 BASIC METABOLIC PNL TOTAL CA: CPT

## 2018-01-01 PROCEDURE — 2500000003 HC RX 250 WO HCPCS: Performed by: FAMILY MEDICINE

## 2018-01-01 PROCEDURE — 6370000000 HC RX 637 (ALT 250 FOR IP): Performed by: INTERNAL MEDICINE

## 2018-01-01 PROCEDURE — 2580000003 HC RX 258: Performed by: NURSE PRACTITIONER

## 2018-01-01 PROCEDURE — 87804 INFLUENZA ASSAY W/OPTIC: CPT

## 2018-01-01 PROCEDURE — 84145 PROCALCITONIN (PCT): CPT

## 2018-01-01 PROCEDURE — 82330 ASSAY OF CALCIUM: CPT

## 2018-01-01 PROCEDURE — 1111F DSCHRG MED/CURRENT MED MERGE: CPT | Performed by: INTERNAL MEDICINE

## 2018-01-01 PROCEDURE — 84484 ASSAY OF TROPONIN QUANT: CPT

## 2018-01-01 PROCEDURE — G8484 FLU IMMUNIZE NO ADMIN: HCPCS | Performed by: INTERNAL MEDICINE

## 2018-01-01 PROCEDURE — 51702 INSERT TEMP BLADDER CATH: CPT

## 2018-01-01 PROCEDURE — 2580000003 HC RX 258: Performed by: INTERNAL MEDICINE

## 2018-01-01 PROCEDURE — 84146 ASSAY OF PROLACTIN: CPT

## 2018-01-01 PROCEDURE — 84155 ASSAY OF PROTEIN SERUM: CPT

## 2018-01-01 PROCEDURE — 96365 THER/PROPH/DIAG IV INF INIT: CPT

## 2018-01-01 PROCEDURE — 86403 PARTICLE AGGLUT ANTBDY SCRN: CPT

## 2018-01-01 PROCEDURE — 82728 ASSAY OF FERRITIN: CPT

## 2018-01-01 PROCEDURE — 6360000004 HC RX CONTRAST MEDICATION: Performed by: UROLOGY

## 2018-01-01 PROCEDURE — 87205 SMEAR GRAM STAIN: CPT

## 2018-01-01 PROCEDURE — BT11YZZ FLUOROSCOPY OF RIGHT KIDNEY USING OTHER CONTRAST: ICD-10-PCS | Performed by: RADIOLOGY

## 2018-01-01 PROCEDURE — 71250 CT THORAX DX C-: CPT

## 2018-01-01 PROCEDURE — 97167 OT EVAL HIGH COMPLEX 60 MIN: CPT

## 2018-01-01 PROCEDURE — 74176 CT ABD & PELVIS W/O CONTRAST: CPT

## 2018-01-01 PROCEDURE — 2500000003 HC RX 250 WO HCPCS: Performed by: EMERGENCY MEDICINE

## 2018-01-01 PROCEDURE — 2580000003 HC RX 258: Performed by: HOSPITALIST

## 2018-01-01 PROCEDURE — 99211 OFF/OP EST MAY X REQ PHY/QHP: CPT

## 2018-01-01 PROCEDURE — 71045 X-RAY EXAM CHEST 1 VIEW: CPT

## 2018-01-01 PROCEDURE — P9603 ONE-WAY ALLOW PRORATED MILES: HCPCS

## 2018-01-01 PROCEDURE — 86850 RBC ANTIBODY SCREEN: CPT

## 2018-01-01 PROCEDURE — 97116 GAIT TRAINING THERAPY: CPT

## 2018-01-01 PROCEDURE — 83605 ASSAY OF LACTIC ACID: CPT

## 2018-01-01 PROCEDURE — 6360000002 HC RX W HCPCS: Performed by: STUDENT IN AN ORGANIZED HEALTH CARE EDUCATION/TRAINING PROGRAM

## 2018-01-01 PROCEDURE — 1200000000 HC SEMI PRIVATE

## 2018-01-01 PROCEDURE — 93005 ELECTROCARDIOGRAM TRACING: CPT

## 2018-01-01 PROCEDURE — 36430 TRANSFUSION BLD/BLD COMPNT: CPT

## 2018-01-01 PROCEDURE — 2580000003 HC RX 258: Performed by: NURSE ANESTHETIST, CERTIFIED REGISTERED

## 2018-01-01 PROCEDURE — C1894 INTRO/SHEATH, NON-LASER: HCPCS

## 2018-01-01 PROCEDURE — 99232 SBSQ HOSP IP/OBS MODERATE 35: CPT | Performed by: INTERNAL MEDICINE

## 2018-01-01 PROCEDURE — 99233 SBSQ HOSP IP/OBS HIGH 50: CPT | Performed by: INTERNAL MEDICINE

## 2018-01-01 PROCEDURE — 86038 ANTINUCLEAR ANTIBODIES: CPT

## 2018-01-01 PROCEDURE — 80307 DRUG TEST PRSMV CHEM ANLYZR: CPT

## 2018-01-01 PROCEDURE — 84478 ASSAY OF TRIGLYCERIDES: CPT

## 2018-01-01 PROCEDURE — 7100000001 HC PACU RECOVERY - ADDTL 15 MIN: Performed by: SPECIALIST

## 2018-01-01 PROCEDURE — 99233 SBSQ HOSP IP/OBS HIGH 50: CPT | Performed by: PSYCHIATRY & NEUROLOGY

## 2018-01-01 PROCEDURE — 81001 URINALYSIS AUTO W/SCOPE: CPT

## 2018-01-01 PROCEDURE — 87086 URINE CULTURE/COLONY COUNT: CPT

## 2018-01-01 PROCEDURE — 97530 THERAPEUTIC ACTIVITIES: CPT

## 2018-01-01 PROCEDURE — 84443 ASSAY THYROID STIM HORMONE: CPT

## 2018-01-01 PROCEDURE — G8987 SELF CARE CURRENT STATUS: HCPCS

## 2018-01-01 PROCEDURE — 82435 ASSAY OF BLOOD CHLORIDE: CPT

## 2018-01-01 PROCEDURE — 97162 PT EVAL MOD COMPLEX 30 MIN: CPT

## 2018-01-01 PROCEDURE — 7100000010 HC PHASE II RECOVERY - FIRST 15 MIN: Performed by: SPECIALIST

## 2018-01-01 PROCEDURE — 82947 ASSAY GLUCOSE BLOOD QUANT: CPT

## 2018-01-01 PROCEDURE — G0328 FECAL BLOOD SCRN IMMUNOASSAY: HCPCS

## 2018-01-01 PROCEDURE — G8979 MOBILITY GOAL STATUS: HCPCS

## 2018-01-01 PROCEDURE — 87040 BLOOD CULTURE FOR BACTERIA: CPT

## 2018-01-01 PROCEDURE — 83970 ASSAY OF PARATHORMONE: CPT

## 2018-01-01 PROCEDURE — 2580000003 HC RX 258: Performed by: SPECIALIST

## 2018-01-01 PROCEDURE — 6370000000 HC RX 637 (ALT 250 FOR IP): Performed by: NURSE PRACTITIONER

## 2018-01-01 PROCEDURE — 99213 OFFICE O/P EST LOW 20 MIN: CPT | Performed by: INTERNAL MEDICINE

## 2018-01-01 PROCEDURE — 2500000003 HC RX 250 WO HCPCS: Performed by: ANESTHESIOLOGY

## 2018-01-01 PROCEDURE — 83540 ASSAY OF IRON: CPT

## 2018-01-01 PROCEDURE — 6360000002 HC RX W HCPCS: Performed by: PSYCHIATRY & NEUROLOGY

## 2018-01-01 PROCEDURE — 95951 PR EEG MONITORING/VIDEORECORD: CPT | Performed by: PSYCHIATRY & NEUROLOGY

## 2018-01-01 PROCEDURE — 99214 OFFICE O/P EST MOD 30 MIN: CPT | Performed by: INTERNAL MEDICINE

## 2018-01-01 PROCEDURE — 5A1935Z RESPIRATORY VENTILATION, LESS THAN 24 CONSECUTIVE HOURS: ICD-10-PCS | Performed by: INTERNAL MEDICINE

## 2018-01-01 PROCEDURE — 6360000002 HC RX W HCPCS: Performed by: HOSPITALIST

## 2018-01-01 PROCEDURE — 0BH17EZ INSERTION OF ENDOTRACHEAL AIRWAY INTO TRACHEA, VIA NATURAL OR ARTIFICIAL OPENING: ICD-10-PCS | Performed by: EMERGENCY MEDICINE

## 2018-01-01 PROCEDURE — 2500000003 HC RX 250 WO HCPCS: Performed by: NURSE PRACTITIONER

## 2018-01-01 PROCEDURE — 70553 MRI BRAIN STEM W/O & W/DYE: CPT

## 2018-01-01 PROCEDURE — 82977 ASSAY OF GGT: CPT

## 2018-01-01 PROCEDURE — 86920 COMPATIBILITY TEST SPIN: CPT

## 2018-01-01 PROCEDURE — 83690 ASSAY OF LIPASE: CPT

## 2018-01-01 PROCEDURE — 84165 PROTEIN E-PHORESIS SERUM: CPT

## 2018-01-01 PROCEDURE — 84295 ASSAY OF SERUM SODIUM: CPT

## 2018-01-01 PROCEDURE — 99254 IP/OBS CNSLTJ NEW/EST MOD 60: CPT | Performed by: PSYCHIATRY & NEUROLOGY

## 2018-01-01 PROCEDURE — 3600000004 HC SURGERY LEVEL 4 BASE: Performed by: SPECIALIST

## 2018-01-01 PROCEDURE — 83516 IMMUNOASSAY NONANTIBODY: CPT

## 2018-01-01 PROCEDURE — 2000000000 HC ICU R&B

## 2018-01-01 PROCEDURE — 6360000004 HC RX CONTRAST MEDICATION: Performed by: EMERGENCY MEDICINE

## 2018-01-01 PROCEDURE — 87186 SC STD MICRODIL/AGAR DIL: CPT

## 2018-01-01 PROCEDURE — 36600 WITHDRAWAL OF ARTERIAL BLOOD: CPT

## 2018-01-01 PROCEDURE — 99285 EMERGENCY DEPT VISIT HI MDM: CPT

## 2018-01-01 PROCEDURE — 70450 CT HEAD/BRAIN W/O DYE: CPT

## 2018-01-01 PROCEDURE — 83010 ASSAY OF HAPTOGLOBIN QUANT: CPT

## 2018-01-01 PROCEDURE — G8988 SELF CARE GOAL STATUS: HCPCS

## 2018-01-01 PROCEDURE — 82805 BLOOD GASES W/O2 SATURATION: CPT

## 2018-01-01 PROCEDURE — 80051 ELECTROLYTE PANEL: CPT

## 2018-01-01 PROCEDURE — 99232 SBSQ HOSP IP/OBS MODERATE 35: CPT | Performed by: PSYCHIATRY & NEUROLOGY

## 2018-01-01 PROCEDURE — G8420 CALC BMI NORM PARAMETERS: HCPCS | Performed by: INTERNAL MEDICINE

## 2018-01-01 PROCEDURE — 6360000002 HC RX W HCPCS: Performed by: ANESTHESIOLOGY

## 2018-01-01 PROCEDURE — 02HV33Z INSERTION OF INFUSION DEVICE INTO SUPERIOR VENA CAVA, PERCUTANEOUS APPROACH: ICD-10-PCS | Performed by: EMERGENCY MEDICINE

## 2018-01-01 PROCEDURE — 7100000000 HC PACU RECOVERY - FIRST 15 MIN: Performed by: SPECIALIST

## 2018-01-01 PROCEDURE — C1769 GUIDE WIRE: HCPCS | Performed by: SPECIALIST

## 2018-01-01 PROCEDURE — 84075 ASSAY ALKALINE PHOSPHATASE: CPT

## 2018-01-01 PROCEDURE — 85730 THROMBOPLASTIN TIME PARTIAL: CPT

## 2018-01-01 PROCEDURE — 87075 CULTR BACTERIA EXCEPT BLOOD: CPT

## 2018-01-01 PROCEDURE — 84132 ASSAY OF SERUM POTASSIUM: CPT

## 2018-01-01 PROCEDURE — 99232 SBSQ HOSP IP/OBS MODERATE 35: CPT | Performed by: NURSE PRACTITIONER

## 2018-01-01 PROCEDURE — 82746 ASSAY OF FOLIC ACID SERUM: CPT

## 2018-01-01 PROCEDURE — G8427 DOCREV CUR MEDS BY ELIG CLIN: HCPCS | Performed by: INTERNAL MEDICINE

## 2018-01-01 PROCEDURE — 86901 BLOOD TYPING SEROLOGIC RH(D): CPT

## 2018-01-01 PROCEDURE — 82565 ASSAY OF CREATININE: CPT

## 2018-01-01 PROCEDURE — 6360000004 HC RX CONTRAST MEDICATION: Performed by: PSYCHIATRY & NEUROLOGY

## 2018-01-01 PROCEDURE — 6360000004 HC RX CONTRAST MEDICATION: Performed by: INTERNAL MEDICINE

## 2018-01-01 PROCEDURE — 99255 IP/OBS CONSLTJ NEW/EST HI 80: CPT | Performed by: PSYCHIATRY & NEUROLOGY

## 2018-01-01 PROCEDURE — 82607 VITAMIN B-12: CPT

## 2018-01-01 PROCEDURE — 95951 HC EEG MONITORING VIDEO RECORDING: CPT

## 2018-01-01 PROCEDURE — 2500000003 HC RX 250 WO HCPCS: Performed by: NURSE ANESTHETIST, CERTIFIED REGISTERED

## 2018-01-01 PROCEDURE — 95816 EEG AWAKE AND DROWSY: CPT | Performed by: PSYCHIATRY & NEUROLOGY

## 2018-01-01 PROCEDURE — 99284 EMERGENCY DEPT VISIT MOD MDM: CPT

## 2018-01-01 PROCEDURE — 85045 AUTOMATED RETICULOCYTE COUNT: CPT

## 2018-01-01 PROCEDURE — 95822 EEG COMA OR SLEEP ONLY: CPT | Performed by: PSYCHIATRY & NEUROLOGY

## 2018-01-01 PROCEDURE — 50432 PLMT NEPHROSTOMY CATHETER: CPT

## 2018-01-01 PROCEDURE — 3700000000 HC ANESTHESIA ATTENDED CARE: Performed by: SPECIALIST

## 2018-01-01 PROCEDURE — 95819 EEG AWAKE AND ASLEEP: CPT | Performed by: PSYCHIATRY & NEUROLOGY

## 2018-01-01 PROCEDURE — 4004F PT TOBACCO SCREEN RCVD TLK: CPT | Performed by: INTERNAL MEDICINE

## 2018-01-01 PROCEDURE — 36416 COLLJ CAPILLARY BLOOD SPEC: CPT

## 2018-01-01 PROCEDURE — 74018 RADEX ABDOMEN 1 VIEW: CPT

## 2018-01-01 PROCEDURE — 2500000003 HC RX 250 WO HCPCS: Performed by: HOSPITALIST

## 2018-01-01 PROCEDURE — 86334 IMMUNOFIX E-PHORESIS SERUM: CPT

## 2018-01-01 PROCEDURE — 3700000001 HC ADD 15 MINUTES (ANESTHESIA): Performed by: SPECIALIST

## 2018-01-01 PROCEDURE — 6360000002 HC RX W HCPCS: Performed by: FAMILY MEDICINE

## 2018-01-01 PROCEDURE — A9579 GAD-BASE MR CONTRAST NOS,1ML: HCPCS | Performed by: PSYCHIATRY & NEUROLOGY

## 2018-01-01 PROCEDURE — 84080 ASSAY ALKALINE PHOSPHATASES: CPT

## 2018-01-01 PROCEDURE — 2580000003 HC RX 258: Performed by: ANESTHESIOLOGY

## 2018-01-01 PROCEDURE — 2580000003 HC RX 258: Performed by: UROLOGY

## 2018-01-01 PROCEDURE — 94003 VENT MGMT INPAT SUBQ DAY: CPT

## 2018-01-01 PROCEDURE — 0T9030Z DRAINAGE OF RIGHT KIDNEY WITH DRAINAGE DEVICE, PERCUTANEOUS APPROACH: ICD-10-PCS | Performed by: RADIOLOGY

## 2018-01-01 PROCEDURE — 82306 VITAMIN D 25 HYDROXY: CPT

## 2018-01-01 PROCEDURE — 6360000002 HC RX W HCPCS: Performed by: RADIOLOGY

## 2018-01-01 PROCEDURE — 71260 CT THORAX DX C+: CPT

## 2018-01-01 PROCEDURE — 99233 SBSQ HOSP IP/OBS HIGH 50: CPT | Performed by: NURSE PRACTITIONER

## 2018-01-01 PROCEDURE — 96375 TX/PRO/DX INJ NEW DRUG ADDON: CPT

## 2018-01-01 PROCEDURE — 6360000002 HC RX W HCPCS: Performed by: UROLOGY

## 2018-01-01 PROCEDURE — 3600000014 HC SURGERY LEVEL 4 ADDTL 15MIN: Performed by: SPECIALIST

## 2018-01-01 PROCEDURE — 99254 IP/OBS CNSLTJ NEW/EST MOD 60: CPT | Performed by: INTERNAL MEDICINE

## 2018-01-01 PROCEDURE — 83550 IRON BINDING TEST: CPT

## 2018-01-01 PROCEDURE — 71046 X-RAY EXAM CHEST 2 VIEWS: CPT

## 2018-01-01 PROCEDURE — 80202 ASSAY OF VANCOMYCIN: CPT

## 2018-01-01 RX ORDER — POTASSIUM CHLORIDE 20 MEQ/1
40 TABLET, EXTENDED RELEASE ORAL ONCE
Status: COMPLETED | OUTPATIENT
Start: 2018-01-01 | End: 2018-01-01

## 2018-01-01 RX ORDER — DEXTROSE AND SODIUM CHLORIDE 5; .9 G/100ML; G/100ML
INJECTION, SOLUTION INTRAVENOUS CONTINUOUS
Status: DISCONTINUED | OUTPATIENT
Start: 2018-01-01 | End: 2018-01-01

## 2018-01-01 RX ORDER — ONDANSETRON 2 MG/ML
4 INJECTION INTRAMUSCULAR; INTRAVENOUS EVERY 6 HOURS PRN
Status: DISCONTINUED | OUTPATIENT
Start: 2018-01-01 | End: 2018-01-01 | Stop reason: HOSPADM

## 2018-01-01 RX ORDER — LORAZEPAM 2 MG/ML
INJECTION INTRAMUSCULAR
Status: COMPLETED
Start: 2018-01-01 | End: 2018-01-01

## 2018-01-01 RX ORDER — OXYCODONE HYDROCHLORIDE AND ACETAMINOPHEN 5; 325 MG/1; MG/1
1 TABLET ORAL EVERY 6 HOURS PRN
Qty: 15 TABLET | Refills: 0 | Status: SHIPPED | OUTPATIENT
Start: 2018-01-01 | End: 2018-01-01

## 2018-01-01 RX ORDER — LORAZEPAM 2 MG/ML
1 INJECTION INTRAMUSCULAR EVERY 30 MIN PRN
Status: DISCONTINUED | OUTPATIENT
Start: 2018-01-01 | End: 2018-01-01 | Stop reason: HOSPADM

## 2018-01-01 RX ORDER — FAMOTIDINE 20 MG/1
20 TABLET, FILM COATED ORAL 2 TIMES DAILY
Status: DISCONTINUED | OUTPATIENT
Start: 2018-01-01 | End: 2018-01-01 | Stop reason: HOSPADM

## 2018-01-01 RX ORDER — ASPIRIN 81 MG/1
81 TABLET, CHEWABLE ORAL DAILY
Status: DISCONTINUED | OUTPATIENT
Start: 2018-01-01 | End: 2018-01-01 | Stop reason: HOSPADM

## 2018-01-01 RX ORDER — 0.9 % SODIUM CHLORIDE 0.9 %
250 INTRAVENOUS SOLUTION INTRAVENOUS ONCE
Status: COMPLETED | OUTPATIENT
Start: 2018-01-01 | End: 2018-01-01

## 2018-01-01 RX ORDER — ETOMIDATE 2 MG/ML
20 INJECTION INTRAVENOUS ONCE
Status: COMPLETED | OUTPATIENT
Start: 2018-01-01 | End: 2018-01-01

## 2018-01-01 RX ORDER — LORAZEPAM 2 MG/ML
INJECTION INTRAMUSCULAR
Status: DISCONTINUED
Start: 2018-01-01 | End: 2018-01-01 | Stop reason: WASHOUT

## 2018-01-01 RX ORDER — VANCOMYCIN HYDROCHLORIDE 500 MG/100ML
500 INJECTION, SOLUTION INTRAVENOUS EVERY 12 HOURS
Status: DISCONTINUED | OUTPATIENT
Start: 2018-01-01 | End: 2018-01-01

## 2018-01-01 RX ORDER — ACETAMINOPHEN 500 MG
1000 TABLET ORAL ONCE
Status: COMPLETED | OUTPATIENT
Start: 2018-01-01 | End: 2018-01-01

## 2018-01-01 RX ORDER — B-COMPLEX WITH VITAMIN C
1 TABLET ORAL 2 TIMES DAILY
Status: DISCONTINUED | OUTPATIENT
Start: 2018-01-01 | End: 2018-01-01

## 2018-01-01 RX ORDER — POTASSIUM CHLORIDE 20 MEQ/1
20 TABLET, EXTENDED RELEASE ORAL 2 TIMES DAILY WITH MEALS
Status: ACTIVE | OUTPATIENT
Start: 2018-01-01 | End: 2018-01-01

## 2018-01-01 RX ORDER — FENTANYL CITRATE 50 UG/ML
50 INJECTION, SOLUTION INTRAMUSCULAR; INTRAVENOUS
Status: DISCONTINUED | OUTPATIENT
Start: 2018-01-01 | End: 2018-01-01

## 2018-01-01 RX ORDER — SODIUM CHLORIDE 9 MG/ML
INJECTION, SOLUTION INTRAVENOUS CONTINUOUS
Status: DISCONTINUED | OUTPATIENT
Start: 2018-01-01 | End: 2018-01-01

## 2018-01-01 RX ORDER — 0.9 % SODIUM CHLORIDE 0.9 %
30 INTRAVENOUS SOLUTION INTRAVENOUS ONCE
Status: COMPLETED | OUTPATIENT
Start: 2018-01-01 | End: 2018-01-01

## 2018-01-01 RX ORDER — FENTANYL CITRATE 50 UG/ML
INJECTION, SOLUTION INTRAMUSCULAR; INTRAVENOUS
Status: COMPLETED | OUTPATIENT
Start: 2018-01-01 | End: 2018-01-01

## 2018-01-01 RX ORDER — LORAZEPAM 2 MG/ML
1 INJECTION INTRAMUSCULAR EVERY 6 HOURS PRN
Status: DISCONTINUED | OUTPATIENT
Start: 2018-01-01 | End: 2018-01-01

## 2018-01-01 RX ORDER — 0.9 % SODIUM CHLORIDE 0.9 %
500 INTRAVENOUS SOLUTION INTRAVENOUS ONCE
Status: COMPLETED | OUTPATIENT
Start: 2018-01-01 | End: 2018-01-01

## 2018-01-01 RX ORDER — MAGNESIUM SULFATE 1 G/100ML
1 INJECTION INTRAVENOUS
Status: COMPLETED | OUTPATIENT
Start: 2018-01-01 | End: 2018-01-01

## 2018-01-01 RX ORDER — LORAZEPAM 2 MG/ML
1 INJECTION INTRAMUSCULAR ONCE
Status: COMPLETED | OUTPATIENT
Start: 2018-01-01 | End: 2018-01-01

## 2018-01-01 RX ORDER — ALBUTEROL SULFATE 2.5 MG/3ML
2.5 SOLUTION RESPIRATORY (INHALATION) EVERY 6 HOURS PRN
Status: DISCONTINUED | OUTPATIENT
Start: 2018-01-01 | End: 2018-01-01 | Stop reason: HOSPADM

## 2018-01-01 RX ORDER — HALOPERIDOL 5 MG/ML
2 INJECTION INTRAMUSCULAR EVERY 6 HOURS PRN
Status: DISCONTINUED | OUTPATIENT
Start: 2018-01-01 | End: 2018-01-01

## 2018-01-01 RX ORDER — OXYCODONE HYDROCHLORIDE AND ACETAMINOPHEN 5; 325 MG/1; MG/1
1 TABLET ORAL EVERY 12 HOURS PRN
Status: DISCONTINUED | OUTPATIENT
Start: 2018-01-01 | End: 2018-01-01

## 2018-01-01 RX ORDER — POTASSIUM CHLORIDE 20 MEQ/1
40 TABLET, EXTENDED RELEASE ORAL PRN
Status: DISCONTINUED | OUTPATIENT
Start: 2018-01-01 | End: 2018-01-01 | Stop reason: HOSPADM

## 2018-01-01 RX ORDER — POTASSIUM CHLORIDE 7.45 MG/ML
10 INJECTION INTRAVENOUS PRN
Status: DISCONTINUED | OUTPATIENT
Start: 2018-01-01 | End: 2018-01-01 | Stop reason: HOSPADM

## 2018-01-01 RX ORDER — ONDANSETRON 4 MG/1
4 TABLET, ORALLY DISINTEGRATING ORAL EVERY 8 HOURS PRN
Qty: 14 TABLET | Refills: 0 | Status: SHIPPED | OUTPATIENT
Start: 2018-01-01 | End: 2018-01-01

## 2018-01-01 RX ORDER — SODIUM CHLORIDE 0.9 % (FLUSH) 0.9 %
10 SYRINGE (ML) INJECTION EVERY 12 HOURS SCHEDULED
Status: DISCONTINUED | OUTPATIENT
Start: 2018-01-01 | End: 2018-01-01 | Stop reason: HOSPADM

## 2018-01-01 RX ORDER — LORAZEPAM 0.5 MG/1
0.5 TABLET ORAL EVERY 6 HOURS PRN
Qty: 6 TABLET | Refills: 0 | Status: SHIPPED | OUTPATIENT
Start: 2018-01-01 | End: 2018-01-01

## 2018-01-01 RX ORDER — TRAMADOL HYDROCHLORIDE 50 MG/1
50 TABLET ORAL EVERY 6 HOURS PRN
Status: DISCONTINUED | OUTPATIENT
Start: 2018-01-01 | End: 2018-01-01 | Stop reason: HOSPADM

## 2018-01-01 RX ORDER — FOLIC ACID 1 MG/1
1 TABLET ORAL DAILY
Status: DISCONTINUED | OUTPATIENT
Start: 2018-01-01 | End: 2018-01-01 | Stop reason: HOSPADM

## 2018-01-01 RX ORDER — POTASSIUM CHLORIDE 20MEQ/15ML
40 LIQUID (ML) ORAL PRN
Status: DISCONTINUED | OUTPATIENT
Start: 2018-01-01 | End: 2018-01-01 | Stop reason: HOSPADM

## 2018-01-01 RX ORDER — LORAZEPAM 0.5 MG/1
0.5 TABLET ORAL DAILY
Qty: 30 TABLET | Refills: 0 | Status: SHIPPED | OUTPATIENT
Start: 2018-01-01 | End: 2018-01-01

## 2018-01-01 RX ORDER — 0.9 % SODIUM CHLORIDE 0.9 %
20 INTRAVENOUS SOLUTION INTRAVENOUS ONCE
Status: DISCONTINUED | OUTPATIENT
Start: 2018-01-01 | End: 2018-01-01

## 2018-01-01 RX ORDER — 0.9 % SODIUM CHLORIDE 0.9 %
250 INTRAVENOUS SOLUTION INTRAVENOUS ONCE
Status: DISCONTINUED | OUTPATIENT
Start: 2018-01-01 | End: 2018-01-01 | Stop reason: HOSPADM

## 2018-01-01 RX ORDER — NICOTINE POLACRILEX 4 MG
15 LOZENGE BUCCAL PRN
Status: DISCONTINUED | OUTPATIENT
Start: 2018-01-01 | End: 2018-01-01 | Stop reason: HOSPADM

## 2018-01-01 RX ORDER — LORAZEPAM 2 MG/ML
1 INJECTION INTRAMUSCULAR EVERY 10 MIN PRN
Status: DISCONTINUED | OUTPATIENT
Start: 2018-01-01 | End: 2018-01-01

## 2018-01-01 RX ORDER — SUCCINYLCHOLINE CHLORIDE 20 MG/ML
80 INJECTION INTRAMUSCULAR; INTRAVENOUS ONCE
Status: COMPLETED | OUTPATIENT
Start: 2018-01-01 | End: 2018-01-01

## 2018-01-01 RX ORDER — LORAZEPAM 2 MG/ML
2 INJECTION INTRAMUSCULAR
Status: DISCONTINUED | OUTPATIENT
Start: 2018-01-01 | End: 2018-01-01

## 2018-01-01 RX ORDER — SODIUM CHLORIDE, SODIUM LACTATE, POTASSIUM CHLORIDE, CALCIUM CHLORIDE 600; 310; 30; 20 MG/100ML; MG/100ML; MG/100ML; MG/100ML
INJECTION, SOLUTION INTRAVENOUS CONTINUOUS
Status: DISCONTINUED | OUTPATIENT
Start: 2018-01-01 | End: 2018-01-01 | Stop reason: HOSPADM

## 2018-01-01 RX ORDER — CEFEPIME 1 G/50ML
1 INJECTION, SOLUTION INTRAVENOUS EVERY 8 HOURS
Status: DISCONTINUED | OUTPATIENT
Start: 2018-01-01 | End: 2018-01-01

## 2018-01-01 RX ORDER — HALOPERIDOL 5 MG/ML
INJECTION INTRAMUSCULAR
Status: COMPLETED
Start: 2018-01-01 | End: 2018-01-01

## 2018-01-01 RX ORDER — CEFEPIME 1 G/50ML
1 INJECTION, SOLUTION INTRAVENOUS EVERY 24 HOURS
Status: DISCONTINUED | OUTPATIENT
Start: 2018-01-01 | End: 2018-01-01

## 2018-01-01 RX ORDER — ONDANSETRON 2 MG/ML
INJECTION INTRAMUSCULAR; INTRAVENOUS
Status: COMPLETED
Start: 2018-01-01 | End: 2018-01-01

## 2018-01-01 RX ORDER — FENTANYL CITRATE 50 UG/ML
25 INJECTION, SOLUTION INTRAMUSCULAR; INTRAVENOUS
Status: DISCONTINUED | OUTPATIENT
Start: 2018-01-01 | End: 2018-01-01

## 2018-01-01 RX ORDER — METOPROLOL TARTRATE 5 MG/5ML
5 INJECTION INTRAVENOUS EVERY 4 HOURS PRN
Status: DISCONTINUED | OUTPATIENT
Start: 2018-01-01 | End: 2018-01-01 | Stop reason: HOSPADM

## 2018-01-01 RX ORDER — LEVETIRACETAM 5 MG/ML
500 INJECTION INTRAVASCULAR EVERY 12 HOURS
Status: DISCONTINUED | OUTPATIENT
Start: 2018-01-01 | End: 2018-01-01

## 2018-01-01 RX ORDER — AMOXICILLIN AND CLAVULANATE POTASSIUM 875; 125 MG/1; MG/1
1 TABLET, FILM COATED ORAL 2 TIMES DAILY
Qty: 8 TABLET | Refills: 0 | Status: SHIPPED | OUTPATIENT
Start: 2018-01-01 | End: 2018-01-01

## 2018-01-01 RX ORDER — MIDAZOLAM HYDROCHLORIDE 1 MG/ML
INJECTION INTRAMUSCULAR; INTRAVENOUS PRN
Status: DISCONTINUED | OUTPATIENT
Start: 2018-01-01 | End: 2018-01-01 | Stop reason: SDUPTHER

## 2018-01-01 RX ORDER — UREA 10 %
2 LOTION (ML) TOPICAL ONCE
Status: DISCONTINUED | OUTPATIENT
Start: 2018-01-01 | End: 2018-01-01 | Stop reason: HOSPADM

## 2018-01-01 RX ORDER — SODIUM CHLORIDE 0.9 % (FLUSH) 0.9 %
10 SYRINGE (ML) INJECTION 2 TIMES DAILY
Status: DISCONTINUED | OUTPATIENT
Start: 2018-01-01 | End: 2018-01-01 | Stop reason: HOSPADM

## 2018-01-01 RX ORDER — LORAZEPAM 2 MG/ML
1 INJECTION INTRAMUSCULAR EVERY 6 HOURS PRN
Status: DISCONTINUED | OUTPATIENT
Start: 2018-01-01 | End: 2018-01-01 | Stop reason: HOSPADM

## 2018-01-01 RX ORDER — MAGNESIUM HYDROXIDE 1200 MG/15ML
LIQUID ORAL CONTINUOUS PRN
Status: DISCONTINUED | OUTPATIENT
Start: 2018-01-01 | End: 2018-01-01 | Stop reason: HOSPADM

## 2018-01-01 RX ORDER — LEVETIRACETAM 10 MG/ML
1000 INJECTION INTRAVASCULAR ONCE
Status: COMPLETED | OUTPATIENT
Start: 2018-01-01 | End: 2018-01-01

## 2018-01-01 RX ORDER — SODIUM CHLORIDE 9 MG/ML
INJECTION, SOLUTION INTRAVENOUS CONTINUOUS
Status: DISCONTINUED | OUTPATIENT
Start: 2018-01-01 | End: 2018-01-01 | Stop reason: HOSPADM

## 2018-01-01 RX ORDER — PROPOFOL 10 MG/ML
INJECTION, EMULSION INTRAVENOUS PRN
Status: DISCONTINUED | OUTPATIENT
Start: 2018-01-01 | End: 2018-01-01 | Stop reason: SDUPTHER

## 2018-01-01 RX ORDER — DEXTROSE MONOHYDRATE 25 G/50ML
12.5 INJECTION, SOLUTION INTRAVENOUS PRN
Status: DISCONTINUED | OUTPATIENT
Start: 2018-01-01 | End: 2018-01-01 | Stop reason: HOSPADM

## 2018-01-01 RX ORDER — LORAZEPAM 2 MG/ML
2 INJECTION INTRAMUSCULAR EVERY 4 HOURS PRN
Status: DISCONTINUED | OUTPATIENT
Start: 2018-01-01 | End: 2018-01-01

## 2018-01-01 RX ORDER — FOSPHENYTOIN SODIUM 50 MG/ML
100 INJECTION, SOLUTION INTRAMUSCULAR; INTRAVENOUS EVERY 8 HOURS
Status: DISCONTINUED | OUTPATIENT
Start: 2018-01-01 | End: 2018-01-01

## 2018-01-01 RX ORDER — SODIUM CHLORIDE 0.9 % (FLUSH) 0.9 %
5 SYRINGE (ML) INJECTION DAILY
Status: DISCONTINUED | OUTPATIENT
Start: 2018-01-01 | End: 2018-01-01 | Stop reason: HOSPADM

## 2018-01-01 RX ORDER — FENTANYL CITRATE 50 UG/ML
25 INJECTION, SOLUTION INTRAMUSCULAR; INTRAVENOUS EVERY 5 MIN PRN
Status: DISCONTINUED | OUTPATIENT
Start: 2018-01-01 | End: 2018-01-01 | Stop reason: HOSPADM

## 2018-01-01 RX ORDER — LORAZEPAM 2 MG/ML
INJECTION INTRAMUSCULAR
Status: DISPENSED
Start: 2018-01-01 | End: 2018-01-01

## 2018-01-01 RX ORDER — POTASSIUM CHLORIDE 29.8 MG/ML
20 INJECTION INTRAVENOUS PRN
Status: DISCONTINUED | OUTPATIENT
Start: 2018-01-01 | End: 2018-01-01 | Stop reason: HOSPADM

## 2018-01-01 RX ORDER — LORAZEPAM 2 MG/ML
1 INJECTION INTRAMUSCULAR EVERY 30 MIN PRN
Status: DISCONTINUED | OUTPATIENT
Start: 2018-01-01 | End: 2018-01-01

## 2018-01-01 RX ORDER — MIDAZOLAM HYDROCHLORIDE 1 MG/ML
INJECTION INTRAMUSCULAR; INTRAVENOUS
Status: COMPLETED | OUTPATIENT
Start: 2018-01-01 | End: 2018-01-01

## 2018-01-01 RX ORDER — ALBUTEROL SULFATE 2.5 MG/3ML
2.5 SOLUTION RESPIRATORY (INHALATION) EVERY 4 HOURS PRN
Status: DISCONTINUED | OUTPATIENT
Start: 2018-01-01 | End: 2018-01-01 | Stop reason: HOSPADM

## 2018-01-01 RX ORDER — FUROSEMIDE 10 MG/ML
40 INJECTION INTRAMUSCULAR; INTRAVENOUS ONCE
Status: COMPLETED | OUTPATIENT
Start: 2018-01-01 | End: 2018-01-01

## 2018-01-01 RX ORDER — LORAZEPAM 2 MG/ML
0.5 INJECTION INTRAMUSCULAR ONCE
Status: COMPLETED | OUTPATIENT
Start: 2018-01-01 | End: 2018-01-01

## 2018-01-01 RX ORDER — SODIUM CHLORIDE 9 MG/ML
INJECTION, SOLUTION INTRAVENOUS CONTINUOUS PRN
Status: DISCONTINUED | OUTPATIENT
Start: 2018-01-01 | End: 2018-01-01 | Stop reason: SDUPTHER

## 2018-01-01 RX ORDER — NICOTINE POLACRILEX 4 MG
15 LOZENGE BUCCAL SEE ADMIN INSTRUCTIONS
COMMUNITY

## 2018-01-01 RX ORDER — AMMONIA INHALANTS 0.04 G/.3ML
0.3 INHALANT RESPIRATORY (INHALATION) PRN
Status: DISCONTINUED | OUTPATIENT
Start: 2018-01-01 | End: 2018-01-01 | Stop reason: HOSPADM

## 2018-01-01 RX ORDER — DEXTROSE MONOHYDRATE 50 MG/ML
100 INJECTION, SOLUTION INTRAVENOUS PRN
Status: DISCONTINUED | OUTPATIENT
Start: 2018-01-01 | End: 2018-01-01 | Stop reason: HOSPADM

## 2018-01-01 RX ORDER — SODIUM CHLORIDE 0.9 % (FLUSH) 0.9 %
10 SYRINGE (ML) INJECTION PRN
Status: DISCONTINUED | OUTPATIENT
Start: 2018-01-01 | End: 2018-01-01 | Stop reason: HOSPADM

## 2018-01-01 RX ORDER — LIDOCAINE HYDROCHLORIDE 10 MG/ML
1 INJECTION, SOLUTION EPIDURAL; INFILTRATION; INTRACAUDAL; PERINEURAL
Status: COMPLETED | OUTPATIENT
Start: 2018-01-01 | End: 2018-01-01

## 2018-01-01 RX ORDER — LEVETIRACETAM 100 MG/ML
500 SOLUTION ORAL 2 TIMES DAILY
Status: DISCONTINUED | OUTPATIENT
Start: 2018-01-01 | End: 2018-01-01

## 2018-01-01 RX ORDER — ROCURONIUM BROMIDE 10 MG/ML
INJECTION, SOLUTION INTRAVENOUS PRN
Status: DISCONTINUED | OUTPATIENT
Start: 2018-01-01 | End: 2018-01-01 | Stop reason: SDUPTHER

## 2018-01-01 RX ORDER — CITALOPRAM 20 MG/1
40 TABLET ORAL DAILY
Status: DISCONTINUED | OUTPATIENT
Start: 2018-01-01 | End: 2018-01-01

## 2018-01-01 RX ADMIN — DOCUSATE SODIUM 100 MG: 100 CAPSULE ORAL at 10:12

## 2018-01-01 RX ADMIN — FAMOTIDINE 20 MG: 20 TABLET, FILM COATED ORAL at 21:17

## 2018-01-01 RX ADMIN — FENTANYL CITRATE 50 MCG: 50 INJECTION INTRAMUSCULAR; INTRAVENOUS at 03:19

## 2018-01-01 RX ADMIN — POTASSIUM CHLORIDE 20 MEQ: 400 INJECTION, SOLUTION INTRAVENOUS at 06:53

## 2018-01-01 RX ADMIN — WATER 2 G: 1 INJECTION INTRAMUSCULAR; INTRAVENOUS; SUBCUTANEOUS at 02:24

## 2018-01-01 RX ADMIN — DICYCLOMINE HYDROCHLORIDE 10 MG: 10 CAPSULE ORAL at 16:56

## 2018-01-01 RX ADMIN — FLUTICASONE PROPIONATE 1 SPRAY: 50 SPRAY, METERED NASAL at 14:14

## 2018-01-01 RX ADMIN — TAZOBACTAM SODIUM AND PIPERACILLIN SODIUM 3.38 G: 375; 3 INJECTION, SOLUTION INTRAVENOUS at 05:36

## 2018-01-01 RX ADMIN — ENOXAPARIN SODIUM 40 MG: 40 INJECTION SUBCUTANEOUS at 10:11

## 2018-01-01 RX ADMIN — TAZOBACTAM SODIUM AND PIPERACILLIN SODIUM 3.38 G: 375; 3 INJECTION, SOLUTION INTRAVENOUS at 04:27

## 2018-01-01 RX ADMIN — DEXTROSE AND SODIUM CHLORIDE: 5; 900 INJECTION, SOLUTION INTRAVENOUS at 06:30

## 2018-01-01 RX ADMIN — PANTOPRAZOLE SODIUM 40 MG: 40 TABLET, DELAYED RELEASE ORAL at 11:18

## 2018-01-01 RX ADMIN — DEXTROSE MONOHYDRATE 25 G: 25 INJECTION, SOLUTION INTRAVENOUS at 07:00

## 2018-01-01 RX ADMIN — OLODATEROL RESPIMAT INHALATION SPRAY 2 PUFF: 2.5 SPRAY, METERED RESPIRATORY (INHALATION) at 08:48

## 2018-01-01 RX ADMIN — FENTANYL CITRATE 50 MCG: 50 INJECTION INTRAMUSCULAR; INTRAVENOUS at 05:46

## 2018-01-01 RX ADMIN — TAZOBACTAM SODIUM AND PIPERACILLIN SODIUM 3.38 G: 375; 3 INJECTION, SOLUTION INTRAVENOUS at 20:07

## 2018-01-01 RX ADMIN — PROPOFOL 100 MG: 10 INJECTION, EMULSION INTRAVENOUS at 09:14

## 2018-01-01 RX ADMIN — FENTANYL CITRATE 50 MCG: 50 INJECTION INTRAMUSCULAR; INTRAVENOUS at 10:53

## 2018-01-01 RX ADMIN — MIDAZOLAM HYDROCHLORIDE 2 MG: 1 INJECTION, SOLUTION INTRAMUSCULAR; INTRAVENOUS at 08:59

## 2018-01-01 RX ADMIN — TAZOBACTAM SODIUM AND PIPERACILLIN SODIUM 3.38 G: 375; 3 INJECTION, SOLUTION INTRAVENOUS at 04:26

## 2018-01-01 RX ADMIN — FLUTICASONE PROPIONATE 1 SPRAY: 50 SPRAY, METERED NASAL at 10:13

## 2018-01-01 RX ADMIN — ASPIRIN 81 MG: 81 TABLET, CHEWABLE ORAL at 09:35

## 2018-01-01 RX ADMIN — CEFEPIME 1 G: 1 INJECTION, SOLUTION INTRAVENOUS at 15:29

## 2018-01-01 RX ADMIN — FENTANYL CITRATE 50 MCG: 50 INJECTION INTRAMUSCULAR; INTRAVENOUS at 10:38

## 2018-01-01 RX ADMIN — FENTANYL CITRATE 50 MCG: 50 INJECTION INTRAMUSCULAR; INTRAVENOUS at 21:20

## 2018-01-01 RX ADMIN — CITALOPRAM 40 MG: 20 TABLET, FILM COATED ORAL at 10:27

## 2018-01-01 RX ADMIN — FAMOTIDINE 20 MG: 20 TABLET, FILM COATED ORAL at 08:00

## 2018-01-01 RX ADMIN — SODIUM CHLORIDE: 9 INJECTION, SOLUTION INTRAVENOUS at 22:14

## 2018-01-01 RX ADMIN — VANCOMYCIN HYDROCHLORIDE 750 MG: 1 INJECTION, POWDER, LYOPHILIZED, FOR SOLUTION INTRAVENOUS at 20:00

## 2018-01-01 RX ADMIN — Medication 10 ML: at 20:32

## 2018-01-01 RX ADMIN — LORAZEPAM 1 MG: 2 INJECTION INTRAMUSCULAR; INTRAVENOUS at 06:06

## 2018-01-01 RX ADMIN — FOLIC ACID 1 MG: 1 TABLET ORAL at 09:15

## 2018-01-01 RX ADMIN — Medication 1 TABLET: at 08:02

## 2018-01-01 RX ADMIN — FOLIC ACID 1 MG: 1 TABLET ORAL at 09:24

## 2018-01-01 RX ADMIN — FENTANYL CITRATE 25 MCG: 50 INJECTION INTRAMUSCULAR; INTRAVENOUS at 22:37

## 2018-01-01 RX ADMIN — LORAZEPAM 0.5 MG: 2 INJECTION INTRAMUSCULAR; INTRAVENOUS at 08:43

## 2018-01-01 RX ADMIN — Medication 1 TABLET: at 08:18

## 2018-01-01 RX ADMIN — TAZOBACTAM SODIUM AND PIPERACILLIN SODIUM 3.38 G: 375; 3 INJECTION, SOLUTION INTRAVENOUS at 14:14

## 2018-01-01 RX ADMIN — LORAZEPAM 2 MG: 2 INJECTION INTRAMUSCULAR; INTRAVENOUS at 16:10

## 2018-01-01 RX ADMIN — ASPIRIN 81 MG: 81 TABLET, CHEWABLE ORAL at 11:19

## 2018-01-01 RX ADMIN — SODIUM CHLORIDE 250 ML: 0.9 INJECTION, SOLUTION INTRAVENOUS at 07:04

## 2018-01-01 RX ADMIN — CEFEPIME HYDROCHLORIDE 2 G: 2 INJECTION, POWDER, FOR SOLUTION INTRAVENOUS at 23:49

## 2018-01-01 RX ADMIN — OLODATEROL RESPIMAT INHALATION SPRAY 2 PUFF: 2.5 SPRAY, METERED RESPIRATORY (INHALATION) at 07:51

## 2018-01-01 RX ADMIN — DEXTROSE MONOHYDRATE 12.5 G: 25 INJECTION, SOLUTION INTRAVENOUS at 05:02

## 2018-01-01 RX ADMIN — CETIRIZINE HYDROCHLORIDE 10 MG: 10 TABLET ORAL at 11:33

## 2018-01-01 RX ADMIN — TAZOBACTAM SODIUM AND PIPERACILLIN SODIUM 3.38 G: 375; 3 INJECTION, SOLUTION INTRAVENOUS at 19:00

## 2018-01-01 RX ADMIN — VANCOMYCIN HYDROCHLORIDE 750 MG: 1 INJECTION, POWDER, LYOPHILIZED, FOR SOLUTION INTRAVENOUS at 00:14

## 2018-01-01 RX ADMIN — IOTHALAMATE MEGLUMINE 10 ML: 430 INJECTION INTRAVASCULAR at 08:59

## 2018-01-01 RX ADMIN — ASPIRIN 81 MG: 81 TABLET, CHEWABLE ORAL at 09:30

## 2018-01-01 RX ADMIN — ENOXAPARIN SODIUM 40 MG: 40 INJECTION SUBCUTANEOUS at 08:46

## 2018-01-01 RX ADMIN — MICONAZORB AF ANTIFUNGAL POWDER: 1.42 POWDER TOPICAL at 20:09

## 2018-01-01 RX ADMIN — DICYCLOMINE HYDROCHLORIDE 10 MG: 10 CAPSULE ORAL at 20:08

## 2018-01-01 RX ADMIN — Medication 10 ML: at 20:44

## 2018-01-01 RX ADMIN — CITALOPRAM 40 MG: 20 TABLET, FILM COATED ORAL at 09:30

## 2018-01-01 RX ADMIN — FAMOTIDINE 20 MG: 20 TABLET, FILM COATED ORAL at 09:52

## 2018-01-01 RX ADMIN — DOCUSATE SODIUM 100 MG: 100 CAPSULE ORAL at 09:30

## 2018-01-01 RX ADMIN — GADOPENTETATE DIMEGLUMINE 10 ML: 469.01 INJECTION INTRAVENOUS at 12:31

## 2018-01-01 RX ADMIN — FAMOTIDINE 20 MG: 20 TABLET, FILM COATED ORAL at 08:40

## 2018-01-01 RX ADMIN — ASPIRIN 81 MG: 81 TABLET, CHEWABLE ORAL at 10:12

## 2018-01-01 RX ADMIN — FENTANYL CITRATE 50 MCG: 50 INJECTION INTRAMUSCULAR; INTRAVENOUS at 06:29

## 2018-01-01 RX ADMIN — DICYCLOMINE HYDROCHLORIDE 10 MG: 10 CAPSULE ORAL at 10:12

## 2018-01-01 RX ADMIN — ASPIRIN 81 MG: 81 TABLET, CHEWABLE ORAL at 09:15

## 2018-01-01 RX ADMIN — SODIUM CHLORIDE: 9 INJECTION, SOLUTION INTRAVENOUS at 05:00

## 2018-01-01 RX ADMIN — LORAZEPAM 0.5 MG: 2 INJECTION INTRAMUSCULAR at 05:32

## 2018-01-01 RX ADMIN — VANCOMYCIN HYDROCHLORIDE 750 MG: 1 INJECTION, POWDER, LYOPHILIZED, FOR SOLUTION INTRAVENOUS at 08:57

## 2018-01-01 RX ADMIN — TAZOBACTAM SODIUM AND PIPERACILLIN SODIUM 3.38 G: 375; 3 INJECTION, SOLUTION INTRAVENOUS at 21:12

## 2018-01-01 RX ADMIN — TRAMADOL HYDROCHLORIDE 50 MG: 50 TABLET, FILM COATED ORAL at 20:43

## 2018-01-01 RX ADMIN — Medication 10 ML: at 13:53

## 2018-01-01 RX ADMIN — FOLIC ACID 1 MG: 1 TABLET ORAL at 10:12

## 2018-01-01 RX ADMIN — ASPIRIN 81 MG: 81 TABLET, CHEWABLE ORAL at 11:11

## 2018-01-01 RX ADMIN — FAMOTIDINE 20 MG: 20 TABLET, FILM COATED ORAL at 08:18

## 2018-01-01 RX ADMIN — Medication 1 TABLET: at 09:30

## 2018-01-01 RX ADMIN — VANCOMYCIN HYDROCHLORIDE 750 MG: 1 INJECTION, POWDER, LYOPHILIZED, FOR SOLUTION INTRAVENOUS at 12:53

## 2018-01-01 RX ADMIN — FAMOTIDINE 20 MG: 20 TABLET, FILM COATED ORAL at 20:04

## 2018-01-01 RX ADMIN — DICYCLOMINE HYDROCHLORIDE 10 MG: 10 CAPSULE ORAL at 20:23

## 2018-01-01 RX ADMIN — Medication 10 ML: at 22:15

## 2018-01-01 RX ADMIN — FAMOTIDINE 20 MG: 10 INJECTION, SOLUTION INTRAVENOUS at 07:26

## 2018-01-01 RX ADMIN — Medication 10 ML: at 10:11

## 2018-01-01 RX ADMIN — FENTANYL CITRATE 25 MCG: 50 INJECTION INTRAMUSCULAR; INTRAVENOUS at 18:26

## 2018-01-01 RX ADMIN — CEFEPIME 1 G: 1 INJECTION, SOLUTION INTRAVENOUS at 03:09

## 2018-01-01 RX ADMIN — POTASSIUM CHLORIDE 40 MEQ: 40 SOLUTION ORAL at 09:05

## 2018-01-01 RX ADMIN — LORAZEPAM 1 MG: 2 INJECTION INTRAMUSCULAR; INTRAVENOUS at 23:42

## 2018-01-01 RX ADMIN — FAMOTIDINE 20 MG: 20 TABLET, FILM COATED ORAL at 10:27

## 2018-01-01 RX ADMIN — PANTOPRAZOLE SODIUM 40 MG: 40 TABLET, DELAYED RELEASE ORAL at 10:12

## 2018-01-01 RX ADMIN — ASPIRIN 81 MG: 81 TABLET, CHEWABLE ORAL at 08:51

## 2018-01-01 RX ADMIN — Medication 10 ML: at 20:57

## 2018-01-01 RX ADMIN — SODIUM CHLORIDE 250 ML: 9 INJECTION, SOLUTION INTRAVENOUS at 05:31

## 2018-01-01 RX ADMIN — LORAZEPAM 2 MG: 2 INJECTION, SOLUTION INTRAMUSCULAR; INTRAVENOUS at 19:13

## 2018-01-01 RX ADMIN — FENTANYL CITRATE 25 MCG: 50 INJECTION INTRAMUSCULAR; INTRAVENOUS at 00:46

## 2018-01-01 RX ADMIN — IOPAMIDOL 75 ML: 755 INJECTION, SOLUTION INTRAVENOUS at 20:31

## 2018-01-01 RX ADMIN — GABAPENTIN 300 MG: 300 CAPSULE ORAL at 21:13

## 2018-01-01 RX ADMIN — ASPIRIN 81 MG: 81 TABLET, CHEWABLE ORAL at 08:18

## 2018-01-01 RX ADMIN — ASPIRIN 81 MG: 81 TABLET, CHEWABLE ORAL at 10:27

## 2018-01-01 RX ADMIN — ONDANSETRON 4 MG: 2 INJECTION INTRAMUSCULAR; INTRAVENOUS at 21:22

## 2018-01-01 RX ADMIN — CITALOPRAM 40 MG: 20 TABLET, FILM COATED ORAL at 10:14

## 2018-01-01 RX ADMIN — CEFEPIME 1 G: 1 INJECTION, SOLUTION INTRAVENOUS at 09:05

## 2018-01-01 RX ADMIN — ONDANSETRON 4 MG: 2 INJECTION, SOLUTION INTRAMUSCULAR; INTRAVENOUS at 17:44

## 2018-01-01 RX ADMIN — MICONAZORB AF ANTIFUNGAL POWDER: 1.42 POWDER TOPICAL at 20:04

## 2018-01-01 RX ADMIN — Medication 10 ML: at 21:13

## 2018-01-01 RX ADMIN — ACETAMINOPHEN 1000 MG: 500 TABLET ORAL at 12:11

## 2018-01-01 RX ADMIN — FOLIC ACID 1 MG: 1 TABLET ORAL at 10:27

## 2018-01-01 RX ADMIN — MICONAZORB AF ANTIFUNGAL POWDER: 1.42 POWDER TOPICAL at 08:02

## 2018-01-01 RX ADMIN — CITALOPRAM 40 MG: 20 TABLET, FILM COATED ORAL at 08:51

## 2018-01-01 RX ADMIN — FAMOTIDINE 20 MG: 20 TABLET, FILM COATED ORAL at 20:31

## 2018-01-01 RX ADMIN — DICYCLOMINE HYDROCHLORIDE 10 MG: 10 CAPSULE ORAL at 10:11

## 2018-01-01 RX ADMIN — Medication 1 TABLET: at 10:11

## 2018-01-01 RX ADMIN — ENOXAPARIN SODIUM 40 MG: 40 INJECTION SUBCUTANEOUS at 08:00

## 2018-01-01 RX ADMIN — FENTANYL CITRATE 50 MCG: 50 INJECTION INTRAMUSCULAR; INTRAVENOUS at 03:31

## 2018-01-01 RX ADMIN — CEFEPIME 1 G: 1 INJECTION, SOLUTION INTRAVENOUS at 08:00

## 2018-01-01 RX ADMIN — Medication 1 TABLET: at 11:11

## 2018-01-01 RX ADMIN — FENTANYL CITRATE 50 MCG: 50 INJECTION INTRAMUSCULAR; INTRAVENOUS at 12:32

## 2018-01-01 RX ADMIN — FENTANYL CITRATE 25 MCG: 50 INJECTION INTRAMUSCULAR; INTRAVENOUS at 08:34

## 2018-01-01 RX ADMIN — Medication: at 23:28

## 2018-01-01 RX ADMIN — TAZOBACTAM SODIUM AND PIPERACILLIN SODIUM 3.38 G: 375; 3 INJECTION, SOLUTION INTRAVENOUS at 05:27

## 2018-01-01 RX ADMIN — DICYCLOMINE HYDROCHLORIDE 10 MG: 10 CAPSULE ORAL at 06:56

## 2018-01-01 RX ADMIN — LIDOCAINE HYDROCHLORIDE 40 MG: 10 INJECTION, SOLUTION EPIDURAL; INFILTRATION; INTRACAUDAL; PERINEURAL at 09:10

## 2018-01-01 RX ADMIN — Medication 10 ML: at 08:45

## 2018-01-01 RX ADMIN — VANCOMYCIN HYDROCHLORIDE 750 MG: 1 INJECTION, POWDER, LYOPHILIZED, FOR SOLUTION INTRAVENOUS at 22:29

## 2018-01-01 RX ADMIN — DICYCLOMINE HYDROCHLORIDE 10 MG: 10 CAPSULE ORAL at 11:34

## 2018-01-01 RX ADMIN — POTASSIUM CHLORIDE 40 MEQ: 20 TABLET, EXTENDED RELEASE ORAL at 20:08

## 2018-01-01 RX ADMIN — CEFEPIME 1 G: 1 INJECTION, SOLUTION INTRAVENOUS at 00:46

## 2018-01-01 RX ADMIN — ASPIRIN 81 MG: 81 TABLET, CHEWABLE ORAL at 08:31

## 2018-01-01 RX ADMIN — SUGAMMADEX 156 MG: 100 INJECTION, SOLUTION INTRAVENOUS at 09:45

## 2018-01-01 RX ADMIN — CEFEPIME 1 G: 1 INJECTION, SOLUTION INTRAVENOUS at 18:27

## 2018-01-01 RX ADMIN — FENTANYL CITRATE 25 MCG: 50 INJECTION INTRAMUSCULAR; INTRAVENOUS at 22:33

## 2018-01-01 RX ADMIN — ENOXAPARIN SODIUM 40 MG: 40 INJECTION SUBCUTANEOUS at 15:29

## 2018-01-01 RX ADMIN — FOLIC ACID 1 MG: 1 TABLET ORAL at 08:18

## 2018-01-01 RX ADMIN — FOLIC ACID 1 MG: 1 TABLET ORAL at 10:10

## 2018-01-01 RX ADMIN — FAMOTIDINE 20 MG: 10 INJECTION, SOLUTION INTRAVENOUS at 10:04

## 2018-01-01 RX ADMIN — FAMOTIDINE 20 MG: 20 TABLET, FILM COATED ORAL at 08:51

## 2018-01-01 RX ADMIN — CEFEPIME HYDROCHLORIDE 2 G: 2 INJECTION, POWDER, FOR SOLUTION INTRAVENOUS at 06:58

## 2018-01-01 RX ADMIN — CEFEPIME 1 G: 1 INJECTION, SOLUTION INTRAVENOUS at 15:50

## 2018-01-01 RX ADMIN — CITALOPRAM 40 MG: 20 TABLET, FILM COATED ORAL at 08:02

## 2018-01-01 RX ADMIN — CEFEPIME 1 G: 1 INJECTION, SOLUTION INTRAVENOUS at 23:34

## 2018-01-01 RX ADMIN — CITALOPRAM 40 MG: 20 TABLET, FILM COATED ORAL at 10:12

## 2018-01-01 RX ADMIN — FENTANYL CITRATE 25 MCG: 50 INJECTION INTRAMUSCULAR; INTRAVENOUS at 12:36

## 2018-01-01 RX ADMIN — DOCUSATE SODIUM 100 MG: 100 CAPSULE ORAL at 21:13

## 2018-01-01 RX ADMIN — FENTANYL CITRATE 25 MCG: 50 INJECTION INTRAMUSCULAR; INTRAVENOUS at 08:36

## 2018-01-01 RX ADMIN — POTASSIUM CHLORIDE 20 MEQ: 1500 TABLET, EXTENDED RELEASE ORAL at 17:42

## 2018-01-01 RX ADMIN — FENTANYL CITRATE 25 MCG: 50 INJECTION INTRAMUSCULAR; INTRAVENOUS at 05:52

## 2018-01-01 RX ADMIN — SODIUM PHOSPHATE, MONOBASIC, MONOHYDRATE 15 MMOL: 276; 142 INJECTION, SOLUTION INTRAVENOUS at 10:27

## 2018-01-01 RX ADMIN — DICYCLOMINE HYDROCHLORIDE 10 MG: 10 CAPSULE ORAL at 18:27

## 2018-01-01 RX ADMIN — POTASSIUM CHLORIDE 40 MEQ: 1500 TABLET, EXTENDED RELEASE ORAL at 08:28

## 2018-01-01 RX ADMIN — SODIUM CHLORIDE: 9 INJECTION, SOLUTION INTRAVENOUS at 03:10

## 2018-01-01 RX ADMIN — FENTANYL CITRATE 50 MCG: 50 INJECTION INTRAMUSCULAR; INTRAVENOUS at 03:25

## 2018-01-01 RX ADMIN — ENOXAPARIN SODIUM 40 MG: 40 INJECTION SUBCUTANEOUS at 10:04

## 2018-01-01 RX ADMIN — DEXTROSE AND SODIUM CHLORIDE: 5; 900 INJECTION, SOLUTION INTRAVENOUS at 22:40

## 2018-01-01 RX ADMIN — SODIUM CHLORIDE 1197 ML: 9 INJECTION, SOLUTION INTRAVENOUS at 18:51

## 2018-01-01 RX ADMIN — GABAPENTIN 300 MG: 300 CAPSULE ORAL at 20:23

## 2018-01-01 RX ADMIN — Medication 80 MG: at 16:31

## 2018-01-01 RX ADMIN — FOSPHENYTOIN SODIUM 100 MG PE: 50 INJECTION, SOLUTION INTRAMUSCULAR; INTRAVENOUS at 01:09

## 2018-01-01 RX ADMIN — FOLIC ACID 1 MG: 1 TABLET ORAL at 11:18

## 2018-01-01 RX ADMIN — FOLIC ACID 1 MG: 1 TABLET ORAL at 08:40

## 2018-01-01 RX ADMIN — SODIUM CHLORIDE 1347 ML: 9 INJECTION, SOLUTION INTRAVENOUS at 17:59

## 2018-01-01 RX ADMIN — CEFEPIME 1 G: 1 INJECTION, SOLUTION INTRAVENOUS at 08:48

## 2018-01-01 RX ADMIN — GABAPENTIN 300 MG: 300 CAPSULE ORAL at 20:38

## 2018-01-01 RX ADMIN — ALBUTEROL SULFATE 2.5 MG: 2.5 SOLUTION RESPIRATORY (INHALATION) at 20:45

## 2018-01-01 RX ADMIN — SODIUM CHLORIDE: 9 INJECTION, SOLUTION INTRAVENOUS at 00:06

## 2018-01-01 RX ADMIN — VANCOMYCIN HYDROCHLORIDE 500 MG: 500 INJECTION, SOLUTION INTRAVENOUS at 09:59

## 2018-01-01 RX ADMIN — Medication 1 TABLET: at 10:12

## 2018-01-01 RX ADMIN — FOLIC ACID 1 MG: 1 TABLET ORAL at 08:51

## 2018-01-01 RX ADMIN — DICYCLOMINE HYDROCHLORIDE 10 MG: 10 CAPSULE ORAL at 18:23

## 2018-01-01 RX ADMIN — DICYCLOMINE HYDROCHLORIDE 10 MG: 10 CAPSULE ORAL at 11:19

## 2018-01-01 RX ADMIN — CITALOPRAM 40 MG: 20 TABLET, FILM COATED ORAL at 09:15

## 2018-01-01 RX ADMIN — Medication 1 TABLET: at 09:15

## 2018-01-01 RX ADMIN — FAMOTIDINE 20 MG: 20 TABLET, FILM COATED ORAL at 09:24

## 2018-01-01 RX ADMIN — FENTANYL CITRATE 25 MCG: 50 INJECTION INTRAMUSCULAR; INTRAVENOUS at 18:53

## 2018-01-01 RX ADMIN — FAMOTIDINE 20 MG: 20 TABLET, FILM COATED ORAL at 20:16

## 2018-01-01 RX ADMIN — Medication 1 TABLET: at 08:40

## 2018-01-01 RX ADMIN — LORAZEPAM 1 MG: 2 INJECTION INTRAMUSCULAR; INTRAVENOUS at 18:46

## 2018-01-01 RX ADMIN — MICONAZORB AF ANTIFUNGAL POWDER: 1.42 POWDER TOPICAL at 09:25

## 2018-01-01 RX ADMIN — LORAZEPAM 1 MG: 2 INJECTION INTRAMUSCULAR; INTRAVENOUS at 00:44

## 2018-01-01 RX ADMIN — DEXTROSE MONOHYDRATE 1000 MG PE: 50 INJECTION, SOLUTION INTRAVENOUS at 18:13

## 2018-01-01 RX ADMIN — DICYCLOMINE HYDROCHLORIDE 10 MG: 10 CAPSULE ORAL at 09:30

## 2018-01-01 RX ADMIN — ENOXAPARIN SODIUM 40 MG: 40 INJECTION SUBCUTANEOUS at 09:24

## 2018-01-01 RX ADMIN — DOCUSATE SODIUM 100 MG: 100 CAPSULE ORAL at 09:15

## 2018-01-01 RX ADMIN — ROCURONIUM BROMIDE 50 MG: 10 INJECTION INTRAVENOUS at 09:10

## 2018-01-01 RX ADMIN — SODIUM CHLORIDE: 9 INJECTION, SOLUTION INTRAVENOUS at 02:27

## 2018-01-01 RX ADMIN — MICONAZORB AF ANTIFUNGAL POWDER: 1.42 POWDER TOPICAL at 11:11

## 2018-01-01 RX ADMIN — CEFEPIME 1 G: 1 INJECTION, SOLUTION INTRAVENOUS at 16:54

## 2018-01-01 RX ADMIN — FENTANYL CITRATE 25 MCG: 50 INJECTION INTRAMUSCULAR; INTRAVENOUS at 10:35

## 2018-01-01 RX ADMIN — LEVETIRACETAM 500 MG: 5 INJECTION INTRAVENOUS at 17:43

## 2018-01-01 RX ADMIN — ASPIRIN 81 MG: 81 TABLET, CHEWABLE ORAL at 09:24

## 2018-01-01 RX ADMIN — MICONAZORB AF ANTIFUNGAL POWDER: 1.42 POWDER TOPICAL at 22:37

## 2018-01-01 RX ADMIN — POTASSIUM CHLORIDE 20 MEQ: 400 INJECTION, SOLUTION INTRAVENOUS at 05:50

## 2018-01-01 RX ADMIN — TAZOBACTAM SODIUM AND PIPERACILLIN SODIUM 3.38 G: 375; 3 INJECTION, SOLUTION INTRAVENOUS at 13:19

## 2018-01-01 RX ADMIN — DEXTROSE 500 MG: 5 SOLUTION INTRAVENOUS at 19:05

## 2018-01-01 RX ADMIN — FAMOTIDINE 20 MG: 10 INJECTION, SOLUTION INTRAVENOUS at 08:45

## 2018-01-01 RX ADMIN — Medication 10 ML: at 20:08

## 2018-01-01 RX ADMIN — FAMOTIDINE 20 MG: 20 TABLET, FILM COATED ORAL at 20:07

## 2018-01-01 RX ADMIN — Medication 1 TABLET: at 11:18

## 2018-01-01 RX ADMIN — CEFEPIME 1 G: 1 INJECTION, SOLUTION INTRAVENOUS at 03:15

## 2018-01-01 RX ADMIN — CEFEPIME 1 G: 1 INJECTION, SOLUTION INTRAVENOUS at 03:08

## 2018-01-01 RX ADMIN — IOTHALAMATE MEGLUMINE 15 ML: 430 INJECTION INTRAVASCULAR at 17:25

## 2018-01-01 RX ADMIN — CETIRIZINE HYDROCHLORIDE 10 MG: 10 TABLET ORAL at 10:11

## 2018-01-01 RX ADMIN — FENTANYL CITRATE 50 MCG: 50 INJECTION INTRAMUSCULAR; INTRAVENOUS at 15:57

## 2018-01-01 RX ADMIN — CEFEPIME 1 G: 1 INJECTION, SOLUTION INTRAVENOUS at 09:51

## 2018-01-01 RX ADMIN — Medication 1 TABLET: at 08:51

## 2018-01-01 RX ADMIN — MICONAZORB AF ANTIFUNGAL POWDER: 1.42 POWDER TOPICAL at 21:02

## 2018-01-01 RX ADMIN — MICONAZORB AF ANTIFUNGAL POWDER: 1.42 POWDER TOPICAL at 11:58

## 2018-01-01 RX ADMIN — ASPIRIN 81 MG: 81 TABLET, CHEWABLE ORAL at 08:40

## 2018-01-01 RX ADMIN — FENTANYL CITRATE 25 MCG: 50 INJECTION INTRAMUSCULAR; INTRAVENOUS at 02:35

## 2018-01-01 RX ADMIN — ENOXAPARIN SODIUM 40 MG: 40 INJECTION SUBCUTANEOUS at 09:20

## 2018-01-01 RX ADMIN — ENOXAPARIN SODIUM 40 MG: 40 INJECTION SUBCUTANEOUS at 09:52

## 2018-01-01 RX ADMIN — CEFEPIME 1 G: 1 INJECTION, SOLUTION INTRAVENOUS at 03:10

## 2018-01-01 RX ADMIN — DICYCLOMINE HYDROCHLORIDE 10 MG: 10 CAPSULE ORAL at 06:00

## 2018-01-01 RX ADMIN — ONDANSETRON 4 MG: 2 INJECTION INTRAMUSCULAR; INTRAVENOUS at 17:44

## 2018-01-01 RX ADMIN — ENOXAPARIN SODIUM 40 MG: 40 INJECTION SUBCUTANEOUS at 09:36

## 2018-01-01 RX ADMIN — CETIRIZINE HYDROCHLORIDE 10 MG: 10 TABLET ORAL at 11:19

## 2018-01-01 RX ADMIN — CEFEPIME 1 G: 1 INJECTION, SOLUTION INTRAVENOUS at 00:02

## 2018-01-01 RX ADMIN — CETIRIZINE HYDROCHLORIDE 10 MG: 10 TABLET ORAL at 09:15

## 2018-01-01 RX ADMIN — Medication 10 MG/HR: at 23:56

## 2018-01-01 RX ADMIN — FAMOTIDINE 20 MG: 20 TABLET, FILM COATED ORAL at 11:11

## 2018-01-01 RX ADMIN — MICONAZORB AF ANTIFUNGAL POWDER: 1.42 POWDER TOPICAL at 08:51

## 2018-01-01 RX ADMIN — LORAZEPAM 0.5 MG: 2 INJECTION INTRAMUSCULAR; INTRAVENOUS at 05:32

## 2018-01-01 RX ADMIN — TAZOBACTAM SODIUM AND PIPERACILLIN SODIUM 3.38 G: 375; 3 INJECTION, SOLUTION INTRAVENOUS at 11:38

## 2018-01-01 RX ADMIN — FAMOTIDINE 20 MG: 20 TABLET, FILM COATED ORAL at 20:08

## 2018-01-01 RX ADMIN — SODIUM CHLORIDE 250 ML: 9 INJECTION, SOLUTION INTRAVENOUS at 02:30

## 2018-01-01 RX ADMIN — ENOXAPARIN SODIUM 40 MG: 40 INJECTION SUBCUTANEOUS at 10:13

## 2018-01-01 RX ADMIN — FOLIC ACID 1 MG: 1 TABLET ORAL at 08:00

## 2018-01-01 RX ADMIN — FENTANYL CITRATE 25 MCG: 50 INJECTION INTRAMUSCULAR; INTRAVENOUS at 15:01

## 2018-01-01 RX ADMIN — Medication 10 ML: at 21:17

## 2018-01-01 RX ADMIN — MICONAZORB AF ANTIFUNGAL POWDER: 1.42 POWDER TOPICAL at 20:31

## 2018-01-01 RX ADMIN — SODIUM CHLORIDE: 9 INJECTION, SOLUTION INTRAVENOUS at 15:41

## 2018-01-01 RX ADMIN — CITALOPRAM 40 MG: 20 TABLET, FILM COATED ORAL at 11:11

## 2018-01-01 RX ADMIN — LEVETIRACETAM 1000 MG: 1000 INJECTION, SOLUTION INTRAVENOUS at 10:11

## 2018-01-01 RX ADMIN — DICYCLOMINE HYDROCHLORIDE 10 MG: 10 CAPSULE ORAL at 18:41

## 2018-01-01 RX ADMIN — FENTANYL CITRATE 50 MCG: 50 INJECTION INTRAMUSCULAR; INTRAVENOUS at 10:24

## 2018-01-01 RX ADMIN — ASPIRIN 81 MG: 81 TABLET, CHEWABLE ORAL at 10:11

## 2018-01-01 RX ADMIN — CETIRIZINE HYDROCHLORIDE 10 MG: 10 TABLET ORAL at 10:12

## 2018-01-01 RX ADMIN — FLUTICASONE PROPIONATE 1 SPRAY: 50 SPRAY, METERED NASAL at 09:37

## 2018-01-01 RX ADMIN — Medication 10 ML: at 09:20

## 2018-01-01 RX ADMIN — TIOTROPIUM BROMIDE 18 MCG: 18 CAPSULE ORAL; RESPIRATORY (INHALATION) at 08:10

## 2018-01-01 RX ADMIN — FAMOTIDINE 20 MG: 10 INJECTION, SOLUTION INTRAVENOUS at 22:11

## 2018-01-01 RX ADMIN — TIOTROPIUM BROMIDE 18 MCG: 18 CAPSULE ORAL; RESPIRATORY (INHALATION) at 09:20

## 2018-01-01 RX ADMIN — Medication 1 TABLET: at 09:24

## 2018-01-01 RX ADMIN — FENTANYL CITRATE 50 MCG: 50 INJECTION INTRAMUSCULAR; INTRAVENOUS at 14:47

## 2018-01-01 RX ADMIN — CITALOPRAM 40 MG: 20 TABLET, FILM COATED ORAL at 09:35

## 2018-01-01 RX ADMIN — DOCUSATE SODIUM 100 MG: 100 CAPSULE ORAL at 20:23

## 2018-01-01 RX ADMIN — CEFEPIME 1 G: 1 INJECTION, SOLUTION INTRAVENOUS at 00:00

## 2018-01-01 RX ADMIN — ENOXAPARIN SODIUM 40 MG: 40 INJECTION SUBCUTANEOUS at 07:26

## 2018-01-01 RX ADMIN — ENOXAPARIN SODIUM 40 MG: 40 INJECTION SUBCUTANEOUS at 11:20

## 2018-01-01 RX ADMIN — TAZOBACTAM SODIUM AND PIPERACILLIN SODIUM 3.38 G: 375; 3 INJECTION, SOLUTION INTRAVENOUS at 18:32

## 2018-01-01 RX ADMIN — FOLIC ACID 1 MG: 1 TABLET ORAL at 09:35

## 2018-01-01 RX ADMIN — Medication 10 ML: at 08:02

## 2018-01-01 RX ADMIN — ASPIRIN 81 MG: 81 TABLET, CHEWABLE ORAL at 08:00

## 2018-01-01 RX ADMIN — MICONAZORB AF ANTIFUNGAL POWDER: 1.42 POWDER TOPICAL at 09:20

## 2018-01-01 RX ADMIN — PROPOFOL 100 MG: 10 INJECTION, EMULSION INTRAVENOUS at 09:10

## 2018-01-01 RX ADMIN — ETOMIDATE 20 MG: 20 INJECTION, SOLUTION INTRAVENOUS at 16:31

## 2018-01-01 RX ADMIN — MAGNESIUM SULFATE HEPTAHYDRATE 1 G: 1 INJECTION, SOLUTION INTRAVENOUS at 20:57

## 2018-01-01 RX ADMIN — OXYCODONE HYDROCHLORIDE AND ACETAMINOPHEN 1 TABLET: 5; 325 TABLET ORAL at 19:18

## 2018-01-01 RX ADMIN — FAMOTIDINE 20 MG: 10 INJECTION, SOLUTION INTRAVENOUS at 09:59

## 2018-01-01 RX ADMIN — ASPIRIN 81 MG: 81 TABLET, CHEWABLE ORAL at 08:02

## 2018-01-01 RX ADMIN — SODIUM CHLORIDE: 9 INJECTION, SOLUTION INTRAVENOUS at 09:13

## 2018-01-01 RX ADMIN — Medication 4 MG/HR: at 16:33

## 2018-01-01 RX ADMIN — DOCUSATE SODIUM 100 MG: 100 CAPSULE ORAL at 20:38

## 2018-01-01 RX ADMIN — CEFEPIME 1 G: 1 INJECTION, SOLUTION INTRAVENOUS at 10:15

## 2018-01-01 RX ADMIN — POTASSIUM CHLORIDE 40 MEQ: 1500 TABLET, EXTENDED RELEASE ORAL at 06:55

## 2018-01-01 RX ADMIN — TIOTROPIUM BROMIDE 18 MCG: 18 CAPSULE ORAL; RESPIRATORY (INHALATION) at 07:52

## 2018-01-01 RX ADMIN — FENTANYL CITRATE 25 MCG: 50 INJECTION INTRAMUSCULAR; INTRAVENOUS at 04:46

## 2018-01-01 RX ADMIN — MAGNESIUM SULFATE HEPTAHYDRATE 1 G: 1 INJECTION, SOLUTION INTRAVENOUS at 18:40

## 2018-01-01 RX ADMIN — MICONAZORB AF ANTIFUNGAL POWDER: 1.42 POWDER TOPICAL at 21:14

## 2018-01-01 RX ADMIN — LORAZEPAM 1 MG: 2 INJECTION INTRAMUSCULAR; INTRAVENOUS at 08:16

## 2018-01-01 RX ADMIN — FAMOTIDINE 20 MG: 20 TABLET, FILM COATED ORAL at 21:29

## 2018-01-01 RX ADMIN — PANTOPRAZOLE SODIUM 40 MG: 40 TABLET, DELAYED RELEASE ORAL at 10:10

## 2018-01-01 RX ADMIN — FENTANYL CITRATE 25 MCG: 50 INJECTION INTRAMUSCULAR; INTRAVENOUS at 13:33

## 2018-01-01 RX ADMIN — Medication 1 TABLET: at 08:00

## 2018-01-01 RX ADMIN — TAZOBACTAM SODIUM AND PIPERACILLIN SODIUM 3.38 G: 375; 3 INJECTION, SOLUTION INTRAVENOUS at 20:37

## 2018-01-01 RX ADMIN — CEFEPIME HYDROCHLORIDE 2 G: 2 INJECTION, POWDER, FOR SOLUTION INTRAVENOUS at 14:19

## 2018-01-01 RX ADMIN — SODIUM CHLORIDE, POTASSIUM CHLORIDE, SODIUM LACTATE AND CALCIUM CHLORIDE: 600; 310; 30; 20 INJECTION, SOLUTION INTRAVENOUS at 08:30

## 2018-01-01 RX ADMIN — POTASSIUM CHLORIDE 20 MEQ: 1500 TABLET, EXTENDED RELEASE ORAL at 10:27

## 2018-01-01 RX ADMIN — DICYCLOMINE HYDROCHLORIDE 10 MG: 10 CAPSULE ORAL at 21:13

## 2018-01-01 RX ADMIN — FENTANYL CITRATE 50 MCG: 50 INJECTION INTRAMUSCULAR; INTRAVENOUS at 23:44

## 2018-01-01 RX ADMIN — VANCOMYCIN HYDROCHLORIDE 750 MG: 1 INJECTION, POWDER, LYOPHILIZED, FOR SOLUTION INTRAVENOUS at 12:12

## 2018-01-01 RX ADMIN — SODIUM CHLORIDE: 9 INJECTION, SOLUTION INTRAVENOUS at 12:32

## 2018-01-01 RX ADMIN — PANTOPRAZOLE SODIUM 40 MG: 40 TABLET, DELAYED RELEASE ORAL at 09:15

## 2018-01-01 RX ADMIN — PANTOPRAZOLE SODIUM 40 MG: 40 TABLET, DELAYED RELEASE ORAL at 09:30

## 2018-01-01 RX ADMIN — SODIUM CHLORIDE: 9 INJECTION, SOLUTION INTRAVENOUS at 10:30

## 2018-01-01 RX ADMIN — CEFEPIME 1 G: 1 INJECTION, SOLUTION INTRAVENOUS at 16:28

## 2018-01-01 RX ADMIN — FAMOTIDINE 20 MG: 20 TABLET, FILM COATED ORAL at 20:43

## 2018-01-01 RX ADMIN — HALOPERIDOL LACTATE 5 MG: 5 INJECTION, SOLUTION INTRAMUSCULAR at 18:41

## 2018-01-01 RX ADMIN — LORAZEPAM 1 MG: 2 INJECTION INTRAMUSCULAR; INTRAVENOUS at 01:38

## 2018-01-01 RX ADMIN — DICYCLOMINE HYDROCHLORIDE 10 MG: 10 CAPSULE ORAL at 20:38

## 2018-01-01 RX ADMIN — DOCUSATE SODIUM 100 MG: 100 CAPSULE ORAL at 11:20

## 2018-01-01 RX ADMIN — CEFEPIME HYDROCHLORIDE 2 G: 2 INJECTION, POWDER, FOR SOLUTION INTRAVENOUS at 23:17

## 2018-01-01 RX ADMIN — CEFAZOLIN SODIUM 1 G: 1 INJECTION, SOLUTION INTRAVENOUS at 09:34

## 2018-01-01 RX ADMIN — CITALOPRAM 40 MG: 20 TABLET, FILM COATED ORAL at 11:26

## 2018-01-01 RX ADMIN — TAZOBACTAM SODIUM AND PIPERACILLIN SODIUM 3.38 G: 375; 3 INJECTION, SOLUTION INTRAVENOUS at 04:57

## 2018-01-01 RX ADMIN — MIDAZOLAM HYDROCHLORIDE 0.5 MG: 1 INJECTION, SOLUTION INTRAMUSCULAR; INTRAVENOUS at 08:34

## 2018-01-01 RX ADMIN — FOLIC ACID 1 MG: 1 TABLET ORAL at 08:31

## 2018-01-01 RX ADMIN — MICAFUNGIN SODIUM 100 MG: 20 INJECTION, POWDER, LYOPHILIZED, FOR SOLUTION INTRAVENOUS at 09:42

## 2018-01-01 RX ADMIN — METOROPROLOL TARTRATE 5 MG: 5 INJECTION, SOLUTION INTRAVENOUS at 01:26

## 2018-01-01 RX ADMIN — FENTANYL CITRATE 50 MCG: 50 INJECTION INTRAMUSCULAR; INTRAVENOUS at 08:02

## 2018-01-01 RX ADMIN — SODIUM CHLORIDE: 9 INJECTION, SOLUTION INTRAVENOUS at 06:45

## 2018-01-01 RX ADMIN — FAMOTIDINE 20 MG: 20 TABLET, FILM COATED ORAL at 09:35

## 2018-01-01 RX ADMIN — FAMOTIDINE 20 MG: 20 TABLET, FILM COATED ORAL at 08:02

## 2018-01-01 RX ADMIN — FAMOTIDINE 20 MG: 10 INJECTION, SOLUTION INTRAVENOUS at 21:30

## 2018-01-01 RX ADMIN — FENTANYL CITRATE 25 MCG: 50 INJECTION INTRAMUSCULAR; INTRAVENOUS at 20:31

## 2018-01-01 RX ADMIN — ALBUTEROL SULFATE 2.5 MG: 2.5 SOLUTION RESPIRATORY (INHALATION) at 08:21

## 2018-01-01 RX ADMIN — POTASSIUM CHLORIDE 40 MEQ: 1500 TABLET, EXTENDED RELEASE ORAL at 09:40

## 2018-01-01 RX ADMIN — CEFEPIME 1 G: 1 INJECTION, SOLUTION INTRAVENOUS at 16:16

## 2018-01-01 RX ADMIN — FUROSEMIDE 40 MG: 10 INJECTION, SOLUTION INTRAMUSCULAR; INTRAVENOUS at 07:17

## 2018-01-01 RX ADMIN — CEFEPIME HYDROCHLORIDE 2 G: 2 INJECTION, POWDER, FOR SOLUTION INTRAVENOUS at 07:26

## 2018-01-01 RX ADMIN — TAZOBACTAM SODIUM AND PIPERACILLIN SODIUM 3.38 G: 375; 3 INJECTION, SOLUTION INTRAVENOUS at 11:49

## 2018-01-01 RX ADMIN — FAMOTIDINE 20 MG: 20 TABLET, FILM COATED ORAL at 22:39

## 2018-01-01 RX ADMIN — OLODATEROL RESPIMAT INHALATION SPRAY 2 PUFF: 2.5 SPRAY, METERED RESPIRATORY (INHALATION) at 08:10

## 2018-01-01 RX ADMIN — SODIUM CHLORIDE 500 ML: 9 INJECTION, SOLUTION INTRAVENOUS at 02:26

## 2018-01-01 RX ADMIN — LEVETIRACETAM 500 MG: 5 INJECTION INTRAVENOUS at 06:30

## 2018-01-01 RX ADMIN — GABAPENTIN 300 MG: 300 CAPSULE ORAL at 20:07

## 2018-01-01 RX ADMIN — LORAZEPAM 1 MG: 2 INJECTION INTRAMUSCULAR at 18:15

## 2018-01-01 RX ADMIN — SODIUM CHLORIDE: 9 INJECTION, SOLUTION INTRAVENOUS at 00:30

## 2018-01-01 RX ADMIN — FENTANYL CITRATE 50 MCG: 50 INJECTION INTRAMUSCULAR; INTRAVENOUS at 01:11

## 2018-01-01 RX ADMIN — FOLIC ACID 1 MG: 1 TABLET ORAL at 11:10

## 2018-01-01 RX ADMIN — DOCUSATE SODIUM 100 MG: 100 CAPSULE ORAL at 10:10

## 2018-01-01 RX ADMIN — TIOTROPIUM BROMIDE 18 MCG: 18 CAPSULE ORAL; RESPIRATORY (INHALATION) at 08:48

## 2018-01-01 RX ADMIN — DICYCLOMINE HYDROCHLORIDE 10 MG: 10 CAPSULE ORAL at 14:14

## 2018-01-01 RX ADMIN — FOLIC ACID 1 MG: 1 TABLET ORAL at 08:02

## 2018-01-01 RX ADMIN — CEFEPIME 1 G: 1 INJECTION, SOLUTION INTRAVENOUS at 00:30

## 2018-01-01 RX ADMIN — FOLIC ACID 1 MG: 1 TABLET ORAL at 09:31

## 2018-01-01 RX ADMIN — VANCOMYCIN HYDROCHLORIDE 750 MG: 1 INJECTION, POWDER, LYOPHILIZED, FOR SOLUTION INTRAVENOUS at 21:00

## 2018-01-01 RX ADMIN — MICONAZORB AF ANTIFUNGAL POWDER: 1.42 POWDER TOPICAL at 10:15

## 2018-01-01 RX ADMIN — FENTANYL CITRATE 50 MCG: 50 INJECTION INTRAMUSCULAR; INTRAVENOUS at 05:26

## 2018-01-01 RX ADMIN — LORAZEPAM 1 MG: 2 INJECTION INTRAMUSCULAR; INTRAVENOUS at 09:37

## 2018-01-01 RX ADMIN — CITALOPRAM 40 MG: 20 TABLET, FILM COATED ORAL at 11:18

## 2018-01-01 RX ADMIN — DEXTROSE AND SODIUM CHLORIDE: 5; 900 INJECTION, SOLUTION INTRAVENOUS at 15:45

## 2018-01-01 RX ADMIN — LORAZEPAM 1 MG: 2 INJECTION, SOLUTION INTRAMUSCULAR; INTRAVENOUS at 19:13

## 2018-01-01 ASSESSMENT — PAIN SCALES - GENERAL
PAINLEVEL_OUTOF10: 5
PAINLEVEL_OUTOF10: 0
PAINLEVEL_OUTOF10: 0
PAINLEVEL_OUTOF10: 8
PAINLEVEL_OUTOF10: 5
PAINLEVEL_OUTOF10: 7
PAINLEVEL_OUTOF10: 10
PAINLEVEL_OUTOF10: 3
PAINLEVEL_OUTOF10: 7
PAINLEVEL_OUTOF10: 0
PAINLEVEL_OUTOF10: 0
PAINLEVEL_OUTOF10: 6
PAINLEVEL_OUTOF10: 0
PAINLEVEL_OUTOF10: 3
PAINLEVEL_OUTOF10: 6
PAINLEVEL_OUTOF10: 7
PAINLEVEL_OUTOF10: 0
PAINLEVEL_OUTOF10: 6
PAINLEVEL_OUTOF10: 6
PAINLEVEL_OUTOF10: 0
PAINLEVEL_OUTOF10: 6
PAINLEVEL_OUTOF10: 9
PAINLEVEL_OUTOF10: 2
PAINLEVEL_OUTOF10: 0
PAINLEVEL_OUTOF10: 2
PAINLEVEL_OUTOF10: 10
PAINLEVEL_OUTOF10: 9
PAINLEVEL_OUTOF10: 10
PAINLEVEL_OUTOF10: 8
PAINLEVEL_OUTOF10: 6
PAINLEVEL_OUTOF10: 6
PAINLEVEL_OUTOF10: 8
PAINLEVEL_OUTOF10: 0
PAINLEVEL_OUTOF10: 9
PAINLEVEL_OUTOF10: 7
PAINLEVEL_OUTOF10: 0
PAINLEVEL_OUTOF10: 10
PAINLEVEL_OUTOF10: 7
PAINLEVEL_OUTOF10: 8
PAINLEVEL_OUTOF10: 6
PAINLEVEL_OUTOF10: 9
PAINLEVEL_OUTOF10: 8
PAINLEVEL_OUTOF10: 0
PAINLEVEL_OUTOF10: 7
PAINLEVEL_OUTOF10: 6
PAINLEVEL_OUTOF10: 0
PAINLEVEL_OUTOF10: 0
PAINLEVEL_OUTOF10: 3
PAINLEVEL_OUTOF10: 0
PAINLEVEL_OUTOF10: 6
PAINLEVEL_OUTOF10: 7
PAINLEVEL_OUTOF10: 4
PAINLEVEL_OUTOF10: 8
PAINLEVEL_OUTOF10: 2
PAINLEVEL_OUTOF10: 9
PAINLEVEL_OUTOF10: 0
PAINLEVEL_OUTOF10: 6
PAINLEVEL_OUTOF10: 0
PAINLEVEL_OUTOF10: 0
PAINLEVEL_OUTOF10: 5
PAINLEVEL_OUTOF10: 4
PAINLEVEL_OUTOF10: 0

## 2018-01-01 ASSESSMENT — PULMONARY FUNCTION TESTS
PIF_VALUE: 19
PIF_VALUE: 19
PIF_VALUE: 22
PIF_VALUE: 23
PIF_VALUE: 1
PIF_VALUE: 19
PIF_VALUE: 17
PIF_VALUE: 24
PIF_VALUE: 24
PIF_VALUE: 20
PIF_VALUE: 19
PIF_VALUE: 2
PIF_VALUE: 16
PIF_VALUE: 27
PIF_VALUE: 19
PIF_VALUE: 22
PIF_VALUE: 24
PIF_VALUE: 12
PIF_VALUE: 22
PIF_VALUE: 1
PIF_VALUE: 17
PIF_VALUE: 3
PIF_VALUE: 1
PIF_VALUE: 19
PIF_VALUE: 2
PIF_VALUE: 1
PIF_VALUE: 19
PIF_VALUE: 12
PIF_VALUE: 24
PIF_VALUE: 21
PIF_VALUE: 2
PIF_VALUE: 9
PIF_VALUE: 19
PIF_VALUE: 3
PIF_VALUE: 23
PIF_VALUE: 24
PIF_VALUE: 2
PIF_VALUE: 19
PIF_VALUE: 14
PIF_VALUE: 15
PIF_VALUE: 24
PIF_VALUE: 0
PIF_VALUE: 18
PIF_VALUE: 19
PIF_VALUE: 23
PIF_VALUE: 23
PIF_VALUE: 2
PIF_VALUE: 24
PIF_VALUE: 28
PIF_VALUE: 19
PIF_VALUE: 20
PIF_VALUE: 2
PIF_VALUE: 0
PIF_VALUE: 23
PIF_VALUE: 20
PIF_VALUE: 19
PIF_VALUE: 19
PIF_VALUE: 2
PIF_VALUE: 7
PIF_VALUE: 19
PIF_VALUE: 19

## 2018-01-01 ASSESSMENT — PAIN DESCRIPTION - LOCATION
LOCATION: HEAD
LOCATION: FLANK
LOCATION: LEG
LOCATION: FLANK
LOCATION: HEAD
LOCATION: ABDOMEN
LOCATION: HEAD
LOCATION: FLANK
LOCATION: HEAD
LOCATION: ABDOMEN
LOCATION: HEAD
LOCATION: FLANK
LOCATION: LEG

## 2018-01-01 ASSESSMENT — PAIN DESCRIPTION - ORIENTATION
ORIENTATION: RIGHT;LEFT
ORIENTATION: RIGHT
ORIENTATION: RIGHT;LEFT
ORIENTATION: RIGHT
ORIENTATION: ANTERIOR

## 2018-01-01 ASSESSMENT — PAIN DESCRIPTION - ONSET
ONSET: ON-GOING
ONSET: ON-GOING
ONSET: GRADUAL
ONSET: ON-GOING
ONSET: GRADUAL
ONSET: ON-GOING
ONSET: GRADUAL

## 2018-01-01 ASSESSMENT — PAIN DESCRIPTION - FREQUENCY
FREQUENCY: CONTINUOUS
FREQUENCY: INTERMITTENT
FREQUENCY: INTERMITTENT
FREQUENCY: CONTINUOUS
FREQUENCY: INTERMITTENT

## 2018-01-01 ASSESSMENT — ENCOUNTER SYMPTOMS
SINUS PAIN: 0
NAUSEA: 0
SHORTNESS OF BREATH: 0
WHEEZING: 0
SHORTNESS OF BREATH: 0
SHORTNESS OF BREATH: 1
RHINORRHEA: 0
ABDOMINAL PAIN: 0
STRIDOR: 0
DIARRHEA: 0
VOMITING: 0
CONSTIPATION: 0
ABDOMINAL PAIN: 0
COUGH: 0

## 2018-01-01 ASSESSMENT — PAIN SCALES - WONG BAKER
WONGBAKER_NUMERICALRESPONSE: 2
WONGBAKER_NUMERICALRESPONSE: 6

## 2018-01-01 ASSESSMENT — PAIN DESCRIPTION - DESCRIPTORS
DESCRIPTORS: HEADACHE
DESCRIPTORS: SHARP
DESCRIPTORS: ACHING
DESCRIPTORS: SHARP
DESCRIPTORS: ACHING

## 2018-01-01 ASSESSMENT — PAIN DESCRIPTION - PROGRESSION
CLINICAL_PROGRESSION: NOT CHANGED
CLINICAL_PROGRESSION: GRADUALLY WORSENING

## 2018-01-01 ASSESSMENT — PAIN DESCRIPTION - PAIN TYPE
TYPE: SURGICAL PAIN
TYPE: ACUTE PAIN
TYPE: REFERRED PAIN
TYPE: ACUTE PAIN
TYPE: ACUTE PAIN
TYPE: REFERRED PAIN
TYPE: ACUTE PAIN
TYPE: SURGICAL PAIN
TYPE: ACUTE PAIN

## 2018-01-01 ASSESSMENT — PAIN - FUNCTIONAL ASSESSMENT: PAIN_FUNCTIONAL_ASSESSMENT: 0-10

## 2018-01-01 NOTE — PROGRESS NOTES
LUNGS EVERY 6 HOURS AS NEEDED FOR WHEEZING 1 Inhaler 0    docusate sodium (DOCQLACE) 100 MG capsule TAKE 1 CAPSULE BY MOUTH 2 TIMES DAILY AS NEEDED FOR CONSTIPATION 30 capsule 3    fluticasone (FLONASE) 50 MCG/ACT nasal spray 1 spray by Nasal route daily 1 Bottle 3    ondansetron (ZOFRAN) 4 MG tablet Take 1 tablet by mouth every 8 hours as needed for Nausea 8 tablet 0    albuterol (PROVENTIL) (5 MG/ML) 0.5% nebulizer solution Take 0.5 mLs by nebulization every 6 hours as needed for Wheezing 30 vial 0       Allergies: Rolene Cough A Samples is allergic to latex; bee venom; benadryl [diphenhydramine-zinc acetate]; tavist; and tylenol [acetaminophen].     Past Medical History:   Diagnosis Date    Allergic rhinitis     Anxiety     Asthma DX PRIOR TO 1995    NO INHALER USE ONLY HAS TROUBLE IN EXTREME HOT OR COLD WEATHER    Blood circulation, collateral     Celiac disease     possible    Chronic back pain     low back pain     Chronic kidney disease     Cirrhosis of liver with ascites ([address] 3/30/2015    Constipation     CHRONIC    Cough 11/23/2016    Cryptogenic organizing pneumonia ([address] 11/23/2016    Depression 12/13/2012    on celexa per pcp    Difficult intravenous access     STATES LT ARM EASIER TO START IN, ENCOURAGED TO HYDRATE DAY PROIR     Disease of blood and blood forming organ     Thrombocytosis    Edentulous     does not wear her dentures    Full dentures     ENCOURAGED TO WEAR DOS    Gastric outlet obstruction 8/30/2017    GERD (gastroesophageal reflux disease)     Hearing loss     left ear    History of blood transfusion 01/2012    Hydronephrosis, bilateral 5/2/2015    Hypertension     Hypoglycemia 2014    Lung nodule     right, benign    Migraine     Observed seizure-like activity ([facility] 12/31/2017    OCD (obsessive compulsive disorder)     Osteoarthritis     Osteoporosis     Pelvic mass April 2015    benign     Perforated nasal septum     Peripheral neuropathy due to

## 2018-01-01 NOTE — PROGRESS NOTES
Nutrition Assessment    Type and Reason for Visit: Initial, Consult    Nutrition Recommendations: Recommend  Continue General diet and Ensure supplements. Encouraged pt's intake. Malnutrition Assessment:  · Malnutrition Status: No malnutrition    Nutrition Diagnosis:   · Problem: Inadequate oral intake  · Etiology: related to  (poor appetite)     Signs and symptoms:  as evidenced by Diet history of poor intake    Nutrition Assessment:  · Subjective Assessment: Consulted due to malnutrition/ low albumin and poor intake. Review of old charts shows pt is at her UBW. She has no teeth, but eats all foods without difficulty. She is willing to drink Chocolate Ensure. · Current Nutrition Therapies:  · Oral Diet Orders: General, Fluid Restriction   · Oral Diet intake: Unable to assess  · Anthropometric Measures:  · Ht: 4' 9.09\" (145 cm)   · Current Body Wt: 110 lb (49.9 kg)  · Ideal Body Wt: 89 lb (40.4 kg), % Ideal Body 124%  · Comparative Standards (Estimated Nutrition Needs):  · Estimated Daily Total Kcal: 0941-5043 kcal  · Estimated Daily Protein (g): 60-65 g    Estimated Intake vs Estimated Needs: Insufficient Data    Nutrition Risk Level: Moderate    Nutrition Interventions:   Continue current diet, Continue current ONS  Education Not Indicated    Nutrition Evaluation:   · Evaluation: Goals set   · Goals: Intake greater than 50%    · Monitoring: Meal Intake, Supplement Intake    See Adult Nutrition Doc Flowsheet for more detail.      Electronically signed by Lane Pacheco RD, [place] on 1/1/18 at 2:54 PM    Contact Number: [phone]

## 2018-01-01 NOTE — FLOWSHEET NOTE
visited patient per initial rounding and 30-day Readmit visits. Patient was sleeping in hospital bed when  visited. Per bedside nurse, patient Jazzy Mccloud been lethargic. \"  shared prayer for patient and left spiritual care brochure on patient's bedside table for reference. Per chart, patient's emergency contact is her sibling, Vicky Barbosa ([phone]). Chaplains will remain available to offer spiritual and emotional support as needed.     Ely Alejo     12/31/17 1952   Encounter Summary   Services provided to: Patient  (Nurse)   Referral/Consult From: [facility] members   Place of [employer]   Continue Visiting (12/31/2017, pt sleeping)   Complexity of Encounter Low   Length of Encounter 15 minutes   Routine   Type Initial   Assessment Sleeping   Intervention Prayer;Provided reading materials/devotional materials;Sustaining presence/ Ministry of presence   Outcome Did not respond   Spiritual/Synagogue   Type Spiritual support

## 2018-01-02 NOTE — PROCEDURES
[facility]                            ELECTROENCEPHALOGRAM REPORT    PATIENT NAME: Harrison ANDRADE                :        1970  MED REC NO:   [id number]                             ROOM:       4232  ACCOUNT NO:   [de-identified]                           ADMIT DATE: 2017  PROVIDER:     Tangela Cordon MD    DATE OF EE2018    HISTORY:  The patient is a 75-year-old female with recurrent spells over  the last 9 months, possible seizures. The following is a 21-channel awake EEG without sedation using the 10-20  International Placement System. This tracing is substantially suboptimal due to large amounts of  essentially continuous muscle artifact seen almost diffusely throughout the  recording. During the brief readable portions of the tracing, the  background posteriorly consists of a moderately well-developed 9-10 per  second alpha activity, which was essentially symmetric. The background was  otherwise of generally low amplitude with a small-to-moderate amount of 6-7  per second theta frequency activity and at least a moderate amount of  admixed low-voltage fast activity. Hyperventilation and photic stimulation  were not done. There were no gross asymmetries, focal disturbances, nor  paroxysmal discharges. INTERPRETATION:  This is a nondiagnostic EEG due to large amounts of  artifact. predominantly muscle artifact. Grossly, however, there were no  gross asymmetries, focal disturbances, nor paroxysmal discharges to  correspond with the diagnosis of seizure disorder; this, of course, does  not rule out such a diagnosis.         Sacha Brothers MD    D: 2018 12:25:41       T: 2018 12:31:36     [email or web address]  Job#: [id number]     Doc#: [id number]    CC:

## 2018-01-02 NOTE — PROGRESS NOTES
diarrhea, nausea, vomiting  Neurological:  negative for dizziness, headache    Medications: Allergies: Allergies   Allergen Reactions    Latex Dermatitis    Bee Venom Anaphylaxis    Benadryl [Diphenhydramine-Zinc Acetate] Anaphylaxis    Tavist Anaphylaxis     Quits  Breathing  And  Low   Heart  beat    Tylenol [Acetaminophen] Other (See Comments)     Liver problems       Current Meds:   Scheduled Meds:    aspirin  81 mg Oral Daily    calcium carbonate-vitamin D  1 tablet Oral Daily    citalopram  40 mg Oral Daily    dicyclomine  10 mg Oral 4x Daily AC & HS    fluticasone  1 spray Nasal Daily    folic acid  1 mg Oral Daily    gabapentin  300 mg Oral Nightly    cetirizine  10 mg Oral Daily    pantoprazole  40 mg Oral Daily    sodium chloride flush  10 mL Intravenous 2 times per day    docusate sodium  100 mg Oral BID    enoxaparin  40 mg Subcutaneous Daily    potassium chloride  40 mEq Oral Once    olodaterol  2 puff Inhalation Daily    tiotropium  18 mcg Inhalation Daily    piperacillin-tazobactam  3.375 g Intravenous Q8H     Continuous Infusions:    sodium chloride Stopped (01/02/18 0236)     PRN Meds: promethazine, sodium chloride, sodium chloride flush, magnesium hydroxide, bisacodyl, ondansetron, nicotine, LORazepam, albuterol, albuterol, LORazepam, potassium chloride **OR** potassium chloride **OR** potassium chloride    Data:     Past Medical History:   has a past medical history of Allergic rhinitis; Anxiety; Asthma; Blood circulation, collateral; Celiac disease; Chronic back pain; Chronic kidney disease; Cirrhosis of liver with ascites ([address] [facility] Constipation; Cough; Cryptogenic organizing pneumonia ([address] Depression; Difficult intravenous access; Disease of blood and blood forming organ; Edentulous; Full dentures; Gastric outlet obstruction; GERD (gastroesophageal reflux disease); Hearing loss; History of blood transfusion; Hydronephrosis, bilateral; Hypertension; Hypoglycemia;  Lung nodule; Migraine; Observed seizure-like activity ([facility] OCD (obsessive compulsive disorder); Osteoarthritis; Osteoporosis; Pelvic mass; Perforated nasal septum; Peripheral neuropathy due to inflammation ([facility] Pneumonia; Psoriasis; PTSD (post-traumatic stress disorder); Scoliosis; Shortness of breath; Substance abuse; Thrombocytosis ([facility] Urinary incontinence; and Wears glasses. Social History:   reports that she has been smoking Cigarettes. She has a 7.50 pack-year smoking history. She has never used smokeless tobacco. She reports that she does not drink alcohol or use drugs. Family History:   Family History   Problem Relation Age of Onset    Heart Disease Mother     Stroke Father      cerebral aneurysm       Vitals:  /80   Pulse 84   Temp 98.4 °F (36.9 °C) (Oral)   Resp 20   Ht 4' 9.09\" (1.45 m)   Wt 110 lb (49.9 kg)   LMP 2012   SpO2 100%   BMI 23.73 kg/m²   Temp (24hrs), Av.5 °F (36.9 °C), Min:97.9 °F (36.6 °C), Max:99.1 °F (37.3 °C)    No results for input(s): POCGLU in the last 72 hours. I/O (24Hr):     Intake/Output Summary (Last 24 hours) at 18 0744  Last data filed at 18 0545   Gross per 24 hour   Intake          1057.63 ml   Output                0 ml   Net          1057.63 ml       Labs:    Hematology:  Recent Labs      17   0231  17   0414   18   0427   18   1609  18   0004  18   0609   WBC  17.2*   --    --   12.7*   --    --    --    --    RBC  2.63*   --    --   2.76*   --    --    --    --    HGB  6.9*   --    < >  7.6*   < >  7.1*  6.4*  7.8*   HCT  22.4*   --    < >  25.1*   < >  23.9*  21.6*  26.2*   MCV  85.2   --    --   90.9   --    --    --    --    MCH  26.2   --    --   27.5   --    --    --    --    MCHC  30.8   --    --   30.3   --    --    --    --    RDW  16.4*   --    --   16.9*   --    --    --    --    PLT  571*   --    --   540*   --    --    --    --    MPV  8.8   --    --   8.8   --    --    --

## 2018-01-02 NOTE — PROGRESS NOTES
geovani 3d in hospital.                  Current Facility-Administered Medications on File Prior to Encounter   Medication Dose Route Frequency Provider Last Rate Last Dose    lactated ringers infusion   Intravenous Continuous West Watson MD 0 mL/hr at 12/01/15 1220      meperidine (DEMEROL) injection 12.5 mg  12.5 mg Intravenous Q5 Min PRN Alvaro Frank MD        fentaNYL (SUBLIMAZE) injection 50 mcg  50 mcg Intravenous Q5 Min PRN Alvaro Frank MD         Current Outpatient Prescriptions on File Prior to Encounter   Medication Sig Dispense Refill    oxyCODONE-acetaminophen (PERCOCET) 5-325 MG per tablet Take 1 tablet by mouth every 6 hours as needed for Pain for up to 30 days.  40 tablet 0    aspirin (ASPIRIN LOW DOSE) 81 MG chewable tablet Take 1 tablet by mouth daily 30 tablet 5    dicyclomine (BENTYL) 10 MG capsule Take 1 capsule by mouth 4 times daily (before meals and nightly) for 7 days 14 capsule 0    ondansetron (ZOFRAN-ODT) 4 MG disintegrating tablet Take 1 tablet by mouth every 8 hours as needed for Nausea or Vomiting 15 tablet 0    gabapentin (NEURONTIN) 300 MG capsule Take 1 capsule by mouth nightly 30 capsule 0    sodium chloride (ALTAMIST SPRAY) 0.65 % nasal spray 1 spray by Nasal route as needed for Congestion 1 Bottle 3    Calcium Carbonate-Vitamin D (OYSTER SHELL CALCIUM/D) 500-200 MG-UNIT TABS Take 1 tablet by mouth daily Please discontinue previous prescription of calcium 30 tablet 5    loratadine (CLARITIN) 10 MG tablet Take 1 tablet by mouth daily 30 tablet 5    citalopram (CELEXA) 40 MG tablet Take 1 tablet by mouth daily 30 tablet 5    pantoprazole (PROTONIX) 40 MG tablet Take 1 tablet by mouth daily 90 tablet 1    folic acid (FOLVITE) 1 MG tablet Take 1 tablet by mouth daily 30 tablet 5    umeclidinium-vilanterol (ANORO ELLIPTA) 62.5-25 MCG/INH AEPB inhaler Inhale 1 puff into the lungs daily 1 each 5    VENTOLIN  (90 Base) MCG/ACT inhaler INHALE 2 PUFFS INTO THE LUNGS EVERY 6 HOURS AS NEEDED FOR WHEEZING 1 Inhaler 0    docusate sodium (DOCQLACE) 100 MG capsule TAKE 1 CAPSULE BY MOUTH 2 TIMES DAILY AS NEEDED FOR CONSTIPATION 30 capsule 3    fluticasone (FLONASE) 50 MCG/ACT nasal spray 1 spray by Nasal route daily 1 Bottle 3    ondansetron (ZOFRAN) 4 MG tablet Take 1 tablet by mouth every 8 hours as needed for Nausea 8 tablet 0    albuterol (PROVENTIL) (5 MG/ML) 0.5% nebulizer solution Take 0.5 mLs by nebulization every 6 hours as needed for Wheezing 30 vial 0       Allergies: Ray Child A Samples is allergic to latex; bee venom; benadryl [diphenhydramine-zinc acetate]; tavist; and tylenol [acetaminophen].     Past Medical History:   Diagnosis Date    Allergic rhinitis     Anxiety     Asthma DX PRIOR TO 1995    NO INHALER USE ONLY HAS TROUBLE IN EXTREME HOT OR COLD WEATHER    Blood circulation, collateral     Celiac disease     possible    Chronic back pain     low back pain     Chronic kidney disease     Cirrhosis of liver with ascites ([facility] 3/30/2015    Constipation     CHRONIC    Cough 11/23/2016    Cryptogenic organizing pneumonia ([address] 11/23/2016    Depression 12/13/2012    on celexa per pcp    Difficult intravenous access     STATES LT ARM EASIER TO START IN, ENCOURAGED TO HYDRATE DAY PROIR     Disease of blood and blood forming organ     Thrombocytosis    Edentulous     does not wear her dentures    Full dentures     ENCOURAGED TO WEAR DOS    Gastric outlet obstruction 8/30/2017    GERD (gastroesophageal reflux disease)     Hearing loss     left ear    History of blood transfusion 01/2012    Hydronephrosis, bilateral 5/2/2015    Hypertension     Hypoglycemia 2014    Lung nodule     right, benign    Migraine     Observed seizure-like activity ([facility] 12/31/2017    OCD (obsessive compulsive disorder)     Osteoarthritis     Osteoporosis     Pelvic mass April 2015    benign     Perforated nasal septum     Peripheral neuropathy due to inflammation ([facility] 12/15/2017    Pneumonia     Psoriasis     PTSD (post-traumatic stress disorder) 1985    [facility]    Scoliosis     Shortness of breath 11/23/2016    with temp.  change    [facility]    [facility]    Thrombocytosis ([facility]     Urinary incontinence     PT STATES RESOLVED    Wears glasses        Past Surgical History:   Procedure Laterality Date    ABDOMEN SURGERY  09/2015    MASS REMOVED AND HERNIA REPAIR    CHOLECYSTECTOMY      CYSTOSCOPY  12/1/2015    bilat retrograde, right ureteroscopy    CYSTOSCOPY  05/11/2017    CYSTOSCOPY Right 5/11/2017    CYSTOSCOPY, RIGHT URETEROSCOPY, RIGHT RETROGRADE PYELOGRAM, RIGHT STENT PLACEMENT performed by Karlee Travis MD at [address] [facility]  5/16/2013    FIXATION KYPHOPLASTY  08/09/2016    T12, L-2, L-5    GASTRECTOMY  2012    Partial Gastrectomy    HERNIA REPAIR  07/18/2017    [facility]ia repair with [facility]    LUNG BIOPSY      right upper lobe    PARACENTESIS Right 01/2015-09/2015    X 8    RI CREATE EARDRUM OPENING,GEN ANESTH N/A 4/14/2017    LEFT MYRINGOTOMY T-TUBE INSERTION performed by Darius Carrizales MD at [address] ESOPHAGOGASTRODUODENOSCOPY TRANSORAL DIAGNOSTIC N/A 8/30/2017    EGD ESOPHAGOGASTRODUODENOSCOPY performed by Karan Bobo MD at [facility] N/A 4/14/2017    NASAL SEPTAL BUTTON INSERTION performed by Darius Carrizales MD at [address] [place] [address] N/A 7/18/2017    VENTRAL INCISIONAL HERNIA REPAIR X2  WITH MESH performed by Sandeep Flood DO at [address] [facility]  05/11/2017    URETEROSCOPY  05/11/2017           Medications:     aspirin  81 mg Oral Daily    calcium carbonate-vitamin D  1 tablet Oral Daily    citalopram  40 mg Oral Daily    dicyclomine  10 mg Oral 4x Daily AC & HS    fluticasone  1 spray Nasal Daily   

## 2018-01-02 NOTE — PLAN OF CARE
Problem: Falls - Risk of  Goal: Absence of falls  Outcome: Ongoing  Bed lock and in lowest position, side rails up x 2, room free from clutter, call light within reach, bed alarm on and patient remains free from falls    Problem: Gas Exchange - Impaired:  Goal: Levels of oxygenation will improve  Levels of oxygenation will improve   Outcome: Ongoing  Patient pulse reading 95% or greater on on room air    Problem: Safety:  Goal: Ability to remain free from injury will improve  Ability to remain free from injury will improve   Outcome: Ongoing  No signs of seizures noted    Problem: Skin Integrity:  Goal: Will show no infection signs and symptoms  Will show no infection signs and symptoms   Outcome: Ongoing

## 2018-01-03 NOTE — PROGRESS NOTES
skin integrity, with fair return. Barriers to Learning: cognition  REQUIRES OT FOLLOW UP: Yes  Activity Tolerance  Activity Tolerance: Patient Tolerated treatment well;Treatment limited secondary to decreased cognition  Safety Devices  Safety Devices in place: Yes  Type of devices: Call light within reach; Left in bed;Patient at risk for falls; Bed alarm in place;Gait belt;Nurse notified          Plan   Plan  Times per week: 3-5x    G-Code  OT G-codes  Functional Assessment Tool Used: [facility] ([facility])  Score: 14/24  Functional Limitation: Self care  Self Care Current Status (): At least 40 percent but less than 60 percent impaired, limited or restricted  Self Care Goal Status (): At least 20 percent but less than 40 percent impaired, limited or restricted    AM-PAC Score  AM-PAC Inpatient Daily Activity Raw Score: 14  AM-PAC Inpatient ADL T-Scale Score : 33.39  ADL Inpatient CMS 0-100% Score: 59.67  ADL Inpatient CMS G-Code Modifier : CK    Goals  Short term goals  Time Frame for Short term goals: Pt will, by discharge.    Short term goal 1: demo UB self-care with setup and verbal cues  Short term goal 2: demo LB self-care MIN A  Short term goal 3: demo activity tolerance >30min for ADL/IADL with rest breaks PRN  Short term goal 4: demo ECWS with MIN VC  Short term goal 5: demo following commands IND 4/5 trials  Short term goal 6: demo functional mobility/ADL tranfers with supervision and AE/AD PRN       Therapy Time   Individual Concurrent Group Co-treatment   Time In 1506         Time Out [address], OTR/L
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
## 2018-01-03 NOTE — PROGRESS NOTES
spells 4d in hospital.  EEG was significantly suboptimal technically but grossly showed no paroxysmal activity; consultant is not recommending repeating the study. Current Facility-Administered Medications on File Prior to Encounter   Medication Dose Route Frequency Provider Last Rate Last Dose    lactated ringers infusion   Intravenous Continuous Delma Herman MD 0 mL/hr at 12/01/15 1220      meperidine (DEMEROL) injection 12.5 mg  12.5 mg Intravenous Q5 Min PRN Clovis Noble MD        fentaNYL (SUBLIMAZE) injection 50 mcg  50 mcg Intravenous Q5 Min PRN Clovis Noble MD         Current Outpatient Prescriptions on File Prior to Encounter   Medication Sig Dispense Refill    oxyCODONE-acetaminophen (PERCOCET) 5-325 MG per tablet Take 1 tablet by mouth every 6 hours as needed for Pain for up to 30 days.  40 tablet 0    aspirin (ASPIRIN LOW DOSE) 81 MG chewable tablet Take 1 tablet by mouth daily 30 tablet 5    dicyclomine (BENTYL) 10 MG capsule Take 1 capsule by mouth 4 times daily (before meals and nightly) for 7 days 14 capsule 0    ondansetron (ZOFRAN-ODT) 4 MG disintegrating tablet Take 1 tablet by mouth every 8 hours as needed for Nausea or Vomiting 15 tablet 0    gabapentin (NEURONTIN) 300 MG capsule Take 1 capsule by mouth nightly 30 capsule 0    sodium chloride (ALTAMIST SPRAY) 0.65 % nasal spray 1 spray by Nasal route as needed for Congestion 1 Bottle 3    Calcium Carbonate-Vitamin D (OYSTER SHELL CALCIUM/D) 500-200 MG-UNIT TABS Take 1 tablet by mouth daily Please discontinue previous prescription of calcium 30 tablet 5    loratadine (CLARITIN) 10 MG tablet Take 1 tablet by mouth daily 30 tablet 5    citalopram (CELEXA) 40 MG tablet Take 1 tablet by mouth daily 30 tablet 5    pantoprazole (PROTONIX) 40 MG tablet Take 1 tablet by mouth daily 90 tablet 1    folic acid (FOLVITE) 1 MG tablet Take 1 tablet by mouth daily 30 tablet 5    umeclidinium-vilanterol (ANORO ELLIPTA)  Osteoarthritis     Osteoporosis     Pelvic mass April 2015    benign     Perforated nasal septum     Peripheral neuropathy due to inflammation (HCC) 12/15/2017    Pneumonia     Psoriasis     PTSD (post-traumatic stress disorder) 1985    [facility]    Scoliosis     Shortness of breath 11/23/2016    with temp.  change    [facility] 1987    [facility]    Thrombocytosis ([address]     Urinary incontinence     PT STATES RESOLVED    Wears glasses        Past Surgical History:   Procedure Laterality Date    ABDOMEN SURGERY  09/2015    MASS REMOVED AND HERNIA REPAIR    CHOLECYSTECTOMY      CYSTOSCOPY  12/1/2015    bilat retrograde, right ureteroscopy    CYSTOSCOPY  05/11/2017    CYSTOSCOPY Right 5/11/2017    CYSTOSCOPY, RIGHT URETEROSCOPY, RIGHT RETROGRADE PYELOGRAM, RIGHT STENT PLACEMENT performed by Louis Joseph MD at [address] [facility]  5/16/2013    FIXATION KYPHOPLASTY  08/09/2016    T12, L-2, L-5    GASTRECTOMY  2012    Partial Gastrectomy    HERNIA REPAIR  07/18/2017    [facility]ia repair with [facility]    LUNG BIOPSY      right upper lobe    PARACENTESIS Right 01/2015-09/2015    X 8    [place] CREATE EARDRUM OPENING,GEN ANESTH N/A 4/14/2017    LEFT MYRINGOTOMY T-TUBE INSERTION performed by Jose Cruz Marie MD at [address] ESOPHAGOGASTRODUODENOSCOPY TRANSORAL DIAGNOSTIC N/A 8/30/2017    EGD ESOPHAGOGASTRODUODENOSCOPY performed by Elvin Campbell MD at [facility] N/A 4/14/2017    NASAL SEPTAL BUTTON INSERTION performed by Jose Cruz Marie MD at [address] [place] [address] N/A 7/18/2017    VENTRAL INCISIONAL HERNIA REPAIR X2  WITH MESH performed by Lori Cowart DO at [facility]  05/11/2017    URETEROSCOPY  05/11/2017           Medications:     aspirin  81 mg Oral Daily    calcium carbonate-vitamin D  1 B UE's. ;  no involuntary movements, no tremor   SENSORY FUNCTION:  Normal touch, normal pain grossly   CEREBELLAR FUNCTION:  No limb nor truncal cerebellar signs   REFLEX FUNCTION:  Symmetric, 1/4 with flexor plantar responses   STATION and GAIT  Normal            Data:    Lab Results:   CBC:   Recent Labs      01/01/18   0427   01/02/18   0609  01/02/18   2044  01/03/18   0611   WBC  12.7*   --    --   14.0*  13.3*   HGB  7.6*   < >  7.8*  8.2*  8.0*   PLT  540*   --    --   576*  567*    < > = values in this interval not displayed. BMP:    Recent Labs      01/01/18   0427   01/01/18   1609  01/02/18   0609  01/03/18   0611   NA  140   < >  137  137  135   K  3.5*   --    --   3.4*  3.8   CL  102   --    --   101  96*   CO2  24   --    --   20  24   BUN  13   --    --   14  13   CREATININE  0.53   --    --   0.49*  0.59   GLUCOSE  95   --    --   83  90    < > = values in this interval not displayed. Lab Results   Component Value Date    CHOL 83 01/07/2016    LDLCHOLESTEROL 59 01/07/2016    HDL 12 (L) 01/07/2016    TRIG 62 01/07/2016    ALT 11 01/01/2018    AST 22 01/01/2018    TSH 1.00 01/01/2018    INR 1.3 12/31/2017    LABA1C 5.2 01/07/2016    [id number] 473 12/31/2017     Impression and Plan: Ms. Sarbjit Law is a 50 y.o. female with severe anemia OUO with a progressive Hx this year of anorexia, worsening anemia, weight loss, anergy and recurring spells of some kind with decreased responsiveness and twitching but not what sounds like organized tonic then clonic activity, without tongue-biting or incontinence and from which she may suddenly arouse and appear more or less normal. These are sufficiently unlikely to represent seizures that Repeating EEG and AED's are not warranted. Patient is neurologically stable and clear. It is also noted that she is declining home care, states she lives with aunt and sister and has good support.

## 2018-01-04 NOTE — PROGRESS NOTES
Pt monitor alarming of heart rate 140. Writer to room and found patient jerking rhythmic and unable to follow direction. Jg Velasquez in neurology notified and Ativan given as documented. Diaphoretic with heart rate 120. Jerking activity noted to continue to all extremities but slowing after Ativan. Pt able to answer questions after Ativan even with jerking motion slowing. Jerking activity  Lasting approximately 12 minutes.

## 2018-01-04 NOTE — PROGRESS NOTES
During change of shift patient began having tremors in upper extremities. Family vocalized to day nurse that she felt uncomfortable with discharging her tonight. Kat [facility] NP was notified and gave order that it was okay for her to stay another night. IV access lost and Dr. Darren Corral gave orders for just oral medications.

## 2018-01-04 NOTE — PROGRESS NOTES
Hearing loss; History of blood transfusion; Hydronephrosis, bilateral; Hypertension; Hypoglycemia; Lung nodule; Migraine; Observed seizure-like activity ([facility] OCD (obsessive compulsive disorder); Osteoarthritis; Osteoporosis; Pelvic mass; Perforated nasal septum; Peripheral neuropathy due to inflammation ([address] Pneumonia; Psoriasis; PTSD (post-traumatic stress disorder); Scoliosis; Shortness of breath; Substance abuse; Thrombocytosis ([address] Urinary incontinence; and Wears glasses. Social History:   reports that she has been smoking Cigarettes. She has a 7.50 pack-year smoking history. She has never used smokeless tobacco. She reports that she does not drink alcohol or use drugs. Family History:   Family History   Problem Relation Age of Onset    Heart Disease Mother     Stroke Father      cerebral aneurysm       Vitals:  /68   Pulse 76   Temp 97.6 °F (36.4 °C) (Axillary)   Resp 14   Ht 4' 9.09\" (1.45 m)   Wt 110 lb (49.9 kg)   LMP 2012   SpO2 100%   BMI 23.73 kg/m²   Temp (24hrs), Av.9 °F (36.6 °C), Min:97.1 °F (36.2 °C), Max:98.6 °F (37 °C)    No results for input(s): POCGLU in the last 72 hours. I/O (24Hr):   No intake or output data in the 24 hours ending 18    Labs:    Hematology:  Recent Labs      18   WBC  13.3*  14.1*  14.2*   RBC  2.82*  2.90*  2.86*   HGB  8.0*  8.1*  8.1*   HCT  25.9*  26.3*  26.5*   MCV  91.8  90.7  92.7   MCH  28.4  27.9  28.3   MCHC  30.9  30.8  30.6   RDW  16.7*  16.6*  16.3*   PLT  567*  613*  637*   MPV  8.9  8.9  9.1     Chemistry:  Recent Labs      18   0618   0618   0615   NA  137  135  136   K  3.4*  3.8  3.6*   CL  101  96*  98   CO2  20  24  23   GLUCOSE  83  90  99   BUN  14  13  11   CREATININE  0.49*  0.59  0.51   ANIONGAP  16  15  15   LABGLOM  >60  >60  >60   GFRAA  >60  >60  >60   CALCIUM  8.4*  9.2  8.9     No results for input(s): PROT, LABALBU, cardiology.        Elizabeth Blanca MD  1/4/2018  8:18 AM

## 2018-01-04 NOTE — PROGRESS NOTES
Nutrition Assessment    Type and Reason for Visit: Reassess    Nutrition Recommendations:    1. If po intake does not improve, consider need for nocturnal TF - suggest TwoCal HN (fluid restricted formula) at 40 mL/hr x 10 hours to provide 800 kcals, 33 gm protein. 2. Obtain current weight as able. Malnutrition Assessment:  · Malnutrition Status: Meets the criteria for severe malnutrition  · Context: Chronic illness  · Findings of the 6 clinical characteristics of malnutrition (Minimum of 2 out of 6 clinical characteristics is required to make the diagnosis of moderate or severe Protein Calorie Malnutrition based on AND/ASPEN Guidelines):  1. Energy Intake-Less than or equal to 75%, greater than or equal to 1 month    2. Weight Loss-20% loss or greater,  (5 months; 9 months)  3. Fat Loss-Unable to assess,    4. Muscle Loss-Unable to assess,    5. Fluid Accumulation-No significant fluid accumulation,    6.  Strength-Not measured    Nutrition Diagnosis:   · Problem: Inadequate oral intake  · Etiology: related to  (poor appetite)     Signs and symptoms:  as evidenced by Diet history of poor intake, Weight loss greater than or equal to 20% in 1 year    Nutrition Assessment:  · Subjective Assessment: RN reports pt had a \"seizure-like episode\" this morning. Family friend at bedside reports pt has not been interested in eating/drinking at home. RN reports pt does not seem interested in po currently, but staff / friends encouraging intake. EHR weight history reviewed. Pt has been losing significant amounts of weight over past year.     · Current Nutrition Therapies:  · Oral Diet Orders: General, Fluid Restriction (1200 mL)   · Oral Diet intake: Unable to assess (Has not eaten yet today per RN)  · Oral Nutrition Supplement (ONS) Orders: Standard High Calorie Oral Supplement  · ONS intake: Unable to assess (Did not receive this morning)  · Anthropometric Measures:  · Ht: 4' 9.09\" (145 cm)   · Admission Body Wt:

## 2018-01-04 NOTE — PROGRESS NOTES
but provides correct information about various people in her household. No language parameter disturbance verbally.    CRANIAL NERVES: II     -       Visual fields intact to confrontation  III,IV,VI -  PRRRE EOMI without nystagmus, no afferent defect,  no ptosis  V     -     Normal facial sensation  VII    -     Normal facial symmetry  VIII   -     Intact hearing  IX,X -     Symmetrical palate  XI    -     Symmetrical shoulder shrug  XII   -     Midline tongue, no atrophy    MOTOR FUNCTION:  Normal bulk, normal tone, at least 4-4+/5 B UE's. ;  no involuntary movements, no tremor   SENSORY FUNCTION:  Normal touch, normal pain grossly   CEREBELLAR FUNCTION:  No limb nor truncal cerebellar signs   REFLEX FUNCTION:  Symmetric, 1/4 with flexor plantar responses   STATION and GAIT  Normal            Data:    Lab Results:   CBC:   Recent Labs      01/03/18   0611  01/03/18   2059  01/04/18   0615   WBC  13.3*  14.1*  14.2*   HGB  8.0*  8.1*  8.1*   PLT  567*  613*  637*     BMP:    Recent Labs      01/02/18   0609  01/03/18   0611  01/04/18   0615   NA  137  135  136   K  3.4*  3.8  3.6*   CL  101  96*  98   CO2  20  24  23   BUN  14  13  11   CREATININE  0.49*  0.59  0.51   GLUCOSE  83  90  99         Lab Results   Component Value Date    CHOL 83 01/07/2016    LDLCHOLESTEROL 59 01/07/2016    HDL 12 (L) 01/07/2016    TRIG 62 01/07/2016    ALT 11 01/01/2018    AST 22 01/01/2018    TSH 1.00 01/01/2018    INR 1.3 12/31/2017    LABA1C 5.2 01/07/2016    [id number] 473 12/31/2017     Impression and Plan: Ms. Sameera Law is a 50 y.o. female with severe anemia OUO with a progressive Hx this year of anorexia, worsening anemia, weight loss, anergy and recurring spells of some kind with decreased responsiveness and twitching but not what sounds like organized tonic then clonic activity, without tongue-biting or incontinence and from which she may suddenly arouse and appear more or less normal. These are sufficiently unlikely to represent seizures that repeating EEG and AED's are not warranted. Even with regard to the 2 spells that she has had in the a.m. hours 1/4, the first of which was very shortly proceeded by onset of an episode of SVT, Examiner considers it much more likely than seizures that she is having episodes of relative cerebral hypoxia with mild clonus caused by a combination of cardiac arrhythmia associated with decreased cardiac output and severe albeit chronic anemia. There is an applicable level of interpretation also with regard to the events of earlier 1/4 that her lateral motor activity thought to be possible seizures during which time patient is capable of goal-directed behavior is not epileptic. Patient is neurologically stable and clear. Evaluation for the etiology of the patient's episodes of SVT appears warranted as a means of evaluating further and treating her so-called \"seizures. \"  Case discussed with IM staff. It is also noted that she is declining home care, states she lives with aunt and sister and has good support.

## 2018-01-05 NOTE — CARE COORDINATION
Discharge [id number] [facility]  Clinical Case Management Department  Written by: Vanessa Rodriguez RN    Patient Name: Kilo Law  Attending Provider: No att. providers found  Admit Date: 2017  2:10 AM  MRN: [id number]  Account: [de-identified]                     : 1970  Discharge Date: 2018      Disposition: SNF [facility] via [place] at [address]

## 2018-01-05 NOTE — CONSULTS
[facility]   Consult Note         Today's Date: 1/5/2018  Patient Name: Saturnino Law  Date of admission: 12/31/2017  2:10 AM  Patient's age: 50 y.o., 1970  Admission Dx: Pneumonia [J18.9]    Reason for Consult:  Cardiac evaluation    Requesting Physician: Cuauhtemoc Lara MD    REASON FOR CONSULT:  CVA, Abn ECG    History Obtained From:  Patient, chart, staff, records    HISTORY OF PRESENT ILLNESS:      The patient is a 50 y.o. female who is admitted to the hospital for CVA. Had tachycardia overnight that looked questionable for SVT. Cardiology was consulted. Past Medical History:   has a past medical history of Allergic rhinitis; Anxiety; Asthma; Blood circulation, collateral; Celiac disease; Chronic back pain; Chronic kidney disease; Cirrhosis of liver with ascites ([address] Constipation; Cough; Cryptogenic organizing pneumonia ([address] Depression; Difficult intravenous access; Disease of blood and blood forming organ; Edentulous; Full dentures; Gastric outlet obstruction; GERD (gastroesophageal reflux disease); Hearing loss; History of blood transfusion; Hydronephrosis, bilateral; Hypertension; Hypoglycemia; Lung nodule; Migraine; Observed seizure-like activity ([address] OCD (obsessive compulsive disorder); Osteoarthritis; Osteoporosis; Pelvic mass; Perforated nasal septum; Peripheral neuropathy due to inflammation ([address] Pneumonia; Psoriasis; PTSD (post-traumatic stress disorder); Scoliosis; Shortness of breath; Substance abuse; Thrombocytosis ([address] Urinary incontinence; and Wears glasses. Past Surgical History:   has a past surgical history that includes Tubal ligation (1989); Tonsillectomy and adenoidectomy (1982); ERCP (5/16/2013); Upper gastrointestinal endoscopy; gastrectomy (2012); Paracentesis (Right, 01/2015-09/2015); Cystocopy (12/1/2015); Fixation Kyphoplasty (08/09/2016);  Lung biopsy; insert nasal septal prosthesis (N/A, 4/14/2017); create eardrum opening,gen anesth (N/A,

## 2018-01-05 NOTE — PROGRESS NOTES
spells 4d in hospital.  EEG was significantly suboptimal technically but grossly showed no paroxysmal activity; consultant is not recommending repeating the study. Patient has had 2 episodes since a.m. 1/3, one occurring in early morning hours 1/4 and 1 later after the dayshift arrived in which associated with tachycardia in the 120s130s the patient had bilateral limb myoclonic activity of varying intensity during which at least some of the time she was tracking visually and answering questions appropriately if slowly. With the first of these 2 episodes the tachycardia was observed and was being treated with beta blocker prior to the time that the \"seizure\" started. She has had no further spells in the last 24 hours.               Current Facility-Administered Medications on File Prior to Encounter   Medication Dose Route Frequency Provider Last Rate Last Dose    lactated ringers infusion   Intravenous Continuous Jazzy Philippe MD 0 mL/hr at 12/01/15 1220      meperidine (DEMEROL) injection 12.5 mg  12.5 mg Intravenous Q5 Min PRN Juve Chandra MD        fentaNYL (SUBLIMAZE) injection 50 mcg  50 mcg Intravenous Q5 Min PRN Juve Chandra MD         Current Outpatient Prescriptions on File Prior to Encounter   Medication Sig Dispense Refill    aspirin (ASPIRIN LOW DOSE) 81 MG chewable tablet Take 1 tablet by mouth daily 30 tablet 5    dicyclomine (BENTYL) 10 MG capsule Take 1 capsule by mouth 4 times daily (before meals and nightly) for 7 days 14 capsule 0    ondansetron (ZOFRAN-ODT) 4 MG disintegrating tablet Take 1 tablet by mouth every 8 hours as needed for Nausea or Vomiting 15 tablet 0    gabapentin (NEURONTIN) 300 MG capsule Take 1 capsule by mouth nightly 30 capsule 0    sodium chloride (ALTAMIST SPRAY) 0.65 % nasal spray 1 spray by Nasal route as needed for Congestion 1 Bottle 3    Calcium Carbonate-Vitamin D (OYSTER SHELL CALCIUM/D) 500-200 MG-UNIT TABS Take 1 tablet by mouth daily Please  IA REPAIR INCISIONAL HERNIA,REDUCIBLE N/A 7/18/2017    VENTRAL INCISIONAL HERNIA REPAIR X2  WITH MESH performed by Gerardo Balbuena DO at [place] [id number]    UPPER GASTROINTESTINAL ENDOSCOPY      URETER STENT PLACEMENT  05/11/2017    URETEROSCOPY  05/11/2017           Medications:     aspirin  81 mg Oral Daily    calcium carbonate-vitamin D  1 tablet Oral Daily    citalopram  40 mg Oral Daily    dicyclomine  10 mg Oral 4x Daily AC & HS    fluticasone  1 spray Nasal Daily    folic acid  1 mg Oral Daily    gabapentin  300 mg Oral Nightly    cetirizine  10 mg Oral Daily    pantoprazole  40 mg Oral Daily    sodium chloride flush  10 mL Intravenous 2 times per day    docusate sodium  100 mg Oral BID    enoxaparin  40 mg Subcutaneous Daily    potassium chloride  40 mEq Oral Once    olodaterol  2 puff Inhalation Daily    tiotropium  18 mcg Inhalation Daily    piperacillin-tazobactam  3.375 g Intravenous Q8H     PRN Meds include: metoprolol, promethazine, sodium chloride, sodium chloride flush, magnesium hydroxide, bisacodyl, ondansetron, nicotine, albuterol, potassium chloride **OR** potassium chloride **OR** potassium chloride    Objective:   BP (!) 108/57   Pulse 87   Temp 99.5 °F (37.5 °C) (Oral)   Resp 11   Ht 4' 9.09\" (1.45 m)   Wt 110 lb (49.9 kg)   LMP 04/18/2012   SpO2 99%   BMI 23.73 kg/m²     Blood pressure range: Systolic (38CGQ), SNI:310 , Min:104 , GOP:265   ; Diastolic (73NRI), KQX:82, Min:57, Max:77  ON EXAMINATION:  GENERAL  Appears comfortable and in no distress   HEENT  NC/ AT   NECK  Supple and no bruits heard   MENTAL STATUS:  Alert and coherent, responds appropriately with nods or short verbal responses.      CRANIAL NERVES: II     -       Visual fields intact to confrontation  III,IV,VI -  PRRRE EOMI without nystagmus, no afferent defect,  no ptosis  V     -     Normal facial sensation  VII    -     Normal facial symmetry  VIII   -     Intact hearing  IX,X -     Symmetrical palate  XI    -     Symmetrical shoulder shrug  XII   -     Midline tongue, no atrophy    MOTOR FUNCTION:  Normal bulk, normal tone, at least 4-4+/5 B UE's. ;  no involuntary movements, no tremor   SENSORY FUNCTION:  Normal touch, normal pain grossly   CEREBELLAR FUNCTION:  No limb nor truncal cerebellar signs   REFLEX FUNCTION:  Symmetric, 1/4 with flexor plantar responses   STATION and GAIT  Normal            Data:    Lab Results:   CBC:   Recent Labs      01/04/18 0615 01/04/18 2008 01/05/18   0638   WBC  14.2*  13.5*  12.6*   HGB  8.1*  8.3*  7.6*   PLT  637*  609*  618*     BMP:    Recent Labs      01/03/18   0611  01/04/18 0615 01/05/18   0638   NA  135  136  137   K  3.8  3.6*  3.8   CL  96*  98  99   CO2  24  23  22   BUN  13  11  15   CREATININE  0.59  0.51  0.58   GLUCOSE  90  99  92         Lab Results   Component Value Date    CHOL 83 01/07/2016    LDLCHOLESTEROL 59 01/07/2016    HDL 12 (L) 01/07/2016    TRIG 62 01/07/2016    ALT 11 01/01/2018    AST 22 01/01/2018    TSH 1.00 01/01/2018    INR 1.3 12/31/2017    LABA1C 5.2 01/07/2016    [id number] 473 12/31/2017     Impression and Plan: Ms. Andria Law is a 50 y.o. female with severe anemia OUO with a progressive Hx this year of anorexia, worsening anemia, weight loss, anergy and recurring spells of some kind with decreased responsiveness and twitching but not what sounds like organized tonic then clonic activity, without tongue-biting or incontinence and from which she may suddenly arouse and appear more or less normal. These are sufficiently unlikely to represent seizures that repeating EEG and AED's are not warranted.   Even with regard to the 2 spells that she has had in the a.m. hours 1/4, the first of which was very shortly proceeded by onset of an episode of SVT, Examiner considers it much more likely than seizures that she is having episodes of relative cerebral hypoxia

## 2018-01-05 NOTE — PROGRESS NOTES
[facility]    Progress Note    1/5/2018    7:48 AM    Name:   Matthieu Law  MRN:     [id number]     Acct:      [de-identified]   Room:   [address] [place] [address]  IP Day:  5  Admit Date:  12/31/2017  2:10 AM    PCP:   Izabel Bergeron MD  Code Status:  Full Code    Subjective:     C/C:   Chief Complaint   Patient presents with    Seizures     Interval History Status: improved. Patient cleared neurologically. No further work-up as per cardiology. To be discharged to [facility] today. More alert, oriented today. In pleasant mood. Agreeable to discharge. Brief History:     The patient is a 50 y.o. Non-/non  female who presents with Seizures   and she is admitted to the hospital for the management of  Seizure-like activity  Patient brought to ER yesterday, witnessed by family to have a minute long tonic-clonic seizure-like activities at home, on EMS arrival patient was alert and oriented and answering all questions appropriately. Per family patient is having progressive weakness over the last, she has been on exertion, anemia, leukocytosis and thrombocytosis, lung and liver masses that did biopsies came back negative. CT head came back negative, sodium was found to be low at 128. Other abnormalities in her CMP and CBC are chronic in nature. During the encounter patient denied any chest pain or shortness of breath, when I ask about having chills at home she nodded her head but nothing documented in the chart. Review of Systems:     Constitutional:  negative for chills, fevers, sweats.  +fatigue, generalized weakness  Respiratory:  negative for cough, dyspnea on exertion, hemoptysis, shortness of breath, wheezing  Cardiovascular:  negative for chest pain, chest pressure/discomfort, lower extremity edema, palpitations  Gastrointestinal:  negative for abdominal pain, constipation, diarrhea, nausea, vomiting  Neurological:  negative for

## 2018-01-05 NOTE — CARE COORDINATION
Received update from [facility] that pt should be ready for DC today. Call to [facility] to inform of DC. [facility] transportation arranged for 3pm pickup via stretcher. Andrew [facility]/Lea informed of time.

## 2018-01-05 NOTE — PROGRESS NOTES
Dr. Leonel Lewis called and informed of consult. Awaiting them for discharge today to facility. He stated he will see the patient in the morning. Explained that the rhythm stated as SVT per Dr. Edita Cooley is a sinus tach and artifact. Writer explained that pt strips from this morning event would be in the chart and will await the [facility] opion of the case.

## 2018-01-16 NOTE — DISCHARGE SUMMARY
every 6 hours as needed for Wheezing     * VENTOLIN  (90 Base) MCG/ACT inhaler  Generic drug:  albuterol sulfate HFA  INHALE 2 PUFFS INTO THE LUNGS EVERY 6 HOURS AS NEEDED FOR WHEEZING     aspirin 81 MG chewable tablet  Commonly known as:  ASPIRIN LOW DOSE  Take 1 tablet by mouth daily     citalopram 40 MG tablet  Commonly known as:  CELEXA  Take 1 tablet by mouth daily     docusate sodium 100 MG capsule  Commonly known as:  DOCQLACE  TAKE 1 CAPSULE BY MOUTH 2 TIMES DAILY AS NEEDED FOR CONSTIPATION     fluticasone 50 MCG/ACT nasal spray  Commonly known as:  FLONASE  1 spray by Nasal route daily     folic acid 1 MG tablet  Commonly known as:  FOLVITE  Take 1 tablet by mouth daily     gabapentin 300 MG capsule  Commonly known as:  NEURONTIN  Take 1 capsule by mouth nightly     loratadine 10 MG tablet  Commonly known as:  CLARITIN  Take 1 tablet by mouth daily     * ondansetron 4 MG disintegrating tablet  Commonly known as:  ZOFRAN-ODT  Take 1 tablet by mouth every 8 hours as needed for Nausea or Vomiting     ondansetron 4 MG tablet  Commonly known as:  ZOFRAN  Take 1 tablet by mouth every 8 hours as needed for Nausea     Oyster Shell Calcium/D 500-200 MG-UNIT Tabs  Take 1 tablet by mouth daily Please discontinue previous prescription of calcium     pantoprazole 40 MG tablet  Commonly known as:  PROTONIX  Take 1 tablet by mouth daily     sodium chloride 0.65 % nasal spray  Commonly known as:  ALTAMIST SPRAY  1 spray by Nasal route as needed for Congestion     umeclidinium-vilanterol 62.5-25 MCG/INH Aepb inhaler  Commonly known as:  ANORO ELLIPTA  Inhale 1 puff into the lungs daily        * This list has 3 medication(s) that are the same as other medications prescribed for you. Read the directions carefully, and ask your doctor or other care provider to review them with you.             STOP taking these medications    dicyclomine 10 MG capsule  Commonly known as:  BENTYL     ondansetron 4 MG disintegrating

## 2018-01-19 NOTE — PROGRESS NOTES
Jevon Parish A Samples  1/19/2018      Jevon Parish A Samples  50 y.o. female presented for follow up for lung mass  Since her last visit, patient has had multiple admissions because of convulsions, no seizure episode have been found. She is also diagnosed with myelodysplastic syndrome and has been given blood transfusions. I reviewed her CT chest, mainly with the radiologist that there is slight increase in size in the left lower lobe and increasing number of masses bilaterally. She continues to lose weight. Denies hemoptysis is fatigued, feels depressed because her condition is not improving and the pain from the convulsions. She is accompanied by her sister. I reviewed CT chest with them in detail.   Immunization   Immunization History   Administered Date(s) Administered    Influenza Virus Vaccine 01/10/2014, 12/18/2014, 10/01/2015    Influenza, Thelma Guzmana, 3 Years and older, IM 10/06/2016    Influenza, Thelma Panda, 3 yrs and older, IM, Preservative Free 10/14/2017    Pneumococcal Polysaccharide ([id number]) 08/17/2016    Tdap (Boostrix, Adacel) 06/30/2015        Pneumococcal Vaccine     [x] Up to date    [] Indicated   [] Refused  [] Contraindicated       Influenza Vaccine   [x] Up to date    [] Indicated   [] Refused  [] Contraindicated         PAST MEDICAL HISTORY:         Diagnosis Date    Allergic rhinitis     Anxiety     Asthma DX PRIOR TO 1995    NO INHALER USE ONLY HAS TROUBLE IN EXTREME HOT OR COLD WEATHER    Blood circulation, collateral     Celiac disease     possible    Chronic back pain     low back pain     Chronic kidney disease     Cirrhosis of liver with ascites ([facility] 3/30/2015    Constipation     CHRONIC    Cough 11/23/2016    Cryptogenic organizing pneumonia ([facility] 11/23/2016    Depression 12/13/2012    on celexa per pcp    Difficult intravenous access     STATES LT ARM EASIER TO START IN, ENCOURAGED TO HYDRATE DAY PROIR     Disease of blood and blood forming organ     Thrombocytosis    ESOPHAGOGASTRODUODENOSCOPY performed by Mimi Bone MD at [facility] N/A 4/14/2017    NASAL SEPTAL BUTTON INSERTION performed by Aldona Severin, MD at [facility] N/A 7/18/2017    VENTRAL [id number] [facility] performed by Lucia Vargas DO at [facility]  05/11/2017    URETEROSCOPY  05/11/2017              Not in a hospital admission. Allergies   Allergen Reactions    Latex Dermatitis    Bee Venom Anaphylaxis    Benadryl [Diphenhydramine-Zinc Acetate] Anaphylaxis    Tavist Anaphylaxis     Quits  Breathing  And  Low   Heart  beat    Tylenol [Acetaminophen] Other (See Comments)     Liver problems     History   Smoking Status    Current Some Day Smoker    Packs/day: 0.25    Years: 30.00    Types: Cigarettes    Last attempt to quit: 12/1/2012   Smokeless Tobacco    Never Used     Comment: pt states 3 cigs per day     Prior to Admission medications    Medication Sig Start Date End Date Taking?  Authorizing Provider   sodium chloride (ALTAMIST SPRAY) 0.65 % nasal spray 1 spray by Nasal route as needed for Congestion 10/23/17  Yes Ruthie Harris MD   Calcium Carbonate-Vitamin D (OYSTER SHELL CALCIUM/D) 500-200 MG-UNIT TABS Take 1 tablet by mouth daily Please discontinue previous prescription of calcium 10/23/17 10/23/18 Yes Ruthie Harris MD   loratadine (CLARITIN) 10 MG tablet Take 1 tablet by mouth daily 10/23/17  Yes Ruthie Harris MD   citalopram (CELEXA) 40 MG tablet Take 1 tablet by mouth daily 10/23/17  Yes Ruthie Harris MD   aspirin (ASPIRIN LOW DOSE) 81 MG chewable tablet Take 1 tablet by mouth daily 10/23/17  Yes Ruthie Harris MD   pantoprazole (PROTONIX) 40 MG tablet Take 1 tablet by mouth daily 10/23/17  Yes Ruthie Harris MD   folic acid (Saddie Riff) 1 MG tablet Take 1 tablet by mouth daily 10/23/17  Yes Mary Choi MD   umeclidinium-vilanterol [facility] ELLIPTA) 62.5-25 MCG/INH AEPB inhaler Inhale 1 puff into the lungs daily 10/20/17  Yes Marley Bhatia MD   VENTOLIN  (90 Base) MCG/ACT inhaler INHALE 2 PUFFS INTO THE LUNGS EVERY 6 HOURS AS NEEDED FOR WHEEZING 9/22/17  Yes Marley Bhatia MD   docusate sodium (DOCQLACE) 100 MG capsule TAKE 1 CAPSULE BY MOUTH 2 TIMES DAILY AS NEEDED FOR CONSTIPATION 8/15/17  Yes Roxie Avila MD   fluticasone (FLONASE) 50 MCG/ACT nasal spray 1 spray by Nasal route daily 8/15/17  Yes Roxie Avila MD   ondansetron (ZOFRAN) 4 MG tablet Take 1 tablet by mouth every 8 hours as needed for Nausea 5/9/17  Yes Leticia Benito MD   albuterol (PROVENTIL) (5 MG/ML) 0.5% nebulizer solution Take 0.5 mLs by nebulization every 6 hours as needed for Wheezing 1/23/17  Yes Tarsha Guidry MD         Physical Exam -  Cachectic  Lungs are clear without any rales or rhonchi  CVS S1, S2, regular  Abdomen is nontender. Bowel sounds are present. No rebound, soft  Lower extremities no edema, and upper extremities no edema  Skin no rash         /69 (Site: Left Arm, Position: Sitting, Cuff Size: Small Adult)   Pulse 109   Temp 97.1 °F (36.2 °C)   Resp 14 Comment: room air  Ht 4' 9\" (1.448 m)   Wt 98 lb 9.6 oz (44.7 kg)   LMP 04/18/2012   SpO2 93%   BMI 21.34 kg/m²     CT chest reviewed  PFT shows intrapulmonic restrictive lung disease    Biopsy of left lung mas  SURGICAL PATHOLOGY CONSULTATION     Patient Name: Lane BORGES 90: [id number]   Path Number: [id number]   Collected: 2/14/2017   Received: 2/14/2017   Reported: 2/16/2017 12:57     -- Diagnosis --   LUNG, BIOPSY (LEFT, CT GUIDED):       -FIBROSCLEROTIC TISSUE WITH ACTIVE CHRONIC (ACUTE AND CHRONIC)         INFLAMMATION.     -NO NEOPLASM DETECTED. Assessment  1. Multiple lung nodules on CT     2.  Lung mass         Plan:  Patient has had multiple lung masses which are slowly increasing in size. Multiple biopsies have been done which have been negative for any malignant process. She is progressively losing weight. Unfortunately, nothing else to offer from pulmonary standpoint. I agree with follow-up at [facility] as arranged by hematology oncology.     Calvin Ureña MD

## 2018-01-22 NOTE — LETTER
(05/11/2017); Ureteroscopy (05/11/2017); Cystoscopy (Right, 5/11/2017); Cholecystectomy; hernia repair (07/18/2017); repair incisional hernia,reducible (N/A, 7/18/2017); and esophagogastroduodenoscopy transoral diagnostic (N/A, 8/30/2017). CURRENT MEDICATIONS:  has a current medication list which includes the following prescription(s): sodium chloride, oyster shell calcium/d, loratadine, citalopram, aspirin, pantoprazole, folic acid, umeclidinium-vilanterol, ventolin hfa, docusate sodium, fluticasone, ondansetron, and albuterol, and the following Facility-Administered Medications: lactated ringers, meperidine, and fentanyl. ALLERGIES:  is allergic to latex; bee venom; benadryl [diphenhydramine-zinc acetate]; tavist; and tylenol [acetaminophen]. FAMILY HISTORY: today she was able to give me family history from her biological mother. Her mother and maternal aunt both had similar presentation with ascites, and they both developed renal disease. Her brother is starting to have recurrent ascites as well. .     SOCIAL HISTORY:  reports that she has been smoking Cigarettes. She has a 7.50 pack-year smoking history. She has never used smokeless tobacco. She reports that she does not drink alcohol or use drugs. REVIEW OF SYSTEMS:  General: no fever or night sweats. Severe cachexia   Eyes: No double or blurred vision. Ears: no tinnitus or hearing problem,    Throat: no dysphagia or sore throat   Respiratory:Chronic shortness of breath and cough, no hemoptysis   Cardiovascular: Denies chest pain, PND or orthopnea. No L E swelling or palpitations. Gastrointestinal:   Mild nausea and abdominal distention. No diarrhea. Genitourinary: Denies dysuria, hematuria, frequency, urgency or incontinence. Neurological: Denies headaches, decreased LOC, no sensory or motor focal deficits. Musculoskeletal:  No arthralgia no back pain or joint swelling. Skin: Multiple excoriation marks, spontaneous bruising on right arm. Psychiatric: She was very depressed during hospitalization but now is feeling better   Endocrine: no diabetes or thyroid disease. Hematologic: no bleeding , no adenopathy. PHYSICAL EXAM: Shows an ill appearing 50y.o.-year-old female who is not in pain or distress. Vital Signs: Blood pressure 107/67, pulse 97, temperature 98.6 °F (37 °C), temperature source Oral, resp. rate 20, weight 99 lb (44.9 kg), last menstrual period 04/18/2012, not currently breastfeeding. HEENT: Normocephalic and atraumatic. Pupils are equal, round, reactive to light and accommodation. Extraocular muscles are intact. Neck: Showed no JVD, no carotid bruit . Lungs: Clear to auscultation but decreased breath sounds bilaterally. Heart: Regular without any murmur. Abdomen: Soft, nontender. The abdomen is distended, No hepatosplenomegaly, no masses  appreciated. Extremities: Excoriation marks all over, spontaneous bruising on left arm, Lower extremities do not show edema, no clubbing, or cyanosis. . Neuro exam: intact cranial nerves bilaterally no motor or sensory deficit, gait is normal. Lymphatic: no adenopathy appreciated in the supraclavicular, axillary, cervical or inguinal area    REVIEW OF RADIOLOGICAL RESULTS:   01/18/2018 CT CHEST WO CONTRAST IMPRESSION:  *Interval increase in size of the subpleural mass involving the left lower   lobe now measuring 6.6 x 3.8 cm in greatest axial dimension.  In addition,   the metastatic lesions involving both lungs have also increased in number and   size suggestive of progression of disease.  There is associated mediastinal   lymphadenopathy. *Pneumobilia left lobe of the liver, unchanged. *Stable hypodense lesion left lobe of the liver. 12/31/2017 CT HEAD WO CONTRAST IMPRESSION:  No acute intracranial abnormality.       REVIEW OF LABORATORY DATA:   Lab Results   Component Value Date    WBC 18.7 (H) 01/19/2018

## 2018-01-22 NOTE — TELEPHONE ENCOUNTER
Jonathan Morales MD VISIT & TX  DR Shilpa Cloud IN TO SEE PATIENT  ORDERS RECEIVED  RV 6 WEEKS W.CAMRYN PALOMO VISIT 3/5/18 @ 3PM  AVS PRINTED AND GIVEN TO PATIENT W/ INSTRUCTIONS  PATIENT DISCHARGED AMBULATORY

## 2018-01-22 NOTE — PROGRESS NOTES
prescription(s): sodium chloride, oyster shell calcium/d, loratadine, citalopram, aspirin, pantoprazole, folic acid, umeclidinium-vilanterol, ventolin hfa, docusate sodium, fluticasone, ondansetron, and albuterol, and the following Facility-Administered Medications: lactated ringers, meperidine, and fentanyl. ALLERGIES:  is allergic to latex; bee venom; benadryl [diphenhydramine-zinc acetate]; tavist; and tylenol [acetaminophen]. FAMILY HISTORY: today she was able to give me family history from her biological mother. Her mother and maternal aunt both had similar presentation with ascites, and they both developed renal disease. Her brother is starting to have recurrent ascites as well. .     SOCIAL HISTORY:  reports that she has been smoking Cigarettes. She has a 7.50 pack-year smoking history. She has never used smokeless tobacco. She reports that she does not drink alcohol or use drugs. REVIEW OF SYSTEMS:  General: no fever or night sweats. Severe cachexia   Eyes: No double or blurred vision. Ears: no tinnitus or hearing problem,    Throat: no dysphagia or sore throat   Respiratory:Chronic shortness of breath and cough, no hemoptysis   Cardiovascular: Denies chest pain, PND or orthopnea. No L E swelling or palpitations. Gastrointestinal:   Mild nausea and abdominal distention. No diarrhea. Genitourinary: Denies dysuria, hematuria, frequency, urgency or incontinence. Neurological: Denies headaches, decreased LOC, no sensory or motor focal deficits. Musculoskeletal:  No arthralgia no back pain or joint swelling. Skin: Multiple excoriation marks, spontaneous bruising on right arm. Psychiatric: She was very depressed during hospitalization but now is feeling better   Endocrine: no diabetes or thyroid disease. Hematologic: no bleeding , no adenopathy. PHYSICAL EXAM: Shows an ill appearing 50y.o.-year-old female who is not in pain or distress.  Vital Signs: Blood pressure 107/67, pulse 97, temperature 98.6 °F (37 °C), temperature source Oral, resp. rate 20, weight 99 lb (44.9 kg), last menstrual period 04/18/2012, not currently breastfeeding. HEENT: Normocephalic and atraumatic. Pupils are equal, round, reactive to light and accommodation. Extraocular muscles are intact. Neck: Showed no JVD, no carotid bruit . Lungs: Clear to auscultation but decreased breath sounds bilaterally. Heart: Regular without any murmur. Abdomen: Soft, nontender. The abdomen is distended, No hepatosplenomegaly, no masses  appreciated. Extremities: Excoriation marks all over, spontaneous bruising on left arm, Lower extremities do not show edema, no clubbing, or cyanosis. . Neuro exam: intact cranial nerves bilaterally no motor or sensory deficit, gait is normal. Lymphatic: no adenopathy appreciated in the supraclavicular, axillary, cervical or inguinal area    REVIEW OF RADIOLOGICAL RESULTS:   01/18/2018 CT CHEST WO CONTRAST IMPRESSION:  *Interval increase in size of the subpleural mass involving the left lower   lobe now measuring 6.6 x 3.8 cm in greatest axial dimension.  In addition,   the metastatic lesions involving both lungs have also increased in number and   size suggestive of progression of disease.  There is associated mediastinal   lymphadenopathy. *Pneumobilia left lobe of the liver, unchanged. *Stable hypodense lesion left lobe of the liver. 12/31/2017 CT HEAD WO CONTRAST IMPRESSION:  No acute intracranial abnormality.       REVIEW OF LABORATORY DATA:   Lab Results   Component Value Date    WBC 18.7 (H) 01/19/2018    HGB 8.1 (L) 01/19/2018    HCT 25.4 (L) 01/19/2018    MCV 88.2 01/19/2018    PLT 1,091 ([place]) 01/19/2018   '  Lab Results   Component Value Date    IRON 20 (L) 11/25/2017    TIBC 133 (L) 11/25/2017    FERRITIN 4,329 (H) 11/25/2017       Chemistry        Component Value Date/Time     (L) 01/19/2018 1302    K 4.4 01/19/2018 1302    CL 88 (L) 01/19/2018 1302    CO2 23 01/19/2018 1302    BUN 19

## 2018-01-24 NOTE — TELEPHONE ENCOUNTER
Called back spoke with Supervisor, Jonathan Pickett at [facility]. [address], MD did not send referral to [facility], have routed a note to MD need referral ordered. Jonathan Pickett states Danika Munoz will still be at center until Saturday, due to elevated WBC and Elevated Platelet count, pt is currently on Rocephin iv with plans to discharge Saturday. Faxed over dictation and appointment schedule to Jonathan Pickett at [phone]. Pt will be discharged to the current address office has listed. Informed Jonathan Pickett if MD places orders for referral will let [facility].

## 2018-01-31 PROBLEM — R65.21 SEPTIC SHOCK (HCC): Status: ACTIVE | Noted: 2018-01-01

## 2018-01-31 PROBLEM — A41.9 SEPTIC SHOCK (HCC): Status: ACTIVE | Noted: 2018-01-01

## 2018-01-31 NOTE — ED NOTES
Witnessed seizure with decerebrate posturing. Resident gives verbal order for 2 mg ativan.      Gayatri Jose RN  01/31/18 [id number]

## 2018-01-31 NOTE — ED PROVIDER NOTES
[facility]  Emergency Department Encounter  Emergency Medicine Resident     Pt Name: David Law  MRN: [id number]  [place] 1970  Date of evaluation: 1/31/18  PCP:  Anshu Matthews MD    [address] [place] [address]       Chief Complaint   Patient presents with    Altered Mental Status    Seizures       HISTORY OF PRESENT ILLNESS  (Location/Symptom, Timing/Onset, Context/Setting, Quality, Duration, Modifying Factors, Severity.)      Ria Law is a 50 y.o. female who presents with Altered mental status. EMS states that the patient was found down in a home. EMS states that there was another person at the house, but they were no help and they're not sure what the relation is. EMS states that the patient was hypoxic with a oxygen saturation in the 80s during transport, and that patient was repeatedly removing the nonrebreather mask from her face. On arrival the patient is alert and following commands, however she is making inappropriate sounds. Patient is repeatedly trying to remove the nonrebreather mask or face. No other information is available at this time. PAST MEDICAL / SURGICAL / SOCIAL / FAMILY HISTORY      has a past medical history of Allergic rhinitis; Anxiety; Asthma; Blood circulation, collateral; Celiac disease; Chronic back pain; Chronic kidney disease; Cirrhosis of liver with ascites ([address] Constipation; Cough; Cryptogenic organizing pneumonia ([address] Depression; Difficult intravenous access; Disease of blood and blood forming organ; Edentulous; Full dentures; Gastric outlet obstruction; GERD (gastroesophageal reflux disease); Hearing loss; History of blood transfusion; Hydronephrosis, bilateral; Hypertension; Hypoglycemia; Lung nodule; Migraine; Observed seizure-like activity ([address] OCD (obsessive compulsive disorder); Osteoarthritis; Osteoporosis; Pelvic mass; Perforated nasal septum; Peripheral neuropathy due to inflammation ([address]  Pneumonia; Psoriasis; PTSD (post-traumatic Lisbeth Bowen MD   Calcium Carbonate-Vitamin D (OYSTER SHELL CALCIUM/D) 500-200 MG-UNIT TABS Take 1 tablet by mouth daily Please discontinue previous prescription of calcium 10/23/17 10/23/18  Lisbeth Bowen MD   loratadine (CLARITIN) 10 MG tablet Take 1 tablet by mouth daily 10/23/17   Lisbeth Bowen MD   citalopram (CELEXA) 40 MG tablet Take 1 tablet by mouth daily 10/23/17   Lisbeth Bowen MD   aspirin (ASPIRIN LOW DOSE) 81 MG chewable tablet Take 1 tablet by mouth daily 10/23/17   Lisbeth Bowen MD   pantoprazole (PROTONIX) 40 MG tablet Take 1 tablet by mouth daily 10/23/17   Lisbeth Bowen MD   folic acid (FOLVITE) 1 MG tablet Take 1 tablet by mouth daily 10/23/17   Lisbeth Bowen MD   umeclidinium-vilanterol (ANORO ELLIPTA) 62.5-25 MCG/INH AEPB inhaler Inhale 1 puff into the lungs daily 10/20/17   Andrez French MD   VENTOLIN  (90 Base) MCG/ACT inhaler INHALE 2 PUFFS INTO THE LUNGS EVERY 6 HOURS AS NEEDED FOR WHEEZING 9/22/17   Andrez French MD   docusate sodium (DOCQLACE) 100 MG capsule TAKE 1 CAPSULE BY MOUTH 2 TIMES DAILY AS NEEDED FOR CONSTIPATION 8/15/17   Loni Elizabeth MD   fluticasone [place]) 50 MCG/ACT nasal spray 1 spray by Nasal route daily 8/15/17   Loni Elizabeth MD   ondansetron (ZOFRAN) 4 MG tablet Take 1 tablet by mouth every 8 hours as needed for Nausea 5/9/17   Reed Sky MD   albuterol (PROVENTIL) (5 MG/ML) 0.5% nebulizer solution Take 0.5 mLs by nebulization every 6 hours as needed for Wheezing 1/23/17   Altagracia Hedrick MD       REVIEW OF SYSTEMS    (2-9 systems for level 4, 10 or more for level 5)      Review of Systems   Unable to perform ROS: Patient nonverbal       PHYSICAL EXAM   (up to 7 for level 4, 8 or more for level 5)      INITIAL VITALS:   /69   Pulse 122   Temp 102.6 °F (39.2 °C) (Oral)   Resp 27   Ht 5' (1.524 m)   Wt 98 lb 15.8 oz (44.9 kg)   LMP 04/18/2012   SpO2 100%   BMI 19.33 kg/m²     Physical Exam   Constitutional: She  Elevate Head of Bed    Pharmacy to Dose: Vancomycin    Inpatient consult to Critical Care    Post Extubation Oxygen Therapy    Protocol Managed Mechanical Ventilation    End Tidal CO2 Continuous    Pulse oximetry, continuous    POCT troponin    POC G4: LACTIC ACID,BLOOD GAS INCLUDES CALC. BASE EXCESS, SO2    POC EC8: PH, PCO2, NA, K, CL, GLU, BUN, HCT/HGB(CALC)    POCT troponin    Venous Blood Gas, POC    Creatinine W/GFR Point of Care    Lactic Acid, POC    POCT Glucose    Anion Gap (Calc) POC    EKG 12 lead    TYPE AND SCREEN    PREPARE RBC (CROSSMATCH) Number of Units: 1, 1 Units    Insert peripheral IV    PATIENT STATUS (FROM ED OR OR/PROCEDURAL) Inpatient       MEDICATIONS ORDERED:  Orders Placed This Encounter   Medications    LORazepam (ATIVAN) 2 MG/ML injection     MALATHI ARGUETA II: cabinet override    MIDAZOLAM 100 MG IN DEXTROSE 5% 100 ML INFUSION     Bernarda Storey: cabinet override    midazolam (VERSED) 100 mg in dextrose 5% 100 mL infusion    succinylcholine (ANECTINE) injection 80 mg    etomidate (AMIDATE) injection 20 mg    0.9 % sodium chloride bolus    piperacillin-tazobactam (ZOSYN) 3.375 g in dextrose 50 mL IVPB (premix)    azithromycin (ZITHROMAX) 500 mg in dextrose 5 % 250 mL IVPB    vancomycin (VANCOCIN) 750 mg in dextrose 5 % 250 mL IVPB    vancomycin (VANCOCIN) intermittent dosing (placeholder)    0.9 % sodium chloride bolus       DDX: Hypoxia, sepsis, drug toxicity, head injury    DIAGNOSTIC RESULTS / EMERGENCY DEPARTMENT COURSE / MDM     LABS:  Results for orders placed or performed during the hospital encounter of 01/31/18   Culture Blood #1   Result Value Ref Range    Specimen Description . BLOOD     Special Requests  RT LEG 6 ML     Culture NO GROWTH 2 HOURS     Culture       [facility] [address] [facility], [address] [place] [address] [phone]    Status Pending    Culture Blood #1   Result Value Ref Range    Specimen Description . BLOOD     Special Value Ref Range    Toxic Tricyclic Sc,Blood NEGATIVE NEG   Microscopic Urinalysis   Result Value Ref Range    -          WBC, UA 50  0 - 5 /HPF    RBC, UA TOO NUMEROUS TO COUNT 0 - 2 /HPF    Casts UA NOT REPORTED 0 - 2 /LPF    Crystals UA NOT REPORTED NONE /HPF    Epithelial Cells UA 5 TO 10 0 - 5 /HPF    Renal Epithelial, Urine NOT REPORTED 0 /HPF    Bacteria, UA NOT REPORTED NONE    Mucus, UA NOT REPORTED NONE    Trichomonas, UA NOT REPORTED NONE    Amorphous, UA NOT REPORTED NONE    Other Observations UA NOT REPORTED NREQ    Yeast, UA NOT REPORTED NONE   LACTIC ACID, WHOLE BLOOD   Result Value Ref Range    Lactic Acid, Whole Blood 1.2 0.7 - 2.1 mmol/L   POCT troponin   Result Value Ref Range    POC Troponin I 0.00 0.00 - 0.10 ng/mL    POC Troponin Interp       The Troponin-I (POC) results cannot be compared to the Troponin-T results.    Venous Blood Gas, POC   Result Value Ref Range    pH, Wil 7.341 7.320 - 7.430    pCO2, Wil 29.6 (L) 41.0 - 51.0 mm Hg    pO2, Wil 37.3 30.0 - 50.0 mm Hg    HCO3, Venous 16.0 (L) 22.0 - 29.0 mmol/L    Total CO2, Venous 17 (L) 23.0 - 30.0 mmol/L    Negative Base Excess, Wil 9 (H) 0.0 - 2.0    Positive Base Excess, Wil NOT REPORTED 0.0 - 3.0    O2 Sat, Wil 69 60.0 - 85.0 %    O2 Device/Flow/% NOT REPORTED     Vince Test NOT REPORTED     Sample Site NOT REPORTED     Mode NOT REPORTED     FIO2 NOT REPORTED     Pt Temp NOT REPORTED     POC pH Temp NOT REPORTED     POC pCO2 Temp NOT REPORTED mm Hg    POC pO2 Temp NOT REPORTED mm Hg   Creatinine W/GFR Point of Care   Result Value Ref Range    POC Creatinine 0.42 (L) 0.51 - 1.19 mg/dL    GFR Comment >60 >60 mL/min    GFR Non-African American >60 >60 mL/min    GFR Comment         Lactic Acid, POC   Result Value Ref Range    POC Lactic Acid 6.67 (H) 0.56 - 1.39 mmol/L   POCT Glucose   Result Value Ref Range    POC Glucose 151 (H) 74 - 100 mg/dL   Anion Gap (Calc) POC   Result Value Ref Range    Anion Gap 15 7 - 16 mmol/L   TYPE AND the right internal jugular vein because of the need for more IV access since patient is receiving multiple antibiotics and a transfusion of pRBCs. Central line was placed without complication. Placement was verified with chest x-ray. 8:23 PM    Patient was discussed with the critical care resident Dr. Colton Lyles who agreed to admit the patient to the ICU.  8:50 PM    A repeat lactic acid shows that it is decreased to 1.2.  12:40 AM      PROCEDURES:  Central Line  Date/Time: 1/31/2018 8:02 PM  Performed by: Sana Garrett by: Cipriano Paul     Consent:     Consent obtained:  Emergent situation  Pre-procedure details:     Hand hygiene: Hand hygiene performed prior to insertion      Sterile barrier technique: All elements of maximal sterile technique followed      Skin preparation:  Betadine    Skin preparation agent: Skin preparation agent completely dried prior to procedure    Anesthesia (see MAR for exact dosages): Anesthesia method:  Local infiltration    Local anesthetic:  Lidocaine 1% w/o epi  Procedure details:     Location:  R internal jugular    Site selection rationale:  Ease of placement    Patient position:  Flat    Procedural supplies:  Triple lumen    Catheter size:  7 Fr    Landmarks identified: yes      Ultrasound guidance: yes      Number of attempts:  2    Successful placement: yes    Post-procedure details:     Post-procedure:  Line sutured and dressing applied    Assessment:  Blood return through all ports, free fluid flow, no pneumothorax on x-ray and placement verified by x-ray    Patient tolerance of procedure: Tolerated well, no immediate complications  Comments:      Sterile field was maintained throughout entire procedure with sterile drape. Sterile gloves and gown were worn during procedure. Sterile cover was placed on ultrasound probe. Cap and facemask were worn during procedure.   Intubation  Date/Time: 1/31/2018 4:48 PM  Performed by: Rashmi Grey C  Authorized by: Sixto Mckeon     Consent:     Consent obtained:  Emergent situation  Pre-procedure details:     Patient status:  Altered mental status    Mallampati score:  II    Pretreatment meds: etomidate. Paralytics:  Succinylcholine  Procedure details:     Preoxygenation:  Bag valve mask    CPR in progress: no      Intubation method:  Oral    Oral intubation technique:  Direct    Laryngoscope blade: Mac 3    Tube size (mm):  7.0    Tube type:  Cuffed    Number of attempts:  1    Cricoid pressure: no      Tube visualized through cords: yes    Placement assessment:     ETT to lip:  22    ETT to teeth:  21    Tube secured with:  ETT horta    Breath sounds:  Equal and absent over the epigastrium    Placement verification: chest rise, condensation, CXR verification, direct visualization, equal breath sounds, ETCO2 detector and tube exhalation      CXR findings:  ETT in proper place  Post-procedure details:     Patient tolerance of procedure: Tolerated well, no immediate complications        CONSULTS:  PHARMACY TO DOSE VANCOMYCIN  IP CONSULT TO CRITICAL CARE    CRITICAL CARE:  None    FINAL IMPRESSION      1. Altered mental status, unspecified altered mental status type    2. Seizure ([address]    3. Hypoxia    4. Elevated alkaline phosphatase level    5. Hyponatremia    6.  Anemia, unspecified type          DISPOSITION / PLAN     DISPOSITION Admitted 01/31/2018 08:47:30 PM      PATIENT REFERRED TO:  Shauna Lechuga [facility] [address] [facility] [address] [phone]            DISCHARGE MEDICATIONS:  Current Discharge Medication List          Arsalan Brothers DO  Emergency Medicine Resident    (Please note that portions of this note were completed with a voice recognition program.  Efforts were made to edit the dictations but occasionally words are mis-transcribed.)     Arsalan Brothers DO  Resident  02/01/18 [id number]

## 2018-01-31 NOTE — ED NOTES
@ mg ativan IV given per verbal order. Seizure stopped just prior to administration.      Moses Ayoub RN  01/31/18 [id number]

## 2018-01-31 NOTE — ED NOTES
Intubated with 7.0 ET tube, 22 cm at lip. Tube fogging noted, bilateral breath sounds present. No sounds over epigastrium.      Fausto Corbin RN  01/31/18 1456

## 2018-01-31 NOTE — ED NOTES
Decision to intubate per resident and attending. Respiratory at bedside. Began BVM ventilation began with high flow oxygen.      Massimo Guevara RN  01/31/18 2947

## 2018-01-31 NOTE — ED TRIAGE NOTES
Patient arrived AMS by EMS bystander on scene was poor historian for EMS. Patient was able to follow some commands upon arrival. Patient then had a seizure lasting 30 seconds w/ posturing. Patient was given 2 mg Ativan IV. Patient was then intubated to protect patients airway.

## 2018-02-01 NOTE — CONSULTS
Allergic rhinitis; Anxiety; Asthma; Blood circulation, collateral; Celiac disease; Chronic back pain; Chronic kidney disease; Cirrhosis of liver with ascites ([address] Constipation; Cough; Cryptogenic organizing pneumonia ([address] Depression; Difficult intravenous access; Disease of blood and blood forming organ; Edentulous; Full dentures; Gastric outlet obstruction; GERD (gastroesophageal reflux disease); Hearing loss; History of blood transfusion; Hydronephrosis, bilateral; Hypertension; Hypoglycemia; Lung nodule; Migraine; Observed seizure-like activity ([address] OCD (obsessive compulsive disorder); Osteoarthritis; Osteoporosis; Pelvic mass; Perforated nasal septum; Peripheral neuropathy due to inflammation ([address] Pneumonia; Psoriasis; PTSD (post-traumatic stress disorder); Scoliosis; Shortness of breath; Substance abuse; Thrombocytosis ([address] Urinary incontinence; and Wears glasses. Past Surgical History:   has a past surgical history that includes Tubal ligation (1989); Tonsillectomy and adenoidectomy (1982); ERCP (5/16/2013); Upper gastrointestinal endoscopy; gastrectomy (2012); Paracentesis (Right, 01/2015-09/2015); Cystocopy (12/1/2015); Fixation Kyphoplasty (08/09/2016); Lung biopsy; insert nasal septal prosthesis (N/A, 4/14/2017); create eardrum opening,gen anesth (N/A, 4/14/2017); Abdomen surgery (09/2015); Cystoscopy (05/11/2017); Ureter stent placement (05/11/2017); Ureteroscopy (05/11/2017); Cystoscopy (Right, 5/11/2017); Cholecystectomy; hernia repair (07/18/2017); repair incisional hernia,reducible (N/A, 7/18/2017); and esophagogastroduodenoscopy transoral diagnostic (N/A, 8/30/2017). Medications:    Prior to Admission medications    Medication Sig Start Date End Date Taking?  Authorizing Provider   sodium chloride (ALTAMIST SPRAY) 0.65 % nasal spray 1 spray by Nasal route as needed for Congestion 10/23/17   David Barth MD   Calcium Carbonate-Vitamin D (OYSTER SHELL CALCIUM/D) 500-200 MG-UNIT 0. 70 0.3 - 1.2 mg/dL    Bilirubin, Direct 0.39 (H) <0.31 mg/dL    Bilirubin, Indirect 0.31 0.00 - 1.00 mg/dL    Total Protein 9.1 (H) 6.4 - 8.3 g/dL    Globulin NOT REPORTED 1.5 - 3.8 g/dL    Albumin/Globulin Ratio 0.4 (L) 1.0 - 2.5   Lipase   Result Value Ref Range    Lipase 42 13 - 60 U/L   Ethanol   Result Value Ref Range    Ethanol <10 <10 mg/dL    Ethanol percent <0.010 %   Acetaminophen level   Result Value Ref Range    Acetaminophen Level <10 (L) 10 - 30 ug/mL   Salicylate   Result Value Ref Range    Salicylate Lvl <1 (L) 3 - 10 mg/dL   AMMONIA   Result Value Ref Range    Ammonia 36 11 - 51 umol/L   URINALYSIS   Result Value Ref Range    Color, UA ORANGE (A) YEL    Turbidity UA TURBID (A) CLEAR    Glucose, Ur NEGATIVE NEG    Bilirubin Urine NEGATIVE NEG    Ketones, Urine NEGATIVE NEG    Specific Gravity, UA 1.016 1.005 - 1.030    Urine Hgb LARGE (A) NEG    pH, UA 5.0 5.0 - 8.0    Protein, UA 3+ (A) NEG    Urobilinogen, Urine Normal NORM    Nitrite, Urine NEGATIVE NEG    Leukocyte Esterase, Urine SMALL (A) NEG    Urinalysis Comments NOT REPORTED    Hemoglobin and hematocrit, blood   Result Value Ref Range    POC Hemoglobin 8.5 (L) 12.0 - 16.0 g/dL    POC Hematocrit 25 (L) 36 - 46 %   SODIUM (POC)   Result Value Ref Range    POC Sodium 134 (L) 138 - 146 mmol/L   POTASSIUM (POC)   Result Value Ref Range    POC Potassium 4.2 3.5 - 4.5 mmol/L   CHLORIDE (POC)   Result Value Ref Range    POC Chloride 103 98 - 107 mmol/L   CALCIUM, IONIC (POC)   Result Value Ref Range    POC Ionized Calcium 1.17 1.15 - 1.33 mmol/L   Lactic Acid, Whole Blood   Result Value Ref Range    Lactic Acid, Whole Blood 7.1 (H) 0.7 - 2.1 mmol/L   Immature Platelet Fraction   Result Value Ref Range    Platelet, Immature Fraction 1.4 1.1 - 10.3 %    Platelet, Fluorescence 1,511 (HH) 138 - 453 k/uL   TOXIC TRICYCLIC SC,B   Result Value Ref Range    Toxic Tricyclic Sc,Blood NEGATIVE NEG   Microscopic Urinalysis   Result Value Ref Range    - PROVIDED HISTORY: Lung mass TECHNOLOGIST PROVIDED HISTORY: Reason for exam:->lung masses FINDINGS: Mediastinum: Visualized thyroid parenchyma appears unremarkable. Thoracic aorta demonstrates mild calcification without aneurysm. Pulmonary trunk appears nondilated. Heart appears at the upper limits normal in size without pericardial effusion. Scattered prominent to enlarged mediastinal lymph nodes are identified, largest measurable lymph node is seen within the precarinal region measuring 13 mm. No definite hilar or axillary lymphadenopathy. The esophagus appears grossly unremarkable. Lungs/pleura: Once again demonstrated is a subpleural mass involving the left lower lobe measuring 6.6 x 3.8 cm in greatest axial dimension which have intervally increased in size since the prior study. Scattered satellite nodules are identified throughout both lungs which appears to have progressed since the prior study in number as well as in size. Paraseptal/centrilobular emphysematous changes are noted. Trachea and distal airways appear patent. No pneumothorax or pleural effusion. Upper Abdomen: Pneumobilia is noted involving the left lobe of the liver. Stable hypodense lesion identified within the left lobe of the liver on series 2, image 72 measuring 14 mm in greatest axial dimension. Visualized adrenal glands appear unremarkable. Soft Tissues/Bones: Visualized soft tissues surrounding the chest wall demonstrate no acute findings. Osseous structures demonstrate degenerative changes. No lytic or blastic lesions. No acute bony process is seen. Kyphoplasty is noted at the presumed level of L1. Scoliosis of the thoracolumbar spine is noted. *Interval increase in size of the subpleural mass involving the left lower lobe now measuring 6.6 x 3.8 cm in greatest axial dimension. In addition, the metastatic lesions involving both lungs have also increased in number and size suggestive of progression of disease.   There is associated mediastinal lymphadenopathy. *Pneumobilia left lobe of the liver, unchanged. *Stable hypodense lesion left lobe of the liver. Xr Chest Portable    Result Date: 1/31/2018  EXAMINATION: SINGLE VIEW OF THE CHEST 1/31/2018 8:07 pm COMPARISON: Chest x-ray from 01/31/2018. HISTORY: ORDERING SYSTEM PROVIDED HISTORY: s/p central line R IJ TECHNOLOGIST PROVIDED HISTORY: Reason for exam:->s/p central line R IJ Ordering Physician Provided Reason for Exam: s/p central line R IJ Acuity: Unknown Type of Exam: Unknown FINDINGS: Endotracheal tube extends to the mid thoracic trachea approximately 2.8 cm above the michael. Enteric tube courses below the diaphragm, distal tip not included. Right central venous catheter extends to the level of the distal SVC. Additional monitoring leads overlie the chest. Similar patchy increased density in the right mid lung. Remainder of the lungs appear well aerated. No pleural effusion. Supine technique limits evaluation for pneumothorax, but given this, no definite pneumothorax appreciated. Heart size appears within normal limits given technique and there is no overt pulmonary vascular congestion. There are calcifications of the aortic arch. Vertebroplasty changes identified. Surgical clips project over the left upper quadrant of the abdomen. Visualized osseous structures appear intact, and are otherwise grossly unremarkable, given the non dedicated imaging. 1. Expected positions of medical support devices. 2. Similar patchy increased density in the right mid lung is concerning for pneumonia, in the appropriate clinical setting. Continued radiographic follow-up to resolution is recommended.      Xr Chest Portable    Result Date: 1/31/2018  EXAMINATION: SINGLE VIEW OF THE CHEST 1/31/2018 4:52 pm COMPARISON: December 31, 2017 HISTORY: ORDERING SYSTEM PROVIDED HISTORY: intubation TECHNOLOGIST PROVIDED HISTORY: Reason for exam:->intubation FINDINGS: The endotracheal tube

## 2018-02-01 NOTE — PROGRESS NOTES
 Osteoporosis     Pelvic mass April 2015    benign     Perforated nasal septum     Peripheral neuropathy due to inflammation (HCC) 12/15/2017    Pneumonia     Psoriasis     PTSD (post-traumatic stress disorder) 1985    [facility]    Scoliosis     Shortness of breath 11/23/2016    with temp.  change    [facility]    Thrombocytosis ([address]     Urinary incontinence     PT STATES RESOLVED    Wears glasses        PAST SURGICAL HISTORY    Past Surgical History:   Procedure Laterality Date    ABDOMEN SURGERY  09/2015    MASS REMOVED AND HERNIA REPAIR    CHOLECYSTECTOMY      CYSTOSCOPY  12/1/2015    bilat retrograde, right ureteroscopy    CYSTOSCOPY  05/11/2017    CYSTOSCOPY Right 5/11/2017    CYSTOSCOPY, RIGHT URETEROSCOPY, RIGHT RETROGRADE PYELOGRAM, RIGHT STENT PLACEMENT performed by Karlee Travis MD at [address] ERCP  5/16/2013    FIXATION KYPHOPLASTY  08/09/2016    T12, L-2, L-5    GASTRECTOMY  2012    Partial Gastrectomy    HERNIA REPAIR  07/18/2017    hernia repair with mas    LUNG BIOPSY      right upper lobe    PARACENTESIS Right 01/2015-09/2015    X 8    MO CREATE EARDRUM OPENING,GEN ANESTH N/A 4/14/2017    LEFT MYRINGOTOMY T-TUBE INSERTION performed by Darius Carrizales MD at [address] ESOPHAGOGASTRODUODENOSCOPY TRANSORAL DIAGNOSTIC N/A 8/30/2017    EGD ESOPHAGOGASTRODUODENOSCOPY performed by Karan Bobo MD at [facility] N/A 4/14/2017    NASAL SEPTAL BUTTON INSERTION performed by Darius Carrizales MD at [facility] N/A 7/18/2017    [facility] [id number] [facility] performed by Sandeep Flood DO at [place] [id number]    UPPER GASTROINTESTINAL ENDOSCOPY      URETER STENT PLACEMENT  05/11/2017    URETEROSCOPY  05/11/2017       FAMILY HISTORY    Family History   Problem Relation Age of Onset    Heart Incisional hernia    Acute sinusitis    Suprapubic pain    Anemia, chronic disease    Celiac disease    Cirrhosis of liver with ascites (HCC)    Gastroesophageal reflux disease without esophagitis    Essential hypertension    Hematuria    Acute cystitis with hematuria    Hypoalbuminemia due to protein-calorie malnutrition (HCC)    Hypokalemia    Hyponatremia    Aspiration pneumonia due to vomit (HCC)    PTSD (post-traumatic stress disorder)    Gastroenteritis    Gastric outlet obstruction    Iron deficiency anemia due to chronic blood loss    Elevated serum immunoglobulin free light chain level    Hypertension    Acute pure red cell anemia (HCC)    Bilateral leg pain    History of blood transfusion    TIN (chronic hypoplastic anemia) (HCC)    Peripheral neuropathy due to inflammation (HCC)    Pneumonia of right middle lobe due to infectious organism ([address]    Seizure ([address]    Hypokalemia    Fatigue    Septic shock (HCC)    Witnessed seizure ([address]           Plan:     Treatment:    · Encourage/assist with repositioning every 2 hours  · Float heels off of bed surface with pillows  · Routine incontinence care with Santiago barrier cloths and zinc oxide cream. Apply zinc oxide cream BID and prn incontinence. · Covidien moisture wicking under pads vs cloth backed briefs  · When transferred out of critical care. Static air overlay. Check inflation each shift by sliding hand under the air overlay. Feel for the patient's heaviest/ most dependant body part. Ideally 1/2 to 1\" of air will be between your hand and the patient's body.  Add air prn.            Electronically signed by Marilou Siemens, NP on 2/1/2018 at 3:26 PM

## 2018-02-01 NOTE — PROGRESS NOTES
Critical Care Team - Daily Progress Note      Date and time: 2/1/2018 7:45 AM  Patient's name:  Lazaro Law  Medical Record Number: [id number]  Patient's account/billing number: [de-identified]  Patient's YOB: 1970  Age: 50 y.o. Date of Admission: 1/31/2018  4:11 PM  Length of stay during current admission: 1      Primary Care Physician: Kris Lutz MD    Code Status: Prior    Reason for ICU admission: sepsis, altered mental status, questionable seizure      SUBJECTIVE:     OVERNIGHT EVENTS:       No acute overnight events. Patient has been off sedation since 1 am. Currently intubated. Moves spontaneously. Afebrile, tachycardic in low 100s, normotensive.     AWAKE & FOLLOWING COMMANDS:  [] No   [x] Yes    CURRENT VENTILATION STATUS:     [x] Ventilator  [] BIPAP  [] Nasal Cannula [] Room Air      IF INTUBATED, ET TUBE MARKING AT LOWER LIP:       cms    SECRETIONS Amount:  [] Small [] Moderate  [] Large  [] None  Color:     [] White [] Colored  [] Bloody    SEDATION:  RAAS Score:  [] Propofol gtt  [x] Versed gtt  [] Ativan gtt   [] No Sedation    PARALYZED:  [x] No    [] Yes    DIARRHEA:                [x] No                [] Yes  (C. Difficile status: [] positive                                                                                                                       [] negative                                                                                                                     [] pending)    VASOPRESSORS:  [x] No    [] Yes    If yes -   [] Levophed       [] Dopamine     [] Vasopressin       [] Dobutamine  [] Phenylephrine         [] Epinephrine    CENTRAL LINES:     [] No   [x] Yes   (Date of Insertion:   )           If yes -     [x] Right IJ     [] Left IJ [] Right Femoral [] Left Femoral                   [] Right Subclavian [] Left Subclavian       DOUGHERTY'S CATHETER:   [] No   [x] Yes  (Date of Insertion:   )     URINE OUTPUT:            [] Good   [x] Low --   152*  82   CALCIUM   --    --   10.1  8.7   PROT   --    < >  9.1*  7.7   LABALBU   --    < >  2.5*  1.7*   BILITOT   --    < >  0.70  0.85   ALKPHOS   --    < >  1,023*  758*   AST   --    < >  60*  32*   ALT   --    < >  21  16    < > = values in this interval not displayed. Magnesium:   Lab Results   Component Value Date    MG 2.1 09/09/2017     Phosphorus:   Lab Results   Component Value Date    PHOS 4.2 03/13/2015     Ionized Calcium:   Lab Results   Component Value Date    CAION 1.58 01/25/2018        Urinalysis:     Troponin:   Recent Labs      01/31/18   1622   TROPONINI  0.00       Microbiology:    Cultures during this admission:     Blood cultures:                 [] None drawn      [] Negative             []  Positive (Details:  )  Urine Culture:                   [] None drawn      [] Negative             []  Positive (Details:  )  Sputum Culture:               [] None drawn       [] Negative             []  Positive (Details:  )   Endotracheal aspirate:     [] None drawn       [] Negative             []  Positive (Details:  )     Other pertinent Labs:       Radiology/Imaging:       ASSESSMENT:     Active Problems:    Septic shock ([facility] [address]            PLAN:     WEAN PER PROTOCOL:  [] No   [x] Yes  [] N/A    DISCONTINUE ANY LABS:   [x] No   [] Yes    ICU PROPHYLAXIS:  Stress ulcer:  [] PPI Agent  [x] [id number] [] Sucralfate  [] Other:  VTE:   [] Enoxaparin  [] Unfract. Heparin Subcut  [] EPC Cuffs    NUTRITION: (Diet:  )    HOME MEDICATIONS RECONCILED: [] No  [x] Yes    INSULIN DRIP:   [x] No   [] Yes    CONSULTATION NEEDED:  [] No   [x] Yes    FAMILY UPDATED:    [x] No   [] Yes    TRANSFER OUT OF ICU:   [x] No   [] Yes    ADDITIONAL PLAN:    1. Pneumonia: will continue with broad spectrum antibiotics. We'll continue with vancomycin and cefepime. Will obtain daily chest x-rays. 2. Sepsis: Elevated lactic acid of 7.1 which improved to 1.2 after receiving fluid bolus.   Likely secondary to pneumonia

## 2018-02-01 NOTE — H&P
AEPB inhaler Inhale 1 puff into the lungs daily 10/20/17   Selina Gray MD   VENTOLIN  (90 Base) MCG/ACT inhaler INHALE 2 PUFFS INTO THE LUNGS EVERY 6 HOURS AS NEEDED FOR WHEEZING 9/22/17   Selina Gray MD   docusate sodium (DOCQLACE) 100 MG capsule TAKE 1 CAPSULE BY MOUTH 2 TIMES DAILY AS NEEDED FOR CONSTIPATION 8/15/17   Marisol Goldman MD   fluticasone Marilee Barrack) 50 MCG/ACT nasal spray 1 spray by Nasal route daily 8/15/17   Marisol Goldman MD   ondansetron [facility]) 4 MG tablet Take 1 tablet by mouth every 8 hours as needed for Nausea 5/9/17   Cindy Avina MD   albuterol (PROVENTIL) (5 MG/ML) 0.5% nebulizer solution Take 0.5 mLs by nebulization every 6 hours as needed for Wheezing 1/23/17   Cristian Garcia MD       Social History:   TOBACCO:   reports that she has been smoking Cigarettes. She has a 7.50 pack-year smoking history. She has never used smokeless tobacco.  ETOH:   reports that she does not drink alcohol. DRUGS:  reports that she does not use drugs. OCCUPATION:      Family History:       Problem Relation Age of Onset    Heart Disease Mother     Stroke Father      cerebral aneurysm           REVIEW OF SYSTEMS (ROS):  Unable to obtain given that the patient was intubated. Physical Exam:    Vitals: /69   Pulse 122   Temp 102.6 °F (39.2 °C) (Oral)   Resp 27   Ht 5' (1.524 m)   Wt 98 lb 15.8 oz (44.9 kg)   LMP 04/18/2012   SpO2 100%   BMI 19.33 kg/m²     Last Body weight:   Wt Readings from Last 3 Encounters:   01/31/18 98 lb 15.8 oz (44.9 kg)   01/22/18 99 lb (44.9 kg)   01/19/18 98 lb 9.6 oz (44.7 kg)     Ros unable to perform due to intubation and sedation    Body Mass Index : Body mass index is 19.33 kg/m².         PHYSICAL EXAMINATION :    General appearance - sedated and intubated  Mental status - sedated on Versed, not following commands  Eyes - pupils equal and reactive  Chest - scattered rhonchi to bilateral anterior chest, symmetric air entry  Heart - tachycardic, input(s): IONCA in the last 72 hours. S. Magnesium:No results for input(s): MG in the last 72 hours. S. Phosphorus:No results for input(s): PHOS in the last 72 hours. S. Glucose:  Recent Labs      01/31/18   1628   POCGLU  151*     Glycosylated hemoglobin A1C: No results for input(s): LABA1C in the last 72 hours. INR:   Recent Labs      01/31/18   1703   INR  1.4     Hepatic functions:   Recent Labs      01/31/18   1703   ALKPHOS  1,023*   ALT  21   AST  60*   PROT  9.1*   BILITOT  0.70   BILIDIR  0.39*   LABALBU  2.5*     Pancreatic functions:No results for input(s): LACTA, AMYLASE in the last 72 hours. S. Lactic Acid: No results for input(s): LACTA in the last 72 hours. Cardiac enzymes:  Recent Labs      01/31/18   1622   TROPONINI  0.00     BNP:No results for input(s): BNP in the last 72 hours. Lipid profile: No results for input(s): CHOL, TRIG, HDL, LDLCALC in the last 72 hours.     Invalid input(s): LDL  Blood Gases: No results found for: PH, PCO2, PO2, HCO3, O2SAT  Thyroid functions:   Lab Results   Component Value Date    TSH 1.00 01/01/2018        Urinalysis:     Microbiology:    Cultures during this admission:     Blood cultures:                 [] None drawn      [] Negative             []  Positive (Details:  )  Urine Culture:                   [] None drawn      [] Negative             []  Positive (Details:  )  Sputum Culture:               [] None drawn       [] Negative             []  Positive (Details:  )   Endotracheal aspirate:     [] None drawn       [] Negative             []  Positive (Details:  )         -----------------------------------------------------------------  Radiological reports:    (See actual reports for details)      Recent Cardiac Catheterization summary:   (See actual reports for details)    Electrocardiogram:     Last Echocardiogram findings:   (See actual reports for details)       Assessment and Plan       Active Problems:    Septic shock sepsis from right upper lobe infiltrate until cultures are finalized and negative. Discontinue vancomycin. Patient has been having tonic-clonic jerking which have been ruled out as seizures. Will request neurology evaluation. Patient had been weaning well, but not responding for extubation. However, as the day went by, she became more responsive and self extubated herself. No stridor, saturating well on 3 L getting EEG. Total critical care time caring for this patient with life threatening, unstable organ failure, including direct patient contact, management of life support systems, review of data including imaging and labs, discussions with other team members and physicians at least 28   Min so far today, excluding procedures. Treatment plan Discussed with nursing staff in detail , all questions answered . Foreign Churchill MD   2/1/2018   4:07 PM    Please note that this chart was generated using voice recognition Dragon dictation software. Although every effort was made to ensure the accuracy of this automated transcription, some errors in transcription may have occurred.

## 2018-02-01 NOTE — CONSULTS
MG tablet Take 1 tablet by mouth daily 30 tablet 5    aspirin (ASPIRIN LOW DOSE) 81 MG chewable tablet Take 1 tablet by mouth daily 30 tablet 5    pantoprazole (PROTONIX) 40 MG tablet Take 1 tablet by mouth daily 90 tablet 1    folic acid (FOLVITE) 1 MG tablet Take 1 tablet by mouth daily 30 tablet 5    umeclidinium-vilanterol (ANORO ELLIPTA) 62.5-25 MCG/INH AEPB inhaler Inhale 1 puff into the lungs daily 1 each 5    VENTOLIN  (90 Base) MCG/ACT inhaler INHALE 2 PUFFS INTO THE LUNGS EVERY 6 HOURS AS NEEDED FOR WHEEZING 1 Inhaler 0    docusate sodium (DOCQLACE) 100 MG capsule TAKE 1 CAPSULE BY MOUTH 2 TIMES DAILY AS NEEDED FOR CONSTIPATION 30 capsule 3    fluticasone (FLONASE) 50 MCG/ACT nasal spray 1 spray by Nasal route daily 1 Bottle 3    ondansetron (ZOFRAN) 4 MG tablet Take 1 tablet by mouth every 8 hours as needed for Nausea 8 tablet 0    albuterol (PROVENTIL) (5 MG/ML) 0.5% nebulizer solution Take 0.5 mLs by nebulization every 6 hours as needed for Wheezing 30 vial 0     Allergies: Saturnino Juarez A Samples is allergic to latex; bee venom; benadryl [diphenhydramine-zinc acetate]; tavist; and tylenol [acetaminophen].     Past Medical History:   Diagnosis Date    Allergic rhinitis     Anxiety     Asthma DX PRIOR TO 1995    NO INHALER USE ONLY HAS TROUBLE IN EXTREME HOT OR COLD WEATHER    Blood circulation, collateral     Celiac disease     possible    Chronic back pain     low back pain     Chronic kidney disease     [facility] 3/30/2015    Constipation     CHRONIC    Cough 11/23/2016    Cryptogenic organizing pneumonia ([facility] 11/23/2016    Depression 12/13/2012    on celexa per pcp    Difficult intravenous access     STATES LT ARM EASIER TO START IN, ENCOURAGED TO HYDRATE DAY PROIR     Disease of blood and blood forming organ     Thrombocytosis    Edentulous     does not wear her dentures    Full dentures     ENCOURAGED TO WEAR DOS    Gastric outlet obstruction INCISIONAL HERNIA REPAIR X2  WITH MESH performed by Tenzin Edwards DO at [place] [id number]    UPPER GASTROINTESTINAL ENDOSCOPY      URETER STENT [facility]  05/11/2017    URETEROSCOPY  05/11/2017     Social History: Becca Law  reports that she has been smoking Cigarettes. She has a 7.50 pack-year smoking history. She has never used smokeless tobacco. She reports that she does not drink alcohol or use drugs. Family History   Problem Relation Age of Onset    Heart Disease Mother     Stroke Father      cerebral aneurysm       Current Medications:     aspirin  81 mg Oral Daily    folic acid  1 mg Oral Daily    sodium chloride  250 mL Intravenous Once    famotidine  20 mg Oral BID    [START ON 2/2/2018] cefepime  1 g Intravenous Q24H    citalopram  40 mg Oral Daily    sodium chloride  250 mL Intravenous Once     PRN Meds include: fentanNYL **OR** fentanNYL, albuterol    ROS: Could not be obtained due to patient's condition. Objective:   /64   Pulse 90   Temp 97.9 °F (36.6 °C) (Oral)   Resp 18   Ht 5' 1\" (1.549 m)   Wt 101 lb 10.1 oz (46.1 kg)   LMP 04/18/2012   SpO2 100%   BMI 19.20 kg/m²     Blood pressure range: Systolic (06MVJ), OYM:437 , Min:84 , UZP:127   ; Diastolic (69XPF), TFI:06, Min:52, Max:106      Continuous infusions:    sodium chloride 100 mL/hr at 02/01/18 1232    midazolam Stopped (02/01/18 0000)          General Examination    General  Intubated on sedation    Head  Normocephalic, without obvious abnormality, atraumatic    Neck  Supple, symmetrical, trachea midline, No mass    Lungs  On ventilator    Chest Wall  No deformity    Heart  Regular rate and rhythm    Abdomen  No masses    Extremities  Extremities normal, atraumatic, no cyanosis or edema    Pulses  2+ and symmetric    Skin:  Skin color, texture, turgor normal, no rashes or lesions      Mental status  Intubated and not on sedation;  Off head 1/31/18: No acute intracranial abnormality. Impression and Plan: Ms. Pablo Law is a 50 y.o. female with   Witnessed generalized tonic clonic seizure activity; sepsis/pneumonia and hyponatremia., intubated for airway protection. , history of twitches and urinary incontinence  EEG stat; continue seizure precautions; IV Lorazepam 1mg q 30 min prn prolonged seizure activity, max 4mg/ day  Will get MRI brain (w/wo), Mag level and urine toxicology. Comorbid pneumonia: On vancomycin and Cefepime. Discussed with patient's Nurse. Will follow with you. Thank you for consultation.

## 2018-02-02 NOTE — PROGRESS NOTES
Date:                           2/2/2018  Patient name:           Matthieu Law  Date of admission:  1/31/2018  4:11 PM  MRN:   [id number]  YOB: 1970  PCP:                           Izabel Bergeron MD      Subjective:   Patient seen and examined. Labs in vitals reviewed. Patient more awake today however still very lethargic. Not able to give any meaningful history. No new event per nurse. MRI could not be performed yesterday as diagnosis were not able to get clearance for MRI from patient or family. REVIEW OF SYSTEMS:  Unable to obtain meaningful review of systems        Objective:     Vitals: /61   Pulse 94   Temp 99.2 °F (37.3 °C) (Axillary)   Resp 25   Ht 5' 1\" (1.549 m)   Wt 105 lb 13.1 oz (48 kg)   LMP 04/18/2012   SpO2 100%   BMI 19.99 kg/m²   General appearance - not in acute distress  Mental status - arousable but very lethargic  Eyes - pupils equal and reactive  Mouth - mucous membranes moist, pharynx normal without lesions  Neck - supple, no significant adenopathy  Lymphatics - no palpable lymphadenopathy, no hepatosplenomegaly  Chest - decreased breathing sounds bilaterally  Heart - normal rate, regular rhythm, normal S1, S2, no murmurs  Abdomen - soft, nontender, nondistended, no masses or organomegaly  Neurological - arousable but does not follow commands  Extremities - peripheral pulses normal, no pedal edema, no clubbing or cyanosis  Skin - normal coloration and turgor, no rashes, no suspicious skin lesions noted         Data:    No intake/output data recorded. In: 6688 [I.V.:1731]  Out: 642 [Urine:642]    CBC:   Recent Labs      01/31/18   1703   02/01/18   0456  02/01/18   1002  02/02/18   0339   WBC  33.5*   --   34.3*   --   24.8*   HGB  6.4*   < >  6.8*  7.8*  6.8*   PLT  See Reflexed IPF Result   --   780*   --   812*    < > = values in this interval not displayed.      BMP:    Recent Labs      01/31/18   1703  02/01/18 0456  02/02/18   0339   NA  129*  130*  135   K  4.1  4.0  3.3*   CL  91*  99  106   CO2  16*  16*  15*   BUN  30*  30*  24*   CREATININE  0.74  0.86  0.76   GLUCOSE  152*  82  54*     Hepatic:   Recent Labs      01/31/18   1703  02/01/18   0456  02/02/18   0339   AST  60*  32*  16   ALT  21  16  11   BILITOT  0.70  0.85  0.46   ALKPHOS  1,023*  758*  581*     INR:   Recent Labs      01/31/18   1703  02/01/18   0456  02/02/18   0339   INR  1.4  1.4  1.4     PTT:No results for input(s): PTT in the last 72 hours. Results for orders placed or performed during the hospital encounter of 01/31/18   Urine Culture   Result Value Ref Range    Specimen Description . CLEAN CATCH URINE     Special Requests NOT REPORTED     Culture NO SIGNIFICANT GROWTH     Culture       [address] [phone]    Status FINAL 02/01/2018    Culture Blood #1   Result Value Ref Range    Specimen Description . BLOOD     Special Requests  RT LEG 6 ML     Culture NO GROWTH 2 DAYS     Culture       [address] [phone]    Status Pending    Culture Blood #1   Result Value Ref Range    Specimen Description . BLOOD     Special Requests LT LEG     Culture NO GROWTH 2 DAYS     Culture       [address] [phone]    Status Pending    MRSA DNA Probe, Nasal   Result Value Ref Range    Specimen Description . NASAL SWAB     MRSA, DNA, Nasal  NMRSAA     NEGATIVE:  MRSA DNA not detected by nucleic acid amplification. RAPID INFLUENZA A/B ANTIGENS   Result Value Ref Range    Specimen Description . NASOPHARYNGEAL SWAB     Special Requests NOT REPORTED     Direct Exam PRESUMPTIVE NEGATIVE for Influenza A + B antigens. Direct Exam       PCR testing to confirm this result is available upon request.  Specimen will be    Direct Exam        saved in the laboratory for 7 days.   Please call [phone] if PCR testing is    Direct Exam NORM    Nitrite, Urine NEGATIVE NEG    Leukocyte Esterase, Urine SMALL (A) NEG    Urinalysis Comments NOT REPORTED    Microscopic Urinalysis   Result Value Ref Range    -          WBC, UA 5 TO 10 0 - 5 /HPF    RBC, UA 50  0 - 4 /HPF    Casts UA NOT REPORTED 0 - 8 /LPF    Crystals UA NOT REPORTED NONE /HPF    Epithelial Cells UA 2 TO 5 0 - 5 /HPF    Renal Epithelial, Urine NOT REPORTED 0 /HPF    Bacteria, UA FEW (A) NONE    Mucus, UA NOT REPORTED NONE    Trichomonas, UA NOT REPORTED NONE    Amorphous, UA NOT REPORTED NONE    Other Observations UA NOT REPORTED NREQ    Yeast, UA NOT REPORTED NONE   HEMOGLOBIN AND HEMATOCRIT, BLOOD   Result Value Ref Range    Hemoglobin 7.8 (L) 11.9 - 15.1 g/dL    Hematocrit 26.1 (L) 36.3 - 47.1 %   DRUG SCREEN MULTI URINE   Result Value Ref Range    Amphetamine Screen, Ur NEGATIVE NEG    Barbiturate Screen, Ur NEGATIVE NEG    Benzodiazepine Screen, Urine POSITIVE (A) NEG    Cocaine Metabolite, Urine NEGATIVE NEG    Methadone Screen, Urine NEGATIVE NEG    Opiates, Urine NEGATIVE NEG    Phencyclidine, Urine NEGATIVE NEG    Propoxyphene, Urine NOT REPORTED NEG    Cannabinoid Scrn, Ur NEGATIVE NEG    Oxycodone Screen, Ur POSITIVE (A) NEG    Methamphetamine, Urine NOT REPORTED NEG    Tricyclic Antidepressants, Ur NOT REPORTED NEG    MDMA URINE NOT REPORTED NEG    Buprenorphine Urine NOT REPORTED NEG    Test Information       Assay provides medical screening only.   The absence of expected drug(s) and/or   TOX SCR, COMPLETE BL   Result Value Ref Range    THC Pending     Cocaine Pending     Opiates Pending     Phencyclidine Pending     Amphetamine Pending     Barbiturates Pending     Benzodiazepines Pending     Methadone Pending     Oxycodone Pending     Methamphetamine Pending     Buprenorphine Pending     Drugs of Abuse Comment Pending     Salicylate Lvl <1 (L) 3 - 10 mg/dL    Acetaminophen Level <10 (L) 10 - 30 ug/mL    Toxic Tricyclic Sc,Blood NEGATIVE NEG    Ethanol <10 <10 mg/dL TRANSFUSED     Transfusion Status OK TO TRANSFUSE     Crossmatch Result COMPATIBLE     Unit Number [id number]     Product Code Leukocyte Reduced Red Cell     Unit Divison 0     Dispense Status ISSUED     Transfusion Status OK TO TRANSFUSE     Crossmatch Result COMPATIBLE        Ct Head Wo Contrast    Result Date: 1/31/2018  EXAMINATION: CT OF THE HEAD WITHOUT CONTRAST  1/31/2018 6:48 pm TECHNIQUE: CT of the head was performed without the administration of intravenous contrast. Dose modulation, iterative reconstruction, and/or weight based adjustment of the mA/kV was utilized to reduce the radiation dose to as low as reasonably achievable. COMPARISON: December 31, 2017 HISTORY: ORDERING SYSTEM PROVIDED HISTORY: ams FINDINGS: BRAIN/VENTRICLES: There is no acute intracranial hemorrhage, mass effect or midline shift. No abnormal extra-axial fluid collection. The gray-white differentiation is maintained without evidence of an acute infarct. There is no evidence of hydrocephalus. ORBITS: The visualized portion of the orbits demonstrate no acute abnormality. SINUSES: The visualized paranasal sinuses and mastoid air cells demonstrate no acute abnormality. SOFT TISSUES/SKULL:  No acute abnormality of the visualized skull or soft tissues. No acute intracranial abnormality. Ct Chest Wo Contrast    Result Date: 1/8/2018  EXAMINATION: CT OF THE CHEST WITHOUT CONTRAST 1/8/2018 2:56 pm TECHNIQUE: CT of the chest was performed without the administration of intravenous contrast. Multiplanar reformatted images are provided for review. Dose modulation, iterative reconstruction, and/or weight based adjustment of the mA/kV was utilized to reduce the radiation dose to as low as reasonably achievable. COMPARISON: CT chest October 16, 2017. HISTORY: ORDERING SYSTEM PROVIDED HISTORY: Lung mass TECHNOLOGIST PROVIDED HISTORY: Reason for exam:->lung masses FINDINGS: Mediastinum: Visualized thyroid parenchyma appears unremarkable. opacities in the right lung are again demonstrated without appreciable change. There is no pneumothorax, edema or effusion. No acute osseous abnormalities identified. The patient is status post vertebroplasty in the lumbar spine. A pigtail stent is noted projecting in the right abdomen. 1.  The endotracheal tube is in appropriate position above the michael. The enteric tube terminates in the body of the stomach. 2.  No significant change in interstitial opacities in the right lung, which may represent persistent or recurrent infiltrate.          Problem Lists:   Primary Problem:  <principal problem not specified>   Current Problems:  Active Hospital Problems    Diagnosis Date Noted    Witnessed seizure ([address] [R56.9]     Streptococcal sepsis ([address] [A40.9]     Septic shock ([address] [A41.9, R65.21] 01/31/2018     PMH:  Past Medical History:   Diagnosis Date    Allergic rhinitis     Anxiety     Asthma DX PRIOR TO 1995    NO INHALER USE ONLY HAS TROUBLE IN EXTREME HOT OR COLD WEATHER    Blood circulation, collateral     Celiac disease     possible    Chronic back pain     low back pain     Chronic kidney disease     Cirrhosis of liver with ascites ([address] 3/30/2015    Constipation     CHRONIC    Cough 11/23/2016    Cryptogenic organizing pneumonia ([address] 11/23/2016    Depression 12/13/2012    on celexa per pcp    Difficult intravenous access     STATES LT ARM EASIER TO START IN, ENCOURAGED TO HYDRATE DAY PROIR     Disease of blood and blood forming organ     Thrombocytosis    Edentulous     does not wear her dentures    Full dentures     ENCOURAGED TO WEAR DOS    Gastric outlet obstruction 8/30/2017    GERD (gastroesophageal reflux disease)     Hearing loss     left ear    History of blood transfusion 01/2012    Hydronephrosis, bilateral 5/2/2015    Hypertension     Hypoglycemia 2014    Lung nodule     right, benign    Migraine     Observed seizure-like activity ([address] 12/31/2017    OCD (obsessive compulsive disorder)     Osteoarthritis     Osteoporosis     Pelvic mass April 2015    benign     Perforated nasal septum     Peripheral neuropathy due to inflammation (HCC) 12/15/2017    Pneumonia     Psoriasis     PTSD (post-traumatic stress disorder) 1985    [facility]    Scoliosis     Shortness of breath 11/23/2016    with temp. change    [facility]stance abuse 1987    [facility]    Thrombocytosis ([address]     Urinary incontinence     PT STATES RESOLVED    Wears glasses       Allergies:    Allergies   Allergen Reactions    Latex Dermatitis    Bee Venom Anaphylaxis    Benadryl [Diphenhydramine-Zinc Acetate] Anaphylaxis    Tavist Anaphylaxis     Quits  Breathing  And  Low   Heart  beat    Tylenol [Acetaminophen] Other (See Comments)     Liver problems        Assessment      Patient Active Problem List   Diagnosis    Smoking greater than 40 pack years    Mass of lower lobe of left lung    Normocytic anemia    Thrombocytosis (HCC)    Protein-calorie malnutrition, severe (HCC)    Leukocytosis    Liver mass    Non-intractable vomiting with nausea    Multiple lung nodules on CT    Anemia    Anxiety    Depression    Altered mental status    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 respiratory abnormalities    COPD with acute exacerbation (HCC)    Ureteropelvic junction (UPJ) obstruction, right    Osteoporosis    Sepsis (HCC)    Anemia due to vitamin B12 deficiency    Pulmonary infiltrates on CXR    Incisional hernia    Acute sinusitis    Suprapubic pain    Anemia, chronic disease    Celiac disease    Cirrhosis of liver with ascites ([address]    Gastroesophageal reflux disease without esophagitis    Essential hypertension    Hematuria    Acute cystitis with hematuria    Hypoalbuminemia due to protein-calorie malnutrition (HCC)    Hypokalemia   

## 2018-02-02 NOTE — PROGRESS NOTES
SEPTAL BUTTON INSERTION performed by Nichol Santos MD at [address] [facility] N/A 2017    VENTRAL INCISIONAL HERNIA REPAIR X2  WITH MESH performed by Tenzin Edwards DO at [place] [id number]    UPPER GASTROINTESTINAL ENDOSCOPY      URETER STENT [facility]  2017    URETEROSCOPY  2017     Social History: Becca Law  reports that she has been smoking Cigarettes. She has a 7.50 pack-year smoking history. She has never used smokeless tobacco. She reports that she does not drink alcohol or use drugs. Family History   Problem Relation Age of Onset    Heart Disease Mother     Stroke Father      cerebral aneurysm       Current Medications:     sodium chloride  250 mL Intravenous Once    aspirin  81 mg Oral Daily    folic acid  1 mg Oral Daily    sodium chloride  250 mL Intravenous Once    famotidine  20 mg Oral BID    citalopram  40 mg Oral Daily    cefepime  1 g Intravenous Q24H    fosphenytoin  100 mg PE Intravenous Q8H    sodium chloride  250 mL Intravenous Once     PRN Meds include: glucose, dextrose, glucagon (rDNA), dextrose, potassium chloride, fentanNYL **OR** fentanNYL, albuterol, LORazepam    ROS: Could not be obtained due to patient's condition. Objective:   /61   Pulse 95   Temp 98.4 °F (36.9 °C) (Oral)   Resp 21   Ht 5' 1\" (1.549 m)   Wt 105 lb 13.1 oz (48 kg)   LMP 2012   SpO2 100%   BMI 19.99 kg/m²     Blood pressure range: Systolic (90QPH), MJO:421 , Min:92 , QNS:469   ; Diastolic (30NDL), EQ, Min:51, Max:84      Continuous infusions:    dextrose      sodium chloride 100 mL/hr at 18 0006    midazolam Stopped (18 0000)          General Examination    General  Extubated and off of sedation.      Head  Normocephalic, without obvious abnormality, atraumatic    Neck  Supple, symmetrical, trachea midline, No mass    Lungs  CTA    Chest Wall No deformity    Heart  Regular rate and rhythm    Abdomen  No masses    Extremities  Extremities normal, atraumatic, no cyanosis or edema    Pulses  2+ and symmetric    Skin:  Skin color, texture, turgor normal, no rashes or lesions      Mental status  Extubated and off of sedation; alert and awake and oriented to self, place and time; followed simple as well as complex commands; dysarthria but with intact cognition; no hallucinations or delusions noted. Cranial nerves  Pupil resposne: Pupils reactive bilaterally  Fundi reveal intact pulsations  Corneal Reflex: present   Oculocephalic reflex: present   Cough reflex: present   Able to hear equally well bilaterally  Face appears symmetric   Tongue protrudes midline and palate elevates symmetrically   Intact shoulder shrug bilaterally    Motor and sensory exam Moves all extremities spontaneously and purposefully; DTRs 1+ and symmetric; withdraws to pinprick equally well bilaterally; no atrophy and muscle tone normal and no abnormal involuntary movements noted. Bilateral equivocal plantar responses present. Cerebellar No nystagmus noted. Gait  Deferred                    Data:    Lab Results:     Lab Results   Component Value Date    CHOL 83 01/07/2016    LDLCHOLESTEROL 59 01/07/2016    HDL 12 (L) 01/07/2016    TRIG 77 02/01/2018    ALT 11 02/02/2018    AST 16 02/02/2018    TSH 1.00 01/01/2018    INR 1.4 02/02/2018    LABA1C 5.2 01/07/2016    [id number] 597 02/01/2018     Hematology:  Recent Labs      01/31/18   1703   02/01/18 0456  02/01/18   1002  02/02/18   0339  02/02/18   0824   WBC  33.5*   --   34.3*   --   24.8*   --    HGB  6.4*   < >  6.8*  7.8*  6.8*  8.0*   HCT  22.0*   < >  22.6*  26.1*  22.9*  26.8*   PLT  See Reflexed IPF Result   --   780*   --   812*   --    INR  1.4   --   1.4   --   1.4   --     < > = values in this interval not displayed.      Chemistry:  Recent Labs      01/31/18   1703  02/01/18   0456  02/02/18   0339   NA  129*  130*

## 2018-02-02 NOTE — PROGRESS NOTES
ICU PATIENT TRANSFER NOTE        Patient:  Colin ANDRADE Samples  YOB: 1970    MRN: [id number]     Acct: [de-identified]     Admit date: 1/31/2018    Code Status:- Full    Reason for ICU Admission:-   Her mental sensorium seizure-like activity with intubation    SUPPORT DEVICES: [] Ventilator [] BIPAP  [x] Nasal Cannula [x] Room Air    Consultations:- [] Cardiology [] Nephrology  [] Hemo onco  [] GI                               [] ID [] ENT  [] Rheum [] Endo   []Physiotherapy                                 Others:-Neurology    NUTRITION:  [] NPO [x] Tube Feeding (Specify: Nasogastric ) [] TPN  [] PO    Central Lines:- [] No   [x] Yes           If yes - Days/Date of Insertion 1/31/2018      Pt seen and Chart reviewed. ICU COURSE :-    Patient was admitted from ED to ICU with R to mental sensorium and she was found down at home. She had a brief seizure-like activity in ED and was hypoxic,  intubated in the ED and had central line placed in the right IJ. Chest x-ray showed a concern about pneumonia she was given vancomycin and Zosyn and azithromycin in ED level showed hyponatremia with sodium 129 lactic acid 7.1, alk phos was 1023, white count was 33.5 hemoglobin 6.4 she was given a transfusion,  patient was given 30 mL per KG fluid bolus repeat lactic acid was 1.2. Neurology was consulted for seizure-like activity, CT head did not show any acute intracranial abnormality serum toxicology was positive for benzo and Oxycodene, EEG did not show any significant abnormality.   She was started on fosphenytoin as per neurology, an MRI with and without contrast was recommended     Hematology oncology was consulted because of chronic thrombocytosis neutrophilia and anemia last 2 years workup include BCR ABL and J-tube limitations both are negative bone marrow LC showed a reactive thrombocytosis, iron stores are sufficient and peritoneal severe did not show any dysplasia. Hematology recommended supportive care and recheck for immunofixation studies to assess M protein. On day 2 of admission she self extubated and since then she is alert oriented, doubt any neurological deficit, able to eat, oral free. REVIEW OF SYSTEMS    See HPI for further details. Review of systems otherwise negative. PAST MEDICAL HISTORY    Past Medical History:   Diagnosis Date    Allergic rhinitis     Anxiety     Asthma DX PRIOR TO 1995    NO INHALER USE ONLY HAS TROUBLE IN EXTREME HOT OR COLD WEATHER    Blood circulation, collateral     Celiac disease     possible    Chronic back pain     low back pain     Chronic kidney disease     Cirrhosis of liver with ascites ([address] 3/30/2015    Constipation     CHRONIC    Cough 11/23/2016    Cryptogenic organizing pneumonia ([address] 11/23/2016    Depression 12/13/2012    on celexa per pcp    Difficult intravenous access     STATES LT ARM EASIER TO START IN, ENCOURAGED TO HYDRATE DAY PROIR     Disease of blood and blood forming organ     Thrombocytosis    Edentulous     does not wear her dentures    Full dentures     ENCOURAGED TO WEAR DOS    Gastric outlet obstruction 8/30/2017    GERD (gastroesophageal reflux disease)     Hearing loss     left ear    History of blood transfusion 01/2012    Hydronephrosis, bilateral 5/2/2015    Hypertension     Hypoglycemia 2014    Lung nodule     right, benign    Migraine     Observed seizure-like activity ([address] 12/31/2017    OCD (obsessive compulsive disorder)     Osteoarthritis     Osteoporosis     Pelvic mass April 2015    benign     Perforated nasal septum     Peripheral neuropathy due to inflammation ([address] 12/15/2017    Pneumonia     Psoriasis     PTSD (post-traumatic stress disorder) 1985    [facility]    Scoliosis     Shortness of breath 11/23/2016    with temp.  change    Substance abuse 1987    [facility]    Thrombocytosis ([address]     Urinary incontinence     PT STATES Social History    Marital status: Single     Spouse name: N/A    Number of children: N/A    Years of education: N/A     Social History Main Topics    Smoking status: Current Some Day Smoker     Packs/day: 0.25     Years: 30.00     Types: Cigarettes     Last attempt to quit: 12/1/2012    Smokeless tobacco: Never Used      Comment: pt states 3 cigs per day    Alcohol use No      Comment: quit in 1987    Drug use: No      Comment: quit marijuana 1987    Sexual activity: No     Other Topics Concern    None     Social History Narrative    None       PHYSICAL EXAM    VITAL SIGNS: /66   Pulse 100   Temp 99.2 °F (37.3 °C) (Oral)   Resp 26   Ht 5' 1\" (1.549 m)   Wt 105 lb 13.1 oz (48 kg)   LMP 04/18/2012   SpO2 99%   BMI 19.99 kg/m²   Constitutional:  Malnourished ,  non-toxic appearance, alert oriented  HENT:  Normocephalic neck supple, no lymphadenopathy, sclera clear, normal EOMI   Respiratory:  No respiratory distress, normal breath sounds   Cardiovascular:  Normal rate, normal rhythm, no murmurs, no gallops, no rubs   GI:  Soft, nondistended, normal bowel sounds, nontender, no organomegaly   Musculoskeletal:  No edema, no tenderness, no deformities. ,  Move all extremities spontaneously        FINAL IMPRESSION    1. Medical encephalopathy  2. Pneumonia  3. Chronic anemia         Physical Exam:  Vitals: /62   Pulse 104   Temp 98.6 °F (37 °C) (Oral)   Resp (!) 31   Ht 5' 1\" (1.549 m)   Wt 105 lb 13.1 oz (48 kg)   LMP 04/18/2012   SpO2 99%   BMI 19.99 kg/m²   24 hour intake/output:  Intake/Output Summary (Last 24 hours) at 02/02/18 1728  Last data filed at 02/02/18 1600   Gross per 24 hour   Intake           2677.5 ml   Output              782 ml   Net           1895.5 ml     Last 3 weights:   Wt Readings from Last 3 Encounters:   02/02/18 105 lb 13.1 oz (48 kg)   01/22/18 99 lb (44.9 kg)   01/19/18 98 lb 9.6 oz (44.7 kg)       Medications:Current Inpatient  Scheduled Meds:  

## 2018-02-02 NOTE — FLOWSHEET NOTE
02/02/18 1012   Swallow Screening   Is the patient able to remain alert for testing? Yes   Was the Patient Eating a Modified Diet Prior to being Admitted? No   Is there an Existing PEG or Abdominal Feeding Tube? No   Does the patient have a Head of Bed [facility]) restriction <30°? No   Is there a Tracheostomy Tube Present?  No   3 oz Water Swallow Screen Pass

## 2018-02-02 NOTE — PROGRESS NOTES
Metabolic Profile:   Recent Labs      01/31/18   1703  01/31/18   1802  02/01/18   0456  02/02/18   0339  02/02/18   0824   NA  129*   --   130*  135   --    K  4.1   --   4.0  3.3*  4.4   CL  91*   --   99  106   --    CO2  16*   --   16*  15*   --    BUN  30*   --   30*  24*   --    CREATININE  0.74  CANNOT BE CALCULATED  0.86  0.76   --    GLUCOSE  152*   --   82  54*   --    CALCIUM  10.1   --   8.7  8.4*   --    PROT  9.1*   --   7.7  6.9   --    LABALBU  2.5*   --   1.7*  1.8*   --    BILITOT  0.70   --   0.85  0.46   --    ALKPHOS  1,023*   --   758*  581*   --    AST  60*   --   32*  16   --    ALT  21   --   16  11   --       Magnesium:   Lab Results   Component Value Date    MG 1.9 02/02/2018     Phosphorus:   Lab Results   Component Value Date    PHOS 3.5 02/02/2018     Ionized Calcium:   Lab Results   Component Value Date    CAION 1.58 01/25/2018        Urinalysis:     Troponin:   Recent Labs      01/31/18   1622   TROPONINI  0.00       Microbiology:    Cultures during this admission:     Blood cultures:                 [] None drawn      [] Negative             []  Positive (Details:  )  Urine Culture:                   [] None drawn      [] Negative             []  Positive (Details:  )  Sputum Culture:               [] None drawn       [] Negative             []  Positive (Details:  )   Endotracheal aspirate:     [] None drawn       [] Negative             []  Positive (Details:  )     Other pertinent Labs:       Radiology/Imaging:       ASSESSMENT:     Active Problems:    Septic shock (HCC)    Witnessed seizure ([address]    Streptococcal sepsis ([address]            PLAN:     WEAN PER PROTOCOL:  [] No   [] Yes  [x] N/A    DISCONTINUE ANY LABS:   [x] No   [] Yes    ICU PROPHYLAXIS:  Stress ulcer:  [] PPI Agent  [x] [id number] [] Sucralfate  [] Other:  VTE:   [] Enoxaparin  [] Unfract.  Heparin Subcut  [] EPC Cuffs    NUTRITION: (Diet: DIET GENERAL;  Dietary Nutrition Supplements: Standard High Calorie Oral Supplement)    HOME MEDICATIONS RECONCILED: [] No  [x] Yes    INSULIN DRIP:   [x] No   [] Yes    CONSULTATION NEEDED:  [] No   [x] Yes    FAMILY UPDATED:    [] No   [x] Yes    TRANSFER OUT OF ICU:   [] No   [x] Yes    ADDITIONAL PLAN:    1. Pneumonia: will continue with broad spectrum antibiotics. We'll continue cefepime. Will obtain daily chest x-rays. 2. Sepsis: Elevated lactic acid of 7.1 which improved to 1.2 after receiving fluid bolus. Likely secondary to pneumonia seen on chest x-ray. Could also account for hypoxia. We'll continue with fluid resuscitation and vent management. 3. Leukocytosis: Likely secondary to pneumonia, possible demarginalization from seizure  4. Hyponatremia: 135 this morning  5. Elevated alk phos: improving; continue to trend  6. Seizure: [facility] consult neurology. Possibly secondary to hypoxia. Neurology recommending EEG and MRI w/wo contrast.  7. Anemia: Hb 6.8 this morning and was transfused with 1 unit of pRBC. Repeat Hb was 8.0. Follow up with heme/onc recommendations. 8. Patient is stable. Plan for transfer to stepdown. Follow up with imaging studies and labwork. Chadwick Will MD      Department of [place]         2/2/2018, 12:44 PM   Attending Physician Statement  I have discussed the care of Yuni Law, including pertinent history and exam findings,  with the resident. I have seen and examined the patient and the key elements of all parts of the encounter have been performed by me. I agree with the assessment, plan and orders as documented by the resident with additions . See my note    Treatment plan Discussed with nursing staff in detail , all questions answered . Cherri Caballero MD   2/2/2018   5:36 PM    Please note that this chart was generated using voice recognition Dragon dictation software.   Although every effort was made to ensure the accuracy of this automated transcription, some errors in transcription may have occurred.

## 2018-02-02 NOTE — CONSULTS
PALLIATIVE CARE TEAM    Patient: Gregory Law  Room: 0102/0102-01    Reason For Consult   Goals of care evaluation  Distress management  Symptom Management  Guidance and support  Facilitate communications  Assistance in coordinating care  Recommendations for the above    Impression: Gregory Law is a 50y.o. year old female with  has a past medical history of Allergic rhinitis; Anxiety; Asthma; Blood circulation, collateral; Celiac disease; Chronic back pain; Chronic kidney disease; Cirrhosis of liver with ascites ([address] Constipation; Cough; Cryptogenic organizing pneumonia ([address] Depression; Difficult intravenous access; Disease of blood and blood forming organ; Edentulous; Full dentures; Gastric outlet obstruction; GERD (gastroesophageal reflux disease); Hearing loss; History of blood transfusion; Hydronephrosis, bilateral; Hypertension; Hypoglycemia; Lung nodule; Migraine; Observed seizure-like activity ([address] OCD (obsessive compulsive disorder); Osteoarthritis; Osteoporosis; Pelvic mass; Perforated nasal septum; Peripheral neuropathy due to inflammation ([address] Pneumonia; Psoriasis; PTSD (post-traumatic stress disorder); Scoliosis; Shortness of breath; Substance abuse; Thrombocytosis ([address] Urinary incontinence; and Wears glasses. .  Currently hospitalized for the management of witnessed seizure, stretococcal sepsis and septic shock. The Palliative Care Team is following to assist with support.     Code Status  Prior     \"prior\" disussed with bedside RN and Critical care team who will address    Vital Signs:  /62   Pulse 96   Temp 98.4 °F (36.9 °C) (Oral)   Resp 16   Ht 5' 1\" (1.549 m)   Wt 105 lb 13.1 oz (48 kg)   LMP 04/18/2012   SpO2 100%   BMI 19.99 kg/m²     Patient Active Problem List   Diagnosis    Smoking greater than 40 pack years    Mass of lower lobe of left lung    Normocytic anemia    Thrombocytosis (HCC)    Protein-calorie malnutrition, severe (HCC)    Leukocytosis

## 2018-02-03 NOTE — PROGRESS NOTES
02/03/18   0601   AST  32*   --   16  12   ALT  16   --   11  9   BILITOT  0.85   --   0.46  0.53   ALKPHOS  758*  816*  581*  562*     INR:   Recent Labs      02/01/18   0456  02/02/18   0339  02/03/18   0601   INR  1.4  1.4  1.3     PTT:No results for input(s): PTT in the last 72 hours. Results for orders placed or performed during the hospital encounter of 01/31/18   Urine Culture   Result Value Ref Range    Specimen Description . CLEAN CATCH URINE     Special Requests NOT REPORTED     Culture NO SIGNIFICANT GROWTH     Culture       [address], [address] [place] [address] [phone]    Status FINAL 02/01/2018    Culture Blood #1   Result Value Ref Range    Specimen Description . BLOOD     Special Requests  RT LEG 6 ML     Culture NO GROWTH 3 DAYS     Culture       [address], [address] [place] [address] [phone]    Status Pending    Culture Blood #1   Result Value Ref Range    Specimen Description . BLOOD     Special Requests LT LEG     Culture NO GROWTH 3 DAYS     Culture       [address], [address] [place] [address] [phone]    Status Pending    MRSA DNA Probe, Nasal   Result Value Ref Range    Specimen Description . NASAL SWAB     MRSA, DNA, Nasal  NMRSAA     NEGATIVE:  MRSA DNA not detected by nucleic acid amplification. RAPID INFLUENZA A/B ANTIGENS   Result Value Ref Range    Specimen Description . NASOPHARYNGEAL SWAB     Special Requests NOT REPORTED     Direct Exam PRESUMPTIVE NEGATIVE for Influenza A + B antigens. Direct Exam       PCR testing to confirm this result is available upon request.  Specimen will be    Direct Exam        saved in the laboratory for 7 days. Please call [phone] if PCR testing is    Direct Exam  indicated. Direct Exam       [address], [address] [place] [address] [phone]    Status FINAL 02/01/2018    SPUTUM CULTURE   Result Value Ref Range    Specimen Description . INDUCED SPUTUM     Special Requests Sodium 129 (L) 135 - 144 mmol/L    Potassium 4.1 3.7 - 5.3 mmol/L    Chloride 91 (L) 98 - 107 mmol/L    CO2 16 (L) 20 - 31 mmol/L    Anion Gap 22 (H) 9 - 17 mmol/L    GFR Non-African American >60 >60 mL/min    GFR African American >60 >60 mL/min    GFR Comment          GFR Staging NOT REPORTED    APTT   Result Value Ref Range    PTT 28.1 21.3 - 31.3 sec   Protime-INR   Result Value Ref Range    Protime 14.6 (H) 9.4 - 12.6 sec    INR 1.4    Hepatic function panel   Result Value Ref Range    Alb 2.5 (L) 3.5 - 5.2 g/dL    Alkaline Phosphatase 1,023 (H) 35 - 104 U/L    ALT 21 5 - 33 U/L    AST 60 (H) <32 U/L    Total Bilirubin 0.70 0.3 - 1.2 mg/dL    Bilirubin, Direct 0.39 (H) <0.31 mg/dL    Bilirubin, Indirect 0.31 0.00 - 1.00 mg/dL    Total Protein 9.1 (H) 6.4 - 8.3 g/dL    Globulin NOT REPORTED 1.5 - 3.8 g/dL    Albumin/Globulin Ratio 0.4 (L) 1.0 - 2.5   Lipase   Result Value Ref Range    Lipase 42 13 - 60 U/L   Ethanol   Result Value Ref Range    Ethanol <10 <10 mg/dL    Ethanol percent <0.010 %   Acetaminophen level   Result Value Ref Range    Acetaminophen Level <10 (L) 10 - 30 ug/mL   Salicylate   Result Value Ref Range    Salicylate Lvl <1 (L) 3 - 10 mg/dL   AMMONIA   Result Value Ref Range    Ammonia 36 11 - 51 umol/L   URINALYSIS   Result Value Ref Range    Color, UA ORANGE (A) YEL    Turbidity UA TURBID (A) CLEAR    Glucose, Ur NEGATIVE NEG    Bilirubin Urine NEGATIVE NEG    Ketones, Urine NEGATIVE NEG    Specific Gravity, UA 1.016 1.005 - 1.030    Urine Hgb LARGE (A) NEG    pH, UA 5.0 5.0 - 8.0    Protein, UA 3+ (A) NEG    Urobilinogen, Urine Normal NORM    Nitrite, Urine NEGATIVE NEG    Leukocyte Esterase, Urine SMALL (A) NEG    Urinalysis Comments NOT REPORTED    Hemoglobin and hematocrit, blood   Result Value Ref Range    POC Hemoglobin 8.5 (L) 12.0 - 16.0 g/dL    POC Hematocrit 25 (L) 36 - 46 %   SODIUM (POC)   Result Value Ref Range    POC Sodium 134 (L) 138 - 146 mmol/L   POTASSIUM (POC)   Result Value Urine NOT REPORTED NEG    Tricyclic Antidepressants, Ur NOT REPORTED NEG    MDMA URINE NOT REPORTED NEG    Buprenorphine Urine NOT REPORTED NEG    Test Information       Assay provides medical screening only.   The absence of expected drug(s) and/or   TOX SCR, COMPLETE BL   Result Value Ref Range    THC Positive Cutoff 30 ng/mL    Cocaine Negative Cutoff 30 ng/mL    Opiates Negative Cutoff 30 ng/mL    Phencyclidine Negative Cutoff 15 ng/mL    Amphetamine Negative Cutoff 30 ng/mL    Barbiturates Negative Cutoff 75 ng/mL    Benzodiazepines Positive Cutoff 75 ng/mL    Methadone Negative Cutoff 40 ng/mL    Oxycodone Negative Cutoff 30 ng/mL    Methamphetamine Negative Cutoff 30 ng/mL    Buprenorphine Negative Cutoff 1 ng/mL    Drugs of Abuse Comment See Note     Salicylate Lvl <1 (L) 3 - 10 mg/dL    Acetaminophen Level <10 (L) 10 - 30 ug/mL    Toxic Tricyclic Sc,Blood NEGATIVE NEG    Ethanol <10 <10 mg/dL    Ethanol percent <0.010 %   TRANSFERRIN   Result Value Ref Range    Transferrin 102 (L) 200 - 360 mg/dL   ALKALINE PHOSPHATASE, ISOENZYMES   Result Value Ref Range    Alk Phosphatase 816 (H) 40 - 120 U/L    Alk Phos Liver Fract 710 (H) 0 - 94 U/L    Alk Phos,Bone Specific 106 (H) 0 - 55 U/L    Alk Phos Other Calc 0 U/L   Iron and TIBC   Result Value Ref Range    Iron 40 37 - 145 ug/dL    TIBC 180 (L) 250 - 450 ug/dL    Iron Saturation 22 20 - 55 %    UIBC 140 112 - 347 ug/dL   Ferritin   Result Value Ref Range    Ferritin 7,335 (H) 13 - 150 ug/L   Gamma GT   Result Value Ref Range     (H) 5 - 36 U/L   Triglyceride   Result Value Ref Range    Triglycerides 77 <150 mg/dL   CBC Auto Differential   Result Value Ref Range    WBC 24.8 (H) 3.5 - 11.3 k/uL    RBC 2.43 (L) 3.95 - 5.11 m/uL    Hemoglobin 6.8 (LL) 11.9 - 15.1 g/dL    Hematocrit 22.9 (L) 36.3 - 47.1 %    MCV 94.2 82.6 - 102.9 fL    MCH 28.0 25.2 - 33.5 pg    MCHC 29.7 28.4 - 34.8 g/dL    RDW 16.5 (H) 11.8 - 14.4 %    Platelets 658 (H) 168 - 453 k/uL    MPV %    Immature Granulocytes 1 (H) 0 %    Segs Absolute 15.69 (H) 1.50 - 8.10 k/uL    Absolute Lymph # 1.45 1.10 - 3.70 k/uL    Absolute Mono # 0.96 0.10 - 1.20 k/uL    Absolute Eos # 0.21 0.00 - 0.44 k/uL    Basophils # 0.07 0.00 - 0.20 k/uL    Absolute Immature Granulocyte 0.14 0.00 - 0.30 k/uL   Basic Metabolic Panel   Result Value Ref Range    Glucose 86 70 - 99 mg/dL    BUN 13 6 - 20 mg/dL    CREATININE 0.55 0.50 - 0.90 mg/dL    Bun/Cre Ratio NOT REPORTED 9 - 20    Calcium 8.8 8.6 - 10.4 mg/dL    Sodium 135 135 - 144 mmol/L    Potassium 3.3 (L) 3.7 - 5.3 mmol/L    Chloride 105 98 - 107 mmol/L    CO2 17 (L) 20 - 31 mmol/L    Anion Gap 13 9 - 17 mmol/L    GFR Non-African American >60 >60 mL/min    GFR African American >60 >60 mL/min    GFR Comment          GFR Staging NOT REPORTED    Hepatic Function Panel   Result Value Ref Range    Alb 2.2 (L) 3.5 - 5.2 g/dL    Alkaline Phosphatase 562 (H) 35 - 104 U/L    ALT 9 5 - 33 U/L    AST 12 <32 U/L    Total Bilirubin 0.53 0.3 - 1.2 mg/dL    Bilirubin, Direct 0.26 <0.31 mg/dL    Bilirubin, Indirect 0.27 0.00 - 1.00 mg/dL    Total Protein 7.3 6.4 - 8.3 g/dL    Globulin NOT REPORTED 1.5 - 3.8 g/dL    Albumin/Globulin Ratio 0.4 (L) 1.0 - 2.5   Protime-INR   Result Value Ref Range    Protime 13.7 (H) 9.4 - 12.6 sec    INR 1.3    Phosphorus   Result Value Ref Range    Phosphorus 1.7 (L) 2.6 - 4.5 mg/dL   PHENYTOIN LEVEL, TOTAL AND FREE   Result Value Ref Range    Phenytoin Lvl 11.7 10.0 - 20.0 ug/mL    Phenytoin Dose Amount NOT REPORTED     Phenytoin Date Last Dose NOT REPORTED     Phenytoin Dose Time NOT REPORTED     Phenytoin, Free 2.3 (H) 1.0 - 2.0 ug/mL   MAGNESIUM   Result Value Ref Range    Magnesium 1.6 1.6 - 2.6 mg/dL   Gamma GT   Result Value Ref Range     (H) 5 - 36 U/L   Haptoglobin   Result Value Ref Range    Haptoglobin 276 (H) 30 - 200 mg/dL   Reticulocytes   Result Value Ref Range    Retic % 0.9 0.5 - 2.0 %    Absolute Retic # 0.026 0.0245 - 0.098 M/uL Immature Retic Fract NOT REPORTED %    Retic Hemoglobin NOT REPORTED 28.2 - 35.7 pg   VITAMIN D 25 HYDROXY   Result Value Ref Range    Vit D, 25-Hydroxy 23.5 (L) 30.0 - 100.0 ng/mL   PTH, INTACT   Result Value Ref Range    Pth Intact 5.93 (L) 15.0 - 65.0 pg/mL   POCT troponin   Result Value Ref Range    POC Troponin I 0.00 0.00 - 0.10 ng/mL    POC Troponin Interp       The Troponin-I (POC) results cannot be compared to the Troponin-T results.    Venous Blood Gas, POC   Result Value Ref Range    pH, Wil 7.341 7.320 - 7.430    pCO2, Wil 29.6 (L) 41.0 - 51.0 mm Hg    pO2, Wil 37.3 30.0 - 50.0 mm Hg    HCO3, Venous 16.0 (L) 22.0 - 29.0 mmol/L    Total CO2, Venous 17 (L) 23.0 - 30.0 mmol/L    Negative Base Excess, Wil 9 (H) 0.0 - 2.0    Positive Base Excess, Wil NOT REPORTED 0.0 - 3.0    O2 Sat, Wil 69 60.0 - 85.0 %    O2 Device/Flow/% NOT REPORTED     Vince Test NOT REPORTED     Sample Site NOT REPORTED     Mode NOT REPORTED     FIO2 NOT REPORTED     Pt Temp NOT REPORTED     POC pH Temp NOT REPORTED     POC pCO2 Temp NOT REPORTED mm Hg    POC pO2 Temp NOT REPORTED mm Hg   Creatinine W/GFR Point of Care   Result Value Ref Range    POC Creatinine 0.42 (L) 0.51 - 1.19 mg/dL    GFR Comment >60 >60 mL/min    GFR Non-African American >60 >60 mL/min    GFR Comment         Lactic Acid, POC   Result Value Ref Range    POC Lactic Acid 6.67 (H) 0.56 - 1.39 mmol/L   POCT Glucose   Result Value Ref Range    POC Glucose 151 (H) 74 - 100 mg/dL   Anion Gap (Calc) POC   Result Value Ref Range    Anion Gap 15 7 - 16 mmol/L   Arterial Blood Gas, POC   Result Value Ref Range    POC pH 7.401 7.350 - 7.450    POC pCO2 27.9 (L) 35.0 - 48.0 mm Hg    POC PO2 134.1 (H) 83.0 - 108.0 mm Hg    POC HCO3 17.3 (L) 21.0 - 28.0 mmol/L    TCO2 (calc), Art 18 (L) 22.0 - 29.0 mmol/L    Negative Base Excess, Art 7 (H) 0.0 - 2.0    Positive Base Excess, Art NOT REPORTED 0.0 - 3.0    POC O2 SAT 99 (H) 94.0 - 98.0 %    O2 Device/Flow/% Adult Ventilator Vince Test POSITIVE     Sample Site Left Radial Artery     Mode NOT REPORTED     FIO2 40.0     Pt Temp NOT REPORTED     POC pH Temp NOT REPORTED     POC pCO2 Temp NOT REPORTED mm Hg    POC pO2 Temp NOT REPORTED mm Hg   Arterial Blood Gas, POC   Result Value Ref Range    POC pH 7.236 (L) 7.350 - 7.450    POC pCO2 49.4 (H) 35.0 - 48.0 mm Hg    POC PO2 368.3 (H) 83.0 - 108.0 mm Hg    POC HCO3 21.0 21.0 - 28.0 mmol/L    TCO2 (calc), Art 23 22.0 - 29.0 mmol/L    Negative Base Excess, Art 6 (H) 0.0 - 2.0    Positive Base Excess, Art NOT REPORTED 0.0 - 3.0    POC O2  (H) 94.0 - 98.0 %    O2 Device/Flow/% Adult Ventilator     Vince Test NOT REPORTED     Sample Site Left Brachial Artery     Mode NOT REPORTED     FIO2 100     Pt Temp NOT REPORTED     POC pH Temp NOT REPORTED     POC pCO2 Temp NOT REPORTED mm Hg    POC pO2 Temp NOT REPORTED mm Hg   Creatinine W/GFR Point of Care   Result Value Ref Range    POC Creatinine CANNOT BE CALCULATED 0.51 - 1.19 mg/dL    GFR Comment CANNOT BE CALCULATED >60 mL/min    GFR Non- CANNOT BE CALCULATED >60 mL/min    GFR Comment         Lactic Acid, POC   Result Value Ref Range    POC Lactic Acid 1.20 0.56 - 1.39 mmol/L   POCT Glucose   Result Value Ref Range    POC Glucose 123 (H) 74 - 100 mg/dL   Anion Gap (Calc) POC   Result Value Ref Range    Anion Gap CANNOT BE CALCULATED 7 - 16 mmol/L   POC Glucose Fingerstick   Result Value Ref Range    POC Glucose 59 (L) 65 - 105 mg/dL   POC Glucose Fingerstick   Result Value Ref Range    POC Glucose 93 65 - 105 mg/dL   POC Glucose Fingerstick   Result Value Ref Range    POC Glucose 120 (H) 65 - 105 mg/dL   POC Glucose Fingerstick   Result Value Ref Range    POC Glucose 97 65 - 105 mg/dL   EKG 12 lead   Result Value Ref Range    Ventricular Rate 111 BPM    Atrial Rate 111 BPM    P-R Interval 112 ms    QRS Duration 92 ms    Q-T Interval 326 ms    QTc Calculation (Bazett) 443 ms    P Axis 49 degrees    R Axis 47 degrees    T Axis of the visualized skull or soft tissues. No acute intracranial abnormality. Ct Chest Wo Contrast    Result Date: 1/8/2018  EXAMINATION: CT OF THE CHEST WITHOUT CONTRAST 1/8/2018 2:56 pm TECHNIQUE: CT of the chest was performed without the administration of intravenous contrast. Multiplanar reformatted images are provided for review. Dose modulation, iterative reconstruction, and/or weight based adjustment of the mA/kV was utilized to reduce the radiation dose to as low as reasonably achievable. COMPARISON: CT chest October 16, 2017. HISTORY: ORDERING SYSTEM PROVIDED HISTORY: Lung mass TECHNOLOGIST PROVIDED HISTORY: Reason for exam:->lung masses FINDINGS: Mediastinum: Visualized thyroid parenchyma appears unremarkable. Thoracic aorta demonstrates mild calcification without aneurysm. Pulmonary trunk appears nondilated. Heart appears at the upper limits normal in size without pericardial effusion. Scattered prominent to enlarged mediastinal lymph nodes are identified, largest measurable lymph node is seen within the precarinal region measuring 13 mm. No definite hilar or axillary lymphadenopathy. The esophagus appears grossly unremarkable. Lungs/pleura: Once again demonstrated is a subpleural mass involving the left lower lobe measuring 6.6 x 3.8 cm in greatest axial dimension which have intervally increased in size since the prior study. Scattered satellite nodules are identified throughout both lungs which appears to have progressed since the prior study in number as well as in size. Paraseptal/centrilobular emphysematous changes are noted. Trachea and distal airways appear patent. No pneumothorax or pleural effusion. Upper Abdomen: Pneumobilia is noted involving the left lobe of the liver. Stable hypodense lesion identified within the left lobe of the liver on series 2, image 72 measuring 14 mm in greatest axial dimension. Visualized adrenal glands appear unremarkable.  Soft Tissues/Bones:

## 2018-02-03 NOTE — PROGRESS NOTES
PULMONARY PROGRESS NOTE      Patient:  Amada Law  YOB: 1970    MRN: [id number]     Acct: [de-identified]     Admit date: 1/31/2018    REASON FOR CONSULT:- Sepsis due to pneumonia and asthma with history of cryptogenic organizing pneumonia    Pt seen and Chart reviewed. Shortness of breath is improving. Not much cough or sputum production. Appetite is improving. No fever or chills. No chest pain or pressure. No hemoptysis.   No orthopnea or PND    Subjective:   Review of Systems -   General ROS: Completed and except as mentioned above were negative   Psychological ROS:  Completed and except as mentioned above were negative  Allergy and Immunology ROS:  Completed and except as mentioned above were negative  Hematological and Lymphatic ROS:  Completed and except as mentioned above were negative  Respiratory ROS:  Completed and except as mentioned above were negative  Cardiovascular ROS:  Completed and except as mentioned above were negative  Gastrointestinal ROS: Completed and except as mentioned above were negative  Genito-Urinary ROS:  Completed and except as mentioned above were negative  Musculoskeletal ROS:  Completed and except as mentioned above were negative  Neurological ROS:  Completed and except as mentioned above were negative  Dermatological ROS:  Completed and except as mentioned above were negative      Diet:  DIET GENERAL;  Dietary Nutrition Supplements: Standard High Calorie Oral Supplement  Dietary Nutrition Supplements: Standard High Calorie Oral Supplement      Medications:Current Inpatient    Scheduled Meds:   potassium chloride  20 mEq Oral BID WC    cefepime  1 g Intravenous Q24H    sodium chloride flush  10 mL Intravenous BID    aspirin  81 mg Oral Daily    folic acid  1 mg Oral Daily    sodium chloride  250 mL Intravenous Once    famotidine  20 mg Oral BID    citalopram  40 mg Oral Daily  sodium chloride  250 mL Intravenous Once     Continuous Infusions:   dextrose      sodium chloride 10 mL/hr at 02/03/18 0945     PRN Meds:glucose, dextrose, glucagon (rDNA), dextrose, potassium chloride, fentanNYL **OR** fentanNYL, albuterol, LORazepam    Objective:      Physical Exam:  Vitals: /82   Pulse 93   Temp 98.4 °F (36.9 °C) (Oral)   Resp 29   Ht 5' 1\" (1.549 m)   Wt 105 lb 13.1 oz (48 kg)   LMP 04/18/2012   SpO2 95%   BMI 19.99 kg/m²   24 hour intake/output:  Intake/Output Summary (Last 24 hours) at 02/03/18 1714  Last data filed at 02/03/18 0546   Gross per 24 hour   Intake             1160 ml   Output                0 ml   Net             1160 ml     Last 3 weights: Wt Readings from Last 3 Encounters:   02/02/18 105 lb 13.1 oz (48 kg)   01/22/18 99 lb (44.9 kg)   01/19/18 98 lb 9.6 oz (44.7 kg)         Physical Examination:   PHYSICAL EXAMINATION:  Vitals:    02/03/18 0825 02/03/18 1143 02/03/18 1145 02/03/18 1454   BP: 124/69  118/72 138/82   Pulse: 91  95 93   Resp: 30  28 29   Temp: 98.9 °F (37.2 °C) 98.4 °F (36.9 °C)  98.4 °F (36.9 °C)   TempSrc: Oral Oral  Oral   SpO2: 96%  97% 95%   Weight:       Height:         Constitutional: This is a well developed, well nourished, 18.5-24.9 - Normal 50y.o. year old female who is alert, oriented, cooperative and in no apparent distress. Head:normocephalic and atraumatic. EENT:  THOMAS. No conjunctival injections. Septum was midline, mucosa was without erythema, exudates or cobblestoning. No thrush was noted. Mallampati  II (soft palate, uvula, fauces visible)  Neck: Supple without thyromegaly. No elevated JVP. Trachea was midline. Respiratory: Chest was symmetrical without dullness to percussion. Breath sounds bilaterally were clear to auscultation. There were no wheezes, rhonchi or rales. There is no intercostal retraction or use of accessory muscles. No egophony noted.    Cardiovascular: Regular without murmur, clicks, gallops or

## 2018-02-03 NOTE — PROGRESS NOTES
Taking? Authorizing Provider   sodium chloride (ALTAMIST SPRAY) 0.65 % nasal spray 1 spray by Nasal route as needed for Congestion 10/23/17   Gabriela Senior MD   Calcium Carbonate-Vitamin D (OYSTER SHELL CALCIUM/D) 500-200 MG-UNIT TABS Take 1 tablet by mouth daily Please discontinue previous prescription of calcium 10/23/17 10/23/18  Gabriela Senior MD   loratadine (CLARITIN) 10 MG tablet Take 1 tablet by mouth daily 10/23/17   Gabriela Senior MD   citalopram (CELEXA) 40 MG tablet Take 1 tablet by mouth daily 10/23/17   Gabriela Senior MD   aspirin (ASPIRIN LOW DOSE) 81 MG chewable tablet Take 1 tablet by mouth daily 10/23/17   Gabriela Senior MD   pantoprazole (PROTONIX) 40 MG tablet Take 1 tablet by mouth daily 10/23/17   Gabriela Senior MD   folic acid (FOLVITE) 1 MG tablet Take 1 tablet by mouth daily 10/23/17   Gabriela Senior MD   umeclidinium-vilanterol (ANORO ELLIPTA) 62.5-25 MCG/INH AEPB inhaler Inhale 1 puff into the lungs daily 10/20/17   Mone Shepard MD   VENTOLIN  (90 Base) MCG/ACT inhaler INHALE 2 PUFFS INTO THE LUNGS EVERY 6 HOURS AS NEEDED FOR WHEEZING 9/22/17   Mone Shepard MD   docusate sodium (DOCQLACE) 100 MG capsule TAKE 1 CAPSULE BY MOUTH 2 TIMES DAILY AS NEEDED FOR CONSTIPATION 8/15/17   Jeane Kearney MD   fluticasone Vidya Fragmin) 50 MCG/ACT nasal spray 1 spray by Nasal route daily 8/15/17   Jeane Kearney MD   ondansetron (ZOFRAN) 4 MG tablet Take 1 tablet by mouth every 8 hours as needed for Nausea 5/9/17   Hay Aleman MD   albuterol (PROVENTIL) (5 MG/ML) 0.5% nebulizer solution Take 0.5 mLs by nebulization every 6 hours as needed for Wheezing 1/23/17   Elsie Cheadle, MD       ALLERGIES      Latex; Bee venom; Benadryl [diphenhydramine-zinc acetate]; Tavist; and Tylenol [acetaminophen]    SOCIAL HISTORY       reports that she has been smoking Cigarettes. She has a 7.50 pack-year smoking history.  She has never used smokeless tobacco. She reports that she does not Calcium:  Recent Labs      02/03/18   0601   CALCIUM  8.8     S. Ionized Calcium:No results for input(s): IONCA in the last 72 hours. S. Magnesium:  Recent Labs      02/02/18   0339   MG  1.9     S. Phosphorus:  Recent Labs      02/03/18   0601   PHOS  1.7*     S. Glucose:  Recent Labs      02/02/18   0759  02/02/18   1826  02/03/18   0314   POCGLU  93  120*  97     Glycosylated hemoglobin A1C: No results for input(s): LABA1C in the last 72 hours. INR:   Recent Labs      02/03/18   0601   INR  1.3     Hepatic functions:   Recent Labs      02/03/18   0601   ALKPHOS  562*   ALT  9   AST  12   PROT  7.3   BILITOT  0.53   BILIDIR  0.26   LABALBU  2.2*     Pancreatic functions:No results for input(s): LACTA, AMYLASE in the last 72 hours. S. Lactic Acid: No results for input(s): LACTA in the last 72 hours. Cardiac enzymes:  Recent Labs      01/31/18   1622   TROPONINI  0.00     BNP:No results for input(s): BNP in the last 72 hours.   Lipid profile:   Recent Labs      02/01/18   0456   TRIG  77     Blood Gases: No results found for: PH, PCO2, PO2, HCO3, O2SAT  Thyroid functions:   Lab Results   Component Value Date    TSH 1.00 01/01/2018        Imaging/Diagonstics:      CXR:    ASSESSMENT     Patient Active Problem List   Diagnosis    Smoking greater than 40 pack years    Mass of lower lobe of left lung    Normocytic anemia    Thrombocytosis (HCC)    Protein-calorie malnutrition, severe (HCC)    Leukocytosis    Elevated alkaline phosphatase level    Liver mass    Non-intractable vomiting with nausea    Multiple lung nodules on CT    Hypoproliferative anemia (HCC)    Anxiety    Depression    Altered mental status    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

## 2018-02-03 NOTE — PROGRESS NOTES
DATE: 2/3/2018    NAME: Porter Law  MRN: [id number]   : 1970    Patient not seen this date for Physical Therapy due to:  [] Blood transfusion in progress  [] Hemodialysis  [] Patient Declined  [] Spine Precautions   [] Strict Bedrest  [] Surgery/ Procedure  [] Testing      [x] Other - Awaiting doppler results        [] PT being discontinued at this time. Patient independent. No further needs. [] PT being discontinued at this time as the patient has been transferred to palliative care. No further needs.     Carmen Rosario, PT, DPT

## 2018-02-03 NOTE — FLOWSHEET NOTE
Stopped to see pt while rounding on 4B. Pt was laying in bed in darkened room when  entered. The TV was on. Pt told  that she was here b/c of having seizures & that her Dilantin #s were too high. Pt told  that she has support from family. She accepted 's offer of prayer. Chaplains will remain available to offer spiritual and emotional support as needed. Rev. Silvia Bob,  [address]     02/02/18 2051   Encounter Summary   Services provided to: Patient   Referral/Consult From: [address] [place] [address] members   Place of [facility] Yes   Continue Visiting (2/2)   Complexity of Encounter Low   Length of Encounter 15 minutes   Routine   Type Initial   Assessment Calm; Approachable; Anxious; Coping   Intervention Sustaining presence/ Ministry of presence; Active listening;Explored feelings, thoughts, concerns;Explored coping resources; Discussed illness/injury and it's impact;Prayer  (left Spiritual Care info)   Outcome Receptive; Acceptance; Coping;Comfort;Expressed gratitude   Spiritual/Zoroastrianism   Type Spiritual support

## 2018-02-03 NOTE — PROGRESS NOTES
11/23/2016    Cryptogenic organizing pneumonia ([address] 11/23/2016    Depression 12/13/2012    on celexa per pcp    Difficult intravenous access     STATES LT ARM EASIER TO START IN, ENCOURAGED TO HYDRATE DAY PROIR     Disease of blood and blood forming organ     Thrombocytosis    Edentulous     does not wear her dentures    Full dentures     ENCOURAGED TO WEAR DOS    Gastric outlet obstruction 8/30/2017    GERD (gastroesophageal reflux disease)     Hearing loss     left ear    History of blood transfusion 01/2012    Hydronephrosis, bilateral 5/2/2015    Hypertension     Hypoglycemia 2014    Lung nodule     right, benign    Migraine     Observed seizure-like activity ([address] 12/31/2017    OCD (obsessive compulsive disorder)     Osteoarthritis     Osteoporosis     Pelvic mass April 2015    benign     Perforated nasal septum     Peripheral neuropathy due to inflammation ([address] 12/15/2017    Pneumonia     Psoriasis     PTSD (post-traumatic stress disorder) 1985    [facility]    Scoliosis     Shortness of breath 11/23/2016    with temp.  change    [facility]    Thrombocytosis ([address]     Urinary incontinence     PT STATES RESOLVED    Wears glasses        Past Surgical History:   Procedure Laterality Date    ABDOMEN SURGERY  09/2015    MASS REMOVED AND HERNIA REPAIR    CHOLECYSTECTOMY      CYSTOSCOPY  12/1/2015    bilat retrograde, right ureteroscopy    CYSTOSCOPY  05/11/2017    CYSTOSCOPY Right 5/11/2017    CYSTOSCOPY, RIGHT URETEROSCOPY, RIGHT RETROGRADE PYELOGRAM, RIGHT STENT PLACEMENT performed by Tj Duff MD at [address] [place] [address] Drive [facility]  5/16/2013    FIXATION KYPHOPLASTY  08/09/2016    T12, L-2, L-5    GASTRECTOMY  2012    Partial Gastrectomy    HERNIA REPAIR  07/18/2017    hernia repair with [facility]    LUNG BIOPSY      right upper lobe    PARACENTESIS Right 01/2015-09/2015    X 8    [place] CREATE EARDRUM OPENING,GEN ANESTH N/A 4/14/2017    LEFT MYRINGOTOMY T-TUBE movements or tremors   Reflex function Intact 2+ DTR and symmetric with no pathologic reflex or Babinski sign   Gait                  Not tested       DATA      Lab Results   Component Value Date    WBC 18.5 (H) 02/03/2018    HGB 8.0 (L) 02/03/2018    HCT 25.0 (L) 02/03/2018     (H) 02/03/2018    CHOL 83 01/07/2016    TRIG 77 02/01/2018    HDL 12 (L) 01/07/2016    ALT 9 02/03/2018    AST 12 02/03/2018     02/03/2018    K 3.3 (L) 02/03/2018     02/03/2018    CREATININE 0.55 02/03/2018    BUN 13 02/03/2018    CO2 17 (L) 02/03/2018    TSH 1.00 01/01/2018    INR 1.3 02/03/2018    LABA1C 5.2 01/07/2016 2/2/2018 08:24 2/2/2018 16:47 2/3/2018 06:01   Phenytoin Lvl 14.4 13.5 11.7   Phenytoin, Free 3.2 (HH) 2.9 (H) 2.3 (H)           CT HEAD (1/31/2018): No acute intracranial abnormality      MRI BRAIN (2/2/2018): No acute intracranial abnormality      EEG (2/1/2018): L anterior temporal spike wave discharges. Associated muscle artifacts. Findings may indicate cortical irritability                                IMPRESSION & PLAN: 50 y.o.  female admitted  Witnessed GTC seizure; got loaded with Fosphenytoin. Still supra-therapeutic Will repeat levels tomorrow AM    Seizure counseling done, including avoiding driving, swimming, heights, open darden/fire, operating heavy machinery for at least 6 months being seizure free on AEDs. Pt doesn't drive. Patient verbalized understading & is in agreement    Sepsis/pneumonia, electrolyte imbalance; self-extubated (2/1); is on ATB    Chronic anemia; chronic thrombocytosis neutrophilia;  Heme/Onc on board    Continue ASA 81 mg QD    Continue PT/OT    Comorbid conditions - HTN, substance abuse, CKD, cirrhosis, psoriasis, GERD, chronic back pain, scoliosis s/p T12, L2 & L5 kyphoplasty, asthma, gastric outlet obstruction s/p partial gastrectomy    Will follow

## 2018-02-03 NOTE — PROGRESS NOTES
IM Senior Note      Patient seen and examined at the bed siteSee details in the Intern note (Dr. Joseph Corrales                    )      Assessment:    Patient with AMS for possible seizure actinity, was hypoxic presentation got intubated for airway. Initial labs showed lactic acid of 7.1, White count of 33, sodium 129, Hb 6.4 received blood transfusion. CXR concerning for pneumonia Vanc and zosyn were given. Neurology following for seizures, CT head negative for acute abnormality. UDS positive for BZD's and opiates, loaded with fosphenytoin. MRI done which is unremarkable. EEG did not show epileptogenic focus. LFt's alk phos elevated in 500's  Fluids were given sepsis protocol. Her sputum cultures are positive for G + cocci in clusters being treated with Cefepime. She has chronic anemia received 6 U of prbc , reactive thrombocytopenia. Heme oncology was consulted for Anemia and thrombocytosis, last work was negative for GENNARO 2 mutation, BCR ABL and BM biopsy was non diagnostic. PS does not show dysplasia. She extubated herself two days ago.         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

## 2018-02-04 PROBLEM — N13.1 HYDRONEPHROSIS DUE TO URETERAL STRICTURE: Status: ACTIVE | Noted: 2018-01-01

## 2018-02-04 PROBLEM — E87.3 RESPIRATORY ALKALOSIS: Status: ACTIVE | Noted: 2018-01-01

## 2018-02-04 PROBLEM — E16.2 HYPOGLYCEMIA: Status: ACTIVE | Noted: 2018-01-01

## 2018-02-04 NOTE — CONSULTS
forming organ     Thrombocytosis    Edentulous     does not wear her dentures    Full dentures     ENCOURAGED TO WEAR DOS    Gastric outlet obstruction 8/30/2017    GERD (gastroesophageal reflux disease)     Hearing loss     left ear    History of blood transfusion 01/2012    Hydronephrosis, bilateral 5/2/2015    Hypertension     Hypoglycemia 2014    Lung nodule     right, benign    Migraine     Observed seizure-like activity ([address] 12/31/2017    OCD (obsessive compulsive disorder)     Osteoarthritis     Osteoporosis     Pelvic mass April 2015    benign     Perforated nasal septum     Peripheral neuropathy due to inflammation ([address] 12/15/2017    Pneumonia     Psoriasis     PTSD (post-traumatic stress disorder) 1985    [facility]    Scoliosis     Shortness of breath 11/23/2016    with temp.  change    [facility]    Thrombocytosis ([address]     Urinary incontinence     PT STATES RESOLVED    Wears glasses        Past Surgical History:    Past Surgical History:   Procedure Laterality Date    ABDOMEN SURGERY  09/2015    MASS REMOVED AND HERNIA REPAIR    CHOLECYSTECTOMY      CYSTOSCOPY  12/1/2015    bilat retrograde, right ureteroscopy    CYSTOSCOPY  05/11/2017    CYSTOSCOPY Right 5/11/2017    CYSTOSCOPY, RIGHT URETEROSCOPY, RIGHT RETROGRADE PYELOGRAM, RIGHT STENT PLACEMENT performed by Niya Moore MD at [address] [facility]  5/16/2013    FIXATION KYPHOPLASTY  08/09/2016    T12, L-2, L-5    GASTRECTOMY  2012    Partial Gastrectomy    HERNIA REPAIR  07/18/2017    [facility]ia [facility] with [facility]    LUNG BIOPSY      right upper lobe    PARACENTESIS Right 01/2015-09/2015    X 8    [place] CREATE EARDRUM OPENING,GEN ANESTH N/A 4/14/2017    LEFT MYRINGOTOMY T-TUBE INSERTION performed by Frederick Villatoro MD at [address] ESOPHAGOGASTRODUODENOSCOPY TRANSORAL DIAGNOSTIC N/A 8/30/2017    EGD ESOPHAGOGASTRODUODENOSCOPY performed by Joseph Cheng MD at [address] [place] [address] attempt to quit: 12/1/2012    Smokeless tobacco: Never Used      Comment: pt states 3 cigs per day    Alcohol use No      Comment: quit in 1987    Drug use: No      Comment: quit marijuana 1987    Sexual activity: No     Other Topics Concern    Not on file     Social History Narrative    No narrative on file       Family History:    Family History   Problem Relation Age of Onset    Heart Disease Mother     Stroke Father      cerebral aneurysm       REVIEW OF SYSTEMS:  Unobtainable due to patient altered mental status. Physical Exam:      This a 50 y.o. female   Vitals:    02/04/18 1000   BP: 112/89   Pulse: 113   Resp: 29   Temp: 98.1 °F (36.7 °C)   SpO2: 100%     Constitutional: Patient in no acute distress. Cachectic, edentulous  Neuro: Altered, but responding to external stimuli. Head: Atraumatic and normocephalic. Neck: Trachea midline. Ext: 2+ radial pulses bilaterally. Psych: Mood and affect normal.  Skin: No rashes or bruising present. Lungs: Respiratory effort normal.  Cardiovascular:  Regular rhythm. Abdomen: Soft, non-distended. Diffuse tenderness to mild palpation of anterior abdomen and bilateral flanks. No peritoneal signs. Bladder not distended. Lymphatics: no palpable lymphadenopathy  Pelvic exam: deferred. Rectal exam not indicated.     Labs:  Recent Labs      02/02/18 0339 02/02/18   0824 02/03/18   0601  02/04/18   0651   WBC  24.8*   --   18.5*  17.2*   HGB  6.8*  8.0*  8.0*  8.9*   HCT  22.9*  26.8*  25.0*  27.8*   MCV  94.2   --   87.4  86.9   PLT  812*   --   807*  See Reflexed IPF Result     Recent Labs      02/02/18   0339 02/02/18   0824  02/03/18   0601  02/04/18   0918   NA  135   --   135  131*   K  3.3*  4.4  3.3*  4.7   CL  106   --   105  100   CO2  15*   --   17*  17*   PHOS  3.5   --   1.7*  2.6   BUN  24*   --   13  8   CREATININE  0.76   --   0.55  0.47*       No results for input(s): COLORU, PHUR, LABCAST, WBCUA, RBCUA, MUCUS, TRICHOMONAS, YEAST, patchy increased density in the right mid lung. Remainder of the lungs appear well aerated. No pleural effusion. Supine technique limits evaluation for pneumothorax, but given this, no definite pneumothorax appreciated. Heart size appears within normal limits given technique and there is no overt pulmonary vascular congestion. There are calcifications of the aortic arch. Vertebroplasty changes identified. Surgical clips project over the left upper quadrant of the abdomen. Visualized osseous structures appear intact, and are otherwise grossly unremarkable, given the non dedicated imaging. 1. Expected positions of medical support devices. 2. Similar patchy increased density in the right mid lung is concerning for pneumonia, in the appropriate clinical setting. Continued radiographic follow-up to resolution is recommended. Xr Chest Portable    Result Date: 1/31/2018  EXAMINATION: SINGLE VIEW OF THE CHEST 1/31/2018 4:52 pm COMPARISON: December 31, 2017 HISTORY: ORDERING SYSTEM PROVIDED HISTORY: intubation TECHNOLOGIST PROVIDED HISTORY: Reason for exam:->intubation FINDINGS: The endotracheal tube is in appropriate position above the michael. The enteric tube terminates in the body of the stomach. Shallow lung inflation is noted. Interstitial opacities in the right lung are again demonstrated without appreciable change. There is no pneumothorax, edema or effusion. No acute osseous abnormalities identified. The patient is status post vertebroplasty in the lumbar spine. A pigtail stent is noted projecting in the right abdomen. 1.  The endotracheal tube is in appropriate position above the michael. The enteric tube terminates in the body of the stomach. 2.  No significant change in interstitial opacities in the right lung, which may represent persistent or recurrent infiltrate.        Assessment and Plan   Impression:    Patient Active Problem List   Diagnosis    Smoking greater than 40 pack years

## 2018-02-04 NOTE — PROGRESS NOTES
due to infectious organism ([facility]    Seizure ([facility]    Hypokalemia    Fatigue    Septic shock ([facility]    Witnessed seizure ([facility]    Streptococcal sepsis ([facility])       Allergies:  Latex; Bee venom; Benadryl [diphenhydramine-zinc acetate]; Tavist; and Tylenol [acetaminophen]     Temp max: 99.1    Recent Labs      02/03/18   0601  02/04/18   0918   BUN  13  8       Recent Labs      02/03/18   0601  02/04/18   0918   CREATININE  0.55  0.47*       Recent Labs      02/03/18   0601  02/04/18   0651   WBC  18.5*  17.2*         Intake/Output Summary (Last 24 hours) at 02/04/18 1101  Last data filed at 02/04/18 0548   Gross per 24 hour   Intake 1744 ml   Output 0 ml   Net 1744 ml       Culture Date      Source                       Results  2/1                       sputum                       MRSA    Ht Readings from Last 1 Encounters:   02/01/18 5' 1\" (1.549 m)        Wt Readings from Last 1 Encounters:   02/02/18 105 lb 13.1 oz (48 kg)         Body mass index is 19.99 kg/m². Estimated Creatinine Clearance: 109 mL/min (based on SCr of 0.47 mg/dL). Assessment/Plan:  Will initiate vancomycin 750 mg IV every 12 hours. Timing of trough level will be determined based on culture results, renal function, and clinical response. Thank you for the consult. Will continue to follow.     Laquita Vidal PharmD, BCPS 2/4/2018 11:05 AM

## 2018-02-04 NOTE — PROGRESS NOTES
Rapid response was called, patient was seizing, with mentation decreased, saturation dropped to 70, 1mg ativan was given, patient seizing activity stopped, she was on fosphenytoin, initial EEG was positive for Epileptic activity, discussed with neurology, loaded with keppra by Dr. Patricia Gaytan, recommend neuro ICU for close monitoring, no bed available in NICU, will shift patient to MICU. No intubation needed this time, as saturation improved after seizing activity stopped. Discussed with neurology    Mark Isidro MD  PGY-1, Internal Medicine resident  [facility].

## 2018-02-04 NOTE — PROGRESS NOTES
Was called to bedside for evaluation of patient. She had sudden onset diffuse body tremors with associated moaning. She was awake and alert upon my arrival. Per nursing staff, this episode was spontaneous with no precipitating event. She did not have any period of loss of consciousness. Patient was moaning throughout my examination, but was following commands. Patient was requesting ativan for her symptoms. She had no gross motor or sensory deficits. She was hemodynamically stable and had an oxygen saturation of greater than 95% on 2L of nasal cannula. Low suspicion of seizure as she was mentating appropriately throughout this episode. No need for urgent medical therapy. Will continue to monitor and reassess.     Sherry Pacheco, EM-2  2/4/18 4:51 PM

## 2018-02-04 NOTE — H&P
Critical Care - History and Physical Examination    Patient's name:  Porter Law  Medical Record Number: [id number]  Patient's account/billing number: [de-identified]  Patient's YOB: 1970  Age: 50 y.o. Date of Admission: 1/31/2018  4:11 PM  Reason of ICU admission:   Date of History and Physical Examination: 2/4/2018      Primary Care Physician: Mary Choi MD  Attending Physician:    Code Status: Prior    Chief complaint: AMS, jerking movements    Reason for ICU admission:  AMS, concern for seizure activity    History Of Present Illness:   History was obtained from chart review. Porter Law is a 50 y.o. female admitted 1/31/18 and she presented to the ED via EMS for altered mental status. EMS noted patient was hypoxic with oxygen saturation in the 80s and she was noted to have a brief seizure-like episode in the emergency department the last for about 30 seconds before resolving. Patient was intubated for airway protection in the emergency department. Chest x-ray showed evidence concerning for a right middle lung pneumonia. She is given 1 dose of vancomycin and Zosyn and azithromycin and then subsequently continued on cefepime. At the time patient's lactic acid was 7.1 subsequently resolved after IVF resuscitation. Patient's white blood count was elevated on admission at 33.5. Hemoglobin was 6.4 and she was transfused packed red blood cells. Patient has history of chronic anemia of chronic disease, no cytosis, gastric mass status post resection nonmalignant by pathology, ascites, pulmonary mass with no malignancy on pathology and benign ovarian mass as well. Follows with hematoma and was last seen by Dr. Radha Mccord on 1/22/18. She has had ascites in the past for which fluid was analyzed and showed no malignacy. TB testing was negative.  Patient has been referred to [facility] for further evaluation by both pulm and heme/onc and appointment is set for a couple weeks from now.    During this admission patient self extubated 2/1/18. She was also evaluated by neurology for witnessed generalized tonic clonic seizure activity in the ED. CTH and MRI brain were negative for acute abnormality. EEG done 2/1/18 showing L anterior temporal spike wave discharges, associated muscle artifacts. Patient was loaded with fosphenytoin and levels indicated supra-therapeutic so were being followed. US liver showed hydronephrosis of the R side and urology was subsequently consulted. This morning RR called for AMS and jerking movements. Patient was seen and was answering questions appropriately throughout episode of tremors noted in both upper and lower extremities bilaterally. Neurology NP present, keppra bolus and maintenance ordered. Plan to send patient to neuro ICU however due to lack of beds patient transferred to SICU. Patient maintaining saturations on NC and oriented to person and place, following all commands. Blood sugar on the floor noted to be 54 and treated accordingly. Repeat in unit 103.     Past Medical History:        Diagnosis Date    Allergic rhinitis     Anxiety     Asthma DX PRIOR TO 1995    NO INHALER USE ONLY HAS TROUBLE IN EXTREME HOT OR COLD WEATHER    Blood circulation, collateral     Celiac disease     possible    Chronic back pain     low back pain     Chronic kidney disease     Cirrhosis of liver with ascites ([facility] [address] 3/30/2015    Constipation     CHRONIC    Cough 11/23/2016    Cryptogenic organizing pneumonia ([facility] [address] 11/23/2016    Depression 12/13/2012    on celexa per pcp    Difficult intravenous access     STATES LT ARM EASIER TO START IN, ENCOURAGED TO HYDRATE DAY PROIR     Disease of blood and blood forming organ     Thrombocytosis    Edentulous     does not wear her dentures    Full dentures     ENCOURAGED TO WEAR DOS    Gastric outlet obstruction 8/30/2017    GERD (gastroesophageal reflux disease)     Hearing loss     left ear    History of blood transfusion 01/2012    Hydronephrosis, bilateral 5/2/2015    Hypertension     Hypoglycemia 2014    Lung nodule     right, benign    Migraine     Observed seizure-like activity ([address] 12/31/2017    OCD (obsessive compulsive disorder)     Osteoarthritis     Osteoporosis     Pelvic mass April 2015    benign     Perforated nasal septum     Peripheral neuropathy due to inflammation (HCC) 12/15/2017    Pneumonia     Psoriasis     PTSD (post-traumatic stress disorder) 1985    [place]ISON    Scoliosis     Shortness of breath 11/23/2016    with temp.  change    Substance abuse 1987    [facility]    Thrombocytosis ([address]     Urinary incontinence     PT STATES RESOLVED    Wears glasses        Past Surgical History:        Procedure Laterality Date    ABDOMEN SURGERY  09/2015    MASS REMOVED AND HERNIA REPAIR    CHOLECYSTECTOMY      CYSTOSCOPY  12/1/2015    bilat retrograde, right ureteroscopy    CYSTOSCOPY  05/11/2017    CYSTOSCOPY Right 5/11/2017    CYSTOSCOPY, RIGHT URETEROSCOPY, RIGHT RETROGRADE PYELOGRAM, RIGHT STENT PLACEMENT performed by Prachi Villalba MD at [address] ERCP  5/16/2013    FIXATION KYPHOPLASTY  08/09/2016    T12, L-2, L-5    GASTRECTOMY  2012    Partial Gastrectomy    HERNIA REPAIR  07/18/2017    hernia repair with [facility]    LUNG BIOPSY      right upper lobe    PARACENTESIS Right 01/2015-09/2015    X 8    HI CREATE EARDRUM OPENING,GEN ANESTH N/A 4/14/2017    LEFT MYRINGOTOMY T-TUBE INSERTION performed by Angela Henriquez MD at [address] ESOPHAGOGASTRODUODENOSCOPY TRANSORAL DIAGNOSTIC N/A 8/30/2017    EGD ESOPHAGOGASTRODUODENOSCOPY performed by Arsen Lai MD at [facility] N/A 4/14/2017    NASAL SEPTAL BUTTON INSERTION performed by Angela Henriquez MD at [address] [facility] N/A 7/18/2017    VENTRAL INCISIONAL HERNIA REPAIR X2  WITH MESH performed by Santos Jerome DO at [address] [facility] Hypokalemia    Fatigue    Septic shock (HCC)    Witnessed seizure (HCC)    Streptococcal sepsis (HCC)    Respiratory alkalosis    Hypoglycemia    Hydronephrosis due to ureteral stricture         Additional assessment:  Active Problems:    Mass of lower lobe of left lung    Thrombocytosis (HCC)    Protein-calorie malnutrition, severe (HCC)    Anemia, chronic disease    TIN (chronic hypoplastic anemia) (HCC)    Septic shock (HCC)    Witnessed seizure (HCC)    Streptococcal sepsis (HCC)    Respiratory alkalosis    Hypoglycemia    Hydronephrosis due to ureteral stricture      Plan:  · Treat hypoglycemia, IVF changed to D5 NS @ 75ml/hr  · Monitor mental status  · Neuro on board, continue keppra at maintenance dose  · EEG 2/1/8 reviewed, as above  · Seizure precautions  · MRI and CTH negative earlier  · Heme/onc following, recommending supportive care and follow up at [facility] as scheduled  · O2 support by NC, blood gas reviewed  · Vancomycin added for MRSA in sputum  · Blood cx x 2 resent, LA 0.8, BP stable  · UA ordered, will order cx if concerning for UTI  · Previous urine & blood cx negative for growth  · Urology consulted for hydronephrosis, CT abdomen ordered, st placed. · Monitor U/O  · Transfuse for Hb <7  · CXR reviewed, no significant change. · Avoid narcotics        Twin Schulz MD  Internal Medicine, PGY2    Attending Physician Statement  I have discussed the care of Verner Andrew A Samples, including pertinent history and exam findings with the resident. I have reviewed the key elements of all parts of the encounter with the resident. I have seen and examined the patient with the resident. I agree with the assessment and plan and status of the problem list as documented.   I seen the patient up to she arrived in ICU from the floor, I seen the labs chest x-ray, CT scan of the chest which was done earlier seen and have also seen the events since admission, neurology input noted and renal ultrasound results seen. She was brought into ICU because of possible seizure. Witnessed by neurology nurse practitioner and by nursing staff and medicine but patient was talking and arousable, she was started on IV Keppra. When I examined her she was arousable although she was lethargic she follows commands with all the extremities, she was mildly tachypneic but not in distress and no hypoxia on 2 L of nasal cannula. She seems very malnourished, she is afebrile at this time since admission to the hospital she's been on cefepime and she is responding and her white cell count which was initially greater than 30 has been down to 17, she was also hypoglycemic with a blood sugar of 56 and I'm not sure whether she had received any D50 on the floor and in ICU her blood sugar was 103 and 95, she did not have any hypotension when she is in ICU, her creatinine is normal and her bicarbonate is 17 and non-gap acidosis as it is present since admission without much change and lactic acid is 0.8 and AST ALT is normal.  She has history of bilateral lung masses especially left lower lobe mass with previous history of multiple biopsies also she has chronic right lung infiltrate without much change recently.   Her respiratory rate was sent on admission is growing MRSA, she has history of ureteral stent on the right side and history of hydronephrosis her ultrasound yesterday shows right-sided moderately severe hydronephrosis urology has been consulted and they have ordered CT scan of the abdomen today.     Addiitionally I recommend:  · Blood cultures and UA now  · Urology consulted and CT scan of the abdomen without contrast ordered for right-sided hydronephrosis  · Add vancomycin and continue Cefepime  · Follow up blood sugar  · Change IV fluids to D5 NS  · Follow up urine output  · Seizures precautions  · Continue IV Keppra and neurology follow up  · Avoid sedation and narcotics  · Monitor mentation and neuro status         Total critical care time caring for this patient with life threatening, unstable organ failure, including direct patient contact, management of life support systems, review of data including imaging and labs, discussions with other team members and physicians at least 48  Min so far today, excluding procedures.  Tasha Benito MD  2/4/2018 1:27 PM

## 2018-02-04 NOTE — PROGRESS NOTES
Writer walked into pt room and pt was actively seizing for about 2 minutes. 1 mg Ativan given. Pt became alert and was able to answer questions appropriately after seizure ceased. However, pt was attempting to pick non-existent items from the air. Dr. Ioana Howe notified and to bedside to evaluate pt. Neuro also notified.

## 2018-02-04 NOTE — PROGRESS NOTES
SPIRITUAL CARE DEPARTMENT - [facility] [address]  PROGRESS NOTE    Shift date: 02-  Shift day: Sunday   Shift # 1    Room # 2563/2867-81   Name: Sepideh Law            Age: 50 y.o. Gender: female          [place]: [place], [place], [address] [place] [employer]: (above)    Referral: RAPID RESPONSE TEAM    Admit Date & Time: 1/31/2018  4:11 PM    PATIENT/EVENT DESCRIPTION:  Sepideh Law is a 50 y.o. female who a Rapid response was called, as patient was seizing, with mentation decreased. Pt was moved to SICU. RN Nahid notified pt's Emergency Contact--Jazmyne. Family will visit this afternoon. SPIRITUAL ASSESSMENT/INTERVENTION:  Writer responded to code and no family were present; writer offered to contact family and RN requested to make that call. Prayers said near pt room as medical filled pt room prior to transport to SICU. SPIRITUAL CARE FOLLOW-UP PLAN:  Chaplains will remain available to offer spiritual and emotional support as needed.       Electronically signed by Chaplain Niraj, on 2/4/2018 at 12:31 PM.

## 2018-02-04 NOTE — PROGRESS NOTES
Attending Physician Statement  I have discussed the care of Ray Law, including pertinent history and exam findings with the resident. I have reviewed the key elements of all parts of the encounter with the resident. I have seen and examined the patient with the resident. I agree with the assessment and plan and status of the problem list as documented. I seen the patient up to she arrived in ICU from the floor, I seen the labs chest x-ray, CT scan of the chest which was done earlier seen and have also seen the events since admission, neurology input noted and renal ultrasound results seen. She was brought into ICU because of possible seizure. Witnessed by neurology nurse practitioner and by nursing staff and medicine but patient was talking and arousable, she was started on IV Keppra. When I examined her she was arousable although she was lethargic she follows commands with all the extremities, she was mildly tachypneic but not in distress and no hypoxia on 2 L of nasal cannula. She seems very malnourished, she is afebrile at this time since admission to the hospital she's been on cefepime and she is responding and her white cell count which was initially greater than 30 has been down to 17, she was also hypoglycemic with a blood sugar of 56 and I'm not sure whether she had received any D50 on the floor and in ICU her blood sugar was 103 and 95, she did not have any hypotension when she is in ICU, her creatinine is normal and her bicarbonate is 17 and non-gap acidosis as it is present since admission without much change and lactic acid is 0.8 and AST ALT is normal.  She has history of bilateral lung masses especially left lower lobe mass with previous history of multiple biopsies also she has chronic right lung infiltrate without much change recently.   Her respiratory rate was sent on admission is growing MRSA, she has history of ureteral stent on the right side and history of hydronephrosis her ultrasound yesterday shows right-sided moderately severe hydronephrosis urology has been consulted and they have ordered CT scan of the abdomen today. Addiitionally I recommend:  · Blood cultures and UA now  · Urology consulted and CT scan of the abdomen without contrast ordered for right-sided hydronephrosis  · Add vancomycin and continue Cefepime  · Follow up blood sugar  · Change IV fluids to D5 NS  · Follow up urine output  · Seizures precautions  · Continue IV Keppra and neurology follow up  · Avoid sedation and narcotics  · Monitor mentation and neuro status       Total critical care time caring for this patient with life threatening, unstable organ failure, including direct patient contact, management of life support systems, review of data including imaging and labs, discussions with other team members and physicians at least 48  Min so far today, excluding procedures.       Bessie Bray MD  2/4/2018 1:27 PM

## 2018-02-04 NOTE — PROGRESS NOTES
drink alcohol or use drugs. FAMILY HISTORY      family history includes Heart Disease in her mother; Stroke in her father. REVIEW OF SYSTEMS      Constitutional:  Malnourished ,  non-toxic appearance, alert oriented  HENT:   multiple bruises around eyeball due to fall, Normocephalic neck supple, no lymphadenopathy, sclera clear, normal EOMI   Respiratory:  No respiratory distress, normal breath sounds   Cardiovascular:  Normal rate, normal rhythm, no murmurs, no gallops, no rubs   GI:  Soft, nondistended, normal bowel sounds, nontender, no organomegaly   Musculoskeletal:  No edema, , no deformities. ,  Move all extremities spontaneously, bilateral cough pain and tenderness from yesterday       PHYSICAL EXAM      /81   Pulse 109   Temp 99.1 °F (37.3 °C) (Oral)   Resp 26   Ht 5' 1\" (1.549 m)   Wt 105 lb 13.1 oz (48 kg)   LMP 04/18/2012   SpO2 99%   BMI 19.99 kg/m²      General appearance: awake, alert, cooperative  HEENT: Multiple bruises bilateral around eyeball and facial bone fall normocephalic, neck supple, normal EOM, thyroid normal, no lymphadenopathy   Lungs: clear to auscultation bilaterally  Heart: regular rate and rhythm, S1, S2 normal, no murmur  Abdomen: soft, non-tender; bowel sounds normal; no masses,  no organomegaly  Extremities: No cyanosis or edema  Neurological:  Awake, alert, oriented to name, place and time. Cranial nerves II-XII are grossly intact. Reflexes normal and symmetric. Sensation grossly normal  Psych-normal affect      DIAGNOSTICS      Laboratory Testing:  CBC:   Recent Labs      02/03/18   0601   WBC  18.5*   HGB  8.0*   PLT  807*     BMP:    Recent Labs      02/02/18   0339   02/03/18   0601   NA  135   --   135   K  3.3*   < >  3.3*   CL  106   --   105   CO2  15*   --   17*   BUN  24*   --   13   CREATININE  0.76   --   0.55   GLUCOSE  54*   --   86    < > = values in this interval not displayed.      S. Calcium:  Recent Labs      02/03/18   0601   CALCIUM  8.8 obstruction, right    Osteoporosis    Sepsis ([address]    Anemia due to vitamin B12 deficiency    Pulmonary infiltrates on CXR    Incisional hernia    Acute sinusitis    Suprapubic pain    Anemia, chronic disease    Celiac disease    Cirrhosis of liver with ascites (HCC)    Gastroesophageal reflux disease without esophagitis    Essential hypertension    Hematuria    Acute cystitis with hematuria    Hypoalbuminemia due to protein-calorie malnutrition (HCC)    Hypokalemia    Hyponatremia    Aspiration pneumonia due to vomit (HCC)    PTSD (post-traumatic stress disorder)    Gastroenteritis    Gastric outlet obstruction    Iron deficiency anemia due to chronic blood loss    Elevated serum immunoglobulin free light chain level    Hypertension    Acute pure red cell anemia (HCC)    Bilateral leg pain    History of blood transfusion    TIN (chronic hypoplastic anemia) (HCC)    Peripheral neuropathy due to inflammation (HCC)    Pneumonia of right middle lobe due to infectious organism (HCC)    Seizure ([address]    Hypokalemia    Fatigue    Septic shock (HCC)    Witnessed seizure (HCC)    Streptococcal sepsis (HCC)       PLAN      Plan:  Metabolic encephalopathy     Daily CBC BMP, hemoglobin 8, creatinine 0.7 today  Hypokalemia today 3.3 we will replace, we will follow  Hyponatremia resolved, 135, we will follow  Alkaline phosphatase improved to 562 today, follow daily, ALT/AST normal  Input and output monitoring  Will discontinue fluids as far as patient starts taking oral food     Sepsis secondary to pneumonia   Sepsis resolved, patient is hemodynamically stable  Right middle lobe opacity on x-ray chest ,Blood Culture remained negative  We repeat chest x-ray in the morning.   Sputum culture showed Staphylococcus  Continue cephipime 1 g daily for total 5 doses, last dose 2/7/2018  Will discontinue fluids     Seizure-like activity  Continue fosphenytoin time 1000 mg every 4 hour, follow

## 2018-02-04 NOTE — PROGRESS NOTES
Pt moaning, RN to bedside. Pt was having entire body tremors but alert and oriented and able to follow commands. Pupils equal and reactive. Vitals remained stable during episode. Pt asked what helps her and she stated \"ativan\". RN reassured pt she is safe and provided emotional support. Tremors slowed. Lights were turned down and stimulation decreased. Dr Corina Vargas with critical care notified of episode. RN to continue to monitor.

## 2018-02-05 PROBLEM — Z96.0 RETAINED URETERAL STENT: Status: ACTIVE | Noted: 2018-01-01

## 2018-02-05 NOTE — SIGNIFICANT EVENT
Pt began to have seizure like activity, Vital signs remained stable throughout. RN reassured pt that she is safe. Pt verbal and following commands. RN administered 1mg Ativan. Will continue to monitor.

## 2018-02-05 NOTE — PRE SEDATION
Sedation Pre-Procedure Note    Patient Name: Carmen Law   YOB: 1970  Room/Bed: 0104/0104-01  Medical Record Number: [id number]  Date: 2/5/2018   Time: 9:04 AM       Indication:  Right hydronephrosis     Consent: I have discussed with the patient and/or the patient representative the indication, alternatives, and the possible risks and/or complications of the planned procedure and the anesthesia methods. The patient and/or patient representative appear to understand and agree to proceed. Vital Signs:   Vitals:    02/05/18 0847   BP: (!) 103/53   Pulse: 107   Resp: 24   SpO2: 100%       Past Medical History:   has a past medical history of Allergic rhinitis; Anxiety; Asthma; Blood circulation, collateral; Celiac disease; Chronic back pain; Chronic kidney disease; Cirrhosis of liver with ascites ([address] Constipation; Cough; Cryptogenic organizing pneumonia ([address] Depression; Difficult intravenous access; Disease of blood and blood forming organ; Edentulous; Full dentures; Gastric outlet obstruction; GERD (gastroesophageal reflux disease); Hearing loss; History of blood transfusion; Hydronephrosis, bilateral; Hypertension; Hypoglycemia; Lung nodule; Migraine; MRSA (methicillin resistant staph aureus) culture positive; Observed seizure-like activity ([address] OCD (obsessive compulsive disorder); Osteoarthritis; Osteoporosis; Pelvic mass; Perforated nasal septum; Peripheral neuropathy due to inflammation ([address] Pneumonia; Psoriasis; PTSD (post-traumatic stress disorder); Scoliosis; Shortness of breath; Substance abuse; Thrombocytosis ([address] Urinary incontinence; and Wears glasses. Past Surgical History:   has a past surgical history that includes Tubal ligation (1989); Tonsillectomy and adenoidectomy (1982); ERCP (5/16/2013); Upper gastrointestinal endoscopy; gastrectomy (2012); Paracentesis (Right, 01/2015-09/2015); Cystocopy (12/1/2015); Fixation Kyphoplasty (08/09/2016);  Lung biopsy; insert nasal

## 2018-02-05 NOTE — PROGRESS NOTES
compared to   the prior studies and most likely infectious in etiology. *Ground-glass opacity involving the right middle lobe and lingula, most   likely representing additional infectious process. *Right-sided double-J ureteral stent is identified with unchanged   hydronephrosis involving the right kidney.  There appears to be layering   calcification seen within the renal pelvis with additional 2 mm calcification   seen adjacent to the ureteral stent on series 2, image 113. *There appears to be a question of extravasated IV or oral contrast adjacent   to the distal aspect of the right ureteral stent best seen on series 2, image   115.  Contained ureteral injury cannot be excluded.  There appear to be   inflammatory changes within this region.  This was not clearly present on the   July 2017 study.  If further evaluation is needed, repeat CT with IV and oral   contrast is recommended. *Small right perinephric fluid collection, unchanged in size compared to the   prior study. *Left lobe pneumobilia.        ASSESSMENT:     Patient Active Problem List    Diagnosis Date Noted    Aspiration pneumonia of right upper lobe due to vomit ([address] 11/23/2016     Priority: High    Dyspnea and respiratory abnormalities 11/23/2016     Priority: High    Respiratory alkalosis 02/04/2018    Hypoglycemia 02/04/2018    Hydronephrosis due to ureteral stricture 93/06/4156    Metabolic acidosis, normal anion gap (NAG)     Witnessed seizure ([facility])     Streptococcal sepsis ([address]     Septic shock ([address] 01/31/2018    Pneumonia of right middle lobe due to infectious organism ([address] 12/31/2017    Seizure ([address] 12/31/2017    Hypokalemia 12/31/2017    Fatigue     Peripheral neuropathy due to inflammation ([facility]) 12/15/2017    TIN (chronic hypoplastic anemia) ([facility]) 11/25/2017    Acute pure red cell anemia ([facility]) 10/14/2017    Hypertension     Bilateral leg pain     Elevated serum immunoglobulin free light chain level 09/25/2017    Iron deficiency anemia due to chronic blood loss     Gastric outlet obstruction 08/30/2017    Gastroenteritis 08/29/2017    Aspiration pneumonia due to vomit ([address] 08/21/2017    Hematuria 08/17/2017    Acute cystitis with hematuria 08/17/2017    Hypoalbuminemia due to protein-calorie malnutrition (HCC) 08/17/2017    Hypokalemia 08/17/2017    Hyponatremia 08/17/2017    Celiac disease     Gastroesophageal reflux disease without esophagitis     Essential hypertension     Acute sinusitis 08/15/2017    Suprapubic pain 08/15/2017    Anemia, chronic disease 08/15/2017    Incisional hernia 07/18/2017    Pulmonary infiltrates on CXR     Anemia due to vitamin B12 deficiency     Sepsis ([address]     Osteoporosis 06/19/2017    Ureteropelvic junction (UPJ) obstruction, right 06/04/2017    COPD with acute exacerbation ([address]     Anxiety 05/27/2016    Depression 05/27/2016    Hypoproliferative anemia ([address] 01/14/2016    Multiple lung nodules on CT 01/13/2016    Liver mass     Non-intractable vomiting with nausea     Elevated alkaline phosphatase level     Leukocytosis 06/22/2015    Protein-calorie malnutrition, severe ([address] 05/19/2015    Thrombocytosis ([address] 03/30/2015    Cirrhosis of liver with ascites ([address] 03/30/2015    Mass of lower lobe of left lung 12/18/2014    Normocytic anemia 12/18/2014    AC globulin factor VII (stable) deficiency ([address] 02/14/2013    LA (lupus anticoagulant) disorder ([address] 02/14/2013    Altered mental status 01/12/2013    Smoking greater than 40 pack years 03/29/2012    History of blood transfusion 01/01/2012    PTSD (post-traumatic stress disorder) 01/01/1985          PLAN:     WEAN PER PROTOCOL:  [] No   [] Yes  [x] N/A    ICU PROPHYLAXIS:  Stress ulcer:  [] PPI Agent  [x] [id number] [] Sucralfate  [] Other:  VTE:   [] Enoxaparin  [] Unfract.  Heparin Subcut  [x] EPC Cuffs    NUTRITION:  [x] NPO [] Tube Feeding (Specify: ) [] TPN  [] PO    HOME MEDS RECONCILED: [] No  [x] Yes    CONSULTATION NEEDED:  [] No  [x] Yes    FAMILY UPDATED:    [x] No  [] Yes    TRANSFER OUT OF ICU:   [x] No  [] Yes        Additional Assessment:  Active Problems:    Mass of lower lobe of left lung    Thrombocytosis (HCC)    Protein-calorie malnutrition, severe (HCC)    Anemia, chronic disease    TIN (chronic hypoplastic anemia) (HCC)    Septic shock (HCC)    Witnessed seizure (HCC)    Streptococcal sepsis (HCC)    Respiratory alkalosis    Hypoglycemia    Hydronephrosis due to ureteral stricture    Metabolic acidosis, normal anion gap (NAG)      Plan:  · R nephrostomy placement by IR today per urology  · Remove st and start clear intermittent cath for volumes 250 or more per urology  · General surgery consult for pneumobilia  · Avoid sedation and narcotics  · Ativan only for seizure  · Continue keppra and await further neuro recs  · Continue ASA  · EPC cuffs for DVT  · Seizure precautions  · Continue vanco and cefipime, follow cx  · Monitor mentation  · IVF bolus as needed, BP stable currently  · D5 NS @ 75 ml/hr for hypoglycemia      Jc Doan MD  Internal Medicine, PGY2  Attending Physician Statement  I have discussed the care of Paula Law, including pertinent history and exam findings,  with the resident. I have seen and examined the patient and the key elements of all parts of the encounter have been performed by me. I agree with the assessment, plan and orders as documented by the resident with additions . Treatment plan Discussed with nursing staff in detail , all questions answered . Naina Cox MD   2/5/2018   5:46 PM    Please note that this chart was generated using voice recognition Dragon dictation software. Although every effort was made to ensure the accuracy of this automated transcription, some errors in transcription may have occurred.

## 2018-02-05 NOTE — CONSULTS
([address] 3/30/2015    Constipation     CHRONIC    Cough 11/23/2016    Cryptogenic organizing pneumonia ([address] 11/23/2016    Depression 12/13/2012    on celexa per pcp    Difficult intravenous access     STATES LT ARM EASIER TO START IN, ENCOURAGED TO HYDRATE DAY PROIR     Disease of blood and blood forming organ     Thrombocytosis    Edentulous     does not wear her dentures    Full dentures     ENCOURAGED TO WEAR DOS    Gastric outlet obstruction 8/30/2017    GERD (gastroesophageal reflux disease)     Hearing loss     left ear    History of blood transfusion 01/2012    Hydronephrosis, bilateral 5/2/2015    Hypertension     Hypoglycemia 2014    Lung nodule     right, benign    Migraine     MRSA (methicillin resistant staph aureus) culture positive 02/01/2018    sputum    Observed seizure-like activity ([address] 12/31/2017    OCD (obsessive compulsive disorder)     Osteoarthritis     Osteoporosis     Pelvic mass April 2015    benign     Perforated nasal septum     Peripheral neuropathy due to inflammation ([address] 12/15/2017    Pneumonia     Psoriasis     PTSD (post-traumatic stress disorder) 1985    [facility]    Scoliosis     Shortness of breath 11/23/2016    with temp.  change    [facility] 1987    [facility]    Thrombocytosis ([address]     Urinary incontinence     PT STATES RESOLVED    Wears glasses        Past Surgical History:        Procedure Laterality Date    ABDOMEN SURGERY  09/2015    MASS REMOVED AND HERNIA REPAIR    CHOLECYSTECTOMY      CYSTOSCOPY  12/1/2015    bilat retrograde, right ureteroscopy    CYSTOSCOPY  05/11/2017    CYSTOSCOPY Right 5/11/2017    CYSTOSCOPY, RIGHT URETEROSCOPY, RIGHT RETROGRADE PYELOGRAM, RIGHT STENT PLACEMENT performed by Alice Yoon MD at [address] [place] [address] Drive [facility]  5/16/2013    FIXATION KYPHOPLASTY  08/09/2016    T12, L-2, L-5    GASTRECTOMY  2012    Partial Gastrectomy    HERNIA REPAIR  07/18/2017    hernia repair with [facility]    LUNG BIOPSY midline   CARDIOVASCULAR: Regular rate and rhythm  LUNGS: No respiratory distress  ABDOMEN: Soft, non distended, mild TTP in LUQ. Midline incision noted and well healed. MUSCULOSKELETAL: Muscle strength intact in all extremities bilaterally. NEUROLOGIC: CN II-XII intact. Gross motor intact without focal weakness. SKIN: multiple small scabs throughout b/l UE and neck  Orientation:   person, place      CBC with Differential:    Lab Results   Component Value Date    WBC 15.9 02/05/2018    RBC 3.42 02/05/2018    HGB 9.6 02/05/2018    HCT 30.8 02/05/2018     02/05/2018    MCV 90.1 02/05/2018    MCH 28.1 02/05/2018    MCHC 31.2 02/05/2018    RDW 16.1 02/05/2018    NRBC 1 06/21/2015    METASPCT 1 01/10/2013    LYMPHOPCT 11 02/05/2018    MONOPCT 7 02/05/2018    MYELOPCT 1 08/09/2016    BASOPCT 0 02/05/2018    MONOSABS 1.11 02/05/2018    LYMPHSABS 1.75 02/05/2018    EOSABS 0.32 02/05/2018    BASOSABS 0.00 02/05/2018    DIFFTYPE NOT REPORTED 02/05/2018     BMP:    Lab Results   Component Value Date     02/05/2018    K 4.4 02/05/2018     02/05/2018    CO2 22 02/05/2018    BUN 9 02/05/2018    LABALBU 1.9 02/05/2018    LABALBU Detected 09/09/2017    CREATININE 0.53 02/05/2018    CALCIUM 9.3 02/05/2018    GFRAA >60 02/05/2018    LABGLOM >60 02/05/2018    GLUCOSE 86 02/05/2018    GLUCOSE 79 03/29/2012       Pertinent Radiology:           ASSESSMENT:  Active Problems:    Mass of lower lobe of left lung    Thrombocytosis (HCC)    Protein-calorie malnutrition, severe (HCC)    Anemia, chronic disease    TIN (chronic hypoplastic anemia) (HCC)    Septic shock (HCC)    Witnessed seizure (HCC)    Streptococcal sepsis (HCC)    Respiratory alkalosis    Hypoglycemia    Hydronephrosis due to ureteral stricture    Metabolic acidosis, normal anion gap (NAG)      Plan:  1. Continue medical mgmt and supportive care per primary  2. Diet: Ok to eat from surgery standpoint  3.  Incidental finding of pneumobilia on CT scan - No evidence of acute surgical abdomen. Much time was spent reviewing patient's chart and pneumobilia has been noted on several previous CT abdominal exams, with earliest one noted to be in Aug 2015. Patient has no complaints of RUQ pain. There are no surgical recommendations. The pneumobilia could be due to ERCP with sphincterotomy in the past - causing incompetent sphincter of oddi. 4. Patient has many other medical problems including hydronephrosis of R kidney and seizure work up with neurology. 5. General surgery to sign off at this time. Please call if any further questions or concerns.       Electronically signed by Amber Sims DO  on 2/5/2018 at 11:01 AM

## 2018-02-05 NOTE — PROGRESS NOTES
[facility] [address]  Occupational Therapy Not Seen Note    Patient not available for Occupational Therapy due to:    [] Testing:    [] Hemodialysis    [] Blood Transfusion in Progress    []Refusal by Patient:    [x] Surgery/Procedure: Pt pending right nephrostomy tube placement this date    [] Strict Bedrest    [] Sedation    [] Spine Precautions     [] Pt being transferred to palliative care at this time. Spoke with pt/family and OT services to be defered. [] Pt independent with functional mobility and functional tasks.  Pt with no OT acute care needs at this time, will defer OT eval.    Next Scheduled Treatment: Will check back 2/6/2018    Signature: ANKUR Chavarria/RAFAELA

## 2018-02-05 NOTE — PROGRESS NOTES
CELLS/LPF     Direct Exam FEW BUDDING YEAST CELLS SEEN (A)     Direct Exam FEW GRAM POSITIVE COCCI IN CLUSTERS (A)     Culture (A)      METHICILLIN RESISTANT STAPHYLOCOCCUS AUREUS LIGHT GROWTH    Culture NORMAL RESPIRATORY UNA LIGHT GROWTH     Culture       [address] [phone]    Status FINAL 02/04/2018     Organism MRSA        Susceptibility    Methicillin resistant staphylococcus aureus - [place]     penicillin >=0.5 RESISTANT Resistant      cefoxitin screen NOT REPORTED       ciprofloxacin NOT REPORTED       clindamycin <=0.25 SUSCEPTIBLE Sensitive      erythromycin <=0.25 SUSCEPTIBLE Sensitive      gentamicin <=0.5 SUSCEPTIBLE Sensitive      Induced Clind Resist NOT REPORTED       levofloxacin 4 INTERMEDIATE Intermediate      linezolid NOT REPORTED       moxifloxacin NOT REPORTED       nitrofurantoin NOT REPORTED       oxacillin >=4 RESISTANT Resistant      Synercid NOT REPORTED       rifampin NOT REPORTED       tetracycline <=1 SUSCEPTIBLE Sensitive      tigecycline NOT REPORTED       trimethoprim-sulfamethoxazole <=10 SUSCEPTIBLE Sensitive      vancomycin 1 SUSCEPTIBLE Sensitive    Urine culture clean catch   Result Value Ref Range    Specimen Description . [facility] URINE     Special Requests NOT REPORTED     Culture NO GROWTH     Culture       [address] [phone]    Status FINAL 02/02/2018    CULTURE BLOOD #1   Result Value Ref Range    Specimen Description . BLOOD     Special Requests LEFT HAND 2     Culture NO GROWTH 21 HOURS     Culture       [address] [phone]    Status Pending    Culture blood #2   Result Value Ref Range    Specimen Description . BLOOD     Special Requests RIGHT HAND 2     Culture NO GROWTH 21 HOURS     Culture       [address] [phone]    Status Pending    CBC auto differential   Result Value Ref Range GFR Staging NOT REPORTED    Hepatic Function Panel   Result Value Ref Range    Alb 1.7 (L) 3.5 - 5.2 g/dL    Alkaline Phosphatase 758 (H) 35 - 104 U/L    ALT 16 5 - 33 U/L    AST 32 (H) <32 U/L    Total Bilirubin 0.85 0.3 - 1.2 mg/dL    Bilirubin, Direct 0.19 <0.31 mg/dL    Bilirubin, Indirect 0.66 0.00 - 1.00 mg/dL    Total Protein 7.7 6.4 - 8.3 g/dL    Globulin NOT REPORTED 1.5 - 3.8 g/dL    Albumin/Globulin Ratio 0.3 (L) 1.0 - 2.5   Lipase   Result Value Ref Range    Lipase 26 13 - 60 U/L   Protime-INR   Result Value Ref Range    Protime 14.7 (H) 9.4 - 12.6 sec    INR 1.4    HEMOGLOBIN AND HEMATOCRIT, BLOOD   Result Value Ref Range    Hemoglobin 7.1 (L) 11.9 - 15.1 g/dL    Hematocrit 21.6 (L) 36.3 - 47.1 %   UA W/REFLEX CULTURE   Result Value Ref Range    Color, UA DARK YELLOW (A) YEL    Turbidity UA TURBID (A) CLEAR    Glucose, Ur NEGATIVE NEG    Bilirubin Urine NEGATIVE NEG    Ketones, Urine NEGATIVE NEG    Specific Gravity, UA 1.025 1.005 - 1.030    Urine Hgb LARGE (A) NEG    pH, UA 5.0 5.0 - 8.0    Protein, UA 2+ (A) NEG    Urobilinogen, Urine Normal NORM    Nitrite, Urine NEGATIVE NEG    Leukocyte Esterase, Urine SMALL (A) NEG    Urinalysis Comments NOT REPORTED    Microscopic Urinalysis   Result Value Ref Range    -          WBC, UA 5 TO 10 0 - 5 /HPF    RBC, UA 50  0 - 4 /HPF    Casts UA NOT REPORTED 0 - 8 /LPF    Crystals UA NOT REPORTED NONE /HPF    Epithelial Cells UA 2 TO 5 0 - 5 /HPF    Renal Epithelial, Urine NOT REPORTED 0 /HPF    Bacteria, UA FEW (A) NONE    Mucus, UA NOT REPORTED NONE    Trichomonas, UA NOT REPORTED NONE    Amorphous, UA NOT REPORTED NONE    Other Observations UA NOT REPORTED NREQ    Yeast, UA NOT REPORTED NONE   HEMOGLOBIN AND HEMATOCRIT, BLOOD   Result Value Ref Range    Hemoglobin 7.8 (L) 11.9 - 15.1 g/dL    Hematocrit 26.1 (L) 36.3 - 47.1 %   OCCULT BLOOD SCREEN   Result Value Ref Range    Occult Blood, Stool #1 NEGATIVE NEG    Date, Stool #1 20,218     Time, Stool #1 UNKNOWN     Occult Blood, Stool #2 NOT REPORTED NEG    Date, Stool #2 NOT REPORTED     Time, Stool #2 NOT REPORTED     Occult Blood, Stool #3 NOT REPORTED NEG    Date, Stool #3 NOT REPORTED     Time, Stool #3 NOT REPORTED    Hemoglobin and hematocrit, blood   Result Value Ref Range    POC Hemoglobin CANNOT BE CALCULATED 12.0 - 16.0 g/dL    POC Hematocrit CANNOT BE CALCULATED 36 - 46 %   SODIUM (POC)   Result Value Ref Range    POC Sodium 129 (L) 138 - 146 mmol/L   POTASSIUM (POC)   Result Value Ref Range    POC Potassium Result not available. 3.5 - 4.5 mmol/L   CHLORIDE (POC)   Result Value Ref Range    POC Chloride 111 (H) 98 - 107 mmol/L   CALCIUM, IONIC (POC)   Result Value Ref Range    POC Ionized Calcium Result not available. 1.15 - 1.33 mmol/L   DRUG SCREEN MULTI URINE   Result Value Ref Range    Amphetamine Screen, Ur NEGATIVE NEG    Barbiturate Screen, Ur NEGATIVE NEG    Benzodiazepine Screen, Urine POSITIVE (A) NEG    Cocaine Metabolite, Urine NEGATIVE NEG    Methadone Screen, Urine NEGATIVE NEG    Opiates, Urine NEGATIVE NEG    Phencyclidine, Urine NEGATIVE NEG    Propoxyphene, Urine NOT REPORTED NEG    Cannabinoid Scrn, Ur NEGATIVE NEG    Oxycodone Screen, Ur POSITIVE (A) NEG    Methamphetamine, Urine NOT REPORTED NEG    Tricyclic Antidepressants, Ur NOT REPORTED NEG    MDMA URINE NOT REPORTED NEG    Buprenorphine Urine NOT REPORTED NEG    Test Information       Assay provides medical screening only.   The absence of expected drug(s) and/or   TOX SCR, COMPLETE BL   Result Value Ref Range    THC Positive Cutoff 30 ng/mL    Cocaine Negative Cutoff 30 ng/mL    Opiates Negative Cutoff 30 ng/mL    Phencyclidine Negative Cutoff 15 ng/mL    Amphetamine Negative Cutoff 30 ng/mL    Barbiturates Negative Cutoff 75 ng/mL    Benzodiazepines Positive Cutoff 75 ng/mL    Methadone Negative Cutoff 40 ng/mL    Oxycodone Negative Cutoff 30 ng/mL    Methamphetamine Negative Cutoff 30 ng/mL    Buprenorphine Negative Cutoff Ratio NOT REPORTED 9 - 20    Calcium 8.8 8.6 - 10.4 mg/dL    Sodium 135 135 - 144 mmol/L    Potassium 3.3 (L) 3.7 - 5.3 mmol/L    Chloride 105 98 - 107 mmol/L    CO2 17 (L) 20 - 31 mmol/L    Anion Gap 13 9 - 17 mmol/L    GFR Non-African American >60 >60 mL/min    GFR African American >60 >60 mL/min    GFR Comment          GFR Staging NOT REPORTED    Hepatic Function Panel   Result Value Ref Range    Alb 2.2 (L) 3.5 - 5.2 g/dL    Alkaline Phosphatase 562 (H) 35 - 104 U/L    ALT 9 5 - 33 U/L    AST 12 <32 U/L    Total Bilirubin 0.53 0.3 - 1.2 mg/dL    Bilirubin, Direct 0.26 <0.31 mg/dL    Bilirubin, Indirect 0.27 0.00 - 1.00 mg/dL    Total Protein 7.3 6.4 - 8.3 g/dL    Globulin NOT REPORTED 1.5 - 3.8 g/dL    Albumin/Globulin Ratio 0.4 (L) 1.0 - 2.5   Protime-INR   Result Value Ref Range    Protime 13.7 (H) 9.4 - 12.6 sec    INR 1.3    Phosphorus   Result Value Ref Range    Phosphorus 1.7 (L) 2.6 - 4.5 mg/dL   PHENYTOIN LEVEL, TOTAL AND FREE   Result Value Ref Range    Phenytoin Lvl 11.7 10.0 - 20.0 ug/mL    Phenytoin Dose Amount NOT REPORTED     Phenytoin Date Last Dose NOT REPORTED     Phenytoin Dose Time NOT REPORTED     Phenytoin, Free 2.3 (H) 1.0 - 2.0 ug/mL   MAGNESIUM   Result Value Ref Range    Magnesium 1.6 1.6 - 2.6 mg/dL   Gamma GT   Result Value Ref Range     (H) 5 - 36 U/L   Haptoglobin   Result Value Ref Range    Haptoglobin 276 (H) 30 - 200 mg/dL   Reticulocytes   Result Value Ref Range    Retic % 0.9 0.5 - 2.0 %    Absolute Retic # 0.026 0.0245 - 0.098 M/uL    Immature Retic Fract NOT REPORTED %    Retic Hemoglobin NOT REPORTED 28.2 - 35.7 pg   VITAMIN D 25 HYDROXY   Result Value Ref Range    Vit D, 25-Hydroxy 23.5 (L) 30.0 - 100.0 ng/mL   PTH, INTACT   Result Value Ref Range    Pth Intact 5.93 (L) 15.0 - 65.0 pg/mL   CBC Auto Differential   Result Value Ref Range    WBC 17.2 (H) 3.5 - 11.3 k/uL    RBC 3.20 (L) 3.95 - 5.11 m/uL    Hemoglobin 8.9 (L) 11.9 - 15.1 g/dL    Hematocrit 27.8 (L) 36.3 - ALT 8 5 - 33 U/L    AST 15 <32 U/L    Total Bilirubin 0.57 0.3 - 1.2 mg/dL    Bilirubin, Direct <0.08 <0.31 mg/dL    Bilirubin, Indirect CANNOT BE CALCULATED 0.00 - 1.00 mg/dL    Total Protein 7.3 6.4 - 8.3 g/dL    Globulin NOT REPORTED 1.5 - 3.8 g/dL    Albumin/Globulin Ratio 0.4 (L) 1.0 - 2.5   Phosphorus   Result Value Ref Range    Phosphorus 2.6 2.6 - 4.5 mg/dL   Troponin   Result Value Ref Range    Troponin T <0.03 <0.03 ng/mL    Troponin Interp         LACTIC ACID, WHOLE BLOOD   Result Value Ref Range    Lactic Acid, Whole Blood 0.8 0.7 - 2.1 mmol/L   MIGUELINA Screen with Reflex   Result Value Ref Range    MIGUELINA NEGATIVE NEG   URINALYSIS   Result Value Ref Range    Color, UA LIGHT YELLOW YEL    Turbidity UA CLEAR CLEAR    Glucose, Ur NEGATIVE NEG    Bilirubin Urine NEGATIVE NEG    Ketones, Urine NEGATIVE NEG    Specific Gravity, UA 1.008 1.005 - 1.030    Urine Hgb SMALL (A) NEG    pH, UA 6.5 5.0 - 8.0    Protein, UA TRACE (A) NEG    Urobilinogen, Urine Normal NORM    Nitrite, Urine NEGATIVE NEG    Leukocyte Esterase, Urine SMALL (A) NEG    Urinalysis Comments NOT REPORTED    Microscopic Urinalysis   Result Value Ref Range    -          WBC, UA 0 TO 2 0 - 5 /HPF    RBC, UA 0 TO 2 0 - 2 /HPF    Casts UA NOT REPORTED 0 - 2 /LPF    Crystals UA NOT REPORTED NONE /HPF    Epithelial Cells UA None 0 - 5 /HPF    Renal Epithelial, Urine NOT REPORTED 0 /HPF    Bacteria, UA NOT REPORTED NONE    Mucus, UA 1+ (A) NONE    Trichomonas, UA NOT REPORTED NONE    Amorphous, UA NOT REPORTED NONE    Other Observations UA NOT REPORTED NREQ    Yeast, UA FEW (A) NONE   Basic Metabolic Panel   Result Value Ref Range    Glucose 86 70 - 99 mg/dL    BUN 9 6 - 20 mg/dL    CREATININE 0.53 0.50 - 0.90 mg/dL    Bun/Cre Ratio NOT REPORTED 9 - 20    Calcium 9.3 8.6 - 10.4 mg/dL    Sodium 132 (L) 135 - 144 mmol/L    Potassium 4.4 3.7 - 5.3 mmol/L    Chloride 100 98 - 107 mmol/L    CO2 22 20 - 31 mmol/L    Anion Gap 10 9 - 17 mmol/L    GFR Base Excess, Art NOT REPORTED 0.0 - 3.0    POC O2  (H) 94.0 - 98.0 %    O2 Device/Flow/% Adult Ventilator     Vince Test NOT REPORTED     Sample Site Left Brachial Artery     Mode NOT REPORTED     FIO2 100     Pt Temp NOT REPORTED     POC pH Temp NOT REPORTED     POC pCO2 Temp NOT REPORTED mm Hg    POC pO2 Temp NOT REPORTED mm Hg   Creatinine W/GFR Point of Care   Result Value Ref Range    POC Creatinine CANNOT BE CALCULATED 0.51 - 1.19 mg/dL    GFR Comment CANNOT BE CALCULATED >60 mL/min    GFR Non- CANNOT BE CALCULATED >60 mL/min    GFR Comment         Lactic Acid, POC   Result Value Ref Range    POC Lactic Acid 1.20 0.56 - 1.39 mmol/L   POCT Glucose   Result Value Ref Range    POC Glucose 123 (H) 74 - 100 mg/dL   Anion Gap (Calc) POC   Result Value Ref Range    Anion Gap CANNOT BE CALCULATED 7 - 16 mmol/L   POC Glucose Fingerstick   Result Value Ref Range    POC Glucose 59 (L) 65 - 105 mg/dL   POC Glucose Fingerstick   Result Value Ref Range    POC Glucose 93 65 - 105 mg/dL   POC Glucose Fingerstick   Result Value Ref Range    POC Glucose 120 (H) 65 - 105 mg/dL   POC Glucose Fingerstick   Result Value Ref Range    POC Glucose 97 65 - 105 mg/dL   POC Glucose Fingerstick   Result Value Ref Range    POC Glucose 74 65 - 105 mg/dL   POC Glucose Fingerstick   Result Value Ref Range    POC Glucose 103 65 - 105 mg/dL   Arterial Blood Gas, POC   Result Value Ref Range    POC pH 7.437 7.350 - 7.450    POC pCO2 28.8 (L) 35.0 - 48.0 mm Hg    POC PO2 94.1 83.0 - 108.0 mm Hg    POC HCO3 19.5 (L) 21.0 - 28.0 mmol/L    TCO2 (calc), Art 20 (L) 22.0 - 29.0 mmol/L    Negative Base Excess, Art 4 (H) 0.0 - 2.0    Positive Base Excess, Art NOT REPORTED 0.0 - 3.0    POC O2 SAT 98 94.0 - 98.0 %    O2 Device/Flow/% Cannula     Vince Test NOT REPORTED     Sample Site NOT REPORTED     Mode NOT REPORTED     FIO2 NOT REPORTED     Pt Temp NOT REPORTED     POC pH Temp NOT REPORTED     POC pCO2 Temp NOT REPORTED mm Hg POC pO2 Temp NOT REPORTED mm Hg   POC Glucose Fingerstick   Result Value Ref Range    POC Glucose 95 65 - 105 mg/dL   POC Glucose Fingerstick   Result Value Ref Range    POC Glucose 77 65 - 105 mg/dL   POC Glucose Fingerstick   Result Value Ref Range    POC Glucose 96 65 - 105 mg/dL   POC Glucose Fingerstick   Result Value Ref Range    POC Glucose 83 65 - 105 mg/dL   EKG 12 lead   Result Value Ref Range    Ventricular Rate 111 BPM    Atrial Rate 111 BPM    P-R Interval 112 ms    QRS Duration 92 ms    Q-T Interval 326 ms    QTc Calculation (Bazett) 443 ms    P Axis 49 degrees    R Axis 47 degrees    T Axis -44 degrees   EKG 12 Lead   Result Value Ref Range    Ventricular Rate 135 BPM    Atrial Rate 135 BPM    P-R Interval 120 ms    QRS Duration 80 ms    Q-T Interval 288 ms    QTc Calculation (Bazett) 432 ms    P Axis 64 degrees    R Axis 54 degrees    T Axis 81 degrees   EKG 12 Lead   Result Value Ref Range    Ventricular Rate 112 BPM    Atrial Rate 112 BPM    P-R Interval 118 ms    QRS Duration 74 ms    Q-T Interval 326 ms    QTc Calculation (Bazett) 444 ms    P Axis 35 degrees    R Axis 11 degrees    T Axis 57 degrees   TYPE AND SCREEN   Result Value Ref Range    Expiration Date 02/03/2018     Arm Band Number [id number]     ABO/Rh O POSITIVE     Antibody Screen NEGATIVE     Unit Number [id number]     Product Code Leukocyte Reduced Red Cell     Unit Divison 0     Dispense Status TRANSFUSED     Transfusion Status OK TO TRANSFUSE     Crossmatch Result COMPATIBLE     Unit Number [id number]     Product Code Leukocyte Reduced Red Cell     Unit Divison 0     Dispense Status TRANSFUSED     Transfusion Status OK TO TRANSFUSE     Crossmatch Result COMPATIBLE     Unit Number [id number]     Product Code Leukocyte Reduced Red Cell     Unit Divison 0     Dispense Status TRANSFUSED     Transfusion Status OK TO TRANSFUSE     Crossmatch Result COMPATIBLE        Ct Head Wo Contrast    Result Date: 1/31/2018  EXAMINATION: CT OF THE HEAD WITHOUT CONTRAST  1/31/2018 6:48 pm TECHNIQUE: CT of the head was performed without the administration of intravenous contrast. Dose modulation, iterative reconstruction, and/or weight based adjustment of the mA/kV was utilized to reduce the radiation dose to as low as reasonably achievable. COMPARISON: December 31, 2017 HISTORY: ORDERING SYSTEM PROVIDED HISTORY: ams FINDINGS: BRAIN/VENTRICLES: There is no acute intracranial hemorrhage, mass effect or midline shift. No abnormal extra-axial fluid collection. The gray-white differentiation is maintained without evidence of an acute infarct. There is no evidence of hydrocephalus. ORBITS: The visualized portion of the orbits demonstrate no acute abnormality. SINUSES: The visualized paranasal sinuses and mastoid air cells demonstrate no acute abnormality. SOFT TISSUES/SKULL:  No acute abnormality of the visualized skull or soft tissues. No acute intracranial abnormality. Ct Chest Wo Contrast    Result Date: 1/8/2018  EXAMINATION: CT OF THE CHEST WITHOUT CONTRAST 1/8/2018 2:56 pm TECHNIQUE: CT of the chest was performed without the administration of intravenous contrast. Multiplanar reformatted images are provided for review. Dose modulation, iterative reconstruction, and/or weight based adjustment of the mA/kV was utilized to reduce the radiation dose to as low as reasonably achievable. COMPARISON: CT chest October 16, 2017. HISTORY: ORDERING SYSTEM PROVIDED HISTORY: Lung mass TECHNOLOGIST PROVIDED HISTORY: Reason for exam:->lung masses FINDINGS: Mediastinum: Visualized thyroid parenchyma appears unremarkable. Thoracic aorta demonstrates mild calcification without aneurysm. Pulmonary trunk appears nondilated. Heart appears at the upper limits normal in size without pericardial effusion. Scattered prominent to enlarged mediastinal lymph nodes are identified, largest measurable lymph node is seen within the precarinal region measuring 13 mm.   No Recommend MRI of brain negative for acute abnormality   Patient is scheduled for a second opinion evaluation at [facility] on the 13th of this month, recommend patient keeps appointment, given her complex work up without any etiology.

## 2018-02-05 NOTE — PROGRESS NOTES
wear her dentures    Full dentures     ENCOURAGED TO WEAR DOS    Gastric outlet obstruction 8/30/2017    GERD (gastroesophageal reflux disease)     Hearing loss     left ear    History of blood transfusion 01/2012    Hydronephrosis, bilateral 5/2/2015    Hypertension     Hypoglycemia 2014    Lung nodule     right, benign    Migraine     MRSA (methicillin resistant staph aureus) culture positive 02/01/2018    sputum    Observed seizure-like activity ([address] 12/31/2017    OCD (obsessive compulsive disorder)     Osteoarthritis     Osteoporosis     Pelvic mass April 2015    benign     Perforated nasal septum     Peripheral neuropathy due to inflammation ([address] 12/15/2017    Pneumonia     Psoriasis     PTSD (post-traumatic stress disorder) 1985    UNISON    Scoliosis     Shortness of breath 11/23/2016    with temp.  change    Substance abuse 1987    MARIJUBonner    Thrombocytosis ([address]     Urinary incontinence     PT STATES RESOLVED    Wears glasses        Past Surgical History:   Procedure Laterality Date    ABDOMEN SURGERY  09/2015    MASS REMOVED AND HERNIA REPAIR    CHOLECYSTECTOMY      CYSTOSCOPY  12/1/2015    bilat retrograde, right ureteroscopy    CYSTOSCOPY  05/11/2017    CYSTOSCOPY Right 5/11/2017    CYSTOSCOPY, RIGHT URETEROSCOPY, RIGHT RETROGRADE PYELOGRAM, RIGHT STENT PLACEMENT performed by Prachi Villalba MD at [address] [place] [address] Drive [facility]  5/16/2013    FIXATION KYPHOPLASTY  08/09/2016    T12, L-2, L-5    GASTRECTOMY  2012    Partial Gastrectomy    HERNIA REPAIR  07/18/2017    hernia repair with [facility]    LUNG BIOPSY      right upper lobe    PARACENTESIS Right 01/2015-09/2015    X 8    MN CREATE EARDRUM OPENING,GEN ANESTH N/A 4/14/2017    LEFT MYRINGOTOMY T-TUBE INSERTION performed by Angela Henriquez MD at [address] ESOPHAGOGASTRODUODENOSCOPY TRANSORAL DIAGNOSTIC N/A 8/30/2017    EGD ESOPHAGOGASTRODUODENOSCOPY performed by Arsen Lai MD at [address],  [address]

## 2018-02-05 NOTE — PROGRESS NOTES
Urology Progress Note      Subjective: Pablo Law is a 50 y.o. female. His/Her current Diet is: Diet NPO Effective Now. Since the previous note, the patient reports the following:  No acute issues overnight. No fevers or chills. No nausea or vomiting. No chest pain or shortness of breath. No calf pain. Pain Controlled. Ambulating. Tolerating PO Diet. Vitals and Labs:  Vitals:    02/05/18 0400 02/05/18 0500 02/05/18 0600 02/05/18 0700   BP: 109/66 (!) 141/93 103/61 106/61   Pulse: 81 92 92 86   Resp: 21 25 18 19   Temp:       TempSrc:       SpO2: 100% 100% 100% 100%   Weight:       Height:         I/O last 3 completed shifts: In: 1632 [I.V.:1282]  Out: 1495 [Urine:1495]    Recent Labs      02/03/18   0601  02/04/18   0651  02/05/18   0522   WBC  18.5*  17.2*  15.9*   HGB  8.0*  8.9*  9.6*   HCT  25.0*  27.8*  30.8*   MCV  87.4  86.9  90.1   PLT  807*  See Reflexed IPF Result  824*     Recent Labs      02/03/18   0601  02/04/18   0918  02/05/18   0430   NA  135  131*  132*   K  3.3*  4.7  4.4   CL  105  100  100   CO2  17*  17*  22   PHOS  1.7*  2.6  3.5   BUN  13  8  9   CREATININE  0.55  0.47*  0.53       Recent Labs      02/04/18   1612   COLORU  LIGHT YELLOW   PHUR  6.5   WBCUA  0 TO 2   RBCUA  0 TO 2   MUCUS  1+*   TRICHOMONAS  NOT REPORTED   YEAST  FEW*   BACTERIA  NOT REPORTED   SPECGRAV  1.008   LEUKOCYTESUR  SMALL*   UROBILINOGEN  Normal   BILIRUBINUR  NEGATIVE       Physical Exam:  NAD  A/O x 3  RRR  No accessory muscles of inspiration  Abdomen soft, non-tender, non-distended. No CVA tenderness. Prater in place. Clear yellow UOP. No calf pain. EPCs on. Machine turned on.     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 respiratory abnormalities    COPD with acute exacerbation (HCC)    Ureteropelvic junction (UPJ) obstruction, right    Osteoporosis    Sepsis (HCC)    Anemia due to vitamin B12 deficiency    Pulmonary infiltrates on CXR    Incisional hernia    Acute sinusitis    Suprapubic pain    Anemia, chronic disease    Celiac disease    Cirrhosis of liver with ascites (HCC)    Gastroesophageal reflux disease without esophagitis    Essential hypertension    Hematuria    Acute cystitis with hematuria    Hypoalbuminemia due to protein-calorie malnutrition (HCC)    Hypokalemia    Hyponatremia    Aspiration pneumonia due to vomit (HCC)    PTSD (post-traumatic stress disorder)    Gastroenteritis    Gastric outlet obstruction    Iron deficiency anemia due to chronic blood loss    Elevated serum immunoglobulin free light chain level    Hypertension    Acute pure red cell anemia (HCC)    Bilateral leg pain    History of blood transfusion    TIN (chronic hypoplastic anemia) (HCC)    Peripheral neuropathy due to inflammation (HCC)    Pneumonia of right middle lobe due to infectious organism ([address]    Seizure ([address]    Hypokalemia    Fatigue    Septic shock (HCC)    Witnessed seizure ([address]    Streptococcal sepsis (HCC)    Respiratory alkalosis    Hypoglycemia    Hydronephrosis due to ureteral stricture    Metabolic acidosis, normal anion gap (NAG)       Plan:  Plan for right nephrostomy tube placement today. Will need to discuss plan for right stent removal vs exchange with Dr. Christian Alvarez MD possibly this admission versus in the future as outpatient. Remove st.

## 2018-02-06 NOTE — PROGRESS NOTES
12   ALT  8  7  6      Magnesium:   Lab Results   Component Value Date    MG 1.6 02/03/2018    MG 1.9 02/02/2018    MG 2.1 09/09/2017     Phosphorus:   Lab Results   Component Value Date    PHOS 3.5 02/05/2018    PHOS 2.6 02/04/2018    PHOS 1.7 02/03/2018     Ionized Calcium:   Lab Results   Component Value Date    CAION 1.58 01/25/2018    CAION 1.13 07/08/2017    CAION 1.33 07/19/2016        Urinalysis:   Lab Results   Component Value Date    NITRU NEGATIVE 02/04/2018    COLORU LIGHT YELLOW 02/04/2018    PHUR 6.5 02/04/2018    WBCUA 0 TO 2 02/04/2018    RBCUA 0 TO 2 02/04/2018    MUCUS 1+ 02/04/2018    TRICHOMONAS NOT REPORTED 02/04/2018    YEAST FEW 02/04/2018    BACTERIA NOT REPORTED 02/04/2018    CLARITYU CLOUDY 05/04/2017    SPECGRAV 1.008 02/04/2018    LEUKOCYTESUR SMALL 02/04/2018    UROBILINOGEN Normal 02/04/2018    BILIRUBINUR NEGATIVE 02/04/2018    BILIRUBINUR SMALL 05/04/2017    BLOODU MODERATE 05/04/2017    GLUCOSEU NEGATIVE 02/04/2018    KETUA NEGATIVE 02/04/2018    AMORPHOUS NOT REPORTED 02/04/2018       HgBA1c:    Lab Results   Component Value Date    LABA1C 5.2 01/07/2016     TSH:    Lab Results   Component Value Date    TSH 1.00 01/01/2018     Lactic Acid:   Lab Results   Component Value Date    LACTA NOT REPORTED 12/05/2017    LACTA NOT REPORTED 07/17/2017    LACTA NOT REPORTED 07/08/2017      Troponin: No results for input(s): TROPONINI in the last 72 hours.     Microbiology:  2/4/18 Blood cx x 2 no growth  2/1/18 sputum cx MRSA  2/1/18 Urine cx no growth final  MRSA nasal probe negative    Other Labs:  CBC:   Lab Results   Component Value Date    WBC 14.6 02/06/2018    RBC 2.75 02/06/2018    HGB 7.7 02/06/2018    HCT 25.7 02/06/2018    MCV 93.5 02/06/2018    MCH 28.0 02/06/2018    MCHC 30.0 02/06/2018    RDW 16.0 02/06/2018     02/06/2018    MPV 8.7 02/06/2018     BMP:    Lab Results   Component Value Date     02/06/2018    K 3.3 02/06/2018     02/06/2018    CO2 19 02/06/2018 High    Retained ureteral stent 02/05/2018    Respiratory alkalosis 02/04/2018    Hypoglycemia 02/04/2018    Hydronephrosis due to ureteral stricture 81/61/7917    Metabolic acidosis, normal anion gap (NAG)     Witness[facility] seizure ([facility])     Streptococcal sepsis ([address]     Septic shock ([address] 01/31/2018    Pneumonia of right middle lobe due to infectious organism ([address] 12/31/2017    Seizure ([address] 12/31/2017    Hypokalemia 12/31/2017    Fatigue     Peripheral neuropathy due to inflammation ([address] 12/15/2017    TIN (chronic hypoplastic anemia) ([facility]) 11/25/2017    Acute pure red cell anemia ([facility]) 10/14/2017    Hypertension     Bilateral leg pain     Elevated serum immunoglobulin free light chain level 09/25/2017    Iron deficiency anemia due to chronic blood loss     Gastric outlet obstruction 08/30/2017    Gastroenteritis 08/29/2017    Aspiration pneumonia due to vomit ([address] 08/21/2017    Hematuria 08/17/2017    Acute cystitis with hematuria 08/17/2017    Hypoalbuminemia due to protein-calorie malnutrition (HCC) 08/17/2017    Hypokalemia 08/17/2017    Hyponatremia 08/17/2017    Celiac disease     Gastroesophageal reflux disease without esophagitis     Essential hypertension     Acute sinusitis 08/15/2017    Suprapubic pain 08/15/2017    Anemia, chronic disease 08/15/2017    Incisional hernia 07/18/2017    Pulmonary infiltrates on CXR     Anemia due to vitamin B12 deficiency     Sepsis ([address]     Osteoporosis 06/19/2017    Ureteropelvic junction (UPJ) obstruction, right 06/04/2017    COPD with acute exacerbation ([address]     Anxiety 05/27/2016    Depression 05/27/2016    Hypoproliferative anemia ([address] 01/14/2016    Multiple lung nodules on CT 01/13/2016    Liver mass     Non-intractable vomiting with nausea     Elevated alkaline phosphatase level     Leukocytosis 06/22/2015    Protein-calorie malnutrition, severe ([address] 05/19/2015    Thrombocytosis ([address] 03/30/2015    Cirrhosis of liver with ascites ([facility] 03/30/2015    Mass of lower lobe of left lung 12/18/2014    Normocytic anemia 12/18/2014    AC globulin factor VII (stable) deficiency ([facility] 02/14/2013    LA (lupus anticoagulant) disorder ([facility] 02/14/2013    Altered mental status 01/12/2013    Smoking greater than 40 pack years 03/29/2012    History of blood transfusion 01/01/2012    PTSD (post-traumatic stress disorder) 01/01/1985          PLAN:     WEAN PER PROTOCOL:  [] No   [] Yes  [x] N/A    ICU PROPHYLAXIS:  Stress ulcer:  [] PPI Agent  [x] [id number] [] Sucralfate  [] Other:  VTE:   [] Enoxaparin  [] Unfract. Heparin Subcut  [x] EPC Cuffs    NUTRITION:  [x] NPO [] Tube Feeding (Specify: ) [] TPN  [] PO    HOME MEDS RECONCILED: [] No  [x] Yes    CONSULTATION NEEDED:  [] No  [x] Yes    FAMILY UPDATED:    [x] No  [] Yes    TRANSFER OUT OF ICU:   [x] No  [] Yes        Additional Assessment:  Active Problems:    Mass of lower lobe of left lung    Thrombocytosis (HCC)    Protein-calorie malnutrition, severe (HCC)    Anemia, chronic disease    TIN (chronic hypoplastic anemia) (HCC)    Septic shock (HCC)    Witnessed seizure (HCC)    Streptococcal sepsis (HCC)    Respiratory alkalosis    Hypoglycemia    Hydronephrosis due to ureteral stricture    Metabolic acidosis, normal anion gap (NAG)      Plan:  · R nephrostomy placement by IR Yesterday. Nephrostomy tube output was 200 mL overnight and patient is complaining of pain at the site of nephrostomy. No Output from nephrostomy tube since 4 am this morning. IR contacted as there was suspicion  for blockage. Nephrogram ordered. Will follow the results. · Remove st and start clear intermittent cath for volumes 250 or more per urology.    · General surgery consult for pneumobilia and they recommended intervention at this time because it's chronic finding and it could be related to sphincterotomy in past causing sphincter of oddi dysfunction  · Avoid sedation and narcotics  · Ativan and critical care team and sign of once on the floor    Jennifer Moss MD  2/6/2018 1:56 PM

## 2018-02-06 NOTE — PROGRESS NOTES
Nutrition Assessment    Type and Reason for Visit: Reassess    Nutrition Recommendations:   -Continue Full liquid diet as necessary. Advance to General as able. -Will order Ensure Enlive BID    Malnutrition Assessment:  · Malnutrition Status: Meets the criteria for severe malnutrition  · Context: Chronic illness  · Findings of the 6 clinical characteristics of malnutrition (Minimum of 2 out of 6 clinical characteristics is required to make the diagnosis of moderate or severe Protein Calorie Malnutrition based on AND/ASPEN Guidelines):  1. Energy Intake-Less than or equal to 50%, greater than or equal to 3 months    2. Weight Loss-20% loss or greater, in 6 months    Nutrition Diagnosis:   · Problem: Severe malnutrition  · Etiology: related to Insufficient energy/nutrient consumption     Signs and symptoms:  as evidenced by Weight loss greater than or equal to 20% in 1 year    Nutrition Assessment:  · Subjective Assessment: Patient transferred back to SICU after seizure like activity. Now on Full liquid diet and eating well. Tolerating. · Nutrition-Focused Physical Findings: active bowel sounds, no edema  · Wound Type: None  · Current Nutrition Therapies:  · Oral Diet Orders: Full Liquid   · Oral Diet intake: %  · Anthropometric Measures:  · Ht: 5' 1\" (154.9 cm)   · Current Body Wt: 105 lb (47.6 kg)  · Usual Body Wt: 154 lb (69.9 kg)  · % Weight Change: 53# (34%),  6 months  · Ideal Body Wt: 105 lb (47.6 kg), % Ideal Body 96%  · BMI Classification: BMI 18.5 - 24.9 Normal Weight  · Comparative Standards (Estimated Nutrition Needs):  · Estimated Daily Total Kcal: 23-25 kcal/kg (6326-0087 kcal)  · Estimated Daily Protein (g): 70 g or more    Estimated Intake vs Estimated Needs: Intake Improving    Nutrition Risk Level:  Moderate    Nutrition Interventions:   Continue current diet, Start ONS (advance diet to General as able)  Continued Inpatient Monitoring, Education Not Indicated    Nutrition Evaluation:

## 2018-02-06 NOTE — PROGRESS NOTES
phosphatase level    Liver mass    Non-intractable vomiting with nausea    Multiple lung nodules on CT    Hypoproliferative anemia (HCC)    Anxiety    Depression    Altered mental status    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 respiratory abnormalities    COPD with acute exacerbation (HCC)    Ureteropelvic junction (UPJ) obstruction, right    Osteoporosis    Sepsis (HCC)    Anemia due to vitamin B12 deficiency    Pulmonary infiltrates on CXR    Incisional hernia    Acute sinusitis    Suprapubic pain    Anemia, chronic disease    Celiac disease    Cirrhosis of liver with ascites (HCC)    Gastroesophageal reflux disease without esophagitis    Essential hypertension    Hematuria    Acute cystitis with hematuria    Hypoalbuminemia due to protein-calorie malnutrition (HCC)    Hypokalemia    Hyponatremia    Aspiration pneumonia due to vomit (HCC)    PTSD (post-traumatic stress disorder)    Gastroenteritis    Gastric outlet obstruction    Iron deficiency anemia due to chronic blood loss    Elevated serum immunoglobulin free light chain level    Hypertension    Acute pure red cell anemia (HCC)    Bilateral leg pain    History of blood transfusion    TIN (chronic hypoplastic anemia) (HCC)    Peripheral neuropathy due to inflammation (HCC)    Pneumonia of right middle lobe due to infectious organism ([address]    Seizure ([address]    Hypokalemia    Fatigue    Septic shock (HCC)    Witnessed seizure ([address]    Streptococcal sepsis (HCC)    Respiratory alkalosis    Hypoglycemia    Hydronephrosis due to ureteral stricture    Metabolic acidosis, normal anion gap (NAG)    Retained ureteral stent    Seizure disorder ([address]       Palliative Interaction:  Chart reviewed and writer to bedside to speak with patient.   Patient was able to recall events leading up

## 2018-02-06 NOTE — PROGRESS NOTES
Physical Therapy  DATE: 2018    NAME: Stephanie Law  MRN: [id number]   : 1970    Patient not seen this date for Physical Therapy due to:  [] Blood transfusion in progress  [] Hemodialysis  []  Patient Declined  [] Spine Precautions   [] Strict Bedrest  [] Surgery/ Procedure  [] Testing      [x] Other--pt eating breakfast; ck back         [] PT being discontinued at this time. Patient independent. No further needs. [] PT being discontinued at this time as the patient has been transferred to palliative care. No further needs.     Chryl Cost, PT

## 2018-02-06 NOTE — PROGRESS NOTES
Physical Therapy    Facility/Department: [address] SICU  Initial Assessment    NAME: Colin Law  : 1970  MRN: [id number]    Date of Service: 2018  Colin Law is a 50 y.o. female admitted 18 and she presented to the ED via EMS for altered mental status. EMS noted patient was hypoxic with oxygen saturation in the 80s and she was noted to have a brief seizure-like episode in the emergency department the last for about 30 seconds before resolving. Patient was intubated for airway protection in the emergency department. Chest x-ray showed evidence concerning for a right middle lung pneumonia. She is given 1 dose of vancomycin and Zosyn and azithromycin and then subsequently continued on cefepime. At the time patient's lactic acid was 7.1 subsequently resolved after IVF resuscitation. Patient's white blood count was elevated on admission at 33.5. Hemoglobin was 6.4 and she was transfused packed red blood cells.     Patient has history of chronic anemia of chronic disease, no cytosis, gastric mass status post resection nonmalignant by pathology, ascites, pulmonary mass with no malignancy on pathology and benign ovarian mass as well. Follows with hematoma and was last seen by Dr. Mariel Gonsalez on 18. She has had ascites in the past for which fluid was analyzed and showed no malignacy. TB testing was negative. Patient has been referred to [facility] for further evaluation by both pulm and heme/onc and appointment is set for a couple weeks from now.     During this admission patient self extubated 18. She was also evaluated by neurology for witnessed generalized tonic clonic seizure activity in the ED. CTH and MRI brain were negative for acute abnormality. EEG done 18 showing L anterior temporal spike wave discharges, associated muscle artifacts. Patient was loaded with fosphenytoin and levels indicated supra-therapeutic so were being followed.  US liver showed hydronephrosis of the R

## 2018-02-06 NOTE — PROGRESS NOTES
Non-intractable vomiting with nausea    Multiple lung nodules on CT    Hypoproliferative anemia (HCC)    Anxiety    Depression    Altered mental status    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 respiratory abnormalities    COPD with acute exacerbation (HCC)    Ureteropelvic junction (UPJ) obstruction, right    Osteoporosis    Sepsis (HCC)    Anemia due to vitamin B12 deficiency    Pulmonary infiltrates on CXR    Incisional hernia    Acute sinusitis    Suprapubic pain    Anemia, chronic disease    Celiac disease    Cirrhosis of liver with ascites (HCC)    Gastroesophageal reflux disease without esophagitis    Essential hypertension    Hematuria    Acute cystitis with hematuria    Hypoalbuminemia due to protein-calorie malnutrition (HCC)    Hypokalemia    Hyponatremia    Aspiration pneumonia due to vomit (HCC)    PTSD (post-traumatic stress disorder)    Gastroenteritis    Gastric outlet obstruction    Iron deficiency anemia due to chronic blood loss    Elevated serum immunoglobulin free light chain level    Hypertension    Acute pure red cell anemia (HCC)    Bilateral leg pain    History of blood transfusion    TIN (chronic hypoplastic anemia) (HCC)    Peripheral neuropathy due to inflammation (HCC)    Pneumonia of right middle lobe due to infectious organism ([address]    Seizure ([address]    Hypokalemia    Fatigue    Septic shock (HCC)    Witnessed seizure ([address]    Streptococcal sepsis (HCC)    Respiratory alkalosis    Hypoglycemia    Hydronephrosis due to ureteral stricture    Metabolic acidosis, normal anion gap (NAG)    Retained ureteral stent       Plan:  Keep right PCNT in and open to drainage.   Right ureteral stent removal (with possible shockwave lithotripsy to encrusted right proximal stent) once patient's condition improves, likely as

## 2018-02-06 NOTE — PROGRESS NOTES
08/17/2017    Celiac disease     Gastroesophageal reflux disease without esophagitis     Essential hypertension     Acute sinusitis 08/15/2017    Suprapubic pain 08/15/2017    Anemia, chronic disease 08/15/2017    Incisional hernia 07/18/2017    Pulmonary infiltrates on CXR     Anemia due to vitamin B12 deficiency     Sepsis ([address]     Osteoporosis 06/19/2017    Ureteropelvic junction (UPJ) obstruction, right 06/04/2017    COPD with acute exacerbation ([address]     Anxiety 05/27/2016    Depression 05/27/2016    Hypoproliferative anemia ([address] 01/14/2016    Multiple lung nodules on CT 01/13/2016    Liver mass     Non-intractable vomiting with nausea     Elevated alkaline phosphatase level     Leukocytosis 06/22/2015    Protein-calorie malnutrition, severe ([address] 05/19/2015    Thrombocytosis ([address] 03/30/2015    Cirrhosis of liver with ascites ([address] 03/30/2015    Mass of lower lobe of left lung 12/18/2014    Normocytic anemia 12/18/2014    AC globulin factor VII (stable) deficiency ([address] 02/14/2013    LA (lupus anticoagulant) disorder ([address] 02/14/2013    Altered mental status 01/12/2013    Smoking greater than 40 pack years 03/29/2012    History of blood transfusion 01/01/2012    PTSD (post-traumatic stress disorder) 01/01/1985       Additional assessment:    1. Recommended Follow-up:   · R nephrostomy placement by IR Yesterday. Nephrostomy tube output was 200 mL overnight and patient is complaining of pain at the site of nephrostomy. No Output from nephrostomy tube since 4 am this morning. IR contacted as there was suspicion  for blockage. Nephrogram ordered. Will follow the results. · Remove st and start clear intermittent cath for volumes 250 or more per urology.    · General surgery consult for pneumobilia and they recommended intervention at this time because it's chronic finding and it could be related to sphincterotomy in past causing sphincter of oddi dysfunction  · Avoid sedation and narcotics  · Ativan only for seizure: did not receive any Ativan  · Patient was on Keppra but it was discontinued by neurology yesterday and patient had one episode of seizure overnight will ask neurology regarding keppra. · Continue ASA  · EPC cuffs for DVT  · Seizure precautions  · On cefepime. · Monitor mentation  · IVF bolus as needed, BP stable currently  · NS 50 ml /hr. Patient is eating and drinking well.            Camron Wallace MD      Department of Internal Medicine  [facility]         2/6/2018, 9:48 PM

## 2018-02-06 NOTE — PROGRESS NOTES
NEUROLOGY INPATIENT PROGRESS NOTE    2/6/2018         Subjective: Sarbjit ANDRADE Samples is a  50 y.o. female admitted on 1/31/2018 with Septic shock ([facility] [A41.9, R65.21]      Briefly, this is a  50 y.o. female admitted on 1/31/2018 with H/O HTN, substance abuse, CKD, cirrhosis, psoriasis, chronic back pain, scoliosis s/p T12, L2 & L5 kyphoplasty, who was admitted in ICU on 1/31/2018 with AMS. Pt was found down unresponsive at home. EMS were called who reported seizure while in transit. At arrival, pt had a witnessed GTC seizure  lasting for 30 sec - 2 min. Ativan. However pt had another witnessed seizure with prolonged post-ictal state and was then intubated. Pt was found to have sepsis, pneumonia, hyponatremia; also has H/O chronic anemia & chronic thrombocytosis neutrophilia. Per medical record, patient has had some twitches along with urinary incontinence. CT & MRI head - normal. She got loaded with Fosphenytoin 1 g IVPB x 1; level was supra therapeutic leading to a decision to discontinue the 100 mg every 8 hour maintenance dose so that she only a single maintenance dose was received which was very early a.m. 2/2.     She was reported to have had another seizure morning 2/5. Report from neurology nurse practitioner from that day described per nurse's report that the patient's eyes started twitching followed by generalized jerking, the patient was moaning loudly but was awake and rapid response was called. Concurrently her oxygen saturation was dropping and she was tachycardic and tachypnea. She was given some IV lorazepam and 1 g of IV LEV. The patient is familiar to the present consultant who evaluated the patient 12/31/2017. She had a history of  progressive weakness with anemia, leukocytosis and thrombocytosis, lung and liver masses reportedly with biopsies that came back negative for malignancy. She has been getting transfusions since 2011.   The frequency of transfusions markedly increased during 2017 [facility] OR    MI ESOPHAGOGASTRODUODENOSCOPY TRANSORAL DIAGNOSTIC N/A 8/30/2017    EGD ESOPHAGOGASTRODUODENOSCOPY performed by Sindhu Jenkins MD at [facility] N/A 4/14/2017    NASAL SEPTAL BUTTON INSERTION performed by Lindsay Hanna MD at [facility] N/A 7/18/2017    VENTRAL INCISIONAL HERNIA REPAIR X2  WITH MESH performed by Kerwin Lundborg, DO at [place] [id number]    UPPER GASTROINTESTINAL ENDOSCOPY      URETER STENT PLACEMENT  05/11/2017    URETEROSCOPY  05/11/2017           Medications:     cefepime  1 g Intravenous Q8H    melatonin  2 mg Oral Once    calcium carbonate-vitamin D  1 tablet Oral Daily    miconazole   Topical BID    sodium chloride flush  10 mL Intravenous BID    aspirin  81 mg Oral Daily    folic acid  1 mg Oral Daily    sodium chloride  250 mL Intravenous Once    famotidine  20 mg Oral BID    citalopram  40 mg Oral Daily    sodium chloride  250 mL Intravenous Once     PRN Meds include: potassium chloride **OR** potassium chloride **OR** potassium chloride, LORazepam, ondansetron, albuterol, glucose, dextrose, glucagon (rDNA), dextrose, potassium chloride, fentanNYL **OR** fentanNYL    Objective:   /70   Pulse 95   Temp 98.1 °F (36.7 °C) (Oral)   Resp 26   Ht 5' 1\" (1.549 m)   Wt 105 lb 13.1 oz (48 kg)   LMP 04/18/2012   SpO2 100%   BMI 19.99 kg/m²     Blood pressure range: Systolic (99YWY), ISAAK:194 , Min:91 , NBB:427   ; Diastolic (54SCD), JHONATAN:26, Min:44, Max:91    Neurologic examination    Alert but anergic. Does respond readily and appropriately. She states that she is aware when she has these attacks but cannot stop them. Grossly no focal neurologic signs.         Data:    Lab Results:   CBC:   Recent Labs      02/04/18   0651  02/05/18   0522  02/06/18   0440   WBC  17.2*  15.9*  14.6*   HGB  8.9*  9.6*  7.7*   PLT  See

## 2018-02-06 NOTE — FLOWSHEET NOTE
No drainage from nephrostomy tube since 0400. Leakage noted from nephrostomy site. Tube is free of kinks and appears to be at the correct position of 35mm. IR called and updated, will call back to follow up. Critical care team updated as well.

## 2018-02-06 NOTE — PROGRESS NOTES
RN to pt's room for pt moaning \"At-I-Van. .. At-I-Van\" When RN entered room, pt made eye contact with RN, was jerking, and continued to moan for ativan. RN asked pt if ativan helps her calm down and pt answered \"yes\". RN informed Pt that she was safe. Pt stopped moaning and jerking on own. No Ativan was given.  Will continue to monitor

## 2018-02-07 NOTE — PROGRESS NOTES
NEUROLOGY INPATIENT PROGRESS NOTE    2/7/2018         Subjective: Serafin Law is a  50 y.o. female admitted on 1/31/2018 with Septic shock ([facility] [address] [A41.9, R65.21]. Chart reviewed. Discussed with patient/caregivers. Patient examined. No acute events overnight. No new motor, sensory, visual or bulbar symptoms. RN reports an episode this morning of patient having eye and head twitching/jerking. She was able to talk through the episode, make eye contact with the nurse, and follow commands the entire time. She was able to ambulate today with PT. Briefly, this is a  50 y.o. female with H/O HTN, substance abuse, CKD, cirrhosis, psoriasis, chronic back pain, scoliosis s/p T12, L2 and L5 kyphoplasty, chronic anemia, pulmonary mass with no malignancy, who was admitted on 1/31/2018 with AMS. Per the records the patient was found down and unresponsive at home. EMS were called who reported a seizure while in transit to the hospital. Upon arrival to ED patient had witnessed seizure like activity with posturing lasting 1-2 minutes; she was given Ativan. The patient then had another witnessed seizure like event with a prolonged post ictal state; she was intubated. The patient was found to have sepsis, pneumonia, hyponatremia. She was loaded with IV Fosphenytoin 1 gram, levels were then supratherapeutic so decision was made to discontinue maintenance dosing. The patient had another seizure like episode on 2/5 in which a RRT was called; the patient had eye twitching with generalized jerking, patient was moaning but awake; she was given IV Ativan and loaded with IV Keppra 1 gram. These spells were felt not to be epileptic in origin, neurology discontinued Keppra on 2/5. Per the records the patient is known to our service from prior hospitalizations and patient's roommate has described seizures occurring 5-6 times over the last 9 months.  The patient has been seen by multiple ED physicians and her PCP who have not determined the patient to be epileptic. These seizure events are described in the records as lasting about 5 minutes, sometimes with the patient suddenly \"coming out of it\" or she may also start responding appropriately when she is touched or spoken to. During her hospitalization at the end of December into January she had 2 episodes on 1/3 and 1/4 in which she was tachycardic in the 120-130s and had bilateral limb myoclonic activity, during which some of the time she was able to visually track and answer questions appropriately. These spells were not felt to be epileptic in origin at the time and no AED therapy was started. CT head and MRI head normal. EEG with left anterior temporal spike wave discharges indicative of cortical irritability.     Current Facility-Administered Medications on File Prior to Encounter   Medication Dose Route Frequency Provider Last Rate Last Dose    lactated ringers infusion   Intravenous Continuous Luci Nixon MD 0 mL/hr at 12/01/15 1220      meperidine (DEMEROL) injection 12.5 mg  12.5 mg Intravenous Q5 Min PRN Valentino Landsman, MD        fentaNYL (SUBLIMAZE) injection 50 mcg  50 mcg Intravenous Q5 Min PRN Valentino Landsman, MD         Current Outpatient Prescriptions on File Prior to Encounter   Medication Sig Dispense Refill    sodium chloride (ALTAMIST SPRAY) 0.65 % nasal spray 1 spray by Nasal route as needed for Congestion 1 Bottle 3    Calcium Carbonate-Vitamin D (OYSTER SHELL CALCIUM/D) 500-200 MG-UNIT TABS Take 1 tablet by mouth daily Please discontinue previous prescription of calcium 30 tablet 5    loratadine (CLARITIN) 10 MG tablet Take 1 tablet by mouth daily 30 tablet 5    citalopram (CELEXA) 40 MG tablet Take 1 tablet by mouth daily 30 tablet 5    aspirin (ASPIRIN LOW DOSE) 81 MG chewable tablet Take 1 tablet by mouth daily 30 tablet 5    pantoprazole (PROTONIX) 40 MG tablet Take 1 tablet by mouth daily 90 tablet 1    folic acid (FOLVITE) 1 MG tablet Take 1 tablet by resistant staph aureus) culture positive 02/01/2018    sputum    Observed seizure-like activity ([address] 12/31/2017    OCD (obsessive compulsive disorder)     Osteoarthritis     Osteoporosis     Pelvic mass April 2015    benign     Perforated nasal septum     Peripheral neuropathy due to inflammation (HCC) 12/15/2017    Pneumonia     Psoriasis     PTSD (post-traumatic stress disorder) 1985    [place]    Scoliosis     Shortness of breath 11/23/2016    with temp.  change    Substance abuse 1987    [place]    Thrombocytosis ([address]     Urinary incontinence     PT STATES RESOLVED    Wears glasses        Past Surgical History:   Procedure Laterality Date    ABDOMEN SURGERY  09/2015    MASS REMOVED AND HERNIA REPAIR    CHOLECYSTECTOMY      CYSTOSCOPY  12/1/2015    bilat retrograde, right ureteroscopy    CYSTOSCOPY  05/11/2017    CYSTOSCOPY Right 5/11/2017    CYSTOSCOPY, RIGHT URETEROSCOPY, RIGHT RETROGRADE PYELOGRAM, RIGHT STENT PLACEMENT performed by Karlee Travis MD at [address] [facility]  5/16/2013    FIXATION KYPHOPLASTY  08/09/2016    T12, L-2, L-5    GASTRECTOMY  2012    Partial Gastrectomy    HERNIA REPAIR  07/18/2017    hernia repair with mash    LUNG BIOPSY      right upper lobe    PARACENTESIS Right 01/2015-09/2015    X 8    OR CREATE EARDRUM OPENING,GEN ANESTH N/A 4/14/2017    LEFT MYRINGOTOMY T-TUBE INSERTION performed by Darius Carrizales MD at [address] ESOPHAGOGASTRODUODENOSCOPY TRANSORAL DIAGNOSTIC N/A 8/30/2017    EGD ESOPHAGOGASTRODUODENOSCOPY performed by Karan Bobo MD at [facility] N/A 4/14/2017    NASAL SEPTAL BUTTON INSERTION performed by Darius Carrizales MD at [facility] N/A 7/18/2017    VENTRAL INCISIONAL HERNIA REPAIR X2  WITH MESH performed by Sandeep Flood DO at [address] [address] URETER STENT PLACEMENT  05/11/2017    URETEROSCOPY  05/11/2017       Social History: Darryle Hector A Samples  reports that she has been smoking Cigarettes. She has a 7.50 pack-year smoking history. She has never used smokeless tobacco. She reports that she does not drink alcohol or use drugs. Family History   Problem Relation Age of Onset    Heart Disease Mother     Stroke Father      cerebral aneurysm       Objective:   BP (!) 99/53   Pulse 83   Temp 97.7 °F (36.5 °C) (Axillary)   Resp 20   Ht 5' 1\" (1.549 m)   Wt 95 lb 3.8 oz (43.2 kg)   LMP 04/18/2012   SpO2 100%   BMI 18.00 kg/m²     Blood pressure range: Systolic (92FQH), HZH:115 , Min:98 , CGT:406   ; Diastolic (15MWU), QPS:63, Min:50, Max:73      Review of Systems:  Constitutional  Negative for fever and chills    HEENT  Negative for ear discharge, ear pain, nosebleed    Eyes  Negative for photophobia, pain and discharge    Respiratory  Negative for hemoptysis and sputum    Cardiovascular  Negative for orthopnea, claudication and PND    Gastrointestinal  Negative for abdominal pain, diarrhea, blood in stool    Musculoskeletal  Negative for joint pain, negative for myalgia    Skin  Negative for rash or itching    Endo/heme/allergies  Negative for polydipsia, environmental allergy    Psychiatric/behavioral  Negative for suicidal ideation. Patient is not anxious        NEUROLOGIC EXAMINATION  GENERAL  Appears comfortable and in no distress   HEENT  NC/ AT   NECK  Supple   MENTAL STATUS:  Alert, oriented, intact memory, no obvious confusion, normal speech, normal language, no hallucination or delusion. Limited participation in exam. Follows simple commands.    CRANIAL NERVES: II     -      Visual fields intact to confrontation  III,IV,VI -  EOMs full, no afferent defect, no DORI, no ptosis  V     -     Normal facial sensation  VII    -     Normal facial symmetry  VIII   -     Intact hearing  IX,X -     Symmetrical palate  XI    -     Symmetrical shoulder shrug  XII   -     Midline tongue, no atrophy    MOTOR FUNCTION:  significant for good strength of grade 5/5 in BUEs and is able to lift both lower extremities up off of the bed, does not participate well to fully assess strength of lower limbs, no involuntary movements, no tremor   SENSORY FUNCTION:  Normal touch, normal pin   CEREBELLAR FUNCTION:  Intact fine motor control over upper limbs   REFLEX FUNCTION:  Symmetric, no perverted reflex, no Babinski sign   STATION and GAIT  Not tested       Data:    Lab Results:   CBC:   Recent Labs      02/05/18   0522  02/06/18   0440  02/06/18   1521  02/07/18   0601   WBC  15.9*  14.6*   --   18.0*   HGB  9.6*  7.7*  7.9*  7.8*   PLT  824*  664*   --   537*     BMP:    Recent Labs      02/04/18   0918  02/05/18   0430  02/06/18   0440   NA  131*  132*  134*   K  4.7  4.4  3.3*   CL  100  100  103   CO2  17*  22  19*   BUN  8  9  10   CREATININE  0.47*  0.53  0.58   GLUCOSE  56*  86  91         Lab Results   Component Value Date    CHOL 83 01/07/2016    LDLCHOLESTEROL 59 01/07/2016    HDL 12 (L) 01/07/2016    TRIG 77 02/01/2018    ALT 6 02/06/2018    AST 12 02/06/2018    TSH 1.00 01/01/2018    INR 1.3 02/06/2018    LABA1C 5.2 01/07/2016    [id number] 02/01/2018           Diagnostic data reviewed:  CT HEAD (1/31/18) -  No acute intracranial abnormality. BRAIN MRI (2/2/18) -  No acute abnormality. EEG (2/1/18) -  This EEG performed predominantly during  drowsiness and sleep is abnormal, demonstrating left anterior temporal  spike wave discharges. Associated muscle artifacts are also noted. Findings may indicate cortical irritability, and clinical correlation is  recommended. Impression and Plan: Ms. Justino Law is a 50 y.o. female with intermittent spells of tachycardia and bilateral myoclonic jerks. Discussed with Dr. Fiona Mcdonald; these spells are not felt to be epileptic in origin and therefore do not require the use of AEDs.  During these episodes she is capable of goal directed behavior which is indicative of the fact that these are not epileptic. Discussed with RN, recommended use of ammonia salt during spells. Will continue to follow.

## 2018-02-07 NOTE — PROGRESS NOTES
[address]    HISTORY AND PHYSICAL EXAMINATION            Date:   2/7/2018  Patient name:  Mini Law  Date of admission:  1/31/2018  4:11 PM  MRN:   [id number]  YOB: 1970    CHIEF COMPLAINT     History Obtained From:  Patient and chart review. HISTORY OF PRESENT ILLNESS      Mini Law is a 50 y.o. female admitted 1/31/18 and she presented to the ED via EMS for altered mental status.  EMS noted patient was hypoxic with oxygen saturation in the 80s and she was noted to have a brief seizure-like episode in the emergency department the last for about 30 seconds before resolving. Gilford Sanes was intubated for airway protection in the emergency department.  Chest x-ray showed evidence concerning for a right middle lung pneumonia.  She is given 1 dose of vancomycin and Zosyn and azithromycin and then subsequently continued on cefepime.  At the time patient's lactic acid was 7.1 subsequently resolved after IVF resuscitation.  Patient's white blood count was elevated on admission at 33.5.  Hemoglobin was 6.4 and she was transfused packed red blood cells.     Patient has history of chronic anemia of chronic disease, no cytosis, gastric mass status post resection nonmalignant by pathology, ascites, pulmonary mass with no malignancy on pathology and benign ovarian mass as well.  Follows with hematoma and was last seen by Dr. Tano Diop on 1/22/18. She has had ascites in the past for which fluid was analyzed and showed no malignacy. TB testing was negative. Patient has been referred to [facility] for further evaluation by both pulm and heme/onc and appointment is set for a couple weeks from now.     During this admission patient self extubated 2/1/18. She was also evaluated by neurology for witnessed generalized tonic clonic seizure activity in the ED. CTH and MRI brain were negative for acute abnormality.  EEG done 2/1/18 There appears to be pneumobilia present involving the left lobe of the liver. Spleen, pancreas and adrenal glands all appear unremarkable. The gallbladder is not clearly seen. Left kidney demonstrates no evidence of stone or hydronephrosis. Right kidney demonstrates a double-J ureteral stent in place with layering calcifications seen within the renal pelvis. A 2 mm calcification seen adjacent to the ureteral stent on series 2, image 113 Hydronephrosis is present is unchanged from prior study. Small perinephric fluid collection is seen adjacent to the right kidney, grossly unchanged from the prior study. Abdominal aorta demonstrates mild calcification without aneurysm. GI/Bowel: Patient status post partial gastrectomy. Small bowel appears nondilated. Bold layering previously given IV oral contrast is seen within the right lower quadrant on series 2, image 115. This was not clearly seen on the prior study. There is questionable surrounding inflammatory change. No acute colonic abnormality is seen. The appendix is not clearly identified. Pelvis: Prater catheter is seen within the urinary bladder with nondependent air present. Uterus is grossly unremarkable. No definite adnexal mass or cyst. Peritoneum/Retroperitoneum: No free air or free fluid is noted. No definite lymphadenopathy is seen. Bones/Soft Tissues: Abdominal wall demonstrates no acute findings. Body wall anasarca is seen. Osseous structures demonstrate no acute findings. Patient status post multilevel kyphoplasty with scoliosis of the visualized thoracolumbar spine with associated degenerative change. *Lower lobe consolidations are identified with a masslike consolidation with a hypodense center involving the left lower lobe seen measuring 6.6 x 4.2 cm. This mass appears to have enhance on the prior study has increased in size since then.   The right lower lobe consolidation appears new when compared to the prior studies and most likely infectious in etiology. *Ground-glass opacity involving the right middle lobe and lingula, most likely representing additional infectious process. *Right-sided double-J ureteral stent is identified with unchanged hydronephrosis involving the right kidney. There appears to be layering calcification seen within the renal pelvis with additional 2 mm calcification seen adjacent to the ureteral stent on series 2, image 113. *There appears to be a question of extravasated IV or oral contrast adjacent to the distal aspect of the right ureteral stent best seen on series 2, image 115. Contained ureteral injury cannot be excluded. There appear to be inflammatory changes within this region. This was not clearly present on the July 2017 study. If further evaluation is needed, repeat CT with IV and oral contrast is recommended. *Small right perinephric fluid collection, unchanged in size compared to the prior study. *Left lobe pneumobilia. *     Xr Chest Standard (2 Vw)    Result Date: 2/3/2018  EXAMINATION: TWO VIEWS OF THE CHEST 2/3/2018 7:56 am COMPARISON: January 31, 2018 HISTORY: ORDERING SYSTEM PROVIDED HISTORY: Pneumonia, Follow opacity TECHNOLOGIST PROVIDED HISTORY: Reason for exam:->Pneumonia, Follow opacity FINDINGS: The endotracheal tube, enteric tube and right internal jugular line have been removed. Opacities in the right lung have progressed. New linear opacities in the mid and lower left lung field are noted. These findings may in part be due to atelectasis. There is no pneumothorax or effusion. The patient is status post vertebroplasty at 2 levels in the lumbar spine. A pigtail catheter or stent is partially visualized in the upper abdomen. Extubation. Opacities in the right lung have progressed and there are new opacities in the left lung. This may in part be due to atelectasis and shallow inflation, however interstitial inflammatory process or developing edema should also be considered.      Ct Head Wo Contrast    Result Date: 1/31/2018  EXAMINATION: CT OF THE HEAD WITHOUT CONTRAST  1/31/2018 6:48 pm TECHNIQUE: CT of the head was performed without the administration of intravenous contrast. Dose modulation, iterative reconstruction, and/or weight based adjustment of the mA/kV was utilized to reduce the radiation dose to as low as reasonably achievable. COMPARISON: December 31, 2017 HISTORY: ORDERING SYSTEM PROVIDED HISTORY: ams FINDINGS: BRAIN/VENTRICLES: There is no acute intracranial hemorrhage, mass effect or midline shift. No abnormal extra-axial fluid collection. The gray-white differentiation is maintained without evidence of an acute infarct. There is no evidence of hydrocephalus. ORBITS: The visualized portion of the orbits demonstrate no acute abnormality. SINUSES: The visualized paranasal sinuses and mastoid air cells demonstrate no acute abnormality. SOFT TISSUES/SKULL:  No acute abnormality of the visualized skull or soft tissues. No acute intracranial abnormality. Ct Chest Wo Contrast    Result Date: 1/8/2018  EXAMINATION: CT OF THE CHEST WITHOUT CONTRAST 1/8/2018 2:56 pm TECHNIQUE: CT of the chest was performed without the administration of intravenous contrast. Multiplanar reformatted images are provided for review. Dose modulation, iterative reconstruction, and/or weight based adjustment of the mA/kV was utilized to reduce the radiation dose to as low as reasonably achievable. COMPARISON: CT chest October 16, 2017. HISTORY: ORDERING SYSTEM PROVIDED HISTORY: Lung mass TECHNOLOGIST PROVIDED HISTORY: Reason for exam:->lung masses FINDINGS: Mediastinum: Visualized thyroid parenchyma appears unremarkable. Thoracic aorta demonstrates mild calcification without aneurysm. Pulmonary trunk appears nondilated. Heart appears at the upper limits normal in size without pericardial effusion.   Scattered prominent to enlarged mediastinal lymph nodes are identified, largest measurable lymph None. FLUOROSCOPY DOSE AND TYPE OR TIME AND EXPOSURES: 0.4 minutes; dap 130 cGy cm2. DESCRIPTION OF PROCEDURE: Informed consent was obtained after a detailed explanation of the procedure including risks, benefits, and alternatives. Universal protocol was observed. The patient was placed prone on the procedure table. A  image was obtained demonstrating the course of the existing nephrostomy tube in the expected location of the right renal pelvis overlying the proximal ureteral stent. 10 mL of contrast was injected and an antegrade pyelogram was performed. Opacification the right renal collecting system confirms proper positioning of the nephrostomy tube. However, moderate hydronephrosis demonstrated. Upon inspection of the nephrostomy tube, the tube appears to be kinked at the retention suture site. Therefore, the right flank was prepped and draped in the usual sterile fashion. The existing retention suture was cut and released. A new retention suture was placed using 2-0 Ethilon nonabsorbable sutures. The nephrostomy tube flushed with ease. An adhesive fixation device occlusive dressing were used. FINDINGS: Right nephrostomy appears in place. Right antegrade pyelogram confirms proper positioning of the right nephrostomy tube. A new retention suture is placed after removal of the existing suture causing a kink in the nephrostomy tube likely explaining the intermittent blockage of drainage. Us Liver    Result Date: 2/3/2018  EXAMINATION: RIGHT UPPER QUADRANT ULTRASOUND 2/3/2018 2:26 pm COMPARISON: August 22, 2017. CT July 16, 2017. Chest radiograph today. HISTORY: ORDERING SYSTEM PROVIDED HISTORY: R/o Cirrhosis, previously documented withoued any evidenc FINDINGS: LIVER:  Heterogeneous echotexture is noted without focal liver lesion identified. There is no nodular contour appreciated. The liver measures up to 17.5 cm. Patency of the portal vein is demonstrated with antegrade flow. BILIARY SYSTEM:  The gallbladder is not visualized consistent with cholecystectomy. Common bile duct is within normal limits measuring 2 mm. RIGHT KIDNEY: Moderately severe hydronephrosis present with echogenic material noted suggestive of stones. This appears more prominent compared to the recent ultrasound. PANCREAS:  Not well visualized. OTHER: Trace perihepatic fluid. 1.  Moderately severe right hydronephrosis, more prominent in the interval with findings consistent nephrolithiasis. Prior imaging demonstrated a right ureteral stent, which is not clearly demonstrated on this exam.  Clinical correlation is recommended. 2.  Mildly enlarged and heterogeneous sonographic appearance of the liver without morphologic features to suggest cirrhosis. These are nonspecific findings that can be seen with acute or chronic liver parenchymal disease. Xr Chest Portable    Result Date: 2/4/2018  EXAMINATION: SINGLE VIEW OF THE CHEST 2/4/2018 10:50 am COMPARISON: Chest February 3, 2018. HISTORY: ORDERING SYSTEM PROVIDED HISTORY: eval, desaturations TECHNOLOGIST PROVIDED HISTORY: Reason for exam:->eval, desaturations FINDINGS: Low lung volumes are once again present. There is cardiomegaly with diffuse vascular congestion and pulmonary edema findings, similar the prior study. Superimposed pneumonia cannot be excluded. No pneumothorax, large pleural effusion or free air. No significant change chest findings compared to the prior study. Xr Chest Portable    Result Date: 1/31/2018  EXAMINATION: SINGLE VIEW OF THE CHEST 1/31/2018 8:07 pm COMPARISON: Chest x-ray from 01/31/2018. HISTORY: ORDERING SYSTEM PROVIDED HISTORY: s/p central line R IJ TECHNOLOGIST PROVIDED HISTORY: Reason for exam:->s/p central line R IJ Ordering Physician Provided Reason for Exam: s/p central line R IJ Acuity: Unknown Type of Exam: Unknown FINDINGS: Endotracheal tube extends to the mid thoracic trachea approximately 2.8 cm above the michael. tibial veins  demonstrate normal compressibility   demonstrate normal compressibility  and augmentation. and augmentation. Normal compressibility of the great  Normal compressibility of the great  saphenous vein. saphenous vein. Normal compressibility of the small  Normal compressibility of the small  saphenous vein. saphenous vein. Incidental finding:                                       Avascular simple fluid collection                                       measuring 7.65mm x 20.3 mm. Risk Factors History +-----------+----------+---------------------------------------------------+ ! Diagnosis  ! Date      ! Comments                                           ! +-----------+----------+---------------------------------------------------+ ! DVT        !06/22/2015! rt deep calf vein partially compressible, 1 deep   ! !           !          !calf vein non-compressible, left wnl               ! +-----------+----------+---------------------------------------------------+ ! Previous   ! 04/01/2016! right dcv partially compressible                   ! !Scan       !          !                                                   ! +-----------+----------+---------------------------------------------------+ ! Previous   !06/30/2017! WNL                                                ! !Scan       !          !                                                   ! +-----------+----------+---------------------------------------------------+   - The patient's risk factor(s) include: chronic lung disease and arterial     hypertension.   - The patient has a current/recent (within 1 year) tobacco history. Velocities are measured in cm/s ; Diameters are measured in mm Right Lower Extremities DVT Study Measurements Right 2D Measurements +------------------------------------+----------+---------------+----------+ ! Location !Visualized! Compressibility! Thrombosis! +------------------------------------+----------+---------------+----------+ ! Common Femoral                      !Yes       ! Yes            ! None      ! +------------------------------------+----------+---------------+----------+ ! Prox Femoral                        !Yes       ! Yes            ! None      ! +------------------------------------+----------+---------------+----------+ ! Mid Femoral                         !Yes       ! Yes            ! None      ! +------------------------------------+----------+---------------+----------+ ! Dist Femoral                        !Yes       ! Yes            ! None      ! +------------------------------------+----------+---------------+----------+ ! Deep Femoral                        !Yes       ! Yes            ! None      ! +------------------------------------+----------+---------------+----------+ ! Popliteal                           !Yes       ! Yes            ! None      ! +------------------------------------+----------+---------------+----------+ ! Sapheno Femoral Junction            ! Yes       ! Yes            ! None      ! +------------------------------------+----------+---------------+----------+ ! PTV                                 ! Yes       ! Yes            ! None      ! +------------------------------------+----------+---------------+----------+ ! Peroneal                            !Yes       ! Yes            ! None      ! +------------------------------------+----------+---------------+----------+ ! Gastroc                             ! Yes       ! Yes            ! None      ! +------------------------------------+----------+---------------+----------+ ! GSV Thigh                           ! Yes       ! Yes            ! None      ! +------------------------------------+----------+---------------+----------+ ! GSV Knee                            ! Yes       ! Yes            ! None      !

## 2018-02-07 NOTE — PROGRESS NOTES
disorder); Scoliosis; Shortness of breath; Substance abuse; Thrombocytosis ([address] Urinary incontinence; and Wears glasses. has a past surgical history that includes Tubal ligation (1989); Tonsillectomy and adenoidectomy (1982); ERCP (5/16/2013); Upper gastrointestinal endoscopy; gastrectomy (2012); Paracentesis (Right, 01/2015-09/2015); Cystocopy (12/1/2015); Fixation Kyphoplasty (08/09/2016); Lung biopsy; insert nasal septal prosthesis (N/A, 4/14/2017); create eardrum opening,gen anesth (N/A, 4/14/2017); Abdomen surgery (09/2015); Cystoscopy (05/11/2017); Ureter stent placement (05/11/2017); Ureteroscopy (05/11/2017); Cystoscopy (Right, 5/11/2017); Cholecystectomy; hernia repair (07/18/2017); repair incisional hernia,reducible (N/A, 7/18/2017); and esophagogastroduodenoscopy transoral diagnostic (N/A, 8/30/2017). Treatment Diagnosis: septic shock      Restrictions  Restrictions/Precautions  Restrictions/Precautions: Fall Risk, Contact Precautions  Required Braces or Orthoses?: No    Subjective   General  Chart Reviewed: No  Patient assessed for rehabilitation services?: Yes  Family / Caregiver Present: No  Pain Assessment  Patient Currently in Pain: Yes  Pain Assessment: 0-10  Jackson-Baker Pain Rating: Hurts even more  Pain Level: 10  Pain Type: Acute pain  Pain Location: Abdomen  Pain Orientation: Right  Pain Descriptors: Aching  Pain Frequency: Continuous  Clinical Progression: Not changed  Pain Intervention(s): Medication (see eMar), Repositioned, Ambulation/Increased activity, Distraction, RN notified  Response to Pain Intervention: Patient Satisfied    Pt on 4L/min nasal cannula throughout session. SpO2 at rest >96, but would drop with poor signal to low 80s as Pt would demo muscle jerks with LE on which sensor was placed.       Social/Functional History  Social/Functional History  Lives With: Family (sister, [de-identified], nephew, aunt)  Type of Home: House  Home Layout: Two level, Able to Live on Main level with bedroom/bathroom  Home Access: Stairs to enter with rails  Entrance Stairs - Number of Steps: 6  Entrance Stairs - Rails: Both  Bathroom Shower/Tub: Tub/Shower unit, Curtain  Bathroom Toilet: Handicap height  Bathroom Equipment: Grab bars in shower, Grab bars around toilet  Home Equipment: Rolling walker, 4 wheeled walker, Cane, Celanese Big[employer] cane  [employer] Help From: Family  ADL Assistance: Independent  Homemaking Assistance: Independent  Ambulation Assistance: Independent  Transfer Assistance: Independent  Active : No  Patient's  Info: Family  Mode of Transportation: Family (sister)  Occupation: On disability, Unemployed (currently pursing disability)  Leisure & Hobbies: rest & relaxation       Objective   Vision: Impaired  Vision Exceptions: Wears glasses at all times  Hearing: Within functional limits    Orientation  Overall Orientation Status: Within Functional Limits     Balance  Sitting Balance: Minimal assistance (supervision progressing to MIN A as Pt fatigued quickly)  Standing Balance:  (Pt too fatigued for OOB activity.)  ADL  Feeding: Setup;Supervision  Grooming: Setup;Supervision  UE Bathing: Moderate assistance  LE Bathing: Moderate assistance  UE Dressing: Maximum assistance  LE Dressing: Maximum assistance  Toileting: Maximum assistance (Pt uses briefs at baseline - MAX A for brief change, pericare)  Tone RUE  RUE Tone: Normotonic  Tone LUE  LUE Tone: Normotonic  Coordination  Movements Are Fluid And Coordinated: No  Coordination and Movement description: Fine motor impairments;Gross motor impairments;Decreased speed;Right UE;Left UE     Bed mobility  Rolling to Right: Modified independent  Supine to Sit: Minimal assistance  Sit to Supine: Minimal assistance  Scooting: Minimal assistance  Comment: with use of arm rail, HOB raised ~25degrees        Cognition  Overall Cognitive Status: Exceptions  Arousal/Alertness: Delayed responses to stimuli  Following Commands:  Follows one step commands with

## 2018-02-07 NOTE — PROGRESS NOTES
Sign out note signed per critical care- green sheet filled out. RN went up to 4B to drop chart off- bed still dirty- RN awaiting call back from 4B RN when room is cleaned so pt can be transferred. Pt remains alert and oriented, VSS, and no signs of distress noted.

## 2018-02-07 NOTE — PROGRESS NOTES
right 06/04/2017    COPD with acute exacerbation ([address]     Anxiety 05/27/2016    Depression 05/27/2016    Hypoproliferative anemia (HCC) 01/14/2016    Multiple lung nodules on CT 01/13/2016    Liver mass     Non-intractable vomiting with nausea     Elevated alkaline phosphatase level     Leukocytosis 06/22/2015    Protein-calorie malnutrition, severe ([address] 05/19/2015    Thrombocytosis ([address] 03/30/2015    Cirrhosis of liver with ascites ([address] 03/30/2015    Mass of lower lobe of left lung 12/18/2014    Normocytic anemia 12/18/2014    AC globulin factor VII (stable) deficiency ([address] 02/14/2013    LA (lupus anticoagulant) disorder ([address] 02/14/2013    Altered mental status 01/12/2013    Smoking greater than 40 pack years 03/29/2012    History of blood transfusion 01/01/2012    PTSD (post-traumatic stress disorder) 01/01/1985       Past Medical History:   Diagnosis Date    Allergic rhinitis     Anxiety     Asthma DX PRIOR TO 1995    NO INHALER USE ONLY HAS TROUBLE IN EXTREME HOT OR COLD WEATHER    Blood circulation, collateral     Celiac disease     possible    Chronic back pain     low back pain     Chronic kidney disease     Cirrhosis of liver with ascites ([address] 3/30/2015    Constipation     CHRONIC    Cough 11/23/2016    Cryptogenic organizing pneumonia ([address] 11/23/2016    Depression 12/13/2012    on celexa per pcp    Difficult intravenous access     STATES LT ARM EASIER TO START IN, ENCOURAGED TO HYDRATE DAY PROIR     Disease of blood and blood forming organ     Thrombocytosis    Edentulous     does not wear her dentures    Full dentures     ENCOURAGED TO WEAR DOS    Gastric outlet obstruction 8/30/2017    GERD (gastroesophageal reflux disease)     Hearing loss     left ear    History of blood transfusion 01/2012    Hydronephrosis, bilateral 5/2/2015    Hypertension     Hypoglycemia 2014    Lung nodule     right, benign    Migraine     MRSA (methicillin resistant staph aureus) culture positive 02/01/2018    sputum    Observed seizure-like activity ([address] 12/31/2017    OCD (obsessive compulsive disorder)     Osteoarthritis     Osteoporosis     Pelvic mass April 2015    benign     Perforated nasal septum     Peripheral neuropathy due to inflammation (HCC) 12/15/2017    Pneumonia     Psoriasis     PTSD (post-traumatic stress disorder) 1985    [place]    Scoliosis     Shortness of breath 11/23/2016    with temp.  change    [place]    Thrombocytosis ([address]     Urinary incontinence     PT STATES RESOLVED    Wears glasses      Past Surgical History:   Procedure Laterality Date    ABDOMEN SURGERY  09/2015    MASS REMOVED AND HERNIA REPAIR    CHOLECYSTECTOMY      CYSTOSCOPY  12/1/2015    bilat retrograde, right ureteroscopy    CYSTOSCOPY  05/11/2017    CYSTOSCOPY Right 5/11/2017    CYSTOSCOPY, RIGHT URETEROSCOPY, RIGHT RETROGRADE PYELOGRAM, RIGHT STENT PLACEMENT performed by Andrew Poole MD at [address] ERCP  5/16/2013    FIXATION KYPHOPLASTY  08/09/2016    T12, L-2, L-5    GASTRECTOMY  2012    Partial Gastrectomy    HERNIA REPAIR  07/18/2017    hernia repair with [facility]    LUNG BIOPSY      right upper lobe    PARACENTESIS Right 01/2015-09/2015    X 8    NH CREATE EARDRUM OPENING,GEN ANESTH N/A 4/14/2017    LEFT MYRINGOTOMY T-TUBE INSERTION performed by Aldona Severin, MD at [address] ESOPHAGOGASTRODUODENOSCOPY TRANSORAL DIAGNOSTIC N/A 8/30/2017    EGD ESOPHAGOGASTRODUODENOSCOPY performed by Mimi Bone MD at [facility] N/A 4/14/2017    NASAL SEPTAL BUTTON INSERTION performed by Aldona Severin, MD at [address] [place] [address] N/A 7/18/2017    VENTRAL INCISIONAL HERNIA REPAIR X2  WITH MESH performed by Lucia Vargas DO at [place] [id number]

## 2018-02-07 NOTE — PROGRESS NOTES
Report called to [facility]. 4B RN. Medications passed, vitals completed. Patient alert and oriented x4, lung sounds clear, bowl sounds active. Patient in normal sinus rhythm. Waiting for critical care resident to complete sign out note.

## 2018-02-07 NOTE — PROGRESS NOTES
in the last 72 hours. Phosphorus:  Recent Labs      02/05/18   0430   PHOS  3.5     BNP:No results for input(s): BNP in the last 72 hours. Glucose:  Recent Labs      02/04/18   1551  02/05/18   0011  02/05/18   1253   POCGLU  77  96  83     HgbA1C: No results for input(s): LABA1C in the last 72 hours. INR:   Recent Labs      02/06/18   0440   INR  1.3     Hepatic: Recent Labs      02/06/18   0440   ALKPHOS  369*   ALT  6   AST  12   PROT  6.9   BILITOT  0.31   BILIDIR  0.16   LABALBU  1.7*     Amylase and Lipase:No results for input(s): LACTA, AMYLASE in the last 72 hours. Lactic Acid: No results for input(s): LACTA in the last 72 hours. CARDIAC ENZYMES:No results for input(s): CKTOTAL, CKMB, CKMBINDEX, TROPONINI in the last 72 hours. BNP: No results for input(s): BNP in the last 72 hours. Lipids: No results for input(s): CHOL, TRIG, HDL, LDLCALC in the last 72 hours.     Invalid input(s): LDL  ABGs: No results found for: PH, PCO2, PO2, HCO3, O2SAT  Thyroid:   Lab Results   Component Value Date    TSH 1.00 01/01/2018        Urinalysis: Recent Labs      02/04/18   1612   BACTERIA  NOT REPORTED   COLORU  LIGHT YELLOW   PHUR  6.5   PROTEINU  TRACE*   RBCUA  0 TO 2   SPECGRAV  1.008   BILIRUBINUR  NEGATIVE   NITRU  NEGATIVE   WBCUA  0 TO 2   LEUKOCYTESUR  SMALL*   GLUCOSEU  NEGATIVE           Assessment:  Patient Active Problem List   Diagnosis    Smoking greater than 40 pack years    Mass of lower lobe of left lung    Normocytic anemia    Thrombocytosis (HCC)    Protein-calorie malnutrition, severe (HCC)    Leukocytosis    Elevated alkaline phosphatase level    Liver mass    Non-intractable vomiting with nausea    Multiple lung nodules on CT    Hypoproliferative anemia (HCC)    Anxiety    Depression    Altered mental status    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

## 2018-02-08 NOTE — FLOWSHEET NOTE
Pt admitted to bed 530. Pt resting comfortably moving all extremities. No sustained eye contact noted. Pt belongings: T-shirt, pants, cell phone. Sister Dhaval Hall updated about patient. Sister stats she has patients dentures and glasses.

## 2018-02-08 NOTE — CONSULTS
[facility].    Neurocritical Care Progress Note  Neuro ICU Consult      History Obtained From:  EMR, Internal medicine residents     CHIEF COMPLAINT: Questionable SZ activity    HISTORY OF PRESENT ILLNESS:    The patient is a 50 y.o. female with past medical history of hypertension, substance abuse, chronic kidney disease, cirrhosis, chronic anemia, pulmonary mass was admitted on 1/31/18 for altered mental status and possible seizure is transferred to the neuro ICU after an episode of altered mental status and possible seizure activity this evening. Briefly, patient was found on 1/31 unresponsive at home, EMS was called and reported seizure activity on the way to the emergency department, patient was found to be altered with prolonged post ictal state, intubated in the emergency department. Patient was loaded with fosphenytoin. The following day, patient self extubated herself. She's had multiple episodes of twitching and jerking motions but has been found to be responsive during most of these episodes, redirectable and responds to ammonia salts. Other times, it's been reported that patient has had jerking activity while moaning for pain medications or Ativan. Due to this type of activity, neurology to determine the patient most likely was not having seizures and discontinued antiepileptics. To be noted, patient did have EEG on 2/1 showing left anterior temporal spike wave discharges. Patient states also been complicated by pneumonia, treated with antibiotics, anemia thought to be due to chronic disease ascites and pulmonary mass, patient followed by hematology. Patient was also found to have hydronephrosis and nephrostomy tube was placed. This evening, a rapid response was called at 6:15 PM, patient had had a change in her mentation, less responsive than normal, progressively becoming more agitated as smelling salts were used.   Internal medicine resident responded and witnessed patient having lip smacking behavior and automatisms and repetitive motor behavior with urinary incontinence. Ativan and Haldol was given and patient became sedated, behavior stopping. Patient was found to have hypomagnesemia and hypokalemia, both of which were replaced. It was then decided to transfer patient to the neuro ICU, and neuro critical care was consult. Currently, patient resting comfortably in bed, with loud verbal and painful stimuli, patient opens eyes but not responding to questions. Patient will follow simple commands such as squeezing her hands and wiggling her toes.       Past Medical History:        Diagnosis Date    Allergic rhinitis     Anxiety     Asthma DX PRIOR TO 1995    NO INHALER USE ONLY HAS TROUBLE IN EXTREME HOT OR COLD WEATHER    Blood circulation, collateral     Celiac disease     possible    Chronic back pain     low back pain     Chronic kidney disease     Cirrhosis of liver with ascites ([facility] [address] 3/30/2015    Constipation     CHRONIC    Cough 11/23/2016    Cryptogenic organizing pneumonia ([facility] [address] 11/23/2016    Depression 12/13/2012    on celexa per pcp    Difficult intravenous access     STATES LT ARM EASIER TO START IN, ENCOURAGED TO HYDRATE DAY PROIR     Disease of blood and blood forming organ     Thrombocytosis    Edentulous     does not wear her dentures    Full dentures     ENCOURAGED TO WEAR DOS    Gastric outlet obstruction 8/30/2017    GERD (gastroesophageal reflux disease)     Hearing loss     left ear    History of blood transfusion 01/2012    Hydronephrosis, bilateral 5/2/2015    Hypertension     Hypoglycemia 2014    Lung nodule     right, benign    Migraine     MRSA (methicillin resistant staph aureus) culture positive 02/01/2018    sputum    Observed seizure-like activity (HCC) 12/31/2017    OCD (obsessive compulsive disorder)     Osteoarthritis     Osteoporosis     Pelvic mass April 2015    benign     Perforated nasal septum     Peripheral neuropathy due to inflammation (HCC) 12/15/2017    Pneumonia     Psoriasis     PTSD (post-traumatic stress disorder) 1985    [facility]    Scoliosis     Shortness of breath 11/23/2016    with temp.  change    Substance abuse 1987    [facility]    Thrombocytosis ([address]     Urinary incontinence     PT STATES RESOLVED    Wears glasses        Past Surgical History:        Procedure Laterality Date    ABDOMEN SURGERY  09/2015    MASS REMOVED AND HERNIA REPAIR    CHOLECYSTECTOMY      CYSTOSCOPY  12/1/2015    bilat retrograde, right ureteroscopy    CYSTOSCOPY  05/11/2017    CYSTOSCOPY Right 5/11/2017    CYSTOSCOPY, RIGHT URETEROSCOPY, RIGHT RETROGRADE PYELOGRAM, RIGHT STENT PLACEMENT performed by Karlee Travis MD at [address] [facility]  5/16/2013    FIXATION KYPHOPLASTY  08/09/2016    T12, L-2, L-5    GASTRECTOMY  2012    Partial Gastrectomy    HERNIA REPAIR  07/18/2017    hernia repair with [facility]    LUNG BIOPSY      right upper lobe    PARACENTESIS Right 01/2015-09/2015    X 8    NE CREATE EARDRUM OPENING,GEN ANESTH N/A 4/14/2017    LEFT MYRINGOTOMY T-TUBE INSERTION performed by Darius Carrizales MD at [address] ESOPHAGOGASTRODUODENOSCOPY TRANSORAL DIAGNOSTIC N/A 8/30/2017    EGD ESOPHAGOGASTRODUODENOSCOPY performed by Karan Bobo MD at [address] [facility] N/A 4/14/2017    NASAL SEPTAL BUTTON INSERTION performed by Darius Carrizales MD at [address] [place] [address] N/A 7/18/2017    VENTRAL INCISIONAL HERNIA REPAIR X2  WITH MESH performed by Sandeep Flood DO at [address] [facility]  05/11/2017    URETEROSCOPY  05/11/2017       Medications Prior to Admission:    Prescriptions Prior to Admission: sodium chloride (ALTAMIST SPRAY) 0.65 % nasal spray, 1 spray by Nasal route as needed for Congestion  Calcium

## 2018-02-08 NOTE — PROGRESS NOTES
[]  Copius, Viscious Yellow/ Secretions   CXR as reported by physician []  clear  []  Unavailable []  Infiltrates and/or consolidation  []  Unavailable []  Mucus Plugging and or lobar consolidation  []  Unavailable   Cough []  Strong, productive cough []  Weak productive cough []  No cough or weak non-productive cough   DEANGELO CARR  1:08 PM                            FEMALE                                  MALE                            FEV1 Predicted Normal Values                        FEV1 Predicted Normal Values          Age                                     Height in Feet and Inches       Age                                     Height in Feet and Inches       4' 11\" 5' 1\" 5' 3\" 5' 5\" 5' 7\" 5' 9\" 5' 11\" 6' 1\"  4' 11\" 5' 1\" 5' 3\" 5' 5\" 5' 7\" 5' 9\" 5' 11\" 6' 1\"   42 - 45 2.49 2.66 2.84 3.03 3.22 3.42 3.62 3.83 42 - 45 2.82 3.03 3.26 3.49 3.72 3.96 4.22 4.47   46 - 49 2.40 2.57 2.76 2.94 3.14 3.33 3.54 3.75 46 - 49 2.70 2.92 3.14 3.37 3.61 3.85 4.10 4.36   50 - 53 2.31 2.48 2.66 2.85 3.04 3.24 3.45 3.66 50 - 53 2.58 2.80 3.02 3.25 3.49 3.73 3.98 4.24   54 - 57 2.21 2.38 2.57 2.75 2.95 3.14 3.35 3.56 54 - 57 2.46 2.67 2.89 3.12 3.36 3.60 3.85 4.11   58 - 61 2.10 2.28 2.46 2.65 2.84 3.04 3.24 3.45 58 - 61 2.32 2.54 2.76 2.99 3.23 3.47 3.72 3.98   62 - 65 1.99 2.17 2.35 2.54 2.73 2.93 3.13 3.34 62 - 65 2.19 2.40 2.62 2.85 3.09 3.33 3.58 3.84   66 - 69 1.88 2.05 2.23 2.42 2.61 2.81 3.02 3.23 66 - 69 2.04 2.26 2.48 2.71 2.95 3.19 3.44 3.70   70+ 1.82 1.99 2.17 2.36 2.55 2.75 2.95 3.16 70+ 1.97 2.19 2.41 2.64 2.87 3.12 3.37 3.62             Predicted Peak Expiratory Flow Rate                                       Height (in)  Female       Height (in) Male           Age 64 62 64 63 57 71 78 74 Age            21 344 774 030 257 008 [phone]  52 19 43 04 85 55 33 52 83   25 337 352 366 381 396 411 426 441 25 447 476 505 533 562 [phone]   30 329 344 359 374 389 404 419 434 30 [phone] [phone]

## 2018-02-08 NOTE — PROGRESS NOTES
Physical Therapy  DATE: 2018    NAME: Manuela Law  MRN: [id number]   : 1970    Patient not seen this date for Physical Therapy due to:  [] Blood transfusion in progress  [] Hemodialysis  []  Patient Declined  [] Spine Precautions   [] Strict Bedrest  [] Surgery/ Procedure  [] Testing      [x] Other Put on LTME this AM.       [] PT being discontinued at this time. Patient independent. No further needs. [] PT being discontinued at this time as the patient has been transferred to palliative care. No further needs.     Neta Apgar, PT

## 2018-02-08 NOTE — PROGRESS NOTES
NEUROLOGY INPATIENT PROGRESS NOTE    2/8/2018         Subjective: Chuy Law is a  50 y.o. female admitted on 1/31/2018 with Septic shock ([facility] [place] [address] [A41.9, R65.21]      Briefly, this is a  50 y.o. female admitted on 1/31/2018 with H/O HTN, substance abuse, CKD, cirrhosis, psoriasis, chronic back pain, scoliosis s/p T12, L2 & L5 kyphoplasty, who was admitted in ICU on 1/31/2018 with AMS. Pt was found down unresponsive at home. EMS were called who reported seizure while in transit. At arrival, pt had a witnessed GTC seizure  lasting for 30 sec - 2 min. Ativan. However pt had another witnessed seizure with prolonged post-ictal state and was then intubated. Pt was found to have sepsis, pneumonia, hyponatremia; also has H/O chronic anemia & chronic thrombocytosis neutrophilia. Per medical record, patient has had some twitches along with urinary incontinence. CT & MRI head - normal. She got loaded with Fosphenytoin 1 g IVPB x 1; level was supra therapeutic leading to a decision to discontinue the 100 mg every 8 hour maintenance dose so that she only a single maintenance dose was received which was very early a.m. 2/2.     She was reported to have had another seizure morning 2/5. Report from neurology nurse practitioner from that day described per nurse's report that the patient's eyes started twitching followed by generalized jerking, the patient was moaning loudly but was awake and rapid response was called. Concurrently her oxygen saturation was dropping and she was tachycardic and tachypnea. She was given some IV lorazepam and 1 g of IV LEV. The patient is familiar to the present consultant who evaluated the patient 12/31/2017. She had a history of  progressive weakness with anemia, leukocytosis and thrombocytosis, lung and liver masses reportedly with biopsies that came back negative for malignancy. She has been getting transfusions since 2011.   The frequency of transfusions markedly increased during 2017 and she needed 6 transfusions just in the month of December. A person with whom she lives reported seeing 5 or 6 \"seizures\" in the prior 9 months, she had been seen by multiple ED physicians and her PCP who have not determined that she was epileptic. These \"seizures\" lasted about 5 minutes but some would last last time and she might \"suddenly come out of it\" and start responding appropriately immediately when she would be touched or spoken to. The patient has become quite anorexic and losing a lot of weight last year. Her sodium was 128 when evaluated at that time. Impression was that she was sufficiently unlikely to be having seizures that AEDs were not warranted. During the approximately one week that the patient was hospitalized which was late December to early January she had 2 episodes in the morning of January 3 and January 4 associated with tachycardia in the 120s to 130s during which she had some bilateral limb myoclonic activity of varying intensity and during which at least some of the time she was tracking visually and answering questions appropriately even if slowly. Present consultant discontinued AED 2/5. That evening the patient had another \"seizure\" for 30 seconds, immediately afterwards was at baseline and asking for Ativan. Evening 2/7 the patient had another of her typical spells. Resident ordered stat CT scan of brain which was canceled by present consultant. She was transferred to NICU and now has ME EEG running. She has not had any spells since the study was begun.         Current Facility-Administered Medications on File Prior to Encounter   Medication Dose Route Frequency Provider Last Rate Last Dose    lactated ringers infusion   Intravenous Continuous Delma Herman MD 0 mL/hr at 12/01/15 1220      meperidine (DEMEROL) injection 12.5 mg  12.5 mg Intravenous Q5 Min PRN Clovis Noble MD        fentaNYL (SUBLIMAZE) injection 50 mcg  50 mcg Intravenous Q5 Min PRN Lane 07/18/2017    hernia repair with mash    LUNG BIOPSY      right upper lobe    PARACENTESIS Right 01/2015-09/2015    X 8    MO CREATE EARDRUM OPENING,GEN ANESTH N/A 4/14/2017    LEFT MYRINGOTOMY T-TUBE INSERTION performed by Frederick Villatoro MD at [address] ESOPHAGOGASTRODUODENOSCOPY TRANSORAL DIAGNOSTIC N/A 8/30/2017    EGD ESOPHAGOGASTRODUODENOSCOPY performed by Joseph Cheng MD at [address] [facility] N/A 4/14/2017    NASAL SEPTAL BUTTON INSERTION performed by Frederick Villatoro MD at [facility] N/A 7/18/2017    VENTRAL INCISIONAL HERNIA REPAIR X2  WITH MESH performed by Lanny Abdi DO at [place] [address]    UPPER GASTROINTESTINAL ENDOSCOPY     [address]  05/11/2017    URETEROSCOPY  05/11/2017           Medications:     cefepime  1 g Intravenous Q8H    melatonin  2 mg Oral Once    calcium carbonate-vitamin D  1 tablet Oral Daily    miconazole   Topical BID    sodium chloride flush  10 mL Intravenous BID    aspirin  81 mg Oral Daily    folic acid  1 mg Oral Daily    sodium chloride  250 mL Intravenous Once    famotidine  20 mg Oral BID    sodium chloride  250 mL Intravenous Once     PRN Meds include: ammonia, LORazepam, potassium chloride **OR** potassium chloride **OR** potassium chloride, oxyCODONE-acetaminophen, LORazepam, ondansetron, albuterol, glucose, dextrose, glucagon (rDNA), dextrose, potassium chloride, fentanNYL **OR** fentanNYL    Objective:   BP (!) 104/54   Pulse 86   Temp 97.8 °F (36.6 °C) (Axillary)   Resp 17   Ht 5' 1\" (1.549 m)   Wt 104 lb 0.9 oz (47.2 kg)   LMP 04/18/2012   SpO2 100%   BMI 19.66 kg/m²     Blood pressure range: Systolic (18SIL), IWM:676 , Min:100 , VERNON:777   ; Diastolic (98MOC), KMS:43, Min:48, Max:98    Neurologic examination    Alert but anergic.   Has responded readily and appropriately on prior exams but this morning once 1 examiner called her name she briefly opened her eyes and look at the examiner that immediately closed her eyes, To her side-lying position with head comfortably on pillow and would not follow any directions, open her eyes or interact with the examiner in any way. She has a small stuffed animal with her and when this was placed between her hands and her chest she adjusted her arms to hold it more comfortably but did not interact with the examiner. Grossly no focal neurologic signs. Data:    Lab Results:   CBC:   Recent Labs      02/06/18   0440  02/06/18   1521  02/07/18   0601  02/08/18   0556   WBC  14.6*   --   18.0*  20.6*   HGB  7.7*  7.9*  7.8*  7.8*   PLT  664*   --   537*  661*     BMP:    Recent Labs      02/06/18   0440  02/07/18   0811  02/07/18   1842  02/08/18   0556   NA  134*  133*  132*  133*   K  3.3*  3.5*  4.8  3.3*   CL  103  101  97*  101   CO2  19*  20  17*  21   BUN  10  8   --   9   CREATININE  0.58  0.49*   --   0.58   GLUCOSE  91  88   --   95         Lab Results   Component Value Date    CHOL 83 01/07/2016    LDLCHOLESTEROL 59 01/07/2016    HDL 12 (L) 01/07/2016    TRIG 77 02/01/2018    ALT 6 02/06/2018    AST 12 02/06/2018    TSH 1.00 01/01/2018    INR 1.3 02/06/2018    LABA1C 5.2 01/07/2016    [id number] 02/01/2018       Lab Results   Component Value Date    PHENYTOIN 4.5 (L) 02/04/2018     Impression and recommendations    See above history. This patient has intermittent spells and has had these for about a year and although these resemble epileptic fits of some kind, examiner considers these not to be seizures and not warrant treatment therefore with AEDs. She has some sort of episodes characterized by tachycardia. Bilateral myoclonic jerks during which time she is capable of goal-directed behavior, which indicates focal intense and purposes that these are not epileptic seizures. Consultant will continue to follow while the patient is in-house.   Now

## 2018-02-08 NOTE — PROGRESS NOTES
Pt moaning, Writer to bedside pt arm in air shaking, Ammonia Salt broken, Pt turns head away, Safety maintained Will continue to monitor

## 2018-02-09 NOTE — PROGRESS NOTES
self-care with MOD A  Short term goal 2: demo LB self-care with MOD-MAX A  Short term goal 3: demo activity tolerance for seated ADL/IADLs >10min with MIN A seated support PRN  Short term goal 4: demo >3 relaxation tech for ADL/IADLs with MOD VC  Short term goal 5: following OOB assessment: demo functional mobility/ADL transfers with 25% decrease in assist  Patient Goals   Patient goals : none identified       Therapy Time   Individual Concurrent Group Co-treatment   Time In  14:25         Time Out  14:39         [address]/L

## 2018-02-09 NOTE — PROGRESS NOTES
Physical Therapy  Facility/Department: [address]  Daily Treatment Note  NAME: Jevon Law  : 1970  MRN: [id number]    Date of Service: 2018    Patient Diagnosis(es):   Patient Active Problem List    Diagnosis Date Noted    Aspiration pneumonia of right upper lobe due to vomit ([address] 2016     Priority: High    Dyspnea and respiratory abnormalities 2016     Priority: High    Spells of trembling     Seizure disorder ([address]     Retained ureteral stent 2018    Respiratory alkalosis 2018    Hypoglycemia 2018    Hydronephrosis due to ureteral stricture     Metabolic acidosis, normal anion gap (NAG)     Witnessed seizure ([address]     Streptococcal sepsis ([address]     Septic shock ([address] 2018    Pneumonia of right middle lobe due to infectious organism ([address] 2017    Seizure ([address] 2017    Hypokalemia 2017    Fatigue     Peripheral neuropathy due to inflammation ([address] 12/15/2017    TIN (chronic hypoplastic anemia) (HCC) 2017    Acute pure red cell anemia ([address] 10/14/2017    Hypertension     Bilateral leg pain     Elevated serum immunoglobulin free light chain level 2017    Iron deficiency anemia due to chronic blood loss     Gastric outlet obstruction 2017    Gastroenteritis 2017    Aspiration pneumonia due to vomit ([address] 2017    Hematuria 2017    Acute cystitis with hematuria 2017    Hypoalbuminemia due to protein-calorie malnutrition ([address] 2017    Hypokalemia 2017    Hyponatremia 2017    Celiac disease     Gastroesophageal reflux disease without esophagitis     Essential hypertension     Acute sinusitis 08/15/2017    Suprapubic pain 08/15/2017    Anemia 08/15/2017    Incisional hernia 2017    Pulmonary infiltrates on CXR     Anemia due to vitamin B12 deficiency     Sepsis ([address]     Osteoporosis 2017    Ureteropelvic junction (UPJ) obstruction, right 06/04/2017    COPD with acute exacerbation ([address]     Anxiety 05/27/2016    Depression 05/27/2016    Hypoproliferative anemia (HCC) 01/14/2016    Multiple lung nodules on CT 01/13/2016    Liver mass     Non-intractable vomiting with nausea     Elevated alkaline phosphatase level     Leukocytosis 06/22/2015    Protein-calorie malnutrition, severe ([address] 05/19/2015    Thrombocytosis ([address] 03/30/2015    Cirrhosis of liver with ascites ([address] 03/30/2015    Mass of lower lobe of left lung 12/18/2014    Normocytic anemia 12/18/2014    AC globulin factor VII (stable) deficiency ([address] 02/14/2013    LA (lupus anticoagulant) disorder (HCC) 02/14/2013    Altered mental status 01/12/2013    Smoking greater than 40 pack years 03/29/2012    History of blood transfusion 01/01/2012    PTSD (post-traumatic stress disorder) 01/01/1985       Past Medical History:   Diagnosis Date    Allergic rhinitis     Anxiety     Asthma DX PRIOR TO 1995    NO INHALER USE ONLY HAS TROUBLE IN EXTREME HOT OR COLD WEATHER    Blood circulation, collateral     Celiac disease     possible    Chronic back pain     low back pain     Chronic kidney disease     Cirrhosis of liver with ascites ([address] 3/30/2015    Constipation     CHRONIC    Cough 11/23/2016    Cryptogenic organizing pneumonia ([address] 11/23/2016    Depression 12/13/2012    on celexa per pcp    Difficult intravenous access     STATES LT ARM EASIER TO START IN, ENCOURAGED TO HYDRATE DAY PROIR     Disease of blood and blood forming organ     Thrombocytosis    Edentulous     does not wear her dentures    Full dentures     ENCOURAGED TO WEAR DOS    Gastric outlet obstruction 8/30/2017    GERD (gastroesophageal reflux disease)     Hearing loss     left ear    History of blood transfusion 01/2012    Hydronephrosis, bilateral 5/2/2015    Hypertension     Hypoglycemia 2014    Lung nodule     right, benign    Migraine     MRSA (methicillin URETER STENT PLACEMENT  05/11/2017    URETEROSCOPY  05/11/2017       Restrictions  Restrictions/Precautions  Restrictions/Precautions: Fall Risk, Contact Precautions  Required Braces or Orthoses?: No  Subjective   General  Response To Previous Treatment: Patient with no complaints from previous session. Family / Caregiver Present: No  Subjective  Subjective: RN and pt agreeable to PT. Pt resting in bed upon arrival.  Slight delay in responses. Pain Screening  Patient Currently in Pain: Denies  Vital Signs  Patient Currently in Pain: Denies       Orientation  Orientation  Overall Orientation Status: Within Functional Limits (pt slow to respond)  Objective   Bed mobility  Supine to Sit: Minimal assistance  Sit to Supine: Contact guard assistance  Scooting: Contact guard assistance  Transfers  Sit to Stand: Contact guard assistance (vc's for hand placement)  Stand to sit: Contact guard assistance (vc for proper hand placement)  Ambulation  Ambulation?: Yes  Ambulation 1  Surface: level tile  Device: Rolling Walker  Other Apparatus:  (IV pole)  Assistance: Contact guard assistance;Minimal assistance  Quality of Gait: decreased refugio, flexed posture, decreased step length, slight BLE weakness noted  Distance: 35ft x 2   Comments: pt needing moderate encouragement to attempt ambulation  Stairs/Curb  Stairs?: No     Balance  Posture: Good (flexed posture)  Sitting - Static: Good  Sitting - Dynamic: Good  Standing - Static: Fair  Standing - Dynamic: Fair;-  Comments: EOB for extended time close SBA  Other exercises  Other exercises?: Yes  Seated LE exercise program: Long Arc Quads, hip abduction/adduction, heel/toe raises, and marches. Reps:  x 15-20  Other exercises 2: B shoulder flex x 20   Comments: pt needing vc's to stay on task and for proper technique              Assessment   Body structures, Functions, Activity limitations: Decreased functional mobility ; Decreased strength;Decreased endurance;Decreased

## 2018-02-09 NOTE — PROGRESS NOTES
MD Cathy         Current Outpatient Prescriptions on File Prior to Encounter   Medication Sig Dispense Refill    sodium chloride (ALTAMIST SPRAY) 0.65 % nasal spray 1 spray by Nasal route as needed for Congestion 1 Bottle 3    Calcium Carbonate-Vitamin D (OYSTER SHELL CALCIUM/D) 500-200 MG-UNIT TABS Take 1 tablet by mouth daily Please discontinue previous prescription of calcium 30 tablet 5    loratadine (CLARITIN) 10 MG tablet Take 1 tablet by mouth daily 30 tablet 5    citalopram (CELEXA) 40 MG tablet Take 1 tablet by mouth daily 30 tablet 5    aspirin (ASPIRIN LOW DOSE) 81 MG chewable tablet Take 1 tablet by mouth daily 30 tablet 5    pantoprazole (PROTONIX) 40 MG tablet Take 1 tablet by mouth daily 90 tablet 1    folic acid (FOLVITE) 1 MG tablet Take 1 tablet by mouth daily 30 tablet 5    umeclidinium-vilanterol (ANORO ELLIPTA) 62.5-25 MCG/INH AEPB inhaler Inhale 1 puff into the lungs daily 1 each 5    VENTOLIN  (90 Base) MCG/ACT inhaler INHALE 2 PUFFS INTO THE LUNGS EVERY 6 HOURS AS NEEDED FOR WHEEZING 1 Inhaler 0    docusate sodium (DOCQLACE) 100 MG capsule TAKE 1 CAPSULE BY MOUTH 2 TIMES DAILY AS NEEDED FOR CONSTIPATION 30 capsule 3    fluticasone (FLONASE) 50 MCG/ACT nasal spray 1 spray by Nasal route daily 1 Bottle 3    ondansetron (ZOFRAN) 4 MG tablet Take 1 tablet by mouth every 8 hours as needed for Nausea 8 tablet 0    albuterol (PROVENTIL) (5 MG/ML) 0.5% nebulizer solution Take 0.5 mLs by nebulization every 6 hours as needed for Wheezing 30 vial 0       Allergies: Pedrone Larry A Samples is allergic to latex; bee venom; benadryl [diphenhydramine-zinc acetate]; tavist; and tylenol [acetaminophen].     Past Medical History:   Diagnosis Date    Allergic rhinitis     Anxiety     Asthma DX PRIOR TO 1995    NO INHALER USE ONLY HAS TROUBLE IN EXTREME HOT OR COLD WEATHER    Blood circulation, collateral     Celiac disease     possible    Chronic back pain     low back pain     Chronic kidney disease     Cirrhosis of liver with ascites ([address] 3/30/2015    Constipation     CHRONIC    Cough 11/23/2016    Cryptogenic organizing pneumonia ([address] 11/23/2016    Depression 12/13/2012    on celexa per pcp    Difficult intravenous access     STATES LT ARM EASIER TO START IN, ENCOURAGED TO HYDRATE DAY PROIR     Disease of blood and blood forming organ     Thrombocytosis    Edentulous     does not wear her dentures    Full dentures     ENCOURAGED TO WEAR DOS    Gastric outlet obstruction 8/30/2017    GERD (gastroesophageal reflux disease)     Hearing loss     left ear    History of blood transfusion 01/2012    Hydronephrosis, bilateral 5/2/2015    Hypertension     Hypoglycemia 2014    Lung nodule     right, benign    Migraine     MRSA (methicillin resistant staph aureus) culture positive 02/01/2018    sputum    Observed seizure-like activity ([address] 12/31/2017    OCD (obsessive compulsive disorder)     Osteoarthritis     Osteoporosis     Pelvic mass April 2015    benign     Perforated nasal septum     Peripheral neuropathy due to inflammation ([address] 12/15/2017    Pneumonia     Psoriasis     PTSD (post-traumatic stress disorder) 1985    [facility]    Scoliosis     Shortness of breath 11/23/2016    with temp.  change    Substance abuse 1987    [facility]    Thrombocytosis ([address]     Urinary incontinence     PT STATES RESOLVED    Wears glasses        Past Surgical History:   Procedure Laterality Date    ABDOMEN SURGERY  09/2015    MASS REMOVED AND HERNIA REPAIR    CHOLECYSTECTOMY      CYSTOSCOPY  12/1/2015    bilat retrograde, right ureteroscopy    CYSTOSCOPY  05/11/2017    CYSTOSCOPY Right 5/11/2017    CYSTOSCOPY, RIGHT URETEROSCOPY, RIGHT RETROGRADE PYELOGRAM, RIGHT STENT PLACEMENT performed by Audrey Escobedo MD at [address] [place] [address] ERCP  5/16/2013    FIXATION KYPHOPLASTY  08/09/2016    T12, L-2, L-5    GASTRECTOMY  2012    Partial Gastrectomy    HERNIA REPAIR

## 2018-02-09 NOTE — FLOWSHEET NOTE
SPIRITUAL CARE DEPARTMENT - [address] [address]  PROGRESS NOTE    Shift date: 2/9/18  Shift day: Friday   Shift # 2    Room # 2893/2451-83   Name: Consuelo Law            Age: 50 y.o. Gender: female          Faith: [employer]   Place of [facility]: [place] [facility]    Referral: Rapid Response    Admit Date & Time: 1/31/2018  4:11 PM    PATIENT/EVENT DESCRIPTION:  Barby Law is a 50 y.o. female who was in Interventional Radiology reportedly for a nephrostogram when she developed seizure-like symptoms for reported approximately 5 minutes. Per report, both heart rate & blood pressure dropped and RRT called. It is reported that the seizure activity halted after pt placed on non-rebreather & RR team arrived in IR. Pt reported to appear to be post-ictal & returned to NSICU. SPIRITUAL ASSESSMENT/INTERVENTION:   responded to RRT in IR & then followed pt to room 530. Staff reports that pt was due to be moved to step-down, but now will remain on ICU unit.  spoke to pt & she said that he could pray for her.  offered prayer. Staff indicated no need to contact family @ this time. SPIRITUAL CARE FOLLOW-UP PLAN:  Chaplains will remain available to offer spiritual and emotional support as needed. Electronically signed by Rev. Gonsalo Ramírez, [facility], [facility], on 2/9/2018 at 5:48 PM.     02/09/18 1737   Encounter Summary   Services provided to: Patient   Referral/Consult From: Multi-disciplinary team  (RRT)   Support System Family members   Place of [address] [employer]   Continue Visiting (2/9)   Complexity of Encounter Moderate   Length of Encounter 15 minutes   Crisis   Type Rapid response   Assessment Approachable; Anxious; Coping   Intervention Sustaining presence/ Ministry of presence;Prayer;Nurtured hope   Outcome Receptive   Spiritual/Taoist   Type Spiritual support

## 2018-02-09 NOTE — PROGRESS NOTES
followed by hematology. Patient was also found to have hydronephrosis and nephrostomy tube was placed. This evening, a rapid response was called at 6:15 PM, patient had had a change in her mentation, less responsive than normal, progressively becoming more agitated as smelling salts were used. Internal medicine resident responded and witnessed patient having lip smacking behavior and automatisms and repetitive motor behavior with urinary incontinence. Ativan and Haldol was given and patient became sedated, behavior stopping. Patient was found to have hypomagnesemia and hypokalemia, both of which were replaced. It was then decided to transfer patient to the neuro ICU, and neuro critical care was consulted. 2/8 LTME placed in efforts to witness the seizure like activity    LTME was discontinued this morning without any seizure-like activity captured. Neurology is following along. Approximately an hour after LTME was removed from patient, when dietary entered patient's room with breakfast tray, patient began to hyperventilate and thrashing bilateral arms and legs, moving head from side to side. Patient did answer a couple questions appropriately during this episode which lasted a total of about 90 minutes before she completely stopped the activity. Dr. Hardeep Birch at bedside and believed to be non-epileptic in nature. Blood pressure, heart rate, and respiratory rate stable during episode. Pulse ox without good wave form. No acute events overnight.       CURRENT MEDICATIONS:  SCHEDULED MEDICATIONS:   enoxaparin  40 mg Subcutaneous Daily    cefepime  1 g Intravenous Q8H    melatonin  2 mg Oral Once    calcium carbonate-vitamin D  1 tablet Oral Daily    miconazole   Topical BID    sodium chloride flush  10 mL Intravenous BID    aspirin  81 mg Oral Daily    folic acid  1 mg Oral Daily    sodium chloride  250 mL Intravenous Once    famotidine  20 mg Oral BID    sodium chloride  250 mL Intravenous Once CONTINUOUS INFUSIONS:   sodium chloride 75 mL/hr at 18 0030    sodium chloride 10 mL/hr at 18 0553    dextrose       PRN MEDICATIONS:   magnesium hydroxide, ammonia, LORazepam, potassium chloride **OR** potassium chloride **OR** potassium chloride, LORazepam, ondansetron, albuterol, glucose, dextrose, glucagon (rDNA), dextrose, potassium chloride    VITALS:  Temperature Range: Temp: 98.8 °F (37.1 °C) Temp  Av.4 °F (36.9 °C)  Min: 97.6 °F (36.4 °C)  Max: 98.9 °F (37.2 °C)  BP Range: Systolic (23YHM), UDD:351 , Min:92 , QSO:511     Diastolic (56LAM), SJM:41, Min:47, Max:92    Pulse Range: Pulse  Av.6  Min: 85  Max: 111  Respiration Range: Resp  Av.4  Min: 17  Max: 26  Current Pulse Ox: SpO2: 91 %  24HR Pulse Ox Range: SpO2  Av.8 %  Min: 91 %  Max: 100 %  Patient Vitals for the past 12 hrs:   BP Temp Temp src Pulse Resp SpO2   18 1500 (!) 118/55 98.8 °F (37.1 °C) - 88 24 91 %   18 1302 112/69 - - 103 26 97 %   18 1201 (!) 115/55 - - 111 23 95 %   18 1123 (!) 92/47 - - 110 22 100 %   18 1115 (!) 92/47 97.6 °F (36.4 °C) - 86 - 93 %   18 1002 134/75 - - 88 23 99 %   18 0902 122/74 - - 91 20 98 %   18 0801 132/77 98.9 °F (37.2 °C) Oral 87 22 98 %   18 0705 126/70 - - 88 20 99 %   18 0702 - - - 89 20 98 %   18 0600 127/82 - - 87 17 99 %   18 0505 124/76 - - 88 22 98 %   18 0405 123/69 98.3 °F (36.8 °C) Oral 88 18 99 %     Estimated body mass index is 19.66 kg/m² as calculated from the following:    Height as of this encounter: 5' 1\" (1.549 m).     Weight as of this encounter: 104 lb 0.9 oz (47.2 kg).  []<16 Severe malnutrition  []1616.99 Moderate malnutrition  []1718.49 Mild malnutrition  [x]18.524.9 Normal  []2529.9 Overweight (not obese)  []3034.9 Obese class 1 (Low Risk)  []3539.9 Obese class 2 (Moderate Risk)  []?40 Obese class 3 (High Risk)    RECENT LABS: Lab Results   Component Value Date    WBC 14. 5 (H) 02/09/2018    HGB 7.3 (L) 02/09/2018    HCT 24.6 (L) 02/09/2018     (H) 02/09/2018    CHOL 83 01/07/2016    TRIG 77 02/01/2018    HDL 12 (L) 01/07/2016    ALT 10 02/09/2018    AST 18 02/09/2018     (L) 02/09/2018    K 3.8 02/09/2018     02/09/2018    CREATININE 0.46 (L) 02/09/2018    BUN 8 02/09/2018    CO2 20 02/09/2018    TSH 1.00 01/01/2018    INR 1.3 02/06/2018    LABA1C 5.2 01/07/2016     24 HOUR INTAKE/OUTPUT:  Intake/Output Summary (Last 24 hours) at 02/09/18 1540  Last data filed at 02/09/18 1200   Gross per 24 hour   Intake             2174 ml   Output              875 ml   Net             1299 ml       RADIOLOGY:   Labs and Images reviewed with:  [x] Patrice Pierce MD         [] Sandeep Serrato MD     [] Adan Peter MD  [] Emilia Dillon MD  [] Jimenez Garibay MD  [] Danika Ayoub MD    [] there are no new interval images to review. Ct Abdomen Pelvis Wo Contrast Additional Contrast? None    Result Date: 2/4/2018  EXAMINATION: CT OF THE ABDOMEN AND PELVIS WITHOUT CONTRAST 2/4/2018 11:56 am TECHNIQUE: CT of the abdomen and pelvis was performed without the administration of intravenous contrast. Multiplanar reformatted images are provided for review. Dose modulation, iterative reconstruction, and/or weight based adjustment of the mA/kV was utilized to reduce the radiation dose to as low as reasonably achievable. COMPARISON: CT abdomen and pelvis July 16, 2017. HISTORY: ORDERING SYSTEM PROVIDED HISTORY: RUQ PAIN, IN HOSPITAL, FEVER, ELEV WBC, LAO'S SIGN TECHNOLOGIST PROVIDED HISTORY: Additional Contrast?->None FINDINGS: Lower Chest: Lower lobe with a masslike consolidation with a hypodense center involving the left lower lobe the measuring 6.6 x 4.2 cm. This finding appear to have enhance on the prior study and appears to have increased in size once measuring 5.8 cm in greatest axial dimension.   Ground-glass opacity is noted involving the lingula as well as right middle which is not clearly demonstrated on this exam.  Clinical correlation is recommended. 2.  Mildly enlarged and heterogeneous sonographic appearance of the liver without morphologic features to suggest cirrhosis. These are nonspecific findings that can be seen with acute or chronic liver parenchymal disease. Xr Chest Portable    Result Date: 2/9/2018  EXAMINATION: SINGLE VIEW OF THE CHEST 2/9/2018 9:46 am COMPARISON: 02/07/2018 HISTORY: ORDERING SYSTEM PROVIDED HISTORY: follow up pneumonia TECHNOLOGIST PROVIDED HISTORY: Reason for exam:->follow up pneumonia FINDINGS: Patchy right lung airspace disease identified which appears slightly improved. The left lung is clear. The heart is nonenlarged. There is no evidence of a pneumothorax. No acute osseous abnormality is identified. Improving right lung airspace disease compatible with improving pneumonia. Xr Chest Portable    Result Date: 2/7/2018  EXAMINATION: SINGLE VIEW OF THE CHEST 2/7/2018 6:41 am COMPARISON: Chest x-ray from 02/04/2018 HISTORY: ORDERING SYSTEM PROVIDED HISTORY: follow up pna TECHNOLOGIST PROVIDED HISTORY: Reason for exam:->follow up pna FINDINGS: There is similar to slightly improved patchy airspace disease in the right mid to lower lung zones. The remainder of the lungs appear well aerated. No sizable pleural effusion or pneumothorax. There is stable borderline enlargement of the cardiac silhouette, which may at least in part relate to technique. There is no overt pulmonary vascular congestion. There are calcifications of the aortic arch. Similar scoliotic deformity of the spine. Visualized osseous structures are moderately demineralized. Vertebroplasty changes are noted in the lumbar spine. Surgical clips project over the left upper abdomen. Pigtail drainage catheter is incompletely visualized in the midline upper abdomen.      Slightly improved patchy airspace disease in the right mid to lower lung zones is concerning for pneumonia, given patient's clinical indication. Residual edema and/or atelectasis is not excluded. Continued imaging follow-up is recommended. Lyndia Corporal Chest Portable    Result Date: 2/4/2018  EXAMINATION: SINGLE VIEW OF THE CHEST 2/4/2018 10:50 am COMPARISON: Chest February 3, 2018. HISTORY: ORDERING SYSTEM PROVIDED HISTORY: eval, desaturations TECHNOLOGIST PROVIDED HISTORY: Reason for exam:->eval, desaturations FINDINGS: Low lung volumes are once again present. There is cardiomegaly with diffuse vascular congestion and pulmonary edema findings, similar the prior study. Superimposed pneumonia cannot be excluded. No pneumothorax, large pleural effusion or free air. No significant change chest findings compared to the prior study. Xr Chest Portable    Result Date: 1/31/2018  EXAMINATION: SINGLE VIEW OF THE CHEST 1/31/2018 8:07 pm COMPARISON: Chest x-ray from 01/31/2018. HISTORY: ORDERING SYSTEM PROVIDED HISTORY: s/p central line R IJ TECHNOLOGIST PROVIDED HISTORY: Reason for exam:->s/p central line R IJ Ordering Physician Provided Reason for Exam: s/p central line R IJ Acuity: Unknown Type of Exam: Unknown FINDINGS: Endotracheal tube extends to the mid thoracic trachea approximately 2.8 cm above the michael. Enteric tube courses below the diaphragm, distal tip not included. Right central venous catheter extends to the level of the distal SVC. Additional monitoring leads overlie the chest. Similar patchy increased density in the right mid lung. Remainder of the lungs appear well aerated. No pleural effusion. Supine technique limits evaluation for pneumothorax, but given this, no definite pneumothorax appreciated. Heart size appears within normal limits given technique and there is no overt pulmonary vascular congestion. There are calcifications of the aortic arch. Vertebroplasty changes identified. Surgical clips project over the left upper quadrant of the abdomen.   Visualized osseous structures

## 2018-02-10 NOTE — FLOWSHEET NOTE
After writer at the bedside and assessment completed, as well as tray set up. Called to the bedside by the tele sitter to say that the patient is having a jerking motion and called out for help. Arrived at the bedside and patient is talking and following commands as she jerks all extremities at times rhythmically and at times not. This event lasted 32 minutes. Notified Tasha NP and she is at the bedside on and off. Noted stable vitals thru event, and given . 5 mg ativan per NP. Momentarily stopped jerking activity and then resumed for another 15 minutes.

## 2018-02-10 NOTE — PROGRESS NOTES
NEUROLOGY INPATIENT PROGRESS NOTE    2/10/2018         Subjective: Sandro Law is a  50 y.o. female admitted on 1/31/2018 with Septic shock ([facility] [address] [A41.9, R65.21]      Briefly, this is a  50 y.o. female admitted on 1/31/2018 with H/O HTN, substance abuse, CKD, cirrhosis, psoriasis, chronic back pain, scoliosis s/p T12, L2 & L5 kyphoplasty, who was admitted in ICU on 1/31/2018 with AMS. Pt was found down unresponsive at home. EMS were called who reported seizure while in transit. At arrival, pt had a witnessed GTC seizure  lasting for 30 sec - 2 min. Ativan. However pt had another witnessed seizure with prolonged post-ictal state and was then intubated. Pt was found to have sepsis, pneumonia, hyponatremia; also has H/O chronic anemia & chronic thrombocytosis neutrophilia. Per medical record, patient has had some twitches along with urinary incontinence. CT & MRI head - normal. She got loaded with Fosphenytoin 1 g IVPB x 1; level was supra therapeutic leading to a decision to discontinue the 100 mg every 8 hour maintenance dose so that she only a single maintenance dose was received which was very early a.m. 2/2.     She was reported to have had another seizure morning 2/5. Report from neurology nurse practitioner from that day described per nurse's report that the patient's eyes started twitching followed by generalized jerking, the patient was moaning loudly but was awake and rapid response was called. Concurrently her oxygen saturation was dropping and she was tachycardic and tachypnea. She was given some IV lorazepam and 1 g of IV LEV. The patient is familiar to the present consultant who evaluated the patient 12/31/2017. She had a history of  progressive weakness with anemia, leukocytosis and thrombocytosis, lung and liver masses reportedly with biopsies that came back negative for malignancy. She has been getting transfusions since 2011.   The frequency of transfusions markedly increased during is allergic to latex; bee venom; benadryl [diphenhydramine-zinc acetate]; tavist; and tylenol [acetaminophen]. Past Medical History:   Diagnosis Date    Allergic rhinitis     Anxiety     Asthma DX PRIOR TO 1995    NO INHALER USE ONLY HAS TROUBLE IN EXTREME HOT OR COLD WEATHER    Blood circulation, collateral     Celiac disease     possible    Chronic back pain     low back pain     Chronic kidney disease     Cirrhosis of liver with ascites ([address] 3/30/2015    Constipation     CHRONIC    Cough 11/23/2016    Cryptogenic organizing pneumonia ([address] 11/23/2016    Depression 12/13/2012    on celexa per pcp    Difficult intravenous access     STATES LT ARM EASIER TO START IN, ENCOURAGED TO HYDRATE DAY PROIR     Disease of blood and blood forming organ     Thrombocytosis    Edentulous     does not wear her dentures    Full dentures     ENCOURAGED TO WEAR DOS    Gastric outlet obstruction 8/30/2017    GERD (gastroesophageal reflux disease)     Hearing loss     left ear    History of blood transfusion 01/2012    Hydronephrosis, bilateral 5/2/2015    Hypertension     Hypoglycemia 2014    Lung nodule     right, benign    Migraine     MRSA (methicillin resistant staph aureus) culture positive 02/01/2018    sputum    Observed seizure-like activity ([address] 12/31/2017    OCD (obsessive compulsive disorder)     Osteoarthritis     Osteoporosis     Pelvic mass April 2015    benign     Perforated nasal septum     Peripheral neuropathy due to inflammation (HCC) 12/15/2017    Pneumonia     Psoriasis     PTSD (post-traumatic stress disorder) 1985    UNISON    Scoliosis     Shortness of breath 11/23/2016    with temp.  change    Substance abuse 1987 [place]    Thrombocytosis ([address]     Urinary incontinence     PT STATES RESOLVED    Wears glasses        Past Surgical History:   Procedure Laterality Date    ABDOMEN SURGERY  09/2015    MASS REMOVED AND HERNIA REPAIR    CHOLECYSTECTOMY      CYSTOSCOPY  12/1/2015    bilat retrograde, right ureteroscopy    CYSTOSCOPY  05/11/2017    CYSTOSCOPY Right 5/11/2017    CYSTOSCOPY, RIGHT URETEROSCOPY, RIGHT RETROGRADE PYELOGRAM, RIGHT STENT PLACEMENT performed by Rickey Schuster MD at [facility]  5/16/2013    FIXATION KYPHOPLASTY  08/09/2016    T12, L-2, L-5    GASTRECTOMY  2012    Partial Gastrectomy    HERNIA REPAIR  07/18/2017    hernia repair with mash    LUNG BIOPSY      right upper lobe    PARACENTESIS Right 01/2015-09/2015    X 8    ME CREATE EARDRUM OPENING,GEN ANESTH N/A 4/14/2017    LEFT MYRINGOTOMY T-TUBE INSERTION performed by Deepika Melendez MD at [address] ESOPHAGOGASTRODUODENOSCOPY TRANSORAL DIAGNOSTIC N/A 8/30/2017    EGD ESOPHAGOGASTRODUODENOSCOPY performed by Antolin Hdz MD at [facility] N/A 4/14/2017    NASAL SEPTAL BUTTON INSERTION performed by Deepika Melendez MD at [address] [place] [address] N/A 7/18/2017    VENTRAL INCISIONAL HERNIA REPAIR X2  WITH MESH performed by Bryant Larsen DO at [address] [facility]  05/11/2017    URETEROSCOPY  05/11/2017           Medications:     enoxaparin  40 mg Subcutaneous Daily    sodium chloride flush  5 mL Intercatheter Daily    cefepime  1 g Intravenous Q8H    melatonin  2 mg Oral Once    calcium carbonate-vitamin D  1 tablet Oral Daily    miconazole   Topical BID    sodium chloride flush  10 mL Intravenous BID    aspirin  81 mg Oral Daily    folic acid  1 mg Oral Daily    sodium chloride  250 mL Intravenous Once    famotidine  20 mg Oral BID    sodium chloride  250 mL Intravenous Once     PRN Meds include: magnesium hydroxide, ammonia, LORazepam, potassium chloride **OR** potassium chloride **OR** potassium chloride, LORazepam, ondansetron, albuterol, glucose, dextrose, glucagon

## 2018-02-10 NOTE — PROGRESS NOTES
without esophagitis     Essential hypertension     Acute sinusitis 08/15/2017    Suprapubic pain 08/15/2017    Anemia 08/15/2017    Incisional hernia 07/18/2017    Pulmonary infiltrates on CXR     Anemia due to vitamin B12 deficiency     Sepsis ([address]     Osteoporosis 06/19/2017    Ureteropelvic junction (UPJ) obstruction, right 06/04/2017    COPD with acute exacerbation ([address]     Anxiety 05/27/2016    Depression 05/27/2016    Hypoproliferative anemia ([address] 01/14/2016    Multiple lung nodules on CT 01/13/2016    Liver mass     Non-intractable vomiting with nausea     Elevated alkaline phosphatase level     Leukocytosis 06/22/2015    Protein-calorie malnutrition, severe ([address] 05/19/2015    Thrombocytosis ([address] 03/30/2015    Cirrhosis of liver with ascites ([address] 03/30/2015    Mass of lower lobe of left lung 12/18/2014    Normocytic anemia 12/18/2014    AC globulin factor VII (stable) deficiency ([address] 02/14/2013    LA (lupus anticoagulant) disorder ([address] 02/14/2013    Altered mental status 01/12/2013    Smoking greater than 40 pack years 03/29/2012    History of blood transfusion 01/01/2012    PTSD (post-traumatic stress disorder) 01/01/1985       Additional assessment:    1. Non-epileptic seizures  2. Pneumonia  3. Right hydronephrosis   4. Anemia of chronic disease  5. Chronic thrombocytosis    Recommended Follow-up:     1. No AEDs recommended, Do not recommend benzodiazepines for her spells  2. CXR 2/9 showed improving right lung airspace disease consistent with improving PNA  3. Continue cefepime 1g Q8h IVPB per primary team  4. Right nephrostomy tube, placement confirmed in via nephrostogram in IR 2/9, no evidence of hydronephrosis  5. Urology following; recommending removal of urethral stent outpatient  6. F/U Hem/Onc    Above mentioned assessment and plan was discussed by me with the admitting medicine resident.  The medicine team assigned to the patient by medicine admitting resident

## 2018-02-10 NOTE — PROGRESS NOTES
called at 6:15 PM, patient had had a change in her mentation, less responsive than normal, progressively becoming more agitated as smelling salts were used.  Internal medicine resident responded and witnessed patient having lip smacking behavior, automatisms, and repetitive motor behavior with urinary incontinence.  Ativan and Haldol was given and patient became sedated, stopping episode. Rigo Ibanez was found to have hypomagnesemia and hypokalemia, both of which were replaced. She was then transferred to the Neuro ICU and Neuro Critical Care was consulted.     2/8 LTME placed in efforts to witness the seizure like activity    2/9 LTME discontinued in the morning with no seizure like activity captured. Shortly after removal of LTME patient had prolonged spell lasting about 90 minutes which including thrashing in all extremities, moving head side from side, and hyperventilation. She had occasional purposeful movements and sometimes directable behavior during spell. Vitals remained stable. No acute events overnight. On assessment this morning the patient is Aox3, answers questions appropriately, and follows commands. She moves all her extremities equally. Denies Stuart, vision changes, SOB, chest pain.       CURRENT MEDICATIONS:  SCHEDULED MEDICATIONS:   enoxaparin  40 mg Subcutaneous Daily    sodium chloride flush  5 mL Intercatheter Daily    cefepime  1 g Intravenous Q8H    melatonin  2 mg Oral Once    calcium carbonate-vitamin D  1 tablet Oral Daily    miconazole   Topical BID    sodium chloride flush  10 mL Intravenous BID    aspirin  81 mg Oral Daily    folic acid  1 mg Oral Daily    sodium chloride  250 mL Intravenous Once    famotidine  20 mg Oral BID    sodium chloride  250 mL Intravenous Once     CONTINUOUS INFUSIONS:   sodium chloride 75 mL/hr at 02/09/18 2214    sodium chloride 10 mL/hr at 02/07/18 0553    dextrose       PRN MEDICATIONS:   magnesium hydroxide, ammonia, LORazepam, potassium unchanged from prior study. Small perinephric fluid collection is seen adjacent to the right kidney, grossly unchanged from the prior study. Abdominal aorta demonstrates mild calcification without aneurysm. GI/Bowel: Patient status post partial gastrectomy. Small bowel appears nondilated. Bold layering previously given IV oral contrast is seen within the right lower quadrant on series 2, image 115. This was not clearly seen on the prior study. There is questionable surrounding inflammatory change. No acute colonic abnormality is seen. The appendix is not clearly identified. Pelvis: Prater catheter is seen within the urinary bladder with nondependent air present. Uterus is grossly unremarkable. No definite adnexal mass or cyst. Peritoneum/Retroperitoneum: No free air or free fluid is noted. No definite lymphadenopathy is seen. Bones/Soft Tissues: Abdominal wall demonstrates no acute findings. Body wall anasarca is seen. Osseous structures demonstrate no acute findings. Patient status post multilevel kyphoplasty with scoliosis of the visualized thoracolumbar spine with associated degenerative change. *Lower lobe consolidations are identified with a masslike consolidation with a hypodense center involving the left lower lobe seen measuring 6.6 x 4.2 cm. This mass appears to have enhance on the prior study has increased in size since then. The right lower lobe consolidation appears new when compared to the prior studies and most likely infectious in etiology. *Ground-glass opacity involving the right middle lobe and lingula, most likely representing additional infectious process. *Right-sided double-J ureteral stent is identified with unchanged hydronephrosis involving the right kidney. There appears to be layering calcification seen within the renal pelvis with additional 2 mm calcification seen adjacent to the ureteral stent on series 2, image 113.  *There appears to be a question of extravasated IV is no acute intracranial hemorrhage, mass effect or midline shift. No abnormal extra-axial fluid collection. The gray-white differentiation is maintained without evidence of an acute infarct. There is no evidence of hydrocephalus. ORBITS: The visualized portion of the orbits demonstrate no acute abnormality. SINUSES: The visualized paranasal sinuses and mastoid air cells demonstrate no acute abnormality. SOFT TISSUES/SKULL:  No acute abnormality of the visualized skull or soft tissues. No acute intracranial abnormality. Ir Antegrade Pyelogram    Result Date: 2/6/2018  PROCEDURE: IR ANTEGRADE PYELOGRAM 2/6/2018 HISTORY: ORDERING SYSTEM PROVIDED HISTORY: Possible blockage of nephrostomy tube TECHNOLOGIST PROVIDED HISTORY: Reason for exam:->Possible blockage of nephrostomy tube CONTRAST: 20 mL of Conray 43. SEDATION: None. FLUOROSCOPY DOSE AND TYPE OR TIME AND EXPOSURES: 0.4 minutes; dap 130 cGy cm2. DESCRIPTION OF PROCEDURE: Informed consent was obtained after a detailed explanation of the procedure including risks, benefits, and alternatives. Universal protocol was observed. The patient was placed prone on the procedure table. A  image was obtained demonstrating the course of the existing nephrostomy tube in the expected location of the right renal pelvis overlying the proximal ureteral stent. 10 mL of contrast was injected and an antegrade pyelogram was performed. Opacification the right renal collecting system confirms proper positioning of the nephrostomy tube. However, moderate hydronephrosis demonstrated. Upon inspection of the nephrostomy tube, the tube appears to be kinked at the retention suture site. Therefore, the right flank was prepped and draped in the usual sterile fashion. The existing retention suture was cut and released. A new retention suture was placed using 2-0 Ethilon nonabsorbable sutures. The nephrostomy tube flushed with ease.   An adhesive fixation device occlusive FINDINGS: Patchy right lung airspace disease identified which appears slightly improved. The left lung is clear. The heart is nonenlarged. There is no evidence of a pneumothorax. No acute osseous abnormality is identified. Improving right lung airspace disease compatible with improving pneumonia. Xr Chest Portable    Result Date: 2/7/2018  EXAMINATION: SINGLE VIEW OF THE CHEST 2/7/2018 6:41 am COMPARISON: Chest x-ray from 02/04/2018 HISTORY: ORDERING SYSTEM PROVIDED HISTORY: follow up pna TECHNOLOGIST PROVIDED HISTORY: Reason for exam:->follow up pna FINDINGS: There is similar to slightly improved patchy airspace disease in the right mid to lower lung zones. The remainder of the lungs appear well aerated. No sizable pleural effusion or pneumothorax. There is stable borderline enlargement of the cardiac silhouette, which may at least in part relate to technique. There is no overt pulmonary vascular congestion. There are calcifications of the aortic arch. Similar scoliotic deformity of the spine. Visualized osseous structures are moderately demineralized. Vertebroplasty changes are noted in the lumbar spine. Surgical clips project over the left upper abdomen. Pigtail drainage catheter is incompletely visualized in the midline upper abdomen. Slightly improved patchy airspace disease in the right mid to lower lung zones is concerning for pneumonia, given patient's clinical indication. Residual edema and/or atelectasis is not excluded. Continued imaging follow-up is recommended. [facility] Chest Portable    Result Date: 2/4/2018  EXAMINATION: SINGLE VIEW OF THE CHEST 2/4/2018 10:50 am COMPARISON: Chest February 3, 2018. HISTORY: ORDERING SYSTEM PROVIDED HISTORY: eval, desaturations TECHNOLOGIST PROVIDED HISTORY: Reason for exam:->eval, desaturations FINDINGS: Low lung volumes are once again present.   There is cardiomegaly with diffuse vascular congestion and pulmonary edema findings, similar the normal    Motor exam is symmetrical 5 out of 5 all extremities bilaterally    Sensory:    Touch:    Right Upper Extremity:  normal  Left Upper Extremity:  normal  Right Lower Extremity:  normal  Left Lower Extremity:  normal    Coordination/Dysmetria:  Heel to Shin:  Right:  normal  Left:  normal  Finger to Nose:   Right:  normal  Left:  normal   Dysdiadochokinesia:  absent       DRAINS:  [x] There are no drains for Neuro Critical Care to monitor at this time.      ASSESSMENT AND PLAN:       51 yo female admitted on 1/31 with AMS and seizure like activity with multiple episodes of seizures vs pseudoseizures during stay in the hospital.  Transferred to NICU 2/7 after episode of AMS, repetitive movements and agitation.      NEUROLOGIC:  - Non-epileptic seizures  - [address] [facility] [address] 1/31 showed no acute abnormality  - EEG 2/1 demonstrated left anterior temporal spike waved discharges that may indicate cortical irritability  - MRI B 2/2 unremarkable  - LTME 2/8-2/9 showed no evidence to support diagnosis of seizures/epilepsy  - Continue seizure precautions  - Neuro checks per protocol   - Recommend Epilepsy Monitoring Unit (EMU) evaluation as outpatient     CARDIOVASCULAR:  - Goal normotensive  - Blood pressures well controlled overnight  - Continue telemetry  - ASA 81 mg     PULMONARY:  - Maintaining oxygen saturation on room air  - Continue to monitor for respiratory distress  - Sputum culture 2/1 positive for MRSA, light growth  - CXR 2/9 improving right lung airspace disease compatible with improving pneumonia  - Currently on Cefepime 1g Q8h per primary team for pneumonia     RENAL/FLUID/ELECTROLYTE:  - BUN 8 / Creatinine 0.44  - Adequate urine output per nursing, 400ml, unmeasured x2  - IVF: NS decreased to 50ml/hr, discontinue when adequate PO intake  - R nephrostomy tube for hydronephrosis, placement confirmed in IR 2/9  - Urology following; right ureteral stent removal likely as outpatient  - Hyponatremia, appears chronic, Na 134  - Replace electrolytes prn     GI/NUTRITION:  NUTRITION:  DIET GENERAL;  Dietary Nutrition Supplements: Standard High Calorie Oral Supplement  - Pepcid 20 mg BID for GI ppx  - Milk of Magnesia prn for constipation     ID:  - Afebrile, tmax 37.2  - Leukocytosis improving; WBC 14.1  - Blood cultures 2/4 negative  - CXR 2/9 improving right lung airspace disease compatible with improving pneumonia  - Currently on Cefepime 1 gram q8h for pneumonia per primary team  - Continue to monitor for fevers  - Daily CBC     HEMATOLOGIC:  - Anemia of chronic disease, H&H stable 7.4/25.2  - Thrombocytosis, Platelets 437  - Hem/Onc following  - Daily CBC    ENDOCRINE:  - Continue to monitor blood glucose, Goal <180     PROPHYLAXIS:  Stress ulcer: H2 blocker, Pepcid 20mg BID     DVT PROPHYLAXIS:  - SCD sleeves - Thigh High   - SIMBA stockings - Thigh High  - Lovenox 40 mg daily    DISPOSITION:  [x] OK for out of ICU from Neuro Critical Care standpoint    We will sign off.       JOSEPH Brewer  Neuro Critical Care  Pager [phone]  2/10/2018     8:25 AM

## 2018-02-10 NOTE — PROGRESS NOTES
ICU PATIENT TRANSFER NOTE        Patient:  Saturnino ANDRADE Samples  YOB: 1970    MRN: [id number]     Acct: [de-identified]     Admit date: 1/31/2018    Code Status:-  Full    Reason for ICU Admission:-   Seizure like activity     SUPPORT DEVICES: [] Ventilator [] BIPAP  [x] Nasal Cannula [x] Room Air    Consultations:- [] Cardiology [] Nephrology  [x] Hemo onco  [] GI                               [] ID [] ENT  [] Rheum [] Endo   []Physiotherapy                                 Others:- ID    NUTRITION:  [] NPO [] Tube Feeding (Specify: ) [] TPN  [x] PO    Central Lines:- [x] No   [] Yes           If yes - Days/Date of Insertion        Pt seen and Chart reviewed. ICU COURSE :-      24-year-old female transferred from neuro ICU to our service. Initially she presented in ED with regard to mental sensorium, EMS found to have a seizure-like activity and gave 1 mg of Ativan she responded well. She had witnessed seizure in ED, got intubated and transferred to ICU, next day she self extubated,   Chest x-ray showed evidence of right middle lung pneumonia, she was started on vancomycin and Zosyn and azithromycin was given in ED and subsequently continued on cefepime. Initially that the acid was 7.1, resolved VF resuscitation.   Patient's WBC count was 33.5, hemoglobin was 6.4 and she was transfused red blood cells in ED.    seizure-like activity neurology was consulted CT head and MRI brain was negative for any acute abnormality EKG was done on 2/1/2018, showing left anterior temporal spike wave with multiple large artifacts, by neurology she was started on fosphenytoin, she continued to have seizure-like activity responded to Ativan, she was transferred to NICU for close monitoring, today she came out of ICU, as DME was done during ICU, was negative for any significant abnormality, neurology did not recommend any AED, considering it nonepileptic

## 2018-02-10 NOTE — PROGRESS NOTES
IM Senior Note      Patient seen and examined at the bed siteSee details in the Intern note (Dr. Riri Aburto note )    This is a patient with multiple comorbidities was brought in and where to the ED for seizure-like activity was hypoxic and got intubated in the ED for airway protection. Patient extubated after 2 days of initial admission. CXR pneumonia in the right lower and middle lobe initially was treated with vancomycin and Zosyn and azithromycin later descalated to Cefipime. Intial wbc count of 33 k, Hb od 6.4 Sputum showed gram-positive cocci. UDS positive for BZd and oxycodone at admission. CT head and MRI brain with therefore acute abnormality. Intially treated with fosphenytoin by neurology. EEG concerning for left temporal spikes with artifacts, later AED were discontinued as neurology is of the opinion of pseudoseizures,  non epileptic nature with lip smacking movements. and discontinued AED. LTME negative for seizure activity. Urine cultures were positive for candida 2/6/18. Blood cultures were negative. Ultrasound liver showed Rt hydronephrosis, urology was consulted. Patient originally underwent diagnostic right ureteroscopy, R RGP and right ureteral stent for hydronephrosis on 5/2017. A Lasix renogram at that time showed prolonged T1/2 of given minutes and reduce function 36% in the right kidney. Scheduled for follow-up but looks like she also follow up. Studies here shows persistent right hydronephrosis. A relook at rt nephrostomy was done 2/9/28 for concern of leakage and hydronephrosis which was apparently negative. GEN surgery was consulted for pneumobilia, no evidence evidence of acute abdomen on the imaging studies and clinically and general surgery signed off. Hematology Oncology was consulted for chronic neutrophilia and thrombocytosis anemia.   She has history of pulmonary masses which has been biopsied and have been negative for malignancy patient has been seen at [facility] in [facility]. Her BCL ABL, GENNARO  2 mutation testing have been negative. On marrow biopsy was non-diagnostic. Mitochondrial ab negative. She received 3 U prbc transfusion. She has been worked up extensively in past Chromosome study, flow cytometry were negative. Connective tissue disease work up was negative.     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 respiratory abnormalities    COPD with acute exacerbation (HCC)    Ureteropelvic junction (UPJ) obstruction, right    Osteoporosis    Sepsis ([address]    Anemia due to vitamin B12 deficiency    Pulmonary infiltrates on CXR    Incisional hernia    Acute sinusitis    Suprapubic pain    Anemia    Celiac disease    Cirrhosis of liver with ascites (HCC)    Gastroesophageal reflux disease without esophagitis    Essential hypertension    Hematuria    Acute cystitis with hematuria    Hypoalbuminemia due to protein-calorie malnutrition (HCC)    Hypokalemia    Hyponatremia    Aspiration pneumonia due to vomit (HCC)    PTSD (post-traumatic stress disorder)    Gastroenteritis    Gastric outlet obstruction    Iron deficiency anemia due to chronic blood loss    Elevated serum immunoglobulin free light chain level    Hypertension    Acute pure red cell anemia (HCC)    Bilateral leg pain    History of blood transfusion

## 2018-02-11 NOTE — PLAN OF CARE
Problem: Falls - Risk of  Goal: Absence of falls  Outcome: Met This Shift  Pt assessed as a fall risk this shift. Remains free from falls and accidental injury at this time. Fall precautions in place, including falling star sign and fall risk band on pt. Floor free from obstacles, and bed is locked and in lowest position. Adequate lighting provided. Pt encouraged to call before getting Out Of Bed for any need. Bed alarm activated. Will continue to monitor needs during hourly rounding, and reinforce education on use of call light.
Problem: Falls - Risk of  Goal: Absence of falls  Outcome: Met This Shift  Pt assessed as a fall risk this shift. Remains free from falls and accidental injury at this time. Fall precautions in place, including falling star sign and fall risk band on pt. Floor free from obstacles, and bed is locked and in lowest position. Adequate lighting provided. Pt encouraged to call before getting Out Of Bed for any need. Bed alarm activated. Will continue to monitor needs during hourly rounding, and reinforce education on use of call light.
Problem: Falls - Risk of  Goal: Absence of falls  Outcome: Met This Shift  Pt. Free of falls this shift. Bed locked in lowest position, side rails up, call light in reach, uses appropriately.
Problem: Falls - Risk of  Goal: Absence of falls  Outcome: Ongoing      Problem: Risk for Impaired Skin Integrity  Goal: Tissue integrity - skin and mucous membranes  Structural intactness and normal physiological function of skin and  mucous membranes.    Outcome: Ongoing      Problem: Pain:  Goal: Control of acute pain  Control of acute pain   Outcome: Ongoing    Goal: Control of chronic pain  Control of chronic pain   Outcome: Ongoing
Problem: Falls - Risk of  Goal: Absence of falls  Outcome: Ongoing  Patient remains free from falls throughout this shift. Patient calls out appropriately. Call light and bedside table are within reach. Bed in lowest position and wheels are locked. Bed alarm on for added safety. Problem: Pain:  Goal: Control of acute pain  Control of acute pain   Outcome: Ongoing  Patient continues to require control of acute pain. Frequent pain assessments have been performed throughout this shift and medication has been administered.
Problem: Falls - Risk of  Goal: Absence of falls  Outcome: Ongoing  Pt remained free from falls per my shift. Bed is low and locked with siderails up x4 with bedfellows in place. Bed alarm in use. Patient calls out appropriately. Will continue to monitor.
Problem: OXYGENATION/RESPIRATORY FUNCTION  Goal: Patient will maintain patent airway  Outcome: Ongoing    Goal: Patient will achieve/maintain normal respiratory rate/effort  Respiratory rate and effort will be within normal limits for the patient   Outcome: Ongoing      Problem: MECHANICAL VENTILATION  Goal: Patient will maintain patent airway  Outcome: Ongoing    Goal: Oral health is maintained or improved  Outcome: Ongoing    Goal: ET tube will be managed safely  Outcome: Ongoing    Goal: Ability to express needs and understand communication  Outcome: Ongoing    Goal: Mobility/activity is maintained at optimum level for patient  Outcome: Ongoing      Problem: SKIN INTEGRITY  Goal: Skin integrity is maintained or improved  Outcome: Ongoing      Problem: NUTRITION  Goal: Nutritional status is improving  Outcome: Ongoing      Problem: Restraint Use - Nonviolent/Non-Self-Destructive Behavior:  Goal: Absence of restraint indications  Absence of restraint indications   Outcome: Completed Date Met: 02/02/18    Goal: Absence of restraint-related injury  Absence of restraint-related injury   Outcome: Completed Date Met: 02/02/18      Problem: Falls - Risk of  Goal: Absence of falls  Outcome: Ongoing      Problem: Risk for Impaired Skin Integrity  Goal: Tissue integrity - skin and mucous membranes  Structural intactness and normal physiological function of skin and  mucous membranes.    Outcome: Ongoing
Problem: OXYGENATION/RESPIRATORY FUNCTION  Goal: Patient will maintain patent airway  Outcome: Ongoing    Goal: Patient will achieve/maintain normal respiratory rate/effort  Respiratory rate and effort will be within normal limits for the patient   Outcome: Ongoing      Problem: SKIN INTEGRITY  Goal: Skin integrity is maintained or improved  Outcome: Ongoing      Problem: NUTRITION  Goal: Nutritional status is improving  Outcome: Ongoing      Problem: Falls - Risk of  Goal: Absence of falls  Outcome: Ongoing      Problem: Risk for Impaired Skin Integrity  Goal: Tissue integrity - skin and mucous membranes  Structural intactness and normal physiological function of skin and  mucous membranes.    Outcome: Ongoing      Problem: Pain:  Goal: Control of acute pain  Control of acute pain   Outcome: Ongoing
Problem: OXYGENATION/RESPIRATORY FUNCTION  Goal: Patient will maintain patent airway  Outcome: Ongoing  Goal: Patient will achieve/maintain normal respiratory rate/effort  Respiratory rate and effort will be within normal limits for the patient  Outcome: Ongoing    Problem: MECHANICAL VENTILATION  Goal: Patient will maintain patent airway  Outcome: Ongoing  Goal: Oral health is maintained or improved  Outcome: Ongoing  Goal: ET tube will be managed safely  Outcome: Ongoing  Goal: Ability to express needs and understand communication  Outcome: Ongoing  Goal: Mobility/activity is maintained at optimum level for patient  Outcome: Ongoing    Problem: ASPIRATION PRECAUTIONS  Goal: Patients risk of aspiration is minimized  Outcome: Ongoing    Problem: SKIN INTEGRITY  Goal: Skin integrity is maintained or improved  Outcome: Ongoing
Problem: Restraint Use - Nonviolent/Non-Self-Destructive Behavior:  Goal: Absence of restraint indications  Absence of restraint indications   Outcome: Ongoing    Goal: Absence of restraint-related injury  Absence of restraint-related injury   Outcome: Ongoing
Problem: Risk for Impaired Skin Integrity  Goal: Tissue integrity - skin and mucous membranes  Structural intactness and normal physiological function of skin and  mucous membranes.    Outcome: Ongoing
ondansetron (ZOFRAN) 4 MG tablet Take 1 tablet by mouth every 8 hours as needed for Nausea 5/9/17   Hay Aleman MD   albuterol (PROVENTIL) (5 MG/ML) 0.5% nebulizer solution Take 0.5 mLs by nebulization every 6 hours as needed for Wheezing 1/23/17   Elsie Cheadle, MD     RR 18  Breath Sounds: crackles right base otherwise clear       · Bronchodilator assessment at level  1    ·   · []    Bronchodilator Assessment  BRONCHODILATOR ASSESSMENT SCORE  Score 0 1 2 3 4 5   Breath Sounds   []  Patient Baseline [x]  No Wheeze good aeration []  Faint, scattered wheezing, good aeration []  Expiratory Wheezing and or moderately diminished []  Insp/Exp wheeze and/or very diminished []  Insp/Exp and/ or marked distress   Respiratory Rate   []  Patient Baseline [x]  Less than 20 [x]  Less than 20 []  20-25 []  Greater than 25 []  Greater than 25   Peak flow % of Pred or PB [x]  NA   []  Greater than 90%  []  81-90% []  71-80% []  Less than or equal to 70%  or unable to perform []  Unable due to Respiratory Distress   Dyspnea re [x]  Patient Baseline []  No SOB []  No SOB [x]  SOB on exertion []  SOB min activity []  At rest/acute   e FEV% Predicted       [x]  NA []  Above 69%  []  Unable []  Above 60-69%  []  Unable []  Above 50-59%  []  Unable []  Above 35-49%  []  Unable []  Less than 35%  []  Unable

## 2018-02-11 NOTE — PROGRESS NOTES
HDL, LDLCALC in the last 72 hours.     Invalid input(s): LDL  Blood Gases: No results found for: PH, PCO2, PO2, HCO3, O2SAT  Thyroid functions:   Lab Results   Component Value Date    TSH 1.00 01/01/2018        Imaging/Diagonstics:      CXR:    ASSESSMENT     Patient Active Problem List   Diagnosis    Smoking greater than 40 pack years    Mass of lower lobe of left lung    Normocytic anemia    Thrombocytosis (HCC)    Protein-calorie malnutrition, severe (HCC)    Leukocytosis    Elevated alkaline phosphatase level    Liver mass    Non-intractable vomiting with nausea    Multiple lung nodules on CT    Hypoproliferative anemia (HCC)    Anxiety    Depression    Altered mental status    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 respiratory abnormalities    COPD with acute exacerbation (HCC)    Ureteropelvic junction (UPJ) obstruction, right    Osteoporosis    Sepsis ([address]    Anemia due to vitamin B12 deficiency    Pulmonary infiltrates on CXR    Incisional hernia    Acute sinusitis    Suprapubic pain    Anemia    Celiac disease    Cirrhosis of liver with ascites (HCC)    Gastroesophageal reflux disease without esophagitis    Essential hypertension    Hematuria    Acute cystitis with hematuria    Hypoalbuminemia due to protein-calorie malnutrition (HCC)    Hypokalemia    Hyponatremia    Aspiration pneumonia due to vomit (HCC)    PTSD (post-traumatic stress disorder)    Gastroenteritis    Gastric outlet obstruction    Iron deficiency anemia due to chronic blood loss    Elevated serum immunoglobulin free light chain level    Hypertension    Acute pure red cell anemia (HCC)    Bilateral leg pain    History of blood transfusion    TIN (chronic hypoplastic anemia) (HCC)    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   2/11/18  7:07 AM

## 2018-02-11 NOTE — PROGRESS NOTES
2017 and she needed 6 transfusions just in the month of December. A person with whom she lives reported seeing 5 or 6 \"seizures\" in the prior 9 months, she had been seen by multiple ED physicians and her PCP who have not determined that she was epileptic. These \"seizures\" lasted about 5 minutes but some would last last time and she might \"suddenly come out of it\" and start responding appropriately immediately when she would be touched or spoken to. The patient has become quite anorexic and losing a lot of weight last year. Her sodium was 128 when evaluated at that time. Impression was that she was sufficiently unlikely to be having seizures that AEDs were not warranted. During the approximately one week that the patient was hospitalized which was late December to early January she had 2 episodes in the morning of January 3 and January 4 associated with tachycardia in the 120s to 130s during which she had some bilateral limb myoclonic activity of varying intensity and during which at least some of the time she was tracking visually and answering questions appropriately even if slowly. Present consultant discontinued AED 2/5. That evening the patient had another \"seizure\" for 30 seconds, immediately afterwards was at baseline and asking for Ativan. Evening 2/7 the patient had another of her typical spells. Resident ordered stat CT scan of brain which was canceled by present consultant. She was transferred to NICU and now has LTME EEG running. She has not had any spells since the study was begun. The study was discontinued 2/9 following absence of any spells and unremarkable EEG. Morning 2/10 the patient started to have one of her spells, she has had some ability retained to communicate and follow directions during the spell and it was going on for 40 minutes by the time examiner saw patient. During her spell, heart rate has been varying from 6220 and the patient has been diaphoretic.     Patient was Oral Daily    folic acid  1 mg Oral Daily    sodium chloride  250 mL Intravenous Once    famotidine  20 mg Oral BID    sodium chloride  250 mL Intravenous Once     PRN Meds include: magnesium hydroxide, ammonia, potassium chloride **OR** potassium chloride **OR** potassium chloride, ondansetron, albuterol, glucose, dextrose, glucagon (rDNA), dextrose, potassium chloride    Objective:   /71   Pulse 74   Temp 98.2 °F (36.8 °C) (Oral)   Resp 16   Ht 5' 1\" (1.549 m)   Wt 104 lb 0.9 oz (47.2 kg)   LMP 04/18/2012   SpO2 100%   BMI 19.66 kg/m²     Blood pressure range: Systolic (81SVF), QAU:622 , Min:95 , IMR:125   ; Diastolic (71RXM), JSL:25, Min:60, Max:76    Patient appears back to her baseline mental status of being alert, coherent, conversive. She demonstrates understanding that she has pneumonia, she was reminded she had another spell last night, she says that her headache is better. Data:    Lab Results:   CBC:   Recent Labs      02/09/18   0652  02/10/18   0636  02/11/18   0642   WBC  14.5*  14.1*  12.9*   HGB  7.3*  7.4*  7.1*   PLT  669*  620*  571*     BMP:    Recent Labs      02/09/18   0652  02/10/18   0636   NA  132*  134*   K  3.8  3.9   CL  100  100   CO2  20  20   BUN  8  8   CREATININE  0.46*  0.44*   GLUCOSE  88  80         Lab Results   Component Value Date    CHOL 83 01/07/2016    LDLCHOLESTEROL 59 01/07/2016    HDL 12 (L) 01/07/2016    TRIG 77 02/01/2018    ALT 10 02/09/2018    AST 18 02/09/2018    TSH 1.00 01/01/2018    INR 1.3 02/06/2018    LABA1C 5.2 01/07/2016    YDHKBVRI96 597 02/01/2018       Lab Results   Component Value Date    PHENYTOIN 4.5 (L) 02/04/2018     Impression and recommendations    See above history. Nonepileptic seizures. This patient has intermittent spells and has had these for about a year and although these resemble epileptic fits of some kind, examiner considers these not to be seizures and not warrant treatment therefore with AEDs.   She has some sort of episodes characterized by tachycardia. Bilateral myoclonic jerks during which time she is capable of goal-directed behavior, which indicates focal intense and purposes that these are not epileptic seizures. Consultant will continue to follow while the patient is in-house. Now that she is in the NICU with video EEG running, the neuro critical care specialist is the attending and the video EEG study will be read by Dr. Terri Beard who is reading EEGs this week. She has had the study running for >24h so far with no spells occurring . Case discussed with attending 2/8 who was made aware of the nonepileptic seizure diagnosis. Do not recommend when necessary benzodiazepines for her spells. Do not recommend AEDs. MRSA pneumonia on cefepime.

## 2018-02-11 NOTE — CARE COORDINATION
Discharge [facility] Case Management Department  Written by: Shanice Hartley RN    Patient Name: Pablo Law  Attending Provider: Tania Carmona MD  Admit Date: 2018  4:11 PM  MRN: [id number]  Account: [de-identified]                     : 1970  Discharge Date: 2018      Disposition: [facility]. Family aware.     Shanice Hartley RN

## 2018-02-12 NOTE — DISCHARGE SUMMARY
[address] [place] [address]     Patient ID: Stephanie Law  :  1970   MRN: [id number]     ACCOUNT:  [de-identified]   Patient's PCP: Milan Galdamez MD  Admit Date: 2018   Discharge Date: 2018     Length of Stay: 11  Code Status:  Prior  Admitting Physician: Jewels Loredo MD  Discharge Physician: Ivon Wetzel MD     Active Discharge Diagnoses:     Primary Problem  <principal problem not specified>      [place] Problems    Diagnosis Date Noted    Spells of trembling [R25.1]     Seizure disorder ([address] [G40.909]     Respiratory alkalosis [E87.3] 2018    Hypoglycemia [E16.2] 2018    Hydronephrosis due to ureteral stricture [N13.1]     Metabolic acidosis, normal anion gap (NAG) [E87.2]     Witnessed seizure ([address] [R56.9]     Streptococcal sepsis ([address] [A40.9]     Septic shock ([address] [A41.9, R65.21] 2018    TIN (chronic hypoplastic anemia) ([address] [D61.9] 2017    Anemia [D64.9] 08/15/2017    Protein-calorie malnutrition, severe ([address] [E43] 2015    Thrombocytosis ([address] [D47.3] 2015    Mass of lower lobe of left lung [R91.8] 2014       Admission Condition:  poor     Discharged Condition: stable    Hospital Stay:     Hospital Course:    25-year-old female transferred from neuro ICU to our service.  Initially she presented in ED with regard to mental sensorium, EMS found to have a seizure-like activity and gave 1 mg of Ativan she responded well.  She had witnessed seizure in ED, got intubated and transferred to [facility] day she self extubated,   Chest x-ray showed evidence of right middle lung pneumonia, she was started on vancomycin and Zosyn and azithromycin was given in ED and subsequently continued on cefepime.  Initially that the acid was 7.1, resolved VF resuscitation.  Patient's WBC count was 33.5, She has a history of chronic waxing and waning of alk phos since 2015. She improved on cefepime completed course of cefepime. Started eating, and she will discharged to subacute nursing facility in a stable condition on optimal medications with proper follow-up with the neurology PCP. Significant Diagnostic Studies:   Labs / Micro:  Recent Results (from the past 168 hour(s))   BODY FLUID CULTURE    Collection Time: 02/06/18 12:04 AM   Result Value Ref Range    Specimen Description . URINE     Special Requests NOT REPORTED     Direct Exam       DUE TO THE SPECIMEN TYPE, THE ORDER WAS CANCELED AND REORDERED. PLEASE REFER TO:    Direct Exam URINE CULTURE ORDERED ON 02/06/2018     Direct Exam       [address] [phone]    Culture NOT REPORTED     Status FINAL 02/06/2018    Urine Culture    Collection Time: 02/06/18 12:05 AM   Result Value Ref Range    Specimen Description . URINE NEPHROSTOMY     Special Requests NOT REPORTED     Culture (A)      YEAST, NOT CANDIDA ALBICANS OR CANDIDA DUBLINIENSIS >[id number] CFU/ML    Culture       [address] [phone]    Status FINAL 02/07/2018    CBC Auto Differential    Collection Time: 02/06/18  4:40 AM   Result Value Ref Range    WBC 14.6 (H) 3.5 - 11.3 k/uL    RBC 2.75 (L) 3.95 - 5.11 m/uL    Hemoglobin 7.7 (L) 11.9 - 15.1 g/dL    Hematocrit 25.7 (L) 36.3 - 47.1 %    MCV 93.5 82.6 - 102.9 fL    MCH 28.0 25.2 - 33.5 pg    MCHC 30.0 28.4 - 34.8 g/dL    RDW 16.0 (H) 11.8 - 14.4 %    Platelets 943 (H) 323 - 453 k/uL    MPV 8.7 8.1 - 13.5 fL    NRBC Automated 0.0 0.0 per 100 WBC    Differential Type NOT REPORTED     WBC Morphology NOT REPORTED     RBC Morphology ANISOCYTOSIS PRESENT     Platelet Estimate NOT REPORTED     Seg Neutrophils 78 (H) 36 - 65 %    Lymphocytes 11 (L) 24 - 43 %    Monocytes 9 3 - 12 %    Eosinophils % 2 1 - 4 %    Basophils 0 0 - 2 %    Immature Granulocytes 1 (H) 0 % Eos # 0.30 0.00 - 0.44 k/uL    Basophils # 0.07 0.00 - 0.20 k/uL    Absolute Immature Granulocyte 0.08 0.00 - 0.30 k/uL   Comprehensive Metabolic Panel w/ Reflex to MG    Collection Time: 02/09/18  6:52 AM   Result Value Ref Range    Glucose 88 70 - 99 mg/dL    BUN 8 6 - 20 mg/dL    CREATININE 0.46 (L) 0.50 - 0.90 mg/dL    Bun/Cre Ratio NOT REPORTED 9 - 20    Calcium 9.0 8.6 - 10.4 mg/dL    Sodium 132 (L) 135 - 144 mmol/L    Potassium 3.8 3.7 - 5.3 mmol/L    Chloride 100 98 - 107 mmol/L    CO2 20 20 - 31 mmol/L    Anion Gap 12 9 - 17 mmol/L    Alkaline Phosphatase 380 (H) 35 - 104 U/L    ALT 10 5 - 33 U/L    AST 18 <32 U/L    Total Bilirubin 0.35 0.3 - 1.2 mg/dL    Total Protein 7.1 6.4 - 8.3 g/dL    Alb 2.3 (L) 3.5 - 5.2 g/dL    Albumin/Globulin Ratio 0.5 (L) 1.0 - 2.5    GFR Non-African American >60 >60 mL/min    GFR African American >60 >60 mL/min    GFR Comment          GFR Staging NOT REPORTED    POC Glucose Fingerstick    Collection Time: 02/09/18  8:38 AM   Result Value Ref Range    POC Glucose 85 65 - 105 mg/dL   POC Glucose Fingerstick    Collection Time: 02/09/18 11:14 AM   Result Value Ref Range    POC Glucose 132 (H) 65 - 105 mg/dL   POC Glucose Fingerstick    Collection Time: 02/09/18  4:53 PM   Result Value Ref Range    POC Glucose 88 65 - 105 mg/dL   CBC Auto Differential    Collection Time: 02/10/18  6:36 AM   Result Value Ref Range    WBC 14.1 (H) 3.5 - 11.3 k/uL    RBC 2.67 (L) 3.95 - 5.11 m/uL    Hemoglobin 7.4 (L) 11.9 - 15.1 g/dL    Hematocrit 25.2 (L) 36.3 - 47.1 %    MCV 94.4 82.6 - 102.9 fL    MCH 27.7 25.2 - 33.5 pg    MCHC 29.4 28.4 - 34.8 g/dL    RDW 16.0 (H) 11.8 - 14.4 %    Platelets 284 (H) 437 - 453 k/uL    MPV 9.4 8.1 - 13.5 fL    NRBC Automated 0.0 0.0 per 100 WBC    Differential Type NOT REPORTED     WBC Morphology NOT REPORTED     RBC Morphology ANISOCYTOSIS PRESENT     Platelet Estimate NOT REPORTED     Seg Neutrophils 78 (H) 36 - 65 %    Lymphocytes 12 (L) 24 - 43 %    Monocytes ANISOCYTOSIS PRESENT     Platelet Estimate NOT REPORTED     Seg Neutrophils 75 (H) 36 - 65 %    Lymphocytes 15 (L) 24 - 43 %    Monocytes 8 3 - 12 %    Eosinophils % 1 1 - 4 %    Basophils 1 0 - 2 %    Immature Granulocytes 1 (H) 0 %    Segs Absolute 9.64 (H) 1.50 - 8.10 k/uL    Absolute Lymph # 1.92 1.10 - 3.70 k/uL    Absolute Mono # 1.04 0.10 - 1.20 k/uL    Absolute Eos # 0.14 0.00 - 0.44 k/uL    Basophils # 0.07 0.00 - 0.20 k/uL    Absolute Immature Granulocyte 0.07 0.00 - 0.30 k/uL       Radiology:  Ct Abdomen Pelvis Wo Contrast Additional Contrast? None    Result Date: 2/4/2018  EXAMINATION: CT OF THE ABDOMEN AND PELVIS WITHOUT CONTRAST 2/4/2018 11:56 am TECHNIQUE: CT of the abdomen and pelvis was performed without the administration of intravenous contrast. Multiplanar reformatted images are provided for review. Dose modulation, iterative reconstruction, and/or weight based adjustment of the mA/kV was utilized to reduce the radiation dose to as low as reasonably achievable. COMPARISON: CT abdomen and pelvis July 16, 2017. HISTORY: ORDERING SYSTEM PROVIDED HISTORY: RUQ PAIN, IN HOSPITAL, FEVER, ELEV WBC, LAO'S SIGN TECHNOLOGIST PROVIDED HISTORY: Additional Contrast?->None FINDINGS: Lower Chest: Lower lobe with a masslike consolidation with a hypodense center involving the left lower lobe the measuring 6.6 x 4.2 cm. This finding appear to have enhance on the prior study and appears to have increased in size once measuring 5.8 cm in greatest axial dimension. Ground-glass opacity is noted involving the lingula as well as right middle lobe. Organs: Suboptimal evaluation due to non use of IV contrast.  There appears to be pneumobilia present involving the left lobe of the liver. Spleen, pancreas and adrenal glands all appear unremarkable. The gallbladder is not clearly seen. Left kidney demonstrates no evidence of stone or hydronephrosis.   Right kidney demonstrates a double-J ureteral stent in place with layering calcifications seen within the renal pelvis. A 2 mm calcification seen adjacent to the ureteral stent on series 2, image 113 Hydronephrosis is present is unchanged from prior study. Small perinephric fluid collection is seen adjacent to the right kidney, grossly unchanged from the prior study. Abdominal aorta demonstrates mild calcification without aneurysm. GI/Bowel: Patient status post partial gastrectomy. Small bowel appears nondilated. Bold layering previously given IV oral contrast is seen within the right lower quadrant on series 2, image 115. This was not clearly seen on the prior study. There is questionable surrounding inflammatory change. No acute colonic abnormality is seen. The appendix is not clearly identified. Pelvis: Prater catheter is seen within the urinary bladder with nondependent air present. Uterus is grossly unremarkable. No definite adnexal mass or cyst. Peritoneum/Retroperitoneum: No free air or free fluid is noted. No definite lymphadenopathy is seen. Bones/Soft Tissues: Abdominal wall demonstrates no acute findings. Body wall anasarca is seen. Osseous structures demonstrate no acute findings. Patient status post multilevel kyphoplasty with scoliosis of the visualized thoracolumbar spine with associated degenerative change. *Lower lobe consolidations are identified with a masslike consolidation with a hypodense center involving the left lower lobe seen measuring 6.6 x 4.2 cm. This mass appears to have enhance on the prior study has increased in size since then. The right lower lobe consolidation appears new when compared to the prior studies and most likely infectious in etiology. *Ground-glass opacity involving the right middle lobe and lingula, most likely representing additional infectious process. *Right-sided double-J ureteral stent is identified with unchanged hydronephrosis involving the right kidney.   There appears to be layering calcification seen within the renal pelvis with additional 2 mm calcification seen adjacent to the ureteral stent on series 2, image 113. *There appears to be a question of extravasated IV or oral contrast adjacent to the distal aspect of the right ureteral stent best seen on series 2, image 115. Contained ureteral injury cannot be excluded. There appear to be inflammatory changes within this region. This was not clearly present on the July 2017 study. If further evaluation is needed, repeat CT with IV and oral contrast is recommended. *Small right perinephric fluid collection, unchanged in size compared to the prior study. *Left lobe pneumobilia. *     Xr Chest Standard (2 Vw)    Result Date: 2/3/2018  EXAMINATION: TWO VIEWS OF THE CHEST 2/3/2018 7:56 am COMPARISON: January 31, 2018 HISTORY: ORDERING SYSTEM PROVIDED HISTORY: Pneumonia, Follow opacity TECHNOLOGIST PROVIDED HISTORY: Reason for exam:->Pneumonia, Follow opacity FINDINGS: The endotracheal tube, enteric tube and right internal jugular line have been removed. Opacities in the right lung have progressed. New linear opacities in the mid and lower left lung field are noted. These findings may in part be due to atelectasis. There is no pneumothorax or effusion. The patient is status post vertebroplasty at 2 levels in the lumbar spine. A pigtail catheter or stent is partially visualized in the upper abdomen. Extubation. Opacities in the right lung have progressed and there are new opacities in the left lung. This may in part be due to atelectasis and shallow inflation, however interstitial inflammatory process or developing edema should also be considered.      Ct Head Wo Contrast    Result Date: 1/31/2018  EXAMINATION: CT OF THE HEAD WITHOUT CONTRAST  1/31/2018 6:48 pm TECHNIQUE: CT of the head was performed without the administration of intravenous contrast. Dose modulation, iterative reconstruction, and/or weight based adjustment of the mA/kV was utilized to reduce the radiation dose to as low as reasonably achievable. COMPARISON: December 31, 2017 HISTORY: ORDERING SYSTEM PROVIDED HISTORY: ams FINDINGS: BRAIN/VENTRICLES: There is no acute intracranial hemorrhage, mass effect or midline shift. No abnormal extra-axial fluid collection. The gray-white differentiation is maintained without evidence of an acute infarct. There is no evidence of hydrocephalus. ORBITS: The visualized portion of the orbits demonstrate no acute abnormality. SINUSES: The visualized paranasal sinuses and mastoid air cells demonstrate no acute abnormality. SOFT TISSUES/SKULL:  No acute abnormality of the visualized skull or soft tissues. No acute intracranial abnormality. Ir Antegrade Pyelogram    Result Date: 2/6/2018  PROCEDURE: IR ANTEGRADE PYELOGRAM 2/6/2018 HISTORY: ORDERING SYSTEM PROVIDED HISTORY: Possible blockage of nephrostomy tube TECHNOLOGIST PROVIDED HISTORY: Reason for exam:->Possible blockage of nephrostomy tube CONTRAST: 20 mL of Con[place] 43. SEDATION: None. FLUOROSCOPY DOSE AND TYPE OR TIME AND EXPOSURES: 0.4 minutes; dap 130 cGy cm2. DESCRIPTION OF PROCEDURE: Informed consent was obtained after a detailed explanation of the procedure including risks, benefits, and alternatives. Universal protocol was observed. The patient was placed prone on the procedure table. A  image was obtained demonstrating the course of the existing nephrostomy tube in the expected location of the right renal pelvis overlying the proximal ureteral stent. 10 mL of contrast was injected and an antegrade pyelogram was performed. Opacification the right renal collecting system confirms proper positioning of the nephrostomy tube. However, moderate hydronephrosis demonstrated. Upon inspection of the nephrostomy tube, the tube appears to be kinked at the retention suture site. Therefore, the right flank was prepped and draped in the usual sterile fashion.   The existing retention suture was cut and desaturations FINDINGS: Low lung volumes are once again present. There is cardiomegaly with diffuse vascular congestion and pulmonary edema findings, similar the prior study. Superimposed pneumonia cannot be excluded. No pneumothorax, large pleural effusion or free air. No significant change chest findings compared to the prior study. Xr Chest Portable    Result Date: 1/31/2018  EXAMINATION: SINGLE VIEW OF THE CHEST 1/31/2018 8:07 pm COMPARISON: Chest x-ray from 01/31/2018. HISTORY: ORDERING SYSTEM PROVIDED HISTORY: s/p central line R IJ TECHNOLOGIST PROVIDED HISTORY: Reason for exam:->s/p central line R IJ Ordering Physician Provided Reason for Exam: s/p central line R IJ Acuity: Unknown Type of Exam: Unknown FINDINGS: Endotracheal tube extends to the mid thoracic trachea approximately 2.8 cm above the michael. Enteric tube courses below the diaphragm, distal tip not included. Right central venous catheter extends to the level of the distal SVC. Additional monitoring leads overlie the chest. Similar patchy increased density in the right mid lung. Remainder of the lungs appear well aerated. No pleural effusion. Supine technique limits evaluation for pneumothorax, but given this, no definite pneumothorax appreciated. Heart size appears within normal limits given technique and there is no overt pulmonary vascular congestion. There are calcifications of the aortic arch. Vertebroplasty changes identified. Surgical clips project over the left upper quadrant of the abdomen. Visualized osseous structures appear intact, and are otherwise grossly unremarkable, given the non dedicated imaging. 1. Expected positions of medical support devices. 2. Similar patchy increased density in the right mid lung is concerning for pneumonia, in the appropriate clinical setting. Continued radiographic follow-up to resolution is recommended.      Xr Chest Portable    Result Date: 1/31/2018  EXAMINATION: SINGLE VIEW OF THE CHEST 1/31/2018 4:52 pm COMPARISON: December 31, 2017 HISTORY: ORDERING SYSTEM PROVIDED HISTORY: intubation TECHNOLOGIST PROVIDED HISTORY: Reason for exam:->intubation FINDINGS: The endotracheal tube is in appropriate position above the michael. The enteric tube terminates in the body of the stomach. Shallow lung inflation is noted. Interstitial opacities in the right lung are again demonstrated without appreciable change. There is no pneumothorax, edema or effusion. No acute osseous abnormalities identified. The patient is status post vertebroplasty in the lumbar spine. A pigtail stent is noted projecting in the right abdomen. 1.  The endotracheal tube is in appropriate position above the michael. The enteric tube terminates in the body of the stomach. 2.  No significant change in interstitial opacities in the right lung, which may represent persistent or recurrent infiltrate. Vl Dup Lower Extremity Venous Bilateral    Result Date: 2/3/2018    [facility]  Vascular Lower Extremities DVT Study Procedure   Patient Name  SAMPLES     Date of Study           02/03/2018                [facility]   Date of Birth 1970  Gender                  Female   Age           50 year(s)  Race                       Room Number   1152   Corporate ID  [id number]  #   Patient Dianelysalphonso  [de-identified]  #   MR #          [id number]     Sonographer             Georgina Cooper   Accession #   [id number]   Interpreting Physician  Geraldene Lombard   Referring                 Referring Physician     Puja Tanner *  Nurse  Practitioner  Procedure Type of Study:   Veins: Lower Extremities DVT Study, Venous Scan Lower Bilateral.  Indications for Study:Calf tenderness. Patient Status: In Patient.   Conclusions   Summary   Simultaneous real time imaging utilizing B-Mode, color doppler and  spectral waveform analysis was performed on the bilateral lower  extremities for venous examination of the deep and superficial

## 2018-02-19 NOTE — TELEPHONE ENCOUNTER
Message left stating, \"This is Jessi Vega with [facility]. Patient had follow up at [facility] to be seen for her bone marrow. I want to get her appointment re-scheduled. Neither the patient or her family have the name or phone number of the doctor. Could you give me this information? \"  Chart and MD notes from 11/13/17 reviewed. ClarenceoB-Alejandra called and provided with phone number of [phone]. Informed [facility] should be able to tell her the name of the MD she was scheduled with.

## 2018-02-20 PROBLEM — N13.1 HYDRONEPHROSIS DUE TO URETERAL STRICTURE: Status: RESOLVED | Noted: 2018-01-01 | Resolved: 2018-01-01

## 2018-02-20 NOTE — ANESTHESIA PRE PROCEDURE
Department of Anesthesiology  Preprocedure Note       Name:  Gregory Grider Samples   Age:  50 y.o.  :  1970                                          MRN:  [id number]         Date:  2018      Surgeon: Davin Plasencia):  Marilyn Nam MD    Procedure: Procedure(s):  HOLMIUM - CYSTOSCOPY, URETEROSCOPY LITHO, STENT REMOVAL  POSSIBLE NEPHROLITHOTOMY PERCUTANEOUS - HOLMIUM LASER, LITHOCLAST, C-ARM    Medications prior to admission:   Prior to Admission medications    Medication Sig Start Date End Date Taking? Authorizing Provider   glucagon, rDNA, 1 MG injection once   Yes Historical Provider, MD   glucose (GLUTOSE) 40 % GEL Take 15 g by mouth See Admin Instructions   Yes Historical Provider, MD   miconazole (MICOTIN) 2 % powder Apply topically as needed for Itching Apply topically 2 times daily.    Yes Historical Provider, MD   sodium chloride (ALTAMIST SPRAY) 0.65 % nasal spray 1 spray by Nasal route as needed for Congestion 10/23/17  Yes Mei James MD   Calcium Carbonate-Vitamin D (OYSTER SHELL CALCIUM/D) 500-200 MG-UNIT TABS Take 1 tablet by mouth daily Please discontinue previous prescription of calcium 10/23/17 10/23/18 Yes Mei James MD   loratadine (CLARITIN) 10 MG tablet Take 1 tablet by mouth daily 10/23/17  Yes Mei James MD   citalopram (CELEXA) 40 MG tablet Take 1 tablet by mouth daily 10/23/17  Yes Mei James MD   pantoprazole (PROTONIX) 40 MG tablet Take 1 tablet by mouth daily 10/23/17  Yes Mei James MD   folic acid (FOLVITE) 1 MG tablet Take 1 tablet by mouth daily 10/23/17  Yes Mei James MD   umeclidinium-vilanterol (ANORO ELLIPTA) 62.5-25 MCG/INH AEPB inhaler Inhale 1 puff into the lungs daily 10/20/17  Yes Bridget Mcfarlane MD   VENTOLIN  (90 Base) MCG/ACT inhaler INHALE 2 PUFFS INTO THE LUNGS EVERY 6 HOURS AS NEEDED FOR WHEEZING 17  Yes Bridget Mcfarlane MD   docusate sodium (DOCQLACE) 100 MG capsule TAKE 1 CAPSULE BY MOUTH 2 TIMES DAILY AS 12/30/2012    [id number] 98.0 12/30/2012        Type & Screen (If Applicable):  No results found for: [facility]    Anesthesia Evaluation  Patient summary reviewed and Nursing notes reviewed no history of anesthetic complications:   Airway: Mallampati: I        Dental:    (+) edentulous      Pulmonary:   (+) pneumonia: resolved,  COPD:  shortness of breath:  rhonchi,  wheezes,  asthma:     (-) recent URI and sleep apnea                           Cardiovascular:  Exercise tolerance: good (>4 METS),   (+) hypertension:,     (-) pacemaker, valvular problems/murmurs, past MI, CAD, CABG/stent, dysrhythmias,  angina,  CHF, orthopnea, PND and  ROE    ECG reviewed  Rhythm: regular  Rate: normal  Echocardiogram reviewed         Beta Blocker:  Not on Beta Blocker         Neuro/Psych:   (+) neuromuscular disease:, headaches:, psychiatric history:   (-) seizures, TIA and CVA            ROS comment: Admitted for spells not found to have seizures, no anti epileptic meds GI/Hepatic/Renal:   (+) GERD:, liver disease:, renal disease:,      (-) hiatal hernia, PUD, hepatitis and bowel prep       Endo/Other:    (+) blood dyscrasia::., .    (-) hypothyroidism, hyperthyroidism, arthritis               Abdominal:         (-) obese     Vascular:                                        Anesthesia Plan      general     ASA 4       Induction: intravenous. Anesthetic plan and risks discussed with patient. Use of blood products discussed with patient whom consented to blood products. Plan discussed with CRNA.                   Sayra Patel MD   2/20/2018

## 2018-02-20 NOTE — H&P
(PROVENTIL) (5 MG/ML) 0.5% nebulizer solution Take 0.5 mLs by nebulization every 6 hours as needed for Wheezing 1/23/17  Yes Cedric Boss MD       Allergies:  Latex; Bee venom; Benadryl [diphenhydramine-zinc acetate]; Tavist; and Tylenol [acetaminophen]    Social History:    Social History     Social History    Marital status: Single     Spouse name: N/A    Number of children: N/A    Years of education: N/A     Occupational History    Not on file. Social History Main Topics    Smoking status: Current Some Day Smoker     Packs/day: 0.25     Years: 30.00     Types: Cigarettes     Last attempt to quit: 12/1/2012    Smokeless tobacco: Never Used      Comment: pt states 3 cigs per day    Alcohol use No      Comment: quit in 1987    Drug use: No      Comment: quit marijuana 1987    Sexual activity: No     Other Topics Concern    Not on file     Social History Narrative    No narrative on file       Family History:    Family History   Problem Relation Age of Onset    Heart Disease Mother     Stroke Father      cerebral aneurysm     Previous Urologic Family history: none  REVIEW OF SYSTEMS:  Constitutional: negative  Eyes: negative  Respiratory: negative  Cardiovascular: negative  Gastrointestinal: negative  Genitourinary: see HPI  Musculoskeletal: negative  Skin: negative   Neurological: negative  Hematological/Lymphatic: negative  Psychological: negative    Physical Exam:      This a 50 y.o. female   No data found. Constitutional: Patient in no acute distress. Neuro: Alert and oriented to person, place and time. Psych: mood and affect normal  HEENT negative  Lungs: Respiratory effort is normal; CTA  Cardiovascular: Normal peripheral pulses; R3 wo murmur  Abdomen: Soft, non-tender, non-distended with no CVA, flank pain or hepatosplenomegaly. No hernias. Kidneys normal.  Lymphatics: No palpable lymphadenopathy. Bladder non-tender and not distended.   Pelvic exam:  External genitalia malnutrition ([address]    Hypokalemia    Hyponatremia    Aspiration pneumonia due to vomit (HCC)    PTSD (post-traumatic stress disorder)    Gastroenteritis    Gastric outlet obstruction    Iron deficiency anemia due to chronic blood loss    Elevated serum immunoglobulin free light chain level    Hypertension    Acute pure red cell anemia (HCC)    Bilateral leg pain    History of blood transfusion    TIN (chronic hypoplastic anemia) (HCC)    Peripheral neuropathy due to inflammation (HCC)    Pneumonia of right middle lobe due to infectious organism ([address]    Seizure ([address]    Hypokalemia    Fatigue    Septic shock (HCC)    Witnessed seizure ([address]    Streptococcal sepsis (HCC)    Respiratory alkalosis    Hypoglycemia    Hydronephrosis due to ureteral stricture    Metabolic acidosis, normal anion gap (NAG)    Retained ureteral stent    Seizure disorder (HCC)    Spells of trembling       Plan: cysto R URS, HLL, stent removal v change today    Franky Gentleman  7:51 AM 2/20/2018

## 2018-03-05 NOTE — PROGRESS NOTES
cough, no hemoptysis   Cardiovascular: Denies chest pain, PND or orthopnea. No L E swelling or palpitations. Gastrointestinal:   Mild nausea and abdominal distention. No diarrhea. Genitourinary: Denies dysuria, hematuria, frequency, urgency or incontinence. Neurological: Denies headaches, decreased LOC, no sensory or motor focal deficits. Musculoskeletal:  No arthralgia no back pain or joint swelling. Skin: Multiple excoriation marks, spontaneous bruising on right arm. Psychiatric: She was very depressed during hospitalization but now is feeling better   Endocrine: no diabetes or thyroid disease. Hematologic: no bleeding , no adenopathy. PHYSICAL EXAM: Shows an ill appearing 50y.o.-year-old  who is not in pain or distress. Vital Signs: Blood pressure 101/65, pulse 110, temperature 97.4 °F (36.3 °C), temperature source Oral, resp. rate 18, weight 91 lb 6.4 oz (41.5 kg), last menstrual period 04/18/2012, not currently breastfeeding. HEENT: Normocephalic and atraumatic. Pupils are equal, round, reactive to light and accommodation. Extraocular muscles are intact. Neck: Showed no JVD, no carotid bruit . Lungs: Clear to auscultation but decreased breath sounds bilaterally. Heart: Regular without any murmur. Abdomen: Soft, nontender. The abdomen is distended, No hepatosplenomegaly, no masses  appreciated. Extremities: Excoriation marks all over, spontaneous bruising on left arm, Lower extremities do not show edema, no clubbing, or cyanosis. . Neuro exam: intact cranial nerves bilaterally no motor or sensory deficit, gait is normal. Lymphatic: no adenopathy appreciated in the supraclavicular, axillary, cervical or inguinal area    REVIEW OF RADIOLOGICAL RESULTS:   01/31/2018 CT HEAD WO CONTRAST IMPRESSION:   No acute intracranial abnormality.     02/04/2018 CT ABDOMEN PELVIS IMPRESSION:  *Lower lobe consolidations are identified with a masslike consolidation with   a hypodense center involving the left lower 02/09/2018 0652    BILITOT 0.35 02/09/2018 2982        PATHOLOGY:             IMPRESSION:   1. Microcytic anemia, without clear evidence of iron deficiency. Anemia has not improved with improvement in nutritional status. Most likely related to anemia of chronic illness  2. Thrombocytosis, likely reactive  3. Small bowel obstruction/ileus, treated by recent surgery   4. Pulmonary mass of unknown etiology, negative biopsy  5. History of gastric mass showing inflammatory changes  6. Cystic pelvic mass. Removed with negative histology     PLAN:   1. We discussed her recent symptoms. 2. Her WBC is elevated but stable. 3. I would like her to be seen at [facility]. 4. I am going to consult with  regarding transportation. 5. I asked her to call and reschedule consult. 6. Return in 3 months.

## 2018-03-07 NOTE — TELEPHONE ENCOUNTER
Writer confused , MD notes say [facility], but she already has an appt at [facility]??   Facility MD getting stat labs today due to elevated WBC( could this be the cause of increased seizure activity?)

## 2018-03-07 NOTE — TELEPHONE ENCOUNTER
MD aware of HGB and no orders at this time, plan to let social service know pt. May need help with transportation to [address] [place] [address]

## 2018-03-10 PROBLEM — E87.20 LACTIC ACIDOSIS: Status: ACTIVE | Noted: 2018-01-01

## 2018-03-11 PROBLEM — E72.20 HYPERAMMONEMIA (HCC): Status: ACTIVE | Noted: 2018-01-01

## 2018-03-11 PROBLEM — R25.2 JERKING MOVEMENTS OF EXTREMITIES: Status: ACTIVE | Noted: 2018-01-01

## 2018-03-11 PROBLEM — E72.20 HYPERAMMONEMIA (HCC): Status: RESOLVED | Noted: 2018-01-01 | Resolved: 2018-01-01

## 2018-03-11 PROBLEM — E87.20 LACTIC ACIDOSIS: Status: RESOLVED | Noted: 2018-01-01 | Resolved: 2018-01-01

## 2018-03-11 PROBLEM — F44.5 PSYCHOGENIC NONEPILEPTIC SEIZURE: Status: ACTIVE | Noted: 2018-01-01

## 2018-03-15 NOTE — ED NOTES
Pt to ed after being discharged by [facility]. Per EMS pt had \"psuedo seizure\" which she responded to about thirty seconds away from [facility] so they brought her back. Pt states upon arrival \"I just want ativan\". No distress noted, pt alert and answering questions appropriately. RR even and nonlabored on 2L O2 per nc. Pt placed on monitor. Awaiting orders.       Marichuy Valles RN  03/14/18 6970

## 2018-03-15 NOTE — ED NOTES
Patient called out and stated she was wet. Adult brief changed and linda care done.       Donna Evans, RN  03/15/18 [id number]

## 2018-03-15 NOTE — ED NOTES
Bed: 04  Expected date: 3/14/18  Expected time: 10:23 PM  Means of arrival: [facility]  Comments:  [address], RN  03/14/18 8389

## 2018-03-15 NOTE — ED PROVIDER NOTES
complaints at this time. PAST MEDICAL / SURGICAL / SOCIAL / FAMILY HISTORY      has a past medical history of Acidosis; Acute cystitis with hematuria; Adult hypertrophic pyloric stenosis; Allergic rhinitis; Anxiety; Aplastic anemia ([address] Asthma; Bipolar disorder ([address] Blood circulation, collateral; Celiac disease; Chronic back pain; Chronic kidney disease; Cirrhosis of liver with ascites ([address] Constipation; COPD (chronic obstructive pulmonary disease) ([address] Cough; Cryptogenic organizing pneumonia ([address] Depression; Difficult intravenous access; Disease of blood and blood forming organ; Edentulous; Full dentures; Gastric outlet obstruction; GERD (gastroesophageal reflux disease); Hearing loss; Hematuria; Hepatomegaly; History of blood transfusion; History of blood transfusion; Hydronephrosis, bilateral; Hypertension; Hypoglycemia; Kidney stone; Lung nodule; Migraine; MRSA (methicillin resistant staph aureus) culture positive; Muscle weakness; Neuralgia and neuritis; Observed seizure-like activity ([address] OCD (obsessive compulsive disorder); Osteoarthritis; Osteoporosis; Pelvic mass; Perforated nasal septum; Peripheral neuropathy due to inflammation ([address] Pneumonia; Polyneuropathy ([address] Psoriasis; PTSD (post-traumatic stress disorder); Scoliosis; Sepsis ([address] Shortness of breath; Substance abuse; Thrombocytosis ([address] Unsteadiness on feet; Urinary incontinence; and Wears glasses. has a past surgical history that includes Tubal ligation (1989); Tonsillectomy and adenoidectomy (1982); ERCP (5/16/2013); Upper gastrointestinal endoscopy; gastrectomy (2012); Paracentesis (Right, 01/2015-09/2015); Cystocopy (12/1/2015); Fixation Kyphoplasty (08/09/2016); Lung biopsy; pr insert nasal septal prosthesis (N/A, 4/14/2017); pr create eardrum opening,gen anesth (N/A, 4/14/2017); Abdomen surgery (09/2015); Cystoscopy (05/11/2017); Ureter stent placement (05/11/2017); Ureteroscopy (05/11/2017);  Cystoscopy (Right, 5/11/2017); Cholecystectomy; hernia repair (07/18/2017); pr repair incisional hernia,reducible (N/A, 7/18/2017); pr esophagogastroduodenoscopy transoral diagnostic (N/A, 8/30/2017); and pr office/outpt visit,procedure only (Right, 2/20/2018). Social History     Social History    Marital status: Single     Spouse name: N/A    Number of children: N/A    Years of education: N/A     Occupational History    Not on file. Social History Main Topics    Smoking status: Current Some Day Smoker     Packs/day: 0.25     Years: 30.00     Types: Cigarettes     Last attempt to quit: 12/1/2012    Smokeless tobacco: Never Used      Comment: pt states 3 cigs per day    Alcohol use No      Comment: quit in 1987    Drug use: No      Comment: quit marijuana 1987    Sexual activity: No     Other Topics Concern    Not on file     Social History Narrative    No narrative on file       Family History   Problem Relation Age of Onset    Heart Disease Mother     Stroke Father      cerebral aneurysm       Allergies:  Latex; Bee venom; Benadryl [diphenhydramine-zinc acetate]; Tavist; and Tylenol [acetaminophen]    Home Medications:  Prior to Admission medications    Medication Sig Start Date End Date Taking? Authorizing Provider   LORazepam (ATIVAN) 0.5 MG tablet Take 1 tablet by mouth every 6 hours as needed (Seizure warning) for up to 30 days. 3/15/18 4/14/18 Yes Didier Suarez MD   LORazepam (ATIVAN) 0.5 MG tablet Take 1 tablet by mouth daily for 30 days. 3/14/18 4/13/18  Adan Saul MD   glucagon, rDNA, 1 MG injection once    Historical Provider, MD   glucose (GLUTOSE) 40 % GEL Take 15 g by mouth See Admin Instructions    Historical Provider, MD   miconazole (MICOTIN) 2 % powder Apply topically as needed for Itching Apply topically 2 times daily.     Historical Provider, MD   sodium chloride (ALTAMIST SPRAY) 0.65 % nasal spray 1 spray by Nasal route as needed for Congestion 10/23/17   Tea Licona MD   Calcium Carbonate-Vitamin D (OYSTER SHELL CALCIUM/D) 500-200 MG-UNIT TABS Take 1 tablet by mouth daily Please discontinue previous prescription of calcium 10/23/17 10/23/18  Diogenes Cordova MD   loratadine (CLARITIN) 10 MG tablet Take 1 tablet by mouth daily 10/23/17   Diogenes Cordova MD   citalopram (CELEXA) 40 MG tablet Take 1 tablet by mouth daily 10/23/17   Diogenes Cordova MD   pantoprazole (PROTONIX) 40 MG tablet Take 1 tablet by mouth daily 10/23/17   Diogenes Cordova MD   folic acid (FOLVITE) 1 MG tablet Take 1 tablet by mouth daily 10/23/17   Diogenes Cordova MD   umeclidinium-vilanterol (ANORO ELLIPTA) 62.5-25 MCG/INH AEPB inhaler Inhale 1 puff into the lungs daily 10/20/17   Nessa Banks MD   VENTOLIN  (90 Base) MCG/ACT inhaler INHALE 2 PUFFS INTO THE LUNGS EVERY 6 HOURS AS NEEDED FOR WHEEZING 9/22/17   Nessa Banks MD   docusate sodium (DOCQLACE) 100 MG capsule TAKE 1 CAPSULE BY MOUTH 2 TIMES DAILY AS NEEDED FOR CONSTIPATION 8/15/17   Francisco Javier Lane MD   fluticasone [facility]) 50 MCG/ACT nasal spray 1 spray by Nasal route daily 8/15/17   Francisco Javier Lane MD   ondansetron (ZOFRAN) 4 MG tablet Take 1 tablet by mouth every 8 hours as needed for Nausea 5/9/17   Lance Huo MD   albuterol (PROVENTIL) (5 MG/ML) 0.5% nebulizer solution Take 0.5 mLs by nebulization every 6 hours as needed for Wheezing 1/23/17   Cathy Vizcaino MD       REVIEW OF SYSTEMS    (2-9 systems for level 4, 10 or more for level 5)      Review of Systems   Constitutional: Negative for activity change, appetite change, chills, diaphoresis, fatigue and fever. HENT: Negative for congestion, rhinorrhea and sinus pain. Respiratory: Negative for cough, shortness of breath, wheezing and stridor. Cardiovascular: Negative for chest pain and leg swelling. Gastrointestinal: Negative for abdominal pain, constipation, diarrhea, nausea and vomiting. Skin: Negative for rash and wound. Neurological: Positive for seizures.  Negative

## 2018-03-15 NOTE — PROGRESS NOTES
Patient seen and examined. Patient alert and oriented ×3 on  Encounter. As per report patient had seizure which lasted about 30 seconds. On reviewing the notes from previous admission, patient has had recurrent episodes of pseudoseizures, was seen by neurology and had ltme , not on any antiseizure medication . Patient states that she just wants her Ativan before the onset of seizure . Patient does not meet inpatient criteria and this time .   Okay to discharge to snf

## 2018-03-18 NOTE — PROCEDURES
[address] [facility]                            ELECTROENCEPHALOGRAM REPORT    PATIENT NAME: Traci ANDRADE                :        1970  MED REC NO:   [id number]                             ROOM:       [id number]  ACCOUNT NO:   [de-identified]                           ADMIT DATE: 2017  PROVIDER:     John Ontiveros    DATE OF EE2018    EEG NUMBER:  Not listed. HISTORY:  This is a 61-year-old female with a history of seizures. MEDICATIONS:  Not listed. DESCRIPTION OF PROCEDURE:  Electrodes were applied using paste in positions  dictated by the International 10-20 System of placement. Reviewing  montages included both referential and bipolar derivations. In addition to  EEG data, EKG and eye movements were recorded. This is a routine  recording. This test was performed on 2018. DESCRIPTION OF ACTIVITIES:  At the onset of study, the patient is awake,  and during wakefulness, there are occasional runs of 5- to 6-Hz theta  activity with significant contamination with muscle artifacts, electrode  artifacts, and movement artifacts. Hyperventilation and photic stimulation  were not performed. Epileptiform discharges cannot be excluded through  these artifacts. ELECTRODIAGNOSTIC INTERPRETATION:  This EEG is significantly contaminated  with muscle artifact. Epileptiform discharges cannot be excluded through  these artifacts. Recommend a repeat study under sleep deprivation for  improved yield of the study.         Nano Craig    D: 2018 17:55:51       T: 2018 17:56:44     [email or web address]  Job#: [id number]     Doc#: [id number]    CC:

## 2018-03-20 NOTE — ED PROVIDER NOTES
[facility]  ED  Emergency Department Encounter  Mid Level Provider     Pt Name: Alejandra Law  MRN: [id number]  [place] 1970  Date of evaluation: 3/19/18  PCP:  Tea Licona MD    CHIEF COMPLAINT       Chief Complaint   Patient presents with    Seizures     Per EMS pt had seizures on and off for 47 minutes       HISTORY OF PRESENT ILLNESS  (Location/Symptom, Timing/Onset, Context/Setting, Quality, Duration, Modifying Factors, Severity.)      Ray Law is a 50 y.o. female who presents with Report of intermittent seizures for 45 minutes at a local ECF. EMS reports ECF did give Ativan 0.5 mg prior to arrival.  Patient is currently alert without complaint. Patient is oriented ×3. Patient is aware she was sent to the emergency room for possible seizures. Patient denies history of stroke. Patient denies any weakness or paresthesia. Patient denies headache. Patient with good eye contact able to follow my commands. PAST MEDICAL / SURGICAL / SOCIAL / FAMILY HISTORY      has a past medical history of Acidosis; Acute cystitis with hematuria; Adult hypertrophic pyloric stenosis; Allergic rhinitis; Anxiety; Aplastic anemia ([address] Asthma; Bipolar disorder ([address] Blood circulation, collateral; Celiac disease; Chronic back pain; Chronic kidney disease; Cirrhosis of liver with ascites ([address] Constipation; COPD (chronic obstructive pulmonary disease) ([address] Cough; Cryptogenic organizing pneumonia ([address] Depression; Difficult intravenous access; Disease of blood and blood forming organ; Edentulous; Full dentures; Gastric outlet obstruction; GERD (gastroesophageal reflux disease); Hearing loss; Hematuria; Hepatomegaly; History of blood transfusion; History of blood transfusion; Hydronephrosis, bilateral; Hypertension; Hypoglycemia; Kidney stone; Lung nodule; Migraine; MRSA (methicillin resistant staph aureus) culture positive; Muscle weakness; Neuralgia and neuritis;  Observed no acute abnormality. SINUSES: There is left-greater-than-right bilateral mastoid disease. There is an air-fluid level in a posterior left ethmoid air cell. Paranasal sinuses are otherwise clear. SOFT TISSUES/SKULL:  No acute abnormality of the visualized skull or soft tissues. No acute intracranial abnormality. Mild chronic small vessel ischemic white matter disease. Bilateral left-greater-than-right mastoiditis. Disease has not changed significantly from the prior study. Air-fluid level consistent with acute disease in a posterior left ethmoid air cell. Ct Chest W Contrast    Result Date: 3/11/2018  EXAMINATION: CT OF THE ABDOMEN AND PELVIS WITH CONTRAST; CT OF THE CHEST WITH CONTRAST 3/10/2018 8:45 pm TECHNIQUE: CT of the abdomen and pelvis was performed with the administration of intravenous contrast. Multiplanar reformatted images are provided for review. Dose modulation, iterative reconstruction, and/or weight based adjustment of the mA/kV was utilized to reduce the radiation dose to as low as reasonably achievable.; CT of the chest was performed with the administration of intravenous contrast. Multiplanar reformatted images are provided for review. Dose modulation, iterative reconstruction, and/or weight based adjustment of the mA/kV was utilized to reduce the radiation dose to as low as reasonably achievable. COMPARISON: 02/04/2018, and 01/08/2018 CT of the abdomen and pelvis. CT chest of 01/18/2018, and 07/16/2017 HISTORY: ORDERING SYSTEM PROVIDED HISTORY: H/o right perinephric stranding and hydronephrosis and h/o SBO TECHNOLOGIST PROVIDED HISTORY: Additional Contrast?->None FINDINGS: Cardiovascular: Unremarkable. Mediastinum: No adenopathy, abnormal fluid or gas collections. Aorta:  No atherosclerosis. Three vessel aortic arch. Heart: Series 4, image 15. No coronary artery calcification. Pulmonary:  Unremarkable. Lungs/pleura: Lungs: 8 and 5 mm nodules are identified in the right apex. Multifocal areas of consolidation are identified in the bilateral lower lobes, inferior portion of the posterior right upper lobe, middle lobe, and subpleural left upper lobe. Mosaic attenuation in the lung parenchyma is identified. There is a 3.8 x 6.2 x 5.6 cm area of rounded consolidation in the subpleural posterior basal left lower lobe. There is adjacent pleural thickening and possible mild volume loss in the posterior basal left lower lobe. Bronchovascular bundle curves into the lesion. Airways: Unremarkable. Pleura: No pleural effusion or pneumothorax. Organs: Liver:  A 13 mm cyst within the left hepatic dome. Subcentimeter hypoattenuating lesion measuring up to 5 mm in segment 7 of the right hepatic lobe. Overall there is heterogenous attenuation of the liver parenchyma. Gallbladder:  Cholecystectomy. Biliary: Unremarkable. Pancreas: Unremarkable. Spleen: Unremarkable. Adrenals:  Unremarkable. Kidneys: Right renal nephrostomy identified. Pelvis: Bladder: Urinary bladder is underdistended with wall thickening. Reproductive: Unremarkable. GI/ Bowel: No wall thickening, pericolonic inflammation or diverticular disease. Partial gastrectomy has been performed. Unremarkable appendix. Stomach and proximal small bowel appear unremarkable. Peritoneum/ Retroperitoneum: Moderate free fluid in the abdomen and pelvis identified. Moderate fecal accumulation in the colon. Bones/ Soft Tissues: Kyphoplasty. Scoliosis. 1. Similar-appearing 3.8 x 6.2 x 5.6 cm area consolidation in the posterior basal left lower lobe. Differential considerations would include rounded atelectasis versus mass. Size has not significantly changed compared to prior examinations dating back to July of 2017. 2. Multifocal areas of bronchovascular consolidation as well as peripherally oriented consolidation appear shifted in distribution but otherwise unchanged compared to January 2018.   Atypical infectious or inflammatory etiologies are considered. Neoplasm is unfortunately not excluded. 3. There is a moderate amount of free fluid seen throughout the abdomen and pelvis. 4. Right sided nephrostomy noted. 5. Other incidental findings as described. Ct Abdomen Pelvis W Iv Contrast Additional Contrast? None    Result Date: 3/11/2018  EXAMINATION: CT OF THE ABDOMEN AND PELVIS WITH CONTRAST; CT OF THE CHEST WITH CONTRAST 3/10/2018 8:45 pm TECHNIQUE: CT of the abdomen and pelvis was performed with the administration of intravenous contrast. Multiplanar reformatted images are provided for review. Dose modulation, iterative reconstruction, and/or weight based adjustment of the mA/kV was utilized to reduce the radiation dose to as low as reasonably achievable.; CT of the chest was performed with the administration of intravenous contrast. Multiplanar reformatted images are provided for review. Dose modulation, iterative reconstruction, and/or weight based adjustment of the mA/kV was utilized to reduce the radiation dose to as low as reasonably achievable. COMPARISON: 02/04/2018, and 01/08/2018 CT of the abdomen and pelvis. CT chest of 01/18/2018, and 07/16/2017 HISTORY: ORDERING SYSTEM PROVIDED HISTORY: H/o right perinephric stranding and hydronephrosis and h/o SBO TECHNOLOGIST PROVIDED HISTORY: Additional Contrast?->None FINDINGS: Cardiovascular: Unremarkable. Mediastinum: No adenopathy, abnormal fluid or gas collections. Aorta:  No atherosclerosis. Three vessel aortic arch. Heart: Series 4, image 15. No coronary artery calcification. Pulmonary:  Unremarkable. Lungs/pleura: Lungs: 8 and 5 mm nodules are identified in the right apex. Multifocal areas of consolidation are identified in the bilateral lower lobes, inferior portion of the posterior right upper lobe, middle lobe, and subpleural left upper lobe. Mosaic attenuation in the lung parenchyma is identified.  There is a 3.8 x 6.2 x 5.6 cm area of rounded consolidation in the subpleural posterior basal left lower lobe. There is adjacent pleural thickening and possible mild volume loss in the posterior basal left lower lobe. Bronchovascular bundle curves into the lesion. Airways: Unremarkable. Pleura: No pleural effusion or pneumothorax. Organs: Liver:  A 13 mm cyst within the left hepatic dome. Subcentimeter hypoattenuating lesion measuring up to 5 mm in segment 7 of the right hepatic lobe. Overall there is heterogenous attenuation of the liver parenchyma. Gallbladder:  Cholecystectomy. Biliary: Unremarkable. Pancreas: Unremarkable. Spleen: Unremarkable. Adrenals:  Unremarkable. Kidneys: Right renal nephrostomy identified. Pelvis: Bladder: Urinary bladder is underdistended with wall thickening. Reproductive: Unremarkable. GI/ Bowel: No wall thickening, pericolonic inflammation or diverticular disease. Partial gastrectomy has been performed. Unremarkable appendix. Stomach and proximal small bowel appear unremarkable. Peritoneum/ Retroperitoneum: Moderate free fluid in the abdomen and pelvis identified. Moderate fecal accumulation in the colon. Bones/ Soft Tissues: Kyphoplasty. Scoliosis. 1. Similar-appearing 3.8 x 6.2 x 5.6 cm area consolidation in the posterior basal left lower lobe. Differential considerations would include rounded atelectasis versus mass. Size has not significantly changed compared to prior examinations dating back to July of 2017. 2. Multifocal areas of bronchovascular consolidation as well as peripherally oriented consolidation appear shifted in distribution but otherwise unchanged compared to January 2018. Atypical infectious or inflammatory etiologies are considered. Neoplasm is unfortunately not excluded. 3. There is a moderate amount of free fluid seen throughout the abdomen and pelvis. 4. Right sided nephrostomy noted. 5. Other incidental findings as described.      Xr Chest Portable    Result Date: 3/10/2018  EXAMINATION: SINGLE VIEW OF THE CHEST 3/10/2018 7:15 pm COMPARISON: Prior studies including 02/09/2018 HISTORY: ORDERING SYSTEM PROVIDED HISTORY: sepsis work up TECHNOLOGIST PROVIDED HISTORY: Reason for exam:->sepsis work up Ordering Physician Provided Reason for Exam: sepsis work up Acuity: Unknown Type of Exam: Unknown FINDINGS: There is a persistent area of left retrocardiac consolidation. Patchy right lung airspace disease has mildly improved over the past 1 month. Disease remains in the right midlung. There is trace right pleural fluid. Heart size upper limits of normal.  No acute bone finding. There is a pigtail nephrostomy tube catheter in the right abdomen. Right lung airspace disease has improved from the study 1 month ago. Mild disease remains. There is a persistent area of left retrocardiac consolidation. Mass or abscess should be considered. Follow-up chest CT with IV contrast would be helpful. No orders to display       LABS:  No results found for this visit on 03/19/18. EMERGENCY DEPARTMENT COURSE:  I did discuss this case with my attending. We agree observation is appropriate this time. 2140: Patient has remained without seizures. [facility] route to take the patient back to ECF. Patient understands to return to the emergency room for any worsening symptoms or new concerns    CONSULTS:  None    PROCEDURES:  None    FINAL IMPRESSION      1.  Seizure-like activity ([facility] [address]          DISPOSITION / PLAN     DISPOSITION Decision To Discharge    PATIENT REFERRED TO:  Ben Hankins MD  [address] [place] [address] [address] [phone]    Schedule an appointment as soon as possible for a visit         DISCHARGE MEDICATIONS:  New Prescriptions    No medications on file       Yuliana Hernandez, [address] [facility]   Emergency Medicine Nurse Practitioner    (Please note that portions of this note were completed with a voice recognition program.  Efforts were made to edit the dictations but occasionally words are mis-transcribed.)        Yuliana Hernandez CNP  03/19/18 2221

## 2018-03-20 NOTE — ED PROVIDER NOTES
[facility]  ED     Emergency Department     Faculty Attestation    I performed a history and physical examination of the patient and discussed management with the resident. I reviewed the residents note and agree with the documented findings and plan of care. Any areas of disagreement are noted on the chart. I was personally present for the key portions of any procedures. I have documented in the chart those procedures where I was not present during the key portions. I have reviewed the emergency nurses triage note. I agree with the chief complaint, past medical history, past surgical history, allergies, medications, social and family history as documented unless otherwise noted below. For Physician Assistant/ Nurse Practitioner cases/documentation I have personally evaluated this patient and have completed at least one if not all key elements of the E/M (history, physical exam, and MDM). Additional findings are as noted. Patient here for possible seizures. Does not have a diagnosed seizure disorder, on no antiepileptics. Multiple visits recently for psychogenic nonepileptic seizures. When asked the patient about possible \"pseudoseizures,\" she replied that \"I know I have pseudoseizures and I'm supposed to control them. \"  She has no completed this time.   She is alert and oriented, resting comfortably chatting with the medic, normal neuro exam.  We'll observe, plan to discharge      Critical Care     none    Shyann Mauricio MD, Gavin Albert  Attending Emergency  Physician             Shyann Mauricio MD  03/19/18 2042

## 2018-03-20 NOTE — ED NOTES
Bed: 01  Expected date: 3/19/18  Expected time: 8:07 PM  Means of arrival: [facility]  Comments:  LS Via Tito Buchanan, RN  03/19/18 2020

## 2018-12-28 ENCOUNTER — TELEPHONE (OUTPATIENT)
Dept: INTERNAL MEDICINE | Age: 48
End: 2018-12-28

## 2018-12-28 NOTE — TELEPHONE ENCOUNTER
Fax received from Hospice stating pt  on 18. Duane Bourdon letter is in media in pts chart. Health Maintenance   Topic Date Due    Shingles Vaccine (1 of 2 - 2 Dose Series) 2018    Flu vaccine (1) 2018    Colon Cancer Screen FIT/FOBT  2019    Breast cancer screen  2019    Potassium monitoring  2019    Creatinine monitoring  2019    Cervical cancer screen  2020    Lipid screen  2021    DTaP/Tdap/Td vaccine (2 - Td) 2025    Pneumococcal med risk  Completed    HIV screen  Completed             (applicable per patient's age: Cancer Screenings, Depression Screening, Fall Risk Screening, Immunizations)    Hemoglobin A1C (%)   Date Value   2016 5.2   2014 5.7     LDL Cholesterol (mg/dL)   Date Value   2016 59     AST (U/L)   Date Value   2018 12     ALT (U/L)   Date Value   2018 8     BUN (mg/dL)   Date Value   2018 20      (goal A1C is < 7)   (goal LDL is <100) need 30-50% reduction from baseline     BP Readings from Last 3 Encounters:   18 129/74   03/15/18 108/64   18 120/72    (goal /80)      All Future Testing planned in [facility]:  Lab Frequency Next Occurrence   CBC With Auto Differential Once 01/10/2018   IR REMOVE REPLACE INTERNAL URETERAL STENT Once 2018   Transfuse RBC     Phase I - warming device     Phase I - cardiac monitor     Phase I & II - metered glucose         Next Visit Date:  No future appointments.          Patient Active Problem List:     Smoking greater than 40 pack years     Mass of lower lobe of left lung     Normocytic anemia     Thrombocytosis (HCC)     Protein-calorie malnutrition, severe (HCC)     Leukocytosis     Elevated alkaline phosphatase level     Liver mass     Non-intractable vomiting with nausea     Multiple lung nodules on CT     Hypoproliferative anemia (HCC)     Anxiety     Depression     Altered mental status     AC globulin factor VII (stable) deficiency

## 2019-07-27 NOTE — ED NOTES
Pt ambulated to restroom with slow but steady gait.      Alberta Martinez RN  10/19/17 0600 Pt c/o laceration to right knee after chopping wood with a hatchet approx 1 hr ago. Bleeding controlled. Pt states last Tetanus was approx 5 yrs ago. Denies other injuries.

## 2022-05-06 NOTE — ED NOTES
INR within goal range. Continue current regimen. Recheck INR in 4 weeks. Kerri Sanchez to give them an ETA of 0200.

## (undated) DEVICE — GAUZE,SPONGE,FLUFF,6"X6.75",STRL,5/TRAY: Brand: MEDLINE

## (undated) DEVICE — GARMENT COMPR STD FOR 17IN CALF UNIF THER FLOTRN

## (undated) DEVICE — Y-TYPE TUR/BLADDER IRRIGATION SET, REGULATING CLAMP

## (undated) DEVICE — GLOVE ORANGE PI 8   MSG9080

## (undated) DEVICE — 10FR FRAZIER SUCTION HANDLE: Brand: CARDINAL HEALTH

## (undated) DEVICE — CONTAINER,SPECIMEN,4OZ,OR STRL: Brand: MEDLINE

## (undated) DEVICE — SVMMC HD AND NK PK

## (undated) DEVICE — SEPTAL BUTTON 1524110 NASAL 2 PART

## (undated) DEVICE — Device

## (undated) DEVICE — ADAPTER URO SCP UROLOK LL

## (undated) DEVICE — DRAPE IRRIG FLD WRM W44XL66IN W/ AORN STD PRTBL INTRATEMP

## (undated) DEVICE — GLOVE ORANGE PI 7 1/2   MSG9075

## (undated) DEVICE — SUTURE

## (undated) DEVICE — Z INACTIVE USE 2527070 DRAPE SURG W40XL44IN UNDERBUTTOCK SMS POLYPR W/ PCH BK DISP

## (undated) DEVICE — DRESSING 440402 MEROCEL 10PK STD 8CM: Brand: MEROCEL

## (undated) DEVICE — GLOVE SURG SZ 65 THK91MIL LTX FREE SYN POLYISOPRENE

## (undated) DEVICE — TOWEL SURG W16XL26IN WHT NONFENESTRATED ST 4 PER PK

## (undated) DEVICE — GUIDEWIRE URO L150CM DIA0.035IN STIFF NIT HYDRPHLC STR TIP

## (undated) DEVICE — COVER,LIGHT HANDLE,FLX,2/PK: Brand: MEDLINE INDUSTRIES, INC.

## (undated) DEVICE — PACK SURG ABD SVMMC

## (undated) DEVICE — BLADE MYR OFFSET 45DEG SPEAR TIP NAR SHFT W/ RND KNURLED

## (undated) DEVICE — STERILE COTTON BALLS LARGE 5/P: Brand: MEDLINE

## (undated) DEVICE — GLOVE ORANGE PI 7   MSG9070

## (undated) DEVICE — PACK PROCEDURE SURG CYSTO SVMMC LF

## (undated) DEVICE — SUTURE PERMAHAND SZ 3-0 L18IN NONABSORBABLE BLK L26MM SH C013D

## (undated) DEVICE — SVMMC PEDS/UROLOGY MINOR PACK: Brand: MEDLINE INDUSTRIES, INC.

## (undated) DEVICE — KENDALL SCD EXPRESS SLEEVES, KNEE LENGTH, MEDIUM: Brand: KENDALL SCD

## (undated) DEVICE — SINGLE ACTION PUMPING SYSTEM

## (undated) DEVICE — LIEBERMAN INTRODUCER: Brand: LIEBERMAN

## (undated) DEVICE — CONTROL SYRINGE LUER-LOCK TIP: Brand: MONOJECT

## (undated) DEVICE — TUBING AMB

## (undated) DEVICE — CHLORAPREP 26ML ORANGE

## (undated) DEVICE — 12FR FRAZIER SUCTION HANDLE: Brand: CARDINAL HEALTH

## (undated) DEVICE — SUTURE VCRL + SZ 0 L27IN ABSRB UD CT-1 L36MM 1/2 CIR TAPR VCP260H

## (undated) DEVICE — TUBING, SUCTION, 9/32" X 20', STRAIGHT: Brand: MEDLINE INDUSTRIES, INC.

## (undated) DEVICE — GLOVE EXAM M L95IN FNGR THK35MIL PALM THK24MIL OFF WHT

## (undated) DEVICE — PAD,ABDOMINAL,5"X9",ST,LF,25/BX: Brand: MEDLINE INDUSTRIES, INC.